# Patient Record
Sex: FEMALE | Race: WHITE | NOT HISPANIC OR LATINO | Employment: STUDENT
[De-identification: names, ages, dates, MRNs, and addresses within clinical notes are randomized per-mention and may not be internally consistent; named-entity substitution may affect disease eponyms.]

---

## 2017-01-13 ENCOUNTER — RECORDS - HEALTHEAST (OUTPATIENT)
Dept: ADMINISTRATIVE | Facility: OTHER | Age: 22
End: 2017-01-13

## 2017-03-19 ENCOUNTER — RECORDS - HEALTHEAST (OUTPATIENT)
Dept: ADMINISTRATIVE | Facility: OTHER | Age: 22
End: 2017-03-19

## 2017-04-27 ENCOUNTER — HOSPITAL ENCOUNTER (OUTPATIENT)
Dept: BEHAVIORAL HEALTH | Facility: CLINIC | Age: 22
Discharge: HOME | End: 2017-04-27
Attending: PSYCHOLOGIST | Admitting: PSYCHOLOGIST
Payer: COMMERCIAL

## 2017-04-27 ENCOUNTER — BEH TREATMENT PLAN (OUTPATIENT)
Dept: BEHAVIORAL HEALTH | Facility: CLINIC | Age: 22
End: 2017-04-27
Attending: PSYCHIATRY & NEUROLOGY

## 2017-04-27 PROCEDURE — 90791 PSYCH DIAGNOSTIC EVALUATION: CPT

## 2017-04-27 ASSESSMENT — PAIN SCALES - GENERAL: PAINLEVEL: MILD PAIN (2)

## 2017-04-27 NOTE — PROGRESS NOTES
"Initial Individual Treatment Plan     Patient: Nehemias Mascorro   MRN: [id number]  : 1995  Age: 21 year old  Sex: female    Diagnostic Assessment Date / Date of Initial Individual Treatment Plan: 17      Immediate Health Concerns:  No immediate health concerns, however, client reported the following: concussion related symptoms including headaches, noise and light sensitivity     Immediate Safety Concerns:  No. Per history, see safety plan.    Identify the issues to be addressed in treatment:  Symptom Management, Personal Safety, Community Resources/Discharge Planning, Develop / Improve Independent Living Skills, Develop Socialization / Interpersonal Relationship Skills and Physical Health     Client Initial Individualized Goals for Treatment: \"To find ways to manage my frustration and depression and anxiety symptoms\".    Initial Treatment suggestions for the client during the time between Diagnostic Assessment and completion of the Individualized Treatment Plan:  Follow Safety Plan   Ask for more information, support and/or assistance as needed.  Follow up with providers/community supports as needed: neurologist and therapist  Report increases or changes in symptoms to staff.  Report any personal safety concerns to staff.   Take medications as prescribed.  Report medication changes and/or side effects to staff.  Attend and participate in groups as scheduled or notify staff if unable to do so.  Report any use of substances to staff as this may impact your symptoms and/or  personal safety.  Notify staff if you have any other issues that need to be addressed. This may include  any current abuse / neglect / exploitation or other vulnerability.  Follow recommendations of your treatment team and discuss concerns if not in  agreement.     Treatment Team Responsible: Day Treatment (DT)      Therapeutic Interventions/Treatment Strategies may include:  Support, Redirection, Feedback, Limit/Boundaries, Safety " Assessments, Structured Activity, Problem Solving, Clarification, Education, Motivational Enhancement and Relapse Prevention as needed.    Xiomara Castro, [facility]

## 2017-04-27 NOTE — PROGRESS NOTES
Acknowledgement of Current Treatment Plan       I have reviewed my treatment plan with my therapist / counselor on 7/26/17. I agree with the plan as it is written in the electronic health record.    Name Signature   Nehemias Mascorro    Name of Therapist / Counselor    Terri Cherry, Therapist

## 2017-04-27 NOTE — PROGRESS NOTES
"MY COPING PLAN FOR SAFETY          Things that are most important to me & reasons for living: My family, I feel like It's not my decision when I die.\"              My relapse Warning Signs: Increase in thoughts, increase in tearfulness, increased anxiety and anger  I will make my environment safer by: nothing required  When in crisis, I will use the following coping skills: (relaxation/self-soothing/distraction/activity):  Ask dad to take me driving, listen to music  I will use My Support System:   Personal Supports: I can ask for reminders, support, or for them to stay with me  Trusted Friend/s:   Alma Rosa Lynn  Family Member/s : Mom and Dad                  Professional Supports: I can ask for med changes, emergency appointments, help  Psychiatrist:    Therapist: David CLIFTON     Other:     Crisis Lines I can call to discuss options and to access support:  [facility]:    [place] ([facility]) [phone]   [place] (Mental Health Crisis Program) [phone]   [place]  [phone]   [place] (COPE) [phone]   [place] [phone]   [place] ([facility] Crisis [facility]) [phone]   [place] ([place], [place], [place], [place], [facility]) [phone]   Other Crisis Lines:   [facility] (Counseling) [phone]   National Suicide Prevention [phone]   Suicide Prevention [phone]      X   I will attend my Treatment Program and talk to my one of my therapists:      X   I agree that I will report any current / recent thoughts, impulses or plans of suicide,           homicide, self injurious behavior or substance use .   X  I agree that I will not act on the above symptoms, but will follow the above plan         instead.  I can also go to the Emergency Department at [facility]: [facility] [phone] or call: 911                                "
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
## 2017-04-28 RX ORDER — NORETHINDRONE ACETATE AND ETHINYL ESTRADIOL 1MG-20(21)
1 KIT ORAL DAILY
COMMUNITY
End: 2022-01-21

## 2017-05-03 ENCOUNTER — HOSPITAL ENCOUNTER (OUTPATIENT)
Dept: BEHAVIORAL HEALTH | Facility: CLINIC | Age: 22
End: 2017-05-03
Attending: PSYCHIATRY & NEUROLOGY
Payer: COMMERCIAL

## 2017-05-03 PROBLEM — F32.9 MAJOR DEPRESSIVE DISORDER: Status: ACTIVE | Noted: 2017-05-03

## 2017-05-03 PROCEDURE — 97150 GROUP THERAPEUTIC PROCEDURES: CPT | Mod: GO

## 2017-05-03 PROCEDURE — H2012 BEHAV HLTH DAY TREAT, PER HR: HCPCS

## 2017-05-03 ASSESSMENT — ANXIETY QUESTIONNAIRES
6. BECOMING EASILY ANNOYED OR IRRITABLE: MORE THAN HALF THE DAYS
7. FEELING AFRAID AS IF SOMETHING AWFUL MIGHT HAPPEN: NOT AT ALL
2. NOT BEING ABLE TO STOP OR CONTROL WORRYING: NOT AT ALL
3. WORRYING TOO MUCH ABOUT DIFFERENT THINGS: SEVERAL DAYS
5. BEING SO RESTLESS THAT IT IS HARD TO SIT STILL: NOT AT ALL
IF YOU CHECKED OFF ANY PROBLEMS ON THIS QUESTIONNAIRE, HOW DIFFICULT HAVE THESE PROBLEMS MADE IT FOR YOU TO DO YOUR WORK, TAKE CARE OF THINGS AT HOME, OR GET ALONG WITH OTHER PEOPLE: SOMEWHAT DIFFICULT
1. FEELING NERVOUS, ANXIOUS, OR ON EDGE: SEVERAL DAYS
GAD7 TOTAL SCORE: 5

## 2017-05-03 ASSESSMENT — PATIENT HEALTH QUESTIONNAIRE - PHQ9: 5. POOR APPETITE OR OVEREATING: SEVERAL DAYS

## 2017-05-04 ASSESSMENT — PATIENT HEALTH QUESTIONNAIRE - PHQ9: SUM OF ALL RESPONSES TO PHQ QUESTIONS 1-9: 11

## 2017-05-04 ASSESSMENT — ANXIETY QUESTIONNAIRES: GAD7 TOTAL SCORE: 5

## 2017-05-04 NOTE — PROGRESS NOTES
"  Adult Mental Health Outpatient Group Therapy Progress Note     Client Initial Individualized Goals for Treatment: \"To find ways to manage my frustration and depression and anxiety symptoms\".    See Initial Treatment suggestions for the client during the time between Diagnostic Assessment and completion of the Master Individualized Treatment Plan.    Treatment Goals:     see above.     Area of Treatment Focus:  Symptom Management, Develop / Improve Independent Living Skills and Develop Socialization / Interpersonal Relationship Skills    Therapeutic Interventions/Treatment Strategies:  Support, Feedback, Safety Assessments, Structured Activity and Problem Solving    Response to Treatment Strategies:  Accepted Feedback, Gave Feedback, Listened, Focused on Goals, Attentive and Accepted Support    Name of Group:  OT Clinic     Description and Outcome:  Pt attended and participated in a structured occupational therapy group where intervention focused on coping through structured hands-on activities to improve function in valued roles, routines, and independent living skills. Client presented to group with an anxious affect. Pt was oriented to the group and verbalized understanding of behavioral expectations in group. Writer provided education re: OT and therapeutic benefits of OT clinic. She initiated a novel multi step task. Fair task focus. Minimal interaction with peers in session. Client would benefit from additional opportunities to practice and implement content from this session. Client addressed ITP goal number initial goal in this session.     Is this a Weekly Review of the Progress on the Treatment Plan?  No          "

## 2017-05-05 ENCOUNTER — HOSPITAL ENCOUNTER (OUTPATIENT)
Dept: BEHAVIORAL HEALTH | Facility: CLINIC | Age: 22
End: 2017-05-05
Attending: PSYCHIATRY & NEUROLOGY
Payer: COMMERCIAL

## 2017-05-05 PROCEDURE — H2012 BEHAV HLTH DAY TREAT, PER HR: HCPCS

## 2017-05-05 PROCEDURE — 97150 GROUP THERAPEUTIC PROCEDURES: CPT | Mod: GO

## 2017-05-05 NOTE — PROGRESS NOTES
"  Adult Mental Health Outpatient Group Therapy Progress Note     Client Initial Individualized Goals for Treatment: \"To find ways to manage my frustration and depression and anxiety symptoms\".     See Initial Treatment suggestions for the client during the time between Diagnostic Assessment and completion of the Master Individualized Treatment Plan.     Treatment Goals:  see above.     Area of Treatment Focus:  Symptom Management and Orientation to program and group psychotherapy.    Therapeutic Interventions/Treatment Strategies:  Support, Feedback and Cognitive Behavioral Therapy    Response to Treatment Strategies:  Accepted Feedback and Gave Feedback    Name of Group:  Group Psychotherapy    Description and Outcome:  Nehemias Bailey) was attending her first day in the program.  She was oriented to group therapy, and the guidelines for participation.  Lebron was offered the opportunity to share with the group.  She said she was feeling anxious about being \"new\" and her feelings were validated by the group.  Lebron talked about her concussion, and the long recovery process (memory and cognitive issues, headaches).  She said her father is a , and she travels with him sometimes.  Lebron shared the challenges with a recent trip to [place] that had undergone recent changes that had upset the dynamic in the group.  She expressed frustration at having to \"act nice\" and how what she has to portray on the outside often conflicts with her inner thoughts and feelings.  The group validated this, and a discussion ensued focused on the roles one has to play in different life situations, and the challenges of \"acting okay\" when one is depressed/anxious.  Lebron offered support to other group members.    Is this a Weekly Review of the Progress on the Treatment Plan?  No          "

## 2017-05-05 NOTE — PROGRESS NOTES
"Mental Health Outpatient Group Therapy Progress Note         Client Initial Individualized Goals for Treatment: \"To find ways to manage my frustration and depression and anxiety symptoms\".     See Initial Treatment suggestions for the client during the time between Diagnostic Assessment and completion of the Master Individualized Treatment Plan.     Treatment Goals:     see above.      Area of Treatment Focus:  Symptom Management, Develop / Improve Independent Living Skills and Develop Socialization / Interpersonal Relationship Skills     Therapeutic Interventions/Treatment Strategies:  Support, Feedback, Safety Assessments, Structured Activity and Problem Solving     Response to Treatment Strategies:  Accepted Feedback, Gave Feedback, Listened, Focused on Goals, Attentive and Accepted Support     Name of Group: Psychotherapy group     Description and Outcome:  Pt attended and participated in the psychotherapy group.  She focused on describing some of her personal story.  This therapist offered validation and support.  She shared about how her work injury has changed her life.  Her life being changed mostly in negative ways.  She was articulate and willing to participate.  She has both anxiety and depression and cannot picture what the rest of her life will look like.      Is this a Weekly Review of the Progress on the Treatment Plan?  No.      Are Treatment Plan Goals being addressed?  Yes, continue treatment goals        Are Treatment Plan Strategies to Address Goals Effective?  Yes, continue treatment strategies        Are there any current contracts in place?  No                                         "

## 2017-05-05 NOTE — PROGRESS NOTES
"  Adult Mental Health Outpatient Group Therapy Progress Note     Client Initial Individualized Goals for Treatment: \"To find ways to manage my frustration and depression and anxiety symptoms\".    See Initial Treatment suggestions for the client during the time between Diagnostic Assessment and completion of the Master Individualized Treatment Plan.    Treatment Goals:     see above.     Area of Treatment Focus:  Symptom Management, Develop / Improve Independent Living Skills and Develop Socialization / Interpersonal Relationship Skills    Therapeutic Interventions/Treatment Strategies:  Support, Feedback, Safety Assessments, Structured Activity and Problem Solving    Response to Treatment Strategies:  Accepted Feedback, Gave Feedback, Listened, Focused on Goals, Attentive and Accepted Support    Name of Group:  OT Clinic     Description and Outcome:  Pt attended and participated in a structured occupational therapy group where intervention focused on coping through structured hands-on activities to improve function in valued roles, routines, and independent living skills. Client presented to group with a calm even affect, engaged in group conversation. She continued work on a multi step project and made good progress. She also discussed with writer that she would like to try painting in the future.  Client would benefit from additional opportunities to practice and implement content from this session. Client addressed initial goal in this session.     Is this a Weekly Review of the Progress on the Treatment Plan?  Yes.      Are Treatment Plan Goals being addressed?  Yes, continue treatment goals      Are Treatment Plan Strategies to Address Goals Effective?  Yes, continue treatment strategies      Are there any current contracts in place?  No                "

## 2017-05-08 ENCOUNTER — HOSPITAL ENCOUNTER (OUTPATIENT)
Dept: BEHAVIORAL HEALTH | Facility: CLINIC | Age: 22
End: 2017-05-08
Attending: PSYCHIATRY & NEUROLOGY
Payer: COMMERCIAL

## 2017-05-08 PROCEDURE — 97150 GROUP THERAPEUTIC PROCEDURES: CPT | Mod: GO

## 2017-05-08 PROCEDURE — H2012 BEHAV HLTH DAY TREAT, PER HR: HCPCS

## 2017-05-08 NOTE — PROGRESS NOTES
"  Adult Mental Health Outpatient Group Therapy Progress Note     Client Initial Individualized Goals for Treatment: \"To find ways to manage my frustration and depression and anxiety symptoms\".     See Initial Treatment suggestions for the client during the time between Diagnostic Assessment and completion of the Master Individualized Treatment Plan.     Treatment Goals:     see above.     Area of Treatment Focus:  Symptom Management, Personal Safety and Develop / Improve Independent Living Skills    Therapeutic Interventions/Treatment Strategies:  Support, Feedback, Safety Assessments, Structured Activity, Clarification and Education    Response to Treatment Strategies:  Accepted Feedback, Gave Feedback, Listened, Focused on Goals, Accepted Support and Alert    Name of Group:   Mental Health Management     Description and Outcome:  Nehemias actively participated in a group discussion focused on possible upcoming topics for this group.  Nehemias identified she would like to learn more about: time management, problem solving, procrastination, feeling overwhelmed, managing anger and aggravation, and sensory based coping skills.  For the remainder of the group, Nehemias actively participated in a structured psycho-educational activity focused on building communication, trust, and group cohesion.  She was active in listening to peers and sharing her own interests.  She had adequate concentration and reported the activity was helpful.         Is this a Weekly Review of the Progress on the Treatment Plan?  No        "

## 2017-05-09 NOTE — PROGRESS NOTES
"  Adult Mental Health Outpatient Group Therapy Progress Note     Client Initial Individualized Goals for Treatment: \"To find ways to manage my frustration and depression and anxiety symptoms\".    See Initial Treatment suggestions for the client during the time between Diagnostic Assessment and completion of the Master Individualized Treatment Plan.    Treatment Goals:     see above.     Area of Treatment Focus:  Symptom Management, Develop / Improve Independent Living Skills and Develop Socialization / Interpersonal Relationship Skills    Therapeutic Interventions/Treatment Strategies:  Support, Feedback, Safety Assessments, Structured Activity and Problem Solving    Response to Treatment Strategies:  Accepted Feedback, Gave Feedback, Listened, Focused on Goals, Attentive and Accepted Support    Name of Group:  OT Clinic     Description and Outcome:  Pt attended and participated in a structured occupational therapy group where intervention focused on coping through structured hands-on activities to improve function in valued roles, routines, and independent living skills. Client had a bright affect during group. Self directed with ongoing task. Client discussed educational and work interests that she has. Reported feeling hopeful to hear stories of people that have made careers out of a particular skill that she has studied. Adequate focus to complete structured task in session. Client demonstrated understanding of session content by increase independence in clinic. Client addressed ITP goal number initial goal in this session.    Is this a Weekly Review of the Progress on the Treatment Plan?  No          "

## 2017-05-09 NOTE — PROGRESS NOTES
"Adult Mental Health Outpatient Group Therapy Progress Note         Client Initial Individualized Goals for Treatment: \"To find ways to manage my frustration and depression and anxiety symptoms\".      See Initial Treatment suggestions for the client during the time between Diagnostic Assessment and completion of the Master Individualized Treatment Plan.      Treatment Goals:      see above.      Area of Treatment Focus:  Symptom Management, Personal Safety and Develop / Improve Independent Living Skills     Therapeutic Interventions/Treatment Strategies:  Support, Feedback, Safety Assessments, Structured Activity, Clarification and Education     Response to Treatment Strategies:  Accepted Feedback, Gave Feedback, Listened, Focused on Goals, Accepted Support and Alert     Name of Group:   Group Psychotherapy       Description and Outcome:  Nehemias actively participated in the group discussion today.  At first she was reporting that she was not feeling all that well.  Then she shared that she was thinking about death over the weekend.  She also felt like she had more anxiety over the weekend and she reported having panic attacks.  When she was feeling suicidal she did tell her mother. Her mother is supportive and validating.  Her mother reminded her that she would not always feel as bad as she does now.  Not knowing what her future will be like, is  hard for her.  She feels out of control and everyday it seems like others are telling her that something else is wrong with her.  This writer asked her about trying to stay more in the present moment.  She can look back at the past, think about the future but to worry about the future is obviously not helpful for her because she always goes to the negative, She demonstrates catastrophic thinking.  Her safety plan was also brought to her attention.  Her safety was assessed during the group process.  She committed to stay safe and to tell someone if she had increasing " suicidal ideation with a plan.        Is this a Weekly Review of the Progress on the Treatment Plan?  No

## 2017-05-10 ENCOUNTER — HOSPITAL ENCOUNTER (OUTPATIENT)
Dept: BEHAVIORAL HEALTH | Facility: CLINIC | Age: 22
End: 2017-05-10
Attending: PSYCHIATRY & NEUROLOGY
Payer: COMMERCIAL

## 2017-05-10 PROCEDURE — H2012 BEHAV HLTH DAY TREAT, PER HR: HCPCS

## 2017-05-10 PROCEDURE — 97150 GROUP THERAPEUTIC PROCEDURES: CPT | Mod: GO

## 2017-05-12 NOTE — PROGRESS NOTES
"  Adult Mental Health Outpatient Group Therapy Progress Note     Client Initial Individualized Goals for Treatment: \"To find ways to manage my frustration and depression and anxiety symptoms\".    See Initial Treatment suggestions for the client during the time between Diagnostic Assessment and completion of the Master Individualized Treatment Plan.    Treatment Goals:     see above.     Area of Treatment Focus:  Symptom Management, Develop / Improve Independent Living Skills and Develop Socialization / Interpersonal Relationship Skills    Therapeutic Interventions/Treatment Strategies:  Support, Feedback, Safety Assessments, Structured Activity and Problem Solving    Response to Treatment Strategies:  Accepted Feedback, Gave Feedback, Listened, Focused on Goals, Attentive and Accepted Support    Name of Group:  OT Clinic     Description and Outcome:  Pt attended and participated in a structured occupational therapy group where intervention focused on coping through structured hands-on activities to improve function in valued roles, routines, and independent living skills. Client had a calm, even affect in session. She assertively sought assistance to locate needed materials in session. She demonstrated good problem solving to overcome a barrier that arose during task. Adequate task focus. Client demonstrated understanding of session content by improved independence in session. Client addressed ITP goal number initial goal in this session.     Is this a Weekly Review of the Progress on the Treatment Plan?  No          "

## 2017-05-15 ENCOUNTER — HOSPITAL ENCOUNTER (OUTPATIENT)
Dept: BEHAVIORAL HEALTH | Facility: CLINIC | Age: 22
End: 2017-05-15
Attending: PSYCHIATRY & NEUROLOGY
Payer: COMMERCIAL

## 2017-05-15 PROCEDURE — 97150 GROUP THERAPEUTIC PROCEDURES: CPT | Mod: GO

## 2017-05-15 PROCEDURE — H2012 BEHAV HLTH DAY TREAT, PER HR: HCPCS

## 2017-05-16 NOTE — PROGRESS NOTES
"Adult Mental Health Outpatient Group Therapy Progress Note         Client Initial Individualized Goals for Treatment: \"To find ways to manage my frustration and depression and anxiety symptoms\".      See Initial Treatment suggestions for the client during the time between Diagnostic Assessment and completion of the Master Individualized Treatment Plan.      Treatment Goals:      see above.      Area of Treatment Focus:  Symptom Management, Personal Safety and Develop / Improve Independent Living Skills     Therapeutic Interventions/Treatment Strategies:  Support, Feedback, Safety Assessments, Structured Activity, Clarification and Education     Response to Treatment Strategies:  Accepted Feedback, Gave Feedback, Listened, Focused on Goals, Accepted Support and Alert     Name of Group:   Group Psychotherapy       Description and Outcome:  Nehemias was feeling ill again today.  She does stay in the group but struggles as she participates.  She is reporting that her sense of smell has increased and some of the group rooms bother her.  She continues to report both headaches and stomach pain.  She generally looks uncomfortable.  When she is offered a weighted blanket she accepts it.  She states that the blanket is very helpful for her.  Her weekend was good with family celebration of Mother's day and always going to a [employer] service.  She shared with the group that she is having nightmares about school.  She knows others that are graduating and she is starting to realize that her schooling will be affected by her injury at work and the complications caused by it.  When she thinks about getting behind it does affect her self esteem and is causing her to feel depressed.  When she thinks about her future she also gets anxious.  Today she also mentioned some resentment towards her parents.  She is the middle child and has always done really well.  She is starting to realize that her parents have left her to own devices " because she has been so reliable in the past.  She is not always feeling validated when she is having increased negative feelings.  Being in a negative place is new for her and her family.  She having passive suicidal ideation but today could contract for safety.  The group validated her feelings and offered her support.      Is this a Weekly Review of the Progress on the Treatment Plan?  Yes

## 2017-05-16 NOTE — PROGRESS NOTES
"  Adult Mental Health Outpatient Group Therapy Progress Note     Client Initial Individualized Goals for Treatment: \"To find ways to manage my frustration and depression and anxiety symptoms\".     See Initial Treatment suggestions for the client during the time between Diagnostic Assessment and completion of the Master Individualized Treatment Plan.     Treatment Goals:     see above.     Area of Treatment Focus:  Symptom Management, Personal Safety and Develop / Improve Independent Living Skills    Therapeutic Interventions/Treatment Strategies:  Support, Feedback, Safety Assessments, Structured Activity, Clarification and Education    Response to Treatment Strategies:  Accepted Feedback, Gave Feedback, Listened, Focused on Goals, Accepted Support and Alert    Name of Group:   Mental Health Management     Description and Outcome:  Nehemias actively participated in a psycho-educational group discussion and written activity focused on increasing awareness and insight into her strengths and difficulties in communicating with others.  Nehemias discussed how sometimes family dynamics influence how she communicates with others.  She shared examples of cognitive distortions she has at times as well.  She was active in giving feedback to others and she seemed receptive to feedback from peers. Concentration was fair.     Is this a Weekly Review of the Progress on the Treatment Plan?  No        "

## 2017-05-17 ENCOUNTER — HOSPITAL ENCOUNTER (OUTPATIENT)
Dept: BEHAVIORAL HEALTH | Facility: CLINIC | Age: 22
End: 2017-05-17
Attending: PSYCHIATRY & NEUROLOGY
Payer: COMMERCIAL

## 2017-05-17 PROCEDURE — H2012 BEHAV HLTH DAY TREAT, PER HR: HCPCS

## 2017-05-17 PROCEDURE — 97150 GROUP THERAPEUTIC PROCEDURES: CPT | Mod: GO

## 2017-05-17 NOTE — PROGRESS NOTES
"Adult Mental Health Outpatient Group Therapy Progress Note         Client Initial Individualized Goals for Treatment: \"To find ways to manage my frustration and depression and anxiety symptoms\".      See Initial Treatment suggestions for the client during the time between Diagnostic Assessment and completion of the Master Individualized Treatment Plan.      Treatment Goals:      see above.      Area of Treatment Focus:  Symptom Management, Personal Safety and Develop / Improve Independent Living Skills     Therapeutic Interventions/Treatment Strategies:  Support, Feedback, Safety Assessments, Structured Activity, Clarification and Education     Response to Treatment Strategies:  Accepted Feedback, Gave Feedback, Listened, Focused on Goals, Accepted Support and Alert     Name of Group:   Group Psychotherapy       Description and Outcome:  Nehemias was feeling ill again today.  She does stay in the group but struggles as she participates.  She is reporting that today she feels physically agitated.  She continues to report both headaches and stomach pain.  She generally looks uncomfortable.  When she is offered a weighted blanket she accepts it.  She states that the blanket is very helpful for her. She is reporting that she is having very scary dreams at night.  She is still dreaming about people at her school wanting to kill her.  This therapist asked some questions about medications that she is taking and then did brief education about dreams in general.  She was asked to keep a journal of her dreams if possible.  She also reported feeling guilty for having all these issues related to her injury.  She is having passive suicidal ideation but today could contract for safety.  The group validated her feelings and offered her support.      Is this a Weekly Review of the Progress on the Treatment Plan?  No.                                 "

## 2017-05-17 NOTE — PROGRESS NOTES
"  Adult Mental Health Outpatient Group Therapy Progress Note     Client Initial Individualized Goals for Treatment: \"To find ways to manage my frustration and depression and anxiety symptoms\".    See Initial Treatment suggestions for the client during the time between Diagnostic Assessment and completion of the Master Individualized Treatment Plan.    Treatment Goals:     see above.     Area of Treatment Focus:  Symptom Management, Develop / Improve Independent Living Skills and Develop Socialization / Interpersonal Relationship Skills    Therapeutic Interventions/Treatment Strategies:  Support, Feedback, Safety Assessments, Structured Activity and Problem Solving    Response to Treatment Strategies:  Accepted Feedback, Gave Feedback, Listened, Focused on Goals, Attentive and Accepted Support    Name of Group:  OT Clinic     Description and Outcome:  Pt attended and participated in a structured occupational therapy group where intervention focused on coping through structured hands-on activities to improve function in valued roles, routines, and independent living skills. Client presented to group with a clam, even affect. Reported that she had \"a lot of family activities\" over the weekend. She reported that she is feeling low energy and low motivation today d/t weekend activities. She discussed strategies to address this such as energy conservation techniques and pacing activities when she knows she will high demand times. She attended to novel activity with variable task focus. Receptive to feedback from writer. Client would benefit from additional opportunities to practice and implement content from this session. Client addressed ITP goal number initial goal in this session.     Is this a Weekly Review of the Progress on the Treatment Plan?  No          "

## 2017-05-19 ENCOUNTER — HOSPITAL ENCOUNTER (OUTPATIENT)
Dept: BEHAVIORAL HEALTH | Facility: CLINIC | Age: 22
End: 2017-05-19
Attending: PSYCHIATRY & NEUROLOGY
Payer: COMMERCIAL

## 2017-05-19 VITALS — HEIGHT: 66 IN | BODY MASS INDEX: 36.8 KG/M2 | WEIGHT: 229 LBS

## 2017-05-19 PROCEDURE — H2012 BEHAV HLTH DAY TREAT, PER HR: HCPCS

## 2017-05-19 PROCEDURE — 97150 GROUP THERAPEUTIC PROCEDURES: CPT | Mod: GO

## 2017-05-19 NOTE — PROGRESS NOTES
"  Adult Mental Health Outpatient Group Therapy Progress Note     Client Initial Individualized Goals for Treatment: \"To find ways to manage my frustration and depression and anxiety symptoms\".    See Initial Treatment suggestions for the client during the time between Diagnostic Assessment and completion of the Master Individualized Treatment Plan.    Treatment Goals:     see above.     Area of Treatment Focus:  Symptom Management, Develop / Improve Independent Living Skills and Develop Socialization / Interpersonal Relationship Skills    Therapeutic Interventions/Treatment Strategies:  Support, Feedback, Safety Assessments, Structured Activity and Problem Solving    Response to Treatment Strategies:  Accepted Feedback, Gave Feedback, Listened, Focused on Goals, Attentive and Accepted Support    Name of Group:  OT Clinic     Description and Outcome:  Pt attended and participated in a structured occupational therapy group where intervention focused on coping through structured hands-on activities to improve function in valued roles, routines, and independent living skills. Client had a calm affect in group. Reported low energy at beginning of session. Improved with participation in structured task. Encouraged client to take breaks throughout task participation. Client reported this is challenging for her because she \"wants to see the finished outcome\". Working on appreciating and being present with the process in addition to the product. Client would benefit from additional opportunities to practice and implement content from this session. Client addressed ITP goal number initial goal in this session.     Is this a Weekly Review of the Progress on the Treatment Plan?  No          "

## 2017-05-19 NOTE — PROGRESS NOTES
Psychiatry staffing: case discussed  Diagnosis:  MDD, here about 2 weeks.  Working on managing sx of depression.  Concussion in September.    Current Outpatient Prescriptions   Medication     norethindrone-ethinyl estradiol (JUNEL FE 1/20) 1-20 MG-MCG per tablet     Fexofenadine HCl (ALLEGRA PO)     TIZANIDINE HCL PO     Venlafaxine HCl (EFFEXOR PO)     IBUPROFEN PO     ONDANSETRON PO     DIAZEPAM PO     GUANFACINE HCL PO     topiramate (TOPAMAX) 25 MG tablet     Lansoprazole (PREVACID PO)     AMITRIPTYLINE HCL PO     cetirizine (ZYRTEC) 10 MG tablet     Ipratropium-Albuterol (COMBIVENT RESPIMAT)  MCG/ACT inhaler     Probiotic Product (PROBIOTIC DAILY PO)     No current facility-administered medications for this encounter.      Past Medical History:   Diagnosis Date     Depressive disorder      Uncomplicated asthma

## 2017-05-19 NOTE — PROGRESS NOTES
"Adult Mental Health Outpatient Group Therapy Progress Note   Client Initial Individualized Goals for Treatment: \"To find ways to manage my frustration and depression and anxiety symptoms\".     See Initial Treatment suggestions for the client during the time between Diagnostic Assessment and completion of the Master Individualized Treatment Plan.     Treatment Goals:     see above.     Area of Treatment Focus:  Symptom Management, Personal Safety and Develop / Improve Independent Living Skills     Therapeutic Interventions/Treatment Strategies:  Support, Feedback, Safety Assessments, Structured Activity, Clarification and Education     Response to Treatment Strategies:  Accepted Feedback, Gave Feedback, Listened, Focused on Goals, Accepted Support and Alert     Name of Group:   Group Psychotherapy        Description and Outcome:  Nehemias reported being safe today.  She reported feeling suicidal and went to her room to be alone, but messaged her friend Marty, who has helped her problem-solve during these situations.  She reported that the stayed in bed, but felt physically tired and needed sleep.  She reported problems with panic attacks and talked with other group members about it.  She stated that she will meet with a Girl's Group on Saturday, and they are 12-15 years old, and they will go to lunch. She stated that one of the girls was insulted about her parents, after the death of her dad, and Nehemias felt that she needed support from the other young girls. She talked with the group about the situation.       Is this a Weekly Review of the Progress on the Treatment Plan?  Yes.      Are Treatment Plan Goals being addressed?  Yes, continue treatment goals      Are Treatment Plan Strategies to Address Goals Effective?  Yes, continue treatment strategies      Are there any current contracts in place?  No                    "

## 2017-05-19 NOTE — PROGRESS NOTES
RN Review of Medical History / Physical Health Screen  Outpatient Behavioral Programs      CLIENT'S NAME: Nehemias Mascorro  MRN:   [id number]  :   1995 AGE:21 year old SEX: female    DATE OF DIAGNOSTIC ASSESSMENT: 17  DATE OF ADMISSION: 5/3/17   PROGRAM: Day Treatment (DT)      Following admission, the RN reviewed the following:    - Medical History / Physical Health Screen completed during the DA noted above.  - Immediate Health Concerns as noted on the Initial Individual Treatment Plan.    Client height and weight recorded by RN in epic: yes    BMI Review:  Was the patient informed of BMI? yes      Findings  37.04 Above,  General nutrition education       RN Recommendations include: RN to provide education about BMI and refer to PCP for further assessment as needed.    Luis Serrano  2017

## 2017-05-22 ENCOUNTER — HOSPITAL ENCOUNTER (OUTPATIENT)
Dept: BEHAVIORAL HEALTH | Facility: CLINIC | Age: 22
End: 2017-05-22
Attending: PSYCHIATRY & NEUROLOGY
Payer: COMMERCIAL

## 2017-05-22 PROCEDURE — H2012 BEHAV HLTH DAY TREAT, PER HR: HCPCS

## 2017-05-22 PROCEDURE — 97150 GROUP THERAPEUTIC PROCEDURES: CPT | Mod: GO

## 2017-05-23 NOTE — PROGRESS NOTES
"  Adult Mental Health Outpatient Group Therapy Progress Note     Client Initial Individualized Goals for Treatment: \"To find ways to manage my frustration and depression and anxiety symptoms\".    See Initial Treatment suggestions for the client during the time between Diagnostic Assessment and completion of the Master Individualized Treatment Plan.    Treatment Goals:     see above.     Area of Treatment Focus:  Symptom Management, Develop / Improve Independent Living Skills and Develop Socialization / Interpersonal Relationship Skills    Therapeutic Interventions/Treatment Strategies:  Support, Feedback, Safety Assessments, Structured Activity and Problem Solving    Response to Treatment Strategies:  Accepted Feedback, Gave Feedback, Listened, Focused on Goals, Attentive and Accepted Support    Name of Group:  OT Clinic     Description and Outcome:  Pt attended and participated in a structured occupational therapy group where intervention focused on coping through structured hands-on activities to improve function in valued roles, routines, and independent living skills. Client presented to group with a calm, even affect. She reported that she had a positive weekend with family visiting from out of town. She reported that she had adequate energy level to socialize with them. She spent some time working on a beading hobby over the weekend, and did not take breaks independently and had a headache afterwards. Took breaks during session with verbal cues. Client would benefit from additional opportunities to practice and implement content from this session. Client addressed ITP goal number initial goal in this session.     Is this a Weekly Review of the Progress on the Treatment Plan?  No          "

## 2017-05-23 NOTE — PROGRESS NOTES
"  Adult Mental Health Outpatient Group Therapy Progress Note     Client Initial Individualized Goals for Treatment: \"To find ways to manage my frustration and depression and anxiety symptoms\".    See Initial Treatment suggestions for the client during the time between Diagnostic Assessment and completion of the Master Individualized Treatment Plan.    Treatment Goals:     see above.     Area of Treatment Focus:  Symptom Management, Develop / Improve Independent Living Skills and Develop Socialization / Interpersonal Relationship Skills    Therapeutic Interventions/Treatment Strategies:  Support, Feedback, Safety Assessments, Structured Activity and Problem Solving    Response to Treatment Strategies:  Accepted Feedback, Gave Feedback, Listened, Focused on Goals, Attentive and Accepted Support    Name of Group:  OT Clinic     Description and Outcome:  Pt attended and participated in a structured occupational therapy group where intervention focused on coping through structured hands-on activities to improve function in valued roles, routines, and independent living skills. Client had full range of affect in session. Self directed with an ongoing goal focused activity. She had difficulty accepting imperfections in task outcome. She problem solved an error in project with mod encouragement from writer. Needed verbal cues to take a break when needed. Client reports she has difficulty pacing activities in most aspects of her life. Client would benefit from additional opportunities to practice and implement content from this session. Client addressed ITP goal number initial goal in this session.    Is this a Weekly Review of the Progress on the Treatment Plan?  Yes.      Are Treatment Plan Goals being addressed?  Yes, continue treatment goals      Are Treatment Plan Strategies to Address Goals Effective?  Yes, continue treatment strategies      Are there any current contracts in place?  No              "

## 2017-05-23 NOTE — PROGRESS NOTES
Occupational Therapy Assessment     Patient: Nehemias Mascorro    MRN: [id number]     :1995    Age: 21 year old    Sex:female     Assessment     Mood: Euthymic    Affect: Congruent to mood    Thought Content:  Ruminative    Verbal Content: No observed deficiencies    Concentration: Inconsistent    Energy Level:  Low  Lethargic/somnolent    Memory:  Continue to assess    Following Directions: Independently followa 1-step directions    Decision Making:  Needs choices limited    Motivation/Procrastination:  Independently initiates activities    Planning & Problem Solving:  Needs assistance  Needs structure    Judgment:  Recognizes errors    Frustration/Stress Management:  Independently identifies stressors/symptoms  Needs assistance to manage frustrations/identify & apply coping skills    Self Awareness:  Demonstrates/expresses lack of confidence in abilities  Demonstrates/expresses perfection    Interpersonal Skills: Difficulty/ineffective in expressing feelings  Demonstrates/reports difficulty with trust /personal boundaries  Reports that energy level interferes with her interpersonal functioning    Social Supports:  Identifies adequate social supports    Time Management:  Needs assistance to organize use of time and/or structure daily activities effectively    Leisure:  Difficulty planning & following through with leisure activities  Has difficulty pacing activities and consequently struggles with fatigue    Self-Care:  Needs assistance to establish plan for adequate nutrition, sleep, exercise, ADL's    Home Management:  Dependent on others for managing meal preparation, cleaning, laundry, organization, personal, financial paperowrk    Community Resources:  Needs assisstnace to identify & follow-through with use of resources & transportation    Employment & Education:  Plans to return to work/school  Has been on leave after accident at work resulting in concussion/possible  "TBI    Additional Comments/Plan of Treatment Functional Goals:  Writer provided an orientation to OT Life Skills group and clinic and administered the [place] Occupational Performance Measure (COPM). The COPM is an individualized measure designed to detect self-perceived change in occupational performance over time. Using a likert scale of 1-10 with 1 being low and 10 being high, results below for client's self-perceptions in occupational areas that are important to them.     Radharobroyal identified the following growth areas regarding their occupational performance.    1. Pacing activities/scheduling breaks  2. Building tolerance for focused activities  3. Her \"social battery\"/energy level during social events  4. Sensory education/learning about sensitivities    In collaboration with occupational therapist, Nehemias assessed their perceived performance and satisfaction in the above listed growth areas using the likert scale referenced previously and collaborated with therapist to develop life skills goal focused on exploring strategies to help pace activities to reduce fatigue.    COPM initial results (mean performance and satisfaction scores):    Performance score: 4.0     Satisfaction score: 2.2      OTR/L Signature: RANDY Mondragon/L    Date/Time: 5/23/2017    "

## 2017-05-24 ENCOUNTER — HOSPITAL ENCOUNTER (OUTPATIENT)
Dept: BEHAVIORAL HEALTH | Facility: CLINIC | Age: 22
End: 2017-05-24
Attending: PSYCHIATRY & NEUROLOGY
Payer: COMMERCIAL

## 2017-05-24 PROCEDURE — H2012 BEHAV HLTH DAY TREAT, PER HR: HCPCS

## 2017-05-24 PROCEDURE — 97150 GROUP THERAPEUTIC PROCEDURES: CPT | Mod: GO

## 2017-05-24 NOTE — TREATMENT PLAN
Individualized Treatment Plan     Date of Plan: 2017    Name: Nehemias Mascorro MRN: [id number]    : 1995    Programs:  Day Treatment (DT)     Clinical Track (if applicable):  1A    DSM5 Diagnosis  296.22 Major Depressive Disorder, Single Episode, Moderate _ and With anxious distress  Adjustment Disorders  309.28 (F43.23) With mixed anxiety and depressed mood    Team Members Contributing to Plan:  Miesha Barroso, Julia Escamilla  and Luis Serrano    Client Strengths:  caring, committed to sobriety, creative, educated, empathetic, goal-focused, good listener, insightful, intelligent, open to learning, open to suggestions / feedback, support of family, friends and providers, supportive, wants to learn, willing to ask questions, willing to relate to others, and a history of working     Client Participation in Plan:  Contributed to goals and plan   Attended individual treatment plan meeting on 17, 17, 17  Agrees with plan   Received copy of treatment plan     Areas of Vulnerability:  Suicidal Ideation  Anxiety with/without panic attacks  Depressive symptoms  Physical/medical concerns     Long-Term Goals:  Knowledge about illness and management of symptoms   Maintenance of personal safety   Effective management of impulsivity     Abuse Prevention Plan:  Safe, therapeutic environment   Safety coping plan as needed   Education regarding illness and skill development   Coordination with care providers   Impluse control education and intervention     Discharge Criteria:  Satisfactory progress toward treatment goals   Improvement re: identified problems and symptoms   Ability to continue recovery at next level of service   Has a discharge plan in place   Has safety/coping plan in place   Regular attendance as scheduled     Tentative Discharge date: 17    Areas of Treatment Focus      Area of Treatment Focus:   Personal Safety  Start Date:    17    Goal:  Target Date: 17, 17 Status:  Active  Client will notify staff when needing assistance to develop or implement a coping plan to manage suicidal or self injurious urges.      Progress:  6/21/17: Nehemias identifies some self harm urges and is encouraged to talk to team further if needed. Identified using ice, rubber bands, or distracting activities using her hands.  7/26/17: Nehemias feels her SIB urges have been somewhat better along with improvement in negative thinking and ruminating. After discharge she will talk to a good friend or call a crisis line if she is struggles with urges to hurt herself.        Treatment Strategies:   Assess / reassess level of potential for harm to self or others  Engage in safety planning when indicated  Facilitate increased self awareness  Teach adaptive coping skills and communication skills  Use reality based supportive approach        Area of Treatment Focus:   Symptom Stabilization and Management  Start Date:    5/24/17    Goal:  Target Date: 6/21/17, 7/26/17 Status: Gunner Del Cid will utilize time in OT clinic to select structured, paced activities and taking breaks as needed.      Progress:  6/21/17: Nehemias has been setting alarms and leaving groups to take a break and walk a needed.  7/26/17: Nehemias has been doing better about taking less breaks and being able to focus on a project much longer than when she started OT. She is encouraged to work on projects at home or even find a Community Ed class to continue working on projects in a classroom setting to continue working on her focus and concentration.        Treatment Strategies:   Facilitate increased self awareness  Provide feedback about social skills  Teach adaptive coping skills and communication skills  Use reality based supportive approach      Area of Treatment Focus:   Symptom Stabilization and Management  Start Date:    5/24/17    Goal:  Target Date: 6/21/17, 7/26/17 Status: Gunner Del Cid will use time in OT to identify  and explore strategies to manage sensory sensitivities.      Progress:  6/21/17:  Nehemias will continue to work with OT to identify and manage sensory issues.  7/26/17:  Nehemias was given some homework to work on regarding sensory sensitivities and she still has yet to work on this.        Treatment Strategies:   Provide education regarding sensory education  Teach adaptive coping skills and communication skills  Use reality based supportive approach      Area of Treatment Focus:   Symptom Stabilization and Management  Start Date:    5/24/17    Goal:  Target Date: 6/21/17, 726/17 Status: Active  Nehemias will use time in Group Therapy to discuss strategies to help manage emotional dysregulation.      Progress:   6/21/17: Nehemias is doing a good job in group therapy to identify stressors and alternative ways to manage her dysregulated mood.  7/26/17:  Nehemias has been using group therapy well, she did a good job of tying her good mood to a weekend spent with friends, she still feels this is a goal to continue to work on, she was encouraged to call and schedule a DBT intake with [facility] so she can learn concrete skills for emotional dysregulation.    Treatment Strategies:   Facilitate increased self awareness  Teach adaptive coping skills and communication skills  Use reality based supportive approach      Area of Treatment Focus:   Community Resources / Support and Discharge Planning  Start Date:    5/24/17    Goal:  Target Date: 6/21/17, 7/26/17 Status: Active  Nehemias will increase her community support by scheduling with a Psychiatric prescriber and identifying what an aftercare plan might look like for ongoing support.      Progress:   6/21/17: Nehemias just established care with psychiatric prescriber Dr Thornton at [facility], RN will provide DBT resources to client and was encourged to check out DBT at [facility].  7/26/17:  Nehemias would like to switch Psychiatric providers  at [facility] and needs to call to do that, she sees her therapist 7/27/17 and will continue to see every other week, she will call to schedule a DBT intake at [facility], and Will go the the [facility] connection group Tues 8/1/17, she will also be taking online classes beginning Aug/Sept at [facility], and is encouraged to check into community education classes for added structure.    Treatment Strategies:   Facilitate increased self awareness  Provide education regarding Community Resources

## 2017-05-24 NOTE — PROGRESS NOTES
"  Adult Mental Health Outpatient Group Therapy Progress Note     Client Initial Individualized Goals for Treatment: \"To find ways to manage my frustration and depression and anxiety symptoms\".     See Initial Treatment suggestions for the client during the time between Diagnostic Assessment and completion of the Master Individualized Treatment Plan.     Treatment Goals:     see above.     Area of Treatment Focus:  Symptom Management, Personal Safety and Develop / Improve Independent Living Skills    Therapeutic Interventions/Treatment Strategies:  Support, Feedback, Safety Assessments, Structured Activity, Problem Solving, Clarification and Education    Response to Treatment Strategies:  Accepted Feedback, Gave Feedback, Listened, Accepted Support and Alert    Name of Group:   Mental Health Management     Description and Outcome:  Nehemias actively participated in a discussion and structured psycho-educational group on using focused activities to help with mindfulness, self regulation and managing symptoms/stressors in a healthy way.  Nehemias was able to identify a couple of activities she already engages in such as coloring but also reported interest in expanding her options.  Nehemias spent time in group exploring an unfamiliar activity but reported frustration with this due to her perfectionistic thinking.  We discussed some ways to challenge this thinking and she was receptive to this idea.  Reported she \"had the best weekend in a long time\" and felt proud about managing her emotions.  She had fair concentration.  Mood was even. Needs continued work on exploring mindful focused activities while effectively managing her perfectionistic thinking.     Is this a Weekly Review of the Progress on the Treatment Plan?  No        "

## 2017-05-26 ENCOUNTER — HOSPITAL ENCOUNTER (OUTPATIENT)
Dept: BEHAVIORAL HEALTH | Facility: CLINIC | Age: 22
End: 2017-05-26
Attending: PSYCHIATRY & NEUROLOGY
Payer: COMMERCIAL

## 2017-05-26 PROCEDURE — 97150 GROUP THERAPEUTIC PROCEDURES: CPT | Mod: GO

## 2017-05-26 PROCEDURE — H2012 BEHAV HLTH DAY TREAT, PER HR: HCPCS

## 2017-05-26 NOTE — PROGRESS NOTES
"  Adult Mental Health Outpatient Group Therapy Progress Note     Client Initial Individualized Goals for Treatment: \"To find ways to manage my frustration and depression and anxiety symptoms\".    See Initial Treatment suggestions for the client during the time between Diagnostic Assessment and completion of the Master Individualized Treatment Plan.    Treatment Goals:     see above.     Area of Treatment Focus:  Symptom Management, Develop / Improve Independent Living Skills and Develop Socialization / Interpersonal Relationship Skills    Therapeutic Interventions/Treatment Strategies:  Support, Feedback, Safety Assessments, Structured Activity and Problem Solving    Response to Treatment Strategies:  Accepted Feedback, Gave Feedback, Listened, Focused on Goals, Attentive and Accepted Support    Name of Group:  OT Clinic     Description and Outcome:  Pt attended and participated in a structured occupational therapy group where intervention focused on coping through structured hands-on activities to improve function in valued roles, routines, and independent living skills. Client had a calm, even affect during session. She was self directed with an ongoing goal focused activity. Demonstrated good task focus. Writer checked in on two separate occasions to cue client to take a break from activity. Both times client reported feeling okay and that she did not need a break. Client would benefit from additional opportunities to practice and implement content from this session. Client addressed ITP goal number initial goal in this session.    Is this a Weekly Review of the Progress on the Treatment Plan?  No          "

## 2017-05-31 ENCOUNTER — HOSPITAL ENCOUNTER (OUTPATIENT)
Dept: BEHAVIORAL HEALTH | Facility: CLINIC | Age: 22
End: 2017-05-31
Attending: PSYCHIATRY & NEUROLOGY
Payer: COMMERCIAL

## 2017-05-31 PROCEDURE — H2012 BEHAV HLTH DAY TREAT, PER HR: HCPCS

## 2017-05-31 PROCEDURE — 97150 GROUP THERAPEUTIC PROCEDURES: CPT | Mod: GO

## 2017-05-31 NOTE — PROGRESS NOTES
"  Adult Mental Health Outpatient Group Therapy Progress Note     Client Initial Individualized Goals for Treatment: \"To find ways to manage my frustration and depression and anxiety symptoms\".    See Initial Treatment suggestions for the client during the time between Diagnostic Assessment and completion of the Master Individualized Treatment Plan.    Treatment Goals:    1. Client will notify staff when needing assistance to develop or implement a coping plan to manage suicidal or self injurious urges.    2. Nehemais will utilize time in OT clinic to select structured, paced activities and taking breaks as needed.    3. Nehemias will use time in OT to identify and explore strategies to manage sensory sensitivities.    4. Nehemias will use time in Group Therapy to discuss strategies to help manage emotional dysregulation.    5. Nehemias will increase her community support by scheduling with a Psychiatric prescriber and identifying what an aftercare plan might look like for ongoing support.     Area of Treatment Focus:  Symptom Management, Develop / Improve Independent Living Skills and Develop Socialization / Interpersonal Relationship Skills    Therapeutic Interventions/Treatment Strategies:  Support, Feedback, Safety Assessments, Structured Activity and Problem Solving    Response to Treatment Strategies:  Accepted Feedback, Gave Feedback, Listened, Focused on Goals, Attentive and Accepted Support    Name of Group:  OT Clinic     Description and Outcome:  Pt attended and participated in a structured occupational therapy group where intervention focused on coping through structured hands-on activities to improve function in valued roles, routines, and independent living skills. Client presented to group with a calm, even affect. She was self directed with an ongoing goal focused activity. She needed occasional cues to pace herself during task participation. Client verbalized understanding of session content " by reporting improved mood and improved motivation through participation in meaningful activity. Client addressed ITP goal number 2 in this session.     Is this a Weekly Review of the Progress on the Treatment Plan?  Yes.      Are Treatment Plan Goals being addressed?  Yes, continue treatment goals      Are Treatment Plan Strategies to Address Goals Effective?  Yes, continue treatment strategies      Are there any current contracts in place?  No

## 2017-06-01 NOTE — PROGRESS NOTES
"Adult Mental Health Outpatient Group Therapy Progress Note   Client Initial Individualized Goals for Treatment: \"To find ways to manage my frustration and depression and anxiety symptoms\".     See Initial Treatment suggestions for the client during the time between Diagnostic Assessment and completion of the Master Individualized Treatment Plan.     Treatment Goals:     1. Client will notify staff when needing assistance to develop or implement a coping plan to manage suicidal or self injurious urges.     2. Nehemias will utilize time in OT clinic to select structured, paced activities and taking breaks as needed.     3. Nehemias will use time in OT to identify and explore strategies to manage sensory sensitivities.     4. Nehemias will use time in Group Therapy to discuss strategies to help manage emotional dysregulation.     5. Nehemias will increase her community support by scheduling with a Psychiatric prescriber and identifying what an aftercare plan might look like for ongoing support.        Area of Treatment Focus:  Symptom Management, Personal Safety and Community Resources/Discharge Planning    Therapeutic Interventions/Treatment Strategies:  Support, Feedback, Safety Assessments, Problem Solving, Clarification, Education and Cognitive Behavioral Therapy    Response to Treatment Strategies:  Accepted Feedback, Listened, Attentive, Accepted Support, Alert and Was able to stay in group    Name of Group:  Psychotherapy     Description and Outcome:  At first the client was very anxious and asked if she could go first.  When asked about her anxiety she said she was not doing well and may have to take a break.  Over the weekend she felt more irritable towards her mother again.  They do a lot of talking and it turns into arguments.  This weekend she became so angry that she left home and went to have a drink.  She was concerned because she does not normally drink alcohol.  She was so upset that she could " not figure out what to do.  Then she felt really bad and still is feeling guilty about her behaviors.  Again this writer reflected back to her about how hard it can be to experience one's emotions and specifically the negative ones.  She is concerned about hurting others so she is turning her feelings inward.  She did not want to drink and did not even like the experience.  She was reminded that negative emotions will occur but that she can handle even the hardest of these emotions when using coping skills.  She admitted that it is getting harder when she feels anger towards someone else.  She thinks about dying still but is working hard to be honest and to ask for help.  She does not want to die but her life has become more stressful over the last few months.  The client is demonstrating ongoing emotional dysregulation and would benefit from additional opportunities to practice and implement content from this session.      Is this a Weekly Review of the Progress on the Treatment Plan?  Yes.      Are Treatment Plan Goals being addressed?  Yes, continue treatment goals      Are Treatment Plan Strategies to Address Goals Effective?  Yes, continue treatment strategies      Are there any current contracts in place?  No

## 2017-06-01 NOTE — PROGRESS NOTES
"Adult Mental Health Outpatient Group Therapy Progress Note         Client Initial Individualized Goals for Treatment: \"To find ways to manage my frustration and depression and anxiety symptoms\".      See Initial Treatment suggestions for the client during the time between Diagnostic Assessment and completion of the Master Individualized Treatment Plan.      Treatment Goals:      see above.      Area of Treatment Focus:  Symptom Management, Personal Safety and Develop / Improve Independent Living Skills     Therapeutic Interventions/Treatment Strategies:  Support, Feedback, Safety Assessments, Structured Activity, Clarification and Education     Response to Treatment Strategies:  Accepted Feedback, Gave Feedback, Listened, Focused on Goals, Accepted Support and Alert     Name of Group:   Group Psychotherapy       Description and Outcome:  Nehemias brought up her increased anxiety today.  She is worried about everything now.  She starts thinking about her future and her mood quickly goes down.  She was exposed to the concept of using mindfulness as a stress management tool.  She was asked to practice staying in her current emotional space over the weekend.  She was encouraged to become more aware of when she jumps to the future and starts to make negative assumptions.  Client demonstrated understanding of session content by coming up with a personal example and was willing to practice over the weekend.      Is this a Weekly Review of the Progress on the Treatment Plan?  Yes.    Continue with Treatment Plan and Strategies?  Yes.                                 "
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
## 2017-06-02 ENCOUNTER — HOSPITAL ENCOUNTER (OUTPATIENT)
Dept: BEHAVIORAL HEALTH | Facility: CLINIC | Age: 22
End: 2017-06-02
Attending: PSYCHIATRY & NEUROLOGY
Payer: COMMERCIAL

## 2017-06-02 PROCEDURE — 97150 GROUP THERAPEUTIC PROCEDURES: CPT | Mod: GO

## 2017-06-02 PROCEDURE — H2012 BEHAV HLTH DAY TREAT, PER HR: HCPCS

## 2017-06-02 NOTE — PROGRESS NOTES
"  Adult Mental Health Outpatient Group Therapy Progress Note     Client Initial Individualized Goals for Treatment: \"To find ways to manage my frustration and depression and anxiety symptoms\".    See Initial Treatment suggestions for the client during the time between Diagnostic Assessment and completion of the Master Individualized Treatment Plan.    Treatment Goals:    1. Client will notify staff when needing assistance to develop or implement a coping plan to manage suicidal or self injurious urges.      2. Nehemias will utilize time in OT clinic to select structured, paced activities and taking breaks as needed.      3. Nehemias will use time in OT to identify and explore strategies to manage sensory sensitivities.      4. Nehemias will use time in Group Therapy to discuss strategies to help manage emotional dysregulation.      5. Nehemias will increase her community support by scheduling with a Psychiatric prescriber and identifying what an aftercare plan might look like for ongoing support.     Area of Treatment Focus:  Symptom Management, Develop / Improve Independent Living Skills and Develop Socialization / Interpersonal Relationship Skills    Therapeutic Interventions/Treatment Strategies:  Support, Feedback, Safety Assessments, Structured Activity and Problem Solving    Response to Treatment Strategies:  Accepted Feedback, Gave Feedback, Listened, Focused on Goals, Attentive and Accepted Support    Name of Group:  OT Clinic     Description and Outcome:  Pt attended and participated in a structured occupational therapy group where intervention focused on coping through structured hands-on activities to improve function in valued roles, routines, and independent living skills. Client had a calm, even affect in session. She needed a verbal cue to take a break/pace activity. She reported difficulty allowing herself a break from task that required high degree of concentration. She found it helpful to " get up and move while taking a break. Fair task focus throughout session. Required verbal cue to transition to next group. Client would benefit from additional opportunities to practice and implement content from this session. Client addressed ITP goal number 2 in this session.     Is this a Weekly Review of the Progress on the Treatment Plan?  No

## 2017-06-05 NOTE — PROGRESS NOTES
"  Adult Mental Health Outpatient Group Therapy Progress Note     Client Initial Individualized Goals for Treatment: \"To find ways to manage my frustration and depression and anxiety symptoms\".    See Initial Treatment suggestions for the client during the time between Diagnostic Assessment and completion of the Master Individualized Treatment Plan.    Treatment Goals:    1. Client will notify staff when needing assistance to develop or implement a coping plan to manage suicidal or self injurious urges.    2. Nehemias will utilize time in OT clinic to select structured, paced activities and taking breaks as needed.    3. Nehemias will use time in OT to identify and explore strategies to manage sensory sensitivities.    4. Nehemias will use time in Group Therapy to discuss strategies to help manage emotional dysregulation.    5. Nehemias will increase her community support by scheduling with a Psychiatric prescriber and identifying what an aftercare plan might look like for ongoing support.     Area of Treatment Focus:  Symptom Management, Develop / Improve Independent Living Skills and Develop Socialization / Interpersonal Relationship Skills    Therapeutic Interventions/Treatment Strategies:  Support, Feedback, Safety Assessments, Structured Activity and Problem Solving    Response to Treatment Strategies:  Accepted Feedback, Gave Feedback, Listened, Focused on Goals, Attentive and Accepted Support    Name of Group:  OT Clinic     Description and Outcome:  Pt attended and participated in a structured occupational therapy group where intervention focused on coping through structured hands-on activities to improve function in valued roles, routines, and independent living skills. Client had a bright affect in session. She was self directed with an ongoing goal focused task. Worked at an appropriate pace, attentive to details. She did an adequate job of accepting imperfections in task. Problem solved a mistake " with one verbal cue. Client demonstrated understanding of session content by improved task tolerance. Client addressed ITP goal number 2 in this session.     Is this a Weekly Review of the Progress on the Treatment Plan?  Yes.      Are Treatment Plan Goals being addressed?  Yes, continue treatment goals      Are Treatment Plan Strategies to Address Goals Effective?  Yes, continue treatment strategies      Are there any current contracts in place?  No

## 2017-06-12 ENCOUNTER — HOSPITAL ENCOUNTER (OUTPATIENT)
Dept: BEHAVIORAL HEALTH | Facility: CLINIC | Age: 22
End: 2017-06-12
Attending: PSYCHIATRY & NEUROLOGY
Payer: COMMERCIAL

## 2017-06-12 PROCEDURE — H2012 BEHAV HLTH DAY TREAT, PER HR: HCPCS

## 2017-06-12 PROCEDURE — 97150 GROUP THERAPEUTIC PROCEDURES: CPT | Mod: GO

## 2017-06-14 ENCOUNTER — HOSPITAL ENCOUNTER (OUTPATIENT)
Dept: BEHAVIORAL HEALTH | Facility: CLINIC | Age: 22
End: 2017-06-14
Attending: PSYCHIATRY & NEUROLOGY
Payer: COMMERCIAL

## 2017-06-14 PROCEDURE — H2012 BEHAV HLTH DAY TREAT, PER HR: HCPCS

## 2017-06-14 PROCEDURE — 97150 GROUP THERAPEUTIC PROCEDURES: CPT | Mod: GO

## 2017-06-14 NOTE — PROGRESS NOTES
"  Adult Mental Health Outpatient Group Therapy Progress Note     Client Initial Individualized Goals for Treatment: \"To find ways to manage my frustration and depression and anxiety symptoms\".    See Initial Treatment suggestions for the client during the time between Diagnostic Assessment and completion of the Master Individualized Treatment Plan.    Treatment Goals:    1. Client will notify staff when needing assistance to develop or implement a coping plan to manage suicidal or self injurious urges.      2. Nehemias will utilize time in OT clinic to select structured, paced activities and taking breaks as needed.      3. Nehemias will use time in OT to identify and explore strategies to manage sensory sensitivities.      4. Nehemias will use time in Group Therapy to discuss strategies to help manage emotional dysregulation.      5. Nehemias will increase her community support by scheduling with a Psychiatric prescriber and identifying what an aftercare plan might look like for ongoing support.     Area of Treatment Focus:  Symptom Management, Develop / Improve Independent Living Skills and Develop Socialization / Interpersonal Relationship Skills    Therapeutic Interventions/Treatment Strategies:  Support, Feedback, Safety Assessments, Structured Activity and Problem Solving    Response to Treatment Strategies:  Accepted Feedback, Gave Feedback, Listened, Focused on Goals, Attentive and Accepted Support    Name of Group:  OT Clinic     Description and Outcome:  Pt attended and participated in a structured occupational therapy group where intervention focused on coping through structured hands-on activities to improve function in valued roles, routines, and independent living skills. Client had a bright affect in session. She reported that her trip to visit a friend in a busy city was overwhelming. She said that she was able to go to crowded settings with friends and managed this by spending only a short " amount of time in crowds. She noted an accomplishment of using a ride share service independently. Client would benefit from additional opportunities to practice and implement content from this session. Client addressed ITP goal number 3 in this session.    Is this a Weekly Review of the Progress on the Treatment Plan?  No

## 2017-06-14 NOTE — PROGRESS NOTES
"Adult Mental Health Outpatient Group Therapy Progress Note     Client Initial Individualized Goals for Treatment: \"To find ways to manage my frustration and depression and anxiety symptoms\".      See Initial Treatment suggestions for the client during the time between Diagnostic Assessment and completion of the Master Individualized Treatment Plan.      Treatment Goals:      1. Client will notify staff when needing assistance to develop or implement a coping plan to manage suicidal or self injurious urges.      2. Nehemias will utilize time in OT clinic to select structured, paced activities and taking breaks as needed.      3. Nehemias will use time in OT to identify and explore strategies to manage sensory sensitivities.      4. Nehemias will use time in Group Therapy to discuss strategies to help manage emotional dysregulation.      5. Nehemias will increase her community support by scheduling with a Psychiatric prescriber and identifying what an aftercare plan might look like for ongoing support.      Area of Treatment Focus:  Symptom Management, Personal Safety and Community Resources/Discharge Planning     Therapeutic Interventions/Treatment Strategies:  Support, Feedback, Safety Assessments, Problem Solving, Clarification, Education and Cognitive Behavioral Therapy     Response to Treatment Strategies:  Accepted Feedback, Listened, Attentive, Accepted Support, Alert and Was able to stay in group     Name of Group:  Group Psychotherapy     Description and Outcome:  Client came into group wearing sunglasses. She reports that she returned this past weekend from a week long vacation in [place]. 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 She reported that she had a good time, though it does not sound like she did a lot of activities outside of attending the recital.  She talked about a situation where her friend planned to watch a recital with her this weekend but then " ended up helping behind the stage, client was disappointed and her feelings were hurt. She was receptive to feedback about letting her friend know that her feelings were hurt. The client is demonstrating ongoing emotional dysregulation and would benefit from additional opportunities to practice and implement content from therapy session.    Is this a Weekly Review of the Progress on the Treatment Plan?  No

## 2017-06-15 NOTE — PROGRESS NOTES
"Adult Mental Health Outpatient Group Therapy Progress Note     Client Initial Individualized Goals for Treatment: \"To find ways to manage my frustration and depression and anxiety symptoms\".     See Initial Treatment suggestions for the client during the time between Diagnostic Assessment and completion of the Master Individualized Treatment Plan.     Treatment Goals:     1. Client will notify staff when needing assistance to develop or implement a coping plan to manage suicidal or self injurious urges.      2. Nehemias will utilize time in OT clinic to select structured, paced activities and taking breaks as needed.      3. Nehemias will use time in OT to identify and explore strategies to manage sensory sensitivities.      4. Nehemias will use time in Group Therapy to discuss strategies to help manage emotional dysregulation.      5. Nehemias will increase her community support by scheduling with a Psychiatric prescriber and identifying what an aftercare plan might look like for ongoing support.     Area of Treatment Focus:  Symptom Management, Personal Safety and Develop / Improve Independent Living Skills    Therapeutic Interventions/Treatment Strategies:  Support, Feedback, Safety Assessments, Structured Activity, Problem Solving, Clarification and Education    Response to Treatment Strategies:  Accepted Feedback, Gave Feedback, Listened, Accepted Support and Alert    Name of Group:   Mental Health Management     Description and Outcome:  Nehemias actively participated in a psycho-educational discussion and written activity focused on building awareness of different states of alertness/arousal and how to make changes in order to better function in various life roles and relationships.  Nehemias  offered examples of different signs and symptoms of high and low arousal for her and a few activities she uses to make changes when needed.  Nehemias talked about how difficult it is currently to feel " "\"just right\" or \"even\" as she almost always feels \"too anxious or depressed\".  Nehemias was attentive throughout the group.  She verbalized understanding of session content by applying information presented to her current experience.  She was active in asking questions and giving feedback.     Is this a Weekly Review of the Progress on the Treatment Plan?  No  "

## 2017-06-16 ENCOUNTER — HOSPITAL ENCOUNTER (OUTPATIENT)
Dept: BEHAVIORAL HEALTH | Facility: CLINIC | Age: 22
End: 2017-06-16
Attending: PSYCHIATRY & NEUROLOGY
Payer: COMMERCIAL

## 2017-06-16 PROCEDURE — H2012 BEHAV HLTH DAY TREAT, PER HR: HCPCS

## 2017-06-16 PROCEDURE — 97150 GROUP THERAPEUTIC PROCEDURES: CPT | Mod: GO

## 2017-06-16 NOTE — PROGRESS NOTES
"  Adult Mental Health Outpatient Group Therapy Progress Note     Client Initial Individualized Goals for Treatment: \"To find ways to manage my frustration and depression and anxiety symptoms\".    See Initial Treatment suggestions for the client during the time between Diagnostic Assessment and completion of the Master Individualized Treatment Plan.    Treatment Goals:    1. Client will notify staff when needing assistance to develop or implement a coping plan to manage suicidal or self injurious urges.      2. Nehemias will utilize time in OT clinic to select structured, paced activities and taking breaks as needed.      3. Nehemias will use time in OT to identify and explore strategies to manage sensory sensitivities.      4. Nehemias will use time in Group Therapy to discuss strategies to help manage emotional dysregulation.      5. Nehemias will increase her community support by scheduling with a Psychiatric prescriber and identifying what an aftercare plan might look like for ongoing support.     Area of Treatment Focus:  Symptom Management, Develop / Improve Independent Living Skills and Develop Socialization / Interpersonal Relationship Skills    Therapeutic Interventions/Treatment Strategies:  Support, Feedback, Safety Assessments, Structured Activity and Problem Solving    Response to Treatment Strategies:  Accepted Feedback, Gave Feedback, Listened, Focused on Goals, Attentive and Accepted Support    Name of Group:  OT Clinic     Description and Outcome:  Pt attended and participated in a structured occupational therapy group where intervention focused on coping through structured hands-on activities to improve function in valued roles, routines, and independent living skills. Client presented to group with a calm, even affect. She was self directed with a goal focused activity in session. She was attentive to details of the task. She interacted easily with peers in session. Client demonstrated " understanding of session content by increased ability to tolerate activity (30+ minutes). Client addressed ITP goal number 2 in this session.     Is this a Weekly Review of the Progress on the Treatment Plan?  No

## 2017-06-16 NOTE — PROGRESS NOTES
"Adult Mental Health Outpatient Group Therapy Progress Note   Client Initial Individualized Goals for Treatment: \"To find ways to manage my frustration and depression and anxiety symptoms\".     See Initial Treatment suggestions for the client during the time between Diagnostic Assessment and completion of the Master Individualized Treatment Plan.     Treatment Goals:     1. Client will notify staff when needing assistance to develop or implement a coping plan to manage suicidal or self injurious urges.     2. Nehemias will utilize time in OT clinic to select structured, paced activities and taking breaks as needed.     3. Nehemias will use time in OT to identify and explore strategies to manage sensory sensitivities.     4. Nehemias will use time in Group Therapy to discuss strategies to help manage emotional dysregulation.     5. Nehemias will increase her community support by scheduling with a Psychiatric prescriber and identifying what an aftercare plan might look like for ongoing support.        Area of Treatment Focus:  Symptom Management, Personal Safety and Community Resources/Discharge Planning    Therapeutic Interventions/Treatment Strategies:  Support, Feedback, Safety Assessments, Problem Solving, Clarification, Education and Cognitive Behavioral Therapy    Response to Treatment Strategies:  Accepted Feedback, Listened, Attentive, Accepted Support, Alert and Was able to stay in group    Name of Group:  Psychotherapy     Description and Outcome:  The client asked if the group could meet today in the Sensory room.  This room helps her to feel more calm and relaxed.  She was reflecting today on how her [employer] year of High School was her favorite year so far.  She felt loved and she felt happy with what was going on in her life.  Now she is dealing with much more difficult situations.  She has \"grown up stuff\" to worry about she told the group.  She remains very anxious about her future and she is " disappointed that she has not graduated from college yet.  This week she turned [age] years which is increasing her anxiety.  She is feeling disappointed in herself.    Is this a Weekly Review of the Progress on the Treatment Plan?  Yes.      Are Treatment Plan Goals being addressed?  Yes, continue treatment goals      Are Treatment Plan Strategies to Address Goals Effective?  Yes, continue treatment strategies      Are there any current contracts in place?  No
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
## 2017-06-21 ENCOUNTER — HOSPITAL ENCOUNTER (OUTPATIENT)
Dept: BEHAVIORAL HEALTH | Facility: CLINIC | Age: 22
End: 2017-06-21
Attending: PSYCHIATRY & NEUROLOGY
Payer: COMMERCIAL

## 2017-06-21 PROCEDURE — H2012 BEHAV HLTH DAY TREAT, PER HR: HCPCS

## 2017-06-21 PROCEDURE — 97150 GROUP THERAPEUTIC PROCEDURES: CPT | Mod: GO

## 2017-06-22 NOTE — PROGRESS NOTES
"  Adult Mental Health Outpatient Group Therapy Progress Note     Client Initial Individualized Goals for Treatment: \"To find ways to manage my frustration and depression and anxiety symptoms\".    See Initial Treatment suggestions for the client during the time between Diagnostic Assessment and completion of the Master Individualized Treatment Plan.    Treatment Goals:    1. Client will notify staff when needing assistance to develop or implement a coping plan to manage suicidal or self injurious urges.      2. Nehemias will utilize time in OT clinic to select structured, paced activities and taking breaks as needed.      3. Nehemias will use time in OT to identify and explore strategies to manage sensory sensitivities.      4. Nehemias will use time in Group Therapy to discuss strategies to help manage emotional dysregulation.      5. Nehemias will increase her community support by scheduling with a Psychiatric prescriber and identifying what an aftercare plan might look like for ongoing support.     Area of Treatment Focus:  Symptom Management, Develop / Improve Independent Living Skills and Develop Socialization / Interpersonal Relationship Skills    Therapeutic Interventions/Treatment Strategies:  Support, Feedback, Safety Assessments, Structured Activity and Problem Solving    Response to Treatment Strategies:  Accepted Feedback, Gave Feedback, Listened, Focused on Goals, Attentive and Accepted Support    Name of Group:  OT Clinic     Description and Outcome:  Pt attended and participated in a structured occupational therapy group where intervention focused on coping through structured hands-on activities to improve function in valued roles, routines, and independent living skills. Client had a calm, even affect in session. She reported that she is continuing to progress in the amount of time that she can focus on reading without side effects. She reported that she is happy about this. She problem solved " a mistake that was made in an ongoing activity. She expressed anxiety about making a mistake in a novel activity that she is planning. Discussed strategies she can use to complete the task. Client would benefit from additional opportunities to practice and implement content from this session. Client addressed ITP goal number 2 in this session.     Is this a Weekly Review of the Progress on the Treatment Plan?  No

## 2017-06-23 ENCOUNTER — HOSPITAL ENCOUNTER (OUTPATIENT)
Dept: BEHAVIORAL HEALTH | Facility: CLINIC | Age: 22
End: 2017-06-23
Attending: PSYCHIATRY & NEUROLOGY
Payer: COMMERCIAL

## 2017-06-23 PROCEDURE — H2012 BEHAV HLTH DAY TREAT, PER HR: HCPCS

## 2017-06-23 PROCEDURE — 97150 GROUP THERAPEUTIC PROCEDURES: CPT | Mod: GO

## 2017-06-26 ENCOUNTER — HOSPITAL ENCOUNTER (OUTPATIENT)
Dept: BEHAVIORAL HEALTH | Facility: CLINIC | Age: 22
End: 2017-06-26
Attending: PSYCHIATRY & NEUROLOGY
Payer: COMMERCIAL

## 2017-06-26 PROCEDURE — 97150 GROUP THERAPEUTIC PROCEDURES: CPT | Mod: GO

## 2017-06-26 PROCEDURE — H2012 BEHAV HLTH DAY TREAT, PER HR: HCPCS

## 2017-06-26 NOTE — PROGRESS NOTES
"  Adult Mental Health Outpatient Group Therapy Progress Note     Client Initial Individualized Goals for Treatment: \"To find ways to manage my frustration and depression and anxiety symptoms\".    See Initial Treatment suggestions for the client during the time between Diagnostic Assessment and completion of the Master Individualized Treatment Plan.    Treatment Goals:    1. Client will notify staff when needing assistance to develop or implement a coping plan to manage suicidal or self injurious urges.    2. Nehemias will utilize time in OT clinic to select structured, paced activities and taking breaks as needed.    3. Nehemias will use time in OT to identify and explore strategies to manage sensory sensitivities.    4. Nehemias will use time in Group Therapy to discuss strategies to help manage emotional dysregulation.    5. Nehemias will increase her community support by scheduling with a Psychiatric prescriber and identifying what an aftercare plan might look like for ongoing support.     Area of Treatment Focus:  Symptom Management, Develop / Improve Independent Living Skills and Develop Socialization / Interpersonal Relationship Skills    Therapeutic Interventions/Treatment Strategies:  Support, Feedback, Safety Assessments, Structured Activity and Problem Solving    Response to Treatment Strategies:  Accepted Feedback, Gave Feedback, Listened, Focused on Goals, Attentive and Accepted Support    Name of Group:  OT Clinic     Description and Outcome:  Pt attended and participated in a structured occupational therapy group where intervention focused on coping through structured hands-on activities to improve function in valued roles, routines, and independent living skills. Client had a calm, even affect in session. She identified a novel multi step activity that she would like to learn. Adequate focus and frustration tolerance during activity. [place] novel complex activity with demonstration and verbal " direction. Client reported she learns best with demonstration of task. Reported no side effects from sustained focus on activity. Provided client with sensory screen to complete over the weekend. Client demonstrated understanding of session content by improved activity tolerance. Client addressed ITP goal number 2 and 3 in this session.     Is this a Weekly Review of the Progress on the Treatment Plan?  Yes.      Are Treatment Plan Goals being addressed?  Yes, continue treatment goals      Are Treatment Plan Strategies to Address Goals Effective?  Yes, continue treatment strategies      Are there any current contracts in place?  No

## 2017-06-27 NOTE — PROGRESS NOTES
"Adult Mental Health Outpatient Group Therapy Progress Note     Client Initial Individualized Goals for Treatment: \"To find ways to manage my frustration and depression and anxiety symptoms\".     See Initial Treatment suggestions for the client during the time between Diagnostic Assessment and completion of the Master Individualized Treatment Plan.     Treatment Goals:     1. Client will notify staff when needing assistance to develop or implement a coping plan to manage suicidal or self injurious urges.      2. Nehemias will utilize time in OT clinic to select structured, paced activities and taking breaks as needed.      3. Nehemias will use time in OT to identify and explore strategies to manage sensory sensitivities.      4. Nehemias will use time in Group Therapy to discuss strategies to help manage emotional dysregulation.      5. Nehemias will increase her community support by scheduling with a Psychiatric prescriber and identifying what an aftercare plan might look like for ongoing support.     Area of Treatment Focus:  Symptom Management, Personal Safety and Develop / Improve Independent Living Skills    Therapeutic Interventions/Treatment Strategies:  Support, Feedback, Safety Assessments, Structured Activity, Clarification and Education    Response to Treatment Strategies:  Accepted Feedback, Gave Feedback, Listened, Accepted Support, Alert and Distracted    Name of Group:   Mental Health Management     Description and Outcome:  Nehemias  participated in a psycho-educational discussion and written activity focused on identifying leisure activities and benefits of participating in leisure activities.  Nehemias reported she has difficulty understanding leisure and how she often feels guilty when not doing productive tasks.  She was open to feedback from peers on the importance of leisure for balance and she seemed receptive  to this information.  Mood was anxious.  Concentration was fair to poor. " Client would benefit from additional opportunities to practice and implement content from this session.    Is this a Weekly Review of the Progress on the Treatment Plan?  No

## 2017-06-27 NOTE — PROGRESS NOTES
"  Adult Mental Health Outpatient Group Therapy Progress Note     Client Initial Individualized Goals for Treatment: \"To find ways to manage my frustration and depression and anxiety symptoms\".    See Initial Treatment suggestions for the client during the time between Diagnostic Assessment and completion of the Master Individualized Treatment Plan.    Treatment Goals:    1. Client will notify staff when needing assistance to develop or implement a coping plan to manage suicidal or self injurious urges.      2. Nehemias will utilize time in OT clinic to select structured, paced activities and taking breaks as needed.      3. Nehemias will use time in OT to identify and explore strategies to manage sensory sensitivities.      4. Nehemias will use time in Group Therapy to discuss strategies to help manage emotional dysregulation.      5. Nehemias will increase her community support by scheduling with a Psychiatric prescriber and identifying what an aftercare plan might look like for ongoing support.     Area of Treatment Focus:  Symptom Management, Develop / Improve Independent Living Skills and Develop Socialization / Interpersonal Relationship Skills    Therapeutic Interventions/Treatment Strategies:  Support, Feedback, Safety Assessments, Structured Activity and Problem Solving    Response to Treatment Strategies:  Accepted Feedback, Gave Feedback, Listened, Focused on Goals, Attentive and Accepted Support    Name of Group:  OT Clinic     Description and Outcome:  Pt attended and participated in a structured occupational therapy group where intervention focused on coping through structured hands-on activities to improve function in valued roles, routines, and independent living skills. Client presented to group with a bright affect. Independently initiated a novel multi step task in session. Attentive to details of the task. Interacted easily with peers in session. Client demonstrated understanding of " session content by taking a healthy risk to try a new activity that she has been hesitant to explore d/t possibility of making a mistake. Client addressed ITP goal number 2 in this session.     Is this a Weekly Review of the Progress on the Treatment Plan?  No

## 2017-06-28 ENCOUNTER — HOSPITAL ENCOUNTER (OUTPATIENT)
Dept: BEHAVIORAL HEALTH | Facility: CLINIC | Age: 22
End: 2017-06-28
Attending: PSYCHIATRY & NEUROLOGY
Payer: COMMERCIAL

## 2017-06-28 PROCEDURE — H2012 BEHAV HLTH DAY TREAT, PER HR: HCPCS

## 2017-06-28 PROCEDURE — 97150 GROUP THERAPEUTIC PROCEDURES: CPT | Mod: GO

## 2017-06-28 NOTE — PROGRESS NOTES
"Adult Mental Health Outpatient Group Therapy Progress Note   Client Initial Individualized Goals for Treatment: \"To find ways to manage my frustration and depression and anxiety symptoms\".     See Initial Treatment suggestions for the client during the time between Diagnostic Assessment and completion of the Master Individualized Treatment Plan.     Treatment Goals:     1. Client will notify staff when needing assistance to develop or implement a coping plan to manage suicidal or self injurious urges.     2. Nehemias will utilize time in OT clinic to select structured, paced activities and taking breaks as needed.     3. Nehemias will use time in OT to identify and explore strategies to manage sensory sensitivities.     4. Nehemias will use time in Group Therapy to discuss strategies to help manage emotional dysregulation.     5. Nehemias will increase her community support by scheduling with a Psychiatric prescriber and identifying what an aftercare plan might look like for ongoing support.        Area of Treatment Focus:  Symptom Management, Personal Safety and Community Resources/Discharge Planning    Therapeutic Interventions/Treatment Strategies:  Support, Feedback, Safety Assessments, Problem Solving, Clarification, Education and Cognitive Behavioral Therapy    Response to Treatment Strategies:  Accepted Feedback, Listened, Attentive, Accepted Support, Alert and Was able to stay in group    Name of Group:  Psychotherapy     Description and Outcome:  Yesterday Nehemias did go to her uncle's .  She did have more emotions during the service than she expected she ended feeling sad about his death.  At first she was not sad but relieved that he had .  She also told the group that she is not feeling good about going to Physical Therapy.  Her therapist does not validate her experience.  She is afraid that she is not listening to her during her appointments or does not believe what she is " "reporting.  She is thinking about finding a new PT.  This therapist asked about this decision it seemed \"out of the blue\", to this therapist.  Nehemias was asked about talking to the therapist about her concerns first, before making such a drastic move.  She was willing to think about it.  Nehemias's thinking can be black and white and she is more sensitive about what others say or think.    Is this a Weekly Review of the Progress on the Treatment Plan?  No.      Are Treatment Plan Goals being addressed?  Yes, continue treatment goals      Are Treatment Plan Strategies to Address Goals Effective?  Yes, continue treatment strategies      Are there any current contracts in place?  No        "

## 2017-06-28 NOTE — PROGRESS NOTES
"Adult Mental Health Outpatient Group Therapy Progress Note   Client Initial Individualized Goals for Treatment: \"To find ways to manage my frustration and depression and anxiety symptoms\".     See Initial Treatment suggestions for the client during the time between Diagnostic Assessment and completion of the Master Individualized Treatment Plan.     Treatment Goals:     1. Client will notify staff when needing assistance to develop or implement a coping plan to manage suicidal or self injurious urges.     2. Nehemias will utilize time in OT clinic to select structured, paced activities and taking breaks as needed.     3. Nehemias will use time in OT to identify and explore strategies to manage sensory sensitivities.     4. Nehemias will use time in Group Therapy to discuss strategies to help manage emotional dysregulation.     5. Nehemias will increase her community support by scheduling with a Psychiatric prescriber and identifying what an aftercare plan might look like for ongoing support.        Area of Treatment Focus:  Symptom Management, Personal Safety and Community Resources/Discharge Planning    Therapeutic Interventions/Treatment Strategies:  Support, Feedback, Safety Assessments, Problem Solving, Clarification, Education and Cognitive Behavioral Therapy    Response to Treatment Strategies:  Accepted Feedback, Listened, Attentive, Accepted Support, Alert and Was able to stay in group    Name of Group:  Psychotherapy     Description and Outcome:  Nehemias's weekend went better.  She was trying to use a specific coping skill that she learned the week before while she was having strong emotions over the weekend.  She was practicing something called, the ABC's to please one's self when distressed.  She was working on a new hobby of working with different fruit and cooking fruit.  She asked her mother to assist and she was willing and they even got a long better.  There was more talking than arguing " which has increased in the last months.  Nehemias felt more positive about life in general after using this skill set.  Currently she is still reporting passive suicidal ideation but not active.  She is still experiencing emotional dysregulation but is trying to use more skills to cope in more productive ways.    Is this a Weekly Review of the Progress on the Treatment Plan?  Yes.      Are Treatment Plan Goals being addressed?  Yes, continue treatment goals      Are Treatment Plan Strategies to Address Goals Effective?  Yes, continue treatment strategies      Are there any current contracts in place?  No

## 2017-06-29 NOTE — PROGRESS NOTES
"  Adult Mental Health Outpatient Group Therapy Progress Note     Client Initial Individualized Goals for Treatment: \"To find ways to manage my frustration and depression and anxiety symptoms\".    See Initial Treatment suggestions for the client during the time between Diagnostic Assessment and completion of the Master Individualized Treatment Plan.    Treatment Goals:    1. Client will notify staff when needing assistance to develop or implement a coping plan to manage suicidal or self injurious urges.      2. Nehemias will utilize time in OT clinic to select structured, paced activities and taking breaks as needed.      3. Nehemias will use time in OT to identify and explore strategies to manage sensory sensitivities.      4. Nehemias will use time in Group Therapy to discuss strategies to help manage emotional dysregulation.      5. Nehemias will increase her community support by scheduling with a Psychiatric prescriber and identifying what an aftercare plan might look like for ongoing support.     Area of Treatment Focus:  Symptom Management, Develop / Improve Independent Living Skills and Develop Socialization / Interpersonal Relationship Skills    Therapeutic Interventions/Treatment Strategies:  Support, Feedback, Safety Assessments, Structured Activity and Problem Solving    Response to Treatment Strategies:  Accepted Feedback, Gave Feedback, Listened, Focused on Goals, Attentive and Accepted Support    Name of Group:  OT Clinic     Description and Outcome:  Pt attended and participated in a structured occupational therapy group where intervention focused on coping through structured hands-on activities to improve function in valued roles, routines, and independent living skills. Client had a calm, even affect in session. Focused on novel multi step activity. Fair concentration. She was attentive to details. Increased frustration noted when she identified a mistake. Problem solved options that she has " and she chose to accept the imperfection rather than trying to minimize the error at the risk of making the imperfection greater. Client demonstrated understanding of session content by improved acceptance of imperfection in task. Client addressed ITP goal number 2 in this session.     Is this a Weekly Review of the Progress on the Treatment Plan?  No

## 2017-06-30 ENCOUNTER — HOSPITAL ENCOUNTER (OUTPATIENT)
Dept: BEHAVIORAL HEALTH | Facility: CLINIC | Age: 22
End: 2017-06-30
Attending: PSYCHIATRY & NEUROLOGY
Payer: COMMERCIAL

## 2017-06-30 PROCEDURE — H2012 BEHAV HLTH DAY TREAT, PER HR: HCPCS

## 2017-06-30 NOTE — PROGRESS NOTES
"Adult Mental Health Outpatient Group Therapy Progress Note   Client Initial Individualized Goals for Treatment: \"To find ways to manage my frustration and depression and anxiety symptoms\".     See Initial Treatment suggestions for the client during the time between Diagnostic Assessment and completion of the Master Individualized Treatment Plan.     Treatment Goals:     1. Client will notify staff when needing assistance to develop or implement a coping plan to manage suicidal or self injurious urges.     2. Nehemias will utilize time in OT clinic to select structured, paced activities and taking breaks as needed.     3. Nehemias will use time in OT to identify and explore strategies to manage sensory sensitivities.     4. Nehemias will use time in Group Therapy to discuss strategies to help manage emotional dysregulation.     5. Nehemias will increase her community support by scheduling with a Psychiatric prescriber and identifying what an aftercare plan might look like for ongoing support.        Area of Treatment Focus:  Symptom Management, Personal Safety and Community Resources/Discharge Planning    Therapeutic Interventions/Treatment Strategies:  Support, Feedback, Safety Assessments, Problem Solving, Clarification, Education and Cognitive Behavioral Therapy    Response to Treatment Strategies:  Accepted Feedback, Listened, Attentive, Accepted Support, Alert and Was able to stay in group    Name of Group:  Psychotherapy     Description and Outcome:  Nehemias was in group today and kept her dark glasses on the entire session.  She reported that her head hurt.  She was asked about how she was feeling relative to the last session.  She was reporting that she was going to stop seeing her current Physical Therapist.  She is still not sure about what she is going to do.  She does not feel heard by the PT and is afraid that she is not being understood by her.  She is feeling hurt by the PT.  She has not " talked to the PT about these feelings and is not sure that she feels up to talking with her.  Nehemias does seem to be struggling with negative reactivity especially if she is having conflict with another person.  She does seem to be comprehending the themes in the session but does not always agree with them.      Is this a Weekly Review of the Progress on the Treatment Plan?  No.      Are Treatment Plan Goals being addressed?  Yes, continue treatment goals      Are Treatment Plan Strategies to Address Goals Effective?  Yes, continue treatment strategies      Are there any current contracts in place?  No

## 2017-06-30 NOTE — PROGRESS NOTES
"Group Therapy Progress Notes     Client Initial Individualized Goals for Treatment: \"To find ways to manage my frustration and depression and anxiety symptoms\".      See Initial Treatment suggestions for the client during the time between Diagnostic Assessment and completion of the Master Individualized Treatment Plan.      Treatment Goals:      1. Client will notify staff when needing assistance to develop or implement a coping plan to manage suicidal or self injurious urges.      2. Nehemias will utilize time in OT clinic to select structured, paced activities and taking breaks as needed.      3. Nehemias will use time in OT to identify and explore strategies to manage sensory sensitivities.      4. Nehemias will use time in Group Therapy to discuss strategies to help manage emotional dysregulation.      5. Nehemias will increase her community support by scheduling with a Psychiatric prescriber and identifying what an aftercare plan might look like for ongoing support.    Area of Treatment Focus:  Symptom Management and Develop Socialization / Interpersonal Relationship Skills    Therapeutic Interventions/Treatment Strategies:  Support, Feedback, Structured Activity, Problem Solving and Clarification    Response to Treatment Strategies:  Accepted Feedback, Listened, Focused on Goals, Attentive, Accepted Support, Alert and Demonstrates Behavior Change    Name of Group:  O.T. Clinic    Progress Note  Pt attended and participated in a structured occupational therapy group where intervention focused on coping through structured hands-on activities to improve function in valued roles, routines, and independent living skills.   Client initiated set-up and work on an ongoing project.  Client accepted a suggestion for improving her task results.  Client showed good concentration for the task throughout the session.  Client showed motivation for being productive by selecting a new activity once finished with the " first task.  Client was independent in problem solving/planning task work and showed a steady work pace.  Client presented with calm, even mood and was interactive with peers periodically.  Client's goal was addressed in this session.          Is this a Weekly Review of the Progress on the Treatment Plan?  Yes.      Are Treatment Plan Goals being addressed?  Yes, continue treatment goals      Are Treatment Plan Strategies to Address Goals Effective?  Yes, continue treatment strategies      Are there any current contracts in place?  No

## 2017-07-05 ENCOUNTER — HOSPITAL ENCOUNTER (OUTPATIENT)
Dept: BEHAVIORAL HEALTH | Facility: CLINIC | Age: 22
End: 2017-07-05
Attending: PSYCHIATRY & NEUROLOGY
Payer: COMMERCIAL

## 2017-07-05 PROCEDURE — H2012 BEHAV HLTH DAY TREAT, PER HR: HCPCS
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
## 2017-07-14 NOTE — PROGRESS NOTES
"Adult Mental Health Outpatient Group Therapy Progress Note   Client Initial Individualized Goals for Treatment: \"To find ways to manage my frustration and depression and anxiety symptoms\".     See Initial Treatment suggestions for the client during the time between Diagnostic Assessment and completion of the Master Individualized Treatment Plan.     Treatment Goals:     1. Client will notify staff when needing assistance to develop or implement a coping plan to manage suicidal or self injurious urges.     2. Nehemias will utilize time in OT clinic to select structured, paced activities and taking breaks as needed.     3. Nehemias will use time in OT to identify and explore strategies to manage sensory sensitivities.     4. Nehemias will use time in Group Therapy to discuss strategies to help manage emotional dysregulation.     5. Nehemias will increase her community support by scheduling with a Psychiatric prescriber and identifying what an aftercare plan might look like for ongoing support.        Area of Treatment Focus:  Symptom Management, Personal Safety and Community Resources/Discharge Planning    Therapeutic Interventions/Treatment Strategies:  Support, Feedback, Safety Assessments, Problem Solving, Clarification, Education and Cognitive Behavioral Therapy    Response to Treatment Strategies:  Accepted Feedback, Listened, Attentive, Accepted Support, Alert and Was able to stay in group    Name of Group:  Psychotherapy     Description and Outcome:  Nehemias's week has not been good.  She is reporting that she has had a lot of physical symptoms that are holding her up.  These symptoms include nausea, headaches and pain.  She is just feeling like crap and she is getting very tired of it.  Sometimes she is still thinking that she would be better off dead and not going through any of this.  She is worried about her parents and all the stress she is causing them.  She was redirected to take a pause and " then to breath a few breaths before sharing any more information.  She followed directions and then was slightly more calm.      Is this a Weekly Review of the Progress on the Treatment Plan?  Yes.      Are Treatment Plan Goals being addressed?  Yes, continue treatment goals      Are Treatment Plan Strategies to Address Goals Effective?  Yes, continue treatment strategies      Are there any current contracts in place?  No

## 2017-07-14 NOTE — ADDENDUM NOTE
Encounter addended by: Maryam Barroso [facility] on: 7/14/2017  4:24 PM<BR>     Actions taken: Sign clinical note

## 2017-07-14 NOTE — PROGRESS NOTES
"Adult Mental Health Outpatient Group Therapy Progress Note   Client Initial Individualized Goals for Treatment: \"To find ways to manage my frustration and depression and anxiety symptoms\".     See Initial Treatment suggestions for the client during the time between Diagnostic Assessment and completion of the Master Individualized Treatment Plan.     Treatment Goals:     1. Client will notify staff when needing assistance to develop or implement a coping plan to manage suicidal or self injurious urges.     2. Nehemias will utilize time in OT clinic to select structured, paced activities and taking breaks as needed.     3. Nehemias will use time in OT to identify and explore strategies to manage sensory sensitivities.     4. Nehemias will use time in Group Therapy to discuss strategies to help manage emotional dysregulation.     5. Nehemias will increase her community support by scheduling with a Psychiatric prescriber and identifying what an aftercare plan might look like for ongoing support.        Area of Treatment Focus:  Symptom Management, Personal Safety and Community Resources/Discharge Planning    Therapeutic Interventions/Treatment Strategies:  Support, Feedback, Safety Assessments, Problem Solving, Clarification, Education and Cognitive Behavioral Therapy    Response to Treatment Strategies:  Accepted Feedback, Listened, Attentive, Accepted Support, Alert and Was able to stay in group    Name of Group:  Psychotherapy     Description and Outcome:  Yesterday Nehemias's great uncle .  She told the group that she does not really care that he  because he was not very nice to her aunt.  She is a bit worried about how she feels but she does not like him very much.  She was reminded that everyone has a right to their feelings and or opinions.  \"It's just not right to feel like I do\", she said again to the group.  Others in group reminded her that not everyone is the type of person to be sad about " when they die.  Sometimes Immanuelle struggles with her values vs her feelings.  It is a big struggle in life right now.  She wants to be honest with people even when it is not want they might want to hear.  This is a new way of thinking and it is scary for her.         Is this a Weekly Review of the Progress on the Treatment Plan?  No.      Are Treatment Plan Goals being addressed?  Yes, continue treatment goals      Are Treatment Plan Strategies to Address Goals Effective?  Yes, continue treatment strategies      Are there any current contracts in place?  No

## 2017-07-17 ENCOUNTER — HOSPITAL ENCOUNTER (OUTPATIENT)
Dept: BEHAVIORAL HEALTH | Facility: CLINIC | Age: 22
End: 2017-07-17
Attending: PSYCHIATRY & NEUROLOGY
Payer: COMMERCIAL

## 2017-07-17 PROCEDURE — H2012 BEHAV HLTH DAY TREAT, PER HR: HCPCS

## 2017-07-17 PROCEDURE — 97150 GROUP THERAPEUTIC PROCEDURES: CPT | Mod: GO

## 2017-07-17 NOTE — PROGRESS NOTES
"Adult Mental Health Outpatient Group Therapy Progress Note     Client Initial Individualized Goals for Treatment: \"To find ways to manage my frustration and depression and anxiety symptoms\".    See Initial Treatment suggestions for the client during the time between Diagnostic Assessment and completion of the Master Individualized Treatment Plan.    Treatment Goals:  1. Client will notify staff when needing assistance to develop or implement a coping plan to manage suicidal or self injurious urges.     2. Nehemias will utilize time in OT clinic to select structured, paced activities and taking breaks as needed.     3. Nehemias will use time in OT to identify and explore strategies to manage sensory sensitivities.     4. Nehemias will use time in Group Therapy to discuss strategies to help manage emotional dysregulation.     5. Nehemias will increase her community support by scheduling with a Psychiatric prescriber and identifying what an aftercare plan might look like for ongoing support.     Area of Treatment Focus:  Symptom Management, Personal Safety, Develop / Improve Independent Living Skills and Develop Socialization / Interpersonal Relationship Skills    Therapeutic Interventions/Treatment Strategies:  Support, Redirection, Feedback, Safety Assessments, Problem Solving and Education    Response to Treatment Strategies:  Accepted Feedback, Gave Feedback, Listened, Focused on Goals, Attentive and Accepted Support    Name of Group:  Psychotherapy and Mental Health Management    Description and Outcome:  Hour one (Psychotherapy): Lebron reported struggling with change in facilitators as she was looking forward to returning to the stability/predictability of group after a difficult vacation. Validated and normalized difficulty in change; thanked Lebron for honest feelings.  Lebron reported reading her brother's texts while on vacation and found he had vented to a friend his frustrations related to Client.  She " "reported isolating due to stress after reading these texts.  When her aunt and uncle came to talk to her, she reported stating how she was feeling but did not expound as to why she was feeling that way.  Highlighted effective action of honest assessment of symptoms and problem solved how Lebron might cope with similar distress in the future.  She reported hearing voices telling her to \"die\" and seeing images of SIB action.  She reported low current suicidal ideation and SIB urges with no intent to act or plan.  She committed to safety and will follow her safety plan.  She was tearful in group.      Goals one and four were addressed.       Lebron verbalized understanding of session content by committing to safety and taking credit for use of skills.       Hour two (Mental Health Management):  Facilitator taught and led discussion on how to practice validation of self and others to demonstrate understanding of emotions which may help reduce symptoms and improve interpersonal relationships. Lebron participated in identifying her barriers to self-validation.  Throughout session, she put her head down on the table and would cease participation for a time.  When she would re-engage it was appropriate and effective.        Client would benefit from additional opportunities to practice and implement content from this session through use of self-validation regarding emotions rather than judging self.     Is this a Weekly Review of the Progress on the Treatment Plan?  Yes.      Are Treatment Plan Goals being addressed?  Yes, continue treatment goals      Are Treatment Plan Strategies to Address Goals Effective?  Yes, continue treatment strategies      Are there any current contracts in place?  No      "

## 2017-07-18 NOTE — PROGRESS NOTES
"Adult Mental Health Outpatient Group Therapy Progress Note   Client Initial Individualized Goals for Treatment: \"To find ways to manage my frustration and depression and anxiety symptoms\".     See Initial Treatment suggestions for the client during the time between Diagnostic Assessment and completion of the Master Individualized Treatment Plan.     Treatment Goals:     1. Client will notify staff when needing assistance to develop or implement a coping plan to manage suicidal or self injurious urges.     2. Nehemias will utilize time in OT clinic to select structured, paced activities and taking breaks as needed.     3. Nehemias will use time in OT to identify and explore strategies to manage sensory sensitivities.     4. Nehemias will use time in Group Therapy to discuss strategies to help manage emotional dysregulation.     5. Nehemias will increase her community support by scheduling with a Psychiatric prescriber and identifying what an aftercare plan might look like for ongoing support.        Area of Treatment Focus:  Symptom Management, Personal Safety and Community Resources/Discharge Planning    Therapeutic Interventions/Treatment Strategies:  Support, Feedback, Safety Assessments, Problem Solving, Clarification, Education and Cognitive Behavioral Therapy    Response to Treatment Strategies:  Accepted Feedback, Listened, Attentive, Accepted Support, Alert and Was able to stay in group    Name of Group:  Psychotherapy     Description and Outcome:  Nehemias shared with the group that she and her mother are having a hard time with communication.  She feels like her Mom goes to the extremes with her.  She has been encouraged to use \"I\" statements when talking with her Mom to help with any defensiveness that Mom might have.  She is willing to try.  She is reporting that she is taking things that the Workmen's comp staff are saying to her very personally.  She is feeling lots of pressure when talking " with them.  She is still endorsing physical pain, nightmares, depressed mood and suicidal ideation daily.      Is this a Weekly Review of the Progress on the Treatment Plan?  No.      Are Treatment Plan Goals being addressed?  Yes, continue treatment goals      Are Treatment Plan Strategies to Address Goals Effective?  Yes, continue treatment strategies      Are there any current contracts in place?  No

## 2017-07-18 NOTE — ADDENDUM NOTE
Encounter addended by: Maryam Barroso [facility] on: 7/18/2017  2:33 PM<BR>     Actions taken: Sign clinical note

## 2017-07-18 NOTE — PROGRESS NOTES
"  Adult Mental Health Outpatient Group Therapy Progress Note     Client Initial Individualized Goals for Treatment: \"To find ways to manage my frustration and depression and anxiety symptoms\".    See Initial Treatment suggestions for the client during the time between Diagnostic Assessment and completion of the Master Individualized Treatment Plan.    Treatment Goals:    1. Client will notify staff when needing assistance to develop or implement a coping plan to manage suicidal or self injurious urges.      2. Nehemias will utilize time in OT clinic to select structured, paced activities and taking breaks as needed.      3. Nehemias will use time in OT to identify and explore strategies to manage sensory sensitivities.      4. Nehemias will use time in Group Therapy to discuss strategies to help manage emotional dysregulation.      5. Nehemias will increase her community support by scheduling with a Psychiatric prescriber and identifying what an aftercare plan might look like for ongoing support.     Area of Treatment Focus:  Symptom Management, Develop / Improve Independent Living Skills and Develop Socialization / Interpersonal Relationship Skills    Therapeutic Interventions/Treatment Strategies:  Support, Feedback, Safety Assessments, Structured Activity and Problem Solving    Response to Treatment Strategies:  Accepted Feedback, Gave Feedback, Listened, Focused on Goals, Attentive and Accepted Support    Name of Group:  OT Clinic     Description and Outcome:  Pt attended and participated in a structured occupational therapy group where intervention focused on coping through structured hands-on activities to improve function in valued roles, routines, and independent living skills. Client had a calm, even affect in session. She reported that her family visit was both good and bad. She enjoyed time with family, had several days of high symptoms. Plans to process this in group therapy today. Reports no " safety concerns. Client verbalized understanding of session content by identifying coping skills that helped to decrease intensity of symptoms. Client addressed ITP goal number 2 in this session.     Is this a Weekly Review of the Progress on the Treatment Plan?  No

## 2017-07-19 ENCOUNTER — HOSPITAL ENCOUNTER (OUTPATIENT)
Dept: BEHAVIORAL HEALTH | Facility: CLINIC | Age: 22
End: 2017-07-19
Attending: PSYCHIATRY & NEUROLOGY
Payer: COMMERCIAL

## 2017-07-19 PROCEDURE — H2012 BEHAV HLTH DAY TREAT, PER HR: HCPCS

## 2017-07-19 PROCEDURE — 97150 GROUP THERAPEUTIC PROCEDURES: CPT | Mod: GO
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
## 2017-07-20 NOTE — PROGRESS NOTES
"  Adult Mental Health Outpatient Group Therapy Progress Note     Client Initial Individualized Goals for Treatment: \"To find ways to manage my frustration and depression and anxiety symptoms\".    See Initial Treatment suggestions for the client during the time between Diagnostic Assessment and completion of the Master Individualized Treatment Plan.    Treatment Goals:    1. Client will notify staff when needing assistance to develop or implement a coping plan to manage suicidal or self injurious urges.      2. Nehemias will utilize time in OT clinic to select structured, paced activities and taking breaks as needed.      3. Nehemias will use time in OT to identify and explore strategies to manage sensory sensitivities.      4. Nehemias will use time in Group Therapy to discuss strategies to help manage emotional dysregulation.      5. Nehemias will increase her community support by scheduling with a Psychiatric prescriber and identifying what an aftercare plan might look like for ongoing support.     Area of Treatment Focus:  Symptom Management, Develop / Improve Independent Living Skills and Develop Socialization / Interpersonal Relationship Skills    Therapeutic Interventions/Treatment Strategies:  Support, Feedback, Safety Assessments, Structured Activity and Problem Solving    Response to Treatment Strategies:  Accepted Feedback, Gave Feedback, Listened, Focused on Goals, Attentive and Accepted Support    Name of Group:  OT Clinic     Description and Outcome:  Pt attended and participated in a structured occupational therapy group where intervention focused on coping through structured hands-on activities to improve function in valued roles, routines, and independent living skills. Client presented to group with a constricted affect. She reported feeling overwhelmed with her worker's comp claim and adjusting to changes in her day treatment schedule. Writer validated and normalized how changes in staff " are hard to adjust to. Discussed ways to cope with the adjustment by asserting needs, using sensory tools, or asking for a short break from group. Client stated that having fidgets or a weighted blanket would be helpful in the future. Reviewed process for attaining sensory tools when needed. Client would benefit from additional opportunities to practice and implement content from this session. Client addressed ITP goal number 2 and 3 in this session.     Is this a Weekly Review of the Progress on the Treatment Plan?  No

## 2017-07-21 ENCOUNTER — HOSPITAL ENCOUNTER (OUTPATIENT)
Dept: BEHAVIORAL HEALTH | Facility: CLINIC | Age: 22
End: 2017-07-21
Attending: PSYCHIATRY & NEUROLOGY
Payer: COMMERCIAL

## 2017-07-21 PROCEDURE — 97150 GROUP THERAPEUTIC PROCEDURES: CPT | Mod: GO

## 2017-07-21 PROCEDURE — H2012 BEHAV HLTH DAY TREAT, PER HR: HCPCS

## 2017-07-21 NOTE — PROGRESS NOTES
"Adult Mental Health Outpatient Group Therapy Progress Note     Client Initial Individualized Goals for Treatment: \"To find ways to manage my frustration and depression and anxiety symptoms\".    See Initial Treatment suggestions for the client during the time between Diagnostic Assessment and completion of the Master Individualized Treatment Plan.    Treatment Goals:  1. Client will notify staff when needing assistance to develop or implement a coping plan to manage suicidal or self injurious urges.      2. Nehemias will utilize time in OT clinic to select structured, paced activities and taking breaks as needed.      3. Nehemias will use time in OT to identify and explore strategies to manage sensory sensitivities.      4. Nehemias will use time in Group Therapy to discuss strategies to help manage emotional dysregulation.      5. Nehemias will increase her community support by scheduling with a Psychiatric prescriber and identifying what an aftercare plan might look like for ongoing support.     Area of Treatment Focus:  Symptom Management, Personal Safety, Community Resources/Discharge Planning and Develop Socialization / Interpersonal Relationship Skills    Therapeutic Interventions/Treatment Strategies:  Support, Feedback, Safety Assessments and Problem Solving    Response to Treatment Strategies:  Accepted Feedback, Gave Feedback, Listened, Focused on Goals, Attentive and Accepted Support    Name of Group:  Psychotherapy     Description and Outcome:  Lebron reported she woke up late which lead to her coming to group 25 minutes late.  Reinforced skills used to still attend group even if late.  She reported feeling anxious/stomach fluttering as well as crying a lot and stated she was unsure why she was nervous.  However, she then reported feeling like all of her programming is kicking her out/coming to an end and she does not feel ready.  She noted fear of change.  Validated and normalized.  Discussed how " to transition out of programming by building support and other programs.  Lebron also discussed difficulty interacting with others due to them asking awkward questions to her about her present/future plans.  Validated difficult feelings and normalized experience.  Provided strategies to cope with this situation including humor, stock responses, and asking questions about the other person.  Lebron appeared receptive to feedback.  She reported suicidal ideation with no plan or intent to act.     Client verbalized understanding of session content by committing to safety related to SI and highlighting strategies she could use to cope with awkward questions.        Is this a Weekly Review of the Progress on the Treatment Plan?  No

## 2017-07-24 ENCOUNTER — HOSPITAL ENCOUNTER (OUTPATIENT)
Dept: BEHAVIORAL HEALTH | Facility: CLINIC | Age: 22
End: 2017-07-24
Attending: PSYCHIATRY & NEUROLOGY
Payer: COMMERCIAL

## 2017-07-24 PROCEDURE — H2012 BEHAV HLTH DAY TREAT, PER HR: HCPCS

## 2017-07-24 PROCEDURE — 97150 GROUP THERAPEUTIC PROCEDURES: CPT | Mod: GO

## 2017-07-24 NOTE — PROGRESS NOTES
"Adult Mental Health Outpatient Group Therapy Progress Note     Client Initial Individualized Goals for Treatment: \"To find ways to manage my frustration and depression and anxiety symptoms\".  See Initial Treatment suggestions for the client during the time between Diagnostic Assessment and completion of the Master Individualized Treatment Plan.    Treatment Goals:  1. Client will notify staff when needing assistance to develop or implement a coping plan to manage suicidal or self injurious urges.      2. Nehemias will utilize time in OT clinic to select structured, paced activities and taking breaks as needed.      3. Nehemias will use time in OT to identify and explore strategies to manage sensory sensitivities.      4. Nehemias will use time in Group Therapy to discuss strategies to help manage emotional dysregulation.      5. Nehemias will increase her community support by scheduling with a Psychiatric prescriber and identifying what an aftercare plan might look like for ongoing support.     Area of Treatment Focus:  Symptom Management, Develop Socialization / Interpersonal Relationship Skills and Physical Health     Therapeutic Interventions/Treatment Strategies:  Support, Feedback and Problem Solving    Response to Treatment Strategies:  Accepted Feedback, Gave Feedback, Listened, Focused on Goals, Attentive and Accepted Support    Name of Group:  Psychotherapy and Mental Health Management     Description and Outcome:  Hour One (Psychotherapy): Lebron used weighted shoulder pad throughout sessions.  She reported stopped taking her sleep medications due to nightmares (she connected to stress) but noted waking in the middle of the night.  She stated plans to restart her sleep meds.  Provided suggestions to practice calming self before sleep to reduce stress and hopefully nightmares.  Lebron stated plan to try candles, shower, and lavender oil to cope with stress related to many life changes and insurance " issues.  She reported an otherwise good weekend due to spending time with her uncles who make her life.  She reported successfully using skills to ground self when in distress.  Challenged her to think of these people or try to connect with them when stress is high.  She appeared receptive to feedback and did not report safety concerns.       Goal four addressed in session.       Client verbalized understanding of session content by stating plan to cope with stress effectively.       Hour Two (Mental Health Management):  Led discussion on purpose of psychotherapy and how to get the most out of each hour.  Taught and led discussion on what the purpose of judgments are and how they can become problematic if someone becomes stuck or overuses judgments.  Provided activity for Clients to aid in identifying judgments and re framing them in facts to help cope with judgments of self, others, and situations that cause distress.  Lebron discussed problems associated with getting stuck in judgments.  She participated actively in discussion and activity.      Client would benefit from additional opportunities to practice and implement content from this session by challenging judgments of self, others and situations.        Is this a Weekly Review of the Progress on the Treatment Plan?  Yes.      Are Treatment Plan Goals being addressed?  Yes, continue treatment goals      Are Treatment Plan Strategies to Address Goals Effective?  Yes, continue treatment strategies      Are there any current contracts in place?  No

## 2017-07-25 NOTE — PROGRESS NOTES
"  Adult Mental Health Outpatient Group Therapy Progress Note     Client Initial Individualized Goals for Treatment: \"To find ways to manage my frustration and depression and anxiety symptoms\".    See Initial Treatment suggestions for the client during the time between Diagnostic Assessment and completion of the Master Individualized Treatment Plan.    Treatment Goals:    1. Client will notify staff when needing assistance to develop or implement a coping plan to manage suicidal or self injurious urges.      2. Nehemias will utilize time in OT clinic to select structured, paced activities and taking breaks as needed.      3. Nehemias will use time in OT to identify and explore strategies to manage sensory sensitivities.      4. Nehemias will use time in Group Therapy to discuss strategies to help manage emotional dysregulation.      5. Nehemias will increase her community support by scheduling with a Psychiatric prescriber and identifying what an aftercare plan might look like for ongoing support.     Area of Treatment Focus:  Symptom Management, Develop / Improve Independent Living Skills and Develop Socialization / Interpersonal Relationship Skills    Therapeutic Interventions/Treatment Strategies:  Support, Feedback, Safety Assessments, Structured Activity and Problem Solving    Response to Treatment Strategies:  Accepted Feedback, Gave Feedback, Listened, Focused on Goals, Attentive and Accepted Support    Name of Group:  OT Clinic     Description and Outcome:  Pt attended and participated in a structured occupational therapy group where intervention focused on coping through structured hands-on activities to improve function in valued roles, routines, and independent living skills. Client had an anxious affect in session. Mood seemed depressed. She reported feeling concerned about discharge. Reviewed her aftercare supports. She is on DBT waitlist at [facility]. Writer encouraged client to attend [facility] " young adult group prior to discharge and she agreed. 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 She reported talking through this plan helped her to see that she does have adequate support in place. Client would benefit from additional opportunities to practice and implement content from this session. Client addressed ITP goal number 2 in this session.     Is this a Weekly Review of the Progress on the Treatment Plan?  Yes.      Are Treatment Plan Goals being addressed?  Yes, continue treatment goals      Are Treatment Plan Strategies to Address Goals Effective?  Yes, continue treatment strategies      Are there any current contracts in place?  No

## 2017-07-26 ENCOUNTER — HOSPITAL ENCOUNTER (OUTPATIENT)
Dept: BEHAVIORAL HEALTH | Facility: CLINIC | Age: 22
End: 2017-07-26
Attending: PSYCHIATRY & NEUROLOGY
Payer: COMMERCIAL

## 2017-07-26 PROCEDURE — H2012 BEHAV HLTH DAY TREAT, PER HR: HCPCS

## 2017-07-26 PROCEDURE — 97150 GROUP THERAPEUTIC PROCEDURES: CPT | Mod: GO

## 2017-07-26 NOTE — DISCHARGE SUMMARY
Adult Mental Health Intensive Outpatient Discharge Summary/Instructions      Patient: Nehemias Mascorro MRN: [id number]   : 1995 Age: 22 year old Sex: female     Admission Date: 5/3/17  Discharge Date: 17  Diagnosis: 296.22 Major Depressive Disorder, Single Episode, Moderate _ and With anxious distress  Adjustment Disorders  309.28 (F43.23) With mixed anxiety and depressed mood    Focus of Treatment / Progress    Personal Safety: No safety concerns at time of discharge     * Follow your safety plan     * Call crisis lines as needed:    [facility] [phone] [facility] [phone]  [facility] [phone] Crisis Connection [phone]  [facility] [phone] [facility] [phone]  [facility] [phone] National Suicide Prevention [phone]  [facility] [phone] Suicide Prevention [phone]  [facility] [phone]    Managing symptoms of:   In group therapy Nehemias focused on strategies to manage emotional dysregulation.    In occupational therapy groups, Nehemias focused on pacing activities, taking breaks as needed, and increasing activity tolerance. GOAL MET. She also focused on exploring sensory sensitivities and learning strategies to manage them. GOAL MET.    Nehemias completed the [place] Occupational Performance Measure (COPM) to assess perceived occupational performance. Pre/post scores for perceived performance and satisfaction in valued occupations improved by 2.8 and 4.3, respectively, on a ten point likert scale.    Community support/health:  Nehemias lives with supportive family, she is currently under the care of multiple Medical and Mental Health providers and has an RN Case Mgr with [facility] working with her.    Managing Symptoms and Preventing Relapse    * Go to all of your appointments    * Take all medications as directed.      * Carry a current list if medication with you    * Do not use illicit (street) drugs.   Avoid alcohol    * Report these symptoms to your care team. These are early signs of relapse:   Thoughts of suicide   Losing more sleep or a lot of daytime sleeping   Increased confusion   Mood getting worse   Feeling more aggressive   Other:  Isolating/Withdrawing yourself    *Use these skills daily:  Opposite to Emotion, Distraction, do something creative, schedule an activity with family or friend    Copy of summary sent to: Available in EPIC.    Follow up with psychiatrist / main caregiver: Ludwin Thornton @ [facility]  Next visit: Client will call and schedule with [facility]    Follow up with your therapist: David Li   Next visit:  Client had appt 7/27/17 and will see weekly.    Go to group therapy and / or support groups at:  [facility] Connections groups, see link for other groups: [email or web address]  [place] [facility] 18[facility]30 Every Tuesday 7:30-9pm @ [facility] [address] contact Jm @ [email or web address] or [phone]  [place] [employer] [facility] [place] Ages 18-30 July 31st, Aug 14, and Aug 28 from 7:30-9pm @ [place] [address]  Contact Sabi @ [email or web address] or Tiffany @ [email or web address]           [facility] (Depression and Bipolar Support [place]) See link for more groups [email or web address]-care-[email or web address]-community-[email or web address]-[email or web address]-groups    [facility] for DBT intake  [phone]    See your medical doctor about:  Any medical concerns that arise and for your ongoing medical issues.    Other: Consider a community class related to ceramics! You may enjoy a class at [facility] (near day treatment) [email or web address] or community education class through [place] at [email or web address].     Your team has appreciated working with you and wishes you ongoing wellness going forward, Please do not hesitate to reach out to us in  the future for any questions or resources. Just call [phone] and ask to speak to one of the team 1A staff.    Client Signature:_______________________  Date / Time:___________  Staff Signature:________________________   Date / Time:___________

## 2017-07-26 NOTE — PROGRESS NOTES
"Adult Mental Health Outpatient Group Therapy Progress Note     Client Initial Individualized Goals for Treatment: \"To find ways to manage my frustration and depression and anxiety symptoms\".  See Initial Treatment suggestions for the client during the time between Diagnostic Assessment and completion of the Master Individualized Treatment Plan.    Treatment Goals:  1. Client will notify staff when needing assistance to develop or implement a coping plan to manage suicidal or self injurious urges.      2. Nehemias will utilize time in OT clinic to select structured, paced activities and taking breaks as needed.      3. Nehemias will use time in OT to identify and explore strategies to manage sensory sensitivities.      4. Nehemias will use time in Group Therapy to discuss strategies to help manage emotional dysregulation.      5. Nehemias will increase her community support by scheduling with a Psychiatric prescriber and identifying what an aftercare plan might look like for ongoing support.     Area of Treatment Focus:  Symptom Management, Develop Socialization / Interpersonal Relationship Skills and Physical Health     Therapeutic Interventions/Treatment Strategies:  Support, Feedback and Problem Solving    Response to Treatment Strategies:  Accepted Feedback, Gave Feedback, Listened, Focused on Goals, Attentive and Accepted Support    Name of Group:  Psychotherapy     Description and Outcome:  Lebron reported feeling unsure of what was going on.  She noted she felt like her old self on Monday but now felt like she was \"drifting.\"  She reported sleeping a lot and spending time with a friend who is leaving soon for school.  Identified pattern that over the weekend she engaged in fun activities and felt better on Monday and over the last few days she has slept and now feels like her symptoms are higher.  She acknowledged patterns.  Challenged Client to find 5 minutes of positive feelings from now until next " group and be mindful in the moment.  Lebron stated this would be challenging but that she would try.  She stated desire to go back on old ADHD meds and acknowledged necessity to discuss this with her doctor.  She did not endorse safety concerns.     Goal four addressed in session.       Client verbalized understanding of session content by stating plan to cope with stress effectively.      Is this a Weekly Review of the Progress on the Treatment Plan?  No

## 2017-07-26 NOTE — PROGRESS NOTES
"  Adult Mental Health Outpatient Group Therapy Progress Note     Client Initial Individualized Goals for Treatment: \"To find ways to manage my frustration and depression and anxiety symptoms\".    See Initial Treatment suggestions for the client during the time between Diagnostic Assessment and completion of the Master Individualized Treatment Plan.    Treatment Goals:    1. Client will notify staff when needing assistance to develop or implement a coping plan to manage suicidal or self injurious urges.      2. Nehemias will utilize time in OT clinic to select structured, paced activities and taking breaks as needed.      3. Nehemias will use time in OT to identify and explore strategies to manage sensory sensitivities.      4. Nehemias will use time in Group Therapy to discuss strategies to help manage emotional dysregulation.      5. Nehemias will increase her community support by scheduling with a Psychiatric prescriber and identifying what an aftercare plan might look like for ongoing support.     Area of Treatment Focus:  Symptom Management, Develop / Improve Independent Living Skills and Develop Socialization / Interpersonal Relationship Skills    Therapeutic Interventions/Treatment Strategies:  Support, Feedback, Safety Assessments, Structured Activity and Problem Solving    Response to Treatment Strategies:  Accepted Feedback, Gave Feedback, Listened, Focused on Goals, Attentive and Accepted Support    Name of Group:  OT Clinic     Description and Outcome:  Pt attended and participated in a structured occupational therapy group where intervention focused on coping through structured hands-on activities to improve function in valued roles, routines, and independent living skills. Client presented to group with a calm, even affect. She focused on an ongoing goal focused activity. Reviewed plan to attend peer support group tomorrow to trial aftercare support. Client requested to discharge on Friday " versus Wednesday to bridge the gap until DBT intake. Reviewed sensory screen and discussed next steps to identify strategies to manage sensitivities. Client verbalized understanding of session content by problem solving aftercare plans. Client addressed ITP goal number 2 and 3 in this session.     Is this a Weekly Review of the Progress on the Treatment Plan?  No

## 2017-07-27 NOTE — PROGRESS NOTES
"  Adult Mental Health Outpatient Group Therapy Progress Note     Client Initial Individualized Goals for Treatment: \"To find ways to manage my frustration and depression and anxiety symptoms\".    See Initial Treatment suggestions for the client during the time between Diagnostic Assessment and completion of the Master Individualized Treatment Plan.    Treatment Goals:    1. Client will notify staff when needing assistance to develop or implement a coping plan to manage suicidal or self injurious urges.      2. Nehemias will utilize time in OT clinic to select structured, paced activities and taking breaks as needed.      3. Nehemias will use time in OT to identify and explore strategies to manage sensory sensitivities.      4. Nehemias will use time in Group Therapy to discuss strategies to help manage emotional dysregulation.      5. Nehemias will increase her community support by scheduling with a Psychiatric prescriber and identifying what an aftercare plan might look like for ongoing support.     Area of Treatment Focus:  Symptom Management, Develop / Improve Independent Living Skills and Develop Socialization / Interpersonal Relationship Skills    Therapeutic Interventions/Treatment Strategies:  Support, Feedback, Safety Assessments, Structured Activity and Problem Solving    Response to Treatment Strategies:  Accepted Feedback, Gave Feedback, Listened, Focused on Goals, Attentive and Accepted Support    Name of Group:  OT Clinic     Description and Outcome:  Pt attended and participated in a structured occupational therapy group where intervention focused on coping through structured hands-on activities to improve function in valued roles, routines, and independent living skills. Client had a calm, even affect in this session. She was focused on completing activity in the time remaining before discharge. She reported that she forgot to attend [facility] young adult group. Encouraged her to email the " facilitator before next session. Client agreed to do so. Client verbalized understanding of session content by discussing her aftercare needs. Client addressed ITP goal number 2 in this session.     Is this a Weekly Review of the Progress on the Treatment Plan?  No

## 2017-07-28 ENCOUNTER — HOSPITAL ENCOUNTER (OUTPATIENT)
Dept: BEHAVIORAL HEALTH | Facility: CLINIC | Age: 22
End: 2017-07-28
Attending: PSYCHIATRY & NEUROLOGY
Payer: COMMERCIAL

## 2017-07-28 PROCEDURE — H2012 BEHAV HLTH DAY TREAT, PER HR: HCPCS

## 2017-07-28 ASSESSMENT — ANXIETY QUESTIONNAIRES
1. FEELING NERVOUS, ANXIOUS, OR ON EDGE: SEVERAL DAYS
2. NOT BEING ABLE TO STOP OR CONTROL WORRYING: NOT AT ALL
3. WORRYING TOO MUCH ABOUT DIFFERENT THINGS: SEVERAL DAYS
7. FEELING AFRAID AS IF SOMETHING AWFUL MIGHT HAPPEN: SEVERAL DAYS
4. TROUBLE RELAXING: SEVERAL DAYS
5. BEING SO RESTLESS THAT IT IS HARD TO SIT STILL: MORE THAN HALF THE DAYS
6. BECOMING EASILY ANNOYED OR IRRITABLE: NEARLY EVERY DAY
GAD7 TOTAL SCORE: 9
IF YOU CHECKED OFF ANY PROBLEMS ON THIS QUESTIONNAIRE, HOW DIFFICULT HAVE THESE PROBLEMS MADE IT FOR YOU TO DO YOUR WORK, TAKE CARE OF THINGS AT HOME, OR GET ALONG WITH OTHER PEOPLE: SOMEWHAT DIFFICULT

## 2017-07-28 NOTE — PROGRESS NOTES
"Adult Mental Health Outpatient Group Therapy Progress Note     Client Initial Individualized Goals for Treatment: \"To find ways to manage my frustration and depression and anxiety symptoms\".  See Initial Treatment suggestions for the client during the time between Diagnostic Assessment and completion of the Master Individualized Treatment Plan.    Treatment Goals:  1. Client will notify staff when needing assistance to develop or implement a coping plan to manage suicidal or self injurious urges.      2. Nehemias will utilize time in OT clinic to select structured, paced activities and taking breaks as needed.      3. Nehemias will use time in OT to identify and explore strategies to manage sensory sensitivities.      4. Nehemias will use time in Group Therapy to discuss strategies to help manage emotional dysregulation.      5. Nehemias will increase her community support by scheduling with a Psychiatric prescriber and identifying what an aftercare plan might look like for ongoing support.     Area of Treatment Focus:  Symptom Management, Develop Socialization / Interpersonal Relationship Skills and Physical Health     Therapeutic Interventions/Treatment Strategies:  Support, Feedback and Problem Solving    Response to Treatment Strategies:  Accepted Feedback, Gave Feedback, Listened, Focused on Goals, Attentive and Accepted Support    Name of Group:  Psychotherapy     Description and Outcome:  Lebron reported mixed feelings about leaving group: excited to start her life and potentially getting a puppy but also nervous about losing consistent structure of group.  Validated concerned and highlighted that her awareness of importance for structure allows her the ability to plan in advance.  Provided suggestions for how to add daily/weekly structure.  Lebron participated in brainstorming ideas including doing yoga, swimming, and volunteering.  She also reported plans to do DBT once per week after leaving " programming.  She stated concern about if symptoms return.  Reminded Immy that she has the ability to cope effectively with increasing symptoms and if they become unmanageable she is able to increase her services as needed.  She did not endorse safety concerns.       Goal four addressed in session.       Client verbalized understanding of session content by creating a plan to maintain positive structure once she leaves group.      Is this a Weekly Review of the Progress on the Treatment Plan?  No

## 2017-07-29 ASSESSMENT — PATIENT HEALTH QUESTIONNAIRE - PHQ9: SUM OF ALL RESPONSES TO PHQ QUESTIONS 1-9: 13

## 2017-07-29 ASSESSMENT — ANXIETY QUESTIONNAIRES: GAD7 TOTAL SCORE: 9

## 2017-08-01 NOTE — PROGRESS NOTES
"  Adult Mental Health Outpatient Group Therapy Progress Note     Client Initial Individualized Goals for Treatment: \"To find ways to manage my frustration and depression and anxiety symptoms\".    See Initial Treatment suggestions for the client during the time between Diagnostic Assessment and completion of the Master Individualized Treatment Plan.    Treatment Goals:    1. Client will notify staff when needing assistance to develop or implement a coping plan to manage suicidal or self injurious urges.      2. Nehemias will utilize time in OT clinic to select structured, paced activities and taking breaks as needed.      3. Nehemias will use time in OT to identify and explore strategies to manage sensory sensitivities.      4. Nehemias will use time in Group Therapy to discuss strategies to help manage emotional dysregulation.      5. Nehemias will increase her community support by scheduling with a Psychiatric prescriber and identifying what an aftercare plan might look like for ongoing support.     Area of Treatment Focus:  Symptom Management, Develop / Improve Independent Living Skills and Develop Socialization / Interpersonal Relationship Skills    Therapeutic Interventions/Treatment Strategies:  Support, Feedback, Safety Assessments, Structured Activity and Problem Solving    Response to Treatment Strategies:  Accepted Feedback, Gave Feedback, Listened, Focused on Goals, Attentive and Accepted Support    Name of Group:  OT Clinic     Description and Outcome:  Pt attended and participated in a structured occupational therapy group where intervention focused on coping through structured hands-on activities to improve function in valued roles, routines, and independent living skills. Client presented to group with a calm, even affect. She reported feeling prepared for discharge today. She reviewed her aftercare support and spent time exploring community ed courses that she could take to increase daily " structure if needed. Client verbalized understanding of session content by discussing personal growth during participation in program. Client addressed ITP goal number 2 in this session.    Is this a Weekly Review of the Progress on the Treatment Plan?  Yes.      Are Treatment Plan Goals being addressed?  Client discharged      Are Treatment Plan Strategies to Address Goals Effective?  Client discharged      Are there any current contracts in place?  No

## 2017-08-22 ENCOUNTER — COMMUNICATION - HEALTHEAST (OUTPATIENT)
Dept: INTERNAL MEDICINE | Facility: CLINIC | Age: 22
End: 2017-08-22

## 2017-08-22 ENCOUNTER — AMBULATORY - HEALTHEAST (OUTPATIENT)
Dept: INTERNAL MEDICINE | Facility: CLINIC | Age: 22
End: 2017-08-22

## 2017-09-01 ENCOUNTER — OFFICE VISIT - HEALTHEAST (OUTPATIENT)
Dept: FAMILY MEDICINE | Facility: CLINIC | Age: 22
End: 2017-09-01

## 2017-09-01 DIAGNOSIS — J32.9 SINUSITIS: ICD-10-CM

## 2017-10-04 ENCOUNTER — RECORDS - HEALTHEAST (OUTPATIENT)
Dept: ADMINISTRATIVE | Facility: OTHER | Age: 22
End: 2017-10-04

## 2017-12-03 ENCOUNTER — HEALTH MAINTENANCE LETTER (OUTPATIENT)
Age: 22
End: 2017-12-03

## 2017-12-04 ENCOUNTER — RECORDS - HEALTHEAST (OUTPATIENT)
Dept: ADMINISTRATIVE | Facility: OTHER | Age: 22
End: 2017-12-04

## 2017-12-11 ENCOUNTER — OFFICE VISIT - HEALTHEAST (OUTPATIENT)
Dept: FAMILY MEDICINE | Facility: CLINIC | Age: 22
End: 2017-12-11

## 2017-12-11 DIAGNOSIS — R51.9 HEADACHE: ICD-10-CM

## 2017-12-18 ENCOUNTER — OFFICE VISIT - HEALTHEAST (OUTPATIENT)
Dept: INTERNAL MEDICINE | Facility: CLINIC | Age: 22
End: 2017-12-18

## 2017-12-18 DIAGNOSIS — R53.83 FATIGUE: ICD-10-CM

## 2017-12-18 DIAGNOSIS — Z00.00 HEALTHCARE MAINTENANCE: ICD-10-CM

## 2017-12-18 DIAGNOSIS — R51.9 HEADACHE: ICD-10-CM

## 2017-12-18 LAB
CHOLEST SERPL-MCNC: 118 MG/DL
FASTING STATUS PATIENT QL REPORTED: YES
HDLC SERPL-MCNC: 41 MG/DL
LDLC SERPL CALC-MCNC: 70 MG/DL
TRIGL SERPL-MCNC: 34 MG/DL

## 2017-12-18 ASSESSMENT — MIFFLIN-ST. JEOR: SCORE: 1827.7

## 2017-12-19 ENCOUNTER — RECORDS - HEALTHEAST (OUTPATIENT)
Dept: ADMINISTRATIVE | Facility: OTHER | Age: 22
End: 2017-12-19

## 2017-12-19 ENCOUNTER — HOSPITAL ENCOUNTER (OUTPATIENT)
Dept: CT IMAGING | Facility: CLINIC | Age: 22
Discharge: HOME | End: 2017-12-19
Attending: INTERNAL MEDICINE

## 2017-12-19 DIAGNOSIS — R51.9 HEADACHE: ICD-10-CM

## 2017-12-19 NOTE — PROGRESS NOTES
"Adult Mental Health Outpatient Group Therapy Progress Note   Client Initial Individualized Goals for Treatment: \"To find ways to manage my frustration and depression and anxiety symptoms\".     See Initial Treatment suggestions for the client during the time between Diagnostic Assessment and completion of the Master Individualized Treatment Plan.     Treatment Goals:     1. Client will notify staff when needing assistance to develop or implement a coping plan to manage suicidal or self injurious urges.     2. Nehemias will utilize time in OT clinic to select structured, paced activities and taking breaks as needed.     3. Nehemias will use time in OT to identify and explore strategies to manage sensory sensitivities.     4. Nehemias will use time in Group Therapy to discuss strategies to help manage emotional dysregulation.     5. Nehemias will increase her community support by scheduling with a Psychiatric prescriber and identifying what an aftercare plan might look like for ongoing support.        Area of Treatment Focus:  Symptom Management, Personal Safety and Community Resources/Discharge Planning    Therapeutic Interventions/Treatment Strategies:  Support, Feedback, Safety Assessments, Problem Solving, Clarification, Education and Cognitive Behavioral Therapy    Response to Treatment Strategies:  Accepted Feedback, Listened, Attentive, Accepted Support, Alert and Was able to stay in group    Name of Group:  Psychotherapy     Description and Outcome:  Nehemias talked about her trip with her family friend.  She said it was awesome but difficult at the same time.  On her trip she could not enjoy the larger city environment because it was over stimulating for her and gave her headaches.  She also became anxious and overwhelmed which previously would not have been the case.  She enjoyed spending time with her friend but they stayed at her house rather then going into the city like they used to do together. "  She is glad to be back from her trip and in group.      Is this a Weekly Review of the Progress on the Treatment Plan?  Yes.      Are Treatment Plan Goals being addressed?  Yes, continue treatment goals      Are Treatment Plan Strategies to Address Goals Effective?  Yes, continue treatment strategies      Are there any current contracts in place?  No         Wrist Watch/Cell Phone/PDA (specify)/Clothing

## 2017-12-20 ENCOUNTER — COMMUNICATION - HEALTHEAST (OUTPATIENT)
Dept: SCHEDULING | Facility: CLINIC | Age: 22
End: 2017-12-20

## 2017-12-21 ENCOUNTER — COMMUNICATION - HEALTHEAST (OUTPATIENT)
Dept: INTERNAL MEDICINE | Facility: CLINIC | Age: 22
End: 2017-12-21

## 2018-01-02 ENCOUNTER — RECORDS - HEALTHEAST (OUTPATIENT)
Dept: ADMINISTRATIVE | Facility: OTHER | Age: 23
End: 2018-01-02

## 2018-01-12 ENCOUNTER — RECORDS - HEALTHEAST (OUTPATIENT)
Dept: ADMINISTRATIVE | Facility: OTHER | Age: 23
End: 2018-01-12

## 2018-01-13 ENCOUNTER — COMMUNICATION - HEALTHEAST (OUTPATIENT)
Dept: INTERNAL MEDICINE | Facility: CLINIC | Age: 23
End: 2018-01-13

## 2018-01-22 ENCOUNTER — RECORDS - HEALTHEAST (OUTPATIENT)
Dept: ADMINISTRATIVE | Facility: OTHER | Age: 23
End: 2018-01-22

## 2018-01-31 ENCOUNTER — RECORDS - HEALTHEAST (OUTPATIENT)
Dept: ADMINISTRATIVE | Facility: OTHER | Age: 23
End: 2018-01-31

## 2018-01-31 ENCOUNTER — TRANSFERRED RECORDS (OUTPATIENT)
Dept: HEALTH INFORMATION MANAGEMENT | Facility: CLINIC | Age: 23
End: 2018-01-31

## 2018-02-20 ENCOUNTER — RECORDS - HEALTHEAST (OUTPATIENT)
Dept: ADMINISTRATIVE | Facility: OTHER | Age: 23
End: 2018-02-20

## 2018-02-20 ENCOUNTER — TRANSFERRED RECORDS (OUTPATIENT)
Dept: HEALTH INFORMATION MANAGEMENT | Facility: CLINIC | Age: 23
End: 2018-02-20

## 2018-03-06 ENCOUNTER — RECORDS - HEALTHEAST (OUTPATIENT)
Dept: ADMINISTRATIVE | Facility: OTHER | Age: 23
End: 2018-03-06

## 2018-03-17 ENCOUNTER — COMMUNICATION - HEALTHEAST (OUTPATIENT)
Dept: SCHEDULING | Facility: CLINIC | Age: 23
End: 2018-03-17

## 2018-03-19 ENCOUNTER — OFFICE VISIT - HEALTHEAST (OUTPATIENT)
Dept: INTERNAL MEDICINE | Facility: CLINIC | Age: 23
End: 2018-03-19

## 2018-03-19 DIAGNOSIS — Z01.818 PREOP EXAMINATION: ICD-10-CM

## 2018-03-19 LAB
HCG UR QL: NEGATIVE
HGB BLD-MCNC: 11.4 G/DL (ref 12–16)
SP GR UR STRIP: 1.01 (ref 1–1.03)

## 2018-03-19 ASSESSMENT — MIFFLIN-ST. JEOR: SCORE: 1841.31

## 2018-03-21 ENCOUNTER — RECORDS - HEALTHEAST (OUTPATIENT)
Dept: ADMINISTRATIVE | Facility: OTHER | Age: 23
End: 2018-03-21

## 2018-04-02 ENCOUNTER — RECORDS - HEALTHEAST (OUTPATIENT)
Dept: ADMINISTRATIVE | Facility: OTHER | Age: 23
End: 2018-04-02

## 2018-05-01 ENCOUNTER — RECORDS - HEALTHEAST (OUTPATIENT)
Dept: ADMINISTRATIVE | Facility: OTHER | Age: 23
End: 2018-05-01

## 2018-05-24 ENCOUNTER — RECORDS - HEALTHEAST (OUTPATIENT)
Dept: ADMINISTRATIVE | Facility: OTHER | Age: 23
End: 2018-05-24

## 2018-06-04 ENCOUNTER — COMMUNICATION - HEALTHEAST (OUTPATIENT)
Dept: INTERNAL MEDICINE | Facility: CLINIC | Age: 23
End: 2018-06-04

## 2018-06-13 ENCOUNTER — TELEPHONE (OUTPATIENT)
Dept: PEDIATRICS | Facility: CLINIC | Age: 23
End: 2018-06-13

## 2018-06-13 ENCOUNTER — RECORDS - HEALTHEAST (OUTPATIENT)
Dept: ADMINISTRATIVE | Facility: OTHER | Age: 23
End: 2018-06-13

## 2018-06-13 NOTE — TELEPHONE ENCOUNTER
Nehemias called and left message re: would like to come in with mom and sister for next Family Clinic on 6/22/18.  Was seen in the past and she is home for the summer and would like to see Dr. Slade again.    Called Nehemias back.  Let her know to go ahead and come on 6/22/18 at 11:15am.  Nehemias okay with plan. She had no other questions at this time.

## 2018-06-22 ENCOUNTER — OFFICE VISIT (OUTPATIENT)
Dept: PEDIATRICS | Facility: CLINIC | Age: 23
End: 2018-06-22
Attending: DIETITIAN, REGISTERED
Payer: COMMERCIAL

## 2018-06-22 ENCOUNTER — RECORDS - HEALTHEAST (OUTPATIENT)
Dept: ADMINISTRATIVE | Facility: OTHER | Age: 23
End: 2018-06-22

## 2018-06-22 ENCOUNTER — OFFICE VISIT (OUTPATIENT)
Dept: ENDOCRINOLOGY | Facility: CLINIC | Age: 23
End: 2018-06-22
Attending: INTERNAL MEDICINE
Payer: COMMERCIAL

## 2018-06-22 VITALS
HEIGHT: 65 IN | HEART RATE: 67 BPM | WEIGHT: 244.27 LBS | SYSTOLIC BLOOD PRESSURE: 115 MMHG | BODY MASS INDEX: 40.7 KG/M2 | DIASTOLIC BLOOD PRESSURE: 72 MMHG

## 2018-06-22 DIAGNOSIS — E66.01 MORBID (SEVERE) OBESITY DUE TO EXCESS CALORIES (H): Primary | ICD-10-CM

## 2018-06-22 DIAGNOSIS — Z78.9 VEGETARIAN DIET: ICD-10-CM

## 2018-06-22 DIAGNOSIS — R53.83 OTHER FATIGUE: ICD-10-CM

## 2018-06-22 DIAGNOSIS — E66.01 MORBID (SEVERE) OBESITY DUE TO EXCESS CALORIES (H): ICD-10-CM

## 2018-06-22 LAB
ALT SERPL W P-5'-P-CCNC: 18 U/L (ref 0–50)
ANION GAP SERPL CALCULATED.3IONS-SCNC: 9 MMOL/L (ref 3–14)
AST SERPL W P-5'-P-CCNC: 14 U/L (ref 0–45)
BUN SERPL-MCNC: 15 MG/DL (ref 7–30)
CALCIUM SERPL-MCNC: 8.5 MG/DL (ref 8.5–10.1)
CHLORIDE SERPL-SCNC: 110 MMOL/L (ref 94–109)
CO2 SERPL-SCNC: 24 MMOL/L (ref 20–32)
CREAT SERPL-MCNC: 0.77 MG/DL (ref 0.52–1.04)
FERRITIN SERPL-MCNC: 3 NG/ML (ref 12–150)
GFR SERPL CREATININE-BSD FRML MDRD: >90 ML/MIN/1.7M2
GLUCOSE SERPL-MCNC: 84 MG/DL (ref 70–99)
IRON SATN MFR SERPL: 8 % (ref 15–46)
IRON SERPL-MCNC: 37 UG/DL (ref 35–180)
POTASSIUM SERPL-SCNC: 4 MMOL/L (ref 3.4–5.3)
SODIUM SERPL-SCNC: 143 MMOL/L (ref 133–144)
T4 FREE SERPL-MCNC: 0.79 NG/DL (ref 0.76–1.46)
TIBC SERPL-MCNC: 451 UG/DL (ref 240–430)
TSH SERPL DL<=0.005 MIU/L-ACNC: 0.7 MU/L (ref 0.4–4)
VIT B12 SERPL-MCNC: 560 PG/ML (ref 193–986)

## 2018-06-22 PROCEDURE — 84439 ASSAY OF FREE THYROXINE: CPT | Performed by: INTERNAL MEDICINE

## 2018-06-22 PROCEDURE — 36415 COLL VENOUS BLD VENIPUNCTURE: CPT | Performed by: INTERNAL MEDICINE

## 2018-06-22 PROCEDURE — 83550 IRON BINDING TEST: CPT | Performed by: INTERNAL MEDICINE

## 2018-06-22 PROCEDURE — G0463 HOSPITAL OUTPT CLINIC VISIT: HCPCS | Mod: 25

## 2018-06-22 PROCEDURE — 84460 ALANINE AMINO (ALT) (SGPT): CPT | Performed by: INTERNAL MEDICINE

## 2018-06-22 PROCEDURE — 83540 ASSAY OF IRON: CPT | Performed by: INTERNAL MEDICINE

## 2018-06-22 PROCEDURE — 82728 ASSAY OF FERRITIN: CPT | Performed by: INTERNAL MEDICINE

## 2018-06-22 PROCEDURE — 84443 ASSAY THYROID STIM HORMONE: CPT | Performed by: INTERNAL MEDICINE

## 2018-06-22 PROCEDURE — 82607 VITAMIN B-12: CPT | Performed by: INTERNAL MEDICINE

## 2018-06-22 PROCEDURE — 97803 MED NUTRITION INDIV SUBSEQ: CPT | Mod: ZF | Performed by: DIETITIAN, REGISTERED

## 2018-06-22 PROCEDURE — 84450 TRANSFERASE (AST) (SGOT): CPT | Performed by: INTERNAL MEDICINE

## 2018-06-22 PROCEDURE — 80048 BASIC METABOLIC PNL TOTAL CA: CPT | Performed by: INTERNAL MEDICINE

## 2018-06-22 RX ORDER — PRAZOSIN HYDROCHLORIDE 1 MG/1
1 CAPSULE ORAL AT BEDTIME
COMMUNITY
End: 2020-08-28

## 2018-06-22 RX ORDER — NALTREXONE HYDROCHLORIDE 50 MG/1
TABLET, FILM COATED ORAL
Qty: 30 TABLET | Refills: 3 | Status: SHIPPED | OUTPATIENT
Start: 2018-06-22 | End: 2018-12-07

## 2018-06-22 ASSESSMENT — PAIN SCALES - GENERAL: PAINLEVEL: NO PAIN (0)

## 2018-06-22 NOTE — LETTER
"  6/22/2018      RE: Nehemias Mascorro  [address]  [place] [address]       Medical Nutrition Therapy  Nutrition Reassessment  Patient seen in Family/Pediatric Weight Mangement Clinic, accompanied by mother and sister.    Anthropometrics  Age:  23 year old female   Height:  166 cm (5' 5.35\")  Weight:  110.8 kg (244 lb 4.3 oz)  BMI:  40.29  Weight Gain 30 lbs since last clinic visit on 8/7/15.  Nutrition History  [facility] management follow up. Patient has not been seen in clinic since 8/7/15 (over 2 1/2 years ago). During this time patient has gained about 30 lbs. She went to college and had an accident during a job - suffered a concussion. She is now at home and struggles with debilitating headaches. She is very restricted on her activity - not allowed to walk but is in rehab. She is living at home again - only allowed to work 2 hours every 2 weeks. Patient is eating throughout the day - grazing. She will eat a very small breakfast - granola bar (not hungry) and snacks on popcorn throughout the morning. She is trying to follow a plant-based, vegan diet (is feeling very tired and even craving meat at times). She is snacking in the afternoon on chips, popcorn or pretzels. Struggles to make meals on her own (without supervision). She states she will often wake up in the night starving.     Nutritional Intakes  Sample intake includes:  Breakfast:   @ 9:30 am - granola bar - something light with medications (not very hungry)  Am Snack:   Popcorn   Lunch:   @1:30 pm - GF, DF burrito and popcorn or leftovers  PM Snack:   Popcorn, chips, pretzels  Dinner:   Make herself - chicken rice or spaghetti or frozen  Joes meals  HS Snack:in the middle of the night - popcorn or granola bar  Beverages:  water      Medications/Vitamins/Minerals    Current Outpatient Prescriptions:      AMITRIPTYLINE HCL PO, Reported on 4/27/2017, Disp: , Rfl:      ARIPiprazole (ABILIFY) 7.5 mg " half-tab, Take 7.5 mg by mouth daily, Disp: , Rfl:      BuPROPion HCl (WELLBUTRIN PO), Take by mouth daily, Disp: , Rfl:      cetirizine (ZYRTEC) 10 MG tablet, Take 10 mg by mouth daily Reported on 4/27/2017, Disp: , Rfl:      DIAZEPAM PO, Take 5 mg by mouth, Disp: , Rfl:      ESCITALOPRAM OXALATE PO, Take 20 mg by mouth daily, Disp: , Rfl:      Fexofenadine HCl (ALLEGRA PO), Take by mouth daily as needed for allergies, Disp: , Rfl:      GLYCOPYRROLATE PO, Take 2 mg by mouth daily, Disp: , Rfl:      GUANFACINE HCL PO, Take 2 mg by mouth daily, Disp: , Rfl:      IBUPROFEN PO, Take 600 mg by mouth, Disp: , Rfl:      Ipratropium-Albuterol (COMBIVENT RESPIMAT)  MCG/ACT inhaler, Inhale 1 puff into the lungs 4 times daily, Disp: , Rfl:      Lansoprazole (PREVACID PO), , Disp: , Rfl:      norethindrone-ethinyl estradiol (JUNEL FE 1/20) 1-20 MG-MCG per tablet, Take 1 tablet by mouth daily, Disp: , Rfl:      ONDANSETRON PO, Take 8 mg by mouth, Disp: , Rfl:      prazosin (MINIPRESS) 1 MG capsule, Take 1 mg by mouth At Bedtime, Disp: , Rfl:      Probiotic Product (PROBIOTIC DAILY PO), , Disp: , Rfl:      TIZANIDINE HCL PO, Take 4 mg by mouth, Disp: , Rfl:      topiramate (TOPAMAX) 25 MG tablet, Take 1 tablet first wk t bedtime, then 2 tablets at bedtime daily thereafter as tolerated (Patient not taking: Reported on 4/27/2017), Disp: 60 tablet, Rfl: 4     Venlafaxine HCl (EFFEXOR PO), Take 75 mg by mouth 3 times daily, Disp: , Rfl:     Nutrition Diagnosis  Obesity related to excessive energy intake as evidenced by BMI 40.    Interventions & Education  Provided written and verbal education on the following:    Meal Plan  Meal replacements    Reviewed dietary recall and patient's current eating habits/behaviors. Discussed the use of a 1400 kcal meal replacement plan. Talked about what foods would allowed on the meal plan and encouraged her to follow the plan strictly to see best results - keep meals to 300 kcal (breakfast,  lunch and dinner) and snacks to 150 kcal (3 daily). Answered nutrition-related questions that mom and pt had, and worked with them to set nutrition goals to work towards until next visit.    Goals  1) Reduce BMI  2) follow 1400 meal replacement plan   3) Keep meals to 300 kcal   4) Have 3 snacks a day of 150 kcal each     Monitoring/Evaluation  Will continue to monitor progress towards goals and provide education in Pediatric Weight Management.    Spent 45 minutes in consult with patient & mother and sister.      Sade Levy MS, RD, LD  Pager # [phone]

## 2018-06-22 NOTE — NURSING NOTE
"[facility] [[id number]]  Chief Complaint   Patient presents with     RECHECK     weight management     Initial /72 (BP Location: Right arm, Patient Position: Sitting, Cuff Size: Adult Large)  Pulse 67  Ht 5' 5.35\" (166 cm)  Wt 244 lb 4.3 oz (110.8 kg)  BMI 40.21 kg/m2 Estimated body mass index is 40.21 kg/(m^2) as calculated from the following:    Height as of this encounter: 5' 5.35\" (166 cm).    Weight as of this encounter: 244 lb 4.3 oz (110.8 kg).  Medication Reconciliation: complete       Wt Readings from Last 4 Encounters:   06/22/18 244 lb 4.3 oz (110.8 kg)   05/19/17 229 lb (103.9 kg)   08/07/15 214 lb 9.6 oz (97.3 kg)   06/26/15 211 lb 13.8 oz (96.1 kg)     Francy Caraballo CMA      "

## 2018-06-22 NOTE — LETTER
Patient:  Nehemias Mascorro  :   1995  MRN:     [id number]        Ms.Immanuelle Mascorro  [address] [place] [address]        2018    Dear ,    We are writing to inform you of your test results.  These additional results ALT (a liver test), TSH (related to thyroid function) and vitamin Y86--cdo all OK.  Other results from this visit are reported separately.  Please let us know if you have any questions or concerns between visits.      Resulted Orders   ALT   Result Value Ref Range    ALT 18 0 - 50 U/L   TSH   Result Value Ref Range    TSH 0.70 0.40 - 4.00 mU/L   Vitamin B12   Result Value Ref Range    Vitamin B12 560 193 - 986 pg/mL       Luis Slade MD

## 2018-06-22 NOTE — PATIENT INSTRUCTIONS
"Please get labs today    We are considering medications to help with wt loss.  One option is adding naltrexone to the bupropion you are already taking--if we do this you would start with 1/2 tablet once daily for one week, timed to be about 1-2 hours before worst cravings, and then the second week go up to 1/2 tablet twice daily.    MEDICATION STARTED AT THIS APPOINTMENT  We are starting Naltrexone. Start with 1/2 tab 1-2 hours prior to the time you have the most trouble with cravings or extra hunger. If you are doing well you may switch to 1/2 tablet twice daily    WARNING: This medication blocks the action of opioid type pain medications. If you routinely take any medication like Codeine, Oxycontin,Percocet,Morphine,Dilaudid or Methodone, do not take this until you have talked with weight management staff. If you are planning surgery you should stop Naltrexone 4 days prior to the surgery. If you have an injury that requires pain medication, make sure the health care staff knows you take Naltrexone.     Call the nurse at [phone] if you have any questions or concerns. (Do not stop taking it if you don't think it's working. For some people it works without them knowing it.)    Naltrexone is a medication that is used most often to help people who are troubled by dependence on prescription pain killers or alcohol. It has also been found to help with weight loss. Although it's not currently FDA approved for weight loss, it has been used safely for a number of years to help people who are carrying extra weight.     Just how Naltrexone helps with weight loss has not been exactly determined.  It seems to work by quieting down brain signals related to strong food cravings. Many of our patients use the word \"addiction\" to describe their feelings and constant thoughts about food. It makes sense then to treat the feeling of dependence on food, outside of real hunger, with a medication designed to help with other sorts of " dependence.     Our patients on Naltrexone find that they:    >feel less interest in food   >think less about food and eating and have more time to think of other things   >find it easier to push the plate away   >have an easier time eating less    For some of our patients, these feelings are very immediate. Other patients, don't feel much of a change but find they've lost weight. Like all weight loss medications, Naltrexone works best when you help it work. This means:  1. Having less tempting high calorie (fattening) food around the house or office. (For people with strong cravings this is very important.)   2. Staying away from situations or people that may trigger your cravings .   3. Eating out only one time or less each week.  4. Eating your meals at a table with the TV or computer off.    Side-effects. Naltrexone is generally well tolerated. The main side-effect we see is  nausea or a woozy feeling. A small number of people feel quite ill. Most people have a mild reaction and some people have no reaction at all.  The good news is that this feeling does go away.     In order to avoid nausea, please start the medication with half a pill for the first few days. Go on to a full pill if you are feeling well.      If you  are nauseated on 1/2 a pill it is okay to cut back to 1/4 pill ( a very small amount). Take this for a couple of days and work your way back up to a 1/2 pill and then a whole pill. Taking the medication at night or with food  to start also may help prevent the feeling of nausea.         Please refer to the pharmacy insert for more information on side-effects. Since many pharmacists are not familiar with the use of naltrexone in weight loss, calling the nurse at [phone] will get you the most accurate information.  In order to get refills of this or any medication we prescribe you must be seen in the medical weight mgmt clinic every 2-3 months. Please have your pharmacy fax a refill request to  [phone].

## 2018-06-22 NOTE — LETTER
"2018       RE: Nehemias Mascorro  [address]  [place] [address]     Dear Colleague,    Thank you for referring your patient, Nehemias Mascorro, to the [facility] ADULT WEIGHT MGT D at [facility] Please see a copy of my visit note below.            Return Medical Weight Management Note     Nehemias Mascorro  MRN:  [id number]  :  1995  JASMEET:  2018    Dear Ludwin Elam,    I had the pleasure of seeing your patient Nehemias Mascorro.  She is a 23 year old female who I am continuing to see for treatment of obesity.  Pt returns, last seen almost 3 yrs ago (last visit was at the family clinic)--reviewed and relevant history is as follows as extracted from the earlier note--     \"...Self-referred for obesity associated with GERD and anxiety and asthma.  Patient is interested in attaining a healthier weight to diminish current health problems related to co-morbid conditions and prevent future health issues.  She describes her weight history as a gradual gain, and began around age 9  Her previous attempts at weight loss include exercise. Patient has had some success lost about 20#--was on adderall and dancing)....   ...She is working on hypocaloric approach with volumetrics approach--thus far we have not started medication--options have been discussed and strategies, both lifestyle and medication support, were again discussed today.  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  We discussed incoporating meal replacement shakes for 1-2 meals per day to help--she wants to do this.  Discussed plans/ideas for activity, discussed also pharm approaches to help support wt loss in terms of potential risks and possible benefits and side effects. All of her " "questions were addressed and she would like a trial of topiramate--familiar with this med for wt loss as other family members are on it with some success.  She is aware that this med alone is not approved bu the FDA as a wt loss med.  Detailed discussion of this med and in particular the need for birth control--recommended 2 forms, at least one barrier, of effective BC if she is sexually active.  Also discussed further the possibility of cognitive issues with the topiramate and concern for school performance.  She will be starting the med for a couple of wks before going off to school and will elt us know if any issues on this med in the interim or later.\"       INTERVAL HISTORY:  Complicated interim history with setbacks.  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  Work is limited to 2  2-hr sessions of sit-down work.  By herself some of the day at home a fair amount and is eating a lot of prepared/quick fix good, trying to be plant-based so eating high carb not low fat.  Snacking frequenlty during the day on prop-popped popcorn with ghee and sea salt (keeps a big bag), notes cravigs for carbs including the popcorn and also sweets.  Wakes at night and eats (has been having night terrors, did have to stop the topiramate because of that.    Discussed options with pt and her mother--would benefit from modified meal replacement approach which is also what her mom is doing.  Pharm support was als discussed with pt and her mom during the visit.  Pt is already on bupropion 150 mg daily--discussed adding naltrexone to that and they will also talk with her psychiatrist about this.  Discussed potential risks and benefits and possible side effects.  Pt is not on tramadol or narcotics " "now.  She is noting feeling more fatigued--difficulty with sleep but is also on largely plant based diet--need to eval for anemia inlcuding iron loss and also B12 def.  Also, given the significant head trauma history will also screen for central hypothyroidism.  Pt also snacks on high carb and some high fat foods--will benefit from working with our nutritionist.    CURRENT WEIGHT:   244 lbs 4.31 oz    Wt Readings from Last 4 Encounters:   06/22/18 110.8 kg (244 lb 4.3 oz)   05/19/17 103.9 kg (229 lb)   08/07/15 97.3 kg (214 lb 9.6 oz)   06/26/15 96.1 kg (211 lb 13.8 oz)       Height:  5' 5.354\"  Body Mass Index:  Body mass index is 40.21 kg/(m^2).  Vitals:  B/P: 115/72, P: 67, R: Data Unavailable     Initial consult weight/vitals--Estimated body mass index is 34.28 kg/(m^2) as calculated from the following:    Height as of this encounter: 5' 5.75\" (167 cm).    Weight as of this encounter: 210 lb 12.2 oz (95.6 kg). on 4/24/15.  Weight change since last seen on 8/7/15 is up 30 pounds.   Total gain is 34 pounds.      MEDICATIONS:   Current Outpatient Prescriptions   Medication     ARIPiprazole (ABILIFY) 7.5 mg half-tab     BuPROPion HCl (WELLBUTRIN PO)     cetirizine (ZYRTEC) 10 MG tablet     DIAZEPAM PO     ESCITALOPRAM OXALATE PO     Fexofenadine HCl (ALLEGRA PO)     GLYCOPYRROLATE PO     IBUPROFEN PO     Ipratropium-Albuterol (COMBIVENT RESPIMAT)  MCG/ACT inhaler     Lansoprazole (PREVACID PO)     norethindrone-ethinyl estradiol (JUNEL FE 1/20) 1-20 MG-MCG per tablet     ONDANSETRON PO     prazosin (MINIPRESS) 1 MG capsule     AMITRIPTYLINE HCL PO     GUANFACINE HCL PO     Probiotic Product (PROBIOTIC DAILY PO)     TIZANIDINE HCL PO     topiramate (TOPAMAX) 25 MG tablet     Venlafaxine HCl (EFFEXOR PO)     No current facility-administered medications for this visit.      ASSESSMENT:   Morbid obesity  Fatigue  Follows vegetarian diet    PLAN:   --will get labs today to eval for fatigue in this pt on vegetarian " "diet, premenopausal  --discussed barriers and approaches to wt loss with pt (include hunger/cravings/lack of information needed and applied to follow plan for wt loss effectively/additional new interim health issues impacting her ability to focus on wt loss as above) and her mother; pt's mother is already following a modified meal replacement plan and with this pt, especially during the day when her mom is out it is a struggle to eat healthy and within a calorie restriction goal for wt loss so pt could also benefit from a  Modified meal replacement approach  --work with our dietitian today--this was done with the following goals co-developed/discussed with pt:  \"Goals  1) Reduce BMI  2) follow 1400 meal replacement plan   3) Keep meals to 300 kcal   4) Have 3 snacks a day of 150 kcal each\"  --planning possible addition of naltrexone to the bupropion pt is already taking; they are discussing with pt's psychiatrist also and will get back to us.    Time: about 35/40 min spent on evaluation, management, counseling, education, & motivational interviewing with greater than 50 % of the total time was spent on counseling and coordinating care    Sincerely,    Luis Slade MD    "

## 2018-06-22 NOTE — LETTER
Patient:  Nehemias Mascorro  :   1995  MRN:     [id number]        Ms.Immanuelle Mascorro  [address] [place] [address]        2018    Dear ,    We are writing to inform you of your test results.  Some of these results are coming in a separate letter-but of note in this set of results is that there does appear to be some iron deficiency.  The ferritin (which can reflect iron stores) is low and the iron saturation is low, and the binding capacity is high.  Kidney function (serum creatinine) and glucose are OK.  Please review these results with your primary care provider to follow up on the low iron stores, which could be contributing to fatigue.      Resulted Orders   Basic metabolic panel   Result Value Ref Range    Sodium 143 133 - 144 mmol/L    Potassium 4.0 3.4 - 5.3 mmol/L    Chloride 110 (H) 94 - 109 mmol/L    Carbon Dioxide 24 20 - 32 mmol/L    Anion Gap 9 3 - 14 mmol/L    Glucose 84 70 - 99 mg/dL    Urea Nitrogen 15 7 - 30 mg/dL    Creatinine 0.77 0.52 - 1.04 mg/dL    GFR Estimate >90 >60 mL/min/1.7m2      Comment:      Non  GFR Calc    GFR Estimate If Black >90 >60 mL/min/1.7m2      Comment:       GFR Calc    Calcium 8.5 8.5 - 10.1 mg/dL   AST   Result Value Ref Range    AST 14 0 - 45 U/L   T4 free   Result Value Ref Range    T4 Free 0.79 0.76 - 1.46 ng/dL   Iron and iron binding capacity   Result Value Ref Range    Iron 37 35 - 180 ug/dL    Iron Binding Cap 451 (H) 240 - 430 ug/dL    Iron Saturation Index 8 (L) 15 - 46 %   Ferritin   Result Value Ref Range    Ferritin 3 (L) 12 - 150 ng/mL       Luis Slade

## 2018-06-22 NOTE — PROGRESS NOTES
"        Return Medical Weight Management Note     Nehemias Mascorro  MRN:  [id number]  :  1995  JASMEET:  2018    Dear Ludwin Elam,    I had the pleasure of seeing your patient Nehemias Mascorro.  She is a 23 year old female who I am continuing to see for treatment of obesity.  Pt returns, last seen almost 3 yrs ago (last visit was at the [facility])--reviewed and relevant history is as follows as extracted from the earlier note--     \"...Self-referred for obesity associated with GERD and anxiety and asthma.  Patient is interested in attaining a healthier weight to diminish current health problems related to co-morbid conditions and prevent future health issues.  She describes her weight history as a gradual gain, and began around age 9  Her previous attempts at weight loss include exercise. Patient has had some success lost about 20#--was on adderall and dancing)....   ...She is working on hypocaloric approach with volumetrics approach--thus far we have not started medication--options have been discussed and strategies, both lifestyle and medication support, were again discussed today.  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  We discussed incoporating meal replacement shakes for 1-2 meals per day to help--she wants to do this.  Discussed plans/ideas for activity, discussed also pharm approaches to help support wt loss in terms of potential risks and possible benefits and side effects. All of her questions were addressed and she would like a trial of topiramate--familiar with this med for wt loss as other family members are on it with some success.  She is aware that this med alone is not approved bu the FDA as a wt loss med.  Detailed discussion of this med and in particular " "the need for birth control--recommended 2 forms, at least one barrier, of effective BC if she is sexually active.  Also discussed further the possibility of cognitive issues with the topiramate and concern for school performance.  She will be starting the med for a couple of wks before going off to school and will elt us know if any issues on this med in the interim or later.\"       INTERVAL HISTORY:  Complicated interim history with setbacks.  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  Work is limited to 2  2-hr sessions of sit-down work.  By herself some of the day at home a fair amount and is eating a lot of prepared/quick fix good, trying to be plant-based so eating high carb not low fat.  Snacking frequenlty during the day on prop-popped popcorn with ghee and sea salt (keeps a big bag), notes cravigs for carbs including the popcorn and also sweets.  Wakes at night and eats (has been having night terrors, did have to stop the topiramate because of that.    Discussed options with pt and her mother--would benefit from modified meal replacement approach which is also what her mom is doing.  Pharm support was als discussed with pt and her mom during the visit.  Pt is already on bupropion 150 mg daily--discussed adding naltrexone to that and they will also talk with her psychiatrist about this.  Discussed potential risks and benefits and possible side effects.  Pt is not on tramadol or narcotics now.  She is noting feeling more fatigued--difficulty with sleep but is also on largely plant based diet--need to eval for anemia inlcuding iron loss and also B12 def.  Also, given the significant head trauma history will also screen for central hypothyroidism.  Pt also snacks on high " "carb and some high fat foods--will benefit from working with our nutritionist.    CURRENT WEIGHT:   244 lbs 4.31 oz    Wt Readings from Last 4 Encounters:   06/22/18 110.8 kg (244 lb 4.3 oz)   05/19/17 103.9 kg (229 lb)   08/07/15 97.3 kg (214 lb 9.6 oz)   06/26/15 96.1 kg (211 lb 13.8 oz)       Height:  5' 5.354\"  Body Mass Index:  Body mass index is 40.21 kg/(m^2).  Vitals:  B/P: 115/72, P: 67, R: Data Unavailable     Initial consult weight/vitals--Estimated body mass index is 34.28 kg/(m^2) as calculated from the following:    Height as of this encounter: 5' 5.75\" (167 cm).    Weight as of this encounter: 210 lb 12.2 oz (95.6 kg). on 4/24/15.  Weight change since last seen on 8/7/15 is up 30 pounds.   Total gain is 34 pounds.      MEDICATIONS:   Current Outpatient Prescriptions   Medication     ARIPiprazole (ABILIFY) 7.5 mg half-tab     BuPROPion HCl (WELLBUTRIN PO)     cetirizine (ZYRTEC) 10 MG tablet     DIAZEPAM PO     ESCITALOPRAM OXALATE PO     Fexofenadine HCl (ALLEGRA PO)     GLYCOPYRROLATE PO     IBUPROFEN PO     Ipratropium-Albuterol (COMBIVENT RESPIMAT)  MCG/ACT inhaler     Lansoprazole (PREVACID PO)     norethindrone-ethinyl estradiol (JUNEL FE 1/20) 1-20 MG-MCG per tablet     ONDANSETRON PO     prazosin (MINIPRESS) 1 MG capsule     AMITRIPTYLINE HCL PO     GUANFACINE HCL PO     Probiotic Product (PROBIOTIC DAILY PO)     TIZANIDINE HCL PO     topiramate (TOPAMAX) 25 MG tablet     Venlafaxine HCl (EFFEXOR PO)     No current facility-administered medications for this visit.      ASSESSMENT:   Morbid obesity  Fatigue  Follows vegetarian diet    PLAN:   --will get labs today to eval for fatigue in this pt on vegetarian diet, premenopausal  --discussed barriers and approaches to wt loss with pt (include hunger/cravings/lack of information needed and applied to follow plan for wt loss effectively/additional new interim health issues impacting her ability to focus on wt loss as above) and her mother; " "pt's mother is already following a modified meal replacement plan and with this pt, especially during the day when her mom is out it is a struggle to eat healthy and within a calorie restriction goal for wt loss so pt could also benefit from a  Modified meal replacement approach  --work with our dietitian today--this was done with the following goals co-developed/discussed with pt:  \"Goals  1) Reduce BMI  2) follow 1400 meal replacement plan   3) Keep meals to 300 kcal   4) Have 3 snacks a day of 150 kcal each\"  --planning possible addition of naltrexone to the bupropion pt is already taking; they are discussing with pt's psychiatrist also and will get back to us.    Time: about 35/40 min spent on evaluation, management, counseling, education, & motivational interviewing with greater than 50 % of the total time was spent on counseling and coordinating care    Sincerely,    Luis Slade MD  "

## 2018-06-22 NOTE — PROGRESS NOTES
"Medical Nutrition Therapy  Nutrition Reassessment  Patient seen in Family[facility] Clinic, accompanied by mother and sister.    Anthropometrics  Age:  23 year old female   Height:  166 cm (5' 5.35\")  Weight:  110.8 kg (244 lb 4.3 oz)  BMI:  40.29  Weight Gain 30 lbs since last clinic visit on 8/7/15.  Nutrition History  Patient seen in [facility] for family weight management follow up. Patient has not been seen in clinic since 8/7/15 (over 2 1/2 years ago). During this time patient has gained about 30 lbs. She went to college and had an accident during a job - suffered a concussion. She is now at home and struggles with debilitating headaches. She is very restricted on her activity - not allowed to walk but is in rehab. She is living at home again - only allowed to work 2 hours every 2 weeks. Patient is eating throughout the day - grazing. She will eat a very small breakfast - granola bar (not hungry) and snacks on popcorn throughout the morning. She is trying to follow a plant-based, vegan diet (is feeling very tired and even craving meat at times). She is snacking in the afternoon on chips, popcorn or pretzels. Struggles to make meals on her own (without supervision). She states she will often wake up in the night starving.     Nutritional Intakes  Sample intake includes:  Breakfast:   @ 9:30 am - granola bar - something light with medications (not very hungry)  Am Snack:   Popcorn   Lunch:   @1:30 pm - GF, DF burrito and popcorn or leftovers  PM Snack:   Popcorn, chips, pretzels  Dinner:   Make herself - chicken rice or spaghetti or frozen  Joes meals  HS Snack:in the middle of the night - popcorn or granola bar  Beverages:  water      Medications/Vitamins/Minerals    Current Outpatient Prescriptions:      AMITRIPTYLINE HCL PO, Reported on 4/27/2017, Disp: , Rfl:      ARIPiprazole (ABILIFY) 7.5 mg half-tab, Take 7.5 mg by mouth daily, Disp: , Rfl:      BuPROPion HCl (WELLBUTRIN PO), " Take by mouth daily, Disp: , Rfl:      cetirizine (ZYRTEC) 10 MG tablet, Take 10 mg by mouth daily Reported on 4/27/2017, Disp: , Rfl:      DIAZEPAM PO, Take 5 mg by mouth, Disp: , Rfl:      ESCITALOPRAM OXALATE PO, Take 20 mg by mouth daily, Disp: , Rfl:      Fexofenadine HCl (ALLEGRA PO), Take by mouth daily as needed for allergies, Disp: , Rfl:      GLYCOPYRROLATE PO, Take 2 mg by mouth daily, Disp: , Rfl:      GUANFACINE HCL PO, Take 2 mg by mouth daily, Disp: , Rfl:      IBUPROFEN PO, Take 600 mg by mouth, Disp: , Rfl:      Ipratropium-Albuterol (COMBIVENT RESPIMAT)  MCG/ACT inhaler, Inhale 1 puff into the lungs 4 times daily, Disp: , Rfl:      Lansoprazole (PREVACID PO), , Disp: , Rfl:      norethindrone-ethinyl estradiol (JUNEL FE 1/20) 1-20 MG-MCG per tablet, Take 1 tablet by mouth daily, Disp: , Rfl:      ONDANSETRON PO, Take 8 mg by mouth, Disp: , Rfl:      prazosin (MINIPRESS) 1 MG capsule, Take 1 mg by mouth At Bedtime, Disp: , Rfl:      Probiotic Product (PROBIOTIC DAILY PO), , Disp: , Rfl:      TIZANIDINE HCL PO, Take 4 mg by mouth, Disp: , Rfl:      topiramate (TOPAMAX) 25 MG tablet, Take 1 tablet first wk t bedtime, then 2 tablets at bedtime daily thereafter as tolerated (Patient not taking: Reported on 4/27/2017), Disp: 60 tablet, Rfl: 4     Venlafaxine HCl (EFFEXOR PO), Take 75 mg by mouth 3 times daily, Disp: , Rfl:     Nutrition Diagnosis  Obesity related to excessive energy intake as evidenced by BMI 40.    Interventions & Education  Provided written and verbal education on the following:    Meal Plan  Meal replacements    Reviewed dietary recall and patient's current eating habits/behaviors. Discussed the use of a 1400 kcal meal replacement plan. Talked about what foods would allowed on the meal plan and encouraged her to follow the plan strictly to see best results - keep meals to 300 kcal (breakfast, lunch and dinner) and snacks to 150 kcal (3 daily). Answered nutrition-related  questions that mom and pt had, and worked with them to set nutrition goals to work towards until next visit.    Goals  1) Reduce BMI  2) follow 1400 meal replacement plan   3) Keep meals to 300 kcal   4) Have 3 snacks a day of 150 kcal each     Monitoring/Evaluation  Will continue to monitor progress towards goals and provide education in Pediatric Weight Management.    Spent 45 minutes in consult with patient & mother and sister.      Sade Levy MS, RD, LD  Pager # [phone]

## 2018-06-22 NOTE — MR AVS SNAPSHOT
After Visit Summary   6/22/2018    Nehemias Mascorro    MRN: [id number]           Patient Information     Date Of Birth          1995        Visit Information        Provider Department      6/22/2018 11:15 AM Luis Slade MD P Adult Weight Mgt D        Today's Diagnoses     Morbid (severe) obesity due to excess calories (H)    -  1    Other fatigue        Vegetarian diet          Care Instructions    Please get labs today    We are considering medications to help with wt loss.  One option is adding naltrexone to the bupropion you are already taking--if we do this you would start with 1/2 tablet once daily for one week, timed to be about 1-2 hours before worst cravings, and then the second week go up to 1/2 tablet twice daily.    MEDICATION STARTED AT THIS APPOINTMENT  We are starting Naltrexone. Start with 1/2 tab 1-2 hours prior to the time you have the most trouble with cravings or extra hunger. If you are doing well you may switch to 1/2 tablet twice daily    WARNING: This medication blocks the action of opioid type pain medications. If you routinely take any medication like Codeine, Oxycontin,Percocet,Morphine,Dilaudid or Methodone, do not take this until you have talked with weight management staff. If you are planning surgery you should stop Naltrexone 4 days prior to the surgery. If you have an injury that requires pain medication, make sure the health care staff knows you take Naltrexone.     Call the nurse at [phone] if you have any questions or concerns. (Do not stop taking it if you don't think it's working. For some people it works without them knowing it.)    Naltrexone is a medication that is used most often to help people who are troubled by dependence on prescription pain killers or alcohol. It has also been found to help with weight loss. Although it's not currently FDA approved for weight loss, it has been used safely for a number of years to help people who  "are carrying extra weight.     Just how Naltrexone helps with weight loss has not been exactly determined.  It seems to work by quieting down brain signals related to strong food cravings. Many of our patients use the word \"addiction\" to describe their feelings and constant thoughts about food. It makes sense then to treat the feeling of dependence on food, outside of real hunger, with a medication designed to help with other sorts of dependence.     Our patients on Naltrexone find that they:    >feel less interest in food   >think less about food and eating and have more time to think of other things   >find it easier to push the plate away   >have an easier time eating less    For some of our patients, these feelings are very immediate. Other patients, don't feel much of a change but find they've lost weight. Like all weight loss medications, Naltrexone works best when you help it work. This means:  1. Having less tempting high calorie (fattening) food around the house or office. (For people with strong cravings this is very important.)   2. Staying away from situations or people that may trigger your cravings .   3. Eating out only one time or less each week.  4. Eating your meals at a table with the TV or computer off.    Side-effects. Naltrexone is generally well tolerated. The main side-effect we see is  nausea or a woozy feeling. A small number of people feel quite ill. Most people have a mild reaction and some people have no reaction at all.  The good news is that this feeling does go away.     In order to avoid nausea, please start the medication with half a pill for the first few days. Go on to a full pill if you are feeling well.      If you  are nauseated on 1/2 a pill it is okay to cut back to 1/4 pill ( a very small amount). Take this for a couple of days and work your way back up to a 1/2 pill and then a whole pill. Taking the medication at night or with food  to start also may help prevent the " feeling of nausea.         Please refer to the pharmacy insert for more information on side-effects. Since many pharmacists are not familiar with the use of naltrexone in weight loss, calling the nurse at [phone] will get you the most accurate information.  In order to get refills of this or any medication we prescribe you must be seen in the medical weight mgmt clinic every 2-3 months. Please have your pharmacy fax a refill request to [phone].              Follow-ups after your visit        Future tests that were ordered for you today     Open Future Orders        Priority Expected Expires Ordered    T4 free Routine  6/22/2019 6/22/2018    Iron and iron binding capacity Routine  6/22/2019 6/22/2018    Ferritin Routine  6/22/2019 6/22/2018    Basic metabolic panel Routine  6/22/2019 6/22/2018    AST Routine  6/22/2019 6/22/2018            Who to contact     Please call your clinic at [phone] to:    Ask questions about your health    Make or cancel appointments    Discuss your medicines    Learn about your test results    Speak to your doctor            Additional Information About Your Visit        [employer] Information     [employer] gives you secure access to your electronic health record. If you see a primary care provider, you can also send messages to your care team and make appointments. If you have questions, please call your primary care clinic.  If you do not have a primary care provider, please call [phone] and they will assist you.      [employer] is an electronic gateway that provides easy, online access to your medical records. With [employer], you can request a clinic appointment, read your test results, renew a prescription or communicate with your care team.     To access your existing account, please contact your [facility] or call [phone] for assistance.        Care EveryWhere ID     This is your Care EveryWhere ID. This could be used by other  "organizations to access your [place] medical records  [id number]        Your Vitals Were     Pulse Height BMI (Body Mass Index)             67 1.66 m (5' 5.35\") 40.21 kg/m2          Blood Pressure from Last 3 Encounters:   06/22/18 115/72   08/07/15 112/61   06/26/15 109/65    Weight from Last 3 Encounters:   06/22/18 110.8 kg (244 lb 4.3 oz)   05/19/17 103.9 kg (229 lb)   08/07/15 97.3 kg (214 lb 9.6 oz)              We Performed the Following     ALT     TSH     Vitamin B12        Primary Care Provider Office Phone # Fax #    Ludwin Elam [phone]       [facility] [address] [address]        Equal Access to Services     SUZAN HICKS : Hadii aad ku hadasho Somike, waaxda luqadaha, qaybta kaalmada adeelmer, juan manuel agosto . So [facility] [phone].    ATENCIÓN: Si habla español, tiene a blackwell disposición servicios gratuitos de asistencia lingüística. Tyrone al [phone].    We comply with applicable federal civil rights laws and [place] laws. We do not discriminate on the basis of race, color, national origin, age, disability, sex, sexual orientation, or gender identity.            Thank you!     Thank you for choosing [facility] ADULT WEIGHT MGT D  for your care. Our goal is always to provide you with excellent care. Hearing back from our patients is one way we can continue to improve our services. Please take a few minutes to complete the written survey that you may receive in the mail after your visit with us. Thank you!             Your Updated Medication List - Protect others around you: Learn how to safely use, store and throw away your medicines at www.disposemymeds.org.          This list is accurate as of 6/22/18 12:37 PM.  Always use your most recent med list.                   Brand Name Dispense Instructions for use Diagnosis    ALLEGRA PO      Take by mouth daily as needed for allergies        AMITRIPTYLINE HCL PO "      Reported on 4/27/2017        ARIPiprazole 7.5 mg half-tab    ABILIFY     Take 7.5 mg by mouth daily        cetirizine 10 MG tablet    zyrTEC     Take 10 mg by mouth daily Reported on 4/27/2017        DIAZEPAM PO      Take 5 mg by mouth        EFFEXOR PO      Take 75 mg by mouth 3 times daily        ESCITALOPRAM OXALATE PO      Take 20 mg by mouth daily        GLYCOPYRROLATE PO      Take 2 mg by mouth daily        GUANFACINE HCL PO      Take 2 mg by mouth daily        IBUPROFEN PO      Take 600 mg by mouth        Ipratropium-Albuterol  MCG/ACT inhaler    COMBIVENT RESPIMAT     Inhale 1 puff into the lungs 4 times daily        norethindrone-ethinyl estradiol 1-20 MG-MCG per tablet    JUNEL FE 1/20     Take 1 tablet by mouth daily        ONDANSETRON PO      Take 8 mg by mouth        prazosin 1 MG capsule    MINIPRESS     Take 1 mg by mouth At Bedtime        PREVACID PO           PROBIOTIC DAILY PO           TIZANIDINE HCL PO      Take 4 mg by mouth        topiramate 25 MG tablet    TOPAMAX    60 tablet    Take 1 tablet first wk t bedtime, then 2 tablets at bedtime daily thereafter as tolerated    Obesity       WELLBUTRIN PO      Take by mouth daily

## 2018-06-22 NOTE — MR AVS SNAPSHOT
MRN:[id number]                      After Visit Summary   6/22/2018    Nehemias Mascorro    MRN: [id number]           Visit Information        Provider Department      6/22/2018 12:00 PM Sade Levy RD Peds Weight Management        [employer] Information     [employer] gives you secure access to your electronic health record. If you see a primary care provider, you can also send messages to your care team and make appointments. If you have questions, please call your primary care clinic.  If you do not have a primary care provider, please call [phone] and they will assist you.      [employer] is an electronic gateway that provides easy, online access to your medical records. With [employer], you can request a clinic appointment, read your test results, renew a prescription or communicate with your care team.     To access your existing account, please contact your [facility] or call [phone] for assistance.        Care EveryWhere ID     This is your Care EveryWhere ID. This could be used by other organizations to access your [place] medical records  [id number]        Equal Access to Services     SUZAN HICKS : Hadii sveta kaufman hadasho Soandrewali, waaxda luqadaha, qaybta kaalmada adeelmer, juan manuel cruz. So [facility] [phone].    ATENCIÓN: Si habla español, tiene a blackwell disposición servicios gratuitos de asistencia lingüística. Llame al [phone].    We comply with applicable federal civil rights laws and [place] laws. We do not discriminate on the basis of race, color, national origin, age, disability, sex, sexual orientation, or gender identity.

## 2018-07-02 ENCOUNTER — TELEPHONE (OUTPATIENT)
Dept: PEDIATRICS | Facility: CLINIC | Age: 23
End: 2018-07-02

## 2018-07-02 NOTE — TELEPHONE ENCOUNTER
Called and left message re: Lab results letters have been sent out.  Dr. Slade want to let you know that your have low ferritin, which can reflect iron store, and the iron saturation is low.  Please follow up with primary care provider to treat low iron stores.  This could be contributing to fatigue.  Left direct call back number for questions or concerns.

## 2018-07-02 NOTE — TELEPHONE ENCOUNTER
----- Message from Luis Slade MD sent at 7/1/2018 10:20 AM CDT -----  Regarding: follow up results letters  Please review both letters with pt (and her mom if she has been given permission)--does appear to be iron deficient so can follow up with PCP to get that treated.  She has been fatigued.    Thanks,  Luis

## 2018-07-10 ENCOUNTER — RECORDS - HEALTHEAST (OUTPATIENT)
Dept: ADMINISTRATIVE | Facility: OTHER | Age: 23
End: 2018-07-10

## 2018-07-10 ENCOUNTER — AMBULATORY - HEALTHEAST (OUTPATIENT)
Dept: MULTI SPECIALTY CLINIC | Facility: CLINIC | Age: 23
End: 2018-07-10

## 2018-07-10 LAB
HPV_EXT - HISTORICAL: NORMAL
PAP SMEAR - HIM PATIENT REPORTED: ABNORMAL

## 2018-07-11 ENCOUNTER — RECORDS - HEALTHEAST (OUTPATIENT)
Dept: ADMINISTRATIVE | Facility: OTHER | Age: 23
End: 2018-07-11

## 2018-07-25 ENCOUNTER — TELEPHONE (OUTPATIENT)
Dept: PEDIATRICS | Facility: CLINIC | Age: 23
End: 2018-07-25

## 2018-07-25 NOTE — TELEPHONE ENCOUNTER
Called and spoke to mom about [facility] appointments on 7/27/18 starting at 11am.  Mom had no other questions at this time.

## 2018-10-25 ASSESSMENT — ENCOUNTER SYMPTOMS
BLOOD IN STOOL: 1
MYALGIAS: 1
POLYPHAGIA: 1
ARTHRALGIAS: 0
NAUSEA: 1
DECREASED CONCENTRATION: 1
SPEECH CHANGE: 0
WEIGHT LOSS: 0
NAIL CHANGES: 1
HEADACHES: 1
FEVER: 0
SMELL DISTURBANCE: 0
HOARSE VOICE: 0
MUSCLE CRAMPS: 0
LOSS OF CONSCIOUSNESS: 0
NECK PAIN: 1
BLOATING: 0
TROUBLE SWALLOWING: 0
ALTERED TEMPERATURE REGULATION: 1
JAUNDICE: 0
HEARTBURN: 0
DISTURBANCES IN COORDINATION: 0
DEPRESSION: 1
RECTAL PAIN: 0
SEIZURES: 0
INSOMNIA: 1
SORE THROAT: 0
BOWEL INCONTINENCE: 0
CHILLS: 0
POOR WOUND HEALING: 0
SKIN CHANGES: 0
WEAKNESS: 0
DECREASED APPETITE: 1
PARALYSIS: 0
DIZZINESS: 1
INCREASED ENERGY: 1
TINGLING: 1
TREMORS: 1
PANIC: 0
TASTE DISTURBANCE: 0
FATIGUE: 1
VOMITING: 0
HALLUCINATIONS: 0
POLYDIPSIA: 0
MUSCLE WEAKNESS: 0
NIGHT SWEATS: 1
WEIGHT GAIN: 1
NUMBNESS: 1
JOINT SWELLING: 0
NERVOUS/ANXIOUS: 1
NECK MASS: 0
DIARRHEA: 0
STIFFNESS: 1
SINUS CONGESTION: 1
ABDOMINAL PAIN: 1
CONSTIPATION: 1
MEMORY LOSS: 1
SINUS PAIN: 0
BACK PAIN: 1

## 2018-10-26 ENCOUNTER — OFFICE VISIT (OUTPATIENT)
Dept: ENDOCRINOLOGY | Facility: CLINIC | Age: 23
End: 2018-10-26
Attending: INTERNAL MEDICINE
Payer: COMMERCIAL

## 2018-10-26 ENCOUNTER — RECORDS - HEALTHEAST (OUTPATIENT)
Dept: ADMINISTRATIVE | Facility: OTHER | Age: 23
End: 2018-10-26

## 2018-10-26 VITALS
HEART RATE: 85 BPM | SYSTOLIC BLOOD PRESSURE: 130 MMHG | HEIGHT: 65 IN | DIASTOLIC BLOOD PRESSURE: 86 MMHG | BODY MASS INDEX: 39.34 KG/M2 | WEIGHT: 236.1 LBS

## 2018-10-26 DIAGNOSIS — E66.9 OBESITY (BMI 30-39.9): Primary | ICD-10-CM

## 2018-10-26 PROCEDURE — G0463 HOSPITAL OUTPT CLINIC VISIT: HCPCS | Mod: ZF

## 2018-10-26 ASSESSMENT — PAIN SCALES - GENERAL: PAINLEVEL: NO PAIN (0)

## 2018-10-26 NOTE — MR AVS SNAPSHOT
"              After Visit Summary   10/26/2018    Nehemias Mascorro    MRN: [id number]           Patient Information     Date Of Birth          1995        Visit Information        Provider Department      10/26/2018 11:30 AM Luis Slade MD [facility] Adult Weight [facility]        Care Instructions    MEDICATION STARTED AT THIS APPOINTMENT  We are starting Naltrexone. Start with 1/2 tab 1-2 hours prior to the time you have the most trouble with cravings or extra hunger. If you are doing well you may switch to a whole tablet taken at the same time period.     WARNING: This medication blocks the action of opioid type pain medications. If you routinely take any medication like Codeine, Oxycontin,Percocet,Morphine,Dilaudid or Methodone, do not take this until you have talked with weight management staff. If you are planning surgery you should stop Naltrexone 4 days prior to the surgery. If you have an injury that requires pain medication, make sure the health care staff knows you take Naltrexone.     Call the nurse at [phone] if you have any questions or concerns. (Do not stop taking it if you don't think it's working. For some people it works without them knowing it.)    Naltrexone is a medication that is used most often to help people who are troubled by dependence on prescription pain killers or alcohol. It has also been found to help with weight loss. Although it's not currently FDA approved for weight loss, it has been used safely for a number of years to help people who are carrying extra weight.     Just how Naltrexone helps with weight loss has not been exactly determined.  It seems to work by quieting down brain signals related to strong food cravings. Many of our patients use the word \"addiction\" to describe their feelings and constant thoughts about food. It makes sense then to treat the feeling of dependence on food, outside of real hunger, with a medication designed to help with other sorts of " dependence.     Our patients on Naltrexone find that they:    >feel less interest in food   >think less about food and eating and have more time to think of other things   >find it easier to push the plate away   >have an easier time eating less    For some of our patients, these feelings are very immediate. Other patients, don't feel much of a change but find they've lost weight. Like all weight loss medications, Naltrexone works best when you help it work. This means:  1. Having less tempting high calorie (fattening) food around the house or office. (For people with strong cravings this is very important.)   2. Staying away from situations or people that may trigger your cravings .   3. Eating out only one time or less each week.  4. Eating your meals at a table with the TV or computer off.    Side-effects. Naltrexone is generally well tolerated. The main side-effect we see is  nausea or a woozy feeling. A small number of people feel quite ill. Most people have a mild reaction and some people have no reaction at all.  The good news is that this feeling does go away.     In order to avoid nausea, please start the medication with half a pill for the first few days. Go on to a full pill if you are feeling well.      If you  are nauseated on 1/2 a pill it is okay to cut back to 1/4 pill ( a very small amount). Take this for a couple of days and work your way back up to a 1/2 pill and then a whole pill. Taking the medication at night or with food  to start also may help prevent the feeling of nausea.         Please refer to the pharmacy insert for more information on side-effects. Since many pharmacists are not familiar with the use of naltrexone in weight loss, calling the nurse at [phone] will get you the most accurate information.  In order to get refills of this or any medication we prescribe you must be seen in the medical weight mgmt clinic every 2-3 months. Please have your pharmacy fax a refill request to  "[phone].              Follow-ups after your visit        Who to contact     Please call your clinic at [phone] to:    Ask questions about your health    Make or cancel appointments    Discuss your medicines    Learn about your test results    Speak to your doctor            Additional Information About Your Visit        MyChart Information     [employer] gives you secure access to your electronic health record. If you see a primary care provider, you can also send messages to your care team and make appointments. If you have questions, please call your primary care clinic.  If you do not have a primary care provider, please call [phone] and they will assist you.      [employer] is an electronic gateway that provides easy, online access to your medical records. With [employer], you can request a clinic appointment, read your test results, renew a prescription or communicate with your care team.     To access your existing account, please contact your [facility] or call [phone] for assistance.        Care EveryWhere ID     This is your Care EveryWhere ID. This could be used by other organizations to access your [place] medical records  [id number]        Your Vitals Were     Pulse Height BMI (Body Mass Index)             85 1.659 m (5' 5.32\") 38.91 kg/m2          Blood Pressure from Last 3 Encounters:   10/26/18 130/86   06/22/18 115/72   08/07/15 112/61    Weight from Last 3 Encounters:   10/26/18 107.1 kg (236 lb 1.6 oz)   06/22/18 110.8 kg (244 lb 4.3 oz)   05/19/17 103.9 kg (229 lb)              Today, you had the following     No orders found for display       Primary Care Provider Office Phone # Fax #    Ludwin Elam [phone]       [facility] [address] [address]        Equal Access to Services     SUZAN HICKS AH: Tito Leach, raquel martines, qajuan manuel yoder " chayito terryfranklyn mahnazgeorge lastephanie ah. So [facility] [phone].    ATENCIÓN: Si habla español, tiene a blackwell disposición servicios gratuitos de asistencia lingüística. Tyrone al [phone].    We comply with applicable federal civil rights laws and [place] laws. We do not discriminate on the basis of race, color, national origin, age, disability, sex, sexual orientation, or gender identity.            Thank you!     Thank you for choosing [facility] ADULT WEIGHT MGT D  for your care. Our goal is always to provide you with excellent care. Hearing back from our patients is one way we can continue to improve our services. Please take a few minutes to complete the written survey that you may receive in the mail after your visit with us. Thank you!             Your Updated Medication List - Protect others around you: Learn how to safely use, store and throw away your medicines at www.disposemymeds.org.          This list is accurate as of 10/26/18 12:53 PM.  Always use your most recent med list.                   Brand Name Dispense Instructions for use Diagnosis    ALLEGRA PO      Take by mouth daily as needed for allergies        AMITRIPTYLINE HCL PO      Reported on 4/27/2017        ARIPiprazole 7.5 mg half-tab    ABILIFY     Take 7.5 mg by mouth daily        cetirizine 10 MG tablet    zyrTEC     Take 10 mg by mouth daily Reported on 4/27/2017        DIAZEPAM PO      Take 5 mg by mouth        EFFEXOR PO      Take 75 mg by mouth 3 times daily        ESCITALOPRAM OXALATE PO      Take 20 mg by mouth daily        GLYCOPYRROLATE PO      Take 2 mg by mouth daily        GUANFACINE HCL PO      Take 2 mg by mouth daily        IBUPROFEN PO      Take 600 mg by mouth        Ipratropium-Albuterol  MCG/ACT inhaler    COMBIVENT RESPIMAT     Inhale 1 puff into the lungs 4 times daily        naltrexone 50 MG tablet    DEPADE;REVIA    30 tablet    Take 1/2 tablet.  Time it one to two hours prior to worst cravings or take with AM bupropion;  increase to 1/2 tablet twice daily in 1 wk as tolerated    Morbid (severe) obesity due to excess calories (H)       norethindrone-ethinyl estradiol 1-20 MG-MCG per tablet    JUNEL FE 1/20     Take 1 tablet by mouth daily        ONDANSETRON PO      Take 8 mg by mouth        prazosin 1 MG capsule    MINIPRESS     Take 1 mg by mouth At Bedtime        PREVACID PO           PROBIOTIC DAILY PO           TIZANIDINE HCL PO      Take 4 mg by mouth        topiramate 25 MG tablet    TOPAMAX    60 tablet    Take 1 tablet first wk t bedtime, then 2 tablets at bedtime daily thereafter as tolerated    Obesity       WELLBUTRIN PO      Take 300 mg by mouth daily

## 2018-10-26 NOTE — LETTER
"10/26/2018       RE: Nehemias Mascorro  [address]  [place] [address]     Dear Colleague,    Thank you for referring your patient, Nehemias Mascorro, to the [facility] ADULT WEIGHT MGT D at [facility]. Please see a copy of my visit note below.            Return Medical Weight Management Note     Nehemias Mascorro  MRN:  [id number]  :  1995  JASMEET:  10/26/2018    Dear Ludwin Elam,    I had the pleasure of seeing your patient Nehemias Mascorro.  She is a 23 year old female who I am continuing to see for treatment of obesity; PMH includes the following--  Past Medical History:   Diagnosis Date     Depressive disorder      Uncomplicated asthma        INTERVAL HISTORY:    I had the pleasure of seeing your patient Nehemias Mascorro.  She is a 23 year old female who I am continuing to see for treatment of obesity.   She was last seen about 4 mo ago, reviewed and relevant history, as extracted, includes the following--      \"Pt returns, last seen almost 3 yrs ago (last visit was at the family clinic)--reviewed and relevant history is as follows as extracted from the earlier note--      '...Self-referred for obesity associated with GERD and anxiety and asthma.  Patient is interested in attaining a healthier weight to diminish current health problems related to co-morbid conditions and prevent future health issues.  She describes her weight history as a gradual gain, and began around age 9  Her previous attempts at weight loss include exercise. Patient has had some success lost about 20#--was on adderall and dancing)....   ...She is working on hypocaloric approach with volumetrics approach--thus far we have not started medication--options have been discussed and strategies, both lifestyle and medication support, were again discussed today.  She starts school soon in [place], is concerned about her ability to manage fod changes in that environment without a kitchen for her own cooking, " "without a fridge, reliant on the cafeteria and without the time to think about what she is eating and track food/activity.  We discussed incoporating meal replacement shakes for 1-2 meals per day to help--she wants to do this.  Discussed plans/ideas for activity, discussed also pharm approaches to help support wt loss in terms of potential risks and possible benefits and side effects. All of her questions were addressed and she would like a trial of topiramate--familiar with this med for wt loss as other family members are on it with some success.  She is aware that this med alone is not approved bu the FDA as a wt loss med.  Detailed discussion of this med and in particular the need for birth control--recommended 2 forms, at least one barrier, of effective BC if she is sexually active.  Also discussed further the possibility of cognitive issues with the topiramate and concern for school performance.  She will be starting the med for a couple of wks before going off to school and will elt us know if any issues on this med in the interim or later.\"        Complicated interim history with setbacks.  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  Work is limited to 2  2-hr sessions of sit-down work.  By herself some of the day at home a fair amount and is eating a lot of prepared/quick fix good, trying to be plant-based so eating high carb not low fat.  Snacking frequenlty during the day on prop-popped popcorn with ghee and sea salt (keeps a big bag), notes cravigs for carbs including the popcorn and also sweets.  Wakes at night and eats (has been having night terrors, did have to stop the topiramate because of that.'     Discussed options with pt and her mother--would benefit from " "modified meal replacement approach which is also what her mom is doing.  Pharm support was als discussed with pt and her mom during the visit.  Pt is already on bupropion 150 mg daily--discussed adding naltrexone to that and they will also talk with her psychiatrist about this.  Discussed potential risks and benefits and possible side effects.  Pt is not on tramadol or narcotics now.  She is noting feeling more fatigued--difficulty with sleep but is also on largely plant based diet--need to eval for anemia inlcuding iron loss and also B12 def.  Also, given the significant head trauma history will also screen for central hypothyroidism.  Pt also snacks on high carb and some high fat foods--will benefit from working with our nutritionist.\"       INTERVAL HISTORY:    Labs at last visit showed low iron stores--she is following up with her PCP on this for repletion.  Discussed challenges with following a hypocaloric intake consistently--she worked with our dietitian and modified meal replacement plan was discussed, along with stopping the snacking on higher carb items (included popcorn with coconut oil)  Goals co-developed/discussed with pt:  \"Goals  1) Reduce BMI  2) follow 1400 meal replacement plan   3) Keep meals to 300 kcal   4) Have 3 snacks a day of 150 kcal each\"    She is here again today with her mother.   We were planning possible addition of naltrexone to the bupropion pt is already taking; pt notes that he psychiatrist may have concerns about combining naltrexone with bupropion.  I discussed that those 2 are combined in the [facility] wt loss formation, and plan was made for them to get contact information for the psychatrist for me so that we can discuss directly and plan for pharm additions to help with wt loss that are best in thie pt, taking into account her other issues include psych issues.      CURRENT WEIGHT:   236 lbs 1.6 oz    Wt Readings from Last 4 Encounters:   10/26/18 107.1 kg (236 lb 1.6 oz) " "  06/22/18 110.8 kg (244 lb 4.3 oz)   05/19/17 103.9 kg (229 lb)   08/07/15 97.3 kg (214 lb 9.6 oz)       Height:  5' 5.315\"  Body Mass Index:  Body mass index is 38.91 kg/(m^2).  Vitals:  B/P: 130/86, P: 85, R: Data Unavailable     Initial consult weight/vitals--Estimated body mass index is 34.28 kg/(m^2) as calculated from the following:    Height as of this encounter: 5' 5.75\" (167 cm).    Weight as of this encounter: 210 lb 12.2 oz (95.6 kg). on 4/24/15.  Weight change since last seen on 6/22/2018 is down 8 pounds.   Total gain is 26 pounds.    Diet and Activity Changes Since Last Visit Reviewed With Patient 10/25/2018   I have made the following changes to my diet since my last visit: Eating less food in a day   With regards to my diet, I am still struggling with: Some cravings   I have made the following changes to my activity/exercise since my last visit: N/A   With regards to my activity/exercise, I am still struggling with: N/A       MEDICATIONS:   Current Outpatient Prescriptions   Medication     ARIPiprazole (ABILIFY) 7.5 mg half-tab     BuPROPion HCl (WELLBUTRIN PO)     DIAZEPAM PO     ESCITALOPRAM OXALATE PO     Fexofenadine HCl (ALLEGRA PO)     IBUPROFEN PO     Ipratropium-Albuterol (COMBIVENT RESPIMAT)  MCG/ACT inhaler     Lansoprazole (PREVACID PO)     naltrexone (DEPADE;REVIA) 50 MG tablet     norethindrone-ethinyl estradiol (JUNEL FE 1/20) 1-20 MG-MCG per tablet     ONDANSETRON PO     prazosin (MINIPRESS) 1 MG capsule     Venlafaxine HCl (EFFEXOR PO)     AMITRIPTYLINE HCL PO     cetirizine (ZYRTEC) 10 MG tablet     GLYCOPYRROLATE PO     GUANFACINE HCL PO     Probiotic Product (PROBIOTIC DAILY PO)     TIZANIDINE HCL PO     topiramate (TOPAMAX) 25 MG tablet     No current facility-administered medications for this visit.        Weight Loss Medication History Reviewed With Patient 10/25/2018   Which weight loss medications are you currently taking on a regular basis?  Naltrexone   Are you having " any side effects from the weight loss medication that we have prescribed you? No       ASSESSMENT:   obesity    PLAN:   Goals set with Sade  1) Reduce BMI  2) follow 1400 meal replacement plan   3) Keep meals to 300 kcal   4) Have 3 snacks a day of 150 kcal each      Considering adding naltrexone--I want to connect with pt's psychiatrist regarding this, and I also gave pt our naltrexone fact sheet on this med to review    FOLLOW-UP:    Return in about 1 mo    Time: about 20/25 min spent on evaluation, management, counseling, education, & motivational interviewing with greater than 50 % of the total time was spent on counseling and coordinating care    Sincerely,    Luis Slade MD

## 2018-10-26 NOTE — NURSING NOTE
"[facility] [[id number]]  Pt is being seen for follow up of weight management  Initial /86 (BP Location: Right arm, Patient Position: Sitting, Cuff Size: Adult Large)  Pulse 85  Ht 5' 5.32\" (165.9 cm)  Wt 236 lb 1.6 oz (107.1 kg)  BMI 38.91 kg/m2 Estimated body mass index is 38.91 kg/(m^2) as calculated from the following:    Height as of this encounter: 5' 5.32\" (165.9 cm).    Weight as of this encounter: 236 lb 1.6 oz (107.1 kg).  Medication Reconciliation: complete   Wt Readings from Last 4 Encounters:   10/26/18 236 lb 1.6 oz (107.1 kg)   06/22/18 244 lb 4.3 oz (110.8 kg)   05/19/17 229 lb (103.9 kg)   08/07/15 214 lb 9.6 oz (97.3 kg)       "

## 2018-10-26 NOTE — PATIENT INSTRUCTIONS
"MEDICATION STARTED AT THIS APPOINTMENT  We are starting Naltrexone. Start with 1/2 tab 1-2 hours prior to the time you have the most trouble with cravings or extra hunger. If you are doing well you may switch to a whole tablet taken at the same time period.     WARNING: This medication blocks the action of opioid type pain medications. If you routinely take any medication like Codeine, Oxycontin,Percocet,Morphine,Dilaudid or Methodone, do not take this until you have talked with weight management staff. If you are planning surgery you should stop Naltrexone 4 days prior to the surgery. If you have an injury that requires pain medication, make sure the health care staff knows you take Naltrexone.     Call the nurse at [phone] if you have any questions or concerns. (Do not stop taking it if you don't think it's working. For some people it works without them knowing it.)    Naltrexone is a medication that is used most often to help people who are troubled by dependence on prescription pain killers or alcohol. It has also been found to help with weight loss. Although it's not currently FDA approved for weight loss, it has been used safely for a number of years to help people who are carrying extra weight.     Just how Naltrexone helps with weight loss has not been exactly determined.  It seems to work by quieting down brain signals related to strong food cravings. Many of our patients use the word \"addiction\" to describe their feelings and constant thoughts about food. It makes sense then to treat the feeling of dependence on food, outside of real hunger, with a medication designed to help with other sorts of dependence.     Our patients on Naltrexone find that they:    >feel less interest in food   >think less about food and eating and have more time to think of other things   >find it easier to push the plate away   >have an easier time eating less    For some of our patients, these feelings are very immediate. " Other patients, don't feel much of a change but find they've lost weight. Like all weight loss medications, Naltrexone works best when you help it work. This means:  1. Having less tempting high calorie (fattening) food around the house or office. (For people with strong cravings this is very important.)   2. Staying away from situations or people that may trigger your cravings .   3. Eating out only one time or less each week.  4. Eating your meals at a table with the TV or computer off.    Side-effects. Naltrexone is generally well tolerated. The main side-effect we see is  nausea or a woozy feeling. A small number of people feel quite ill. Most people have a mild reaction and some people have no reaction at all.  The good news is that this feeling does go away.     In order to avoid nausea, please start the medication with half a pill for the first few days. Go on to a full pill if you are feeling well.      If you  are nauseated on 1/2 a pill it is okay to cut back to 1/4 pill ( a very small amount). Take this for a couple of days and work your way back up to a 1/2 pill and then a whole pill. Taking the medication at night or with food  to start also may help prevent the feeling of nausea.         Please refer to the pharmacy insert for more information on side-effects. Since many pharmacists are not familiar with the use of naltrexone in weight loss, calling the nurse at [phone] will get you the most accurate information.  In order to get refills of this or any medication we prescribe you must be seen in the medical weight mgmt clinic every 2-3 months. Please have your pharmacy fax a refill request to [phone].

## 2018-10-29 ENCOUNTER — OFFICE VISIT - HEALTHEAST (OUTPATIENT)
Dept: INTERNAL MEDICINE | Facility: CLINIC | Age: 23
End: 2018-10-29

## 2018-10-29 DIAGNOSIS — R53.83 FATIGUE: ICD-10-CM

## 2018-10-29 ASSESSMENT — MIFFLIN-ST. JEOR: SCORE: 1827.7

## 2018-10-30 LAB
FERRITIN SERPL-MCNC: 5 NG/ML (ref 10–130)
FOLATE SERPL-MCNC: 6.8 NG/ML
VIT B12 SERPL-MCNC: 602 PG/ML (ref 213–816)

## 2018-10-31 ENCOUNTER — COMMUNICATION - HEALTHEAST (OUTPATIENT)
Dept: INTERNAL MEDICINE | Facility: CLINIC | Age: 23
End: 2018-10-31

## 2018-11-04 NOTE — PROGRESS NOTES
"        Return Medical Weight Management Note     Nehemias Mascorro  MRN:  [id number]  :  1995  JASMEET:  10/26/2018    Dear Ludwin Elam,    I had the pleasure of seeing your patient Nehemias Mascorro.  She is a 23 year old female who I am continuing to see for treatment of obesity; PMH includes the following--  Past Medical History:   Diagnosis Date     Depressive disorder      Uncomplicated asthma        INTERVAL HISTORY:    I had the pleasure of seeing your patient Nehemias Mascorro.  She is a 23 year old female who I am continuing to see for treatment of obesity.  She was last seen about 4 mo ago, reviewed and relevant history, as extracted, includes the following--      \"Pt returns, last seen almost 3 yrs ago (last visit was at the family clinic)--reviewed and relevant history is as follows as extracted from the earlier note--      '...Self-referred for obesity associated with GERD and anxiety and asthma.  Patient is interested in attaining a healthier weight to diminish current health problems related to co-morbid conditions and prevent future health issues.  She describes her weight history as a gradual gain, and began around age 9  Her previous attempts at weight loss include exercise. Patient has had some success lost about 20#--was on adderall and dancing)....   ...She is working on hypocaloric approach with volumetrics approach--thus far we have not started medication--options have been discussed and strategies, both lifestyle and medication support, were again discussed today.  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  We discussed incoporating meal replacement shakes for 1-2 meals per day to help--she wants to do this.  Discussed plans/ideas for activity, discussed also " "pharm approaches to help support wt loss in terms of potential risks and possible benefits and side effects. All of her questions were addressed and she would like a trial of topiramate--familiar with this med for wt loss as other family members are on it with some success.  She is aware that this med alone is not approved bu the FDA as a wt loss med.  Detailed discussion of this med and in particular the need for birth control--recommended 2 forms, at least one barrier, of effective BC if she is sexually active.  Also discussed further the possibility of cognitive issues with the topiramate and concern for school performance.  She will be starting the med for a couple of wks before going off to school and will elt us know if any issues on this med in the interim or later.\"        Complicated interim history with setbacks.  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  Work is limited to 2  2-hr sessions of sit-down work.  By herself some of the day at home a fair amount and is eating a lot of prepared/quick fix good, trying to be plant-based so eating high carb not low fat.  Snacking frequenlty during the day on prop-popped popcorn with ghee and sea salt (keeps a big bag), notes cravigs for carbs including the popcorn and also sweets.  Wakes at night and eats (has been having night terrors, did have to stop the topiramate because of that.'     Discussed options with pt and her mother--would benefit from modified meal replacement approach which is also what her mom is doing.  Pharm support was als discussed with pt and her mom during the visit.  Pt is already on bupropion 150 mg daily--discussed adding naltrexone to that and they will also talk with her psychiatrist about this.  " "Discussed potential risks and benefits and possible side effects.  Pt is not on tramadol or narcotics now.  She is noting feeling more fatigued--difficulty with sleep but is also on largely plant based diet--need to eval for anemia inlcuding iron loss and also B12 def.  Also, given the significant head trauma history will also screen for central hypothyroidism.  Pt also snacks on high carb and some high fat foods--will benefit from working with our nutritionist.\"       INTERVAL HISTORY:    Labs at last visit showed low iron stores--she is following up with her PCP on this for repletion.  Discussed challenges with following a hypocaloric intake consistently--she worked with our dietitian and modified meal replacement plan was discussed, along with stopping the snacking on higher carb items (included popcorn with coconut oil)  Goals co-developed/discussed with pt:  \"Goals  1) Reduce BMI  2) follow 1400 meal replacement plan   3) Keep meals to 300 kcal   4) Have 3 snacks a day of 150 kcal each\"    She is here again today with her mother.   We were planning possible addition of naltrexone to the bupropion pt is already taking; pt notes that he psychiatrist may have concerns about combining naltrexone with bupropion.  I discussed that those 2 are combined in the [facility] wt loss formation, and plan was made for them to get contact information for the psychatrist for me so that we can discuss directly and plan for pharm additions to help with wt loss that are best in elvira pt, taking into account her other issues include psych issues.      CURRENT WEIGHT:   236 lbs 1.6 oz    Wt Readings from Last 4 Encounters:   10/26/18 107.1 kg (236 lb 1.6 oz)   06/22/18 110.8 kg (244 lb 4.3 oz)   05/19/17 103.9 kg (229 lb)   08/07/15 97.3 kg (214 lb 9.6 oz)       Height:  5' 5.315\"  Body Mass Index:  Body mass index is 38.91 kg/(m^2).  Vitals:  B/P: 130/86, P: 85, R: Data Unavailable     Initial consult weight/vitals--Estimated body " "mass index is 34.28 kg/(m^2) as calculated from the following:    Height as of this encounter: 5' 5.75\" (167 cm).    Weight as of this encounter: 210 lb 12.2 oz (95.6 kg). on 4/24/15.  Weight change since last seen on 6/22/2018 is down 8 pounds.   Total gain is 26 pounds.    Diet and Activity Changes Since Last Visit Reviewed With Patient 10/25/2018   I have made the following changes to my diet since my last visit: Eating less food in a day   With regards to my diet, I am still struggling with: Some cravings   I have made the following changes to my activity/exercise since my last visit: N/A   With regards to my activity/exercise, I am still struggling with: N/A       MEDICATIONS:   Current Outpatient Prescriptions   Medication     ARIPiprazole (ABILIFY) 7.5 mg half-tab     BuPROPion HCl (WELLBUTRIN PO)     DIAZEPAM PO     ESCITALOPRAM OXALATE PO     Fexofenadine HCl (ALLEGRA PO)     IBUPROFEN PO     Ipratropium-Albuterol (COMBIVENT RESPIMAT)  MCG/ACT inhaler     Lansoprazole (PREVACID PO)     naltrexone (DEPADE;REVIA) 50 MG tablet     norethindrone-ethinyl estradiol (JUNEL FE 1/20) 1-20 MG-MCG per tablet     ONDANSETRON PO     prazosin (MINIPRESS) 1 MG capsule     Venlafaxine HCl (EFFEXOR PO)     AMITRIPTYLINE HCL PO     cetirizine (ZYRTEC) 10 MG tablet     GLYCOPYRROLATE PO     GUANFACINE HCL PO     Probiotic Product (PROBIOTIC DAILY PO)     TIZANIDINE HCL PO     topiramate (TOPAMAX) 25 MG tablet     No current facility-administered medications for this visit.        Weight Loss Medication History Reviewed With Patient 10/25/2018   Which weight loss medications are you currently taking on a regular basis?  Naltrexone   Are you having any side effects from the weight loss medication that we have prescribed you? No       ASSESSMENT:   obesity    PLAN:   Goals set with Sade  1) Reduce BMI  2) follow 1400 meal replacement plan   3) Keep meals to 300 kcal   4) Have 3 snacks a day of 150 kcal each "      Considering adding naltrexone--I want to connect with pt's psychiatrist regarding this, and I also gave pt our naltrexone fact sheet on this med to review    FOLLOW-UP:    Return in about 1 mo    Time: about 20/25 min spent on evaluation, management, counseling, education, & motivational interviewing with greater than 50 % of the total time was spent on counseling and coordinating care    Sincerely,    Luis Slade MD

## 2018-12-05 ENCOUNTER — COMMUNICATION - HEALTHEAST (OUTPATIENT)
Dept: SCHEDULING | Facility: CLINIC | Age: 23
End: 2018-12-05

## 2018-12-05 ENCOUNTER — OFFICE VISIT - HEALTHEAST (OUTPATIENT)
Dept: INTERNAL MEDICINE | Facility: CLINIC | Age: 23
End: 2018-12-05

## 2018-12-05 ENCOUNTER — TELEPHONE (OUTPATIENT)
Dept: PEDIATRICS | Facility: CLINIC | Age: 23
End: 2018-12-05

## 2018-12-05 DIAGNOSIS — R53.83 FATIGUE, UNSPECIFIED TYPE: ICD-10-CM

## 2018-12-05 DIAGNOSIS — R06.09 DYSPNEA ON EXERTION: ICD-10-CM

## 2018-12-05 DIAGNOSIS — R00.2 PALPITATIONS: ICD-10-CM

## 2018-12-05 LAB
ATRIAL RATE - MUSE: 74 BPM
DIASTOLIC BLOOD PRESSURE - MUSE: NORMAL MMHG
FERRITIN SERPL-MCNC: 8 NG/ML (ref 10–130)
INTERPRETATION ECG - MUSE: NORMAL
P AXIS - MUSE: 47 DEGREES
PR INTERVAL - MUSE: 144 MS
QRS DURATION - MUSE: 86 MS
QT - MUSE: 390 MS
QTC - MUSE: 432 MS
R AXIS - MUSE: 40 DEGREES
SYSTOLIC BLOOD PRESSURE - MUSE: NORMAL MMHG
T AXIS - MUSE: 27 DEGREES
TSH SERPL DL<=0.005 MIU/L-ACNC: 1.28 UIU/ML (ref 0.3–5)
VENTRICULAR RATE- MUSE: 74 BPM

## 2018-12-05 ASSESSMENT — MIFFLIN-ST. JEOR: SCORE: 1877.6

## 2018-12-05 NOTE — TELEPHONE ENCOUNTER
Called and left message re: Reminder of [facility] appointments on 12/7/18.  Left direct call back number for questions or concerns.

## 2018-12-06 ENCOUNTER — COMMUNICATION - HEALTHEAST (OUTPATIENT)
Dept: INTERNAL MEDICINE | Facility: CLINIC | Age: 23
End: 2018-12-06

## 2018-12-07 ENCOUNTER — RECORDS - HEALTHEAST (OUTPATIENT)
Dept: ADMINISTRATIVE | Facility: OTHER | Age: 23
End: 2018-12-07

## 2018-12-07 ENCOUNTER — OFFICE VISIT (OUTPATIENT)
Dept: ENDOCRINOLOGY | Facility: CLINIC | Age: 23
End: 2018-12-07
Attending: INTERNAL MEDICINE
Payer: COMMERCIAL

## 2018-12-07 VITALS
BODY MASS INDEX: 39.22 KG/M2 | WEIGHT: 244.05 LBS | DIASTOLIC BLOOD PRESSURE: 72 MMHG | SYSTOLIC BLOOD PRESSURE: 127 MMHG | HEIGHT: 66 IN | HEART RATE: 81 BPM

## 2018-12-07 DIAGNOSIS — E66.01 MORBID (SEVERE) OBESITY DUE TO EXCESS CALORIES (H): ICD-10-CM

## 2018-12-07 PROCEDURE — G0463 HOSPITAL OUTPT CLINIC VISIT: HCPCS | Mod: ZF

## 2018-12-07 RX ORDER — NALTREXONE HYDROCHLORIDE 50 MG/1
TABLET, FILM COATED ORAL
Qty: 45 TABLET | Refills: 2 | Status: SHIPPED | OUTPATIENT
Start: 2018-12-07 | End: 2019-03-03

## 2018-12-07 NOTE — LETTER
2018       RE: Nehemias Mascorro  [address]  [place] [address]     Dear Colleague,    Thank you for referring your patient, Nehemias Mascorro, to the [facility] ADULT WEIGHT MGT D at [facility]. Please see a copy of my visit note below.          Return Medical Weight Management Note     Nehemias Mascorro  MRN:  [id number]  :  1995  JASMEET:  2018    Dear Ludwin Elam,    I had the pleasure of seeing your patient Nehemias Mascorro.  She is a 23 year old female who I am continuing to see for treatment of obesity related to:  Past Medical History:   Diagnosis Date     Depressive disorder      Uncomplicated asthma    GERD also      INTERVAL HISTORY:    Pt returns, last seen about 6 wks ago (beofore that had a gap of more than 3 yrs since last seen).  Reviewed and relevant history as extracted from earlier note is as follows--  --reviewed and relevant history is as follows as extracted from the earlier note--      '...Self-referred for obesity associated with GERD and anxiety and asthma.  Patient is interested in attaining a healthier weight to diminish current health problems related to co-morbid conditions and prevent future health issues.  She describes her weight history as a gradual gain, and began around age 9  Her previous attempts at weight loss include exercise. Patient has had some success lost about 20#--was on adderall and dancing)....   ...She is working on hypocaloric approach with volumetrics approach--thus far we have not started medication--options have been discussed and strategies, both lifestyle and medication support, were again discussed today.  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  We discussed incoporating meal  replacement shakes for 1-2 meals per day to help--she wants to do this.  Discussed plans/ideas for activity, discussed also pharm approaches to help support wt loss in terms of potential risks and possible benefits and side effects. All of her questions were addressed and she would like a trial of topiramate--familiar with this med for wt loss as other family members are on it with some success.  She is aware that this med alone is not approved bu the FDA as a wt loss med.  Detailed discussion of this med and in particular the need for birth control--recommended 2 forms, at least one barrier, of effective BC if she is sexually active.  Also discussed further the possibility of cognitive issues with the topiramate and concern for school performance.  She will be starting the med for a couple of wks before going off to school and will elt us know if any issues on this med in the interim or later...        Complicated  history with setbacks.  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  Work is limited to 2  2-hr sessions of sit-down work.  By herself some of the day at home a fair amount and is eating a lot of prepared/quick fix good, trying to be plant-based so eating high carb not low fat.  Snacking frequenlty during the day on prop-popped popcorn with ghee and sea salt (keeps a big bag), notes cravigs for carbs including the popcorn and also sweets.  Wakes at night and eats (has been having night terrors, did have to stop the topiramate because of that...     Discussed options with pt and her mother--would benefit from modified meal replacement approach which is also what her mom is doing.  Pharm support was als discussed with pt and her mom during the visit.  Pt is already on  "bupropion 150 mg daily--discussed adding naltrexone to that and they will also talk with her psychiatrist about this.  Discussed potential risks and benefits and possible side effects.  Pt is not on tramadol or narcotics now.  She is noting feeling more fatigued--difficulty with sleep but is also on largely plant based diet--need to eval for anemia inlcuding iron loss and also B12 def.  Also, given the significant head trauma history will also screen for central hypothyroidism.  Pt also snacks on high carb and some high fat foods--will benefit from working with our nutritionist.\"       We had worked out a strategy allowing pt to use some meal replacements and avoid higher carb higher fat foods but it has been difficult for pt to stay focussed.  We had also discussed adding naltrexone to her regimen (pt was already on bupropion) but pt noted potential concerns by other providers of combining these 2 meds--we discussed that these meds are combinted in Contrave, an FDA-approved wt loss med.  Subsequently this med was started--tolerating it without ay issues but she wonders if the effectiveness is waning.    Barriers to wt loss--  Distracted eating, cravings, difficulty stopping    Constipation is ano ongoing issue at times--  Metamucil crackers/benefiber now to help with constiption    CURRENT WEIGHT:   244 lbs .79 oz    Wt Readings from Last 4 Encounters:   12/07/18 110.7 kg (244 lb 0.8 oz)   10/26/18 107.1 kg (236 lb 1.6 oz)   06/22/18 110.8 kg (244 lb 4.3 oz)   05/19/17 103.9 kg (229 lb)       Height:  5' 5.551\"  Body Mass Index:  Body mass index is 39.93 kg/(m^2).  Vitals:  B/P: 127/72, P: 81, R: Data Unavailable     Initial consult weight/vitals--Estimated body mass index is 34.28 kg/(m^2) as calculated from the following:    Height as of this encounter: 5' 5.75\" (167 cm).    Weight as of this encounter: 210 lb 12.2 oz (95.6 kg). on 4/24/15.    Weight change since last seen on 10/26/2018 is up 8 pounds.   Total " gain is up 33 pounds from 2015, stable since resuming clinic after a >3 yr break in 6/18.    Diet and Activity Changes Since Last Visit Reviewed With Patient 10/25/2018   I have made the following changes to my diet since my last visit: Eating less food in a day   With regards to my diet, I am still struggling with: Some cravings   I have made the following changes to my activity/exercise since my last visit: N/A   With regards to my activity/exercise, I am still struggling with: N/A       MEDICATIONS:   Current Outpatient Prescriptions   Medication     ARIPiprazole (ABILIFY) 7.5 mg half-tab     BuPROPion HCl (WELLBUTRIN PO)     DIAZEPAM PO     ESCITALOPRAM OXALATE PO     Fexofenadine HCl (ALLEGRA PO)     GLYCOPYRROLATE PO     IBUPROFEN PO     Ipratropium-Albuterol (COMBIVENT RESPIMAT)  MCG/ACT inhaler     Lansoprazole (PREVACID PO)     naltrexone (DEPADE;REVIA) 50 MG tablet     norethindrone-ethinyl estradiol (JUNEL FE 1/20) 1-20 MG-MCG per tablet     ONDANSETRON PO     prazosin (MINIPRESS) 1 MG capsule     AMITRIPTYLINE HCL PO     cetirizine (ZYRTEC) 10 MG tablet     GUANFACINE HCL PO     Probiotic Product (PROBIOTIC DAILY PO)     TIZANIDINE HCL PO     topiramate (TOPAMAX) 25 MG tablet     Venlafaxine HCl (EFFEXOR PO)     No current facility-administered medications for this visit.        Weight Loss Medication History Reviewed With Patient 10/25/2018   Which weight loss medications are you currently taking on a regular basis?  Naltrexone   Are you having any side effects from the weight loss medication that we have prescribed you? No       ASSESSMENT:   Obesity, morbid     PLAN:   Goals set with Sade were reviewed today and notes that she can follow this (reviewec concretely as pt tends to eat many of the same things from day to day)--  1) Reduce BMI  2) follow 1400 meal replacement plan   3) Keep meals to 300 kcal   4) Have 3 snacks a day of 150 kcal each      Specifically--  1--Food  Goals--  --[place], apple around noon  --2p--snack baked chips  --6:30 dinner--Frozen low chalo meal replacement  --later apple if hungry  --middle of the night apple    2--we will increase to a full tablet of naltrexone in the morning, continue 1/2 tablet in the evening (pt is tolerating this without any side effects, and was effective but effectiveness may be waning)     FOLLOW-UP:    Return in about 6 wks     Time: about 20/25 min spent on evaluation, management, counseling, education, & motivational interviewing with greater than 50 % of the total time was spent on counseling and coordinating care       Sincerely,    Luis Slade MD

## 2018-12-07 NOTE — NURSING NOTE
"Chief Complaint   Patient presents with     RECHECK     weight management     /72  Pulse 81  Ht 5' 5.55\" (166.5 cm)  Wt 244 lb 0.8 oz (110.7 kg)  BMI 39.93 kg/m2  Ela Brown CMA    "

## 2018-12-07 NOTE — PROGRESS NOTES
Return Medical Weight Management Note     Nehemias Mascorro  MRN:  [id number]  :  1995  JASMEET:  2018    Dear Ludwin Elam,    I had the pleasure of seeing your patient Nehemias Mascorro.  She is a 23 year old female who I am continuing to see for treatment of obesity related to:  Past Medical History:   Diagnosis Date     Depressive disorder      Uncomplicated asthma    GERD also      INTERVAL HISTORY:    Pt returns, last seen about 6 wks ago (beofore that had a gap of more than 3 yrs since last seen).  Reviewed and relevant history as extracted from earlier note is as follows--  --reviewed and relevant history is as follows as extracted from the earlier note--      '...Self-referred for obesity associated with GERD and anxiety and asthma.  Patient is interested in attaining a healthier weight to diminish current health problems related to co-morbid conditions and prevent future health issues.  She describes her weight history as a gradual gain, and began around age 9  Her previous attempts at weight loss include exercise. Patient has had some success lost about 20#--was on adderall and dancing)....   ...She is working on hypocaloric approach with volumetrics approach--thus far we have not started medication--options have been discussed and strategies, both lifestyle and medication support, were again discussed today.  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  We discussed incoporating meal replacement shakes for 1-2 meals per day to help--she wants to do this.  Discussed plans/ideas for activity, discussed also pharm approaches to help support wt loss in terms of potential risks and possible benefits and side effects. All of her questions were addressed and she would like a  trial of topiramate--familiar with this med for wt loss as other family members are on it with some success.  She is aware that this med alone is not approved bu the FDA as a wt loss med.  Detailed discussion of this med and in particular the need for birth control--recommended 2 forms, at least one barrier, of effective BC if she is sexually active.  Also discussed further the possibility of cognitive issues with the topiramate and concern for school performance.  She will be starting the med for a couple of wks before going off to school and will elt us know if any issues on this med in the interim or later...        Complicated  history with setbacks.  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  Work is limited to 2  2-hr sessions of sit-down work.  By herself some of the day at home a fair amount and is eating a lot of prepared/quick fix good, trying to be plant-based so eating high carb not low fat.  Snacking frequenlty during the day on prop-popped popcorn with ghee and sea salt (keeps a big bag), notes cravigs for carbs including the popcorn and also sweets.  Wakes at night and eats (has been having night terrors, did have to stop the topiramate because of that...     Discussed options with pt and her mother--would benefit from modified meal replacement approach which is also what her mom is doing.  Pharm support was als discussed with pt and her mom during the visit.  Pt is already on bupropion 150 mg daily--discussed adding naltrexone to that and they will also talk with her psychiatrist about this.  Discussed potential risks and benefits and possible side effects.  Pt is not on tramadol or narcotics now.  She is noting feeling more fatigued--difficulty with sleep but  "is also on largely plant based diet--need to eval for anemia inlcuding iron loss and also B12 def.  Also, given the significant head trauma history will also screen for central hypothyroidism.  Pt also snacks on high carb and some high fat foods--will benefit from working with our nutritionist.\"       We had worked out a strategy allowing pt to use some meal replacements and avoid higher carb higher fat foods but it has been difficult for pt to stay focussed.  We had also discussed adding naltrexone to her regimen (pt was already on bupropion) but pt noted potential concerns by other providers of combining these 2 meds--we discussed that these meds are combinted in Contrave, an FDA-approved wt loss med.  Subsequently this med was started--tolerating it without ay issues but she wonders if the effectiveness is waning.    Barriers to wt loss--  Distracted eating, cravings, difficulty stopping    Constipation is ano ongoing issue at times--  Metamucil crackers/benefiber now to help with constiption    CURRENT WEIGHT:   244 lbs .79 oz    Wt Readings from Last 4 Encounters:   12/07/18 110.7 kg (244 lb 0.8 oz)   10/26/18 107.1 kg (236 lb 1.6 oz)   06/22/18 110.8 kg (244 lb 4.3 oz)   05/19/17 103.9 kg (229 lb)       Height:  5' 5.551\"  Body Mass Index:  Body mass index is 39.93 kg/(m^2).  Vitals:  B/P: 127/72, P: 81, R: Data Unavailable     Initial consult weight/vitals--Estimated body mass index is 34.28 kg/(m^2) as calculated from the following:    Height as of this encounter: 5' 5.75\" (167 cm).    Weight as of this encounter: 210 lb 12.2 oz (95.6 kg). on 4/24/15.    Weight change since last seen on 10/26/2018 is up 8 pounds.   Total gain is up 33 pounds from 2015, stable since resuming clinic after a >3 yr break in 6/18.    Diet and Activity Changes Since Last Visit Reviewed With Patient 10/25/2018   I have made the following changes to my diet since my last visit: Eating less food in a day   With regards to my diet, I " am still struggling with: Some cravings   I have made the following changes to my activity/exercise since my last visit: N/A   With regards to my activity/exercise, I am still struggling with: N/A       MEDICATIONS:   Current Outpatient Prescriptions   Medication     ARIPiprazole (ABILIFY) 7.5 mg half-tab     BuPROPion HCl (WELLBUTRIN PO)     DIAZEPAM PO     ESCITALOPRAM OXALATE PO     Fexofenadine HCl (ALLEGRA PO)     GLYCOPYRROLATE PO     IBUPROFEN PO     Ipratropium-Albuterol (COMBIVENT RESPIMAT)  MCG/ACT inhaler     Lansoprazole (PREVACID PO)     naltrexone (DEPADE;REVIA) 50 MG tablet     norethindrone-ethinyl estradiol (JUNEL FE 1/20) 1-20 MG-MCG per tablet     ONDANSETRON PO     prazosin (MINIPRESS) 1 MG capsule     AMITRIPTYLINE HCL PO     cetirizine (ZYRTEC) 10 MG tablet     GUANFACINE HCL PO     Probiotic Product (PROBIOTIC DAILY PO)     TIZANIDINE HCL PO     topiramate (TOPAMAX) 25 MG tablet     Venlafaxine HCl (EFFEXOR PO)     No current facility-administered medications for this visit.        Weight Loss Medication History Reviewed With Patient 10/25/2018   Which weight loss medications are you currently taking on a regular basis?  Naltrexone   Are you having any side effects from the weight loss medication that we have prescribed you? No       ASSESSMENT:   Obesity, morbid     PLAN:   Goals set with Sade were reviewed today and notes that she can follow this (reviewec concretely as pt tends to eat many of the same things from day to day)--  1) Reduce BMI  2) follow 1400 meal replacement plan   3) Keep meals to 300 kcal   4) Have 3 snacks a day of 150 kcal each      Specifically--  1--Food Goals--  --[place], apple around noon  --2p--snack baked chips  --6:30 dinner--Frozen low chalo meal replacement  --later apple if hungry  --middle of the night apple    2--we will increase to a full tablet of naltrexone in the morning, continue 1/2 tablet in the evening (pt is tolerating this without any side  effects, and was effective but effectiveness may be waning)     FOLLOW-UP:    Return in about 6 wks     Time: about 20/25 min spent on evaluation, management, counseling, education, & motivational interviewing with greater than 50 % of the total time was spent on counseling and coordinating care       Sincerely,    Luis Slade MD

## 2018-12-07 NOTE — PATIENT INSTRUCTIONS
1--Food Goals--  --[place], apple around noon  --2p--snack baked chips  --6:30 dinner--Frozen low chalo meal replacement  --later apple if hungry  --middle of the night apple    2--we will increase to a full tablet of naltrexone in the morning, continue 1/2 tablet in the evening

## 2018-12-17 ENCOUNTER — RECORDS - HEALTHEAST (OUTPATIENT)
Dept: ADMINISTRATIVE | Facility: OTHER | Age: 23
End: 2018-12-17

## 2018-12-17 ENCOUNTER — TRANSFERRED RECORDS (OUTPATIENT)
Dept: HEALTH INFORMATION MANAGEMENT | Facility: CLINIC | Age: 23
End: 2018-12-17

## 2018-12-27 ENCOUNTER — COMMUNICATION - HEALTHEAST (OUTPATIENT)
Dept: SCHEDULING | Facility: CLINIC | Age: 23
End: 2018-12-27

## 2018-12-27 ENCOUNTER — RECORDS - HEALTHEAST (OUTPATIENT)
Dept: ADMINISTRATIVE | Facility: OTHER | Age: 23
End: 2018-12-27

## 2018-12-29 ENCOUNTER — COMMUNICATION - HEALTHEAST (OUTPATIENT)
Dept: SCHEDULING | Facility: CLINIC | Age: 23
End: 2018-12-29

## 2019-01-07 ENCOUNTER — COMMUNICATION - HEALTHEAST (OUTPATIENT)
Dept: INTERNAL MEDICINE | Facility: CLINIC | Age: 24
End: 2019-01-07

## 2019-01-14 ENCOUNTER — OFFICE VISIT - HEALTHEAST (OUTPATIENT)
Dept: INTERNAL MEDICINE | Facility: CLINIC | Age: 24
End: 2019-01-14

## 2019-01-14 DIAGNOSIS — J06.9 UPPER RESPIRATORY TRACT INFECTION, UNSPECIFIED TYPE: ICD-10-CM

## 2019-01-14 ASSESSMENT — MIFFLIN-ST. JEOR: SCORE: 1882.13

## 2019-01-23 ENCOUNTER — TELEPHONE (OUTPATIENT)
Dept: PEDIATRICS | Facility: CLINIC | Age: 24
End: 2019-01-23

## 2019-01-23 NOTE — TELEPHONE ENCOUNTER
Called and spoke to mom about Family Clinic appointments on 1/25/19.  Mom had no other questions at this time.

## 2019-01-25 ENCOUNTER — OFFICE VISIT (OUTPATIENT)
Dept: ENDOCRINOLOGY | Facility: CLINIC | Age: 24
End: 2019-01-25
Attending: INTERNAL MEDICINE
Payer: COMMERCIAL

## 2019-01-25 ENCOUNTER — RECORDS - HEALTHEAST (OUTPATIENT)
Dept: ADMINISTRATIVE | Facility: OTHER | Age: 24
End: 2019-01-25

## 2019-01-25 VITALS
SYSTOLIC BLOOD PRESSURE: 190 MMHG | WEIGHT: 243.83 LBS | DIASTOLIC BLOOD PRESSURE: 72 MMHG | HEIGHT: 65 IN | HEART RATE: 74 BPM | BODY MASS INDEX: 40.62 KG/M2

## 2019-01-25 DIAGNOSIS — E66.01 MORBID OBESITY DUE TO EXCESS CALORIES (H): Primary | ICD-10-CM

## 2019-01-25 PROCEDURE — G0463 HOSPITAL OUTPT CLINIC VISIT: HCPCS | Mod: ZF

## 2019-01-25 ASSESSMENT — ENCOUNTER SYMPTOMS
HEADACHES: 1
DECREASED CONCENTRATION: 1
NERVOUS/ANXIOUS: 1
INSOMNIA: 1
PARALYSIS: 0
MUSCLE CRAMPS: 1
EYE WATERING: 0
MEMORY LOSS: 1
SPUTUM PRODUCTION: 1
TREMORS: 1
JOINT SWELLING: 0
EYE IRRITATION: 1
SNORES LOUDLY: 0
DISTURBANCES IN COORDINATION: 0
NECK PAIN: 1
SINUS CONGESTION: 1
HEMOPTYSIS: 0
SHORTNESS OF BREATH: 1
EYE REDNESS: 0
TASTE DISTURBANCE: 1
POSTURAL DYSPNEA: 1
SORE THROAT: 0
SEIZURES: 0
DEPRESSION: 1
WHEEZING: 0
PANIC: 0
DIZZINESS: 0
DYSPNEA ON EXERTION: 1
WEAKNESS: 1
COUGH DISTURBING SLEEP: 1
SINUS PAIN: 0
SPEECH CHANGE: 0
MYALGIAS: 1
DOUBLE VISION: 1
COUGH: 1
MUSCLE WEAKNESS: 0
HOARSE VOICE: 0
NECK MASS: 0
TINGLING: 1
ARTHRALGIAS: 0
NUMBNESS: 1
LOSS OF CONSCIOUSNESS: 0
TROUBLE SWALLOWING: 0
STIFFNESS: 0
SMELL DISTURBANCE: 0
BACK PAIN: 1
EYE PAIN: 1

## 2019-01-25 ASSESSMENT — PAIN SCALES - GENERAL: PAINLEVEL: NO PAIN (0)

## 2019-01-25 ASSESSMENT — MIFFLIN-ST. JEOR: SCORE: 1865.62

## 2019-01-25 NOTE — LETTER
2019       RE: Nehemias Mascorro  [address]  [place] [address]     Dear Colleague,    Thank you for referring your patient, Nehemias Mascorro, to the [facility] ADULT WEIGHT MGT D at [facility]. Please see a copy of my visit note below.        Return Medical Weight Management Note     Nehemias Mascorro  MRN:  [id number]  :  1995  JASMEET:  2019    Dear Ludwin Elam,    I had the pleasure of seeing your patient Nehemias Mascorro.  She is a 23 year old female who I am continuing to see for treatment of obesity; PMH includes the following:  Past Medical History:   Diagnosis Date     Depressive disorder      Uncomplicated asthma    GERD also      INTERVAL HISTORY:    Last visit here was about 6 wks ago--Reviewed and relevant history as extracted from earlier note is as follows--  --reviewed and relevant history is as follows as extracted from the earlier note--      '...Self-referred for obesity associated with GERD and anxiety and asthma.  Patient is interested in attaining a healthier weight to diminish current health problems related to co-morbid conditions and prevent future health issues.  She describes her weight history as a gradual gain, and began around age 9  Her previous attempts at weight loss include exercise. Patient has had some success lost about 20#--was on adderall and dancing)....   ...She is working on hypocaloric approach with volumetrics approach--thus far we have not started medication--options have been discussed and strategies, both lifestyle and medication support, were again discussed today.  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  We discussed incoporating meal replacement shakes for 1-2 meals per day to help--she  wants to do this.  Discussed plans/ideas for activity, discussed also pharm approaches to help support wt loss in terms of potential risks and possible benefits and side effects. All of her questions were addressed and she would like a trial of topiramate--familiar with this med for wt loss as other family members are on it with some success.  She is aware that this med alone is not approved bu the FDA as a wt loss med.  Detailed discussion of this med and in particular the need for birth control--recommended 2 forms, at least one barrier, of effective BC if she is sexually active.  Also discussed further the possibility of cognitive issues with the topiramate and concern for school performance.  She will be starting the med for a couple of wks before going off to school and will elt us know if any issues on this med in the interim or later...     Complicated  history with setbacks.  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  Work is limited to 2  2-hr sessions of sit-down work.  By herself some of the day at home a fair amount and is eating a lot of prepared/quick fix good, trying to be plant-based so eating high carb not low fat.  Snacking frequenlty during the day on prop-popped popcorn with ghee and sea salt (keeps a big bag), notes cravigs for carbs including te popcorn and also sweets.  Wakes at night and eats (has been having night terrors, did have to stop the topiramate because of that...     Discussed options with pt and her mother--would benefit from modified meal replacement approach which is also what her mom is doing.  Pharm support was als discussed with pt and her mom during the visit.  Pt is already on bupropion 150 mg daily--discussed adding naltrexone to that  "and they will also talk with her psychiatrist about this.  Discussed potential risks and benefits and possible side effects.  Pt is not on tramadol or narcotics now.  She is noting feeling more fatigued--difficulty with sleep but is also on largely plant based diet--need to eval for anemia inlcuding iron loss and also B12 def.  Also, given the significant head trauma history will also screen for central hypothyroidism.  Pt also snacks on high carb and some high fat foods--will benefit from working with our nutritionist.\"        When pt reconnected for care in 2018 after about a 3 yr gap we worked out a strategy allowing pt to use some meal replacements and avoid higher carb higher fat foods but it has been difficult for pt to stay focussed.  We had also discussed adding naltrexone to her regimen (pt was already on bupropion) but pt noted potential concerns by other providers of combining these 2 meds--we discussed that these meds are combinted in Contrave, an FDA-approved wt loss med.  Subsequently this med was started--tolerating it without ay issues but she wonders if the effectiveness is waning.     Barriers to wt loss identified--  Distracted eating, cravings, difficulty stopping    Today again we assessed strategies for wt loss, and barriers and what is working well, and co-developed next steps/changes to help get her on track.  Since last seen--  Getting over a recent pneumonia--with recovery lately off of her food regimen to help support wt loss, for example drinking sugared ginger ale.    Noting that she tends to want to snack in the afternoon.  Taking the naltrexone at 8:30A and with supper.      Meals--breakfast/brunch around 11-12 (not hungry until then)--sandwich and fruit  Afternoon snacking around 4p    6:30 supper--often eating what her mom makes or sometimes a frozen meal replacement such as lean cuisine.    She is working on being more active also    CURRENT WEIGHT:   Vitals--  /72 (BP " "Location: Right arm, Patient Position: Chair, Cuff Size: Adult Large)   Pulse 74   Ht 1.657 m (5' 5.24\")   Wt 110.6 kg (243 lb 13.3 oz)   BMI 40.28 kg/m       Wt Readings from Last 4 Encounters:   12/07/18 110.7 kg (244 lb 0.8 oz)   10/26/18 107.1 kg (236 lb 1.6 oz)   06/22/18 110.8 kg (244 lb 4.3 oz)   05/19/17 103.9 kg (229 lb)       Height:  Data Unavailable  Body Mass Index:  There is no height or weight on file to calculate BMI.  Vitals:  B/P: Data Unavailable, P: Data Unavailable, R: Data Unavailable     Initial consult weight/vitals--Estimated body mass index is 34.28 kg/(m^2) as calculated from the following:    Height as of this encounter: 5' 5.75\" (167 cm).    Weight as of this encounter: 210 lb 12.2 oz (95.6 kg). on 4/24/15.  Weight change since last seen on 12/7/2018 is down 1 pounds.   Total gain is 34 pounds.    Diet and Activity Changes Since Last Visit Reviewed With Patient 1/25/2019   I have made the following changes to my diet since my last visit: Eating less and more vegetables   With regards to my diet, I am still struggling with: -   I have made the following changes to my activity/exercise since my last visit: Joined a gym   With regards to my activity/exercise, I am still struggling with: -       MEDICATIONS:   Current Outpatient Medications   Medication     AMITRIPTYLINE HCL PO     ARIPiprazole (ABILIFY) 7.5 mg half-tab     BuPROPion HCl (WELLBUTRIN PO)     cetirizine (ZYRTEC) 10 MG tablet     DIAZEPAM PO     ESCITALOPRAM OXALATE PO     Fexofenadine HCl (ALLEGRA PO)     GLYCOPYRROLATE PO     GUANFACINE HCL PO     IBUPROFEN PO     Ipratropium-Albuterol (COMBIVENT RESPIMAT)  MCG/ACT inhaler     Lansoprazole (PREVACID PO)     naltrexone (DEPADE/REVIA) 50 MG tablet     norethindrone-ethinyl estradiol (JUNEL FE 1/20) 1-20 MG-MCG per tablet     ONDANSETRON PO     prazosin (MINIPRESS) 1 MG capsule     Probiotic Product (PROBIOTIC DAILY PO)     TIZANIDINE HCL PO     topiramate (TOPAMAX) 25 " MG tablet     Venlafaxine HCl (EFFEXOR PO)     No current facility-administered medications for this visit.        Weight Loss Medication History Reviewed With Patient 1/25/2019   Which weight loss medications are you currently taking on a regular basis?  Naltrexone   Are you having any side effects from the weight loss medication that we have prescribed you? No       ASSESSMENT:   Obesity, morbid     PLAN:   Goals set with Sade were reviewed today and notes that she can follow this (reviewec concretely as pt tends to eat many of the same things from day to day)--  1) Reduce BMI  2) follow 1400 meal replacement plan   3) Keep meals to 300 kcal   4) Have 3 snacks a day of 150 kcal each      Specifically--  1--Food Goals--  --[place], apple around noon  --2p--snack baked chips  --6:30 dinner--Frozen low chalo meal replacement  --later apple if hungry  --middle of the night apple     2--full tablet of naltrexone in the morning but shift timing to around 11A (around when she has her first volodymyr), continue 1/2 tablet in the evening (pt is tolerating this without any side effects).  She will see if this allows her to stop the afternoon snacks.  She will also stop the sugared soda and avoid other caloried liquids.  Also increasing activity--gym 2-3 times per wk if/as tolerated-walking the track is a good activity to start with.     FOLLOW-UP:    Return in about  4 wks if needed--our nurse will check in with pt on the med shift for appetitive control and decreasing snacking, and the planned activity and food changes.  If needed I can see pt in a month but if she is losing wt has no questions we can schedule further out for the next visit     Time: about 20/25 min spent on evaluation, management, counseling, education, & motivational interviewing with greater than 50 % of the total time was spent on counseling and coordinating care  Sincerely,    Luis Slade MD

## 2019-01-25 NOTE — PROGRESS NOTES
Return Medical Weight Management Note     Nehemias Mascorro  MRN:  [id number]  :  1995  JASMEET:  2019    Dear Ludwin Elam,    I had the pleasure of seeing your patient Nehemias Mascorro.  She is a 23 year old female who I am continuing to see for treatment of obesity; PMH includes the following:  Past Medical History:   Diagnosis Date     Depressive disorder      Uncomplicated asthma    GERD also      INTERVAL HISTORY:    Last visit here was about 6 wks ago--Reviewed and relevant history as extracted from earlier note is as follows--  --reviewed and relevant history is as follows as extracted from the earlier note--      '...Self-referred for obesity associated with GERD and anxiety and asthma.  Patient is interested in attaining a healthier weight to diminish current health problems related to co-morbid conditions and prevent future health issues.  She describes her weight history as a gradual gain, and began around age 9  Her previous attempts at weight loss include exercise. Patient has had some success lost about 20#--was on adderall and dancing)....   ...She is working on hypocaloric approach with volumetrics approach--thus far we have not started medication--options have been discussed and strategies, both lifestyle and medication support, were again discussed today.  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  We discussed incoporating meal replacement shakes for 1-2 meals per day to help--she wants to do this.  Discussed plans/ideas for activity, discussed also pharm approaches to help support wt loss in terms of potential risks and possible benefits and side effects. All of her questions were addressed and she would like a trial of topiramate--familiar with this med for  wt loss as other family members are on it with some success.  She is aware that this med alone is not approved bu the FDA as a wt loss med.  Detailed discussion of this med and in particular the need for birth control--recommended 2 forms, at least one barrier, of effective BC if she is sexually active.  Also discussed further the possibility of cognitive issues with the topiramate and concern for school performance.  She will be starting the med for a couple of wks before going off to school and will elt us know if any issues on this med in the interim or later...     Complicated  history with setbacks.  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  Work is limited to 2  2-hr sessions of sit-down work.  By herself some of the day at home a fair amount and is eating a lot of prepared/quick fix good, trying to be plant-based so eating high carb not low fat.  Snacking frequenlty during the day on prop-popped popcorn with ghee and sea salt (keeps a big bag), notes cravigs for carbs including te popcorn and also sweets.  Wakes at night and eats (has been having night terrors, did have to stop the topiramate because of that...     Discussed options with pt and her mother--would benefit from modified meal replacement approach which is also what her mom is doing.  Pharm support was als discussed with pt and her mom during the visit.  Pt is already on bupropion 150 mg daily--discussed adding naltrexone to that and they will also talk with her psychiatrist about this.  Discussed potential risks and benefits and possible side effects.  Pt is not on tramadol or narcotics now.  She is noting feeling more fatigued--difficulty with sleep but is also on largely plant based diet--need to eval for  "anemia inlcuding iron loss and also B12 def.  Also, given the significant head trauma history will also screen for central hypothyroidism.  Pt also snacks on high carb and some high fat foods--will benefit from working with our nutritionist.\"        When pt reconnected for care in 2018 after about a 3 yr gap we worked out a strategy allowing pt to use some meal replacements and avoid higher carb higher fat foods but it has been difficult for pt to stay focussed.  We had also discussed adding naltrexone to her regimen (pt was already on bupropion) but pt noted potential concerns by other providers of combining these 2 meds--we discussed that these meds are combinted in Contrave, an FDA-approved wt loss med.  Subsequently this med was started--tolerating it without ay issues but she wonders if the effectiveness is waning.     Barriers to wt loss identified--  Distracted eating, cravings, difficulty stopping    Today again we assessed strategies for wt loss, and barriers and what is working well, and co-developed next steps/changes to help get her on track.  Since last seen--  Getting over a recent pneumonia--with recovery lately off of her food regimen to help support wt loss, for example drinking sugared ginger ale.    Noting that she tends to want to snack in the afternoon.  Taking the naltrexone at 8:30A and with supper.      Meals--breakfast/brunch around 11-12 (not hungry until then)--sandwich and fruit  Afternoon snacking around 4p    6:30 supper--often eating what her mom makes or sometimes a frozen meal replacement such as lean cuisine.    She is working on being more active also    CURRENT WEIGHT:   Vitals--  /72 (BP Location: Right arm, Patient Position: Chair, Cuff Size: Adult Large)   Pulse 74   Ht 1.657 m (5' 5.24\")   Wt 110.6 kg (243 lb 13.3 oz)   BMI 40.28 kg/m      Wt Readings from Last 4 Encounters:   12/07/18 110.7 kg (244 lb 0.8 oz)   10/26/18 107.1 kg (236 lb 1.6 oz)   06/22/18 110.8 kg " "(244 lb 4.3 oz)   05/19/17 103.9 kg (229 lb)       Height:  Data Unavailable  Body Mass Index:  There is no height or weight on file to calculate BMI.  Vitals:  B/P: Data Unavailable, P: Data Unavailable, R: Data Unavailable     Initial consult weight/vitals--Estimated body mass index is 34.28 kg/(m^2) as calculated from the following:    Height as of this encounter: 5' 5.75\" (167 cm).    Weight as of this encounter: 210 lb 12.2 oz (95.6 kg). on 4/24/15.  Weight change since last seen on 12/7/2018 is down 1 pounds.   Total gain is 34 pounds.    Diet and Activity Changes Since Last Visit Reviewed With Patient 1/25/2019   I have made the following changes to my diet since my last visit: Eating less and more vegetables   With regards to my diet, I am still struggling with: -   I have made the following changes to my activity/exercise since my last visit: Joined a gym   With regards to my activity/exercise, I am still struggling with: -       MEDICATIONS:   Current Outpatient Medications   Medication     AMITRIPTYLINE HCL PO     ARIPiprazole (ABILIFY) 7.5 mg half-tab     BuPROPion HCl (WELLBUTRIN PO)     cetirizine (ZYRTEC) 10 MG tablet     DIAZEPAM PO     ESCITALOPRAM OXALATE PO     Fexofenadine HCl (ALLEGRA PO)     GLYCOPYRROLATE PO     GUANFACINE HCL PO     IBUPROFEN PO     Ipratropium-Albuterol (COMBIVENT RESPIMAT)  MCG/ACT inhaler     Lansoprazole (PREVACID PO)     naltrexone (DEPADE/REVIA) 50 MG tablet     norethindrone-ethinyl estradiol (JUNEL FE 1/20) 1-20 MG-MCG per tablet     ONDANSETRON PO     prazosin (MINIPRESS) 1 MG capsule     Probiotic Product (PROBIOTIC DAILY PO)     TIZANIDINE HCL PO     topiramate (TOPAMAX) 25 MG tablet     Venlafaxine HCl (EFFEXOR PO)     No current facility-administered medications for this visit.        Weight Loss Medication History Reviewed With Patient 1/25/2019   Which weight loss medications are you currently taking on a regular basis?  Naltrexone   Are you having any " side effects from the weight loss medication that we have prescribed you? No       ASSESSMENT:   Obesity, morbid     PLAN:   Goals set with Sade were reviewed today and notes that she can follow this (reviewec concretely as pt tends to eat many of the same things from day to day)--  1) Reduce BMI  2) follow 1400 meal replacement plan   3) Keep meals to 300 kcal   4) Have 3 snacks a day of 150 kcal each      Specifically--  1--Food Goals--  --[place], apple around noon  --2p--snack baked chips  --6:30 dinner--Frozen low chalo meal replacement  --later apple if hungry  --middle of the night apple     2--full tablet of naltrexone in the morning but shift timing to around 11A (around when she has her first volodymyr), continue 1/2 tablet in the evening (pt is tolerating this without any side effects).  She will see if this allows her to stop the afternoon snacks.  She will also stop the sugared soda and avoid other caloried liquids.  Also increasing activity--gym 2-3 times per wk if/as tolerated-walking the track is a good activity to start with.     FOLLOW-UP:    Return in about 4 wks if needed--our nurse will check in with pt on the med shift for appetitive control and decreasing snacking, and the planned activity and food changes.  If needed I can see pt in a month but if she is losing wt has no questions we can schedule further out for the next visit     Time: about 20/25 min spent on evaluation, management, counseling, education, & motivational interviewing with greater than 50 % of the total time was spent on counseling and coordinating care  Sincerely,    Luis Slade MD

## 2019-01-25 NOTE — NURSING NOTE
"[facility] [[id number]]  Chief Complaint   Patient presents with     RECHECK     weight management follow up      Initial /72 (BP Location: Right arm, Patient Position: Chair, Cuff Size: Adult Large)   Pulse 74   Ht 5' 5.24\" (165.7 cm)   Wt 243 lb 13.3 oz (110.6 kg)   BMI 40.28 kg/m   Estimated body mass index is 40.28 kg/m  as calculated from the following:    Height as of this encounter: 5' 5.24\" (165.7 cm).    Weight as of this encounter: 243 lb 13.3 oz (110.6 kg).  Medication Reconciliation: complete  Wt Readings from Last 4 Encounters:   01/25/19 243 lb 13.3 oz (110.6 kg)   12/07/18 244 lb 0.8 oz (110.7 kg)   10/26/18 236 lb 1.6 oz (107.1 kg)   06/22/18 244 lb 4.3 oz (110.8 kg)     "

## 2019-01-25 NOTE — PATIENT INSTRUCTIONS
We reviewed food plans--  Specifically--  1--Food Goals--  --[place], apple around noon  --2p--snack baked chips  --6:30 dinner--Frozen low chalo meal replacement  --later apple if hungry  --middle of the night apple    2.  We discussed that you can change your naltrexone first dose (full tablet) to with brunch, second dose (1/2 tablet) to be with supper.    3.  You plan to go to the gym twice per wk also

## 2019-02-03 ENCOUNTER — COMMUNICATION - HEALTHEAST (OUTPATIENT)
Dept: INTERNAL MEDICINE | Facility: CLINIC | Age: 24
End: 2019-02-03

## 2019-02-04 ENCOUNTER — COMMUNICATION - HEALTHEAST (OUTPATIENT)
Dept: INTERNAL MEDICINE | Facility: CLINIC | Age: 24
End: 2019-02-04

## 2019-02-13 ENCOUNTER — TELEPHONE (OUTPATIENT)
Dept: PEDIATRICS | Facility: CLINIC | Age: 24
End: 2019-02-13

## 2019-02-13 NOTE — TELEPHONE ENCOUNTER
Called and left message re: Calling to check in to see how Naltrexone was working.  Left direct call back number to discuss medication.

## 2019-02-22 ENCOUNTER — RECORDS - HEALTHEAST (OUTPATIENT)
Dept: ADMINISTRATIVE | Facility: OTHER | Age: 24
End: 2019-02-22

## 2019-02-22 ENCOUNTER — OFFICE VISIT (OUTPATIENT)
Dept: ENDOCRINOLOGY | Facility: CLINIC | Age: 24
End: 2019-02-22
Attending: INTERNAL MEDICINE
Payer: COMMERCIAL

## 2019-02-22 ENCOUNTER — OFFICE VISIT (OUTPATIENT)
Dept: PSYCHOLOGY | Facility: CLINIC | Age: 24
End: 2019-02-22
Attending: PSYCHOLOGIST
Payer: COMMERCIAL

## 2019-02-22 VITALS
BODY MASS INDEX: 40.11 KG/M2 | DIASTOLIC BLOOD PRESSURE: 81 MMHG | WEIGHT: 249.56 LBS | HEIGHT: 66 IN | HEART RATE: 87 BPM | SYSTOLIC BLOOD PRESSURE: 126 MMHG

## 2019-02-22 DIAGNOSIS — E66.01 MORBID OBESITY DUE TO EXCESS CALORIES (H): Primary | ICD-10-CM

## 2019-02-22 DIAGNOSIS — F41.9 ANXIETY: ICD-10-CM

## 2019-02-22 DIAGNOSIS — E66.01 MORBID (SEVERE) OBESITY DUE TO EXCESS CALORIES (H): ICD-10-CM

## 2019-02-22 DIAGNOSIS — E66.9 OBESITY: Primary | ICD-10-CM

## 2019-02-22 DIAGNOSIS — F32.9 MAJOR DEPRESSIVE DISORDER: ICD-10-CM

## 2019-02-22 ASSESSMENT — MIFFLIN-ST. JEOR: SCORE: 1902.88

## 2019-02-22 ASSESSMENT — PAIN SCALES - GENERAL: PAINLEVEL: NO PAIN (0)

## 2019-02-22 NOTE — PATIENT INSTRUCTIONS
We can increase the naltrexone to a full tablet twice daily--first one in the morning when you get up and second dose at 3p    We are considering phentermine as a future add in for better appetite control but need to consider possible medication interactions and please also talk with your other providers about whether they would have any concerns about this medication at this point    Please return in a month

## 2019-02-22 NOTE — PROGRESS NOTES
Return Medical Weight Management Note     Nehemias Mascorro  MRN:  [id number]  :  1995  JASMEET:  2019    Dear Ludwin Elam,    I had the pleasure of seeing your patient Nehemias Mascorro.  She is a 23 year old female who I am continuing to see for treatment of obesity related to:  Past Medical History:   Diagnosis Date     Depressive disorder      Uncomplicated asthma    GERD also    INTERVAL HISTORY:    Last visit here was about 4 wks ago--Reviewed and relevant history as extracted from earlier note is as follows--  --reviewed and relevant history is as follows as extracted from the earlier note--      '...Self-referred for obesity associated with GERD and anxiety and asthma.  Patient is interested in attaining a healthier weight to diminish current health problems related to co-morbid conditions and prevent future health issues.  She describes her weight history as a gradual gain, and began around age 9  Her previous attempts at weight loss include exercise. Patient has had some success lost about 20#--was on adderall and dancing)....   ...She is working on hypocaloric approach with volumetrics approach--thus far we have not started medication--options have been discussed and strategies, both lifestyle and medication support, were again discussed today.  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  We discussed incoporating meal replacement shakes for 1-2 meals per day to help--she wants to do this.  Discussed plans/ideas for activity, discussed also pharm approaches to help support wt loss in terms of potential risks and possible benefits and side effects. All of her questions were addressed and she would like a trial of topiramate--familiar with this med for wt loss as other  family members are on it with some success.  She is aware that this med alone is not approved bu the FDA as a wt loss med.  Detailed discussion of this med and in particular the need for birth control--recommended 2 forms, at least one barrier, of effective BC if she is sexually active.  Also discussed further the possibility of cognitive issues with the topiramate and concern for school performance.  She will be starting the med for a couple of wks before going off to school and will elt us know if any issues on this med in the interim or later...     Complicated  history with setbacks.  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  Work is limited to 2  2-hr sessions of sit-down work.  By herself some of the day at home a fair amount and is eating a lot of prepared/quick fix good, trying to be plant-based so eating high carb not low fat.  Snacking frequenlty during the day on prop-popped popcorn with ghee and sea salt (keeps a big bag), notes cravigs for carbs including te popcorn and also sweets.  Wakes at night and eats (has been having night terrors, did have to stop the topiramate because of that...     Discussed options with pt and her mother--would benefit from modified meal replacement approach which is also what her mom is doing.  Pharm support was als discussed with pt and her mom during the visit.  Pt is already on bupropion 150 mg daily--discussed adding naltrexone to that and they will also talk with her psychiatrist about this.  Discussed potential risks and benefits and possible side effects.  Pt is not on tramadol or narcotics now.  She is noting feeling more fatigued--difficulty with sleep but is also on largely plant based diet--need to eval for anemia inlcuding  "iron loss and also B12 def.  Also, given the significant head trauma history will also screen for central hypothyroidism.  Pt also snacks on high carb and some high fat foods--will benefit from working with our nutritionist...        When pt reconnected for care in 2018 after about a 3 yr gap we worked out a strategy allowing pt to use some meal replacements and avoid higher carb higher fat foods but it has been difficult for pt to stay focussed.  We had also discussed adding naltrexone to her regimen (pt was already on bupropion) but pt noted potential concerns by other providers of combining these 2 meds--we discussed that these meds are combinted in Contrave, an FDA-approved wt loss med.  Subsequently this med was started--tolerating it without ay issues but she wonders if the effectiveness is waning.     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   [phone]       CURRENT WEIGHT:   249 lbs 8.97 oz    Wt Readings from Last 4 Encounters:   02/22/19 113.2 kg (249 lb 9 oz)   01/25/19 110.6 kg (243 lb 13.3 oz)   12/07/18 110.7 kg (244 lb 0.8 oz)   10/26/18 107.1 kg (236 lb 1.6 oz)       Height:  5' 5.945\"  Body Mass Index:  Body mass index is 40.35 kg/m .  Vitals:  B/P: 126/81, P: 87, R: Data Unavailable     Initial consult weight/vitals--Estimated body mass index is 34.28 kg/(m^2) as " "calculated from the following:    Height as of this encounter: 5' 5.75\" (167 cm).    Weight as of this encounter: 210 lb 12.2 oz (95.6 kg). on 4/24/15.  Weight change since last seen on 1/25/2019 is up 6 pounds.   Total gain is 39 pounds.    Diet and Activity Changes Since Last Visit Reviewed With Patient 1/25/2019   I have made the following changes to my diet since my last visit: Eating less and more vegetables   With regards to my diet, I am still struggling with: -   I have made the following changes to my activity/exercise since my last visit: Joined a gym   With regards to my activity/exercise, I am still struggling with: -       MEDICATIONS:   Current Outpatient Medications   Medication     AMITRIPTYLINE HCL PO     ARIPiprazole (ABILIFY) 7.5 mg half-tab     BuPROPion HCl (WELLBUTRIN PO)     cetirizine (ZYRTEC) 10 MG tablet     DIAZEPAM PO     ESCITALOPRAM OXALATE PO     Fexofenadine HCl (ALLEGRA PO)     GLYCOPYRROLATE PO     GUANFACINE HCL PO     IBUPROFEN PO     Ipratropium-Albuterol (COMBIVENT RESPIMAT)  MCG/ACT inhaler     Lansoprazole (PREVACID PO)     naltrexone (DEPADE/REVIA) 50 MG tablet     norethindrone-ethinyl estradiol (JUNEL FE 1/20) 1-20 MG-MCG per tablet     ONDANSETRON PO     prazosin (MINIPRESS) 1 MG capsule     Probiotic Product (PROBIOTIC DAILY PO)     TIZANIDINE HCL PO     topiramate (TOPAMAX) 25 MG tablet     Venlafaxine HCl (EFFEXOR PO)     No current facility-administered medications for this visit.        Weight Loss Medication History Reviewed With Patient 1/25/2019   Which weight loss medications are you currently taking on a regular basis?  Naltrexone   Are you having any side effects from the weight loss medication that we have prescribed you? No       ASSESSMENT:   Morbid obesity in pt with wt gain over past few years, more since her TBI; working now with neuro and psych.  Spends good portion of most days of the week at home, relatively sedentary, and has been gaining despite " working with me and nutrition and with escalation of meds available; not able to be on topiramate at present.  On escalating naltrexone, potential to add low dose phentermine and I have asked pt/her mother to discuss that with other providers also.  I am concerned about potential for decreased self monitoring/control, given the gain despite working towards setting food goals repeatedly.  Need to better assess what is going on with food intake at present, pt is alone fair amount of the time.  Some issues with various types of snacking issues which we address and then additional issues arise.  Have also discussed meal replacement approach to help with this issue.  Pt has been using own foods lately but not staying with low carb low fat options and some snacking/liquid calories have been an issue    PLAN:   Eat breakfast daily  Decrease portion sizes  No meals in front of TV screen  Purge house of food triggers  No meal skipping  Decrease caloric beverages by no soda  Volumetrics eating plan--structured plan and avoding snacking  Low Calorie/low fat diet  Meal planning/journaling    They will meet with Alba Huitron today also regarding gaining further insight into why pt is gaining wt     We can increase the naltrexone to a full tablet twice daily--first one in the morning when you get up and second dose at 3p    We are considering phentermine as a future add in for better appetite control but need to consider possible medication interactions and please also talk with your other providers about whether they would have any concerns about this medication at this point    return in a month      Time: 20/25 min spent on evaluation, management, counseling, education, & motivational interviewing with greater than 50 % of the total time was spent on counseling and coordinating care    Sincerely,    Luis Slade MD

## 2019-02-22 NOTE — PROGRESS NOTES
"Pediatric Psychology Progress Note    Start time: 10:15am  Stop time: 10:45am  Service: [id number]  Diagnosis: obesity, depression, anxiety    Subjective: Nehemias is a 23 year old female here with her mother for family weight management clinic. I had met the family a few years ago but have not followed them in the interim. They have been followed by Dr. Slade.     Objective: Nehemias has a history of head injury as a young adult. She is currently living at home and working 2 days per week x5 hours per day. Otherwise she is home - doing \"nothing.\" She has worked with OT int he past - created a schedule with meal times, wake/sleep times, etc. She is currently working with a psychologist and psychiatrist. The broad goal with psychology is to increase pleasurable activities. Discussed barriers to weight management; Nehemias reported lack of physical activity. Mom thought family stressors and not having meals as a family may be contributors. Discussed how lack of family meals contributed; it was unclear. Mom reported that she does not see Nehemias overeat or make poor food choices. Discussed how to help Nehemias self-monitor and we agreed Nehemias would take photos of food as a food log.     Assessment: Nehemias and her mother were both someone reserved in session but answered all questions and seemed open to suggestions.    Plan: Follow-up next month. Problem-solve based on photo food diary.    Alba Huitron, PhD, LP, BCBA-D   of Pediatrics  Board Certified Behavior Analyst-Doctoral  Department of Pediatrics    *no letter    "

## 2019-02-22 NOTE — LETTER
2019     RE: Nehemias Mascorro  [address]  [place] [address]     Dear Colleague,    Thank you for referring your patient, Nehemias Mascorro, to the [facility] ADULT WEIGHT MGT D at [facility]. Please see a copy of my visit note below.    Return Medical Weight Management Note     Nehemias Mascorro  MRN:  [id number]  :  1995  JASMEET:  2019    Dear Ludwin Elam,    I had the pleasure of seeing your patient Nehemias Mascorro.  She is a 23 year old female who I am continuing to see for treatment of obesity related to:  Past Medical History:   Diagnosis Date     Depressive disorder      Uncomplicated asthma    GERD also    INTERVAL HISTORY:    Last visit here was about 4 wks ago--Reviewed and relevant history as extracted from earlier note is as follows--  --reviewed and relevant history is as follows as extracted from the earlier note--      '...Self-referred for obesity associated with GERD and anxiety and asthma.  Patient is interested in attaining a healthier weight to diminish current health problems related to co-morbid conditions and prevent future health issues.  She describes her weight history as a gradual gain, and began around age 9  Her previous attempts at weight loss include exercise. Patient has had some success lost about 20#--was on adderall and dancing)....   ...She is working on hypocaloric approach with volumetrics approach--thus far we have not started medication--options have been discussed and strategies, both lifestyle and medication support, were again discussed today.  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  We discussed incoporating meal replacement shakes for 1-2 meals per day to help--she wants to do this.   Discussed plans/ideas for activity, discussed also pharm approaches to help support wt loss in terms of potential risks and possible benefits and side effects. All of her questions were addressed and she would like a trial of topiramate--familiar with this med for wt loss as other family members are on it with some success.  She is aware that this med alone is not approved bu the FDA as a wt loss med.  Detailed discussion of this med and in particular the need for birth control--recommended 2 forms, at least one barrier, of effective BC if she is sexually active.  Also discussed further the possibility of cognitive issues with the topiramate and concern for school performance.  She will be starting the med for a couple of wks before going off to school and will elt us know if any issues on this med in the interim or later...     Complicated  history with setbacks.  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  Work is limited to 2  2-hr sessions of sit-down work.  By herself some of the day at home a fair amount and is eating a lot of prepared/quick fix good, trying to be plant-based so eating high carb not low fat.  Snacking frequenlty during the day on prop-popped popcorn with ghee and sea salt (keeps a big bag), notes cravigs for carbs including te popcorn and also sweets.  Wakes at night and eats (has been having night terrors, did have to stop the topiramate because of that...     Discussed options with pt and her mother--would benefit from modified meal replacement approach which is also what her mom is doing.  Pharm support was als discussed with pt and her mom during the visit.  Pt is already on bupropion 150 mg daily--discussed adding naltrexone to that and they will  "also talk with her psychiatrist about this.  Discussed potential risks and benefits and possible side effects.  Pt is not on tramadol or narcotics now.  She is noting feeling more fatigued--difficulty with sleep but is also on largely plant based diet--need to eval for anemia inlcuding iron loss and also B12 def.  Also, given the significant head trauma history will also screen for central hypothyroidism.  Pt also snacks on high carb and some high fat foods--will benefit from working with our nutritionist...        When pt reconnected for care in 2018 after about a 3 yr gap we worked out a strategy allowing pt to use some meal replacements and avoid higher carb higher fat foods but it has been difficult for pt to stay focussed.  We had also discussed adding naltrexone to her regimen (pt was already on bupropion) but pt noted potential concerns by other providers of combining these 2 meds--we discussed that these meds are combinted in Contrave, an FDA-approved wt loss med.  Subsequently this med was started--tolerating it without ay issues but she wonders if the effectiveness is waning.     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   [phone]       CURRENT WEIGHT:   249 lbs 8.97 oz    Wt Readings from Last 4 Encounters:   02/22/19 113.2 kg (249 lb 9 oz)   01/25/19 110.6 kg (243 lb 13.3 " "oz)   12/07/18 110.7 kg (244 lb 0.8 oz)   10/26/18 107.1 kg (236 lb 1.6 oz)       Height:  5' 5.945\"  Body Mass Index:  Body mass index is 40.35 kg/m .  Vitals:  B/P: 126/81, P: 87, R: Data Unavailable     Initial consult weight/vitals--Estimated body mass index is 34.28 kg/(m^2) as calculated from the following:    Height as of this encounter: 5' 5.75\" (167 cm).    Weight as of this encounter: 210 lb 12.2 oz (95.6 kg). on 4/24/15.  Weight change since last seen on 1/25/2019 is up 6 pounds.   Total gain is 39 pounds.    Diet and Activity Changes Since Last Visit Reviewed With Patient 1/25/2019   I have made the following changes to my diet since my last visit: Eating less and more vegetables   With regards to my diet, I am still struggling with: -   I have made the following changes to my activity/exercise since my last visit: Joined a gym   With regards to my activity/exercise, I am still struggling with: -       MEDICATIONS:   Current Outpatient Medications   Medication     AMITRIPTYLINE HCL PO     ARIPiprazole (ABILIFY) 7.5 mg half-tab     BuPROPion HCl (WELLBUTRIN PO)     cetirizine (ZYRTEC) 10 MG tablet     DIAZEPAM PO     ESCITALOPRAM OXALATE PO     Fexofenadine HCl (ALLEGRA PO)     GLYCOPYRROLATE PO     GUANFACINE HCL PO     IBUPROFEN PO     Ipratropium-Albuterol (COMBIVENT RESPIMAT)  MCG/ACT inhaler     Lansoprazole (PREVACID PO)     naltrexone (DEPADE/REVIA) 50 MG tablet     norethindrone-ethinyl estradiol (JUNEL FE 1/20) 1-20 MG-MCG per tablet     ONDANSETRON PO     prazosin (MINIPRESS) 1 MG capsule     Probiotic Product (PROBIOTIC DAILY PO)     TIZANIDINE HCL PO     topiramate (TOPAMAX) 25 MG tablet     Venlafaxine HCl (EFFEXOR PO)     No current facility-administered medications for this visit.        Weight Loss Medication History Reviewed With Patient 1/25/2019   Which weight loss medications are you currently taking on a regular basis?  Naltrexone   Are you having any side effects from the " weight loss medication that we have prescribed you? No     ASSESSMENT:   Morbid obesity in pt with wt gain over past few years, more since her TBI; working now with neuro and psych.  Spends good portion of most days of the week at home, relatively sedentary, and has been gaining despite working with me and nutrition and with escalation of meds available; not able to be on topiramate at present.  On escalating naltrexone, potential to add low dose phentermine and I have asked pt/her mother to discuss that with other providers also.  I am concerned about potential for decreased self monitoring/control, given the gain despite working towards setting food goals repeatedly.  Need to better assess what is going on with food intake at present, pt is alone fair amount of the time.  Some issues with various types of snacking issues which we address and then additional issues arise.  Have also discussed meal replacement approach to help with this issue.  Pt has been using own foods lately but not staying with low carb low fat options and some snacking/liquid calories have been an issue    PLAN:   Eat breakfast daily  Decrease portion sizes  No meals in front of TV screen  Purge house of food triggers  No meal skipping  Decrease caloric beverages by no soda  Volumetrics eating plan--structured plan and avoding snacking  Low Calorie/low fat diet  Meal planning/journaling    They will meet with Alba Huitron today also regarding gaining further insight into why pt is gaining wt     We can increase the naltrexone to a full tablet twice daily--first one in the morning when you get up and second dose at 3p    We are considering phentermine as a future add in for better appetite control but need to consider possible medication interactions and please also talk with your other providers about whether they would have any concerns about this medication at this point    return in a month    Time: 20/25 min spent on evaluation, management,  counseling, education, & motivational interviewing with greater than 50 % of the total time was spent on counseling and coordinating care    Sincerely,    Luis Slade MD

## 2019-02-22 NOTE — LETTER
"  2/22/2019      RE: Nehemias Mascorro  [address]  [place] [address]       Pediatric Psychology Progress Note    Start time: 10:15am  Stop time: 10:45am  Service: [id number]  Diagnosis: obesity, depression, anxiety    Subjective: Nehemias is a 23 year old female here with her mother for family weight management clinic. I had met the family a few years ago but have not followed them in the interim. They have been followed by Dr. Slade.     Objective: Nehemias has a history of head injury as a young adult. She is currently living at home and working 2 days per week x5 hours per day. Otherwise she is home - doing \"nothing.\" She has worked with OT int he past - created a schedule with meal times, wake/sleep times, etc. She is currently working with a psychologist and psychiatrist. The broad goal with psychology is to increase pleasurable activities. Discussed barriers to weight management; Nehemias reported lack of physical activity. Mom thought family stressors and not having meals as a family may be contributors. Discussed how lack of family meals contributed; it was unclear. Mom reported that she does not see Nehemias overeat or make poor food choices. Discussed how to help Nehemias self-monitor and we agreed Nehemias would take photos of food as a food log.     Assessment: Nehemias and her mother were both someone reserved in session but answered all questions and seemed open to suggestions.    Plan: Follow-up next month. Problem-solve based on photo food diary.    Alba Huitron, PhD, LP, BCBA-D   of Pediatrics  Board Certified Behavior Analyst-Doctoral  Department of Pediatrics    *no letter      Alba Huitron LP, PhD LP  "

## 2019-02-22 NOTE — LETTER
Date:February 25, 2019      Provider requested that no letter be sent. Do not send.       [facility] Health Information

## 2019-03-03 RX ORDER — NALTREXONE HYDROCHLORIDE 50 MG/1
TABLET, FILM COATED ORAL
Qty: 60 TABLET | Refills: 2 | Status: SHIPPED | OUTPATIENT
Start: 2019-03-03 | End: 2019-06-28

## 2019-05-07 ENCOUNTER — RECORDS - HEALTHEAST (OUTPATIENT)
Dept: ADMINISTRATIVE | Facility: OTHER | Age: 24
End: 2019-05-07

## 2019-05-09 ENCOUNTER — COMMUNICATION - HEALTHEAST (OUTPATIENT)
Dept: INTERNAL MEDICINE | Facility: CLINIC | Age: 24
End: 2019-05-09

## 2019-05-16 ENCOUNTER — RECORDS - HEALTHEAST (OUTPATIENT)
Dept: ADMINISTRATIVE | Facility: OTHER | Age: 24
End: 2019-05-16

## 2019-05-17 ENCOUNTER — OFFICE VISIT - HEALTHEAST (OUTPATIENT)
Dept: INTERNAL MEDICINE | Facility: CLINIC | Age: 24
End: 2019-05-17

## 2019-05-17 DIAGNOSIS — E66.01 MORBID OBESITY (H): ICD-10-CM

## 2019-05-17 DIAGNOSIS — D50.0 IRON DEFICIENCY ANEMIA DUE TO CHRONIC BLOOD LOSS: ICD-10-CM

## 2019-05-17 DIAGNOSIS — H92.09 OTALGIA, UNSPECIFIED LATERALITY: ICD-10-CM

## 2019-05-17 DIAGNOSIS — Z76.89 ENCOUNTER FOR WEIGHT MANAGEMENT: ICD-10-CM

## 2019-05-17 LAB
BASOPHILS # BLD AUTO: 0 THOU/UL (ref 0–0.2)
BASOPHILS NFR BLD AUTO: 0 % (ref 0–2)
EOSINOPHIL # BLD AUTO: 0 THOU/UL (ref 0–0.4)
EOSINOPHIL NFR BLD AUTO: 1 % (ref 0–6)
ERYTHROCYTE [DISTWIDTH] IN BLOOD BY AUTOMATED COUNT: 12.8 % (ref 11–14.5)
HCT VFR BLD AUTO: 40.9 % (ref 35–47)
HGB BLD-MCNC: 13.6 G/DL (ref 12–16)
IRON SATN MFR SERPL: 19 % (ref 20–50)
IRON SERPL-MCNC: 69 UG/DL (ref 42–175)
LYMPHOCYTES # BLD AUTO: 0.6 THOU/UL (ref 0.8–4.4)
LYMPHOCYTES NFR BLD AUTO: 13 % (ref 20–40)
MCH RBC QN AUTO: 30.2 PG (ref 27–34)
MCHC RBC AUTO-ENTMCNC: 33.3 G/DL (ref 32–36)
MCV RBC AUTO: 91 FL (ref 80–100)
MONOCYTES # BLD AUTO: 0.1 THOU/UL (ref 0–0.9)
MONOCYTES NFR BLD AUTO: 2 % (ref 2–10)
NEUTROPHILS # BLD AUTO: 4.1 THOU/UL (ref 2–7.7)
NEUTROPHILS NFR BLD AUTO: 84 % (ref 50–70)
PLATELET # BLD AUTO: 249 THOU/UL (ref 140–440)
PMV BLD AUTO: 8.5 FL (ref 7–10)
RBC # BLD AUTO: 4.5 MILL/UL (ref 3.8–5.4)
TIBC SERPL-MCNC: 364 UG/DL (ref 313–563)
TRANSFERRIN SERPL-MCNC: 291 MG/DL (ref 212–360)
WBC: 4.9 THOU/UL (ref 4–11)

## 2019-05-17 ASSESSMENT — MIFFLIN-ST. JEOR: SCORE: 1936.57

## 2019-05-21 ENCOUNTER — COMMUNICATION - HEALTHEAST (OUTPATIENT)
Dept: INTERNAL MEDICINE | Facility: CLINIC | Age: 24
End: 2019-05-21

## 2019-05-22 ENCOUNTER — COMMUNICATION - HEALTHEAST (OUTPATIENT)
Dept: INTERNAL MEDICINE | Facility: CLINIC | Age: 24
End: 2019-05-22

## 2019-06-28 ENCOUNTER — OFFICE VISIT (OUTPATIENT)
Dept: ENDOCRINOLOGY | Facility: CLINIC | Age: 24
End: 2019-06-28
Attending: INTERNAL MEDICINE
Payer: COMMERCIAL

## 2019-06-28 ENCOUNTER — RECORDS - HEALTHEAST (OUTPATIENT)
Dept: ADMINISTRATIVE | Facility: OTHER | Age: 24
End: 2019-06-28

## 2019-06-28 VITALS
DIASTOLIC BLOOD PRESSURE: 76 MMHG | WEIGHT: 257.5 LBS | BODY MASS INDEX: 41.38 KG/M2 | SYSTOLIC BLOOD PRESSURE: 119 MMHG | HEIGHT: 66 IN | HEART RATE: 83 BPM

## 2019-06-28 DIAGNOSIS — E66.01 MORBID (SEVERE) OBESITY DUE TO EXCESS CALORIES (H): ICD-10-CM

## 2019-06-28 PROCEDURE — G0463 HOSPITAL OUTPT CLINIC VISIT: HCPCS | Mod: ZF

## 2019-06-28 RX ORDER — NALTREXONE HYDROCHLORIDE 50 MG/1
TABLET, FILM COATED ORAL
Qty: 60 TABLET | Refills: 2 | Status: SHIPPED | OUTPATIENT
Start: 2019-06-28 | End: 2020-03-22

## 2019-06-28 ASSESSMENT — MIFFLIN-ST. JEOR: SCORE: 1928.88

## 2019-06-28 ASSESSMENT — PAIN SCALES - GENERAL: PAINLEVEL: NO PAIN (0)

## 2019-06-28 NOTE — PROGRESS NOTES
Return Medical Weight Management Note     Nehemias Mascorro  MRN:  [id number]  :  1995  JASMEET:  2019    Dear Ludwin Elam,    I had the pleasure of seeing your patient Nehemias Mascorro.  She is a 24 year old female who I am continuing to see for treatment of obesity related to:      INTERVAL HISTORY:    Last visit here was about 4 mo ago--Reviewed and relevant history as extracted from earlier note is as follows--  --reviewed and relevant history is as follows as extracted from the earlier note--      '...Self-referred for obesity associated with GERD and anxiety and asthma.  Patient is interested in attaining a healthier weight to diminish current health problems related to co-morbid conditions and prevent future health issues.  She describes her weight history as a gradual gain, and began around age 9  Her previous attempts at weight loss include exercise. Patient has had some success lost about 20#--was on adderall and dancing)....   ...She is working on hypocaloric approach with volumetrics approach--thus far we have not started medication--options have been discussed and strategies, both lifestyle and medication support, were again discussed today.  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  We discussed incoporating meal replacement shakes for 1-2 meals per day to help--she wants to do this.  Discussed plans/ideas for activity, discussed also pharm approaches to help support wt loss in terms of potential risks and possible benefits and side effects. All of her questions were addressed and she would like a trial of topiramate--familiar with this med for wt loss as other family members are on it with some success.  She is aware that this med alone is not approved bu the FDA  as a wt loss med.  Detailed discussion of this med and in particular the need for birth control--recommended 2 forms, at least one barrier, of effective BC if she is sexually active.  Also discussed further the possibility of cognitive issues with the topiramate and concern for school performance.  She will be starting the med for a couple of wks before going off to school and will elt us know if any issues on this med in the interim or later...     Complicated  history with setbacks.  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  Work is limited to 2  2-hr sessions of sit-down work.  By herself some of the day at home a fair amount and is eating a lot of prepared/quick fix good, trying to be plant-based so eating high carb not low fat.  Snacking frequenlty during the day on prop-popped popcorn with ghee and sea salt (keeps a big bag), notes cravigs for carbs including te popcorn and also sweets.  Wakes at night and eats (has been having night terrors, did have to stop the topiramate because of that...     Discussed options with pt and her mother--would benefit from modified meal replacement approach which is also what her mom is doing.  Pharm support was als discussed with pt and her mom during the visit.  Pt is already on bupropion 150 mg daily--discussed adding naltrexone to that and they will also talk with her psychiatrist about this.  Discussed potential risks and benefits and possible side effects.  Pt is not on tramadol or narcotics now.  She is noting feeling more fatigued--difficulty with sleep but is also on largely plant based diet--need to eval for anemia inlcuding iron loss and also B12 def.  Also, given the significant head trauma history will also screen for  "central hypothyroidism.  Pt also snacks on high carb and some high fat foods--will benefit from working with our nutritionist...        When pt reconnected for care in 2018 after about a 3 yr gap we worked out a strategy allowing pt to use some meal replacements and avoid higher carb higher fat foods but it has been difficult for pt to stay focussed.  We had also discussed adding naltrexone to her regimen (pt was already on bupropion) but pt noted potential concerns by other providers of combining these 2 meds--we discussed that these meds are combinted in Contrave, an FDA-approved wt loss med.  Subsequently this med was started--tolerating it without ay issues but she wonders if the effectiveness is waning.     Barriers to wt loss identified--  Distracted eating, cravings, difficulty stopping...\"       Since last seen, pt notes that she is having ongoing struggles with staying on track with and sustaining healthy food changes to get and keep wt loss going effectively.  Cravings and snacking on higher carb snacks are current issues, kervin with current pharm support which she is tolerating but is only getting partal benefit from.  Snacking and cravings (snack before bed 9p  [pretzels or applesauce], and usually in afternoon 3p  [pretzels or applesauce]  Hunger is an issue also.    May benefit from shift in the timing of her current pharm support for t loss-  Naltrexone--9:30 change to 1p, 3P dose change to 7p    Food recall--  Breakfast--bagel, eggs, cheese  [place]/pretzels or chips  Supper--chicken or fish and veggies    Walking--random rather than structured; 2000 steps daily which could be built up.    Pt would benefit from further work with nutrition to make further carb cuts from her daily routine--we will schedule her with Padmini in near future. It will be important for pt to have more structure in her wt loss plans--need to find ways to intensify the support for these changes.  Does have unstructured time " "during the day--difficult to have a good sustained pattern with food relationship to support wt loss.    Other providers include--  Psych  Dr. Gabriela Hall   [facility]   [phone]     CURRENT WEIGHT:   257 lbs 7.96 oz    Wt Readings from Last 4 Encounters:   06/28/19 116.8 kg (257 lb 8 oz)   02/22/19 113.2 kg (249 lb 9 oz)   01/25/19 110.6 kg (243 lb 13.3 oz)   12/07/18 110.7 kg (244 lb 0.8 oz)       Height:  5' 5.63\"  Body Mass Index:  Body mass index is 42.03 kg/m .  /76   Pulse 83   Ht 1.667 m (5' 5.63\")   Wt 116.8 kg (257 lb 8 oz)   BMI 42.03 kg/m      Initial consult weight/vitals--Estimated body mass index is 34.28 kg/(m^2) as calculated from the following:    Height as of encounter: 5' 5.75\" (167 cm).    Weight as of encounter: 210 lb 12.2 oz (95.6 kg). on 4/24/15.  Weight change since last seen on 2/22/2019 is up 8 pounds.   Total gain is 47 pounds.    Diet and Activity Changes Since Last Visit Reviewed With Patient 1/25/2019   I have made the following changes to my diet since my last visit: Eating less and more vegetables   With regards to my diet, I am still struggling with: -   I have made the following changes to my activity/exercise since my last visit: Joined a gym   With regards to my activity/exercise, I am still struggling with: -       MEDICATIONS:   Current Outpatient Medications   Medication     AMITRIPTYLINE HCL PO     ARIPiprazole (ABILIFY) 7.5 mg half-tab     BuPROPion HCl (WELLBUTRIN PO)     cetirizine (ZYRTEC) 10 MG tablet     DIAZEPAM PO     ESCITALOPRAM OXALATE PO     Fexofenadine HCl (ALLEGRA PO)     GLYCOPYRROLATE PO     GUANFACINE HCL PO     IBUPROFEN PO     Ipratropium-Albuterol (COMBIVENT RESPIMAT)  MCG/ACT inhaler     Lansoprazole (PREVACID PO)     naltrexone (DEPADE/REVIA) 50 MG tablet     norethindrone-ethinyl estradiol (JUNEL FE 1/20) 1-20 MG-MCG per tablet     ONDANSETRON PO     prazosin (MINIPRESS) 1 MG capsule     Probiotic Product (PROBIOTIC " DAILY PO)     TIZANIDINE HCL PO     topiramate (TOPAMAX) 25 MG tablet     Venlafaxine HCl (EFFEXOR PO)     No current facility-administered medications for this visit.        Weight Loss Medication History Reviewed With Patient 1/25/2019   Which weight loss medications are you currently taking on a regular basis?  Naltrexone   Are you having any side effects from the weight loss medication that we have prescribed you? No     ASSESSMENT:   Morbid obesity in pt with wt gain over past few years, more since her TBI; working now with neuro and psych.  Spends good portion of most days of the week at home, relatively sedentary, and has been gaining despite working with me and nutrition and with escalation of meds available; not able to be on topiramate at present.  On escalating naltrexone, potential to add low dose phentermine and I have asked pt/her mother to discuss that with other providers also.  I remain concerned about potential for decreased self monitoring/control, given the gain despite working towards setting food goals repeatedly.       PLAN:   Eat breakfast daily  Decrease portion sizes  No meals in front of TV screen  Purge house of food triggers  No meal skipping  Decrease caloric beverages by no soda  Volumetrics eating plan--structured plan and avoding snacking  Low Calorie/low fat diet  Meal planning/journaling         FOLLOW-UP:    Return in 1 mo    Time: 20/25 min spent on evaluation, management, counseling, education, & motivational interviewing with greater than 50 % of the total time was spent on counseling and coordinating care    Sincerely,    Luis Slade MD

## 2019-06-28 NOTE — LETTER
2019       RE: Nehemias Mascorro  [address]  [place] [address]     Dear Colleague,    Thank you for referring your patient, Nehemias Mascorro, to the [facility] ADULT WEIGHT MGT D at [facility]. Please see a copy of my visit note below.        Return Medical Weight Management Note     Nehemias Mascorro  MRN:  [id number]  :  1995  JASMEET:  2019    Dear Ludwin Elam,    I had the pleasure of seeing your patient Nehemias Mascorro.  She is a 24 year old female who I am continuing to see for treatment of obesity related to:      INTERVAL HISTORY:    Last visit here was about 4  mo ago--Reviewed and relevant history as extracted from earlier note is as follows--  --reviewed and relevant history is as follows as extracted from the earlier note--      '...Self-referred for obesity associated with GERD and anxiety and asthma.  Patient is interested in attaining a healthier weight to diminish current health problems related to co-morbid conditions and prevent future health issues.  She describes her weight history as a gradual gain, and began around age 9  Her previous attempts at weight loss include exercise. Patient has had some success lost about 20#--was on adderall and dancing)....   ...She is working on hypocaloric approach with volumetrics approach--thus far we have not started medication--options have been discussed and strategies, both lifestyle and medication support, were again discussed today.  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  We discussed incoporating meal replacement shakes for 1-2 meals per day to help--she wants to do this.  Discussed plans/ideas for activity, discussed also pharm approaches to help support wt loss in terms of  potential risks and possible benefits and side effects. All of her questions were addressed and she would like a trial of topiramate--familiar with this med for wt loss as other family members are on it with some success.  She is aware that this med alone is not approved bu the FDA as a wt loss med.  Detailed discussion of this med and in particular the need for birth control--recommended 2 forms, at least one barrier, of effective BC if she is sexually active.  Also discussed further the possibility of cognitive issues with the topiramate and concern for school performance.  She will be starting the med for a couple of wks before going off to school and will elt us know if any issues on this med in the interim or later...     Complicated  history with setbacks.  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  Work is limited to 2  2-hr sessions of sit-down work.  By herself some of the day at home a fair amount and is eating a lot of prepared/quick fix good, trying to be plant-based so eating high carb not low fat.  Snacking frequenlty during the day on prop-popped popcorn with ghee and sea salt (keeps a big bag), notes cravigs for carbs including te popcorn and also sweets.  Wakes at night and eats (has been having night terrors, did have to stop the topiramate because of that...     Discussed options with pt and her mother--would benefit from modified meal replacement approach which is also what her mom is doing.  Pharm support was als discussed with pt and her mom during the visit.  Pt is already on bupropion 150 mg daily--discussed adding naltrexone to that and they will also talk with her psychiatrist about this.  Discussed potential risks and benefits and possible side  "effects.  Pt is not on tramadol or narcotics now.  She is noting feeling more fatigued--difficulty with sleep but is also on largely plant based diet--need to eval for anemia inlcuding iron loss and also B12 def.  Also, given the significant head trauma history will also screen for central hypothyroidism.  Pt also snacks on high carb and some high fat foods--will benefit from working with our nutritionist...        When pt reconnected for care in 2018 after about a 3 yr gap we worked out a strategy allowing pt to use some meal replacements and avoid higher carb higher fat foods but it has been difficult for pt to stay focussed.  We had also discussed adding naltrexone to her regimen (pt was already on bupropion) but pt noted potential concerns by other providers of combining these 2 meds--we discussed that these meds are combinted in Contrave, an FDA-approved wt loss med.  Subsequently this med was started--tolerating it without ay issues but she wonders if the effectiveness is waning.     Barriers to wt loss identified--  Distracted eating, cravings, difficulty stopping...\"       Since last seen, pt notes that she is having ongoing struggles with staying on track with and sustaining healthy food changes to get and keep wt loss going effectively.  Cravings and snacking on higher carb snacks are current issues, kervin with current pharm support which she is tolerating but is only getting partal benefit from.  Snacking and cravings (snack before bed 9p  [pretzels or applesauce], and usually in afternoon 3p  [pretzels or applesauce]  Hunger is an issue also.    May benefit from shift in the timing of her current pharm support for t loss-  Naltrexone--9:30 change to 1p, 3P dose change to 7p    Food recall--  Breakfast--bagel, eggs, cheese  [place]/pretzels or chips  Supper--chicken or fish and veggies    Walking--random rather than structured; 2000 steps daily which could be built up.    Pt would benefit from further " "work with nutrition to make further carb cuts from her daily routine--we will schedule her with Padmini in near future. It will be important for pt to have more structure in her wt loss plans--need to find ways to intensify the support for these changes.  Does have unstructured time during the day--difficult to have a good sustained pattern with food relationship to support wt loss.    Other providers include--  Psych  Dr. Gabriela Hall   [facility]   [phone]     CURRENT WEIGHT:   257 lbs 7.96 oz    Wt Readings from Last 4 Encounters:   06/28/19 116.8 kg (257 lb 8 oz)   02/22/19 113.2 kg (249 lb 9 oz)   01/25/19 110.6 kg (243 lb 13.3 oz)   12/07/18 110.7 kg (244 lb 0.8 oz)       Height:  5' 5.63\"  Body Mass Index:  Body mass index is 42.03 kg/m .  /76   Pulse 83   Ht 1.667 m (5' 5.63\")   Wt 116.8 kg (257 lb 8 oz)   BMI 42.03 kg/m       Initial consult weight/vitals--Estimated body mass index is 34.28 kg/(m^2) as calculated from the following:    Height as of encounter: 5' 5.75\" (167 cm).    Weight as of encounter: 210 lb 12.2 oz (95.6 kg). on 4/24/15.  Weight change since last seen on 2/22/2019 is up 8 pounds.   Total gain is 47 pounds.    Diet and Activity Changes Since Last Visit Reviewed With Patient 1/25/2019   I have made the following changes to my diet since my last visit: Eating less and more vegetables   With regards to my diet, I am still struggling with: -   I have made the following changes to my activity/exercise since my last visit: Joined a gym   With regards to my activity/exercise, I am still struggling with: -       MEDICATIONS:   Current Outpatient Medications   Medication     AMITRIPTYLINE HCL PO     ARIPiprazole (ABILIFY) 7.5 mg half-tab     BuPROPion HCl (WELLBUTRIN PO)     cetirizine (ZYRTEC) 10 MG tablet     DIAZEPAM PO     ESCITALOPRAM OXALATE PO     Fexofenadine HCl (ALLEGRA PO)     GLYCOPYRROLATE PO     GUANFACINE HCL PO     IBUPROFEN PO     Ipratropium-Albuterol " (COMBIVENT RESPIMAT)  MCG/ACT inhaler     Lansoprazole (PREVACID PO)     naltrexone (DEPADE/REVIA) 50 MG tablet     norethindrone-ethinyl estradiol (JUNEL FE 1/20) 1-20 MG-MCG per tablet     ONDANSETRON PO     prazosin (MINIPRESS) 1 MG capsule     Probiotic Product (PROBIOTIC DAILY PO)     TIZANIDINE HCL PO     topiramate (TOPAMAX) 25 MG tablet     Venlafaxine HCl (EFFEXOR PO)     No current facility-administered medications for this visit.        Weight Loss Medication History Reviewed With Patient 1/25/2019   Which weight loss medications are you currently taking on a regular basis?  Naltrexone   Are you having any side effects from the weight loss medication that we have prescribed you? No     ASSESSMENT:   Morbid obesity in pt with wt gain over past few years, more since her TBI; working now with neuro and psych.  Spends good portion of most days of the week at home, relatively sedentary, and has been gaining despite working with me and nutrition and with escalation of meds available; not able to be on topiramate at present.  On escalating naltrexone, potential to add low dose phentermine and I have asked pt/her mother to discuss that with other providers also.  I  remain concerned about potential for decreased self monitoring/control, given the gain despite working towards setting food goals repeatedly.       PLAN:   Eat breakfast daily  Decrease portion sizes  No meals in front of TV screen  Purge house of food triggers  No meal skipping  Decrease caloric beverages by no soda  Volumetrics eating plan--structured plan and avoding snacking  Low Calorie/low fat diet  Meal planning/journaling         FOLLOW-UP:    Return in 1 mo    Time: 20/25 min spent on evaluation, management, counseling, education, & motivational interviewing with greater than 50 % of the total time was spent on counseling and coordinating care    Sincerely,    Luis Slade MD

## 2019-06-28 NOTE — NURSING NOTE
"[facility] [[id number]]  Chief Complaint   Patient presents with     RECHECK     weight management     Initial /76   Pulse 83   Ht 5' 5.63\" (166.7 cm)   Wt 257 lb 8 oz (116.8 kg)   BMI 42.03 kg/m   Estimated body mass index is 42.03 kg/m  as calculated from the following:    Height as of this encounter: 5' 5.63\" (166.7 cm).    Weight as of this encounter: 257 lb 8 oz (116.8 kg).  Medication Reconciliation: complete   Belén Bautista LPN      "

## 2019-07-10 ENCOUNTER — TELEPHONE (OUTPATIENT)
Dept: PEDIATRICS | Facility: CLINIC | Age: 24
End: 2019-07-10

## 2019-07-10 NOTE — TELEPHONE ENCOUNTER
Called and left messages with both Nehemias and mom re: checking in to see if Nehemias was able to change timing of naltrexone dose.  If she has changed timing, wondering how that was working for her food cravings.  Left direct call back number.

## 2019-07-17 NOTE — TELEPHONE ENCOUNTER
Nehemias called back and left message re: She did switch her naltrexone times and has not seen a big difference, but it has only been a week.  She also started on Adderall and feels that this has really helped suppress her appetite.  She had no other questions or concerns at this time.

## 2019-07-26 ENCOUNTER — OFFICE VISIT (OUTPATIENT)
Dept: ENDOCRINOLOGY | Facility: CLINIC | Age: 24
End: 2019-07-26
Attending: INTERNAL MEDICINE
Payer: COMMERCIAL

## 2019-07-26 ENCOUNTER — RECORDS - HEALTHEAST (OUTPATIENT)
Dept: ADMINISTRATIVE | Facility: OTHER | Age: 24
End: 2019-07-26

## 2019-07-26 VITALS
BODY MASS INDEX: 43.05 KG/M2 | HEIGHT: 65 IN | WEIGHT: 258.38 LBS | HEART RATE: 95 BPM | SYSTOLIC BLOOD PRESSURE: 131 MMHG | DIASTOLIC BLOOD PRESSURE: 82 MMHG

## 2019-07-26 DIAGNOSIS — E66.01 MORBID (SEVERE) OBESITY DUE TO EXCESS CALORIES (H): Primary | ICD-10-CM

## 2019-07-26 PROCEDURE — G0463 HOSPITAL OUTPT CLINIC VISIT: HCPCS | Mod: ZF

## 2019-07-26 ASSESSMENT — MIFFLIN-ST. JEOR: SCORE: 1928.49

## 2019-07-26 ASSESSMENT — PAIN SCALES - GENERAL: PAINLEVEL: NO PAIN (0)

## 2019-07-26 NOTE — LETTER
2019       RE: Nehemias Mascorro  [address]  [place] [address]     Dear Colleague,    Thank you for referring your patient, Nehemias Mascorro, to the [facility] ADULT WEIGHT MGT D at [facility]. Please see a copy of my visit note below.        Return Medical Weight Management Note     Nehemias Mascorro  MRN:  [id number]  :  1995  JASMEET:  2019    Dear Ludwin Elam,    I had the pleasure of seeing your patient Nehemias Mascorro.  She is a 24 year old female who I am continuing to see for treatment of obesity related to:      INTERVAL HISTORY:    She returns, last seen about 1 mo ago.  Reviewed and relevant history as extracted from earlier notes is as follows--  Last visit here was about 4 mo ago--Reviewed and relevant history as extracted from earlier note is as follows--  --reviewed and relevant history is as follows as extracted from the earlier note--      '...Self-referred for obesity associated with GERD and anxiety and asthma.  Patient is interested in attaining a healthier weight to diminish current health problems related to co-morbid conditions and prevent future health issues.  She describes her weight history as a gradual gain, and began around age 9  Her previous attempts at weight loss include exercise. Patient has had some success lost about 20#--was on adderall and dancing)....   ...She is working on hypocaloric approach with volumetrics approach--thus far we have not started medication--options have been discussed and strategies, both lifestyle and medication support, were again discussed today.  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  We discussed incoporating meal replacement shakes for 1-2 meals per day to help--she  wants to do this.  Discussed plans/ideas for activity, discussed also pharm approaches to help support wt loss in terms of potential risks and possible benefits and side effects. All of her questions were addressed and she would like a trial of topiramate--familiar with this med for wt loss as other family members are on it with some success.  She is aware that this med alone is not approved bu the FDA as a wt loss med.  Detailed discussion of this med and in particular the need for birth control--recommended 2 forms, at least one barrier, of effective BC if she is sexually active.  Also discussed further the possibility of cognitive issues with the topiramate and concern for school performance.  She will be starting the med for a couple of wks before going off to school and will elt us know if any issues on this med in the interim or later...     Complicated  history with setbacks.  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  Work is limited to 2  2-hr sessions of sit-down work.  By herself some of the day at home a fair amount and is eating a lot of prepared/quick fix good, trying to be plant-based so eating high carb not low fat.  Snacking frequenlty during the day on prop-popped popcorn with ghee and sea salt (keeps a big bag), notes cravigs for carbs including te popcorn and also sweets.  Wakes at night and eats (has been having night terrors, did have to stop the topiramate because of that...     Discussed options with pt and her mother--would benefit from modified meal replacement approach which is also what her mom is doing.  Pharm support was als discussed with pt and her mom during the visit.  Pt is already on bupropion 150 mg daily--discussed adding naltrexone to that  "and they will also talk with her psychiatrist about this.  Discussed potential risks and benefits and possible side effects.  Pt is not on tramadol or narcotics now.  She is noting feeling more fatigued--difficulty with sleep but is also on largely plant based diet--need to eval for anemia inlcuding iron loss and also B12 def.  Also, given the significant head trauma history will also screen for central hypothyroidism.  Pt also snacks on high carb and some high fat foods--will benefit from working with our nutritionist...        When pt reconnected for care in 2018 after about a 3 yr gap we worked out a strategy allowing pt to use some meal replacements and avoid higher carb higher fat foods but it has been difficult for pt to stay focussed.  We had also discussed adding naltrexone to her regimen (pt was already on bupropion) but pt noted potential concerns by other providers of combining these 2 meds--we discussed that these meds are combinted in Contrave, an FDA-approved wt loss med.  Subsequently this med was started--tolerating it without ay issues but she wonders if the effectiveness is waning.     Barriers to wt loss identified--  Distracted eating, cravings, difficulty stopping...\"       Food recall at last visit (and on review this is similar to her current eating/activity pattern)--Food recall--  Breakfast--bagel, eggs, cheese  [place]/pretzels or chips  Supper--chicken or fish and veggies  --made a plan for her to follow up with Padmini for counseling/troubleshooting for carb and fat reduction at meals.     Walking--random rather than structured; 2000 steps daily which could be built up.    Last visit she was having a snack/wanting to eat between supper and bed--we shifted her naltrexone to current pattern of fist dose 1p (no problems in the morning), and second dose at 7p.    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  Vitals:  B/P: 131/82, P: 95, R: Data Unavailable     Initial consult weight/vitals--Estimated body mass index is 34.28 kg/(m^2) as calculated from the following:    Height as of encounter: 5' 5.75\" (167 cm).    Weight as of encounter: 210 lb 12.2 oz (95.6 kg). on 4/24/15.  Weight change since last seen on 6/28/2019 is up 1 pounds--rate of wt gain is plateauing  Total gain is 48 pounds (gained post TBI a few years ago)    Diet and Activity Changes Since Last Visit Reviewed With Patient 1/25/2019   I have made the following changes to my diet since my last visit: Eating less and more vegetables   With regards to my diet, I am still struggling with: -   I have made the following changes to my activity/exercise since my last visit: Joined a gym   With regards to my activity/exercise, I am still struggling with: -       MEDICATIONS:   Current Outpatient Medications   Medication     ARIPiprazole (ABILIFY) 7.5 mg half-tab     BuPROPion HCl (WELLBUTRIN PO)     cetirizine (ZYRTEC) 10 MG tablet     ESCITALOPRAM OXALATE PO     Fexofenadine HCl (ALLEGRA PO)     GLYCOPYRROLATE PO     IBUPROFEN PO     Ipratropium-Albuterol (COMBIVENT RESPIMAT)  MCG/ACT inhaler     Lansoprazole (PREVACID PO)     naltrexone (DEPADE/REVIA) 50 MG tablet     norethindrone-ethinyl estradiol (JUNEL FE 1/20) 1-20 MG-MCG per tablet     ONDANSETRON PO     prazosin (MINIPRESS) 1 MG capsule     AMITRIPTYLINE HCL PO     DIAZEPAM PO     GUANFACINE HCL PO     Probiotic Product " (PROBIOTIC DAILY PO)     TIZANIDINE HCL PO     topiramate (TOPAMAX) 25 MG tablet     Venlafaxine HCl (EFFEXOR PO)     No current facility-administered medications for this visit.        Weight Loss Medication History Reviewed With Patient 1/25/2019   Which weight loss medications are you currently taking on a regular basis?  Naltrexone   Are you having any side effects from the weight loss medication that we have prescribed you? No       ASSESSMENT:   Morbid obesity    ASSESSMENT:   Morbid obesity in pt with wt gain over past few years, more since her TBI; working now with neuro and psych.  Spends good portion of most days of the week at home, relatively sedentary, and has been gaining despite working with me and nutrition and with escalation of meds available; not able to be on topiramate at present.  On naltrexone (coupled with bupropion he has been prescribed by other physician.  Considering possible additional meds, with input from neuro and psych       PLAN:   Eat breakfast daily  Decrease portion sizes  No meals in front of TV screen  Purge house of food triggers  No meal skipping  Decrease caloric beverages by no soda  Volumetrics eating plan--structured plan and avoding snacking  Low Calorie/low fat diet  Meal planning/journaling       I have asked them to discuss with other providers (neuro and psych) the possibility of adding Belviq--  Avoiding topiramate currently (potential for cognitive impact).  She remains on bupropion and I have added naltrexone--will continue with 1 tab 1p and 1 tab 7p.  --no seizure history    [place] goals framed with Padmini today--  Goals  1) Reduce BMI  2) Decrease carbohydrates in diet -switch out to bagel thin or english muffin, cut out pretzels/chips from lunch  3) use 20 minute rule - seconds on fruits and vegetables  4) HS snack - only fruit or vegetables if needed     FOLLOW-UP:    Return in 1 mo    Time: 20/25 min spent on evaluation, management, counseling, education, &  motivational interviewing with greater than 50 % of the total time was spent on counseling and coordinating care    Sincerely,    Luis Slade MD

## 2019-07-26 NOTE — PROGRESS NOTES
Return Medical Weight Management Note     Nehemias Mascorro  MRN:  [id number]  :  1995  JASMEET:  2019    Dear Ludwin Elam,    I had the pleasure of seeing your patient Nehemias Mascorro.  She is a 24 year old female who I am continuing to see for treatment of obesity related to:      INTERVAL HISTORY:    She returns, last seen about 1 mo ago.  Reviewed and relevant history as extracted from earlier notes is as follows--  Last visit here was about 4 mo ago--Reviewed and relevant history as extracted from earlier note is as follows--  --reviewed and relevant history is as follows as extracted from the earlier note--      '...Self-referred for obesity associated with GERD and anxiety and asthma.  Patient is interested in attaining a healthier weight to diminish current health problems related to co-morbid conditions and prevent future health issues.  She describes her weight history as a gradual gain, and began around age 9  Her previous attempts at weight loss include exercise. Patient has had some success lost about 20#--was on adderall and dancing)....   ...She is working on hypocaloric approach with volumetrics approach--thus far we have not started medication--options have been discussed and strategies, both lifestyle and medication support, were again discussed today.  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  We discussed incoporating meal replacement shakes for 1-2 meals per day to help--she wants to do this.  Discussed plans/ideas for activity, discussed also pharm approaches to help support wt loss in terms of potential risks and possible benefits and side effects. All of her questions were addressed and she would like a trial of topiramate--familiar with this med for wt  loss as other family members are on it with some success.  She is aware that this med alone is not approved bu the FDA as a wt loss med.  Detailed discussion of this med and in particular the need for birth control--recommended 2 forms, at least one barrier, of effective BC if she is sexually active.  Also discussed further the possibility of cognitive issues with the topiramate and concern for school performance.  She will be starting the med for a couple of wks before going off to school and will elt us know if any issues on this med in the interim or later...     Complicated  history with setbacks.  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  Work is limited to 2  2-hr sessions of sit-down work.  By herself some of the day at home a fair amount and is eating a lot of prepared/quick fix good, trying to be plant-based so eating high carb not low fat.  Snacking frequenlty during the day on prop-popped popcorn with ghee and sea salt (keeps a big bag), notes cravigs for carbs including te popcorn and also sweets.  Wakes at night and eats (has been having night terrors, did have to stop the topiramate because of that...     Discussed options with pt and her mother--would benefit from modified meal replacement approach which is also what her mom is doing.  Pharm support was als discussed with pt and her mom during the visit.  Pt is already on bupropion 150 mg daily--discussed adding naltrexone to that and they will also talk with her psychiatrist about this.  Discussed potential risks and benefits and possible side effects.  Pt is not on tramadol or narcotics now.  She is noting feeling more fatigued--difficulty with sleep but is also on largely plant based diet--need to eval for  "anemia inlcuding iron loss and also B12 def.  Also, given the significant head trauma history will also screen for central hypothyroidism.  Pt also snacks on high carb and some high fat foods--will benefit from working with our nutritionist...        When pt reconnected for care in 2018 after about a 3 yr gap we worked out a strategy allowing pt to use some meal replacements and avoid higher carb higher fat foods but it has been difficult for pt to stay focussed.  We had also discussed adding naltrexone to her regimen (pt was already on bupropion) but pt noted potential concerns by other providers of combining these 2 meds--we discussed that these meds are combinted in Contrave, an FDA-approved wt loss med.  Subsequently this med was started--tolerating it without ay issues but she wonders if the effectiveness is waning.     Barriers to wt loss identified--  Distracted eating, cravings, difficulty stopping...\"       Food recall at last visit (and on review this is similar to her current eating/activity pattern)--Food recall--  Breakfast--bagel, eggs, cheese  [place]/pretzels or chips  Supper--chicken or fish and veggies  --made a plan for her to follow up with Padmini for counseling/troubleshooting for carb and fat reduction at meals.     Walking--random rather than structured; 2000 steps daily which could be built up.    Last visit she was having a snack/wanting to eat between supper and bed--we shifted her naltrexone to current pattern of fist dose 1p (no problems in the morning), and second dose at 7p.    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  Vitals:  B/P: 131/82, P: 95, R: Data Unavailable     Initial consult weight/vitals--Estimated body mass index is 34.28 kg/(m^2) as calculated from the following:    Height as of encounter: 5' 5.75\" (167 cm).    Weight as of encounter: 210 lb 12.2 oz (95.6 kg). on 4/24/15.  Weight change since last seen on 6/28/2019 is up 1 pounds--rate of wt gain is plateauing  Total gain is 48 pounds (gained post TBI a few years ago)    Diet and Activity Changes Since Last Visit Reviewed With Patient 1/25/2019   I have made the following changes to my diet since my last visit: Eating less and more vegetables   With regards to my diet, I am still struggling with: -   I have made the following changes to my activity/exercise since my last visit: Joined a gym   With regards to my activity/exercise, I am still struggling with: -       MEDICATIONS:   Current Outpatient Medications   Medication     ARIPiprazole (ABILIFY) 7.5 mg half-tab     BuPROPion HCl (WELLBUTRIN PO)     cetirizine (ZYRTEC) 10 MG tablet     ESCITALOPRAM OXALATE PO     Fexofenadine HCl (ALLEGRA PO)     GLYCOPYRROLATE PO     IBUPROFEN PO     Ipratropium-Albuterol (COMBIVENT RESPIMAT)  MCG/ACT inhaler     Lansoprazole (PREVACID PO)     naltrexone (DEPADE/REVIA) 50 MG tablet     norethindrone-ethinyl estradiol (JUNEL FE 1/20) 1-20 MG-MCG per tablet     ONDANSETRON PO     prazosin (MINIPRESS) 1 MG capsule     AMITRIPTYLINE HCL PO     DIAZEPAM PO     GUANFACINE HCL PO     Probiotic Product (PROBIOTIC DAILY PO)     TIZANIDINE HCL PO     topiramate (TOPAMAX) 25 MG tablet     Venlafaxine HCl (EFFEXOR PO)     No current facility-administered medications for this visit.        Weight Loss Medication History Reviewed With Patient 1/25/2019   Which weight loss medications are " you currently taking on a regular basis?  Naltrexone   Are you having any side effects from the weight loss medication that we have prescribed you? No       ASSESSMENT:   Morbid obesity    ASSESSMENT:   Morbid obesity in pt with wt gain over past few years, more since her TBI; working now with neuro and psych.  Spends good portion of most days of the week at home, relatively sedentary, and has been gaining despite working with me and nutrition and with escalation of meds available; not able to be on topiramate at present.  On naltrexone (coupled with bupropion he has been prescribed by other physician.  Considering possible additional meds, with input from neuro and psych       PLAN:   Eat breakfast daily  Decrease portion sizes  No meals in front of TV screen  Purge house of food triggers  No meal skipping  Decrease caloric beverages by no soda  Volumetrics eating plan--structured plan and avoding snacking  Low Calorie/low fat diet  Meal planning/journaling       I have asked them to discuss with other providers (neuro and psych) the possibility of adding Belviq--  Avoiding topiramate currently (potential for cognitive impact).  She remains on bupropion and I have added naltrexone--will continue with 1 tab 1p and 1 tab 7p.  --no seizure history    [place] goals framed with Padmini today--  Goals  1) Reduce BMI  2) Decrease carbohydrates in diet -switch out to bagel thin or english muffin, cut out pretzels/chips from lunch  3) use 20 minute rule - seconds on fruits and vegetables  4) HS snack - only fruit or vegetables if needed     FOLLOW-UP:    Return in 1 mo    Time: 20/25 min spent on evaluation, management, counseling, education, & motivational interviewing with greater than 50 % of the total time was spent on counseling and coordinating care    Sincerely,    Luis Slade MD

## 2019-07-26 NOTE — NURSING NOTE
"[facility] [088135]  Chief Complaint   Patient presents with     RECHECK     WM follow up     Initial /82   Pulse 95   Ht 5' 5.35\" (166 cm)   Wt 258 lb 6.1 oz (117.2 kg)   BMI 42.53 kg/m   Estimated body mass index is 42.53 kg/m  as calculated from the following:    Height as of this encounter: 5' 5.35\" (166 cm).    Weight as of this encounter: 258 lb 6.1 oz (117.2 kg).  Medication Reconciliation: complete Sabi Wan LPN  Wt Readings from Last 4 Encounters:   07/26/19 258 lb 6.1 oz (117.2 kg)   06/28/19 257 lb 8 oz (116.8 kg)   02/22/19 249 lb 9 oz (113.2 kg)   01/25/19 243 lb 13.3 oz (110.6 kg)     "

## 2019-07-26 NOTE — PATIENT INSTRUCTIONS
Please discuss this possible medication with your pcsyhiatrist and neurologist--we can consider adding it at the next visit if needed    We are considering starting Belviq. Dosing for the medication is 10 mg BID.  Belviq is a medication specifically prescribed for obesity. The drug works by controlling appetite. It activates brain receptors for serotonin, a neurotransmitter that triggers feelings of satiety and satisfaction. Patients should take the medication for at least 3 months to see if it is effective. A positive response to the medication is a 5-10% weight loss.       For some of our patients, the pills work right away. They feel and think quite differently about food. Other patients don't feel much of a change but find in fact they have lost weight! Like all weight loss medications, Belviq works best when you help it work.  This means:  1) Have less tempting high calorie (fattening) food around the house or office   2) Have lower calorie food (fruits, vegetables,low fat meats and dairy) for snacks   3) Eat out only one time or less each week.  4) Eat your meals at a table with the TV or computer off.    Side-effects: The most common side effects include: dizziness; headache; fatigue; nausea; dry mouth; and constipation.     This medication typically isn t covered by insurance and usually requires a prior authorization, which can take 1-2 weeks for review. It typically costs around $200 a month. Patients can pay out of pocket for the medication or can visit [email or web address] and receive 14 days free and also sign up for the Pharmacy savings program to receive $75 off their monthly prescription for 12 months if they are eligible    For any questions/concerns contact Susan Balderas LPN at [phone] or Lo Gamez RN at [phone]     In order to get refills of this or any medication we prescribe you must be seen in the medical weight mgmt clinic every 2-4 months. Please have your pharmacy fax a refill  request to [phone].                                     .

## 2019-07-31 ENCOUNTER — RECORDS - HEALTHEAST (OUTPATIENT)
Dept: ADMINISTRATIVE | Facility: OTHER | Age: 24
End: 2019-07-31

## 2019-07-31 ENCOUNTER — OFFICE VISIT (OUTPATIENT)
Dept: PEDIATRICS | Facility: CLINIC | Age: 24
End: 2019-07-31
Attending: DIETITIAN, REGISTERED
Payer: COMMERCIAL

## 2019-07-31 VITALS — WEIGHT: 255.73 LBS | HEIGHT: 65 IN | BODY MASS INDEX: 42.61 KG/M2

## 2019-07-31 PROCEDURE — 97803 MED NUTRITION INDIV SUBSEQ: CPT | Performed by: DIETITIAN, REGISTERED

## 2019-07-31 ASSESSMENT — MIFFLIN-ST. JEOR: SCORE: 1916.49

## 2019-07-31 NOTE — PROGRESS NOTES
Medical Nutrition Therapy  Nutrition Reassessment  Patient seen in [facility], accompanied by mother.    Anthropometrics  Age:  24 year old female   Height:  166 cm  Normalized stature-for-age data not available for patients older than 20 years.    Weight:  116 kg (actual weight), 255 lbs 11.74 oz, Normalized weight-for-age data not available for patients older than 20 years.  BMI:  Body mass index is 42.1 kg/m .   Weight Loss 3 lbs since last clinic visit on 7/26/19.  Nutrition History  Patient seen in [facility] for family weight management follow up. Patient has not seen a dietitian for over a year but following up with Dr Slade frequently. She has lost about 3 lbs in the past week. She reports that she is doing okay and has noticed that she is eating less. Was started on Adderall and notices a difference when she takes the medication and starts to wear off around 6-7 pm. She is eating less during the middle of the day but snacking more in the evening time. Diet is high in carbohydrates. Sample dietary intake noted below.     Nutritional Intakes  Sample intake includes:  Breakfast:  Cereal (HN Cheerios or Frost Flakes)with milk; bagel, egg and cheese sandwich    Am Snack:   None reported  Lunch:   2 pm - sandwich, pretzels/chips, applesauce  PM Snack:   Applesauce, babatunde, pretzels  Dinner:   Tacos (3- larger tortillas), oatmeal bar  HS Snack:  Same as above   Beverages: water, Crystal Light        Medications/Vitamins/Minerals    Current Outpatient Medications:      AMITRIPTYLINE HCL PO, Reported on 4/27/2017, Disp: , Rfl:      ARIPiprazole (ABILIFY) 7.5 mg half-tab, Take 7.5 mg by mouth daily, Disp: , Rfl:      BuPROPion HCl (WELLBUTRIN PO), Take 300 mg by mouth daily , Disp: , Rfl:      cetirizine (ZYRTEC) 10 MG tablet, Take 10 mg by mouth daily Reported on 4/27/2017, Disp: , Rfl:      DIAZEPAM PO, Take 5 mg by mouth, Disp: , Rfl:      ESCITALOPRAM OXALATE PO, Take 20 mg by mouth  daily, Disp: , Rfl:      Fexofenadine HCl (ALLEGRA PO), Take by mouth daily as needed for allergies, Disp: , Rfl:      GLYCOPYRROLATE PO, Take 2 mg by mouth daily, Disp: , Rfl:      GUANFACINE HCL PO, Take 2 mg by mouth daily, Disp: , Rfl:      IBUPROFEN PO, Take 600 mg by mouth, Disp: , Rfl:      Ipratropium-Albuterol (COMBIVENT RESPIMAT)  MCG/ACT inhaler, Inhale 1 puff into the lungs 4 times daily, Disp: , Rfl:      Lansoprazole (PREVACID PO), , Disp: , Rfl:      naltrexone (DEPADE/REVIA) 50 MG tablet, Take 1 tablet 1p and 1 tablet 7p, Disp: 60 tablet, Rfl: 2     norethindrone-ethinyl estradiol (JUNEL FE 1/20) 1-20 MG-MCG per tablet, Take 1 tablet by mouth daily, Disp: , Rfl:      ONDANSETRON PO, Take 8 mg by mouth, Disp: , Rfl:      prazosin (MINIPRESS) 1 MG capsule, Take 1 mg by mouth At Bedtime, Disp: , Rfl:      Probiotic Product (PROBIOTIC DAILY PO), , Disp: , Rfl:      TIZANIDINE HCL PO, Take 4 mg by mouth, Disp: , Rfl:      topiramate (TOPAMAX) 25 MG tablet, Take 1 tablet first wk t bedtime, then 2 tablets at bedtime daily thereafter as tolerated (Patient not taking: Reported on 7/26/2019), Disp: 60 tablet, Rfl: 4     Venlafaxine HCl (EFFEXOR PO), Take 75 mg by mouth 3 times daily, Disp: , Rfl:     Previous Goals & Progress  Goals not applicable     Nutrition Diagnosis  Obesity related to excessive energy intake as evidenced by BMI 42.1    Interventions & Education  Provided written and verbal education on the following:    Plate Method  Healthy lunchs  Healthy meals/cooking  Healthy snacks  Healthy beverages  Portion sizes  Increase fruit and vegetable intake    Reviewed previous nutrition goals and patient's progress since last appointment. Discussed the importance of cutting back on carbohydrate foods in diet and provided some examples - switch to bagel thin or english muffin for breakfast, cut out chips/pretzels from lunch, no pretzels or chips for snacks. Discussed some healthy snack options for  patient. Also discussed working on decreasing portion sizes at meals - encouraged her to use the 20 minute rule before going back for seconds. Answered nutrition-related questions that mom and pt had, and worked with them to set nutrition goals to work towards until next visit.    Goals  1) Reduce BMI  2) Decrease carbohydrates in diet -switch out to bagel thin or english muffin, cut out pretzels/chips from lunch  3) use 20 minute rule - seconds on fruits and vegetables  4) HS snack - only fruit or vegetables if needed    Monitoring/Evaluation  Will continue to monitor progress towards goals and provide education in Family Weight Management.    Spent 30 minutes in consult with patient & mother.      Sade Levy MS, RD, LD  Pager # [phone]

## 2019-07-31 NOTE — LETTER
7/31/2019      RE: Nehemias Mascorro  [address]  [place] [address]       Medical Nutrition Therapy  Nutrition Reassessment  Patient seen in [facility], accompanied by mother.    Anthropometrics  Age:  24 year old female   Height:  166 cm  Normalized stature-for-age data not available for patients older than 20 years.    Weight:  116 kg (actual weight), 255 lbs 11.74 oz, Normalized weight-for-age data not available for patients older than 20 years.  BMI:  Body mass index is 42.1 kg/m .   Weight Loss 3 lbs since last clinic visit on 7/26/19.  Nutrition History  Patient seen in [facility] for family weight management follow up. Patient has not seen a dietitian for over a year but following up with Dr Slade frequently. She has lost about 3 lbs in the past week. She reports that she is doing okay and has noticed that she is eating less. Was started on Adderall and notices a difference when she takes the medication and starts to wear off around 6-7 pm. She is eating less during the middle of the day but snacking more in the evening time. Diet is high in carbohydrates. Sample dietary intake noted below.     Nutritional Intakes  Sample intake includes:  Breakfast:  Cereal (HN Cheerios or Frost Flakes)with milk; bagel, egg and cheese sandwich    Am Snack:   None reported  Lunch:   2 pm - sandwich, pretzels/chips, applesauce  PM Snack:   Applesauce, babatunde, pretzels  Dinner:   Tacos (3- larger tortillas), oatmeal bar  HS Snack:  Same as above   Beverages: water, Crystal Light        Medications/Vitamins/Minerals    Current Outpatient Medications:      AMITRIPTYLINE HCL PO, Reported on 4/27/2017, Disp: , Rfl:      ARIPiprazole (ABILIFY) 7.5 mg half-tab, Take 7.5 mg by mouth daily, Disp: , Rfl:      BuPROPion HCl (WELLBUTRIN PO), Take 300 mg by mouth daily , Disp: , Rfl:      cetirizine (ZYRTEC) 10 MG tablet, Take 10 mg by mouth daily Reported on 4/27/2017, Disp: , Rfl:      DIAZEPAM PO,  Take 5 mg by mouth, Disp: , Rfl:      ESCITALOPRAM OXALATE PO, Take 20 mg by mouth daily, Disp: , Rfl:      Fexofenadine HCl (ALLEGRA PO), Take by mouth daily as needed for allergies, Disp: , Rfl:      GLYCOPYRROLATE PO, Take 2 mg by mouth daily, Disp: , Rfl:      GUANFACINE HCL PO, Take 2 mg by mouth daily, Disp: , Rfl:      IBUPROFEN PO, Take 600 mg by mouth, Disp: , Rfl:      Ipratropium-Albuterol (COMBIVENT RESPIMAT)  MCG/ACT inhaler, Inhale 1 puff into the lungs 4 times daily, Disp: , Rfl:      Lansoprazole (PREVACID PO), , Disp: , Rfl:      naltrexone (DEPADE/REVIA) 50 MG tablet, Take 1 tablet 1p and 1 tablet 7p, Disp: 60 tablet, Rfl: 2     norethindrone-ethinyl estradiol (JUNEL FE 1/20) 1-20 MG-MCG per tablet, Take 1 tablet by mouth daily, Disp: , Rfl:      ONDANSETRON PO, Take 8 mg by mouth, Disp: , Rfl:      prazosin (MINIPRESS) 1 MG capsule, Take 1 mg by mouth At Bedtime, Disp: , Rfl:      Probiotic Product (PROBIOTIC DAILY PO), , Disp: , Rfl:      TIZANIDINE HCL PO, Take 4 mg by mouth, Disp: , Rfl:      topiramate (TOPAMAX) 25 MG tablet, Take 1 tablet first wk t bedtime, then 2 tablets at bedtime daily thereafter as tolerated (Patient not taking: Reported on 7/26/2019), Disp: 60 tablet, Rfl: 4     Venlafaxine HCl (EFFEXOR PO), Take 75 mg by mouth 3 times daily, Disp: , Rfl:     Previous Goals & Progress  Goals not applicable     Nutrition Diagnosis  Obesity related to excessive energy intake as evidenced by BMI 42.1    Interventions & Education  Provided written and verbal education on the following:    Plate Method  Healthy lunchs  Healthy meals/cooking  Healthy snacks  Healthy beverages  Portion sizes  Increase fruit and vegetable intake    Reviewed previous nutrition goals and patient's progress since last appointment. Discussed the importance of cutting back on carbohydrate foods in diet and provided some examples - switch to bagel thin or english muffin for breakfast, cut out chips/pretzels  from lunch, no pretzels or chips for snacks. Discussed some healthy snack options for patient. Also discussed working on decreasing portion sizes at meals - encouraged her to use the 20 minute rule before going back for seconds. Answered nutrition-related questions that mom and pt had, and worked with them to set nutrition goals to work towards until next visit.    Goals  1) Reduce BMI  2) Decrease carbohydrates in diet -switch out to bagel thin or english muffin, cut out pretzels/chips from lunch  3) use 20 minute rule - seconds on fruits and vegetables  4) HS snack - only fruit or vegetables if needed    Monitoring/Evaluation  Will continue to monitor progress towards goals and provide education in Family Weight Management.    Spent 30 minutes in consult with patient & mother.      Sade Levy MS, RD, LD  Pager # [phone]

## 2019-08-12 ENCOUNTER — COMMUNICATION - HEALTHEAST (OUTPATIENT)
Dept: INTERNAL MEDICINE | Facility: CLINIC | Age: 24
End: 2019-08-12

## 2019-08-12 DIAGNOSIS — B37.31 YEAST INFECTION OF THE VAGINA: ICD-10-CM

## 2019-09-05 ENCOUNTER — RECORDS - HEALTHEAST (OUTPATIENT)
Dept: ADMINISTRATIVE | Facility: OTHER | Age: 24
End: 2019-09-05

## 2019-09-26 ENCOUNTER — TELEPHONE (OUTPATIENT)
Dept: PEDIATRICS | Facility: CLINIC | Age: 24
End: 2019-09-26

## 2019-09-26 NOTE — TELEPHONE ENCOUNTER
Called and spoke to mom.  Reminded her of Family Clinic appointments on 9/27/19.  Mom had no other questions at this time.

## 2019-09-27 ENCOUNTER — RECORDS - HEALTHEAST (OUTPATIENT)
Dept: ADMINISTRATIVE | Facility: OTHER | Age: 24
End: 2019-09-27

## 2019-09-27 ENCOUNTER — OFFICE VISIT (OUTPATIENT)
Dept: ENDOCRINOLOGY | Facility: CLINIC | Age: 24
End: 2019-09-27
Attending: INTERNAL MEDICINE
Payer: COMMERCIAL

## 2019-09-27 VITALS
SYSTOLIC BLOOD PRESSURE: 136 MMHG | HEART RATE: 89 BPM | DIASTOLIC BLOOD PRESSURE: 85 MMHG | BODY MASS INDEX: 41.38 KG/M2 | WEIGHT: 257.5 LBS | HEIGHT: 66 IN

## 2019-09-27 DIAGNOSIS — E66.01 MORBID OBESITY DUE TO EXCESS CALORIES (H): Primary | ICD-10-CM

## 2019-09-27 PROCEDURE — G0463 HOSPITAL OUTPT CLINIC VISIT: HCPCS | Mod: ZF

## 2019-09-27 RX ORDER — DEXTROAMPHETAMINE SULFATE, DEXTROAMPHETAMINE SACCHARATE, AMPHETAMINE SULFATE AND AMPHETAMINE ASPARTATE 6.25; 6.25; 6.25; 6.25 MG/1; MG/1; MG/1; MG/1
CAPSULE, EXTENDED RELEASE ORAL
Refills: 0 | COMMUNITY
Start: 2019-08-27 | End: 2020-11-08

## 2019-09-27 RX ORDER — ALBUTEROL SULFATE 90 UG/1
2 AEROSOL, METERED RESPIRATORY (INHALATION)
COMMUNITY
Start: 2016-08-10 | End: 2021-11-10

## 2019-09-27 RX ORDER — ESTRADIOL VALERATE AND ESTRADIOL VALERATE/DIENOGEST 3-2-1(28)
KIT ORAL
Refills: 0 | COMMUNITY
Start: 2019-09-23 | End: 2021-11-10

## 2019-09-27 ASSESSMENT — PAIN SCALES - GENERAL: PAINLEVEL: MILD PAIN (3)

## 2019-09-27 ASSESSMENT — MIFFLIN-ST. JEOR: SCORE: 1928.88

## 2019-09-27 NOTE — PATIENT INSTRUCTIONS
Welcome to the Family Clinic!   We are excited to join you on your weight loss journey    Thank you for allowing us the privilege of caring for you. We hope we provided you with the excellent service you deserve.    Please let us know if there is anything else we can do so that we can be sure you are leaving completely satisfied with your care experience.    Instructions per today's visit:   Medications started today: see below    Follow up appointments:  One month    For any questions/concerns contact Tamara Green RN at [phone]    To schedule appointments with our team, please call [phone]     Please call during clinic hours Monday through Friday 8:00a - 4:00p if you have questions or you can contact us via [employer] at anytime. ?    Lab results will be communicated through My Chart or letter (if My Chart not used). Please call the clinic if you have not received communication after 1 week or if you have any questions.?      We will  See if the Belviq is covered for you to try to help with weight loss; please discuss with your neurologist    Try replacing lunch with a meal replacement--like a SlimFast shake or Lean Cuisine    Try weighing with the new scales    MEDICATION STARTED AT THIS APPOINTMENT    We are starting Belviq. Dosing for the medication is 10 mg BID.  Belviq is a medication specifically prescribed for obesity. The drug works by controlling appetite. It activates brain receptors for serotonin, a neurotransmitter that triggers feelings of satiety and satisfaction. Patients should take the medication for at least 3 months to see if it is effective. A positive response to the medication is a 5-10% weight loss.       For some of our patients, the pills work right away. They feel and think quite differently about food. Other patients don't feel much of a change but find in fact they have lost weight! Like all weight loss medications, Belviq works best when you help it work.  This means:  1)  Have less tempting high calorie (fattening) food around the house or office   2) Have lower calorie food (fruits, vegetables,low fat meats and dairy) for snacks   3) Eat out only one time or less each week.  4) Eat your meals at a table with the TV or computer off.    Side-effects: The most common side effects include: dizziness; headache; fatigue; nausea; dry mouth; and constipation.     This medication typically isn t covered by insurance and usually requires a prior authorization, which can take 1-2 weeks for review. It typically costs around $200 a month. Patients can pay out of pocket for the medication or can visit www.[employer] and receive 14 days free and also sign up for the Pharmacy savings program to receive $75 off their monthly prescription for 12 months if they are eligible    For any questions/concerns contact our nurse.     In order to get refills of this or any medication we prescribe you must be seen in the medical weight mgmt clinic every 2-4 months. Please have your pharmacy fax a refill request to [phone].                                     .

## 2019-09-27 NOTE — LETTER
2019       RE: Nehemias Mascorro  [address]  [place] [address]     Dear Colleague,    Thank you for referring your patient, Nehemias Mascorro, to the [facility]ULT [facility] at [facility]. Please see a copy of my visit note below.      Return Medical Weight Management Note     Nehemias Mascorro  MRN:  [id number]  :  1995  JASMEET:  2019    Dear Ludwin Elam,    I had the pleasure of seeing your patient Nehemias Mascorro.  She is a 24 year old female who I am continuing to see for treatment of obesity related to:  GERD and anxiety and asthma      INTERVAL HISTORY:    Last seen about 2 mo ago, reviewed and relevant history is as follows--      '...Self-referred for obesity associated with GERD and anxiety and asthma.  Patient is interested in attaining a healthier weight to diminish current health problems related to co-morbid conditions and prevent future health issues.  She describes her weight history as a gradual gain, and began around age 9  Her previous attempts at weight loss include exercise. Patient has had some success lost about 20#--was on adderall and dancing)....   ...She is working on hypocaloric approach with volumetrics approach--thus far we have not started medication--options have been discussed and strategies, both lifestyle and medication support, were again discussed today.  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  We discussed incoporating meal replacement shakes for 1-2 meals per day to help--she wants to do this.  Discussed plans/ideas for activity, discussed also pharm approaches to help support wt loss in terms of potential risks and possible benefits and side effects. All of her questions were addressed and  she would like a trial of topiramate--familiar with this med for wt loss as other family members are on it with some success.  She is aware that this med alone is not approved bu the FDA as a wt loss med.  Detailed discussion of this med and in particular the need for birth control--recommended 2 forms, at least one barrier, of effective BC if she is sexually active.  Also discussed further the possibility of cognitive issues with the topiramate and concern for school performance.  She will be starting the med for a couple of wks before going off to school and will elt us know if any issues on this med in the interim or later...     Complicated  history with setbacks.  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  Work is limited to 2  2-hr sessions of sit-down work.  By herself some of the day at home a fair amount and is eating a lot of prepared/quick fix good, trying to be plant-based so eating high carb not low fat.  Snacking frequenlty during the day on prop-popped popcorn with ghee and sea salt (keeps a big bag), notes cravigs for carbs including te popcorn and also sweets.  Wakes at night and eats (has been having night terrors, did have to stop the topiramate because of that...     Discussed options with pt and her mother--would benefit from modified meal replacement approach which is also what her mom is doing.  Pharm support was als discussed with pt and her mom during the visit.  Pt is already on bupropion 150 mg daily--discussed adding naltrexone to that and they will also talk with her psychiatrist about this.  Discussed potential risks and benefits and possible side effects.  Pt is not on tramadol or narcotics now.  She is noting feeling more fatigued--difficulty  "with sleep but is also on largely plant based diet--need to eval for anemia inlcuding iron loss and also B12 def.  Also, given the significant head trauma history will also screen for central hypothyroidism.  Pt also snacks on high carb and some high fat foods--will benefit from working with our nutritionist...     When pt reconnected for care in 2018 after about a 3 yr gap we worked out a strategy allowing pt to use some meal replacements and avoid higher carb higher fat foods but it has been difficult for pt to stay focussed.  We had also discussed adding naltrexone to her regimen (pt was already on bupropion) but pt noted potential concerns by other providers of combining these 2 meds--we discussed that these meds are combinted in Contrave, an FDA-approved wt loss med.  Subsequently this med was started--tolerating it without ay issues but she wonders if the effectiveness is waning.     Barriers to wt loss identified--  Distracted eating, cravings, difficulty stopping...\"        In the interim--    Walking--random rather than structured; 2000 steps daily which could be built up.  Discussed options for structured walks vs. working into daily activities.  Could track steps with a tracker also to help with determining baseline and goal setting     At earlier visits she had noted a snack/wanting to eat between supper and bed--we shifted her naltrexone to current pattern of fist dose 1p (no problems in the morning), and second dose at 7p.    Up to 15 mg adderrall and not napping and less hungry     She reports not snacking during the day much, snack in the evening before bed still.     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     Pt follows with neuro and psychiatry soon--will be following up soon      MWF class-- 1.5 hours  [facility]--5 hours    Up around " "7:45  Br-oatmeal/eggs cereal/banana  Petra--sandwich (meal/black/cheese)/pretzels/applesause--  Verdin-yellow rice/gr beans/chicken  Sometimes dessert (brownies)  Crystal light    CURRENT WEIGHT:   257 lbs 7.96 oz    Wt Readings from Last 4 Encounters:   09/27/19 116.8 kg (257 lb 8 oz)   07/31/19 116 kg (255 lb 11.7 oz)   07/26/19 117.2 kg (258 lb 6.1 oz)   06/28/19 116.8 kg (257 lb 8 oz)       Height:  5' 5.63\"  Body Mass Index:  Body mass index is 42.03 kg/m .  Vitals:  B/P: 136/85, P: 89, R: Data Unavailable     Initial consult weight/vitals--Estimated body mass index is 34.28 kg/(m^2) as calculated from the following:    Height as of encounter: 5' 5.75\" (167 cm).    Weight as of encounter: 210 lb 12.2 oz (95.6 kg). on 4/24/15.    Weight change since last seen on 7/26/2019 is up 1.5 pounds.   Total gain is 47 pounds.    Diet and Activity Changes Since Last Visit Reviewed With Patient 1/25/2019   I have made the following changes to my diet since my last visit: Eating less and more vegetables   With regards to my diet, I am still struggling with: -   I have made the following changes to my activity/exercise since my last visit: Joined a gym   With regards to my activity/exercise, I am still struggling with: -       MEDICATIONS:   Current Outpatient Medications   Medication     ADDERALL XR 25 MG 24 hr capsule     albuterol (PROAIR HFA/PROVENTIL HFA/VENTOLIN HFA) 108 (90 Base) MCG/ACT inhaler     ARIPiprazole (ABILIFY) 7.5 mg half-tab     BuPROPion HCl (WELLBUTRIN PO)     ESCITALOPRAM OXALATE PO     Fexofenadine HCl (ALLEGRA PO)     GLYCOPYRROLATE PO     IBUPROFEN PO     Lansoprazole (PREVACID PO)     NATAZIA 3/2-2/2-3/1 MG TABS     AMITRIPTYLINE HCL PO     cetirizine (ZYRTEC) 10 MG tablet     DIAZEPAM PO     GUANFACINE HCL PO     Ipratropium-Albuterol (COMBIVENT RESPIMAT)  MCG/ACT inhaler     naltrexone (DEPADE/REVIA) 50 MG tablet     norethindrone-ethinyl estradiol (JUNEL FE 1/20) 1-20 MG-MCG per tablet     " ONDANSETRON PO     prazosin (MINIPRESS) 1 MG capsule     Probiotic Product (PROBIOTIC DAILY PO)     TIZANIDINE HCL PO     topiramate (TOPAMAX) 25 MG tablet     Venlafaxine HCl (EFFEXOR PO)     No current facility-administered medications for this visit.        Weight Loss Medication History Reviewed With Patient 1/25/2019   Which weight loss medications are you currently taking on a regular basis?  Naltrexone   Are you having any side effects from the weight loss medication that we have prescribed you? No       ASSESSMENT:   Morbid obesity in pt with wt gain over past few years, more since her TBI; working now with neuro and psych.  Spends good portion of most days of the week at home, relatively sedentary, and has been gaining despite working with me and nutrition and with escalation of meds available; not able to be on topiramate at present.  On escalating naltrexone, potential to add low dose phentermine and I have asked pt/her mother to discuss that with other providers also.  I remain concerned about potential for decreased self monitoring/control, given the gain despite working towards setting food goals repeatedly.       PLAN:   Eat breakfast daily  Decrease portion sizes  No meals in front of TV screen  Purge house of food triggers  No meal skipping  Decrease caloric beverages by no soda  Volumetrics eating plan--structured plan and avoding snacking  Low Calorie/low fat diet  Meal planning/journaling        We will  See if the Belviq is covered to try to help with weight loss; asked also to discuss with her neurologist    She can also continue the naltrexone although she has not felt like it was consistently helping (and pt is also on bupropion)    Try replacing lunch with a meal replacement--like a SlimFast shake or Lean Cuisine    Try weighing with the new scales       FOLLOW-UP:    Return in 1 mo     Time: 20/25 min spent on evaluation, management, counseling, education, & motivational interviewing with  greater than 50 % of the total time was spent on counseling and coordinating care    Sincerely,    Luis Slade MD

## 2019-09-27 NOTE — PROGRESS NOTES
Return Medical Weight Management Note     Nehemias Mascorro  MRN:  [id number]  :  1995  JASMEET:  2019    Dear Ludwin Elam,    I had the pleasure of seeing your patient Nehemias Mascorro.  She is a 24 year old female who I am continuing to see for treatment of obesity related to:  GERD and anxiety and asthma      INTERVAL HISTORY:    Last seen about 2 mo ago, reviewed and relevant history is as follows--      '...Self-referred for obesity associated with GERD and anxiety and asthma.  Patient is interested in attaining a healthier weight to diminish current health problems related to co-morbid conditions and prevent future health issues.  She describes her weight history as a gradual gain, and began around age 9  Her previous attempts at weight loss include exercise. Patient has had some success lost about 20#--was on adderall and dancing)....   ...She is working on hypocaloric approach with volumetrics approach--thus far we have not started medication--options have been discussed and strategies, both lifestyle and medication support, were again discussed today.  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  We discussed incoporating meal replacement shakes for 1-2 meals per day to help--she wants to do this.  Discussed plans/ideas for activity, discussed also pharm approaches to help support wt loss in terms of potential risks and possible benefits and side effects. All of her questions were addressed and she would like a trial of topiramate--familiar with this med for wt loss as other family members are on it with some success.  She is aware that this med alone is not approved bu the FDA as a wt loss med.  Detailed discussion of this med and in particular the need for birth  control--recommended 2 forms, at least one barrier, of effective BC if she is sexually active.  Also discussed further the possibility of cognitive issues with the topiramate and concern for school performance.  She will be starting the med for a couple of wks before going off to school and will elt us know if any issues on this med in the interim or later...     Complicated  history with setbacks.  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  Work is limited to 2  2-hr sessions of sit-down work.  By herself some of the day at home a fair amount and is eating a lot of prepared/quick fix good, trying to be plant-based so eating high carb not low fat.  Snacking frequenlty during the day on prop-popped popcorn with ghee and sea salt (keeps a big bag), notes cravigs for carbs including te popcorn and also sweets.  Wakes at night and eats (has been having night terrors, did have to stop the topiramate because of that...     Discussed options with pt and her mother--would benefit from modified meal replacement approach which is also what her mom is doing.  Pharm support was als discussed with pt and her mom during the visit.  Pt is already on bupropion 150 mg daily--discussed adding naltrexone to that and they will also talk with her psychiatrist about this.  Discussed potential risks and benefits and possible side effects.  Pt is not on tramadol or narcotics now.  She is noting feeling more fatigued--difficulty with sleep but is also on largely plant based diet--need to eval for anemia inlcuding iron loss and also B12 def.  Also, given the significant head trauma history will also screen for central hypothyroidism.  Pt also snacks on high carb and some high fat foods--will benefit  "from working with our nutritionist...     When pt reconnected for care in 2018 after about a 3 yr gap we worked out a strategy allowing pt to use some meal replacements and avoid higher carb higher fat foods but it has been difficult for pt to stay focussed.  We had also discussed adding naltrexone to her regimen (pt was already on bupropion) but pt noted potential concerns by other providers of combining these 2 meds--we discussed that these meds are combinted in Contrave, an FDA-approved wt loss med.  Subsequently this med was started--tolerating it without ay issues but she wonders if the effectiveness is waning.     Barriers to wt loss identified--  Distracted eating, cravings, difficulty stopping...\"        In the interim--    Walking--random rather than structured; 2000 steps daily which could be built up.  Discussed options for structured walks vs. working into daily activities.  Could track steps with a tracker also to help with determining baseline and goal setting     At earlier visits she had noted a snack/wanting to eat between supper and bed--we shifted her naltrexone to current pattern of fist dose 1p (no problems in the morning), and second dose at 7p.    Up to 15 mg adderrall and not napping and less hungry     She reports not snacking during the day much, snack in the evening before bed still.     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  Vitals:  B/P: 136/85, P: 89, R: Data Unavailable     Initial consult weight/vitals--Estimated body mass index is 34.28 kg/(m^2) as calculated from the following:    Height as of encounter: 5' 5.75\" (167 cm).    Weight as of encounter: 210 lb 12.2 oz (95.6 kg). on 4/24/15.    Weight change since last seen on 7/26/2019 is up 1.5 pounds.   Total gain is 47 pounds.    Diet and Activity Changes Since Last Visit Reviewed With Patient 1/25/2019   I have made the following changes to my diet since my last visit: Eating less and more vegetables   With regards to my diet, I am still struggling with: -   I have made the following changes to my activity/exercise since my last visit: Joined a gym   With regards to my activity/exercise, I am still struggling with: -       MEDICATIONS:   Current Outpatient Medications   Medication     ADDERALL XR 25 MG 24 hr capsule     albuterol (PROAIR HFA/PROVENTIL HFA/VENTOLIN HFA) 108 (90 Base) MCG/ACT inhaler     ARIPiprazole (ABILIFY) 7.5 mg half-tab     BuPROPion HCl (WELLBUTRIN PO)     ESCITALOPRAM OXALATE PO     Fexofenadine HCl (ALLEGRA PO)     GLYCOPYRROLATE PO     IBUPROFEN PO     Lansoprazole (PREVACID PO)     NATAZIA 3/2-2/2-3/1 MG TABS     AMITRIPTYLINE HCL PO     cetirizine (ZYRTEC) 10 MG tablet     DIAZEPAM PO     GUANFACINE HCL PO     Ipratropium-Albuterol (COMBIVENT RESPIMAT)  MCG/ACT inhaler     naltrexone (DEPADE/REVIA) 50 MG tablet     norethindrone-ethinyl estradiol (JUNEL FE 1/20) 1-20 MG-MCG per tablet     ONDANSETRON PO     prazosin (MINIPRESS) 1 MG capsule     Probiotic Product (PROBIOTIC DAILY PO)     TIZANIDINE HCL PO     topiramate (TOPAMAX) 25 MG tablet     Venlafaxine HCl (EFFEXOR PO)     No current facility-administered medications for this visit.        Weight Loss Medication History Reviewed " With Patient 1/25/2019   Which weight loss medications are you currently taking on a regular basis?  Naltrexone   Are you having any side effects from the weight loss medication that we have prescribed you? No       ASSESSMENT:   Morbid obesity in pt with wt gain over past few years, more since her TBI; working now with neuro and psych.  Spends good portion of most days of the week at home, relatively sedentary, and has been gaining despite working with me and nutrition and with escalation of meds available; not able to be on topiramate at present.  On escalating naltrexone, potential to add low dose phentermine and I have asked pt/her mother to discuss that with other providers also.  I remain concerned about potential for decreased self monitoring/control, given the gain despite working towards setting food goals repeatedly.       PLAN:   Eat breakfast daily  Decrease portion sizes  No meals in front of TV screen  Purge house of food triggers  No meal skipping  Decrease caloric beverages by no soda  Volumetrics eating plan--structured plan and avoding snacking  Low Calorie/low fat diet  Meal planning/journaling        We will  See if the Belviq is covered to try to help with weight loss; asked also to discuss with her neurologist    She can also continue the naltrexone although she has not felt like it was consistently helping (and pt is also on bupropion)    Try replacing lunch with a meal replacement--like a SlimFast shake or Lean Cuisine    Try weighing with the new scales       FOLLOW-UP:    Return in 1 mo     Time: 20/25 min spent on evaluation, management, counseling, education, & motivational interviewing with greater than 50 % of the total time was spent on counseling and coordinating care    Sincerely,    Luis Slade MD

## 2019-09-27 NOTE — NURSING NOTE
"Chief Complaint   Patient presents with     RECHECK     Follow up weight mgmt      /85 (BP Location: Right arm, Patient Position: Sitting, Cuff Size: Adult Large)   Pulse 89   Ht 5' 5.63\" (166.7 cm)   Wt 257 lb 8 oz (116.8 kg)   BMI 42.03 kg/m    Wt Readings from Last 4 Encounters:   09/27/19 257 lb 8 oz (116.8 kg)   07/31/19 255 lb 11.7 oz (116 kg)   07/26/19 258 lb 6.1 oz (117.2 kg)   06/28/19 257 lb 8 oz (116.8 kg)   Yessi Coyle LPN    "

## 2019-09-30 ENCOUNTER — TELEPHONE (OUTPATIENT)
Dept: PEDIATRICS | Facility: CLINIC | Age: 24
End: 2019-09-30

## 2019-09-30 NOTE — TELEPHONE ENCOUNTER
Nehemias called and left message re: She spoke to neurologist.  They are okay with Nehemias starting Belviq.  Nehemias requested to let Dr. Slade know and ask that she send a prescription to her pharmacy.    Note to Dr. Slade sent.

## 2019-10-09 ENCOUNTER — TELEPHONE (OUTPATIENT)
Dept: ENDOCRINOLOGY | Facility: CLINIC | Age: 24
End: 2019-10-09

## 2019-10-09 NOTE — TELEPHONE ENCOUNTER
Prior Authorization Retail Medication Request    Medication/Dose: Belviq  ICD code (if different than what is on RX):  Morbid obesity due to excess calories (H) [E66.01]  - Primary   Previously Tried and Failed: Naltrexone, Bupropion, History of diet and exercise  Rationale:  She is a 24 year old female who I am continuing to see for treatment of obesity related to: GERD and anxiety and asthma. Patient is interested in attaining a healthier weight to diminish current health problems related to co-morbid conditions and prevent future health issues. Not able to be on topiramate at present.  On escalating naltrexone, potential to add low dose phentermine and I have asked pt/her mother to discuss that with other providers also.  I remain concerned about potential for decreased self monitoring/control, given the gain despite working towards setting food goals repeatedly.      Insurance Name:    Insurance ID:        Pharmacy Information (if different than what is on RX)  Name:  [id number] - [place], [place] - [address] AT [facility]  Phone:  [phone]

## 2019-10-10 NOTE — TELEPHONE ENCOUNTER
Central Prior Authorization Team   Phone: [phone]      PA Initiation    Medication: Belviq 10 mg-PA Initiated  Insurance Company: [employer] [place] - Phone [phone] Fax [phone]  Pharmacy Filling the Rx: [id number] - [place], [place] - [address] [place] [address] & [facility]  Filling Pharmacy Phone: [phone]  Filling Pharmacy Fax:    Start Date: 10/10/2019

## 2019-10-11 ENCOUNTER — AMBULATORY - HEALTHEAST (OUTPATIENT)
Dept: LAB | Facility: CLINIC | Age: 24
End: 2019-10-11

## 2019-10-11 DIAGNOSIS — Z79.899 ENCOUNTER FOR LONG-TERM (CURRENT) USE OF MEDICATIONS: ICD-10-CM

## 2019-10-11 LAB
AMPHETAMINES UR QL SCN: ABNORMAL
BARBITURATES UR QL: ABNORMAL
BASOPHILS # BLD AUTO: 0 THOU/UL (ref 0–0.2)
BASOPHILS NFR BLD AUTO: 1 % (ref 0–2)
BENZODIAZ UR QL: ABNORMAL
CANNABINOIDS UR QL SCN: ABNORMAL
CHOLEST SERPL-MCNC: 142 MG/DL
COCAINE UR QL: ABNORMAL
CREAT UR-MCNC: 257.4 MG/DL
EOSINOPHIL # BLD AUTO: 0 THOU/UL (ref 0–0.4)
EOSINOPHIL NFR BLD AUTO: 1 % (ref 0–6)
ERYTHROCYTE [DISTWIDTH] IN BLOOD BY AUTOMATED COUNT: 12.6 % (ref 11–14.5)
FASTING STATUS PATIENT QL REPORTED: YES
FASTING STATUS PATIENT QL REPORTED: YES
GLUCOSE BLD-MCNC: 95 MG/DL (ref 70–125)
HBA1C MFR BLD: 5.4 % (ref 3.5–6)
HCT VFR BLD AUTO: 41.3 % (ref 35–47)
HDLC SERPL-MCNC: 58 MG/DL
HGB BLD-MCNC: 13.4 G/DL (ref 12–16)
IRON SERPL-MCNC: 88 UG/DL (ref 42–175)
LDLC SERPL CALC-MCNC: 70 MG/DL
LYMPHOCYTES # BLD AUTO: 1.5 THOU/UL (ref 0.8–4.4)
LYMPHOCYTES NFR BLD AUTO: 35 % (ref 20–40)
MCH RBC QN AUTO: 29.7 PG (ref 27–34)
MCHC RBC AUTO-ENTMCNC: 32.4 G/DL (ref 32–36)
MCV RBC AUTO: 92 FL (ref 80–100)
METHADONE UR QL SCN: ABNORMAL
MONOCYTES # BLD AUTO: 0.4 THOU/UL (ref 0–0.9)
MONOCYTES NFR BLD AUTO: 9 % (ref 2–10)
NEUTROPHILS # BLD AUTO: 2.3 THOU/UL (ref 2–7.7)
NEUTROPHILS NFR BLD AUTO: 54 % (ref 50–70)
OPIATES UR QL SCN: ABNORMAL
OXYCODONE UR QL: ABNORMAL
PCP UR QL SCN: ABNORMAL
PLATELET # BLD AUTO: 262 THOU/UL (ref 140–440)
PMV BLD AUTO: 11.1 FL (ref 8.5–12.5)
RBC # BLD AUTO: 4.51 MILL/UL (ref 3.8–5.4)
TRIGL SERPL-MCNC: 71 MG/DL
TSH SERPL DL<=0.005 MIU/L-ACNC: 0.74 UIU/ML (ref 0.3–5)
WBC: 4.3 THOU/UL (ref 4–11)

## 2019-10-14 NOTE — TELEPHONE ENCOUNTER
PRIOR AUTHORIZATION DENIED    Medication: Belviq 10 mg-PA denied    Denial Date: 10/13/2019    Denial Rational: Plan exclusion        Appeal Information:

## 2019-10-14 NOTE — TELEPHONE ENCOUNTER
Medication Appeal Request    Please initiate an appeal for the requested medication: Belviq 10 mg-PA denied    Has a letter of medical necessity been completed in EPIC?   yes    Any additional lab values/information to include?     Would you like to include any research articles?               If yes please include the hyperlink(s) below or fax to    [phone] for Specialty/Retail               [phone] for Infusion/Clinic Administered.                Include the patients name and MRN on the fax cover sheet.

## 2019-10-15 NOTE — TELEPHONE ENCOUNTER
Medication Appeal Initiation    We have initiated an appeal for the requested medication:  Medication: Belviq 10 mg-PA Appeal Initiated  Appeal Start Date:  10/15/2019  Insurance Company: [employer] [place] - Phone [phone] Fax [phone]  Comments:  Appeal and letter of medical necessity faxed to [employer] of [place]

## 2019-11-19 NOTE — TELEPHONE ENCOUNTER
I spent 1 hr 10 minutes on the phone with [employer] trying to get an update on the status after having faxed this 4 times. They claim,m again, they don't have the appeal, but did state it is faxed to the correct number. I have faxed a 5th time and asked for an immediate call back to verify receipt.

## 2019-11-20 ENCOUNTER — TELEPHONE (OUTPATIENT)
Dept: PEDIATRICS | Facility: CLINIC | Age: 24
End: 2019-11-20

## 2019-11-20 NOTE — TELEPHONE ENCOUNTER
Called and spoke to mom.  Reminded her of Family Clinic appointments on 11/22/19.  Mom had no other questions at this time.

## 2019-11-22 ENCOUNTER — OFFICE VISIT (OUTPATIENT)
Dept: PEDIATRICS | Facility: CLINIC | Age: 24
End: 2019-11-22
Attending: DIETITIAN, REGISTERED
Payer: COMMERCIAL

## 2019-11-22 ENCOUNTER — OFFICE VISIT (OUTPATIENT)
Dept: ENDOCRINOLOGY | Facility: CLINIC | Age: 24
End: 2019-11-22
Attending: INTERNAL MEDICINE
Payer: COMMERCIAL

## 2019-11-22 ENCOUNTER — RECORDS - HEALTHEAST (OUTPATIENT)
Dept: ADMINISTRATIVE | Facility: OTHER | Age: 24
End: 2019-11-22

## 2019-11-22 VITALS
SYSTOLIC BLOOD PRESSURE: 135 MMHG | HEART RATE: 102 BPM | DIASTOLIC BLOOD PRESSURE: 80 MMHG | WEIGHT: 261.91 LBS | BODY MASS INDEX: 42.09 KG/M2 | HEIGHT: 66 IN

## 2019-11-22 DIAGNOSIS — E66.01 MORBID OBESITY DUE TO EXCESS CALORIES (H): Primary | ICD-10-CM

## 2019-11-22 PROCEDURE — G0463 HOSPITAL OUTPT CLINIC VISIT: HCPCS | Mod: ZF

## 2019-11-22 PROCEDURE — 97803 MED NUTRITION INDIV SUBSEQ: CPT | Mod: 25 | Performed by: DIETITIAN, REGISTERED

## 2019-11-22 ASSESSMENT — MIFFLIN-ST. JEOR: SCORE: 1949.5

## 2019-11-22 ASSESSMENT — PAIN SCALES - GENERAL: PAINLEVEL: NO PAIN (0)

## 2019-11-22 NOTE — PROGRESS NOTES
"Medical Nutrition Therapy  Nutrition Reassessment  Patient seen in Family/Pediatric Weight Mangement Clinic, accompanied by mother.    Anthropometrics  Age:  24 year old female   Height:  166.8 cm (5' 5.67\")  Weight: 118.8 kg (261 lb 14.5 oz)  BMI:  42.70  Weight Gain 4 lbs since last clinic visit on 9/27/19.  Nutrition History  Patient seen in [facility] for family weight management follow up. Patient has gained about 4 lbs in the past 2 months. She reports that her eating hasn't been great lately due to stress of family and strong cravings (will often go get a food she is craving like cookies, etc). 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 Patient has been logging her food intake on her phone. Per food logs, she is eating out frequently and giving into her cravings often (daily). She even identified that she is eating too much carbohydrates. Sample dietary intake noted below.     Nutritional Intakes  Sample intake includes:  Breakfast:   PB banana sandwich  Am Snack:   gummies or treat  Lunch:   Pork, sweet potatoes, mac and cheese and greens  PM Snack:   Ice cream bar  Dinner:   katya  HS Snack: mini pretzel bags or bernardo crackers      Medications/Vitamins/Minerals    Current Outpatient Medications:      ADDERALL XR 25 MG 24 hr capsule, TK 1 C PO D, Disp: , Rfl: 0     albuterol (PROAIR HFA/PROVENTIL HFA/VENTOLIN HFA) 108 (90 Base) MCG/ACT inhaler, Inhale 2 puffs into the lungs, Disp: , Rfl:      AMITRIPTYLINE HCL PO, Reported on 4/27/2017, Disp: , Rfl:      ARIPiprazole (ABILIFY) 7.5 mg half-tab, Take 7.5 mg by mouth daily, Disp: , Rfl:      BuPROPion HCl (WELLBUTRIN PO), Take 300 mg by mouth daily , Disp: , Rfl:      cetirizine (ZYRTEC) 10 MG tablet, Take 10 mg by mouth daily Reported on " 4/27/2017, Disp: , Rfl:      DIAZEPAM PO, Take 5 mg by mouth, Disp: , Rfl:      ESCITALOPRAM OXALATE PO, Take 20 mg by mouth daily, Disp: , Rfl:      Fexofenadine HCl (ALLEGRA PO), Take by mouth daily as needed for allergies, Disp: , Rfl:      GLYCOPYRROLATE PO, Take 2 mg by mouth daily, Disp: , Rfl:      GUANFACINE HCL PO, Take 2 mg by mouth daily, Disp: , Rfl:      IBUPROFEN PO, Take 600 mg by mouth, Disp: , Rfl:      Ipratropium-Albuterol (COMBIVENT RESPIMAT)  MCG/ACT inhaler, Inhale 1 puff into the lungs 4 times daily, Disp: , Rfl:      Lansoprazole (PREVACID PO), , Disp: , Rfl:      lorcaserin (BELVIQ) 10 MG tablet, Take 1 tablet (10 mg) by mouth 2 times daily (Patient not taking: Reported on 11/22/2019), Disp: 60 tablet, Rfl: 2     naltrexone (DEPADE/REVIA) 50 MG tablet, Take 1 tablet 1p and 1 tablet 7p (Patient not taking: Reported on 9/27/2019), Disp: 60 tablet, Rfl: 2     NATAZIA 3/2-2/2-3/1 MG TABS, TK 1 T PO QD, Disp: , Rfl: 0     norethindrone-ethinyl estradiol (JUNEL FE 1/20) 1-20 MG-MCG per tablet, Take 1 tablet by mouth daily, Disp: , Rfl:      ONDANSETRON PO, Take 8 mg by mouth, Disp: , Rfl:      prazosin (MINIPRESS) 1 MG capsule, Take 1 mg by mouth At Bedtime, Disp: , Rfl:      Probiotic Product (PROBIOTIC DAILY PO), , Disp: , Rfl:      TIZANIDINE HCL PO, Take 4 mg by mouth, Disp: , Rfl:      topiramate (TOPAMAX) 25 MG tablet, Take 1 tablet first wk t bedtime, then 2 tablets at bedtime daily thereafter as tolerated (Patient not taking: Reported on 7/26/2019), Disp: 60 tablet, Rfl: 4     Venlafaxine HCl (EFFEXOR PO), Take 75 mg by mouth 3 times daily, Disp: , Rfl:     Previous Goals & Progress  1) Reduce BMI - ongoing goal ; gained 4 lbs  2) Decrease carbohydrates in diet -switch out to bagel thin or english muffin, cut out pretzels/chips from lunch - goal not met  3) use 20 minute rule - seconds on fruits and vegetables - goal not met  4) HS snack - only fruit or vegetables if needed - goal  not met    Nutrition Diagnosis  Obesity related to excessive energy intake as evidenced by BMI is 42.    Interventions & Education  Provided written and verbal education on the following:    Plate Method  Healthy lunchs  Healthy meals/cooking  Healthy snacks  Healthy beverages  Portion sizes  Increase fruit and vegetable intake    Reviewed previous nutrition goals and patient's progress since last appointment. Looked at patient's food log and provided feedback and suggestions for changes. She identified having too much carbs in her diet - agreed to have a lighter snack before bed (fruit, vegetable, string cheese). Also discussed cutting back on eating out and to not order a dessert. Answered nutrition-related questions that mom and pt had, and worked with them to set nutrition goals to work towards until next visit.    Goals  1. Switch Evening snack (no grain)              - fruit              - vegetable              - string cheese  2. Limit eating out to 1x/week               - no dessert  3. Always have vegetable at meals    Monitoring/Evaluation  Will continue to monitor progress towards goals and provide education in Pediatric Weight Management.    Spent 30 minutes in consult with patient & mother.      Sade Levy MS, RD, LD  Pager # [phone]

## 2019-11-22 NOTE — LETTER
"  11/22/2019    RE: Nehemias Guadalupe  [place] [address]     Medical Nutrition Therapy  Nutrition Reassessment  Patient seen in [facility], accompanied by mother.    Anthropometrics  Age:  24 year old female   Height:  166.8 cm (5' 5.67\")  Weight: 118.8 kg (261 lb 14.5 oz)  BMI:  42.70  Weight Gain 4 lbs since last clinic visit on 9/27/19.  Nutrition History  Patient seen in [facility] for family weight management follow up. Patient has gained about 4 lbs in the past 2 months. She reports that her eating hasn't been great lately due to stress of family and strong cravings (will often go get a food she is craving like cookies, etc). 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 Patient has been logging her food intake on her phone. Per food logs, she is eating out frequently and giving into her cravings often (daily). She even identified that she is eating too much carbohydrates. Sample dietary intake noted below.     Nutritional Intakes  Sample intake includes:  Breakfast:   PB banana sandwich  Am Snack:   gummies or treat  Lunch:   Pork, sweet potatoes, mac and cheese and greens  PM Snack:   Ice cream bar  Dinner:   katya  HS Snack: mini pretzel bags or bernardo crackers      Medications/Vitamins/Minerals    Current Outpatient Medications:      ADDERALL XR 25 MG 24 hr capsule, TK 1 C PO D, Disp: , Rfl: 0     albuterol (PROAIR HFA/PROVENTIL HFA/VENTOLIN HFA) 108 (90 Base) MCG/ACT inhaler, Inhale 2 puffs into the lungs, Disp: , Rfl:      AMITRIPTYLINE HCL PO, Reported on 4/27/2017, Disp: , Rfl:      ARIPiprazole (ABILIFY) 7.5 mg half-tab, Take 7.5 mg by mouth daily, Disp: , Rfl:      BuPROPion HCl (WELLBUTRIN PO), Take 300 mg by mouth daily , Disp: , Rfl: "      cetirizine (ZYRTEC) 10 MG tablet, Take 10 mg by mouth daily Reported on 4/27/2017, Disp: , Rfl:      DIAZEPAM PO, Take 5 mg by mouth, Disp: , Rfl:      ESCITALOPRAM OXALATE PO, Take 20 mg by mouth daily, Disp: , Rfl:      Fexofenadine HCl (ALLEGRA PO), Take by mouth daily as needed for allergies, Disp: , Rfl:      GLYCOPYRROLATE PO, Take 2 mg by mouth daily, Disp: , Rfl:      GUANFACINE HCL PO, Take 2 mg by mouth daily, Disp: , Rfl:      IBUPROFEN PO, Take 600 mg by mouth, Disp: , Rfl:      Ipratropium-Albuterol (COMBIVENT RESPIMAT)  MCG/ACT inhaler, Inhale 1 puff into the lungs 4 times daily, Disp: , Rfl:      Lansoprazole (PREVACID PO), , Disp: , Rfl:      lorcaserin (BELVIQ) 10 MG tablet, Take 1 tablet (10 mg) by mouth 2 times daily (Patient not taking: Reported on 11/22/2019), Disp: 60 tablet, Rfl: 2     naltrexone (DEPADE/REVIA) 50 MG tablet, Take 1 tablet 1p and 1 tablet 7p (Patient not taking: Reported on 9/27/2019), Disp: 60 tablet, Rfl: 2     NATAZIA 3/2-2/2-3/1 MG TABS, TK 1 T PO QD, Disp: , Rfl: 0     norethindrone-ethinyl estradiol (JUNEL FE 1/20) 1-20 MG-MCG per tablet, Take 1 tablet by mouth daily, Disp: , Rfl:      ONDANSETRON PO, Take 8 mg by mouth, Disp: , Rfl:      prazosin (MINIPRESS) 1 MG capsule, Take 1 mg by mouth At Bedtime, Disp: , Rfl:      Probiotic Product (PROBIOTIC DAILY PO), , Disp: , Rfl:      TIZANIDINE HCL PO, Take 4 mg by mouth, Disp: , Rfl:      topiramate (TOPAMAX) 25 MG tablet, Take 1 tablet first wk t bedtime, then 2 tablets at bedtime daily thereafter as tolerated (Patient not taking: Reported on 7/26/2019), Disp: 60 tablet, Rfl: 4     Venlafaxine HCl (EFFEXOR PO), Take 75 mg by mouth 3 times daily, Disp: , Rfl:     Previous Goals & Progress  1) Reduce BMI - ongoing goal ; gained 4 lbs  2) Decrease carbohydrates in diet -switch out to bagel thin or english muffin, cut out pretzels/chips from lunch - goal not met  3) use 20 minute rule - seconds on fruits and  vegetables - goal not met  4) HS snack - only fruit or vegetables if needed - goal not met    Nutrition Diagnosis  Obesity related to excessive energy intake as evidenced by BMI is 42.    Interventions & Education  Provided written and verbal education on the following:    Plate Method  Healthy lunchs  Healthy meals/cooking  Healthy snacks  Healthy beverages  Portion sizes  Increase fruit and vegetable intake    Reviewed previous nutrition goals and patient's progress since last appointment. Looked at patient's food log and provided feedback and suggestions for changes. She identified having too much carbs in her diet - agreed to have a lighter snack before bed (fruit, vegetable, string cheese). Also discussed cutting back on eating out and to not order a dessert. Answered nutrition-related questions that mom and pt had, and worked with them to set nutrition goals to work towards until next visit.    Goals  1. Switch Evening snack (no grain)              - fruit              - vegetable              - string cheese  2. Limit eating out to 1x/week               - no dessert  3. Always have vegetable at meals    Monitoring/Evaluation  Will continue to monitor progress towards goals and provide education in Pediatric Weight Management.    Spent 30 minutes in consult with patient & mother.      Sade Levy MS, RD, LD  Pager # [phone]

## 2019-11-22 NOTE — PATIENT INSTRUCTIONS
Nutrition Goals:  1. Switch Evening snack (no grain)   - fruit   - vegetable   - string cheese  2. Limit eating out to 1x/week    - no dessert  3. Always have vegetable at meals

## 2019-11-22 NOTE — PROGRESS NOTES
Return Medical Weight Management Note     Nehemias Mascorro  MRN:  [id number]  :  1995  JASMEET:  2019    Dear Ludwin Elam,    I had the pleasure of seeing your patient Nehemias Mascorro.  She is a 24 year old female who I am continuing to see for treatment of obesity related to:  GERD.  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    INTERVAL HISTORY:    Last seen about 2 mo ago, reviewed and relevant history is as follows--      -------------------  '...Self-referred for obesity associated with GERD and anxiety and asthma.  Patient is interested in attaining a healthier weight to diminish current health problems related to co-morbid conditions and prevent future health issues.  She describes her weight history as a gradual gain, and began around age 9  Her previous attempts at weight loss include exercise. Patient has had some success lost about 20#--was on adderall and dancing)....   ...She is working on hypocaloric approach with volumetrics approach--thus far we have not started medication--options have been discussed and strategies, both lifestyle and medication support, were again discussed today.  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  We discussed incoporating meal replacement shakes for 1-2 meals per day to help--she wants to do this.  Discussed plans/ideas for activity, discussed also pharm approaches to help support wt loss in terms of potential risks and possible benefits and side effects. All of her questions were addressed and she would like a trial of topiramate--familiar with this med for wt loss as other family members are on it with some success.  She is  aware that this med alone is not approved bu the FDA as a wt loss med.  Detailed discussion of this med and in particular the need for birth control--recommended 2 forms, at least one barrier, of effective BC if she is sexually active.  Also discussed further the possibility of cognitive issues with the topiramate and concern for school performance.  She will be starting the med for a couple of wks before going off to school and will elt us know if any issues on this med in the interim or later...     Complicated  history with setbacks.  History of head injury (working in kitchen at [employer]) and subsequent neurologic and psychological issues including chronic HAs, night terrors at times, difficulty with functioning independently (completed rehab prior) and is now living at home with parents, restrictions on activity.  Work is limited to 2  2-hr sessions of sit-down work.  By herself some of the day at home a fair amount and is eating a lot of prepared/quick fix good, trying to be plant-based so eating high carb not low fat.  Snacking frequenlty during the day on prop-popped popcorn with ghee and sea salt (keeps a big bag), notes cravigs for carbs including te popcorn and also sweets.  Wakes at night and eats (has been having night terrors, did have to stop the topiramate because of that...     Discussed options with pt and her mother--would benefit from modified meal replacement approach which is also what her mom is doing.  Pharm support was als discussed with pt and her mom during the visit.  Pt is already on bupropion 150 mg daily--discussed adding naltrexone to that and they will also talk with her psychiatrist about this.  Discussed potential risks and benefits and possible side effects.  Pt is not on tramadol or narcotics now.  She is noting feeling more fatigued--difficulty with sleep but is also on largely plant based diet--need to eval for anemia inlcuding iron loss and also B12 def.  Also, given the  "significant head trauma history will also screen for central hypothyroidism.  Pt also snacks on high carb and some high fat foods--will benefit from working with our nutritionist...     When pt reconnected for care in 2018 after about a 3 yr gap we worked out a strategy allowing pt to use some meal replacements and avoid higher carb higher fat foods but it has been difficult for pt to stay focussed.  We had also discussed adding naltrexone to her regimen (pt was already on bupropion) but pt noted potential concerns by other providers of combining these 2 meds--we discussed that these meds are combinted in Contrave, an FDA-approved wt loss med.  Subsequently this med was started--tolerating it without ay issues but she wonders if the effectiveness is waning.     Barriers to wt loss identified--  Distracted eating, cravings, difficulty stopping...\"    At earlier visits she had noted a snack/wanting to eat between supper and bed--we shifted her naltrexone to current pattern of fist dose 1p (no problems in the morning), and second dose at 7p.     Up to 15 mg adderrall and not napping and less hungry...   -------------------     In the interim--    She ntress and emotional eating--reaching more lately for \"feel good\" stuff like cake and cookies (out of the house now), pt will buy at grocery store or bakery--she is stressed with her parents considering a divorce.    Issues/triggers for eating--Boredom and stress eating  Hunger and cravings both (sweets and comfort food cravings)    Has been having a more active schedule lately--school MWF AM and work Mon/Thu/Sat; working on art (painting with acrylics)    Food schedule--  School day 8A, work day 8:30--breakfast (was doing SlimFast shakes, but now eating PB banana sandwiches)  -Eating around 12:30 next--dinner leftovers (pasta and/or chicken, green beans, sweet potatoes, turkey sandwich with cheese and black), crystal light; sometimes with lunch cookie or cake  -Dinner " "around 6p--steak or chicken, pasta, green beans or greens, rice, crystal light  -Snacks/dessert--hungry before bed or waking up hungry middle of night (crackers or mini-bag of pretzels    At last visit made plan to start lorcaserin-still working on appeal for insurance coverage; they have met their deductible this year so she was advised to  the med and start that to see if there is benefit.      CURRENT WEIGHT:   /80 (BP Location: Right arm, Patient Position: Sitting, Cuff Size: Adult Large)   Pulse 102   Ht 1.668 m (5' 5.67\")   Wt 118.8 kg (261 lb 14.5 oz)   BMI 42.70 kg/m        Wt Readings from Last 4 Encounters:   09/27/19 116.8 kg (257 lb 8 oz)   07/31/19 116 kg (255 lb 11.7 oz)   07/26/19 117.2 kg (258 lb 6.1 oz)   06/28/19 116.8 kg (257 lb 8 oz)       Height:  Data Unavailable  Body Mass Index:  There is no height or weight on file to calculate BMI.  Vitals:  B/P: Data Unavailable, P: Data Unavailable, R: Data Unavailable       Initial consult weight/vitals--Estimated body mass index is 34.28 kg/(m^2) as calculated from the following:    Height as of encounter: 5' 5.75\" (167 cm).    Weight as of encounter: 210 lb 12.2 oz (95.6 kg). on 4/24/15.    Weight change since last seen on 9/27/2019 is up 4 pounds.   Total gain is 47.5 pounds.    Diet and Activity Changes Since Last Visit Reviewed With Patient 1/25/2019   I have made the following changes to my diet since my last visit: Eating less and more vegetables   With regards to my diet, I am still struggling with: -   I have made the following changes to my activity/exercise since my last visit: Joined a gym   With regards to my activity/exercise, I am still struggling with: -       MEDICATIONS:   Current Outpatient Medications   Medication     ADDERALL XR 25 MG 24 hr capsule     albuterol (PROAIR HFA/PROVENTIL HFA/VENTOLIN HFA) 108 (90 Base) MCG/ACT inhaler     AMITRIPTYLINE HCL PO     ARIPiprazole (ABILIFY) 7.5 mg half-tab     BuPROPion HCl " (WELLBUTRIN PO)     cetirizine (ZYRTEC) 10 MG tablet     DIAZEPAM PO     ESCITALOPRAM OXALATE PO     Fexofenadine HCl (ALLEGRA PO)     GLYCOPYRROLATE PO     GUANFACINE HCL PO     IBUPROFEN PO     Ipratropium-Albuterol (COMBIVENT RESPIMAT)  MCG/ACT inhaler     Lansoprazole (PREVACID PO)     lorcaserin (BELVIQ) 10 MG tablet     naltrexone (DEPADE/REVIA) 50 MG tablet     NATAZIA 3/2-2/2-3/1 MG TABS     norethindrone-ethinyl estradiol (JUNEL FE 1/20) 1-20 MG-MCG per tablet     ONDANSETRON PO     prazosin (MINIPRESS) 1 MG capsule     Probiotic Product (PROBIOTIC DAILY PO)     TIZANIDINE HCL PO     topiramate (TOPAMAX) 25 MG tablet     Venlafaxine HCl (EFFEXOR PO)     No current facility-administered medications for this visit.        Weight Loss Medication History Reviewed With Patient 1/25/2019   Which weight loss medications are you currently taking on a regular basis?  Naltrexone   Are you having any side effects from the weight loss medication that we have prescribed you? No         ASSESSMENT:   Morbid obesity in pt with wt gain over past few years, more since her TBI; working now with neuro and psych.  Spends good portion of most days of the week at home, relatively sedentary, and has been gaining despite working with me and nutrition and with escalation of meds available; not able to be on topiramate at present.  On escalating naltrexone, potential to add low dose phentermine and I have asked pt/her mother to discuss that with other providers also.  I remain concerned about potential for decreased self monitoring/control, given the gain despite working towards setting food goals repeatedly.       PLAN:   Eat breakfast daily  Decrease portion sizes  No meals in front of TV screen  Purge house of food triggers  No meal skipping  Decrease caloric beverages by no soda  Volumetrics eating plan--structured plan and avoding snacking  Low Calorie/low fat diet  Meal planning/journaling        Start Belviq--she will  "be able to try it for a couple of mo to see if there is benefit and we are working on the appeal.     She can also continue the naltrexone although she has not felt like it was consistently helping (and pt is also on bupropion)     Try replacing lunch with a meal replacement--like a SlimFast shake or Lean Cuisine     Try weighing with the new scales    Met with Sade today--  \"Goals  1. Switch Evening snack (no grain)              - fruit              - vegetable              - string cheese  2. Limit eating out to 1x/week               - no dessert  3. Always have vegetable at meals\"        FOLLOW-UP:    next family clinic in  3 mo; advised to get in touch in the interim with any questions or concerns that arise            Time: about 20/25 min spent on evaluation, management, counseling, education, & motivational interviewing with greater than 50 % of the total time was spent on counseling and coordinating care    Sincerely,    Luis Slade MD     Addendum--I also discussed for future possibility, depending on coverage, use of GLP1 agonist therapy to help with wt loss.  Pt does not have evidence of DM or prediabetes at this time (labs reviewed from outside provider done 10/19--HbA1c 5.4 with glucose 95 [TSH was also checked and wnl] but sibling has prediabetes and is on Ozempic and benefiting from this therapy.  "

## 2019-11-22 NOTE — LETTER
2019       RE: Nehemias Mascorro  [address]  [place] [address]     Dear Colleague,    Thank you for referring your patient, Nehemias Mascorro, to the [facility] ADULT WEIGHT MGT D at [facility]. Please see a copy of my visit note below.        Return Medical Weight Management Note     Nehemias Mascorro  MRN:  [id number]  :  1995  JASMEET:  2019    Dear Ludwin Elam,    I had the pleasure of seeing your patient Nehemias Mascorro.  She is a 24 year old female who I am continuing to see for treatment of obesity related to:  GERD.  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    INTERVAL HISTORY:    Last seen about 2 mo ago, reviewed and relevant history is as follows--      -------------------  '...Self-referred for obesity associated with GERD and anxiety and asthma.  Patient is interested in attaining a healthier weight to diminish current health problems related to co-morbid conditions and prevent future health issues.  She describes her weight history as a gradual gain, and began around age 9  Her previous attempts at weight loss include exercise. Patient has had some success lost about 20#--was on adderall and dancing)....   ...She is working on hypocaloric approach with volumetrics approach--thus far we have not started medication--options have been discussed and strategies, both lifestyle and medication support, were again discussed today.  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  We discussed incoporating meal replacement shakes for 1-2 meals per day to help--she wants to do this.  Discussed plans/ideas for activity, discussed  also pharm approaches to help support wt loss in terms of potential risks and possible benefits and side effects. All of her questions were addressed and she would like a trial of topiramate--familiar with this med for wt loss as other family members are on it with some success.  She is aware that this med alone is not approved bu the FDA as a wt loss med.  Detailed discussion of this med and in particular the need for birth control--recommended 2 forms, at least one barrier, of effective BC if she is sexually active.  Also discussed further the possibility of cognitive issues with the topiramate and concern for school performance.  She will be starting the med for a couple of wks before going off to school and will elt us know if any issues on this med in the interim or later...     Complicated  history with setbacks.  History of head injury (working in kitchen at [employer]) and subsequent neurologic and psychological issues including chronic HAs, night terrors at times, difficulty with functioning independently (completed rehab prior) and is now living at home with parents, restrictions on activity.  Work is limited to 2  2-hr sessions of sit-down work.  By herself some of the day at home a fair amount and is eating a lot of prepared/quick fix good, trying to be plant-based so eating high carb not low fat.  Snacking frequenlty during the day on prop-popped popcorn with ghee and sea salt (keeps a big bag), notes cravigs for carbs including te popcorn and also sweets.  Wakes at night and eats (has been having night terrors, did have to stop the topiramate because of that...     Discussed options with pt and her mother--would benefit from modified meal replacement approach which is also what her mom is doing.  Pharm support was als discussed with pt and her mom during the visit.  Pt is already on bupropion 150 mg daily--discussed adding naltrexone to that and they will also talk with her psychiatrist about this.  " Discussed potential risks and benefits and possible side effects.  Pt is not on tramadol or narcotics now.  She is noting feeling more fatigued--difficulty with sleep but is also on largely plant based diet--need to eval for anemia inlcuding iron loss and also B12 def.  Also, given the significant head trauma history will also screen for central hypothyroidism.  Pt also snacks on high carb and some high fat foods--will benefit from working with our nutritionist...     When pt reconnected for care in 2018 after about a 3 yr gap we worked out a strategy allowing pt to use some meal replacements and avoid higher carb higher fat foods but it has been difficult for pt to stay focussed.  We had also discussed adding naltrexone to her regimen (pt was already on bupropion) but pt noted potential concerns by other providers of combining these 2 meds--we discussed that these meds are combinted in Contrave, an FDA-approved wt loss med.  Subsequently this med was started--tolerating it without ay issues but she wonders if the effectiveness is waning.     Barriers to wt loss identified--  Distracted eating, cravings, difficulty stopping...\"    At earlier visits she had noted a snack/wanting to eat between supper and bed--we shifted her naltrexone to current pattern of fist dose 1p (no problems in the morning), and second dose at 7p.     Up to 15 mg adderrall and not napping and less hungry...   -------------------     In the interim--    She ntress and emotional eating--reaching more lately for \"feel good\" stuff like cake and cookies (out of the house now), pt will buy at grocery store or bakery--she is stressed with her parents considering a divorce.    Issues/triggers for eating--Boredom and stress eating  Hunger and cravings both (sweets and comfort food cravings)    Has been having a more active schedule lately--school MWF AM and work Mon/Thu/Sat; working on art (painting with acrylics)    Food schedule--  School day 8A, " "work day 8:30--breakfast (was doing SlimFast shakes, but now eating PB banana sandwiches)  -Eating around 12:30 next--dinner leftovers (pasta and/or chicken, green beans, sweet potatoes, turkey sandwich with cheese and black), crystal light; sometimes with lunch cookie or cake  -Dinner around 6p--steak or chicken, pasta, green beans or greens, rice, crystal light  -Snacks/dessert--hungry before bed or waking up hungry middle of night (crackers or mini-bag of pretzels    At last visit made plan to start lorcaserin-still working on appeal for insurance coverage; they have met their deductible this year so she was advised to  the med and start that to see if there is benefit.      CURRENT WEIGHT:   /80 (BP Location: Right arm, Patient Position: Sitting, Cuff Size: Adult Large)   Pulse 102   Ht 1.668 m (5' 5.67\")   Wt 118.8 kg (261 lb 14.5 oz)   BMI 42.70 kg/m         Wt Readings from Last 4 Encounters:   09/27/19 116.8 kg (257 lb 8 oz)   07/31/19 116 kg (255 lb 11.7 oz)   07/26/19 117.2 kg (258 lb 6.1 oz)   06/28/19 116.8 kg (257 lb 8 oz)       Height:  Data Unavailable  Body Mass Index:  There is no height or weight on file to calculate BMI.  Vitals:  B/P: Data Unavailable, P: Data Unavailable, R: Data Unavailable       Initial consult weight/vitals--Estimated body mass index is 34.28 kg/(m^2) as calculated from the following:    Height as of encounter: 5' 5.75\" (167 cm).    Weight as of encounter: 210 lb 12.2 oz (95.6 kg). on 4/24/15.    Weight change since last seen on 9/27/2019 is up 4 pounds.   Total gain is 47.5 pounds.    Diet and Activity Changes Since Last Visit Reviewed With Patient 1/25/2019   I have made the following changes to my diet since my last visit: Eating less and more vegetables   With regards to my diet, I am still struggling with: -   I have made the following changes to my activity/exercise since my last visit: Joined a gym   With regards to my activity/exercise, I am still " struggling with: -       MEDICATIONS:   Current Outpatient Medications   Medication     ADDERALL XR 25 MG 24 hr capsule     albuterol (PROAIR HFA/PROVENTIL HFA/VENTOLIN HFA) 108 (90 Base) MCG/ACT inhaler     AMITRIPTYLINE HCL PO     ARIPiprazole (ABILIFY) 7.5 mg half-tab     BuPROPion HCl (WELLBUTRIN PO)     cetirizine (ZYRTEC) 10 MG tablet     DIAZEPAM PO     ESCITALOPRAM OXALATE PO     Fexofenadine HCl (ALLEGRA PO)     GLYCOPYRROLATE PO     GUANFACINE HCL PO     IBUPROFEN PO     Ipratropium-Albuterol (COMBIVENT RESPIMAT)  MCG/ACT inhaler     Lansoprazole (PREVACID PO)     lorcaserin (BELVIQ) 10 MG tablet     naltrexone (DEPADE/REVIA) 50 MG tablet     NATAZIA 3/2-2/2-3/1 MG TABS     norethindrone-ethinyl estradiol (JUNEL FE 1/20) 1-20 MG-MCG per tablet     ONDANSETRON PO     prazosin (MINIPRESS) 1 MG capsule     Probiotic Product (PROBIOTIC DAILY PO)     TIZANIDINE HCL PO     topiramate (TOPAMAX) 25 MG tablet     Venlafaxine HCl (EFFEXOR PO)     No current facility-administered medications for this visit.        Weight Loss Medication History Reviewed With Patient 1/25/2019   Which weight loss medications are you currently taking on a regular basis?  Naltrexone   Are you having any side effects from the weight loss medication that we have prescribed you? No         ASSESSMENT:   Morbid obesity in pt with wt gain over past few years, more since her TBI; working now with neuro and psych.  Spends good portion of most days of the week at home, relatively sedentary, and has been gaining despite working with me and nutrition and with escalation of meds available; not able to be on topiramate at present.  On escalating naltrexone, potential to add low dose phentermine and I have asked pt/her mother to discuss that with other providers also.  I remain concerned about potential for decreased self monitoring/control, given the gain despite working towards setting food goals repeatedly.       PLAN:   Eat breakfast  "daily  Decrease portion sizes  No meals in front of TV screen  Purge house of food triggers  No meal skipping  Decrease caloric beverages by no soda  Volumetrics eating plan--structured plan and avoding snacking  Low Calorie/low fat diet  Meal planning/journaling        Start Belviq--she will be able to try it for a couple of mo to see if there is benefit and we are working on the appeal.     She can also continue the naltrexone although she has not felt like it was consistently helping (and pt is also on bupropion)     Try replacing lunch with a meal replacement--like a SlimFast shake or Lean Cuisine     Try weighing with the new scales    Met with Sade today--  \"Goals  1. Switch Evening snack (no grain)              - fruit              - vegetable              - string cheese  2. Limit eating out to 1x/week               - no dessert  3. Always have vegetable at meals\"        FOLLOW-UP:    next family clinic in  3 mo; advised to get in touch in the interim with any questions or concerns that arise            Time: about 20/25 min spent on evaluation, management, counseling, education, & motivational interviewing with greater than 50 % of the total time was spent on counseling and coordinating care    Sincerely,    Luis Slade MD     Addendum--I also discussed for future possibility, depending on coverage, use of GLP1 agonist therapy to help with wt loss.  Pt does not have evidence of DM or prediabetes at this time (labs reviewed from outside provider done 10/19--HbA1c 5.4 with glucose 95 [TSH was also checked and wnl] but sibling has prediabetes and is on Ozempic and benefiting from this therapy.      "

## 2019-11-22 NOTE — NURSING NOTE
"[facility] [[id number]]  Chief Complaint   Patient presents with     RECHECK     [facility]u     Initial /80 (BP Location: Right arm, Patient Position: Sitting, Cuff Size: Adult Large)   Pulse 102   Ht 5' 5.67\" (166.8 cm)   Wt 261 lb 14.5 oz (118.8 kg)   BMI 42.70 kg/m   Estimated body mass index is 42.7 kg/m  as calculated from the following:    Height as of this encounter: 5' 5.67\" (166.8 cm).    Weight as of this encounter: 261 lb 14.5 oz (118.8 kg).  Medication Reconciliation: complete   Cheryl Rowland LPN      "

## 2019-12-03 NOTE — TELEPHONE ENCOUNTER
I spent 20 minutes on the phone again with [employer] appeals and the agent was not able to find the appeal. I refaxed for the 5th time.

## 2019-12-06 NOTE — TELEPHONE ENCOUNTER
After sending 7 requests for appeal and status update to [facility], I received a fax stating the patient must initiate any appeal.

## 2019-12-10 NOTE — TELEPHONE ENCOUNTER
Appeal letter was sent to patient by insurance. This must be completed and returned to initiate a 2nd level appeal. Per [employer], this plan will not allow a level 1 appeal. This cannot be initiated by the clinic.

## 2020-01-10 ENCOUNTER — OFFICE VISIT - HEALTHEAST (OUTPATIENT)
Dept: INTERNAL MEDICINE | Facility: CLINIC | Age: 25
End: 2020-01-10

## 2020-01-10 DIAGNOSIS — M62.838 MUSCLE SPASM: ICD-10-CM

## 2020-01-10 DIAGNOSIS — E66.01 MORBID OBESITY (H): ICD-10-CM

## 2020-01-10 DIAGNOSIS — D50.9 IRON DEFICIENCY ANEMIA, UNSPECIFIED IRON DEFICIENCY ANEMIA TYPE: ICD-10-CM

## 2020-01-10 LAB
BASOPHILS # BLD AUTO: 0 THOU/UL (ref 0–0.2)
BASOPHILS NFR BLD AUTO: 1 % (ref 0–2)
EOSINOPHIL # BLD AUTO: 0.1 THOU/UL (ref 0–0.4)
EOSINOPHIL NFR BLD AUTO: 1 % (ref 0–6)
ERYTHROCYTE [DISTWIDTH] IN BLOOD BY AUTOMATED COUNT: 12.2 % (ref 11–14.5)
FERRITIN SERPL-MCNC: 24 NG/ML (ref 10–130)
HCT VFR BLD AUTO: 39.1 % (ref 35–47)
HGB BLD-MCNC: 13.2 G/DL (ref 12–16)
IRON SERPL-MCNC: 93 UG/DL (ref 42–175)
LYMPHOCYTES # BLD AUTO: 1.9 THOU/UL (ref 0.8–4.4)
LYMPHOCYTES NFR BLD AUTO: 36 % (ref 20–40)
MAGNESIUM SERPL-MCNC: 1.8 MG/DL (ref 1.8–2.6)
MCH RBC QN AUTO: 30.2 PG (ref 27–34)
MCHC RBC AUTO-ENTMCNC: 33.9 G/DL (ref 32–36)
MCV RBC AUTO: 89 FL (ref 80–100)
MONOCYTES # BLD AUTO: 0.3 THOU/UL (ref 0–0.9)
MONOCYTES NFR BLD AUTO: 6 % (ref 2–10)
NEUTROPHILS # BLD AUTO: 3.1 THOU/UL (ref 2–7.7)
NEUTROPHILS NFR BLD AUTO: 56 % (ref 50–70)
PLATELET # BLD AUTO: 262 THOU/UL (ref 140–440)
PMV BLD AUTO: 7.9 FL (ref 7–10)
RBC # BLD AUTO: 4.38 MILL/UL (ref 3.8–5.4)
WBC: 5.5 THOU/UL (ref 4–11)

## 2020-01-10 ASSESSMENT — PATIENT HEALTH QUESTIONNAIRE - PHQ9: SUM OF ALL RESPONSES TO PHQ QUESTIONS 1-9: 9

## 2020-01-10 ASSESSMENT — MIFFLIN-ST. JEOR: SCORE: 1963.78

## 2020-01-13 ENCOUNTER — COMMUNICATION - HEALTHEAST (OUTPATIENT)
Dept: INTERNAL MEDICINE | Facility: CLINIC | Age: 25
End: 2020-01-13

## 2020-01-21 ENCOUNTER — RECORDS - HEALTHEAST (OUTPATIENT)
Dept: ADMINISTRATIVE | Facility: OTHER | Age: 25
End: 2020-01-21

## 2020-02-18 ENCOUNTER — AMBULATORY - HEALTHEAST (OUTPATIENT)
Dept: INTERNAL MEDICINE | Facility: CLINIC | Age: 25
End: 2020-02-18

## 2020-02-18 ENCOUNTER — OFFICE VISIT - HEALTHEAST (OUTPATIENT)
Dept: INTERNAL MEDICINE | Facility: CLINIC | Age: 25
End: 2020-02-18

## 2020-02-18 DIAGNOSIS — M25.562 ACUTE PAIN OF LEFT KNEE: ICD-10-CM

## 2020-02-18 DIAGNOSIS — J01.01 ACUTE RECURRENT MAXILLARY SINUSITIS: ICD-10-CM

## 2020-02-18 ASSESSMENT — MIFFLIN-ST. JEOR: SCORE: 1945.64

## 2020-02-24 ENCOUNTER — TELEPHONE (OUTPATIENT)
Dept: PEDIATRICS | Facility: CLINIC | Age: 25
End: 2020-02-24

## 2020-02-24 NOTE — TELEPHONE ENCOUNTER
Called and left message re: Reminder of Family Clinic appointment on 2/28/20.  Left direct call back number for questions or concerns.

## 2020-02-28 ENCOUNTER — OFFICE VISIT (OUTPATIENT)
Dept: PEDIATRICS | Facility: CLINIC | Age: 25
End: 2020-02-28
Attending: DIETITIAN, REGISTERED
Payer: COMMERCIAL

## 2020-02-28 ENCOUNTER — RECORDS - HEALTHEAST (OUTPATIENT)
Dept: ADMINISTRATIVE | Facility: OTHER | Age: 25
End: 2020-02-28

## 2020-02-28 ENCOUNTER — OFFICE VISIT (OUTPATIENT)
Dept: ENDOCRINOLOGY | Facility: CLINIC | Age: 25
End: 2020-02-28
Attending: INTERNAL MEDICINE
Payer: COMMERCIAL

## 2020-02-28 VITALS
SYSTOLIC BLOOD PRESSURE: 128 MMHG | WEIGHT: 264.33 LBS | BODY MASS INDEX: 44.04 KG/M2 | DIASTOLIC BLOOD PRESSURE: 80 MMHG | HEIGHT: 65 IN

## 2020-02-28 DIAGNOSIS — E66.01 MORBID (SEVERE) OBESITY DUE TO EXCESS CALORIES (H): Primary | ICD-10-CM

## 2020-02-28 PROCEDURE — 97803 MED NUTRITION INDIV SUBSEQ: CPT | Performed by: DIETITIAN, REGISTERED

## 2020-02-28 RX ORDER — ARIPIPRAZOLE 15 MG/1
1 TABLET ORAL
COMMUNITY
Start: 2020-01-29 | End: 2022-01-03

## 2020-02-28 RX ORDER — DEXTROAMPHETAMINE SACCHARATE, AMPHETAMINE ASPARTATE MONOHYDRATE, DEXTROAMPHETAMINE SULFATE AND AMPHETAMINE SULFATE 7.5; 7.5; 7.5; 7.5 MG/1; MG/1; MG/1; MG/1
CAPSULE, EXTENDED RELEASE ORAL
COMMUNITY
Start: 2019-12-17 | End: 2022-11-16

## 2020-02-28 ASSESSMENT — PAIN SCALES - GENERAL: PAINLEVEL: NO PAIN (0)

## 2020-02-28 ASSESSMENT — MIFFLIN-ST. JEOR: SCORE: 1951.75

## 2020-02-28 NOTE — LETTER
2/28/2020      RE: Immanuelle Thomas [address] [place] [address]       Medical Nutrition Therapy  Nutrition Reassessment  Patient seen in [facility], accompanied by mother and younger sister.    Anthropometrics  Age:  24 year old female   Height: 165.4 cm (165.4 cm )  Weight: 119.9 kg (264 lb 5.3 oz)  BMI:  43.83  Weight Gain 3 lbs since last clinic visit on 11/22/19.  Nutrition History  Patient seen in [facility] for family weight management follow up. Patient has gained about 3 lbs in the past 3 months. Patient reports that she has made the goal for lent to cut out all snacking between meals and stop overeating. She believes those are two areas she is struggling with currently.     Nutritional Intakes  Sample intake includes:  Breakfast:   2 Activa yogurts  Am Snack:   None reported  Lunch:   Leftovers (chicken, veggies); went out yesterday  PM Snack: cheese stick, fruit cups, stephany chips       Dinner:   Burrito (chicken, refried beans, rice, sour cream, cheese, corn, tomatoes)  HS Snack:   Middle of night - fruit cup  Beverages:  water      Medications/Vitamins/Minerals    Current Outpatient Medications:      ADDERALL XR 25 MG 24 hr capsule, TK 1 C PO D, Disp: , Rfl: 0     albuterol (PROAIR HFA/PROVENTIL HFA/VENTOLIN HFA) 108 (90 Base) MCG/ACT inhaler, Inhale 2 puffs into the lungs, Disp: , Rfl:      AMITRIPTYLINE HCL PO, Reported on 4/27/2017, Disp: , Rfl:      amphetamine-dextroamphetamine (ADDERALL XR) 30 MG 24 hr capsule, TK 1 C PO D FOR ADHD, Disp: , Rfl:      ARIPiprazole (ABILIFY) 15 MG tablet, Take 1 tablet by mouth, Disp: , Rfl:      ARIPiprazole (ABILIFY) 7.5 mg half-tab, Take 7.5 mg by mouth daily, Disp: , Rfl:      BuPROPion HCl (WELLBUTRIN PO), Take 300 mg by mouth daily , Disp: , Rfl:      cetirizine (ZYRTEC) 10 MG tablet, Take 10 mg by mouth daily Reported on 4/27/2017, Disp: , Rfl:      DIAZEPAM PO, Take 5 mg by mouth, Disp: , Rfl:      ESCITALOPRAM  OXALATE PO, Take 20 mg by mouth daily, Disp: , Rfl:      Fexofenadine HCl (ALLEGRA PO), Take by mouth daily as needed for allergies, Disp: , Rfl:      GLYCOPYRROLATE PO, Take 2 mg by mouth daily, Disp: , Rfl:      GUANFACINE HCL PO, Take 2 mg by mouth daily, Disp: , Rfl:      IBUPROFEN PO, Take 600 mg by mouth, Disp: , Rfl:      Ipratropium-Albuterol (COMBIVENT RESPIMAT)  MCG/ACT inhaler, Inhale 1 puff into the lungs 4 times daily, Disp: , Rfl:      Lansoprazole (PREVACID PO), , Disp: , Rfl:      lorcaserin (BELVIQ) 10 MG tablet, Take 1 tablet (10 mg) by mouth 2 times daily (Patient not taking: Reported on 11/22/2019), Disp: 60 tablet, Rfl: 2     metFORMIN (GLUCOPHAGE) 500 MG tablet, For 2 wks take 1 tablet AM with breakfast then take 1 tablet twice daily with meal, as tolerated, Disp: 180 tablet, Rfl: 1     naltrexone (DEPADE/REVIA) 50 MG tablet, Take 1 tablet 1p and 1 tablet 7p (Patient not taking: Reported on 9/27/2019), Disp: 60 tablet, Rfl: 2     NATAZIA 3/2-2/2-3/1 MG TABS, TK 1 T PO QD, Disp: , Rfl: 0     norethindrone-ethinyl estradiol (JUNEL FE 1/20) 1-20 MG-MCG per tablet, Take 1 tablet by mouth daily, Disp: , Rfl:      ONDANSETRON PO, Take 8 mg by mouth, Disp: , Rfl:      prazosin (MINIPRESS) 1 MG capsule, Take 1 mg by mouth At Bedtime, Disp: , Rfl:      Probiotic Product (PROBIOTIC DAILY PO), , Disp: , Rfl:      TIZANIDINE HCL PO, Take 4 mg by mouth, Disp: , Rfl:      topiramate (TOPAMAX) 25 MG tablet, Take 1 tablet first wk t bedtime, then 2 tablets at bedtime daily thereafter as tolerated (Patient not taking: Reported on 7/26/2019), Disp: 60 tablet, Rfl: 4     Venlafaxine HCl (EFFEXOR PO), Take 75 mg by mouth 3 times daily, Disp: , Rfl:     Previous Goals & Progress  1. Switch Evening snack (no grain) - ongoing goal               - fruit              - vegetable              - string cheese  2. Limit eating out to 1x/week - ongoing goal               - no dessert  3. Always have vegetable at  meals - ongoing goal     Nutrition Diagnosis  Obesity related to excessive energy intake as evidenced by BMI/age >95th %ile    Interventions & Education  Provided written and verbal education on the following:    Plate Method  Healthy lunchs  Healthy meals/cooking  Healthy snacks  Portion sizes  Increase fruit and vegetable intake    Reviewed previous nutrition goals and patient's progress since last appointment. Discussed with the family working on using the sectioned plates as a guide for meals and keeping the food portions in each section. Discussed the importance of decreasing the frequency of eating out with the goal being 1 time a week or less. Also discussed alternative options when eating out, looking specifically at [employer] and [employer]  (bowl or salad/ unwich or little [employer]arena sub). Answered nutrition-related questions that mom and pt had, and worked with them to set nutrition goals to work towards until next visit.    Goals  1) Reduce BMI  2) Use sectioned plate for meals at home   - no seconds  3) Limit eating out 1x/week or less  4) When eating out, modify order   - [employer] - salad or bowl   - [employer] - unwich or little macarena sub  5) Patient is giving up snacking between meals and overeating for lent    Monitoring/Evaluation  Will continue to monitor progress towards goals and provide education in Pediatric Weight Management.    Spent 30 minutes in consult with patient & mother and younger sister.      Sade Levy MS, RD, LD  Pager # [phone]        Sade Levy RD

## 2020-02-28 NOTE — NURSING NOTE
"[facility] [[id number]]  Chief Complaint   Patient presents with     RECHECK     pt being seen in  Clinic for f/u     Initial /80 (BP Location: Right arm, Patient Position: Sitting, Cuff Size: Thigh)   Ht 5' 5.12\" (165.4 cm)   Wt 264 lb 5.3 oz (119.9 kg)   BMI 43.83 kg/m   Estimated body mass index is 43.83 kg/m  as calculated from the following:    Height as of this encounter: 5' 5.12\" (165.4 cm).    Weight as of this encounter: 264 lb 5.3 oz (119.9 kg).  Medication Reconciliation: complete   Cheryl Rowland LPN  Arm circ: 46cm  Cheryl Rowland LPN  Wt Readings from Last 4 Encounters:   02/28/20 264 lb 5.3 oz (119.9 kg)   11/22/19 261 lb 14.5 oz (118.8 kg)   09/27/19 257 lb 8 oz (116.8 kg)   07/31/19 255 lb 11.7 oz (116 kg)         "

## 2020-02-28 NOTE — PROGRESS NOTES
Medical Nutrition Therapy  Nutrition Reassessment  Patient seen in [facility], accompanied by mother and younger sister.    Anthropometrics  Age:  24 year old female   Height: 165.4 cm (165.4 cm )  Weight: 119.9 kg (264 lb 5.3 oz)  BMI:  43.83  Weight Gain 3 lbs since last clinic visit on 11/22/19.  Nutrition History  Patient seen in [facility] for family weight management follow up. Patient has gained about 3 lbs in the past 3 months. Patient reports that she has made the goal for lent to cut out all snacking between meals and stop overeating. She believes those are two areas she is struggling with currently.     Nutritional Intakes  Sample intake includes:  Breakfast:   2 Activa yogurts  Am Snack:   None reported  Lunch:   Leftovers (chicken, veggies); went out yesterday  PM Snack: cheese stick, fruit cups, stephany chips       Dinner:   Burrito (chicken, refried beans, rice, sour cream, cheese, corn, tomatoes)  HS Snack:   Middle of night - fruit cup  Beverages:  water      Medications/Vitamins/Minerals    Current Outpatient Medications:      ADDERALL XR 25 MG 24 hr capsule, TK 1 C PO D, Disp: , Rfl: 0     albuterol (PROAIR HFA/PROVENTIL HFA/VENTOLIN HFA) 108 (90 Base) MCG/ACT inhaler, Inhale 2 puffs into the lungs, Disp: , Rfl:      AMITRIPTYLINE HCL PO, Reported on 4/27/2017, Disp: , Rfl:      amphetamine-dextroamphetamine (ADDERALL XR) 30 MG 24 hr capsule, TK 1 C PO D FOR ADHD, Disp: , Rfl:      ARIPiprazole (ABILIFY) 15 MG tablet, Take 1 tablet by mouth, Disp: , Rfl:      ARIPiprazole (ABILIFY) 7.5 mg half-tab, Take 7.5 mg by mouth daily, Disp: , Rfl:      BuPROPion HCl (WELLBUTRIN PO), Take 300 mg by mouth daily , Disp: , Rfl:      cetirizine (ZYRTEC) 10 MG tablet, Take 10 mg by mouth daily Reported on 4/27/2017, Disp: , Rfl:      DIAZEPAM PO, Take 5 mg by mouth, Disp: , Rfl:      ESCITALOPRAM OXALATE PO, Take 20 mg by mouth daily, Disp: , Rfl:      Fexofenadine HCl (ALLEGRA PO),  Take by mouth daily as needed for allergies, Disp: , Rfl:      GLYCOPYRROLATE PO, Take 2 mg by mouth daily, Disp: , Rfl:      GUANFACINE HCL PO, Take 2 mg by mouth daily, Disp: , Rfl:      IBUPROFEN PO, Take 600 mg by mouth, Disp: , Rfl:      Ipratropium-Albuterol (COMBIVENT RESPIMAT)  MCG/ACT inhaler, Inhale 1 puff into the lungs 4 times daily, Disp: , Rfl:      Lansoprazole (PREVACID PO), , Disp: , Rfl:      lorcaserin (BELVIQ) 10 MG tablet, Take 1 tablet (10 mg) by mouth 2 times daily (Patient not taking: Reported on 11/22/2019), Disp: 60 tablet, Rfl: 2     metFORMIN (GLUCOPHAGE) 500 MG tablet, For 2 wks take 1 tablet AM with breakfast then take 1 tablet twice daily with meal, as tolerated, Disp: 180 tablet, Rfl: 1     naltrexone (DEPADE/REVIA) 50 MG tablet, Take 1 tablet 1p and 1 tablet 7p (Patient not taking: Reported on 9/27/2019), Disp: 60 tablet, Rfl: 2     NATAZIA 3/2-2/2-3/1 MG TABS, TK 1 T PO QD, Disp: , Rfl: 0     norethindrone-ethinyl estradiol (JUNEL FE 1/20) 1-20 MG-MCG per tablet, Take 1 tablet by mouth daily, Disp: , Rfl:      ONDANSETRON PO, Take 8 mg by mouth, Disp: , Rfl:      prazosin (MINIPRESS) 1 MG capsule, Take 1 mg by mouth At Bedtime, Disp: , Rfl:      Probiotic Product (PROBIOTIC DAILY PO), , Disp: , Rfl:      TIZANIDINE HCL PO, Take 4 mg by mouth, Disp: , Rfl:      topiramate (TOPAMAX) 25 MG tablet, Take 1 tablet first wk t bedtime, then 2 tablets at bedtime daily thereafter as tolerated (Patient not taking: Reported on 7/26/2019), Disp: 60 tablet, Rfl: 4     Venlafaxine HCl (EFFEXOR PO), Take 75 mg by mouth 3 times daily, Disp: , Rfl:     Previous Goals & Progress  1. Switch Evening snack (no grain) - ongoing goal               - fruit              - vegetable              - string cheese  2. Limit eating out to 1x/week - ongoing goal               - no dessert  3. Always have vegetable at meals - ongoing goal     Nutrition Diagnosis  Obesity related to excessive energy intake as  evidenced by BMI/age >95th %ile    Interventions & Education  Provided written and verbal education on the following:    Plate Method  Healthy lunchs  Healthy meals/cooking  Healthy snacks  Portion sizes  Increase fruit and vegetable intake    Reviewed previous nutrition goals and patient's progress since last appointment. Discussed with the family working on using the sectioned plates as a guide for meals and keeping the food portions in each section. Discussed the importance of decreasing the frequency of eating out with the goal being 1 time a week or less. Also discussed alternative options when eating out, looking specifically at [employer] and [employer]  (bowl or salad/ unwich or little Macarena sub). Answered nutrition-related questions that mom and pt had, and worked with them to set nutrition goals to work towards until next visit.    Goals  1) Reduce BMI  2) Use sectioned plate for meals at home   - no seconds  3) Limit eating out 1x/week or less  4) When eating out, modify order   - [employer] - salad or bowl   - [employer] - unwich or little macarena sub  5) Patient is giving up snacking between meals and overeating for lent    Monitoring/Evaluation  Will continue to monitor progress towards goals and provide education in Pediatric Weight Management.    Spent 30 minutes in consult with patient & mother and younger sister.      Sade Levy MS, RD, LD  Pager # [phone]

## 2020-02-28 NOTE — LETTER
2020       RE: Nehemias Mascorro  [address]  [place] [address]     Dear Colleague,    Thank you for referring your patient, Nehemias Mascorro, to the [facility] ADULT WEIGHT [facility] at [facility]. Please see a copy of my visit note below.        Return Medical Weight Management Note     Nehemias Mascorro  MRN:  [id number]  :  1995  JASMEET:  2020        I had the pleasure of seeing your patient Nehemias Mascorro. She is a 24 year old female who I am continuing to see for treatment of obesity related to:    No flowsheet data found.    ASSESSMENT:   Return in 2 mo      PLAN:   Morbid obesity in pt with wt gain over past few years, more since her TBI; working now with neuro and psych.  Spends good portion of most days of the week at home, relatively sedentary, and has been gaining despite working with me and nutrition and with escalation of meds available; not able to be on topiramate at present.  On escalating naltrexone, potential to add low dose phentermine and I have asked pt/her mother to discuss that with other providers also.  I remain concerned about potential for decreased self monitoring/control, given the gain despite working towards setting food goals repeatedly.       PLAN:   Eat breakfast daily  Decrease portion sizes  No meals in front of TV screen  Purge house of food triggers  No meal skipping  Decrease caloric beverages by no soda  Volumetrics eating plan--structured plan and avoding snacking  Low Calorie/low fat diet  Meal planning/journaling       Pharm support--start metformin, and consider change from Adderall to Vyvanse.    INTERVAL HISTORY:     she returns, last seen about 3 mo ago.  Reviewed and relevant history is as follows as extracted from earlier note--   -------------------  '...Self-referred for obesity associated with GERD and anxiety and asthma.  Patient is interested in attaining a healthier weight to diminish current health  problems related to co-morbid conditions and prevent future health issues.  She describes her weight history as a gradual gain, and began around age 9  Her previous attempts at weight loss include exercise. Patient has had some success lost about 20#--was on adderall and dancing)....   ...She is working on hypocaloric approach with volumetrics approach--thus far we have not started medication--options have been discussed and strategies, both lifestyle and medication support, were again discussed today.  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  We discussed incoporating meal replacement shakes for 1-2 meals per day to help--she wants to do this.  Discussed plans/ideas for activity, discussed also pharm approaches to help support wt loss in terms of potential risks and possible benefits and side effects. All of her questions were addressed and she would like a trial of topiramate--familiar with this med for wt loss as other family members are on it with some success.  She is aware that this med alone is not approved bu the FDA as a wt loss med.  Detailed discussion of this med and in particular the need for birth control--recommended 2 forms, at least one barrier, of effective BC if she is sexually active.  Also discussed further the possibility of cognitive issues with the topiramate and concern for school performance.  She will be starting the med for a couple of wks before going off to school and will elt us know if any issues on this med in the interim or later...     Complicated  history with setbacks.  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  Work is limited to 2  2-hr sessions of sit-down work.  By herself some of the day at home a fair amount and is eating a lot of prepared/quick fix good, trying to be plant-based so eating high carb not low fat.  Snacking frequenlty during the day on prop-popped popcorn with ghee and sea salt (keeps a big bag), notes cravigs for carbs including te popcorn and also sweets.  Wakes at night and eats (has been having night terrors, did have to stop the topiramate because of that...     Discussed options with pt and her mother--would benefit from modified meal replacement approach which is also what her mom is doing.  Pharm support was als discussed with pt and her mom during the visit.  Pt is already on bupropion 150 mg daily--discussed adding naltrexone to that and they will also talk with her psychiatrist about this.  Discussed potential risks and benefits and possible side effects.  Pt is not on tramadol or narcotics now.  She is noting feeling more fatigued--difficulty with sleep but is also on largely plant based diet--need to eval for anemia inlcuding iron loss and also B12 def.  Also, given the significant head trauma history will also screen for central hypothyroidism.  Pt also snacks on high carb and some high fat foods--will benefit from working with our nutritionist...     When pt reconnected for care in 2018 after about a 3 yr gap we worked out a strategy allowing pt to use some meal replacements and avoid higher carb higher fat foods but it has been difficult for pt to stay focussed.  We had also discussed adding naltrexone to her regimen (pt was already on bupropion) but pt noted potential concerns by other providers of combining these 2 meds--we discussed that these meds are combinted in Contrave, an FDA-approved wt loss med.  Subsequently this med was started--tolerating it without ay issues but she  "wonders if the effectiveness is waning.     Barriers to wt loss identified--  Distracted eating, cravings, difficulty stopping...     At earlier visits she had noted a snack/wanting to eat between supper and bed--we shifted her naltrexone to current pattern of fist dose 1p (no problems in the morning), and second dose at 7p.     Up to 15 mg adderrall and not napping and less hungry...\"   -------------------     In the interim--     Issues/triggers with eating have included stress and emotional eating--at times reaching for \"feel good\" stuff like cake and cookies (out of the house now), pt would buy at grocery store or bakery.  For Natalie she has decided to give up snacking and overeating--would benefit from meeting with nutrition today to reframe/shape this plan into operationalized concrete approach     Issues/triggers for eating (as noted)--Boredom and stress eating  Hunger and cravings both (sweets and comfort food cravings)     Variable schedule in terms of daily steps and she discussed goal for increasing daily steps now that spring is here--was given step chart with goals     At last visit made plan to start lorcaserin-but she never started it and with recall we will not start it.  We reviewed meds and considered other options.  I will start metformin as this may be of some benefit.  We also reviewed that she is on Adderall and discussed possibility of changing this to Vyvanse--she will discuss this with the provider who is treating her ADHD--Vyvanse might treat that and better support wt loss.    Tamara Green will follow up with her in a few weeks to see how she is doing with the med changes.      CURRENT WEIGHT:   264 lbs 5.3 oz        last visit--11/22/19--  /80 (BP Location: Right arm, Patient Position: Sitting, Cuff Size: Adult Large)   Pulse 102   Ht 1.668 m (5' 5.67\")   Wt 118.8 kg (261 lb 14.5 oz)   BMI 42.70 kg/m              Wt Readings from Last 4 Encounters:   09/27/19 116.8 kg (257 lb 8 " oz)   07/31/19 116 kg (255 lb 11.7 oz)   07/26/19 117.2 kg (258 lb 6.1 oz)   06/28/19 116.8 kg (257 lb 8 oz)               MEDICATIONS:   Current Outpatient Medications   Medication Sig Dispense Refill     albuterol (PROAIR HFA/PROVENTIL HFA/VENTOLIN HFA) 108 (90 Base) MCG/ACT inhaler Inhale 2 puffs into the lungs       amphetamine-dextroamphetamine (ADDERALL XR) 30 MG 24 hr capsule TK 1 C PO D FOR ADHD       ARIPiprazole (ABILIFY) 15 MG tablet Take 1 tablet by mouth       BuPROPion HCl (WELLBUTRIN PO) Take 300 mg by mouth daily        cetirizine (ZYRTEC) 10 MG tablet Take 10 mg by mouth daily Reported on 4/27/2017       ESCITALOPRAM OXALATE PO Take 20 mg by mouth daily       Fexofenadine HCl (ALLEGRA PO) Take by mouth daily as needed for allergies       IBUPROFEN PO Take 600 mg by mouth       Lansoprazole (PREVACID PO)        NATAZIA 3/2-2/2-3/1 MG TABS TK 1 T PO QD  0     ADDERALL XR 25 MG 24 hr capsule TK 1 C PO D  0     AMITRIPTYLINE HCL PO Reported on 4/27/2017       ARIPiprazole (ABILIFY) 7.5 mg half-tab Take 7.5 mg by mouth daily       DIAZEPAM PO Take 5 mg by mouth       GLYCOPYRROLATE PO Take 2 mg by mouth daily       GUANFACINE HCL PO Take 2 mg by mouth daily       Ipratropium-Albuterol (COMBIVENT RESPIMAT)  MCG/ACT inhaler Inhale 1 puff into the lungs 4 times daily       lorcaserin (BELVIQ) 10 MG tablet Take 1 tablet (10 mg) by mouth 2 times daily (Patient not taking: Reported on 11/22/2019) 60 tablet 2     naltrexone (DEPADE/REVIA) 50 MG tablet Take 1 tablet 1p and 1 tablet 7p (Patient not taking: Reported on 9/27/2019) 60 tablet 2     norethindrone-ethinyl estradiol (JUNEL FE 1/20) 1-20 MG-MCG per tablet Take 1 tablet by mouth daily       ONDANSETRON PO Take 8 mg by mouth       prazosin (MINIPRESS) 1 MG capsule Take 1 mg by mouth At Bedtime       Probiotic Product (PROBIOTIC DAILY PO)        TIZANIDINE HCL PO Take 4 mg by mouth       topiramate (TOPAMAX) 25 MG tablet Take 1 tablet first wk t  "bedtime, then 2 tablets at bedtime daily thereafter as tolerated (Patient not taking: Reported on 7/26/2019) 60 tablet 4     Venlafaxine HCl (EFFEXOR PO) Take 75 mg by mouth 3 times daily         Weight Loss Medication History Reviewed With Patient 1/25/2019   Which weight loss medications are you currently taking on a regular basis?  Naltrexone   Are you having any side effects from the weight loss medication that we have prescribed you? No        VITALS:  /80 (BP Location: Right arm, Patient Position: Sitting, Cuff Size: Thigh)   Ht 1.654 m (5' 5.12\")   Wt 119.9 kg (264 lb 5.3 oz)   BMI 43.83 kg/m      FOLLOW-UP:    RTC 2 mo    Time: about 20/25 min spent on evaluation, management, counseling, education, & motivational interviewing with greater than 50 % of the total time was spent on counseling and coordinating care    Sincerely,    Luis Slade MD      "

## 2020-02-28 NOTE — PROGRESS NOTES
Return Medical Weight Management Note     Nehemias Mascorro  MRN:  [id number]  :  1995  JASMEET:  2020        I had the pleasure of seeing your patient Nehemias Mascorro. She is a 24 year old female who I am continuing to see for treatment of obesity related to:    No flowsheet data found.    ASSESSMENT:   Return in 2 mo      PLAN:   Morbid obesity in pt with wt gain over past few years, more since her TBI; working now with neuro and psych.  Spends good portion of most days of the week at home, relatively sedentary, and has been gaining despite working with me and nutrition and with escalation of meds available; not able to be on topiramate at present.  On escalating naltrexone, potential to add low dose phentermine and I have asked pt/her mother to discuss that with other providers also.  I remain concerned about potential for decreased self monitoring/control, given the gain despite working towards setting food goals repeatedly.       PLAN:   Eat breakfast daily  Decrease portion sizes  No meals in front of TV screen  Purge house of food triggers  No meal skipping  Decrease caloric beverages by no soda  Volumetrics eating plan--structured plan and avoding snacking  Low Calorie/low fat diet  Meal planning/journaling       Pharm support--start metformin, and consider change from Adderall to Vyvanse.    INTERVAL HISTORY:     she returns, last seen about 3 mo ago.  Reviewed and relevant history is as follows as extracted from earlier note--   -------------------  '...Self-referred for obesity associated with GERD and anxiety and asthma.  Patient is interested in attaining a healthier weight to diminish current health problems related to co-morbid conditions and prevent future health issues.  She describes her weight history as a gradual gain, and began around age 9  Her previous attempts at weight loss include exercise. Patient has had some success lost about 20#--was on adderall and dancing)....   ...She  is working on hypocaloric approach with volumetrics approach--thus far we have not started medication--options have been discussed and strategies, both lifestyle and medication support, were again discussed today.  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  We discussed incoporating meal replacement shakes for 1-2 meals per day to help--she wants to do this.  Discussed plans/ideas for activity, discussed also pharm approaches to help support wt loss in terms of potential risks and possible benefits and side effects. All of her questions were addressed and she would like a trial of topiramate--familiar with this med for wt loss as other family members are on it with some success.  She is aware that this med alone is not approved bu the FDA as a wt loss med.  Detailed discussion of this med and in particular the need for birth control--recommended 2 forms, at least one barrier, of effective BC if she is sexually active.  Also discussed further the possibility of cognitive issues with the topiramate and concern for school performance.  She will be starting the med for a couple of wks before going off to school and will elt us know if any issues on this med in the interim or later...     Complicated  history with setbacks.  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  Work is limited to 2  2-hr sessions of sit-down work.  By herself some of the day at home a fair amount and is eating a lot of prepared/quick fix good, trying to be plant-based so eating high carb not  low fat.  Snacking frequenlty during the day on prop-popped popcorn with ghee and sea salt (keeps a big bag), notes cravigs for carbs including te popcorn and also sweets.  Wakes at night and eats (has been having night terrors, did have to stop the topiramate because of that...     Discussed options with pt and her mother--would benefit from modified meal replacement approach which is also what her mom is doing.  Pharm support was als discussed with pt and her mom during the visit.  Pt is already on bupropion 150 mg daily--discussed adding naltrexone to that and they will also talk with her psychiatrist about this.  Discussed potential risks and benefits and possible side effects.  Pt is not on tramadol or narcotics now.  She is noting feeling more fatigued--difficulty with sleep but is also on largely plant based diet--need to eval for anemia inlcuding iron loss and also B12 def.  Also, given the significant head trauma history will also screen for central hypothyroidism.  Pt also snacks on high carb and some high fat foods--will benefit from working with our nutritionist...     When pt reconnected for care in 2018 after about a 3 yr gap we worked out a strategy allowing pt to use some meal replacements and avoid higher carb higher fat foods but it has been difficult for pt to stay focussed.  We had also discussed adding naltrexone to her regimen (pt was already on bupropion) but pt noted potential concerns by other providers of combining these 2 meds--we discussed that these meds are combinted in Contrave, an FDA-approved wt loss med.  Subsequently this med was started--tolerating it without ay issues but she wonders if the effectiveness is waning.     Barriers to wt loss identified--  Distracted eating, cravings, difficulty stopping...     At earlier visits she had noted a snack/wanting to eat between supper and bed--we shifted her naltrexone to current pattern of fist dose 1p (no problems in the morning), and  "second dose at 7p.     Up to 15 mg adderrall and not napping and less hungry...\"   -------------------     In the interim--     Issues/triggers with eating have included stress and emotional eating--at times reaching for \"feel good\" stuff like cake and cookies (out of the house now), pt would buy at grocery store or bakery.  For Natalie she has decided to give up snacking and overeating--would benefit from meeting with nutrition today to reframe/shape this plan into operationalized concrete approach     Issues/triggers for eating (as noted)--Boredom and stress eating  Hunger and cravings both (sweets and comfort food cravings)     Variable schedule in terms of daily steps and she discussed goal for increasing daily steps now that spring is here--was given step chart with goals     At last visit made plan to start lorcaserin-but she never started it and with recall we will not start it.  We reviewed meds and considered other options.  I will start metformin as this may be of some benefit.  We also reviewed that she is on Adderall and discussed possibility of changing this to Vyvanse--she will discuss this with the provider who is treating her ADHD--Vyvanse might treat that and better support wt loss.    Tamara Green will follow up with her in a few weeks to see how she is doing with the med changes.      CURRENT WEIGHT:   264 lbs 5.3 oz        last visit--11/22/19--  /80 (BP Location: Right arm, Patient Position: Sitting, Cuff Size: Adult Large)   Pulse 102   Ht 1.668 m (5' 5.67\")   Wt 118.8 kg (261 lb 14.5 oz)   BMI 42.70 kg/m              Wt Readings from Last 4 Encounters:   09/27/19 116.8 kg (257 lb 8 oz)   07/31/19 116 kg (255 lb 11.7 oz)   07/26/19 117.2 kg (258 lb 6.1 oz)   06/28/19 116.8 kg (257 lb 8 oz)               MEDICATIONS:   Current Outpatient Medications   Medication Sig Dispense Refill     albuterol (PROAIR HFA/PROVENTIL HFA/VENTOLIN HFA) 108 (90 Base) MCG/ACT inhaler Inhale 2 puffs into " the lungs       amphetamine-dextroamphetamine (ADDERALL XR) 30 MG 24 hr capsule TK 1 C PO D FOR ADHD       ARIPiprazole (ABILIFY) 15 MG tablet Take 1 tablet by mouth       BuPROPion HCl (WELLBUTRIN PO) Take 300 mg by mouth daily        cetirizine (ZYRTEC) 10 MG tablet Take 10 mg by mouth daily Reported on 4/27/2017       ESCITALOPRAM OXALATE PO Take 20 mg by mouth daily       Fexofenadine HCl (ALLEGRA PO) Take by mouth daily as needed for allergies       IBUPROFEN PO Take 600 mg by mouth       Lansoprazole (PREVACID PO)        NATAZIA 3/2-2/2-3/1 MG TABS TK 1 T PO QD  0     ADDERALL XR 25 MG 24 hr capsule TK 1 C PO D  0     AMITRIPTYLINE HCL PO Reported on 4/27/2017       ARIPiprazole (ABILIFY) 7.5 mg half-tab Take 7.5 mg by mouth daily       DIAZEPAM PO Take 5 mg by mouth       GLYCOPYRROLATE PO Take 2 mg by mouth daily       GUANFACINE HCL PO Take 2 mg by mouth daily       Ipratropium-Albuterol (COMBIVENT RESPIMAT)  MCG/ACT inhaler Inhale 1 puff into the lungs 4 times daily       lorcaserin (BELVIQ) 10 MG tablet Take 1 tablet (10 mg) by mouth 2 times daily (Patient not taking: Reported on 11/22/2019) 60 tablet 2     naltrexone (DEPADE/REVIA) 50 MG tablet Take 1 tablet 1p and 1 tablet 7p (Patient not taking: Reported on 9/27/2019) 60 tablet 2     norethindrone-ethinyl estradiol (JUNEL FE 1/20) 1-20 MG-MCG per tablet Take 1 tablet by mouth daily       ONDANSETRON PO Take 8 mg by mouth       prazosin (MINIPRESS) 1 MG capsule Take 1 mg by mouth At Bedtime       Probiotic Product (PROBIOTIC DAILY PO)        TIZANIDINE HCL PO Take 4 mg by mouth       topiramate (TOPAMAX) 25 MG tablet Take 1 tablet first wk t bedtime, then 2 tablets at bedtime daily thereafter as tolerated (Patient not taking: Reported on 7/26/2019) 60 tablet 4     Venlafaxine HCl (EFFEXOR PO) Take 75 mg by mouth 3 times daily         Weight Loss Medication History Reviewed With Patient 1/25/2019   Which weight loss medications are you currently  "taking on a regular basis?  Naltrexone   Are you having any side effects from the weight loss medication that we have prescribed you? No        VITALS:  /80 (BP Location: Right arm, Patient Position: Sitting, Cuff Size: Thigh)   Ht 1.654 m (5' 5.12\")   Wt 119.9 kg (264 lb 5.3 oz)   BMI 43.83 kg/m      FOLLOW-UP:    RTC 2 mo    Time: about 20/25 min spent on evaluation, management, counseling, education, & motivational interviewing with greater than 50 % of the total time was spent on counseling and coordinating care    Sincerely,    Luis Slade MD  "

## 2020-03-02 ENCOUNTER — HEALTH MAINTENANCE LETTER (OUTPATIENT)
Age: 25
End: 2020-03-02

## 2020-03-06 ENCOUNTER — TELEPHONE (OUTPATIENT)
Dept: PEDIATRICS | Facility: CLINIC | Age: 25
End: 2020-03-06

## 2020-03-06 NOTE — TELEPHONE ENCOUNTER
Called and left message re: Calling [facility].  Left direct call back number.

## 2020-03-27 ENCOUNTER — TELEPHONE (OUTPATIENT)
Dept: PEDIATRICS | Facility: CLINIC | Age: 25
End: 2020-03-27

## 2020-03-27 NOTE — TELEPHONE ENCOUNTER
Called and spoke to Nehemias to check in on Metformin.  Nehemias reports that things are going okay.  She has had some upset stomach with Metformin.  Nehemias is currently taking one tablet with breakfast and one with supper.  She takes the medication during her meal.      Discussed that upset stomach should decrease after taking medication for a while.  Nehemias reports that it is already getting better.    Scheduled appointments for [facility].    Nehemias had no other questions at this time.

## 2020-04-24 ENCOUNTER — VIRTUAL VISIT (OUTPATIENT)
Dept: ENDOCRINOLOGY | Facility: CLINIC | Age: 25
End: 2020-04-24
Attending: INTERNAL MEDICINE
Payer: COMMERCIAL

## 2020-04-24 DIAGNOSIS — E66.01 MORBID (SEVERE) OBESITY DUE TO EXCESS CALORIES (H): Primary | ICD-10-CM

## 2020-04-24 NOTE — PROGRESS NOTES
"The patient has been notified of following:      \"This video visit will be conducted via a call between you and your physician/provider. We have found that certain health care needs can be provided without the need for an in-person physical exam.  This service lets us provide the care you need with a video conversation.  If a prescription is necessary we can send it directly to your pharmacy.  If lab work is needed we can place an order for that and you can then stop by our lab to have the test done at a later time.     Video visits are billed at different rates depending on your insurance coverage.  Please reach out to your insurance provider with any questions.     If during the course of the call the physician/provider feels a video visit is not appropriate, you will not be charged for this service.\"     Patient has given verbal consent for Video visit? Yes     How would you like to obtain your AVS? MyChart        Will anyone else be joining your video visit?  Mother James     Video-Visit Details     Type of service:  Video Visit     Video Start Time: 09:52  Video End Time: 10:39     Total time spent counseling James (group and individual counseling)--approx 40 min    Originating Location (pt. Location): Home     Distant Location (provider location):  [facility] ADULT WEIGHT MGT D      Mode of Communication:  Video Conference via [facility]        Luis Slade MD         Return Medical Weight Management Note     Nehemias Mascorro  MRN:  [id number]  :  1995  JASMEET:  2020    Dear Ludwin Elam MD,    I had the pleasure of seeing your patient Nehemias Mascorro. She is a 24 year old female who I am continuing to see for treatment of morbid obesity; additional PMH is as follows:  Past Medical History:   Diagnosis Date     Depressive disorder      Uncomplicated asthma    h/o TBI also     MEDICATIONS:   Current Outpatient Medications   Medication Sig Dispense Refill     metFORMIN (GLUCOPHAGE) 500 MG " tablet For 2 wks take 2 tablets AM with breakfast then take 1 tablet twice daily with meal, and then increase to 2 tablets twice daily, as tolerated 360 tablet 1     ADDERALL XR 25 MG 24 hr capsule TK 1 C PO D  0     albuterol (PROAIR HFA/PROVENTIL HFA/VENTOLIN HFA) 108 (90 Base) MCG/ACT inhaler Inhale 2 puffs into the lungs       AMITRIPTYLINE HCL PO Reported on 4/27/2017       amphetamine-dextroamphetamine (ADDERALL XR) 30 MG 24 hr capsule TK 1 C PO D FOR ADHD       ARIPiprazole (ABILIFY) 15 MG tablet Take 1 tablet by mouth       ARIPiprazole (ABILIFY) 7.5 mg half-tab Take 7.5 mg by mouth daily       BuPROPion HCl (WELLBUTRIN PO) Take 300 mg by mouth daily        cetirizine (ZYRTEC) 10 MG tablet Take 10 mg by mouth daily Reported on 4/27/2017       DIAZEPAM PO Take 5 mg by mouth       ESCITALOPRAM OXALATE PO Take 20 mg by mouth daily       Fexofenadine HCl (ALLEGRA PO) Take by mouth daily as needed for allergies       GLYCOPYRROLATE PO Take 2 mg by mouth daily       GUANFACINE HCL PO Take 2 mg by mouth daily       IBUPROFEN PO Take 600 mg by mouth       Ipratropium-Albuterol (COMBIVENT RESPIMAT)  MCG/ACT inhaler Inhale 1 puff into the lungs 4 times daily       Lansoprazole (PREVACID PO)        NATAZIA 3/2-2/2-3/1 MG TABS TK 1 T PO QD  0     norethindrone-ethinyl estradiol (JUNEL FE 1/20) 1-20 MG-MCG per tablet Take 1 tablet by mouth daily       ONDANSETRON PO Take 8 mg by mouth       prazosin (MINIPRESS) 1 MG capsule Take 1 mg by mouth At Bedtime       Probiotic Product (PROBIOTIC DAILY PO)        TIZANIDINE HCL PO Take 4 mg by mouth       Venlafaxine HCl (EFFEXOR PO) Take 75 mg by mouth 3 times daily         Weight Loss Medication History Reviewed With Patient 1/25/2019   Which weight loss medications are you currently taking on a regular basis?  Naltrexone   Are you having any side effects from the weight loss medication that we have prescribed you? No     ASSESSMENT:   Return in 1 mo--virtual        PLAN:    Morbid obesity in pt with wt gain over past few years, more since her TBI; working now with neuro and psych.     PLAN:   Decrease portion sizes  No meals in front of TV screen  Purge house of food triggers  No meal skipping and avoid snacking  Decrease caloric beverages by no soda  Volumetrics eating plan--structured plan and avoding snacking  Low Calorie/low fat diet  Meal planning/journaling  --I discussed that we also have a 24 week structured wt loss program that pt could consider and we can send further information out to her with this additional support option.      Goal setting/reframing during the visit--  1.  She is getting almost 4500 steps lately.  She set goal for every 2 wks increasing that daily step count by 500, with eventual target of around 10,000  2.  She set goal of trying to cut out the snacks, but if having a snack making it only 1 fruit cup or a piece of fresh fruit such as an apple       Pharm support--increasing metformin (not having any side effects, will increase insulin sensitivity and may have potential to support wt loss also, and at this point do not have many other options to add considering other meds/PMH/insurance coverage and affordability). Considered change from Adderall to Vyvanse and pt checked with other provider--provider would rather not change to Vyvanse.  --will escalate metformin to total daily dose of 1500 and then 2000mg as tolerated--increase to 1000AM/500mg PM for 2 wks and then up to 1000mg BID thereafter as tolerated.  --RTC virtual visit in one month     Time: approx 40 min of shared total virtual visit (including group and individual elements) spent on evaluation, management, counseling, education, & motivational interviewing.    Sincerely,    Luis Slade MD        INTERVAL HISTORY:    She returns, last seen about 2 mo ago, reviewed and relevant history, as extracted from earlier notes, is as follows--   -------------------  '...Self-referred for obesity  associated with GERD and anxiety and asthma.  Patient is interested in attaining a healthier weight to diminish current health problems related to co-morbid conditions and prevent future health issues.  She describes her weight history as a gradual gain, and began around age 9  Her previous attempts at weight loss include exercise. Patient has had some success lost about 20#--was on adderall and dancing)....   ...She is working on hypocaloric approach with volumetrics approach--thus far we have not started medication--options have been discussed and strategies, both lifestyle and medication support, were again discussed today.  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  We discussed incoporating meal replacement shakes for 1-2 meals per day to help--she wants to do this.  Discussed plans/ideas for activity, discussed also pharm approaches to help support wt loss in terms of potential risks and possible benefits and side effects. All of her questions were addressed and she would like a trial of topiramate--familiar with this med for wt loss as other family members are on it with some success.  She is aware that this med alone is not approved bu the FDA as a wt loss med.  Detailed discussion of this med and in particular the need for birth control--recommended 2 forms, at least one barrier, of effective BC if she is sexually active.  Also discussed further the possibility of cognitive issues with the topiramate and concern for school performance.  She will be starting the med for a couple of wks before going off to school and will elt us know if any issues on this med in the interim or later...     Complicated  history with setbacks.  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  Work is limited to 2  2-hr sessions of sit-down work.  By herself some of the day at home a fair amount and is eating a lot of prepared/quick fix good, trying to be plant-based so eating high carb not low fat.  Snacking frequenlty during the day on prop-popped popcorn with ghee and sea salt (keeps a big bag), notes cravigs for carbs including te popcorn and also sweets.  Wakes at night and eats (has been having night terrors, did have to stop the topiramate because of that...     Discussed options with pt and her mother--would benefit from modified meal replacement approach which is also what her mom is doing.  Pharm support was als discussed with pt and her mom during the visit.  Pt is already on bupropion 150 mg daily--discussed adding naltrexone to that and they will also talk with her psychiatrist about this.  Discussed potential risks and benefits and possible side effects.  Pt is not on tramadol or narcotics now.  She is noting feeling more fatigued--difficulty with sleep but is also on largely plant based diet--need to eval for anemia inlcuding iron loss and also B12 def.  Also, given the significant head trauma history will also screen for central hypothyroidism.  Pt also snacks on high carb and some high fat foods--will benefit from working with our nutritionist...     When pt reconnected for care in 2018 after about a 3 yr gap we worked out a strategy allowing pt to use some meal replacements and avoid higher carb higher fat foods but it has been difficult for pt to stay focussed.  We had also discussed adding naltrexone to her regimen (pt was already on bupropion) but pt noted potential concerns by other providers of combining these 2 meds--we discussed that these meds are combinted  "in Contrave, an FDA-approved wt loss med.  Subsequently this med was started--tolerating it without ay issues but she wonders if the effectiveness is waning.     Barriers to wt loss identified--  Distracted eating, cravings, difficulty stopping...     At earlier visits she had noted a snack/wanting to eat between supper and bed--we shifted her naltrexone to current pattern of fist dose 1p (no problems in the morning), and second dose at 7p.     Up to 15 mg adderrall and not napping and less hungry...\"   -------------------       Today again detailed discussion of barriers/issues in the way of successful wt loss, and also reviewed pharm options including everything tried prior, potential med interactions and within context of PMH.  --she is on bupropion so naltrexone was added for potential for wt loss but pt notes this med did not help.  --other providers have had concern about restarting topiramate given the TBI (she had been on that prior to the TBI and was tolerating it)  --GLP1 agonists not covered by insurance  --on Adderall so not able to use phentermine (and she checked with other provider about possibly switching to Vyvanse but this is not a change they want to make)  --lorcaserin is off the market  --she did start metformin and has increased to 500mg BID and is tolerating this without any side effects    She notes lately she has felt hungrier and some night eating, up at night sometimes (will eat peach cup in water).  Has had issues with cravings prior and self-monitoring/distracted eating are also factors. Lately not noting/aware of significant issue with stress/mood eating.    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  May have 1-2 of the cups for a snack.  May snack mid morning or afternoon.     Post our last visit she discussed with other provider " possibility of Vyvannse but not a good option for pt to switch to from adderall so pt will stay on that.    During the visit we reviewed daily schedule and activity and did goal setting/reframing during the visit and other plans--  1.  She is getting almost 4500 steps lately.  She set goal for every 2 wks increasing that daily step count by 500, with eventual target of around 10,000  2.  She set goal of trying to cut out the snacks, but if having a snack making it only 1 fruit cup or a piece of fresh fruit such as an apple  3.  meds--on the metformin now at 500mg BID and tolerating (initially GI issues) so will increase to 1000AM/500mg PM for 2 wks and then up to 1000mg BID thereafter as tolerated.    Visit conducted as video visit--video start time (visit with mother James and daughter Nehemias--total time start 9:52, end 10:39

## 2020-04-24 NOTE — PATIENT INSTRUCTIONS
"Welcome to our weight management program!   We are excited to join you on your weight loss journey    Thank you for allowing us the privilege of caring for you. We hope we provided you with the excellent service you deserve.   Please let us know if there is anything else we can do for you so that we can be sure you are leaving completely satisfied with your care experience.    To ensure the quality of our services you may be receiving a patient satisfaction survey from an independent patient satisfaction monitoring company.    The greatest compliment you can give is a \"Likely to Recommend\"    Visit with  today.    Instructions per today's visit:   Goal setting/reframing during the visit and other plans--  1.  She is getting almost 4500 steps lately.  She set goal for every 2 wks increasing that daily step count by 500, with eventual target of around 10,000  2.  She set goal of trying to cut out the snacks, but if having a snack making it only 1 fruit cup or a piece of fresh fruit such as an apple  3.  meds--on the metformin now at 500mg BID and tolerating (initially GI issues) so will increase to 1000AM/500mg PM for 2 wks and then up to 1000mg BID thereafter as tolerated.    --let us know if you are interested getting more information about the 24 week weight loss program    Return--  RTC virtual visit again in 1 month    Interested in working with a health ?  Health coaches work with you to improve your overall health and wellbeing.  They look at the whole person, and may involve discussion of different areas of life, including, but not limited to the four pillars of health (sleep, exercise, nutrition, and stress management). Discuss with your care team if you would like to start working a health .  Health Coaching-3 Pack:    $99 for three health coaching visits    Visits may be done in person or via phone    Coaching is a partnership between the  and the client; Coaches do not prescribe or " diagnose    Coaching helps inspire the client to reach his/her personal goals     For any questions/concerns contact Susan Balderas LPN at [phone]     To schedule appointments with our team, please call [phone]     Please call during clinic hours Monday through Friday 8:00a - 4:00p if you have questions or you can contact us via [employer] at anytime. ?    Lab results will be communicated through My Chart or letter (if My Chart not used). Please call the clinic if you have not received communication after 1 week or if you have any questions.?      Fax: [phone]    Thank you,  Medical Weight Management Team

## 2020-05-19 ENCOUNTER — RECORDS - HEALTHEAST (OUTPATIENT)
Dept: ADMINISTRATIVE | Facility: OTHER | Age: 25
End: 2020-05-19

## 2020-05-19 ENCOUNTER — TRANSFERRED RECORDS (OUTPATIENT)
Dept: HEALTH INFORMATION MANAGEMENT | Facility: CLINIC | Age: 25
End: 2020-05-19

## 2020-05-21 ENCOUNTER — RECORDS - HEALTHEAST (OUTPATIENT)
Dept: ADMINISTRATIVE | Facility: OTHER | Age: 25
End: 2020-05-21

## 2020-05-22 ENCOUNTER — RECORDS - HEALTHEAST (OUTPATIENT)
Dept: ADMINISTRATIVE | Facility: OTHER | Age: 25
End: 2020-05-22

## 2020-05-22 ENCOUNTER — VIRTUAL VISIT (OUTPATIENT)
Dept: ENDOCRINOLOGY | Facility: CLINIC | Age: 25
End: 2020-05-22
Attending: INTERNAL MEDICINE
Payer: COMMERCIAL

## 2020-05-22 DIAGNOSIS — E66.01 MORBID (SEVERE) OBESITY DUE TO EXCESS CALORIES (H): Primary | ICD-10-CM

## 2020-05-22 NOTE — LETTER
"2020       RE: Nehemias Mascorro  [address]  [place] [address]     Dear Colleague,    Thank you for referring your patient, Nehemias Mascorro, to the [facility]ULT WEIGHT [facility] at [facility]. Please see a copy of my visit note below.    Nehemias Mascorro is a 24 year old female who is being evaluated via a billable telephone visit.      The patient has been notified of following:     \"This telephone visit will be conducted via a call between you and your physician/provider. We have found that certain health care needs can be provided without the need for a physical exam.  This service lets us provide the care you need with a short phone conversation.  If a prescription is necessary we can send it directly to your pharmacy.  If lab work is needed we can place an order for that and you can then stop by our lab to have the test done at a later time.    Telephone visits are billed at different rates depending on your insurance coverage. During this emergency period, for some insurers they may be billed the same as an in-person visit.  Please reach out to your insurance provider with any questions.    If during the course of the call the physician/provider feels a telephone visit is not appropriate, you will not be charged for this service.\"    Patient has given verbal consent for Telephone visit?  Yes      How would you like to obtain your AVS? MyChart    Phone call duration: 40 minutes    Luis Slade MD        Return Medical Weight Management Note     Nehemias Mascorro  MRN:  [id number]  :  1995  JASMEET:  2020    Dear Ludwin Elam MD,    I had the pleasure of seeing your patient Nehemias Mascorro. She is a 24 year old female who I am continuing to see for treatment of obesity related to:        INTERVAL HISTORY:    Last visit (virtual) was about 1 mo ago; reviewed and relevant history is as follows, as extracted from earlier " note--     -------------------  '...Self-referred for obesity associated with GERD and anxiety and asthma.  Patient is interested in attaining a healthier weight to diminish current health problems related to co-morbid conditions and prevent future health issues.  She describes her weight history as a gradual gain, and began around age 9  Her previous attempts at weight loss include exercise. Patient has had some success lost about 20#--was on adderall and dancing)....   ...She is working on hypocaloric approach with volumetrics approach--thus far we have not started medication--options have been discussed and strategies, both lifestyle and medication support, were again discussed today.  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  We discussed incoporating meal replacement shakes for 1-2 meals per day to help--she wants to do this.  Discussed plans/ideas for activity, discussed also pharm approaches to help support wt loss in terms of potential risks and possible benefits and side effects. All of her questions were addressed and she would like a trial of topiramate--familiar with this med for wt loss as other family members are on it with some success.  She is aware that this med alone is not approved bu the FDA as a wt loss med.  Detailed discussion of this med and in particular the need for birth control--recommended 2 forms, at least one barrier, of effective BC if she is sexually active.  Also discussed further the possibility of cognitive issues with the topiramate and concern for school performance.  She will be starting the med for a couple of wks before going off to school and will elt us know if any issues on this med in the interim or later...     Complicated  history with setbacks.   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  Work is limited to 2  2-hr sessions of sit-down work.  By herself some of the day at home a fair amount and is eating a lot of prepared/quick fix good, trying to be plant-based so eating high carb not low fat.  Snacking frequenlty during the day on prop-popped popcorn with ghee and sea salt (keeps a big bag), notes cravigs for carbs including te popcorn and also sweets.  Wakes at night and eats (has been having night terrors, did have to stop the topiramate because of that...     Discussed options with pt and her mother--would benefit from modified meal replacement approach which is also what her mom is doing.  Pharm support was als discussed with pt and her mom during the visit.  Pt is already on bupropion 150 mg daily--discussed adding naltrexone to that and they will also talk with her psychiatrist about this.  Discussed potential risks and benefits and possible side effects.  Pt is not on tramadol or narcotics now.  She is noting feeling more fatigued--difficulty with sleep but is also on largely plant based diet--need to eval for anemia inlcuding iron loss and also B12 def.  Also, given the significant head trauma history will also screen for central hypothyroidism.  Pt also snacks on high carb and some high fat foods--will benefit from working with our nutritionist...     When pt reconnected for care in 2018 after about a 3 yr gap we worked out a strategy allowing pt to use some meal replacements and avoid higher carb higher fat foods but it has been difficult for pt to stay focussed.  We had also discussed adding naltrexone to her regimen (pt was already on bupropion) but pt noted potential concerns by other providers of  "combining these 2 meds--we discussed that these meds are combinted in Contrave, an FDA-approved wt loss med.  Subsequently this med was started--tolerating it without ay issues but she wonders if the effectiveness is waning.     Barriers to wt loss identified--  Distracted eating, cravings, difficulty stopping...     At earlier visits she had noted a snack/wanting to eat between supper and bed--we shifted her naltrexone to current pattern of fist dose 1p (no problems in the morning), and second dose at 7p.     Up to 15 mg adderrall and not napping and less hungry...\"   -------------------     Issues/triggers with eating have included stress and emotional eating--at times reaching for \"feel good\" stuff like cake and cookies (out of the house now), pt would buy at grocery store or bakery.  Boredom eating can also be an issue.  Hunger and cravings both (sweets and comfort food cravings)    Today again detailed discussion/assessment/motivational interviewing done with pt to assess barriers/issues with wt loss and to re-formulate plan going forward, including reframing/setting goals.    Also had a detailed discussion of prior meds/issues with those, and discussed potential future options (really the main option at this point if unable to get GLP1 agonist for her [and at present--see comments below--given the GI issues being worked up I will continue to hold off on that option at present] would be to add back the naltrexone tried before given that she is on bupropion but this did not seem to provide much benefit prior she notes)    Of note is the following with other med changes, with an obesity focus, is the following--  1. lexapro tapering and adding Prozac  2. Adderall continuing  3. Amitriptyline--not taking now (removed from med list); diazepam has only gotten a few pills and may be discontinuing that.  4. Abilify--taking  5. buproprion at 300 taking  6. Metformin 1000mg BID tolerating without any side " "effects)--unsure if this has helped any with appetite (escalated after last visit to full dose--I asked whether the abd symptoms she has been having might be related but she links them more strongly to the baclofen in terms of timing/dosing)    Of note from prior meds tried to help with wt loss--  --she is on bupropion so naltrexone was added (as noted above) for potential for wt loss but pt notes this med did not help.  --other providers have had concern about restarting topiramate given the TBI hx (she had been on that prior to the TBI and was tolerating it and getting some benefit)  --GLP1 agonists not covered by insurance  --on Adderall so not able to use phentermine (and she checked with other provider about possibly switching to Vyvanse but this is not a change they want to make)  --lorcaserin is off the market  --she did start metformin and has increased to 500mg BID    Also as noted above, she is having stomach pains on baclofen which is being tapered and pain is getting better she notes      Wt recently--gained 1# since last visit one month ago (today)  Noting she was was eating more with the baclofen (abd pains) initially but then eating less ore recently  --Saw gastroenterologist and will be having camera endoscopy done for assessment of GI symptoms      Current food changes struggles--has been \"cuddling up to carbs\" lately and has restricted them to only with 2 meals daily and with depression meds adjusted she is hopeful it will be easier to manage making food changes to support wt loss    Goals around food--  For carb reduction she formulated concrete goals during the visit--  --drop to 0-2 slices of bread daily (have low calorie or whole grain when having bread ideally)  --carbs @ 2 meals or less daily--when having them limit to 1/2 cup  --avoid snacking (those have tended to be high carb prior) and if needing a snack have a piece of fresh fruit    Activity-related goals--currently aspirational, " thinking about going with mother and sister on walks and/or to the park      CURRENT WEIGHT:      notes she has gained 1# last month (weighed earlier today)         Wt Readings from Last 3 Encounters:   02/28/20 119.9 kg (264 lb 5.3 oz)   11/22/19 118.8 kg (261 lb 14.5 oz)   09/27/19 116.8 kg (257 lb 8 oz)        MEDICATIONS:   Current Outpatient Medications   Medication Sig Dispense Refill     ADDERALL XR 25 MG 24 hr capsule TK 1 C PO D  0     albuterol (PROAIR HFA/PROVENTIL HFA/VENTOLIN HFA) 108 (90 Base) MCG/ACT inhaler Inhale 2 puffs into the lungs       AMITRIPTYLINE HCL PO Reported on 4/27/2017       amphetamine-dextroamphetamine (ADDERALL XR) 30 MG 24 hr capsule TK 1 C PO D FOR ADHD       ARIPiprazole (ABILIFY) 15 MG tablet Take 1 tablet by mouth       ARIPiprazole (ABILIFY) 7.5 mg half-tab Take 7.5 mg by mouth daily       BuPROPion HCl (WELLBUTRIN PO) Take 300 mg by mouth daily        cetirizine (ZYRTEC) 10 MG tablet Take 10 mg by mouth daily Reported on 4/27/2017       DIAZEPAM PO Take 5 mg by mouth       ESCITALOPRAM OXALATE PO Take 20 mg by mouth daily       Fexofenadine HCl (ALLEGRA PO) Take by mouth daily as needed for allergies       GLYCOPYRROLATE PO Take 2 mg by mouth daily       GUANFACINE HCL PO Take 2 mg by mouth daily       IBUPROFEN PO Take 600 mg by mouth       Ipratropium-Albuterol (COMBIVENT RESPIMAT)  MCG/ACT inhaler Inhale 1 puff into the lungs 4 times daily       Lansoprazole (PREVACID PO)        metFORMIN (GLUCOPHAGE) 500 MG tablet For 2 wks take 2 tablets AM with breakfast then take 1 tablet twice daily with meal, and then increase to 2 tablets twice daily, as tolerated 360 tablet 1     NATAZIA 3/2-2/2-3/1 MG TABS TK 1 T PO QD  0     norethindrone-ethinyl estradiol (JUNEL FE 1/20) 1-20 MG-MCG per tablet Take 1 tablet by mouth daily       ONDANSETRON PO Take 8 mg by mouth       prazosin (MINIPRESS) 1 MG capsule Take 1 mg by mouth At Bedtime       Probiotic Product (PROBIOTIC DAILY  PO)        TIZANIDINE HCL PO Take 4 mg by mouth       Venlafaxine HCl (EFFEXOR PO) Take 75 mg by mouth 3 times daily         Weight Loss Medication History Reviewed With Patient 1/25/2019   Which weight loss medications are you currently taking on a regular basis?  Naltrexone   Are you having any side effects from the weight loss medication that we have prescribed you? No       ASSESSMENT;  Morbid obesity in pt with wt gain over past few years, more since her TBI; working now with neuro and psych.     PLAN:   (continues)  Decrease portion sizes  No meals in front of TV screen  Purge house of food triggers  No meal skipping and avoid snacking  Decrease caloric beverages--avoid soda  Volumetrics low carb eating plan--structured plan and avoding snacking  Low Calorie/low fat diet  Meal planning/journaling  --I discussed the components of our 24 week structured wt loss program that pt could consider adding in for further struction, and we can send information out to her with this additional support option and get her signed up if she wants.       additionally--  --would like to sign up for the 24 week program; will let Susan Balderas know this so we can get pt signed up  --continue on metformin at current full dose; tolerating this without any issues  --request [facility] records from the last visit and the upcoming procedure results also    FOLLOW-UP:    4-8 weeks in family clinic again        Sincerely,    Luis Slade MD    Again, thank you for allowing me to participate in the care of your patient.      Sincerely,    Luis Slade MD

## 2020-05-22 NOTE — PROGRESS NOTES
"Nehemias Mascorro is a 24 year old female who is being evaluated via a billable telephone visit.      The patient has been notified of following:     \"This telephone visit will be conducted via a call between you and your physician/provider. We have found that certain health care needs can be provided without the need for a physical exam.  This service lets us provide the care you need with a short phone conversation.  If a prescription is necessary we can send it directly to your pharmacy.  If lab work is needed we can place an order for that and you can then stop by our lab to have the test done at a later time.    Telephone visits are billed at different rates depending on your insurance coverage. During this emergency period, for some insurers they may be billed the same as an in-person visit.  Please reach out to your insurance provider with any questions.    If during the course of the call the physician/provider feels a telephone visit is not appropriate, you will not be charged for this service.\"    Patient has given verbal consent for Telephone visit?  Yes      How would you like to obtain your AVS? MyChart    Phone call duration: 40 minutes    Luis Slade MD        Return Medical Weight Management Note     Nehemias Mascorro  MRN:  [id number]  :  1995  JASMEET:  2020    Dear Ludwin Elam MD,    I had the pleasure of seeing your patient Nehemias Mascorro. She is a 24 year old female who I am continuing to see for treatment of obesity related to:        INTERVAL HISTORY:    Last visit (virtual) was about 1 mo ago; reviewed and relevant history is as follows, as extracted from earlier note--     -------------------  '...Self-referred for obesity associated with GERD and anxiety and asthma.  Patient is interested in attaining a healthier weight to diminish current health problems related to co-morbid conditions and prevent future health issues.  She describes her weight history as a " gradual gain, and began around age 9  Her previous attempts at weight loss include exercise. Patient has had some success lost about 20#--was on adderall and dancing)....   ...She is working on hypocaloric approach with volumetrics approach--thus far we have not started medication--options have been discussed and strategies, both lifestyle and medication support, were again discussed today.  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  We discussed incoporating meal replacement shakes for 1-2 meals per day to help--she wants to do this.  Discussed plans/ideas for activity, discussed also pharm approaches to help support wt loss in terms of potential risks and possible benefits and side effects. All of her questions were addressed and she would like a trial of topiramate--familiar with this med for wt loss as other family members are on it with some success.  She is aware that this med alone is not approved bu the FDA as a wt loss med.  Detailed discussion of this med and in particular the need for birth control--recommended 2 forms, at least one barrier, of effective BC if she is sexually active.  Also discussed further the possibility of cognitive issues with the topiramate and concern for school performance.  She will be starting the med for a couple of wks before going off to school and will elt us know if any issues on this med in the interim or later...     Complicated  history with setbacks.  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  Work is limited to 2   2-hr sessions of sit-down work.  By herself some of the day at home a fair amount and is eating a lot of prepared/quick fix good, trying to be plant-based so eating high carb not low fat.  Snacking frequenlty during the day on prop-popped popcorn with ghee and sea salt (keeps a big bag), notes cravigs for carbs including te popcorn and also sweets.  Wakes at night and eats (has been having night terrors, did have to stop the topiramate because of that...     Discussed options with pt and her mother--would benefit from modified meal replacement approach which is also what her mom is doing.  Pharm support was als discussed with pt and her mom during the visit.  Pt is already on bupropion 150 mg daily--discussed adding naltrexone to that and they will also talk with her psychiatrist about this.  Discussed potential risks and benefits and possible side effects.  Pt is not on tramadol or narcotics now.  She is noting feeling more fatigued--difficulty with sleep but is also on largely plant based diet--need to eval for anemia inlcuding iron loss and also B12 def.  Also, given the significant head trauma history will also screen for central hypothyroidism.  Pt also snacks on high carb and some high fat foods--will benefit from working with our nutritionist...     When pt reconnected for care in 2018 after about a 3 yr gap we worked out a strategy allowing pt to use some meal replacements and avoid higher carb higher fat foods but it has been difficult for pt to stay focussed.  We had also discussed adding naltrexone to her regimen (pt was already on bupropion) but pt noted potential concerns by other providers of combining these 2 meds--we discussed that these meds are combinted in Contrave, an FDA-approved wt loss med.  Subsequently this med was started--tolerating it without ay issues but she wonders if the effectiveness is waning.     Barriers to wt loss identified--  Distracted eating, cravings, difficulty  "stopping...     At earlier visits she had noted a snack/wanting to eat between supper and bed--we shifted her naltrexone to current pattern of fist dose 1p (no problems in the morning), and second dose at 7p.     Up to 15 mg adderrall and not napping and less hungry...\"   -------------------     Issues/triggers with eating have included stress and emotional eating--at times reaching for \"feel good\" stuff like cake and cookies (out of the house now), pt would buy at grocery store or bakery.  Boredom eating can also be an issue.  Hunger and cravings both (sweets and comfort food cravings)    Today again detailed discussion/assessment/motivational interviewing done with pt to assess barriers/issues with wt loss and to re-formulate plan going forward, including reframing/setting goals.    Also had a detailed discussion of prior meds/issues with those, and discussed potential future options (really the main option at this point if unable to get GLP1 agonist for her [and at present--see comments below--given the GI issues being worked up I will continue to hold off on that option at present] would be to add back the naltrexone tried before given that she is on bupropion but this did not seem to provide much benefit prior she notes)    Of note is the following with other med changes, with an obesity focus, is the following--  1. lexapro tapering and adding Prozac  2. Adderall continuing  3. Amitriptyline--not taking now (removed from med list); diazepam has only gotten a few pills and may be discontinuing that.  4. Abilify--taking  5. buproprion at 300 taking  6. Metformin 1000mg BID tolerating without any side effects)--unsure if this has helped any with appetite (escalated after last visit to full dose--I asked whether the abd symptoms she has been having might be related but she links them more strongly to the baclofen in terms of timing/dosing)    Of note from prior meds tried to help with wt loss--  --she is on " "bupropion so naltrexone was added (as noted above) for potential for wt loss but pt notes this med did not help.  --other providers have had concern about restarting topiramate given the TBI hx (she had been on that prior to the TBI and was tolerating it and getting some benefit)  --GLP1 agonists not covered by insurance  --on Adderall so not able to use phentermine (and she checked with other provider about possibly switching to Vyvanse but this is not a change they want to make)  --lorcaserin is off the market  --she did start metformin and has increased to 500mg BID    Also as noted above, she is having stomach pains on baclofen which is being tapered and pain is getting better she notes      Wt recently--gained 1# since last visit one month ago (today)  Noting she was was eating more with the baclofen (abd pains) initially but then eating less ore recently  --Saw gastroenterologist and will be having camera endoscopy done for assessment of GI symptoms      Current food changes struggles--has been \"cuddling up to carbs\" lately and has restricted them to only with 2 meals daily and with depression meds adjusted she is hopeful it will be easier to manage making food changes to support wt loss    Goals around food--  For carb reduction she formulated concrete goals during the visit--  --drop to 0-2 slices of bread daily (have low calorie or whole grain when having bread ideally)  --carbs @ 2 meals or less daily--when having them limit to 1/2 cup  --avoid snacking (those have tended to be high carb prior) and if needing a snack have a piece of fresh fruit    Activity-related goals--currently aspirational, thinking about going with mother and sister on walks and/or to the park      CURRENT WEIGHT:      notes she has gained 1# last month (weighed earlier today)         Wt Readings from Last 3 Encounters:   02/28/20 119.9 kg (264 lb 5.3 oz)   11/22/19 118.8 kg (261 lb 14.5 oz)   09/27/19 116.8 kg (257 lb 8 oz)      "   MEDICATIONS:   Current Outpatient Medications   Medication Sig Dispense Refill     ADDERALL XR 25 MG 24 hr capsule TK 1 C PO D  0     albuterol (PROAIR HFA/PROVENTIL HFA/VENTOLIN HFA) 108 (90 Base) MCG/ACT inhaler Inhale 2 puffs into the lungs       AMITRIPTYLINE HCL PO Reported on 4/27/2017       amphetamine-dextroamphetamine (ADDERALL XR) 30 MG 24 hr capsule TK 1 C PO D FOR ADHD       ARIPiprazole (ABILIFY) 15 MG tablet Take 1 tablet by mouth       ARIPiprazole (ABILIFY) 7.5 mg half-tab Take 7.5 mg by mouth daily       BuPROPion HCl (WELLBUTRIN PO) Take 300 mg by mouth daily        cetirizine (ZYRTEC) 10 MG tablet Take 10 mg by mouth daily Reported on 4/27/2017       DIAZEPAM PO Take 5 mg by mouth       ESCITALOPRAM OXALATE PO Take 20 mg by mouth daily       Fexofenadine HCl (ALLEGRA PO) Take by mouth daily as needed for allergies       GLYCOPYRROLATE PO Take 2 mg by mouth daily       GUANFACINE HCL PO Take 2 mg by mouth daily       IBUPROFEN PO Take 600 mg by mouth       Ipratropium-Albuterol (COMBIVENT RESPIMAT)  MCG/ACT inhaler Inhale 1 puff into the lungs 4 times daily       Lansoprazole (PREVACID PO)        metFORMIN (GLUCOPHAGE) 500 MG tablet For 2 wks take 2 tablets AM with breakfast then take 1 tablet twice daily with meal, and then increase to 2 tablets twice daily, as tolerated 360 tablet 1     NATAZIA 3/2-2/2-3/1 MG TABS TK 1 T PO QD  0     norethindrone-ethinyl estradiol (JUNEL FE 1/20) 1-20 MG-MCG per tablet Take 1 tablet by mouth daily       ONDANSETRON PO Take 8 mg by mouth       prazosin (MINIPRESS) 1 MG capsule Take 1 mg by mouth At Bedtime       Probiotic Product (PROBIOTIC DAILY PO)        TIZANIDINE HCL PO Take 4 mg by mouth       Venlafaxine HCl (EFFEXOR PO) Take 75 mg by mouth 3 times daily         Weight Loss Medication History Reviewed With Patient 1/25/2019   Which weight loss medications are you currently taking on a regular basis?  Naltrexone   Are you having any side effects  from the weight loss medication that we have prescribed you? No       ASSESSMENT;  Morbid obesity in pt with wt gain over past few years, more since her TBI; working now with neuro and psych.     PLAN:   (continues)  Decrease portion sizes  No meals in front of TV screen  Purge house of food triggers  No meal skipping and avoid snacking  Decrease caloric beverages--avoid soda  Volumetrics low carb eating plan--structured plan and avoding snacking  Low Calorie/low fat diet  Meal planning/journaling  --I discussed the components of our 24 week structured wt loss program that pt could consider adding in for further struction, and we can send information out to her with this additional support option and get her signed up if she wants.       additionally--  --would like to sign up for the 24 week program; will let Susan Balderas know this so we can get pt signed up  --continue on metformin at current full dose; tolerating this without any issues  --request [facility] records from the last visit and the upcoming procedure results also    FOLLOW-UP:    4-8 weeks in family clinic again        Sincerely,    Luis Slade MD

## 2020-05-22 NOTE — NURSING NOTE
"Nehemias Mascorro is a 24 year old female who is being evaluated via a billable video visit.      The patient has been notified of following:     \"This video visit will be conducted via a call between you and your physician/provider. We have found that certain health care needs can be provided without the need for an in-person physical exam.  This service lets us provide the care you need with a video conversation.  If a prescription is necessary we can send it directly to your pharmacy.  If lab work is needed we can place an order for that and you can then stop by our lab to have the test done at a later time.    Video visits are billed at different rates depending on your insurance coverage.  Please reach out to your insurance provider with any questions.    If during the course of the call the physician/provider feels a video visit is not appropriate, you will not be charged for this service.\"     How would you like to obtain your AVS? Reji    Nehemias Mascorro complains of  No chief complaint on file.      Patient has given verbal consent for Video visit? Yes    Patient would like the video invitation sent by: Send to e-mail at:[email or web address]    I have reviewed and updated the patient's medication list, allergies and preferred pharmacy.      Mika Cochran LPN  "

## 2020-05-25 NOTE — PATIENT INSTRUCTIONS
Thank you for allowing us the privilege of caring for you. We hope we provided you with the excellent service you deserve.    Please let us know if there is anything else we can do for you so that we can be sure you are completely satisfied with your care experience.    Your recent visit was with Dr. Hernandez.    Instructions per today's visit:  Goals around food--  Working on carb reduction  --drop to 0-2 slices of bread daily (have low calorie or whole grain when having bread ideally)  --carbs @ 2 meals or less daily--when having them limit to 1/2 cup  --avoid snacking (those have tended to be high carb prior) and if needing a snack have a piece of fresh fruit    We also discussed the 24 week weight loss support program--we will be following up with you on this.    We can plan a return appointment with you for one month from now.        Other information--  Meal Replacement Products:    Here is the link to our new e-store where you can purchase our meal replacement products    [email or web address]    The one week starter kit is a great way to sample a variety of products and see what works for you.    If you want more information about the product go to: Worklight    Free Shipping for orders over $75    Benefits of meal replacements products:    Portion and calorie control  Improved nutrition  Structured eating  Simplified food choices  Avoid contact with trigger foods    Interested in working with a health ?  Health coaches work with you to improve your overall health and wellbeing.  They look at the whole person, and may involve discussion of different areas of life, including, but not limited to the four pillars of health (sleep, exercise, nutrition, and stress management). Discuss with your care team if you would like to start working a health .    Health Coaching-3 Pack: Schedule by calling [phone]    $99 for three health coaching visits     Visits may be done in person or via phone    Coaching is a partnership between the  and the client; Coaches do not prescribe or diagnose    Coaching helps inspire the client to reach his/her personal goals        Bluetooth Scale:    We hope to provide you with high quality telephone and virtual healthcare visits while social distancing for COVID-19 is necessary, as well as in the future when virtual visits may be more convenient for you.    Our technology team made it possible for Bluetooth scales to send weight measurements to our electronic medical record. This allows weights from you weighing at home to securely flow into the medical record, which will improve telephone and virtual visits.  Additionally, studies have shown that adults actually lose more weight when their weights are automatically sent to someone else, and also that this process is not stressful for those adults.    Below is a link for purchasing the scale, with a discount code for our patients. You may call your insurance company to see if they will reimburse you for the cost of the scale, as a piece of durable medical equipment. The scales only go up to a weight of 400 pounds. This is an issue and we are working with the developer on increasing this. We found no scales that go over 400lb that have blue-tooth for connecting to [employer].    Scale to purchase: the [employer]  Body  Scale: [email or web address]=aw.ds    Discount Code: We have a discount code for our patients to bring the cost down to $50, the code is:  withmed     Steps to link the scale to [employer] via an Android Phone (you can always disconnect at any point in the future):  1. The order must be placed first before the patient can access Track My Health within [employer].  2. Download Google Fit robby from the Google Play Store  a. Log in or register using your Google account  3. Download the  Wuiperhart junaid from Google Play Store  a. Select add organization  b. Search for [employer] and select it  c. Log into [facility]  d. Select Track My Health  e. Select the green connect my account button  f. When prompted log into your Google account  g. Select okay to confirm the account  4. Download the [employer] Health Mate junaid from Google Play Store  a. Laura for Withings  b. Go to profile  c. Tap google fit under the Apps section  d. Select the option to activate Google Fit integration  e. Select the same Google account  f. Select okay to confirm the account  g.  Steps to link the scale to [employer] via an iPhone (you can always disconnect at any point in the future):  **Note fsboWOW is not available for download on an iPad**  1. The order must be placed first before the patient can access [employer] Health within [employer].  2. Locate the Health junaid on your iPhone.  a. Set up your Apple Health account as prompted  b. The Sources page will show Apps that communicate with your Health junaid. Once all steps are completed, you should see Health myTips and MyChart listed under the Apps section and your iPhone under the devices section.  i. Select Health Mate  1. Under 'ALLOW  HEALTH MATE  TO WRITE DATA ensure the toggle is on for Weight.  2. This will allow the scale to add your weight to the Apple Health  ii. Select MyChart  1. Under 'ALLOW  MYCHART  TO READ DATA ensure the toggle is on for Weight.  2. This allows [facility] to grab the weight from fsboWOW so your provider can see your weights.  3. Download the Wuiperhart junaid from the Junaid Store  a. Select add organization                                                  b. Search for [place] and select it  c. Log into [employer]  d. Select Track My Health  e. Select the green connect my account button  f. Follow prompts to link your device to [employer].  4. Download the [employer] health mate junaid in the Junaid Store  a. Laura for Withings  b. Go to profile  c. Tap Health under the  Apps section  d. If prompted to allow access with the Health Junaid, toggle weight on for read and write access.      For any questions/concerns contact Tamara Green ([facility]) or Susan Balderas LPN at the [facility]    To schedule appointments with our team, please call [phone]    Please call during clinic hours Monday through Friday 8:00a - 4:00p if you have questions or you can contact us via [employer] at anytime.      Lab results will be communicated through My Chart or letter (if My Chart not used). Please call the clinic if you have not received communication after 1 week or if you have any questions.    Clinic Fax: [phone]    Thank you,  Medical Weight Management Team

## 2020-06-12 ENCOUNTER — TRANSFERRED RECORDS (OUTPATIENT)
Dept: HEALTH INFORMATION MANAGEMENT | Facility: CLINIC | Age: 25
End: 2020-06-12

## 2020-06-25 ENCOUNTER — RECORDS - HEALTHEAST (OUTPATIENT)
Dept: ADMINISTRATIVE | Facility: OTHER | Age: 25
End: 2020-06-25

## 2020-06-26 ENCOUNTER — RECORDS - HEALTHEAST (OUTPATIENT)
Dept: ADMINISTRATIVE | Facility: OTHER | Age: 25
End: 2020-06-26

## 2020-06-26 ENCOUNTER — VIRTUAL VISIT (OUTPATIENT)
Dept: ENDOCRINOLOGY | Facility: CLINIC | Age: 25
End: 2020-06-26
Attending: INTERNAL MEDICINE
Payer: COMMERCIAL

## 2020-06-26 DIAGNOSIS — E66.01 MORBID (SEVERE) OBESITY DUE TO EXCESS CALORIES (H): Primary | ICD-10-CM

## 2020-06-26 NOTE — LETTER
2020       RE: Nehemias Mascorro  [address]  [place] [address]     Dear Colleague,    Thank you for referring your patient, Nehemias Mascorro, to the [facility] ADULT WEIGHT MGT D at [facility]. Please see a copy of my visit note below.    Nehemias Mascorro is a 25 year old female who is being evaluated via a billable video visit.            Video-Visit Details    Type of service:  Video Visit    Video Start Time: 9:59  Appointment End Time for this pt (same video stream as mother, James): 10:32    Originating Location (pt. Location): Home    Distant Location (provider location):  [facility] ADULT WEIGHT MGT D     Platform used for Video Visit: Peace Slade MD        Return Medical Weight Management Note     Nehemias Mascorro  MRN:  [id number]  :  1995  JASMEET:  2020    Dear Ludwin Elam MD,    I had the pleasure of seeing your patient Nehemias Mascorro. She is a 25 year old female who I am continuing to see for treatment of obesity    Past Medical History:   Diagnosis Date     Depressive disorder      Uncomplicated asthma        INTERVAL HISTORY:      Last visit (virtual) was about 1 mo ago; reviewed and relevant history is as follows, as extracted from earlier note--      -------------------  '...Self-referred for obesity associated with GERD and anxiety and asthma.  Patient is interested in attaining a healthier weight to diminish current health problems related to co-morbid conditions and prevent future health issues.  She describes her weight history as a gradual gain, and began around age 9  Her previous attempts at weight loss include exercise. Patient has had some success lost about 20#--was on adderall and dancing)....   ...She is working on hypocaloric approach with volumetrics approach--thus far we have not started medication--options have been discussed and strategies, both lifestyle and medication support, were again discussed  today.  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  We discussed incoporating meal replacement shakes for 1-2 meals per day to help--she wants to do this.  Discussed plans/ideas for activity, discussed also pharm approaches to help support wt loss in terms of potential risks and possible benefits and side effects. All of her questions were addressed and she would like a trial of topiramate--familiar with this med for wt loss as other family members are on it with some success.  She is aware that this med alone is not approved bu the FDA as a wt loss med.  Detailed discussion of this med and in particular the need for birth control--recommended 2 forms, at least one barrier, of effective BC if she is sexually active.  Also discussed further the possibility of cognitive issues with the topiramate and concern for school performance.  She will be starting the med for a couple of wks before going off to school and will elt us know if any issues on this med in the interim or later...     Complicated  history with setbacks.  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  Work is limited to 2  2-hr sessions of sit-down work.  By herself some of the day at home a fair amount and is eating a lot of prepared/quick fix good, trying to be plant-based so eating high carb not low fat.  Snacking frequenlty during the day on prop-popped popcorn with ghee and sea salt (keeps a big bag), notes cravigs for carbs including te popcorn and also sweets.  Wakes at night and eats (has been  "having night terrors, did have to stop the topiramate because of that...     Discussed options with pt and her mother--would benefit from modified meal replacement approach which is also what her mom is doing.  Pharm support was als discussed with pt and her mom during the visit.  Pt is already on bupropion 150 mg daily--discussed adding naltrexone to that and they will also talk with her psychiatrist about this.  Discussed potential risks and benefits and possible side effects.  Pt is not on tramadol or narcotics now.  She is noting feeling more fatigued--difficulty with sleep but is also on largely plant based diet--need to eval for anemia inlcuding iron loss and also B12 def.  Also, given the significant head trauma history will also screen for central hypothyroidism.  Pt also snacks on high carb and some high fat foods--will benefit from working with our nutritionist...     When pt reconnected for care in 2018 after about a 3 yr gap we worked out a strategy allowing pt to use some meal replacements and avoid higher carb higher fat foods but it has been difficult for pt to stay focussed.  We had also discussed adding naltrexone to her regimen (pt was already on bupropion) but pt noted potential concerns by other providers of combining these 2 meds--we discussed that these meds are combinted in Contrave, an FDA-approved wt loss med.  Subsequently this med was started--tolerating it without ay issues but she wonders if the effectiveness is waning.     Barriers to wt loss identified--  Distracted eating, cravings, difficulty stopping...     At earlier visits she had noted a snack/wanting to eat between supper and bed--we shifted her naltrexone to current pattern of fist dose 1p (no problems in the morning), and second dose at 7p.     Up to 15 mg adderrall and not napping and less hungry...      Issues/triggers with eating have included stress and emotional eating--at times reaching for \"feel good\" stuff like cake " and cookies (out of the house now), pt would buy at grocery store or bakery.  Boredom eating can also be an issue.  Hunger and cravings both (sweets and comfort food cravings)...      Of note from prior meds tried to help with wt loss--  --she is on bupropion so naltrexone was added (as noted above) for potential for wt loss but pt notes this med did not help.  --other providers have had concern about restarting topiramate given the TBI hx (she had been on that prior to the TBI and was tolerating it and getting some benefit)  --GLP1 agonists not covered by insurance  --on Adderall so not able to use phentermine (and she checked with other provider about possibly switching to Vyvanse but this is not a change they want to make)  --lorcaserin is off the market  --she did start metformin and has increased to 500mg BID...      -------------------       Today again detailed discussion/assessment/motivational interviewing done with pt to assess barriers/issues with wt loss and to re-formulate plan going forward, including reframing/setting goals.     Also again had a detailed discussion of prior meds/issues with those, and discussed potential future options (really the main option at this point if unable to get GLP1 agonist for her [and at present--see comments below--given the GI issues being worked up I will continue to hold off on that option at present] would be to add back the naltrexone tried before given that she is on bupropion but this did not seem to provide much benefit prior she notes).       Of note is the following with other med changes, with an obesity focus, is the following--  1. lexapro changed to Prozac--now has stabilized with plan to titrate as needed  2. Adderall continuing  4. Abilify--taking  5. buproprion at 300 taking  6. Metformin 1000mg BID tolerating without any side effects)--unsure if this has helped any with appetite (escalated after last visit to full dose--I asked whether the abd symptoms  she has been having might be related but she links them more strongly to the baclofen in terms of timing/dosing)    Sometimes getting cravings but has been able to resist more.  At times eating less because of desire to lose wt       Activity-related goals--currently aspirational, thinking about going with mother and sister on walks and/or to the park    She is interested in the 24 week program but no one has contacted her; I contacted Angela Jiménez during the visit to help her get connected.       Also as noted above, she is having stomach pains on baclofen which is being tapered and pain is getting better she notes--had camera endoscopy done recently      Current food habits--  Up around 9:30  Breakfast--oatmeal or grits or eggs or cereal (Cheerios, Cinnamon Toast Crunch)  Lounge for awhile, and go to [employer] to work  Lunch at work or at home--tuna salad/chicken and rice  Snack sometimes (if skipped lunch)--peaches or veggie straws  Supper--(yesterday ate out--chicken/fried rice/veggie); salmon or chicken/veggies      CURRENT WEIGHT:     249.3# (scale wt)        Wt Readings from Last 3 Encounters:   02/28/20 119.9 kg (264 lb 5.3 oz)   11/22/19 118.8 kg (261 lb 14.5 oz)   09/27/19 116.8 kg (257 lb 8 oz)                Changes and Difficulties 1/25/2019   I have made the following changes to my diet since my last visit: Eating less and more vegetables   With regards to my diet, I am still struggling with: -   I have made the following changes to my activity/exercise since my last visit: Joined a gym   With regards to my activity/exercise, I am still struggling with: -       MEDICATIONS:   Current Outpatient Medications   Medication Sig Dispense Refill     ADDERALL XR 25 MG 24 hr capsule TK 1 C PO D  0     albuterol (PROAIR HFA/PROVENTIL HFA/VENTOLIN HFA) 108 (90 Base) MCG/ACT inhaler Inhale 2 puffs into the lungs       amphetamine-dextroamphetamine (ADDERALL XR) 30 MG 24 hr capsule TK 1 C PO D FOR ADHD        ARIPiprazole (ABILIFY) 15 MG tablet Take 1 tablet by mouth       ARIPiprazole (ABILIFY) 7.5 mg half-tab Take 7.5 mg by mouth daily       BuPROPion HCl (WELLBUTRIN PO) Take 300 mg by mouth daily        cetirizine (ZYRTEC) 10 MG tablet Take 10 mg by mouth daily Reported on 4/27/2017       DIAZEPAM PO Take 5 mg by mouth       ESCITALOPRAM OXALATE PO Take 20 mg by mouth daily       Fexofenadine HCl (ALLEGRA PO) Take by mouth daily as needed for allergies       GLYCOPYRROLATE PO Take 2 mg by mouth daily       GUANFACINE HCL PO Take 2 mg by mouth daily       IBUPROFEN PO Take 600 mg by mouth       Ipratropium-Albuterol (COMBIVENT RESPIMAT)  MCG/ACT inhaler Inhale 1 puff into the lungs 4 times daily       Lansoprazole (PREVACID PO)        metFORMIN (GLUCOPHAGE) 500 MG tablet For 2 wks take 2 tablets AM with breakfast then take 1 tablet twice daily with meal, and then increase to 2 tablets twice daily, as tolerated 360 tablet 1     NATAZIA 3/2-2/2-3/1 MG TABS TK 1 T PO QD  0     norethindrone-ethinyl estradiol (JUNEL FE 1/20) 1-20 MG-MCG per tablet Take 1 tablet by mouth daily       ONDANSETRON PO Take 8 mg by mouth       prazosin (MINIPRESS) 1 MG capsule Take 1 mg by mouth At Bedtime       Probiotic Product (PROBIOTIC DAILY PO)        TIZANIDINE HCL PO Take 4 mg by mouth       Venlafaxine HCl (EFFEXOR PO) Take 75 mg by mouth 3 times daily         Weight Loss Medication History Reviewed With Patient 1/25/2019   Which weight loss medications are you currently taking on a regular basis?  Naltrexone   Are you having any side effects from the weight loss medication that we have prescribed you? No         ASSESSMENT;  Morbid obesity in pt with wt gain over past few years, more since her TBI; working now with neuro and psych.     PLAN:   (continues)  Decrease portion sizes  No meals in front of TV screen  Purge house of food triggers  No meal skipping and avoid snacking  Decrease caloric beverages--avoid soda  Volumetrics  low carb eating plan--structured plan and avoding snacking  Low Calorie/low fat diet  Meal planning/journaling  --I discussed the components of our 24 week structured wt loss program that pt could consider adding in for further structure, and we can send information out to her with this additional support option and get her signed up if she wants. I contacted Angela Tino as noted above        additionally--  --continue on metformin at current full dose (2 g); she wants to continue this  --request [facility] records from the last visit and the upcoming procedure results also  --Screening labs (BMP) ideally to be used with other labs ordered to be used in the next few mo for fasting BS and kidney function     FOLLOW-UP:    4 weeks in family clinic again         Sincerely,    Luis Slade MD

## 2020-06-26 NOTE — NURSING NOTE
"Nehemias Mascorro is a 25 year old female who is being evaluated via a billable video visit.      The patient has been notified of following:     \"This video visit will be conducted via a call between you and your physician/provider. We have found that certain health care needs can be provided without the need for an in-person physical exam.  This service lets us provide the care you need with a video conversation.  If a prescription is necessary we can send it directly to your pharmacy.  If lab work is needed we can place an order for that and you can then stop by our lab to have the test done at a later time.    Video visits are billed at different rates depending on your insurance coverage.  Please reach out to your insurance provider with any questions.    If during the course of the call the physician/provider feels a video visit is not appropriate, you will not be charged for this service.\"    Patient has given verbal consent for Video visit? Yes    Will anyone else be joining your video visit? No      Sabi Wan LPN          "

## 2020-06-26 NOTE — PATIENT INSTRUCTIONS
Thank you for allowing us the privilege of caring for you. We hope we provided you with the excellent service you deserve.    Please let us know if there is anything else we can do for you so that we can be sure you are completely satisfied with your care experience.    Your visit was with Dr. Slade today.    Instructions per today's visit:  --continue on metformin at current full dose (2 g per day total dose)  --We want to request [facility] records from the last visit and the procedure results also  --Screening labs (BMP) ideally to be used with other labs ordered to be used in the next few mo for fasting BS and kidney function  --Angela Jiménez (health ) should be contacting you about the 24 week program     FOLLOW-UP:    Please return to family clinic (virtual visit) in 4 wks; please let us know if you have any questions or concerns between visits                  Other information for our patients--    Meal Replacement Products:    Here is the link to our new e-store where you can purchase our meal replacement products    [email or web address]    The one week starter kit is a great way to sample a variety of products and see what works for you.    If you want more information about the product go to: [employer]    Free Shipping for orders over $75    Benefits of meal replacements products:    Portion and calorie control  Improved nutrition  Structured eating  Simplified food choices  Avoid contact with trigger foods            Interested in working with a health ?  Health coaches work with you to improve your overall health and wellbeing.  They look at the whole person, and may involve discussion of different areas of life, including, but not limited to the four pillars of health (sleep, exercise, nutrition, and stress management). Discuss with your care team if you would like to start working a health .    Health Coaching-3 Pack:  Schedule by calling [phone]    $99 for three health coaching visits    Visits may be done in person or via phone    Coaching is a partnership between the  and the client; Coaches do not prescribe or diagnose    Coaching helps inspire the client to reach his/her personal goals            Bluetooth Scale:    We hope to provide you with high quality telephone and virtual healthcare visits while social distancing for COVID-19 is necessary, as well as in the future when virtual visits may be more convenient for you.    Our technology team made it possible for Bluetooth scales to send weight measurements to our electronic medical record. This allows weights from you weighing at home to securely flow into the medical record, which will improve telephone and virtual visits.  Additionally, studies have shown that adults actually lose more weight when their weights are automatically sent to someone else, and also that this process is not stressful for those adults.    Below is a link for purchasing the scale, with a discount code for our patients. You may call your insurance company to see if they will reimburse you for the cost of the scale, as a piece of durable medical equipment. The scales only go up to a weight of 400 pounds. This is an issue and we are working with the developer on increasing this. We found no scales that go over 400lb that have blue-tooth for connecting to [employer].    Scale to purchase: the [employer]  Body  Scale: [email or web address]=aw.ds    Discount Code: We have a discount code for our patients to bring the cost down to $50, the code is:  withmed     Steps to link the scale to [employer] via an Android Phone (you can always disconnect at any point in the future):  1. The order must be placed first before the patient can access Track My Health within [employer].  2. Download Google Fit robby from the [employer]  Play Store  a. Log in or register using your Google account  3. Download the MyChart junaid from Google Play Store  a. Select add organization  b. Search for [place] and select it  c. Log into "[employer]hart  d. Select Track My Health  e. Select the green connect my account button  f. When prompted log into your Google account  g. Select okay to confirm the account  4. Download the [employer] Health Mate junaid from Google Play Store  a. [place] for [employer]  b. Go to profile  c. Tap google fit under the Apps section  d. Select the option to activate Google Fit integration  e. Select the same Google account  f. Select okay to confirm the account  g.  Steps to link the scale to [employer] via an iPhone (you can always disconnect at any point in the future):  **Note [place] is not available for download on an iPad**  1. The order must be placed first before the patient can access Track [employer] Health within [employer].  2. Locate the Health junaid on your iPhone.  a. Set up your Apple Health account as prompted  b. The Sources page will show Apps that communicate with your Health junaid. Once all steps are completed, you should see Health [employer] and MyChart listed under the Apps section and your iPhone under the devices section.  i. Select Health Mate  1. Under 'ALLOW  HEALTH MATE  TO WRITE DATA ensure the toggle is on for Weight.  2. This will allow the scale to add your weight to the Apple Health  ii. Select MyChart  1. Under 'ALLOW  MYCHART  TO READ DATA ensure the toggle is on for Weight.  2. This allows MyChart to grab the weight from [place] so your provider can see your weights.  3. Download the MyChart junaid from the Junaid Store  a. Select add organization                                                  b. Search for [place] and select it  c. Log into [employer]  d. Select Track My Health  e. Select the green connect my account button  f. Follow prompts to link your device to [employer].  4. Download the [employer] health mate junaid in the Junaid  Store  a. [place] for [employer]  b. Go to profile  c. [employer] under the Apps section  d. If prompted to allow access with the Health Junaid, toggle weight on for read and write access.      For any questions/concerns contact Susan Balderas LPN at [phone]    To schedule appointments with our team, please call [phone]    Please call during clinic hours Monday through Friday 8:00a - 4:00p if you have questions or you can contact us via drchrono at anytime.      Lab results will be communicated through My Chart or letter (if My Chart not used). Please call the clinic if you have not received communication after 1 week or if you have any questions.    Clinic Fax: [phone]    Thank you,  Medical Weight Management Team

## 2020-06-26 NOTE — PROGRESS NOTES
"Nehemias Mascorro is a 25 year old female who is being evaluated via a billable video visit.      The patient has been notified of following:     \"This video visit will be conducted via a call between you and your physician/provider. We have found that certain health care needs can be provided without the need for an in-person physical exam.  This service lets us provide the care you need with a video conversation.  If a prescription is necessary we can send it directly to your pharmacy.  If lab work is needed we can place an order for that and you can then stop by our lab to have the test done at a later time.    Video visits are billed at different rates depending on your insurance coverage.  Please reach out to your insurance provider with any questions.    If during the course of the call the physician/provider feels a video visit is not appropriate, you will not be charged for this service.\"    Patient has given verbal consent for Video visit? Yes        Video-Visit Details    Type of service:  Video Visit    Video Start Time: 9:59  Appointment End Time for this pt (same video stream as motherJames): 10:32    Originating Location (pt. Location): Home    Distant Location (provider location):  [facility] ADULT WEIGHT MGT D     Platform used for Video Visit: Peace Slade MD        Return Medical Weight Management Note     Nehemias Mascorro  MRN:  [id number]  :  1995  JASMEET:  2020    Dear Ludwin Elam MD,    I had the pleasure of seeing your patient Nehemias Mascorro. She is a 25 year old female who I am continuing to see for treatment of obesity    Past Medical History:   Diagnosis Date     Depressive disorder      Uncomplicated asthma        INTERVAL HISTORY:      Last visit (virtual) was about 1 mo ago; reviewed and relevant history is as follows, as extracted from earlier note--      -------------------  '...Self-referred for obesity associated with GERD and anxiety and " asthma.  Patient is interested in attaining a healthier weight to diminish current health problems related to co-morbid conditions and prevent future health issues.  She describes her weight history as a gradual gain, and began around age 9  Her previous attempts at weight loss include exercise. Patient has had some success lost about 20#--was on adderall and dancing)....   ...She is working on hypocaloric approach with volumetrics approach--thus far we have not started medication--options have been discussed and strategies, both lifestyle and medication support, were again discussed today.  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  We discussed incoporating meal replacement shakes for 1-2 meals per day to help--she wants to do this.  Discussed plans/ideas for activity, discussed also pharm approaches to help support wt loss in terms of potential risks and possible benefits and side effects. All of her questions were addressed and she would like a trial of topiramate--familiar with this med for wt loss as other family members are on it with some success.  She is aware that this med alone is not approved bu the FDA as a wt loss med.  Detailed discussion of this med and in particular the need for birth control--recommended 2 forms, at least one barrier, of effective BC if she is sexually active.  Also discussed further the possibility of cognitive issues with the topiramate and concern for school performance.  She will be starting the med for a couple of wks before going off to school and will elt us know if any issues on this med in the interim or later...     Complicated  history with setbacks.  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  Work is limited to 2  2-hr sessions of sit-down work.  By herself some of the day at home a fair amount and is eating a lot of prepared/quick fix good, trying to be plant-based so eating high carb not low fat.  Snacking frequenlty during the day on prop-popped popcorn with ghee and sea salt (keeps a big bag), notes cravigs for carbs including te popcorn and also sweets.  Wakes at night and eats (has been having night terrors, did have to stop the topiramate because of that...     Discussed options with pt and her mother--would benefit from modified meal replacement approach which is also what her mom is doing.  Pharm support was als discussed with pt and her mom during the visit.  Pt is already on bupropion 150 mg daily--discussed adding naltrexone to that and they will also talk with her psychiatrist about this.  Discussed potential risks and benefits and possible side effects.  Pt is not on tramadol or narcotics now.  She is noting feeling more fatigued--difficulty with sleep but is also on largely plant based diet--need to eval for anemia inlcuding iron loss and also B12 def.  Also, given the significant head trauma history will also screen for central hypothyroidism.  Pt also snacks on high carb and some high fat foods--will benefit from working with our nutritionist...     When pt reconnected for care in 2018 after about a 3 yr gap we worked out a strategy allowing pt to use some meal replacements and avoid higher carb higher fat foods but it has been difficult for pt to stay focussed.  We had also discussed adding naltrexone to her regimen (pt was already on bupropion) but pt noted potential concerns by other providers of combining these 2 meds--we discussed that these meds are combinted in Contrave, an FDA-approved wt loss  "med.  Subsequently this med was started--tolerating it without ay issues but she wonders if the effectiveness is waning.     Barriers to wt loss identified--  Distracted eating, cravings, difficulty stopping...     At earlier visits she had noted a snack/wanting to eat between supper and bed--we shifted her naltrexone to current pattern of fist dose 1p (no problems in the morning), and second dose at 7p.     Up to 15 mg adderrall and not napping and less hungry...      Issues/triggers with eating have included stress and emotional eating--at times reaching for \"feel good\" stuff like cake and cookies (out of the house now), pt would buy at grocery store or bakery.  Boredom eating can also be an issue.  Hunger and cravings both (sweets and comfort food cravings)...      Of note from prior meds tried to help with wt loss--  --she is on bupropion so naltrexone was added (as noted above) for potential for wt loss but pt notes this med did not help.  --other providers have had concern about restarting topiramate given the TBI hx (she had been on that prior to the TBI and was tolerating it and getting some benefit)  --GLP1 agonists not covered by insurance  --on Adderall so not able to use phentermine (and she checked with other provider about possibly switching to Vyvanse but this is not a change they want to make)  --lorcaserin is off the market  --she did start metformin and has increased to 500mg BID...      -------------------       Today again detailed discussion/assessment/motivational interviewing done with pt to assess barriers/issues with wt loss and to re-formulate plan going forward, including reframing/setting goals.     Also again had a detailed discussion of prior meds/issues with those, and discussed potential future options (really the main option at this point if unable to get GLP1 agonist for her [and at present--see comments below--given the GI issues being worked up I will continue to hold off on that " option at present] would be to add back the naltrexone tried before given that she is on bupropion but this did not seem to provide much benefit prior she notes).       Of note is the following with other med changes, with an obesity focus, is the following--  1. lexapro changed to Prozac--now has stabilized with plan to titrate as needed  2. Adderall continuing  4. Abilify--taking  5. buproprion at 300 taking  6. Metformin 1000mg BID tolerating without any side effects)--unsure if this has helped any with appetite (escalated after last visit to full dose--I asked whether the abd symptoms she has been having might be related but she links them more strongly to the baclofen in terms of timing/dosing)    Sometimes getting cravings but has been able to resist more.  At times eating less because of desire to lose wt       Activity-related goals--currently aspirational, thinking about going with mother and sister on walks and/or to the park    She is interested in the 24 week program but no one has contacted her; I contacted Angela Jiménez during the visit to help her get connected.       Also as noted above, she is having stomach pains on baclofen which is being tapered and pain is getting better she notes--had camera endoscopy done recently      Current food habits--  Up around 9:30  Breakfast--oatmeal or grits or eggs or cereal (Cheerios, Cinnamon Toast Crunch)  Lounge for awhile, and go to [employer] to work  Lunch at work or at home--tuna salad/chicken and rice  Snack sometimes (if skipped lunch)--peaches or veggie straws  Supper--(yesterday ate out--chicken/fried rice/veggie); salmon or chicken/veggies      CURRENT WEIGHT:     249.3# (scale wt)        Wt Readings from Last 3 Encounters:   02/28/20 119.9 kg (264 lb 5.3 oz)   11/22/19 118.8 kg (261 lb 14.5 oz)   09/27/19 116.8 kg (257 lb 8 oz)                Changes and Difficulties 1/25/2019   I have made the following changes to my diet since my last visit: Eating less  and more vegetables   With regards to my diet, I am still struggling with: -   I have made the following changes to my activity/exercise since my last visit: Joined a gym   With regards to my activity/exercise, I am still struggling with: -       MEDICATIONS:   Current Outpatient Medications   Medication Sig Dispense Refill     ADDERALL XR 25 MG 24 hr capsule TK 1 C PO D  0     albuterol (PROAIR HFA/PROVENTIL HFA/VENTOLIN HFA) 108 (90 Base) MCG/ACT inhaler Inhale 2 puffs into the lungs       amphetamine-dextroamphetamine (ADDERALL XR) 30 MG 24 hr capsule TK 1 C PO D FOR ADHD       ARIPiprazole (ABILIFY) 15 MG tablet Take 1 tablet by mouth       ARIPiprazole (ABILIFY) 7.5 mg half-tab Take 7.5 mg by mouth daily       BuPROPion HCl (WELLBUTRIN PO) Take 300 mg by mouth daily        cetirizine (ZYRTEC) 10 MG tablet Take 10 mg by mouth daily Reported on 4/27/2017       DIAZEPAM PO Take 5 mg by mouth       ESCITALOPRAM OXALATE PO Take 20 mg by mouth daily       Fexofenadine HCl (ALLEGRA PO) Take by mouth daily as needed for allergies       GLYCOPYRROLATE PO Take 2 mg by mouth daily       GUANFACINE HCL PO Take 2 mg by mouth daily       IBUPROFEN PO Take 600 mg by mouth       Ipratropium-Albuterol (COMBIVENT RESPIMAT)  MCG/ACT inhaler Inhale 1 puff into the lungs 4 times daily       Lansoprazole (PREVACID PO)        metFORMIN (GLUCOPHAGE) 500 MG tablet For 2 wks take 2 tablets AM with breakfast then take 1 tablet twice daily with meal, and then increase to 2 tablets twice daily, as tolerated 360 tablet 1     NATAZIA 3/2-2/2-3/1 MG TABS TK 1 T PO QD  0     norethindrone-ethinyl estradiol (JUNEL FE 1/20) 1-20 MG-MCG per tablet Take 1 tablet by mouth daily       ONDANSETRON PO Take 8 mg by mouth       prazosin (MINIPRESS) 1 MG capsule Take 1 mg by mouth At Bedtime       Probiotic Product (PROBIOTIC DAILY PO)        TIZANIDINE HCL PO Take 4 mg by mouth       Venlafaxine HCl (EFFEXOR PO) Take 75 mg by mouth 3 times daily          Weight Loss Medication History Reviewed With Patient 1/25/2019   Which weight loss medications are you currently taking on a regular basis?  Naltrexone   Are you having any side effects from the weight loss medication that we have prescribed you? No         ASSESSMENT;  Morbid obesity in pt with wt gain over past few years, more since her TBI; working now with neuro and psych.     PLAN:   (continues)  Decrease portion sizes  No meals in front of TV screen  Purge house of food triggers  No meal skipping and avoid snacking  Decrease caloric beverages--avoid soda  Volumetrics low carb eating plan--structured plan and avoding snacking  Low Calorie/low fat diet  Meal planning/journaling  --I discussed the components of our 24 week structured wt loss program that pt could consider adding in for further structure, and we can send information out to her with this additional support option and get her signed up if she wants. I contacted Angela Tino as noted above        additionally--  --continue on metformin at current full dose (2 g); she wants to continue this  --request [facility] records from the last visit and the upcoming procedure results also  --Screening labs (BMP) ideally to be used with other labs ordered to be used in the next few mo for fasting BS and kidney function     FOLLOW-UP:    4 weeks in family clinic again         Sincerely,    Luis Slade MD

## 2020-07-06 ENCOUNTER — TRANSFERRED RECORDS (OUTPATIENT)
Dept: HEALTH INFORMATION MANAGEMENT | Facility: CLINIC | Age: 25
End: 2020-07-06

## 2020-07-06 ENCOUNTER — RECORDS - HEALTHEAST (OUTPATIENT)
Dept: ADMINISTRATIVE | Facility: OTHER | Age: 25
End: 2020-07-06

## 2020-07-08 ENCOUNTER — VIRTUAL VISIT (OUTPATIENT)
Dept: ENDOCRINOLOGY | Facility: CLINIC | Age: 25
End: 2020-07-08
Payer: COMMERCIAL

## 2020-07-08 ENCOUNTER — RECORDS - HEALTHEAST (OUTPATIENT)
Dept: ADMINISTRATIVE | Facility: OTHER | Age: 25
End: 2020-07-08

## 2020-07-08 ENCOUNTER — VIRTUAL VISIT (OUTPATIENT)
Dept: SURGERY | Facility: CLINIC | Age: 25
End: 2020-07-08
Payer: COMMERCIAL

## 2020-07-08 DIAGNOSIS — E66.9 OBESITY: Primary | ICD-10-CM

## 2020-07-08 NOTE — PROGRESS NOTES
MICHELLE GARCIA    Progress Notes    New Weight Management Health Coaching Note    Nehemias Mascorro          MRN: [id number]  : 1995  JASMEET: 2020    Health  Visit #: 1  Phone Visit, 24 Week  Referred by Dr Slade    ASSESSMENT:     Initial Start Date: 2020  Initial Weight: 249. 3 lbs  Graduation Date: 2020  Current Weight (lbs): na  Average Daily Water (ounces): 45 oz  Weight Loss Medication: metformin    Nutrition Plan: 1 week supply; tracking food on noes section of phone    Just had visit with RD today.    Support Group Email List:  yes    Scheduled next 2 HC visits: and  at 9am    PATIENT INFORMATION PROVIDED:  1.) 24 Week Healthy Lifestyle Plan Overview Handout:    Explained the structure of the 24 week plan    Reviewed scheduling information    Discussed option to do coaching sessions via phone or in person  2.) Cooking and Exercise Class Handout  3.) Support Group Handout  4.) Explain the role of a HEALTH  and the FOUR Pillars of Health      INITIAL INTAKE:  The four pillars of well-being may impact your ability to manage weight.   Level of Satisfaction:   Rate your current level of satisfaction on a scale of 1-10 in each pillar, and state what number you would like to be at for each area (you do not have to want to be at a 10).        Sleep ( -10): 5. Not good quality since had concussion. Sleep study rec by psychitrist.    Movement/Activity ( -10): 4/10. Mom and I started walking.    Nutrition ( -10): 7/10. Only eating dessert on sundays    Stress Management ( -10): 7/10.  Positive self talk    GOALS  Using the Meal Replacement products/ following the RD's dietary recs    Tracking my food intake on the notes section of my phone    Taking Walks with  mom  Continuing to use positive self talk         FOLLOW-UP: Next HC visits  , and  at 9am. .         To schedule your next visit, please call [phone].      TIME: 30 min          SIGNATURE:  Angela Jiménez, Certified Health  on 7/8/2020 at 8:30 AM

## 2020-07-08 NOTE — PATIENT INSTRUCTIONS
1. Try the fresh meal starter kit: 4-5 small meals daily (you may have one meal with family, while trying the starter kit)   - You may add as many vegetables as you would like to meal kit.   - If you do not like the flavor packets for the smoothies, you can add 1/2 cup of frozen   - Season well   2. Continues walking 3x a week   3. Start food journaling: can use an robby on your phone or a small notebook and pen   4. Increase vegetable intake: have a handful of vegetables with 2 meals daily. Ideas below:   - added vegetables to your eggs in the morning   - Have vegetable with dip for a snack or a side dish   - Prep a salad for dinner or lunch  - Eat a small side salad before having the rest of dinner   - Put tuna/chicken salad on a bed of lettuce or eat with carrots, celery and cucumbers.     Follow up with RD in one month    Deborah Beckman, MS, RD, LD  If you need to schedule or reschedule with a dietitian please call [phone].

## 2020-07-08 NOTE — LETTER
2020       RE: Nehemias Mascorro  [address]  [place] [address]     Dear Colleague,    Thank you for referring your patient, Nehemias Mascorro, to the [facility] MEDICAL WEIGHT MANAGEMENT at [facility]. Please see a copy of my visit note below.    MICHELLE GARCIA Notes    New Weight Management Health Coaching Note    Nehemias Mascorro          MRN: [id number]  : 1995  JASMEET: 2020    Health  Visit #: 1  Phone Visit, 24 Week  Referred by Dr Slade    ASSESSMENT:     Initial Start Date: 2020  Initial Weight: 249. 3 lbs  Graduation Date: 2020  Current Weight (lbs): na  Average Daily Water (ounces): 45 oz  Weight Loss Medication: metformin    Nutrition Plan: 1 week supply; tracking food on noes section of phone    Just had visit with RD today.    Support Group Email List:  yes    Scheduled next 2 HC visits: and  at 9am    PATIENT INFORMATION PROVIDED:  1.) 24 Week Healthy Lifestyle Plan Overview Handout:    Explained the structure of the 24 week plan    Reviewed scheduling information    Discussed option to do coaching sessions via phone or in person  2.) Cooking and Exercise Class Handout  3.) Support Group Handout  4.) Explain the role of a HEALTH  and the FOUR Pillars of Health      INITIAL INTAKE:  The four pillars of well-being may impact your ability to manage weight.   Level of Satisfaction:   Rate your current level of satisfaction on a scale of 1-10 in each pillar, and state what number you would like to be at for each area (you do not have to want to be at a 10).        Sleep (1 -10): 5. Not good quality since had concussion. Sleep study rec by psychitrist.    Movement/Activity (1 -10): 4/10. Mom and I started walking.    Nutrition (1 -10): 7/10. Only eating dessert on sundays    Stress Management (1 -10): 7/10.  Positive self talk    GOALS  Using the Meal Replacement products/ following the RD's dietary  recs    Tracking my food intake on the notes section of my phone    Taking Walks with  mom  Continuing to use positive self talk         FOLLOW-UP: Next HC visits  July 16, and July 23 at 9am. .         To schedule your next visit, please call [phone].      TIME: 30 min         SIGNATURE:  Angela Jiménez, Certified Health  on 7/8/2020 at 8:30 AM     Again, thank you for allowing me to participate in the care of your patient.      Sincerely,    Angela Jiménez

## 2020-07-08 NOTE — LETTER
"7/8/2020       RE: Nehemias Mascorro  [address]  [place] [address]     Dear Colleague,    Thank you for referring your patient, Nehemias Mascorro, to the [facility] SURGICAL WEIGHT MANAGEMENT at [facility]. Please see a copy of my visit note below.    Nehemias Mascorro is a 25 year old female who is being evaluated via a billable video visit.        Video-Visit Details    Type of service:  Video Visit    Video Start Time: 9:32 AM  Video End Time: 9:58 AM    Originating Location (pt. Location): Home    Distant Location (provider location):  [facility] SURGICAL WEIGHT MANAGEMENT     Platform used for Video Visit: flikdate 24 Week Healthy Lifestyle Nutrition Note    Reason for visit: Nutrition Assessment    Nehemias Mascorro is a 25 year old female presenting today for new nutrition assessment consult.  Patient is accompanied by self. Pt is referred by Dr. Slade.    Use of meal replacements: yes, trying starter kit    Current vitamin/mineral supplements: did not discuss today    ANTHROPOMETRICS:   Estimated body mass index is 43.83 kg/m  as calculated from the following:    Height as of 2/28/20: 1.654 m (5' 5.12\").    Weight as of 2/28/20: 119.9 kg (264 lb 5.3 oz).   Recent pt reported weight on 6/29/2020: 249 lb     NUTRITION HISTORY:  Today, pt expresses she is doing well, she started walking with her mom this past Monday. She is trying to only have two CHO daily, for lunch and dinner, but sometimes will have them for breakfast or a snack.     Diet Recall   Breakfast--oatmeal or grits or eggs or cereal (Cheerios, Cinnamon Toast Crunch)  Lunch at work or at home--tuna salad/chicken and rice  Snack sometimes: peaches or veggie straws  Supper--(yesterday ate out--chicken/fried rice/veggie); salmon or chicken/veggies      ADDITIONAL INFORMATION:   Pt is interested in trying the meal kit box, she has it ordering an will be trying this.     PHYSICAL ACTIVITY:  Walking " with mom      NUTRITION DIAGNOSIS:   Food and nutrition related knowledge deficit r/t lack of prior exposure to nutrition education of meal replacement program aeb pt unable to verbalize understanding of how to use Bariatrix meal replacements for weight loss.  -OR-  Obesity r/t long history of self-monitoring deficit and excessive energy intake aeb BMI >30.    INTERVENTION:  Nutrition Prescription:  Recommended energy/nutrient modification.    Implementation:  Assessed learning needs and learning preferences  Nutrition Education:   1. Introduced the bariatrix products and discussed how to use the meal kit.   2. Dicussed consistent exercise   3. Recommended starting a food journal again  4. Discussed increasing vegetable intake.   5. AVS via My Chart     GOALS:  1. Try the fresh meal starter kit: 4-5 small meals daily (you may have one meal with family, while trying the starter kit)   - You may add as many vegetables as you would like to meal kit.   - If you do not like the flavor packets for the smoothies, you can add 1/2 cup of frozen   - Season well   2. Continues walking 3x a week   3. Start food journaling: can use an robby on your phone or a small notebook and pen   4. Increase vegetable intake: have a handful of vegetables with 2 meals daily. Ideas below:   - added vegetables to your eggs in the morning   - Have vegetable with dip for a snack or a side dish   - Prep a salad for dinner or lunch  - Eat a small side salad before having the rest of dinner   - Put tuna/chicken salad on a bed of lettuce or eat with carrots, celery and cucumbers.     Patient Understanding: good  Expected Compliance: fair-good  Follow-Up Plans: Discuss meal and snack planning and portion sizes      FOLLOW UP  1 month    TIME SPENT WITH PATIENT: 26 Minutes     Deborah Beckman, MS, RD, LD

## 2020-07-08 NOTE — PROGRESS NOTES
"Nehemias Mascorro is a 25 year old female who is being evaluated via a billable video visit.      The patient has been notified of following:     \"This video visit will be conducted via a call between you and your physician/provider. We have found that certain health care needs can be provided without the need for an in-person physical exam.  This service lets us provide the care you need with a video conversation.  If a prescription is necessary we can send it directly to your pharmacy.  If lab work is needed we can place an order for that and you can then stop by our lab to have the test done at a later time.    Video visits are billed at different rates depending on your insurance coverage.  Please reach out to your insurance provider with any questions.    If during the course of the call the physician/provider feels a video visit is not appropriate, you will not be charged for this service.\"    Patient has given verbal consent for Video visit? Yes  How would you like to obtain your AVS? MyChart    Will anyone else be joining your video visit? No        Video-Visit Details    Type of service:  Video Visit    Video Start Time: 9:32 AM  Video End Time: 9:58 AM    Originating Location (pt. Location): Home    Distant Location (provider location):  [facility]     Platform used for Video Visit: XYverify 24 Week Healthy Lifestyle Nutrition Note    Reason for visit: Nutrition Assessment    Nehemias Mascorro is a 25 year old female presenting today for new nutrition assessment consult.  Patient is accompanied by self. Pt is referred by Dr. Slade.    Use of meal replacements: yes, trying starter kit    Current vitamin/mineral supplements: did not discuss today    ANTHROPOMETRICS:   Estimated body mass index is 43.83 kg/m  as calculated from the following:    Height as of 2/28/20: 1.654 m (5' 5.12\").    Weight as of 2/28/20: 119.9 kg (264 lb 5.3 oz).   Recent pt reported weight on " 6/29/2020: 249 lb     NUTRITION HISTORY:  Today, pt expresses she is doing well, she started walking with her mom this past Monday. She is trying to only have two CHO daily, for lunch and dinner, but sometimes will have them for breakfast or a snack.     Diet Recall   Breakfast--oatmeal or grits or eggs or cereal (Cheerios, Cinnamon Toast Crunch)  Lunch at work or at home--tuna salad/chicken and rice  Snack sometimes: peaches or veggie straws  Supper--(yesterday ate out--chicken/fried rice/veggie); salmon or chicken/veggies      ADDITIONAL INFORMATION:   Pt is interested in trying the meal kit box, she has it ordering an will be trying this.     PHYSICAL ACTIVITY:  Walking with mom      NUTRITION DIAGNOSIS:   Food and nutrition related knowledge deficit r/t lack of prior exposure to nutrition education of meal replacement program aeb pt unable to verbalize understanding of how to use Bariatrix meal replacements for weight loss.  -OR-  Obesity r/t long history of self-monitoring deficit and excessive energy intake aeb BMI >30.    INTERVENTION:  Nutrition Prescription:  Recommended energy/nutrient modification.    Implementation:  Assessed learning needs and learning preferences  Nutrition Education:   1. Introduced the bariatrix products and discussed how to use the meal kit.   2. Dicussed consistent exercise   3. Recommended starting a food journal again  4. Discussed increasing vegetable intake.   5. AVS via My Chart     GOALS:  1. Try the fresh meal starter kit: 4-5 small meals daily (you may have one meal with family, while trying the starter kit)   - You may add as many vegetables as you would like to meal kit.   - If you do not like the flavor packets for the smoothies, you can add 1/2 cup of frozen   - Season well   2. Continues walking 3x a week   3. Start food journaling: can use an robby on your phone or a small notebook and pen   4. Increase vegetable intake: have a handful of vegetables with 2 meals daily.  Ideas below:   - added vegetables to your eggs in the morning   - Have vegetable with dip for a snack or a side dish   - Prep a salad for dinner or lunch  - Eat a small side salad before having the rest of dinner   - Put tuna/chicken salad on a bed of lettuce or eat with carrots, celery and cucumbers.     Patient Understanding: good  Expected Compliance: fair-good  Follow-Up Plans: Discuss meal and snack planning and portion sizes      FOLLOW UP  1 month    TIME SPENT WITH PATIENT: 26 Minutes     Deborah Beckman, MS, RD, LD

## 2020-07-14 ENCOUNTER — COMMUNICATION - HEALTHEAST (OUTPATIENT)
Dept: INTERNAL MEDICINE | Facility: CLINIC | Age: 25
End: 2020-07-14

## 2020-07-14 ENCOUNTER — OFFICE VISIT - HEALTHEAST (OUTPATIENT)
Dept: INTERNAL MEDICINE | Facility: CLINIC | Age: 25
End: 2020-07-14

## 2020-07-14 DIAGNOSIS — B37.31 CANDIDIASIS OF VAGINA: ICD-10-CM

## 2020-07-14 ASSESSMENT — PATIENT HEALTH QUESTIONNAIRE - PHQ9: SUM OF ALL RESPONSES TO PHQ QUESTIONS 1-9: 8

## 2020-07-15 ENCOUNTER — TRANSFERRED RECORDS (OUTPATIENT)
Dept: HEALTH INFORMATION MANAGEMENT | Facility: CLINIC | Age: 25
End: 2020-07-15

## 2020-07-16 ENCOUNTER — RECORDS - HEALTHEAST (OUTPATIENT)
Dept: ADMINISTRATIVE | Facility: OTHER | Age: 25
End: 2020-07-16

## 2020-07-16 ENCOUNTER — VIRTUAL VISIT (OUTPATIENT)
Dept: ENDOCRINOLOGY | Facility: CLINIC | Age: 25
End: 2020-07-16
Payer: COMMERCIAL

## 2020-07-16 DIAGNOSIS — E66.9 OBESITY: Primary | ICD-10-CM

## 2020-07-16 NOTE — PROGRESS NOTES
ANGELA GARCIA     Progress Notes    Return Weight Management Health Coaching Note    Nehemias Mascorro          MRN: [id number]  : 1995  JASMEET: 2020    Health  Visit #: 2  Phone, 24 week    ASSESSMENT:  Initial Weight:  249.3 lbs, on       Weight Loss Medication: metformin    PILLAR(S) DISCUSSED IN THIS VISIT:    Pt received the starter box. Did not like smoothies but I gave her some ideas to try to see if it helps. She likes the egg, and pasta and bars are ok. She is wondering about using frozen meals like healthy choice. I messaged KENA Cabrera to reach out to her with some ideas for breakfast. Pt is currently eating cheerios for breakfast and wants to have the goal of healthy breakfasts daily.    Pt did not walk this week with her mom because they were busy volunteering to make meals for her [employer]; pt enjoys this.    Pt says making small goals works best for her. So she made the healthy breakfast goal, and will keep to the dessert only on Sundays goal.    We did a brief 5 min wellness vision;    Stamina, Less Sugar, Fitting into my clothes, Taking Baby Steps (discussed options for expanding on this vision with pt)        GOALS:      1.) Healthy Breakfast daily (no cereal)    2.) Dessert only on Sundays      PATIENT EDUCATION PROVIDED:        NOTES:      FOLLOW-UP:  To schedule your next visit, please call [phone].      TIME:       SIGNATURE:  Angela Garcia, Certified Health  on 2020 at 8:28 AM   Per Dr. Seaman, patient should heal naturally, should wait at least 1 month for acupuncture. Message left to return call.

## 2020-07-16 NOTE — LETTER
2020       RE: Nehemias Mascorro  [address]  [place] [address]     Dear Colleague,    Thank you for referring your patient, Nehemias Mascorro, to the [facility] at [facility]. Please see a copy of my visit note below.    ANGELA JIMÉNEZ     Progress Notes    Return Weight Management Health Coaching Note    Nheemias Mascorro          MRN: [id number]  : 1995  JASMEET: 2020    Health  Visit #: 2  Phone, 24 week    ASSESSMENT:  Initial Weight:  249.3 lbs, on       Weight Loss Medication: metformin    PILLAR(S) DISCUSSED IN THIS VISIT:    Pt received the starter box. Did not like smoothies but I gave her some ideas to try to see if it helps. She likes the egg, and pasta and bars are ok. She is wondering about using frozen meals like healthy choice. I messaged KENA Cabrera to reach out to her with some ideas for breakfast. Pt is currently eating cheerios for breakfast and wants to have the goal of healthy breakfasts daily.    Pt did not walk this week with her mom because they were busy volunteering to make meals for her [employer]; pt enjoys this.    Pt says making small goals works best for her. So she made the healthy breakfast goal, and will keep to the dessert only on Sundays goal.    We did a brief 5 min wellness vision;    Stamina, Less Sugar, Fitting into my clothes, Taking Baby Steps (discussed options for expanding on this vision with pt)        GOALS:      1.) Healthy Breakfast daily (no cereal)    2.) Dessert only on Sundays      PATIENT EDUCATION PROVIDED:        NOTES:      FOLLOW-UP:  To schedule your next visit, please call [phone].      TIME:       SIGNATURE:  Angela Jiménez, Certified Health  on 2020 at 8:28 AM    Again, thank you for allowing me to participate in the care of your patient.      Sincerely,    Angela Jiménez

## 2020-07-21 ENCOUNTER — RECORDS - HEALTHEAST (OUTPATIENT)
Dept: ADMINISTRATIVE | Facility: OTHER | Age: 25
End: 2020-07-21

## 2020-07-22 ENCOUNTER — RECORDS - HEALTHEAST (OUTPATIENT)
Dept: ADMINISTRATIVE | Facility: OTHER | Age: 25
End: 2020-07-22

## 2020-07-23 ENCOUNTER — VIRTUAL VISIT (OUTPATIENT)
Dept: ENDOCRINOLOGY | Facility: CLINIC | Age: 25
End: 2020-07-23
Payer: COMMERCIAL

## 2020-07-23 DIAGNOSIS — E66.9 OBESITY: Primary | ICD-10-CM

## 2020-07-23 NOTE — LETTER
2020       RE: Nehemias Mascorro  [address]  [place] [address]     Dear Colleague,    Thank you for referring your patient, Nehemias Mascorro, to the [facility]NAGEMENT at [facility]. Please see a copy of my visit note below.    ANGELA GARCIA Notes    Return Weight Management Health Coaching Note    Nehemias Mascorro          MRN: [id number]  : 1995  JASMEET: 2020    Health  Visit #: 3    ASSESSMENT:  Initial Weight:  249.3lbs    Current Weight (lbs):   Average Daily Water (ounces):   Weight Loss Medication: metformin      PREVIOUS GOAL(S) REVIEW:    1.) Healthy Breakfast daily (no cereal)     2.) Dessert only on Sundays      PILLAR(S) DISCUSSED IN THIS VISIT:         GOALS:        PATIENT EDUCATION PROVIDED:        NOTES:      FOLLOW-UP:  To schedule your next visit, please call [phone].      TIME:       SIGNATURE:  Angela Garcia, Certified Health  on 2020 at 8:48 PM    ANGELA GARCIA Notes    Return Weight Management Health Coaching Note    Nehemias Mascorro          MRN: [id number]  : 1995  JASMEET: 2020    Health  Visit #: 3    ASSESSMENT:  Initial Weight:  249.3lbs    Current Weight (lbs): 257 lb    Weight Loss Medication: metformin    Nutrition plan: eggs or oatmeal;  Lunch is healthy choice frozen meal; dinner with family    Ate out more this week; talked to dad about not bringing food home    Better planning and not eating out as much are pts' goals for the upcoming week.    Going to therapist twice per month    PREVIOUS GOAL(S) REVIEW:    1.) Healthy Breakfast daily (no cereal) MEETING     2.) Dessert only on Sundays  MEETING    GOALS:  2020    1.) I will eat out once per week     2;)  I will sit down with mom and write up a menu for the week; shop; prepare food ahead of time.    3.) Practice Mindful Eating (see Mindful eating handouts)    3.)  [place]  for cooking classes at [facility]; call [phone]      PATIENT EDUCATION PROVIDED:       Mindful Eating  [email or web address]    Mindful Eating Assessment  [email or web address]    Tips for Mindful Eating    [email or web address]    NOTES:    Next HC visits with Angela, August 12 and August 26 at 9am      FOLLOW-UP:  To schedule your next visit, please call [phone].      TIME:       SIGNATURE:  Angela Jiménez, Certified Health  on 7/22/2020 at 8:48 PM

## 2020-07-23 NOTE — PROGRESS NOTES
MICHELLE GARCIA     Progress Notes    Return Weight Management Health Coaching Note    Nehemias Mascorro          MRN: [id number]  : 1995  JASMEET: 2020    Health  Visit #: 3    ASSESSMENT:  Initial Weight:  249.3lbs    Current Weight (lbs): 257 lb    Weight Loss Medication: metformin    Nutrition plan: eggs or oatmeal;  Lunch is healthy choice frozen meal; dinner with family    Ate out more this week; talked to dad about not bringing food home    Better planning and not eating out as much are pts' goals for the upcoming week.    Going to therapist twice per month    PREVIOUS GOAL(S) REVIEW:    1.) Healthy Breakfast daily (no cereal) MEETING     2.) Dessert only on Sundays  MEETING    GOALS:  2020    1.) I will eat out once per week     2;)  I will sit down with mom and write up a menu for the week; shop; prepare food ahead of time.    3.) Practice Mindful Eating (see Mindful eating handouts)    3.)  [place] for cooking classes at [employer]; call [phone]      PATIENT EDUCATION PROVIDED:       Mindful Eating  [email or web address]    Mindful Eating Assessment  [email or web address]    Tips for Mindful Eating    [email or web address]    NOTES:    Next HC visits with Michelle,  and  at 9am      FOLLOW-UP:  To schedule your next visit, please call [phone].      TIME:       SIGNATURE:  Michelle Garcia, Certified Health  on 2020 at 8:48 PM

## 2020-07-23 NOTE — PROGRESS NOTES
MICHELLE GARCIA     Progress Notes    Return Weight Management Health Coaching Note    Nehemias Mascorro          MRN: [id number]  : 1995  JASMEET: 2020    Health  Visit #: 3    ASSESSMENT:  Initial Weight:  249.3lbs    Current Weight (lbs):   Average Daily Water (ounces):   Weight Loss Medication: metformin      PREVIOUS GOAL(S) REVIEW:    1.) Healthy Breakfast daily (no cereal)     2.) Dessert only on Sundays      PILLAR(S) DISCUSSED IN THIS VISIT:         GOALS:        PATIENT EDUCATION PROVIDED:        NOTES:      FOLLOW-UP:  To schedule your next visit, please call [phone].      TIME:       SIGNATURE:  Michelle Garcia, Certified Health  on 2020 at 8:48 PM

## 2020-07-24 ENCOUNTER — RECORDS - HEALTHEAST (OUTPATIENT)
Dept: ADMINISTRATIVE | Facility: OTHER | Age: 25
End: 2020-07-24

## 2020-07-24 ENCOUNTER — VIRTUAL VISIT (OUTPATIENT)
Dept: ENDOCRINOLOGY | Facility: CLINIC | Age: 25
End: 2020-07-24
Attending: INTERNAL MEDICINE
Payer: COMMERCIAL

## 2020-07-24 DIAGNOSIS — E66.01 MORBID (SEVERE) OBESITY DUE TO EXCESS CALORIES (H): Primary | ICD-10-CM

## 2020-07-24 NOTE — PROGRESS NOTES
"Nehemias Mascorro is a 25 year old female who is being evaluated via a billable video visit.      The patient has been notified of following:     \"This video visit will be conducted via a call between you and your physician/provider. We have found that certain health care needs can be provided without the need for an in-person physical exam.  This service lets us provide the care you need with a video conversation.  If a prescription is necessary we can send it directly to your pharmacy.  If lab work is needed we can place an order for that and you can then stop by our lab to have the test done at a later time.    Video visits are billed at different rates depending on your insurance coverage.  Please reach out to your insurance provider with any questions.    If during the course of the call the physician/provider feels a video visit is not appropriate, you will not be charged for this service.\"    Patient has given verbal consent for Video visit? Yes  How would you like to obtain your AVS? MyChart      Video-Visit Details    Type of service:  Video Visit    Video Start Time: 8:14  Video End Time: 9:14   (time spent with this pt 25 min; done as part of a family visit with Dr. Polanco (working with another family member) present for part of family visit)    Originating Location (pt. Location): Home    Distant Location (provider location):  [facility] ADULT WEIGHT MGT D     Platform used for Video Visit: Peace Slade MD      Return Medical Weight Management Note     Nehemias Mascorro  MRN:  [id number]  :  1995  JASMEET:  2020    Dear Ludwin Elam MD,    I had the pleasure of seeing your patient Nehemias Mascorro. She is a 25 year old female who I am continuing to see for treatment of obesity; past medical history includes the following:  Past Medical History:   Diagnosis Date     Depressive disorder      Uncomplicated asthma      INTERVAL HISTORY:    Today again we reviewed interim history " and discussed goals/barriers/issues with working on wt loss.    Last visit (virtual) was about 1 mo ago; reviewed and relevant history is as follows, as extracted from earlier note--      -------------------  '...Self-referred for obesity associated with GERD and anxiety and asthma.  Patient is interested in attaining a healthier weight to diminish current health problems related to co-morbid conditions and prevent future health issues.  She describes her weight history as a gradual gain, and began around age 9  Her previous attempts at weight loss include exercise. Patient has had some success lost about 20#--was on adderall and dancing)....   ...She is working on hypocaloric approach with volumetrics approach--thus far we have not started medication--options have been discussed and strategies, both lifestyle and medication support, were again discussed today.  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  We discussed incoporating meal replacement shakes for 1-2 meals per day to help--she wants to do this.  Discussed plans/ideas for activity, discussed also pharm approaches to help support wt loss in terms of potential risks and possible benefits and side effects. All of her questions were addressed and she would like a trial of topiramate--familiar with this med for wt loss as other family members are on it with some success.  She is aware that this med alone is not approved bu the FDA as a wt loss med.  Detailed discussion of this med and in particular the need for birth control--recommended 2 forms, at least one barrier, of effective BC if she is sexually active.  Also discussed further the possibility of cognitive issues with the topiramate and concern for school performance.  She will be  starting the med for a couple of wks before going off to school and will elt us know if any issues on this med in the interim or later...     Complicated  history with setbacks.  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  Work is limited to 2  2-hr sessions of sit-down work.  By herself some of the day at home a fair amount and is eating a lot of prepared/quick fix good, trying to be plant-based so eating high carb not low fat.  Snacking frequenlty during the day on prop-popped popcorn with ghee and sea salt (keeps a big bag), notes cravigs for carbs including te popcorn and also sweets.  Wakes at night and eats (has been having night terrors, did have to stop the topiramate because of that...     Discussed options with pt and her mother--would benefit from modified meal replacement approach which is also what her mom is doing.  Pharm support was als discussed with pt and her mom during the visit.  Pt is already on bupropion 150 mg daily--discussed adding naltrexone to that and they will also talk with her psychiatrist about this.  Discussed potential risks and benefits and possible side effects.  Pt is not on tramadol or narcotics now.  She is noting feeling more fatigued--difficulty with sleep but is also on largely plant based diet--need to eval for anemia inlcuding iron loss and also B12 def.  Also, given the significant head trauma history will also screen for central hypothyroidism.  Pt also snacks on high carb and some high fat foods--will benefit from working with our nutritionist...     When pt reconnected for care in 2018 after about a 3 yr gap we worked out a strategy allowing pt to use some meal replacements and avoid higher carb higher fat foods but it has been  "difficult for pt to stay focussed.  We had also discussed adding naltrexone to her regimen (pt was already on bupropion) but pt noted potential concerns by other providers of combining these 2 meds--we discussed that these meds are combinted in Contrave, an FDA-approved wt loss med.  Subsequently this med was started--tolerating it without ay issues but she wonders if the effectiveness is waning.     Barriers to wt loss identified--  Distracted eating, cravings, difficulty stopping...     At earlier visits she had noted a snack/wanting to eat between supper and bed--we shifted her naltrexone to current pattern of fist dose 1p (no problems in the morning), and second dose at 7p.     Up to 15 mg adderrall and not napping and less hungry...        Issues/triggers with eating have included stress and emotional eating--at times reaching for \"feel good\" stuff like cake and cookies (out of the house now), pt would buy at grocery store or bakery.  Boredom eating can also be an issue.  Hunger and cravings both (sweets and comfort food cravings)...        Of note from prior meds tried to help with wt loss--  --she is on bupropion so naltrexone was added (as noted above) for potential for wt loss but pt notes this med did not help.  --other providers have had concern about restarting topiramate given the TBI hx (she had been on that prior to the TBI and was tolerating it and getting some benefit)  --GLP1 agonists not covered by insurance  --on Adderall so not able to use phentermine (and she checked with other provider about possibly switching to Vyvanse but this is not a change they want to make)  --lorcaserin is off the market  --she did start metformin and has increased to 500mg BID...      -------------------    Regarding medications related/relevant to wt loss and co-morbidities I note the following--    1. lexapro changed to Prozac--now has stabilized with plan to titrate as needed---> in the interim Prozac dose was " increased but is working much better than the other  2. Adderall continuing  4. Abilify--taking  5. buproprion at 300 taking  6. Metformin 1000mg BID tolerating without any side effects)--unsure if this has helped any with appetite (escalated after last visit to full dose)         Since last seen, connected with the 24 wk program and has met with  and nutritionist    We discussed goals she is working on with that program and she notes the following--  Goals set--stop cereal and have eggs or oatmeal  Go walking during the week--3 days per wk)  Plan out menus during the week with Mom and do shopping with her      She has also followed up with GI in the interim and had anorectal studies--she notes they cited difficulty with passing stools; further follow up is planned.  Given the potential GI motility issues I continue to have some concern about adding GLP-1 agonist.  May be under consideration in the  Future depending on additional work up/treatment.    CURRENT WEIGHT:     Wt today--257#      wt last  258.7#        Wt Readings from Earlier Encounters:   02/28/20 119.9 kg (264 lb 5.3 oz)   11/22/19 118.8 kg (261 lb 14.5 oz)   09/27/19 116.8 kg (257 lb 8 oz)               Changes and Difficulties 1/25/2019   I have made the following changes to my diet since my last visit: Eating less and more vegetables   With regards to my diet, I am still struggling with: -   I have made the following changes to my activity/exercise since my last visit: Joined a gym   With regards to my activity/exercise, I am still struggling with: -       VITALS:  There were no vitals taken for this visit.    MEDICATIONS:   Current Outpatient Medications   Medication Sig Dispense Refill     ADDERALL XR 25 MG 24 hr capsule TK 1 C PO D  0     albuterol (PROAIR HFA/PROVENTIL HFA/VENTOLIN HFA) 108 (90 Base) MCG/ACT inhaler Inhale 2 puffs into the lungs       ARIPiprazole (ABILIFY) 15 MG tablet Take 1 tablet by mouth       BuPROPion HCl (WELLBUTRIN  PO) Take 300 mg by mouth daily        cetirizine (ZYRTEC) 10 MG tablet Take 10 mg by mouth daily Reported on 4/27/2017       DIAZEPAM PO Take 5 mg by mouth       ESCITALOPRAM OXALATE PO Take 20 mg by mouth daily       Fexofenadine HCl (ALLEGRA PO) Take by mouth daily as needed for allergies       GLYCOPYRROLATE PO Take 2 mg by mouth daily       GUANFACINE HCL PO Take 2 mg by mouth daily       IBUPROFEN PO Take 600 mg by mouth       Ipratropium-Albuterol (COMBIVENT RESPIMAT)  MCG/ACT inhaler Inhale 1 puff into the lungs 4 times daily       Lansoprazole (PREVACID PO)        metFORMIN (GLUCOPHAGE) 500 MG tablet For 2 wks take 2 tablets AM with breakfast then take 1 tablet twice daily with meal, and then increase to 2 tablets twice daily, as tolerated 360 tablet 1     NATAZIA 3/2-2/2-3/1 MG TABS TK 1 T PO QD  0     norethindrone-ethinyl estradiol (JUNEL FE 1/20) 1-20 MG-MCG per tablet Take 1 tablet by mouth daily       ONDANSETRON PO Take 8 mg by mouth       prazosin (MINIPRESS) 1 MG capsule Take 1 mg by mouth At Bedtime       Probiotic Product (PROBIOTIC DAILY PO)        TIZANIDINE HCL PO Take 4 mg by mouth       Venlafaxine HCl (EFFEXOR PO) Take 75 mg by mouth 3 times daily       amphetamine-dextroamphetamine (ADDERALL XR) 30 MG 24 hr capsule TK 1 C PO D FOR ADHD       ARIPiprazole (ABILIFY) 7.5 mg half-tab Take 7.5 mg by mouth daily         Weight Loss Medication History Reviewed With Patient 1/25/2019   Which weight loss medications are you currently taking on a regular basis?  Naltrexone   Are you having any side effects from the weight loss medication that we have prescribed you? No   (not still taking naltrexone)    ASSESSMENT;  Morbid obesity in pt with wt gain over past few years, more since her TBI; working now with neuro and psych.      PLAN:   (continues)  Decrease portion sizes  No meals in front of TV screen  Purge house of food triggers  No meal skipping and avoid snacking  Decrease caloric  beverages--avoid soda  Volumetrics low carb eating plan--structured plan and avoding snacking  Low Calorie/low fat diet  Meal planning/journaling          additionally--  --continue on metformin at current full dose (2 g); she wants to continue this  --continue with [facility] work up/treatment  --Screening labs (BMP) ideally to be used with other labs ordered to be used in the next few mo for fasting BS and kidney function  --continue with our 24 week health coaching/nutrition program        Sincerely,    Luis Slade MD

## 2020-07-24 NOTE — NURSING NOTE
"[facility] [[id number]]  No chief complaint on file.    Initial There were no vitals taken for this visit. Estimated body mass index is 43.83 kg/m  as calculated from the following:    Height as of 2/28/20: 5' 5.12\" (165.4 cm).    Weight as of 2/28/20: 264 lb 5.3 oz (119.9 kg).  Medication Reconciliation: complete  "

## 2020-07-24 NOTE — LETTER
2020       RE: Nehemias Mascorro  [address]  [place] [address]     Dear Colleague,    Thank you for referring your patient, Nehemias Mascorro, to the [facility] ADULT WEIGHT MGT D at [facility] acute [facility]. Please see a copy of my visit note below.    Nehemias Mascorro is a 25 year old female who is being evaluated via a billable video visit.        Video-Visit Details    Type of service:  Video Visit    Video Start Time: 8:14  Video End Time: 9:14   (time spent with this pt 25 min; done as part of a family visit with Dr. Polanco (working with another family member) present for part of family visit)    Originating Location (pt. Location): Home    Distant Location (provider location):  [facility] ADULT WEIGHT MGT D     Platform used for Video Visit: Peace Slade MD      Return Medical Weight Management Note     Nehemias Mascorro  MRN:  [id number]  :  1995  JASMEET:  2020    Dear Ludwin Elam MD,    I had the pleasure of seeing your patient Nehemias Mascorro. She is a 25 year old female who I am continuing to see for treatment of obesity; past medical history includes the following:  Past Medical History:   Diagnosis Date     Depressive disorder      Uncomplicated asthma      INTERVAL HISTORY:    Today again we reviewed interim history and discussed goals/barriers/issues with working on wt loss.    Last visit (virtual) was about 1 mo ago; reviewed and relevant history is as follows, as extracted from earlier note--      -------------------  '...Self-referred for obesity associated with GERD and anxiety and asthma.  Patient is interested in attaining a healthier weight to diminish current health problems related to co-morbid conditions and prevent future health issues.  She describes her weight history as a gradual gain, and began around age 9  Her previous attempts at weight loss include exercise. Patient has had some success lost about 20#--was on adderall  and dancing)....   ...She is working on hypocaloric approach with volumetrics approach--thus far we have not started medication--options have been discussed and strategies, both lifestyle and medication support, were again discussed today.  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  We discussed incoporating meal replacement shakes for 1-2 meals per day to help--she wants to do this.  Discussed plans/ideas for activity, discussed also pharm approaches to help support wt loss in terms of potential risks and possible benefits and side effects. All of her questions were addressed and she would like a trial of topiramate--familiar with this med for wt loss as other family members are on it with some success.  She is aware that this med alone is not approved bu the FDA as a wt loss med.  Detailed discussion of this med and in particular the need for birth control--recommended 2 forms, at least one barrier, of effective BC if she is sexually active.  Also discussed further the possibility of cognitive issues with the topiramate and concern for school performance.  She will be starting the med for a couple of wks before going off to school and will elt us know if any issues on this med in the interim or later...     Complicated  history with setbacks.  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  Work is limited to 2  2-hr sessions of sit-down work.  By herself some of the day at home a fair amount and is eating a lot of prepared/quick fix good, trying to be  plant-based so eating high carb not low fat.  Snacking frequenlty during the day on prop-popped popcorn with ghee and sea salt (keeps a big bag), notes cravigs for carbs including te popcorn and also sweets.  Wakes at night and eats (has been having night terrors, did have to stop the topiramate because of that...     Discussed options with pt and her mother--would benefit from modified meal replacement approach which is also what her mom is doing.  Pharm support was als discussed with pt and her mom during the visit.  Pt is already on bupropion 150 mg daily--discussed adding naltrexone to that and they will also talk with her psychiatrist about this.  Discussed potential risks and benefits and possible side effects.  Pt is not on tramadol or narcotics now.  She is noting feeling more fatigued--difficulty with sleep but is also on largely plant based diet--need to eval for anemia inlcuding iron loss and also B12 def.  Also, given the significant head trauma history will also screen for central hypothyroidism.  Pt also snacks on high carb and some high fat foods--will benefit from working with our nutritionist...     When pt reconnected for care in 2018 after about a 3 yr gap we worked out a strategy allowing pt to use some meal replacements and avoid higher carb higher fat foods but it has been difficult for pt to stay focussed.  We had also discussed adding naltrexone to her regimen (pt was already on bupropion) but pt noted potential concerns by other providers of combining these 2 meds--we discussed that these meds are combinted in Contrave, an FDA-approved wt loss med.  Subsequently this med was started--tolerating it without ay issues but she wonders if the effectiveness is waning.     Barriers to wt loss identified--  Distracted eating, cravings, difficulty stopping...     At earlier visits she had noted a snack/wanting to eat between supper and bed--we shifted her naltrexone to current pattern of fist dose 1p  "(no problems in the morning), and second dose at 7p.     Up to 15 mg adderrall and not napping and less hungry...        Issues/triggers with eating have included stress and emotional eating--at times reaching for \"feel good\" stuff like cake and cookies (out of the house now), pt would buy at grocery store or bakery.  Boredom eating can also be an issue.  Hunger and cravings both (sweets and comfort food cravings)...        Of note from prior meds tried to help with wt loss--  --she is on bupropion so naltrexone was added (as noted above) for potential for wt loss but pt notes this med did not help.  --other providers have had concern about restarting topiramate given the TBI hx (she had been on that prior to the TBI and was tolerating it and getting some benefit)  --GLP1 agonists not covered by insurance  --on Adderall so not able to use phentermine (and she checked with other provider about possibly switching to Vyvanse but this is not a change they want to make)  --lorcaserin is off the market  --she did start metformin and has increased to 500mg BID...      -------------------    Regarding medications related/relevant to wt loss and co-morbidities I note the following--    1. lexapro changed to Prozac--now has stabilized with plan to titrate as needed---> in the interim Prozac dose was increased but is working much better than the other  2. Adderall continuing  4. Abilify--taking  5. buproprion at 300 taking  6. Metformin 1000mg BID tolerating without any side effects)--unsure if this has helped any with appetite (escalated after last visit to full dose)         Since last seen, connected with the 24 wk program and has met with  and nutritionist    We discussed goals she is working on with that program and she notes the following--  Goals set--stop cereal and have eggs or oatmeal  Go walking during the week--3 days per wk)  Plan out menus during the week with Mom and do shopping with her      She has also " followed up with GI in the interim and had anorectal studies--she notes they cited difficulty with passing stools; further follow up is planned.  Given the potential GI motility issues I continue to have some concern about adding GLP-1 agonist.  May be under consideration in the  Future depending on additional work up/treatment.    CURRENT WEIGHT:     Wt today--257#      wt last  258.7#        Wt Readings from Earlier Encounters:   02/28/20 119.9 kg (264 lb 5.3 oz)   11/22/19 118.8 kg (261 lb 14.5 oz)   09/27/19 116.8 kg (257 lb 8 oz)               Changes and Difficulties 1/25/2019   I have made the following changes to my diet since my last visit: Eating less and more vegetables   With regards to my diet, I am still struggling with: -   I have made the following changes to my activity/exercise since my last visit: Joined a gym   With regards to my activity/exercise, I am still struggling with: -       VITALS:  There were no vitals taken for this visit.    MEDICATIONS:   Current Outpatient Medications   Medication Sig Dispense Refill     ADDERALL XR 25 MG 24 hr capsule TK 1 C PO D  0     albuterol (PROAIR HFA/PROVENTIL HFA/VENTOLIN HFA) 108 (90 Base) MCG/ACT inhaler Inhale 2 puffs into the lungs       ARIPiprazole (ABILIFY) 15 MG tablet Take 1 tablet by mouth       BuPROPion HCl (WELLBUTRIN PO) Take 300 mg by mouth daily        cetirizine (ZYRTEC) 10 MG tablet Take 10 mg by mouth daily Reported on 4/27/2017       DIAZEPAM PO Take 5 mg by mouth       ESCITALOPRAM OXALATE PO Take 20 mg by mouth daily       Fexofenadine HCl (ALLEGRA PO) Take by mouth daily as needed for allergies       GLYCOPYRROLATE PO Take 2 mg by mouth daily       GUANFACINE HCL PO Take 2 mg by mouth daily       IBUPROFEN PO Take 600 mg by mouth       Ipratropium-Albuterol (COMBIVENT RESPIMAT)  MCG/ACT inhaler Inhale 1 puff into the lungs 4 times daily       Lansoprazole (PREVACID PO)        metFORMIN (GLUCOPHAGE) 500 MG tablet For 2 wks take  2 tablets AM with breakfast then take 1 tablet twice daily with meal, and then increase to 2 tablets twice daily, as tolerated 360 tablet 1     NATAZIA 3/2-2/2-3/1 MG TABS TK 1 T PO QD  0     norethindrone-ethinyl estradiol (JUNEL FE 1/20) 1-20 MG-MCG per tablet Take 1 tablet by mouth daily       ONDANSETRON PO Take 8 mg by mouth       prazosin (MINIPRESS) 1 MG capsule Take 1 mg by mouth At Bedtime       Probiotic Product (PROBIOTIC DAILY PO)        TIZANIDINE HCL PO Take 4 mg by mouth       Venlafaxine HCl (EFFEXOR PO) Take 75 mg by mouth 3 times daily       amphetamine-dextroamphetamine (ADDERALL XR) 30 MG 24 hr capsule TK 1 C PO D FOR ADHD       ARIPiprazole (ABILIFY) 7.5 mg half-tab Take 7.5 mg by mouth daily         Weight Loss Medication History Reviewed With Patient 1/25/2019   Which weight loss medications are you currently taking on a regular basis?  Naltrexone   Are you having any side effects from the weight loss medication that we have prescribed you? No   (not still taking naltrexone)    ASSESSMENT;  Morbid obesity in pt with wt gain over past few years, more since her TBI; working now with neuro and psych.      PLAN:   (continues)  Decrease portion sizes  No meals in front of TV screen  Purge house of food triggers  No meal skipping and avoid snacking  Decrease caloric beverages--avoid soda  Volumetrics low carb eating plan--structured plan and avoding snacking  Low Calorie/low fat diet  Meal planning/journaling          additionally--  --continue on metformin at current full dose (2 g); she wants to continue this  --continue with [facility] work up/treatment  --Screening labs (BMP) ideally to be used with other labs ordered to be used in the next few mo for fasting BS and kidney function  --continue with our 24 week health coaching/nutrition program        Sincerely,    Luis Slade MD

## 2020-07-26 NOTE — PATIENT INSTRUCTIONS
Thank you for allowing us the privilege of caring for you. We hope we provided you with the excellent service you deserve.    Please let us know if there is anything else we can do for you so that we can be sure you are completely satisfied with your care experience.    Your visit was with Dr. Slade today.    Instructions per today's visit:  --we can continue current medications to help with wt loss support  --please continue to work on the goals you are setting with the 24- week health coaching/nutrition program  --we can plan a follow up virtual visit in one month          Additional information for our patients--  Meal Replacement Products:    Here is the link to our new e-store where you can purchase our meal replacement products    [email or web address]    The one week starter kit is a great way to sample a variety of products and see what works for you.    If you want more information about the product go to: [employer]    Free Shipping for orders over $75    Benefits of meal replacements products:    Portion and calorie control  Improved nutrition  Structured eating  Simplified food choices  Avoid contact with trigger foods          Interested in working with a health ?  Health coaches work with you to improve your overall health and wellbeing.  They look at the whole person, and may involve discussion of different areas of life, including, but not limited to the four pillars of health (sleep, exercise, nutrition, and stress management). Discuss with your care team if you would like to start working a health .    Health Coaching-3 Pack: Schedule by calling [phone]    $99 for three health coaching visits    Visits may be done in person or via phone    Coaching is a partnership between the  and the client; Coaches do not prescribe or diagnose    Coaching helps inspire the client to reach his/her personal goals          [employer]  Scale:    We hope to provide you with high quality telephone and virtual healthcare visits while social distancing for COVID-19 is necessary, as well as in the future when virtual visits may be more convenient for you.    Our technology team made it possible for Bluetooth scales to send weight measurements to our electronic medical record. This allows weights from you weighing at home to securely flow into the medical record, which will improve telephone and virtual visits.  Additionally, studies have shown that adults actually lose more weight when their weights are automatically sent to someone else, and also that this process is not stressful for those adults.    Below is a link for purchasing the scale, with a discount code for our patients. You may call your insurance company to see if they will reimburse you for the cost of the scale, as a piece of durable medical equipment. The scales only go up to a weight of 400 pounds. This is an issue and we are working with the developer on increasing this. We found no scales that go over 400lb that have blue-tooth for connecting to [employer].    Scale to purchase: the [employer]  Body  Scale: [email or web address]=aw.ds    Discount Code: We have a discount code for our patients to bring the cost down to $50, the code is:  withmed     Steps to link the scale to [employer] via an Android Phone (you can always disconnect at any point in the future):  1. The order must be placed first before the patient can access Track My Health within [employer].  2. Download Google Fit robby from the Google Play Store  a. Log in or register using your Google account  3. Download the [employer] robby from Google Play Store  a. Select add organization  b. Search for [employer] and select it  c. Log into [employer]  d. Select Track My Health  e. Select the green connect my account button  f. When prompted log into your [employer]  account  g. Select okay to confirm the account  4. Download the [employer] Health Mate junaid from Google Play Store  a. [place] for [employer]  b. Go to profile  c. Tap google fit under the Apps section  d. Select the option to activate Google Fit integration  e. Select the same Google account  f. Select okay to confirm the account  g.  Steps to link the scale to [employer] via an iPhone (you can always disconnect at any point in the future):  **Note En Noir is not available for download on an iPad**  1. The order must be placed first before the patient can access Track My Health within [employer]  2. Locate the Health junaid on your iPhone.  a. Set up your Apple Health account as prompted  b. The Sources page will show Apps that communicate with your Health junaid. Once all steps are completed, you should see [employer] and [employer] listed under the Apps section and your iPhone under the devices section.  i. Select Health Mate  1. Under 'ALLOW  HEALTH Lexim  TO WRITE DATA ensure the toggle is on for Weight.  2. This will allow the scale to add your weight to the Apple Health  ii. Select [employer]  1. Under 'ALLOW  [employer]ure  TO READ DATA ensure the toggle is on for Weight.  2. This allows [employer] to grab the weight from En Noir so your provider can see your weights.  3. Download the [employer] junaid from the Junaid Store  a. Select add organization                                                  b. Search for [employer] and select it  c. Log into [employer]  d. Select Track My Health  e. Select the green connect my account button  f. Follow prompts to link your device to [employer]  4. Download the [employer] health mate junaid in the Junaid Store  a. [place] for [employer]  b. Go to profile  c. Tap Health under the Apps section  d. If prompted to allow access with the Health Junaid, toggle weight on for read and write access.            Lab results will be communicated through My Chart or letter (if My Chart not used). Please call the clinic if you  have not received communication after 1 week or if you have any questions.      Thank you,  Medical Weight Management Team

## 2020-07-28 ENCOUNTER — THERAPY VISIT (OUTPATIENT)
Dept: PHYSICAL THERAPY | Facility: CLINIC | Age: 25
End: 2020-07-28
Payer: COMMERCIAL

## 2020-07-28 DIAGNOSIS — M62.89 PELVIC FLOOR DYSFUNCTION: ICD-10-CM

## 2020-07-28 PROCEDURE — 97110 THERAPEUTIC EXERCISES: CPT | Mod: GP | Performed by: PHYSICAL THERAPIST

## 2020-07-28 PROCEDURE — 97161 PT EVAL LOW COMPLEX 20 MIN: CPT | Mod: GP | Performed by: PHYSICAL THERAPIST

## 2020-07-28 PROCEDURE — 97112 NEUROMUSCULAR REEDUCATION: CPT | Mod: GP | Performed by: PHYSICAL THERAPIST

## 2020-07-28 PROCEDURE — 97530 THERAPEUTIC ACTIVITIES: CPT | Mod: GP | Performed by: PHYSICAL THERAPIST

## 2020-07-28 NOTE — LETTER
[facility] ATHLETIC [facility] PHYSICAL THERAPY  [address] [place] [address]  [phone]    2020    Re: Nehemias Mascorro   :   1995  MRN:  [id number]   REFERRING PHYSICIAN:   Ludwin Elam    [facility]  Date of Initial Evaluation:  20  Visits:  Rxs Used: 1  Reason for Referral:  Pelvic floor dysfunction    EVALUATION SUMMARY    [facility] Initial Evaluation  Subjective:  The history is provided by the patient. No  was used.   Patient Health History  Nehemias Mascorro being seen for obstructive defecation.     Date of Onset: 20, date of order.   Problem occurred: years ago   Pain is reported as 3/10 on pain scale.  General health as reported by patient is fair.  Health conditions: IBS, asthma, anxiety, depression, anemia, overweight, concussions/dizziness, migranes/headaches, numbness/tingling.   Red flags:  None as reported by patient.     Surgeries include:  Other (ankle hip wrist).    Current medications: adderall, metformin, lansoprazole, natazia, allergy.    Current occupation is student.   Primary job tasks include:  Computer work, lifting/carrying, prolonged sitting and pushing/pulling.                              Therapist Assessment:   Clinical Impression: [facility] with primary complaint of obstructive defecation.  Per clinical examination, pt with fair coordination of pelvic floor muscles and decreases strength and flexibility of lumbo pelvic region.  Pt will benefit from skilled physical therapy for pelvic floor coordination training as well as strength and flexibility trainging.    Chief Complaint:  The pt presents today with chronic constipation that has been going on ever since she can remember. The pt will also periodically have abdominal discomfort.    Onset date/MD orders: 20, date of  order, obstructive defecation    Current activity: walking 3x a week for ~1 mile    Goals: complete bowel movements and reduce abdominal discomfort    Urination   Do you leak on the way to the bathroom or with a strong urge to void? no  Do you leak with cough, sneeze, jumping, running? no  Re: Nehemias Mascorro   :   1995    Any other activities that cause leaking? n/a  How many times do you get up to urinate at night? no  How many times do you urinate during the day? 2-3 hours  Can you stop the flow of urine when on the toilet? yes  What is you fluid intake per day? Water (8oz) 32-64oz  Caffeine decaffeinated tea 1 cup  Alcohol: on occasion    Bowel Habits  Frequency of bowel movements? 2x a week  Consistency of stool? [place] stool scale? Type 3-5   Do you ignore the urge to defecate? sometimes  Do you strain to pass stool? Sometimes  The pt notes that she tries to eat a good amount of fruits and veggies during the day.  Pt is taking one capful of Miralax a day.    Pelvic Pain  When do you have pelvic pain? abdominal discomfort and chronic low back pain from accident in 2016.  Are you sexually active? no  Have you ever been worried for your physical safety? yes  Are you having regular exercise? Yes, walk 1-2 miles 3x a week  Have you practiced the PF (kegel) exercise for 4 or more weeks? no    Objective:  FLEXIBILITY:tightness of adductors and piriformis bilaterally    ABDOMINAL WALL: tenderness to palpation R lower quadrant    EXTERNAL ASSESSMENT:  Skin condition:normal  Bearing down/coughing:no movement  Muscle contraction/perineal mobility: elevation and urogenital triangle descent, slight lift, no urogenital triangle descent, no movement, substitution      INTERNAL ASSESSMENT:  Baseline PF tone:slightly hyper, increased tenderness to palpation levator ani and bulbocavernosus on R side  PF Tone with cough: NT  PF Response quality: periodically sluggish  PF Power: Center: 3/5  Accessory Muscle  use:occasionally glutes  Endurance: Maximum contraction in seconds:NT   Quick contraction repetitions prior to fatigue: NT     SEMG BIOFEEDBACK  Surface Electrode Placement:   Perianal: Levator ani     Abdominals: transverse abdominis  Baseline EMG PM: supine and sittinmV  Pt demonstrated slight paradoxical PFM contraction in sitting. Improved after cueing.  Observation: L ASIS superior    Assessment/Plan:    Patient is a 25 year old female with pelvic complaints.    Patient has the following significant findings with corresponding treatment plan.                  Re: Nehemias Mascorro   :   1995    Diagnosis 1:  Pelvic floor dysfunction  Pain -  hot/cold therapy, US, electric stimulation, mechanical traction, manual therapy, STS, splint/taping/bracing/orthotics, self management, education, directional preference exercise and home program  Decreased ROM/flexibility - manual therapy, therapeutic exercise, therapeutic activity and home program  Decreased strength - therapeutic exercise, therapeutic activities and home program  Impaired muscle performance - biofeedback, electric stimulation, neuro re-education and home program    Therapy Evaluation Codes:   Cumulative Therapy Evaluation is: Low complexity.    Previous and current functional limitations:  (See Goal Flow Sheet for this information)    Short term and Long term goals: (See Goal Flow Sheet for this information)     Communication ability:  Patient appears to be able to clearly communicate and understand verbal and written communication and follow directions correctly.  Treatment Explanation - The following has been discussed with the patient:   RX ordered/plan of care  Anticipated outcomes  Possible risks and side effects  This patient would benefit from PT intervention to resume normal activities.   Rehab potential is good.    Frequency:  1 X week, once daily  Duration:  for 4 weeks tapering to 2 X a month over 8 weeks  Discharge Plan:  Achieve  all LTG.  Independent in home treatment program.  Reach maximal therapeutic benefit.    Please refer to the daily flowsheet for treatment today, total treatment time and time spent performing 1:1 timed codes.     Thank you for your referral.    INQUIRIES  Therapist: Aggie Hawk DPT  [facility] PH[facility]ICAL THERAP[facility]  [address] [place] [address]  Phone: [phone]  Fax: [phone]

## 2020-07-28 NOTE — PROGRESS NOTES
[place] [facility] Initial Evaluation  Subjective:  The history is provided by the patient. No  was used.   Patient Health History  Nehemias Mascorro being seen for obstructive defecation.     Date of Onset: 7/7/20, date of order.   Problem occurred: years ago   Pain is reported as 3/10 on pain scale.  General health as reported by patient is fair.  Health conditions: IBS, asthma, anxiety, depression, anemia, overweight, concussions/dizziness, migranes/headaches, numbness/tingling.   Red flags:  None as reported by patient.     Surgeries include:  Other (ankle hip wrist).    Current medications: adderall, metformin, lansoprazole, natazia, allergy.    Current occupation is student.   Primary job tasks include:  Computer work, lifting/carrying, prolonged sitting and pushing/pulling.                                  Therapist Assessment:   Clinical Impression: Pt presents to [facility] with primary complaint of obstructive defecation.  Per clinical examination, pt with fair coordination of pelvic floor muscles and decreases strength and flexibility of lumbo pelvic region.  Pt will benefit from skilled physical therapy for pelvic floor coordination training as well as strength and flexibility trainging.    Chief Complaint:  The pt presents today with chronic constipation that has been going on ever since she can remember. The pt will also periodically have abdominal discomfort.    Onset date/MD orders: 7/7/20, date of order, obstructive defecation    Current activity: walking 3x a week for ~1 mile    Goals: complete bowel movements and reduce abdominal discomfort    Urination   Do you leak on the way to the bathroom or with a strong urge to void? no  Do you leak with cough, sneeze, jumping, running? no  Any other activities that cause leaking? n/a  How many times do you get up to urinate at night? no  How many times do you urinate during the day? 2-3  hours  Can you stop the flow of urine when on the toilet? yes  What is you fluid intake per day? Water (8oz) 32-64oz  Caffeine decaffeinated tea 1 cup  Alcohol: on occasion    Bowel Habits  Frequency of bowel movements? 2x a week  Consistency of stool? [place] stool scale? Type 3-5   Do you ignore the urge to defecate? sometimes  Do you strain to pass stool? Sometimes  The pt notes that she tries to eat a good amount of fruits and veggies during the day.  Pt is taking one capful of Miralax a day.    Pelvic Pain  When do you have pelvic pain? abdominal discomfort and chronic low back pain from accident in 2016.  Are you sexually active? no  Have you ever been worried for your physical safety? yes  Are you having regular exercise? Yes, walk 1-2 miles 3x a week  Have you practiced the PF (kegel) exercise for 4 or more weeks? no    Objective:    FLEXIBILITY:tightness of adductors and piriformis bilaterally    ABDOMINAL WALL: tenderness to palpation R lower quadrant    EXTERNAL ASSESSMENT:  Skin condition:normal  Bearing down/coughing:no movement  Muscle contraction/perineal mobility: elevation and urogenital triangle descent, slight lift, no urogenital triangle descent, no movement, substitution      INTERNAL ASSESSMENT:  Baseline PF tone:slightly hyper, increased tenderness to palpation levator ani and bulbocavernosus on R side  PF Tone with cough: NT  PF Response quality: periodically sluggish  PF Power: Center: 3/5  Accessory Muscle use:occasionally glutes  Endurance: Maximum contraction in seconds:NT   Quick contraction repetitions prior to fatigue: NT       SEMG BIOFEEDBACK  Surface Electrode Placement:   Perianal: Levator ani     Abdominals: transverse abdominis  Baseline EMG PM: supine and sittinmV  Pt demonstrated slight paradoxical PFM contraction in sitting. Improved after cueing.    Observation: L ASIS superior    Assessment/Plan:    Patient is a 25 year old female with pelvic complaints.    Patient has  the following significant findings with corresponding treatment plan.                Diagnosis 1:  Pelvic floor dysfunction  Pain -  hot/cold therapy, US, electric stimulation, mechanical traction, manual therapy, STS, splint/taping/bracing/orthotics, self management, education, directional preference exercise and home program  Decreased ROM/flexibility - manual therapy, therapeutic exercise, therapeutic activity and home program  Decreased strength - therapeutic exercise, therapeutic activities and home program  Impaired muscle performance - biofeedback, electric stimulation, neuro re-education and home program    Therapy Evaluation Codes:   Cumulative Therapy Evaluation is: Low complexity.    Previous and current functional limitations:  (See Goal Flow Sheet for this information)    Short term and Long term goals: (See Goal Flow Sheet for this information)     Communication ability:  Patient appears to be able to clearly communicate and understand verbal and written communication and follow directions correctly.  Treatment Explanation - The following has been discussed with the patient:   RX ordered/plan of care  Anticipated outcomes  Possible risks and side effects  This patient would benefit from PT intervention to resume normal activities.   Rehab potential is good.    Frequency:  1 X week, once daily  Duration:  for 4 weeks tapering to 2 X a month over 8 weeks  Discharge Plan:  Achieve all LTG.  Independent in home treatment program.  Reach maximal therapeutic benefit.    Please refer to the daily flowsheet for treatment today, total treatment time and time spent performing 1:1 timed codes.

## 2020-08-06 ENCOUNTER — AMBULATORY - HEALTHEAST (OUTPATIENT)
Dept: INTERNAL MEDICINE | Facility: CLINIC | Age: 25
End: 2020-08-06

## 2020-08-06 ENCOUNTER — COMMUNICATION - HEALTHEAST (OUTPATIENT)
Dept: LAB | Facility: CLINIC | Age: 25
End: 2020-08-06

## 2020-08-06 DIAGNOSIS — Z79.899 MEDICATION MANAGEMENT: ICD-10-CM

## 2020-08-10 ENCOUNTER — AMBULATORY - HEALTHEAST (OUTPATIENT)
Dept: LAB | Facility: CLINIC | Age: 25
End: 2020-08-10

## 2020-08-10 DIAGNOSIS — Z79.899 MEDICATION MANAGEMENT: ICD-10-CM

## 2020-08-10 LAB
ALBUMIN SERPL-MCNC: 3.7 G/DL (ref 3.5–5)
ALP SERPL-CCNC: 69 U/L (ref 45–120)
ALT SERPL W P-5'-P-CCNC: 33 U/L (ref 0–45)
ANION GAP SERPL CALCULATED.3IONS-SCNC: 8 MMOL/L (ref 5–18)
AST SERPL W P-5'-P-CCNC: 19 U/L (ref 0–40)
BILIRUB SERPL-MCNC: 0.5 MG/DL (ref 0–1)
BUN SERPL-MCNC: 11 MG/DL (ref 8–22)
CALCIUM SERPL-MCNC: 9 MG/DL (ref 8.5–10.5)
CHLORIDE BLD-SCNC: 106 MMOL/L (ref 98–107)
CO2 SERPL-SCNC: 25 MMOL/L (ref 22–31)
CREAT SERPL-MCNC: 0.83 MG/DL (ref 0.6–1.1)
GFR SERPL CREATININE-BSD FRML MDRD: >60 ML/MIN/1.73M2
GLUCOSE BLD-MCNC: 105 MG/DL (ref 70–125)
POTASSIUM BLD-SCNC: 4.4 MMOL/L (ref 3.5–5)
PROT SERPL-MCNC: 6.7 G/DL (ref 6–8)
SODIUM SERPL-SCNC: 139 MMOL/L (ref 136–145)

## 2020-08-11 ENCOUNTER — THERAPY VISIT (OUTPATIENT)
Dept: PHYSICAL THERAPY | Facility: CLINIC | Age: 25
End: 2020-08-11
Payer: COMMERCIAL

## 2020-08-11 ENCOUNTER — VIRTUAL VISIT (OUTPATIENT)
Dept: SURGERY | Facility: CLINIC | Age: 25
End: 2020-08-11
Payer: COMMERCIAL

## 2020-08-11 ENCOUNTER — TELEPHONE (OUTPATIENT)
Dept: ENDOCRINOLOGY | Facility: CLINIC | Age: 25
End: 2020-08-11

## 2020-08-11 ENCOUNTER — RECORDS - HEALTHEAST (OUTPATIENT)
Dept: ADMINISTRATIVE | Facility: OTHER | Age: 25
End: 2020-08-11

## 2020-08-11 DIAGNOSIS — E66.9 OBESITY: Primary | ICD-10-CM

## 2020-08-11 DIAGNOSIS — M62.89 PELVIC FLOOR DYSFUNCTION: ICD-10-CM

## 2020-08-11 PROCEDURE — 97112 NEUROMUSCULAR REEDUCATION: CPT | Mod: GP | Performed by: PHYSICAL THERAPIST

## 2020-08-11 PROCEDURE — 97140 MANUAL THERAPY 1/> REGIONS: CPT | Mod: GP | Performed by: PHYSICAL THERAPIST

## 2020-08-11 PROCEDURE — 97530 THERAPEUTIC ACTIVITIES: CPT | Mod: GP | Performed by: PHYSICAL THERAPIST

## 2020-08-11 NOTE — PATIENT INSTRUCTIONS
GOALS:  1. Try a protein shake for breakfast instead of cereal, egg and yogurt are other good options   2. Add a protein to your afternoon or nighttime snack this may help with the middle of the night hunger you are feeling.   3. Use whole fruit instead of fruit cups   -try making a list of fruit you like and a list of vegetables to help with meal planning and grocery store shopping.   4. Continues walking 3x a week   5. Increase vegetable intake: have a handful of vegetables with 2 meals daily. Ideas below:   - added vegetables to your eggs in the morning   - Have vegetable with dip for a snack or a side dish   - Prep a salad for dinner or lunch  - Eat a small side salad before having the rest of dinner   - Put tuna/chicken salad on a bed of lettuce or eat with carrots, celery and cucumbers.     Follow up with RD in one month    Deborah Beckman MS, RD, LD  If you need to schedule or reschedule with a dietitian please call [phone].

## 2020-08-11 NOTE — TELEPHONE ENCOUNTER
Called and spoke with patient as provider was running ~30 minutes behind. Patient has another appointment this morning and rescheduled 9am RD appointment to 12:30 PM today.     Dietitian will see patient at 12:30.     Deborah Beckman MS, RD, LD

## 2020-08-11 NOTE — PROGRESS NOTES
"Nehemias Mascorro is a 25 year old female who is being evaluated via a billable video visit.      The patient has been notified of following:     \"This video visit will be conducted via a call between you and your physician/provider. We have found that certain health care needs can be provided without the need for an in-person physical exam.  This service lets us provide the care you need with a video conversation.  If a prescription is necessary we can send it directly to your pharmacy.  If lab work is needed we can place an order for that and you can then stop by our lab to have the test done at a later time.    Video visits are billed at different rates depending on your insurance coverage.  Please reach out to your insurance provider with any questions.    If during the course of the call the physician/provider feels a video visit is not appropriate, you will not be charged for this service.\"    Patient has given verbal consent for Video visit? Yes  How would you like to obtain your AVS? MyChart    Will anyone else be joining your video visit? No        Video-Visit Details    Type of service:  Video Visit    Video Start Time: 12:32 PM  Video End Time: 1:03 PM    Originating Location (pt. Location): Home    Distant Location (provider location):  [facility]NAG[facility]     Platform used for Video Visit: Hupu 24 Week Healthy Lifestyle Nutrition Note    Reason for visit: Nutrition Assessment    Nehemias Mascorro is a 25 year old female presenting today for follow-up nutrition assessment consult.  Patient is accompanied by self. Pt is referred by Dr. Slade.    Use of meal replacements: yes, trying starter kit    Current vitamin/mineral supplements: did not discuss today    ANTHROPOMETRICS:   Estimated body mass index is 43.83 kg/m  as calculated from the following:    Height as of 2/28/20: 1.654 m (5' 5.12\").    Weight as of 2/28/20: 119.9 kg (264 lb 5.3 oz).   Recent pt reported weight on " 8/10/2020: 254.9 lb     NUTRITION HISTORY:  Today, pt expresses she is doing well and is interested in using one meal replacement daily. Discussed options for meal replacement, pt typically shops at [employer] or [employer].   Discussed goals from other providers such as meal prepping, having whole fruit instead of fruit cups and having dessert only once per week. Pt states she is working on these goals, meal prepping is not going well but she is working on the fruit and dessert goals.      Sleep: 4-5 times per week patient will wake up in the middle of the night and eat, she has been having fruit. Continues to work on this with her psychologist.     Diet Recall   Breakfast eggs or yogurt or cheerios or oatmeal plain (borwn sugar or honey, nuta dn flax seed and some times craisin)   Lunch healthy choices steamer   Snack: apple sauce or peaches and mandrian oranges. ( trying fresh)   D: Baked chicken or fish with a vegetable and corn or potatoes      Diet Recall:  1. Try the fresh meal starter kit: 4-5 small meals daily (you may have one meal with family, while trying the starter kit) -Trying other meal replacements  - You may add as many vegetables as you would like to meal kit.   - If you do not like the flavor packets for the smoothies, you can add 1/2 cup of frozen   - Season well   2. Continues walking 3x a week -Continues  3. Start food journaling: can use an robby on your phone or a small notebook and pen -did not dicuss today  4. Increase vegetable intake: have a handful of vegetables with 2 meals daily. Ideas below: -Continues   - added vegetables to your eggs in the morning  - Have vegetable with dip for a snack or a side dish   - Prep a salad for dinner or lunch  - Eat a small side salad before having the rest of dinner   - Put tuna/chicken salad on a bed of lettuce or eat with carrots, celery and cucumbers.       ADDITIONAL INFORMATION:   Pt is interested in trying the meal kit box, she has it ordering an will  be trying this.     PHYSICAL ACTIVITY:  Walking with mom      NUTRITION DIAGNOSIS:   Previous :Food and nutrition related knowledge deficit r/t lack of prior exposure to nutrition education of meal replacement program aeb pt unable to verbalize understanding of how to use [employer] meal replacements for weight loss.-Not using these meal replacement options   -OR-  Obesity r/t long history of self-monitoring deficit and excessive energy intake aeb BMI >30.    Current:Obesity r/t long history of self-monitoring deficit and excessive energy intake aeb BMI >30.    INTERVENTION:  Nutrition Prescription:  Recommended energy/nutrient modification.    Implementation:  Assessed learning needs and learning preferences  Nutrition Education:   1. Reviewed and modified previous goals   2. Dicussed and reminded patient of goals with other providers    3. Discussed protein shake options instead of cereal in the morning   4. Recommended add in a protein to afternoon or night time snack to help with satiety  5. AVS via My Chart     GOALS:  1. Try a protein shake for breakfast instead of cereal, egg and yogurt are other good options   2. Add a protein to your afternoon or nighttime snack this may help with the middle of the night hunger you are feeling.   3. Use whole fruit instead of fruit cups   -try making a list of fruit you like and a list of vegetables to help with meal planning and grocery store shopping.   4. Continues walking 3x a week   5. Increase vegetable intake: have a handful of vegetables with 2 meals daily. Ideas below:   - added vegetables to your eggs in the morning   - Have vegetable with dip for a snack or a side dish   - Prep a salad for dinner or lunch  - Eat a small side salad before having the rest of dinner   - Put tuna/chicken salad on a bed of lettuce or eat with carrots, celery and cucumbers.     Patient Understanding: good  Expected Compliance: fair-good  Follow-Up Plans: Discuss meal and snack planning  and portion sizes      FOLLOW UP  1 month    TIME SPENT WITH PATIENT: 31 Minutes     Deborah Beckman, MS, RD, LD

## 2020-08-11 NOTE — LETTER
"8/11/2020       RE: Nehemias Mascorro  [address]  [place] [address]     Dear Colleague,    Thank you for referring your patient, Nehemias Mascorro, to the [facility] SURGICAL WEIGHT MANAGEMENT at [facility]. Please see a copy of my visit note below.    Nehemias Mascorro is a 25 year old female who is being evaluated via a billable video visit.        Video-Visit Details    Type of service:  Video Visit    Video Start Time: 12:32 PM  Video End Time: 1:03 PM    Originating Location (pt. Location): Home    Distant Location (provider location):  [facility] SURGICAL WEIGHT MANAGEMENT     Platform used for Video Visit: AppSense 24 Week Healthy Lifestyle Nutrition Note    Reason for visit: Nutrition Assessment    Nehemias Mascorro is a 25 year old female presenting today for follow-up nutrition assessment consult.  Patient is accompanied by self. Pt is referred by Dr. Slade.    Use of meal replacements: yes, trying starter kit    Current vitamin/mineral supplements: did not discuss today    ANTHROPOMETRICS:   Estimated body mass index is 43.83 kg/m  as calculated from the following:    Height as of 2/28/20: 1.654 m (5' 5.12\").    Weight as of 2/28/20: 119.9 kg (264 lb 5.3 oz).   Recent pt reported weight on 8/10/2020: 254.9 lb     NUTRITION HISTORY:  Today, pt expresses she is doing well and is interested in using one meal replacement daily. Discussed options for meal replacement, pt typically shops at [employer] or [employer].   Discussed goals from other providers such as meal prepping, having whole fruit instead of fruit cups and having dessert only once per week. Pt states she is working on these goals, meal prepping is not going well but she is working on the fruit and dessert goals.      Sleep: 4-5 times per week patient will wake up in the middle of the night and eat, she has been having fruit. Continues to work on this with her psychologist.     Diet Recall "   Breakfast eggs or yogurt or cheerios or oatmeal plain (borwn sugar or honey, nuta dn flax seed and some times craisin)   Lunch healthy choices steamer   Snack: apple sauce or peaches and mandrian oranges. ( trying fresh)   D: Baked chicken or fish with a vegetable and corn or potatoes      Diet Recall:  1. Try the fresh meal starter kit: 4-5 small meals daily (you may have one meal with family, while trying the starter kit) -Trying other meal replacements  - You may add as many vegetables as you would like to meal kit.   - If you do not like the flavor packets for the smoothies, you can add 1/2 cup of frozen   - Season well   2. Continues walking 3x a week -Continues  3. Start food journaling: can use an robby on your phone or a small notebook and pen -did not dicuss today  4. Increase vegetable intake: have a handful of vegetables with 2 meals daily. Ideas below: -Continues   - added vegetables to your eggs in the morning  - Have vegetable with dip for a snack or a side dish   - Prep a salad for dinner or lunch  - Eat a small side salad before having the rest of dinner   - Put tuna/chicken salad on a bed of lettuce or eat with carrots, celery and cucumbers.       ADDITIONAL INFORMATION:   Pt is interested in trying the meal kit box, she has it ordering an will be trying this.     PHYSICAL ACTIVITY:  Walking with mom      NUTRITION DIAGNOSIS:   Previous :Food and nutrition related knowledge deficit r/t lack of prior exposure to nutrition education of meal replacement program aeb pt unable to verbalize understanding of how to use Bariatrix meal replacements for weight loss.-Not using these meal replacement options   -OR-  Obesity r/t long history of self-monitoring deficit and excessive energy intake aeb BMI >30.    Current:Obesity r/t long history of self-monitoring deficit and excessive energy intake aeb BMI >30.    INTERVENTION:  Nutrition Prescription:  Recommended energy/nutrient  modification.    Implementation:  Assessed learning needs and learning preferences  Nutrition Education:   1. Reviewed and modified previous goals   2. Dicussed and reminded patient of goals with other providers    3. Discussed protein shake options instead of cereal in the morning   4. Recommended add in a protein to afternoon or night time snack to help with satiety  5. AVS via My Chart     GOALS:  1. Try a protein shake for breakfast instead of cereal, egg and yogurt are other good options   2. Add a protein to your afternoon or nighttime snack this may help with the middle of the night hunger you are feeling.   3. Use whole fruit instead of fruit cups   -try making a list of fruit you like and a list of vegetables to help with meal planning and grocery store shopping.   4. Continues walking 3x a week   5. Increase vegetable intake: have a handful of vegetables with 2 meals daily. Ideas below:   - added vegetables to your eggs in the morning   - Have vegetable with dip for a snack or a side dish   - Prep a salad for dinner or lunch  - Eat a small side salad before having the rest of dinner   - Put tuna/chicken salad on a bed of lettuce or eat with carrots, celery and cucumbers.     Patient Understanding: good  Expected Compliance: fair-good  Follow-Up Plans: Discuss meal and snack planning and portion sizes      FOLLOW UP  1 month    TIME SPENT WITH PATIENT: 31 Minutes     Deborah Beckman, MS, RD, LD

## 2020-08-12 ENCOUNTER — VIRTUAL VISIT (OUTPATIENT)
Dept: ENDOCRINOLOGY | Facility: CLINIC | Age: 25
End: 2020-08-12
Payer: COMMERCIAL

## 2020-08-12 DIAGNOSIS — E66.9 OBESITY: Primary | ICD-10-CM

## 2020-08-12 NOTE — PROGRESS NOTES
"MICHELLE GARCIA     Progress Notes    Return Weight Management Health Coaching Note    Nehemias Mascorro          MRN: [id number]  : 1995  JASMEET: 2020    Health  Visit #: 4, 24 week    ASSESSMENT:  Initial Weight:  249.3 lb    Current Weight (lbs): 254.9 (8/10)    Weight Loss Medication: metformin    Trying the starter kit.    Feeling more determined.now, with her plan, sticking with it more now. I feel stronger. I see myself having more will power. Clothes are fitting differently.    Confidence level for following plan is a 7 out of 10. I will ask my family to help hold myself accountable.     I believe that I want to do this, pt says. This is not something I have to do.       PREVIOUS GOAL(S) REVIEW:  1.) I will eat out once per week      2;)  I will sit down with mom and write up a menu for the week; shop; prepare food ahead of time. IN PROGRESS     3.) Practice Mindful Eating (see Mindful eating handouts) EATING SLOWER     3.)  [place] for cooking classes at [employer]; call [phone] NOT YET        PATIENT EDUCATION PROVIDED:         Mindful Eating  [email or web address]     Mindful Eating Assessment  [email or web address]     Tips for Mindful Eating    [email or web address]     NOTES:     Next HC visits with Michelle,  at 9am            GOALS:  Aug 12    1.) I will be mindful at breakfast, enjoy my smoothie slowly.  (look at mindful eating handouts; practice eating more slowly and really enjoying your food).    2.) Remind myself of my mindset:\" I want this! I want to be healthier. It will take some work, but it is possible.\"      RD GOALS;  1. Try a protein shake for breakfast instead of cereal, egg and yogurt are other good options   2. Add a protein to your afternoon or nighttime snack this may help with the middle of the night hunger you are feeling.   3. Use whole fruit instead of fruit cups   -try making a list of fruit you like and a " list of vegetables to help with meal planning and grocery store shopping.   4. Continues walking 3x a week   5. Increase vegetable intake: have a handful of vegetables with 2 meals daily. Ideas below:   - added vegetables to your eggs in the morning   - Have vegetable with dip for a snack or a side dish   - Prep a salad for dinner or lunch  - Eat a small side salad before having the rest of dinner   - Put tuna/chicken salad on a bed of lettuce or eat with carrots, celery and cucumbers.          PATIENT EDUCATION PROVIDED:        NOTES:      FOLLOW-UP:  To schedule your next visit, please call [phone].      TIME:       SIGNATURE:  Angela Jiménez, Certified Health  on 8/11/2020 at 8:40 PM

## 2020-08-12 NOTE — LETTER
"2020       RE: Nehemias Mascorro  [address]  [place] [address]     Dear Colleague,    Thank you for referring your patient, Nehemias Mascorro, to the [facility] MEDICAL WEIGHT MANAGEMENT at [facility]. Please see a copy of my visit note below.    ANGELA GARCIA     Progress Notes    Return Weight Management Health Coaching Note    Nehemias Mascorro          MRN: [id number]  : 1995  JASMEET: 2020    Health  Visit #: 4, 24 week    ASSESSMENT:  Initial Weight:  249.3 lb    Current Weight (lbs): 254.9 (8/10)    Weight Loss Medication: metformin    Trying the starter kit.    Feeling more determined.now, with her plan, sticking with it more now. I feel stronger. I see myself having more will power. Clothes are fitting differently.    Confidence level for following plan is a 7 out of 10. I will ask my family to help hold myself accountable.     I believe that I want to do this, pt says. This is not something I have to do.       PREVIOUS GOAL(S) REVIEW:  1.) I will eat out once per week      2;)  I will sit down with mom and write up a menu for the week; shop; prepare food ahead of time. IN PROGRESS     3.) Practice Mindful Eating (see Mindful eating handouts) EATING SLOWER     3.)  [place] for cooking classes at [employer] [facility]; call [phone] NOT YET        PATIENT EDUCATION PROVIDED:         Mindful Eating  [email or web address]     Mindful Eating Assessment  [email or web address]     Tips for Mindful Eating    [email or web address]     NOTES:     Next HC visits with Angela,  at 9am            GOALS:  Aug 12    1.) I will be mindful at breakfast, enjoy my smoothie slowly.  (look at mindful eating handouts; practice eating more slowly and really enjoying your food).    2.) Remind myself of my mindset:\" I want this! I want to be healthier. It will take some work, but it is possible.\"      RD GOALS;  1. Try " a protein shake for breakfast instead of cereal, egg and yogurt are other good options   2. Add a protein to your afternoon or nighttime snack this may help with the middle of the night hunger you are feeling.   3. Use whole fruit instead of fruit cups   -try making a list of fruit you like and a list of vegetables to help with meal planning and grocery store shopping.   4. Continues walking 3x a week   5. Increase vegetable intake: have a handful of vegetables with 2 meals daily. Ideas below:   - added vegetables to your eggs in the morning   - Have vegetable with dip for a snack or a side dish   - Prep a salad for dinner or lunch  - Eat a small side salad before having the rest of dinner   - Put tuna/chicken salad on a bed of lettuce or eat with carrots, celery and cucumbers.          PATIENT EDUCATION PROVIDED:        NOTES:      FOLLOW-UP:  To schedule your next visit, please call [phone].      TIME:       SIGNATURE:  Angela Jiménez, Certified Health  on 8/11/2020 at 8:40 PM

## 2020-08-20 ENCOUNTER — THERAPY VISIT (OUTPATIENT)
Dept: PHYSICAL THERAPY | Facility: CLINIC | Age: 25
End: 2020-08-20
Payer: COMMERCIAL

## 2020-08-20 DIAGNOSIS — M62.89 PELVIC FLOOR DYSFUNCTION: ICD-10-CM

## 2020-08-20 PROCEDURE — 97530 THERAPEUTIC ACTIVITIES: CPT | Mod: GP | Performed by: PHYSICAL THERAPIST

## 2020-08-20 PROCEDURE — 97140 MANUAL THERAPY 1/> REGIONS: CPT | Mod: GP | Performed by: PHYSICAL THERAPIST

## 2020-08-20 PROCEDURE — 97110 THERAPEUTIC EXERCISES: CPT | Mod: GP | Performed by: PHYSICAL THERAPIST

## 2020-08-25 ENCOUNTER — THERAPY VISIT (OUTPATIENT)
Dept: PHYSICAL THERAPY | Facility: CLINIC | Age: 25
End: 2020-08-25
Payer: COMMERCIAL

## 2020-08-25 DIAGNOSIS — M62.89 PELVIC FLOOR DYSFUNCTION: ICD-10-CM

## 2020-08-25 PROCEDURE — 97530 THERAPEUTIC ACTIVITIES: CPT | Mod: GP | Performed by: PHYSICAL THERAPIST

## 2020-08-25 PROCEDURE — 97110 THERAPEUTIC EXERCISES: CPT | Mod: GP | Performed by: PHYSICAL THERAPIST

## 2020-08-26 ENCOUNTER — VIRTUAL VISIT (OUTPATIENT)
Dept: ENDOCRINOLOGY | Facility: CLINIC | Age: 25
End: 2020-08-26
Payer: COMMERCIAL

## 2020-08-26 ENCOUNTER — RECORDS - HEALTHEAST (OUTPATIENT)
Dept: ADMINISTRATIVE | Facility: OTHER | Age: 25
End: 2020-08-26

## 2020-08-26 DIAGNOSIS — E66.9 OBESITY: Primary | ICD-10-CM

## 2020-08-26 NOTE — PATIENT INSTRUCTIONS
GOALS:  8/26/20     1.) I will ask my family to support me with my weight loss and limit buying certain snack foods.    2.) Reminder: I am doing much better than I think I am. (7/10 with my effort). Review list of positive changes I am making.    My positive Changes:    I have a protein shake daily  I have healthy choice frozen meal for lunch  I am eating fresh fruit  I am eating more slowly and I am mindful of my eating.  My portion sizes are healthy  I am doing a better job snacking at night and no longer taking food upstairs.     3.) I will increase my water intake. I will have my water bottle near me.      NEXT HC VISIT is 9/15 at 9am       FOLLOW-UP:  To schedule your next visit, please call [phone].      TIME: 30 minutes       SIGNATURE:  Angela Jiménez, Certified Health  on 8/26/2020 at 7:55 AM

## 2020-08-26 NOTE — LETTER
"2020       RE: Nehemias Mascorro  [address]  [place] [address]     Dear Colleague,    Thank you for referring your patient, Nehemias Mascorro, to the [facility] MEDICAL WEIGHT MANAGEMENT at [facility]. Please see a copy of my visit note below.    MICHELLE GARCIA     Progress Notes    Return Weight Management Health Coaching Note    Nehemias Mascorro          MRN: [id number]  : 1995  JASMEET: 2020    Health  Visit #: 5, 24 week    ASSESSMENT:  Initial Weight:  249.3 lb    Current Weight (lbs) NA (last wght was 254)    Weight Loss Medication: metformin  Nutrition Plan:tried the starter kit      PREVIOUS GOAL(S) REVIEW:       1.) I will be mindful at breakfast, enjoy my smoothie slowly.  (look at mindful eating handouts; practice eating more slowly and really enjoying your food).     2.) Remind myself of my mindset:\" I want this! I want to be healthier. It will take some work, but it is possible.\"       PILLAR(S) DISCUSSED IN THIS VISIT:     Pt said she is not walking for exercise but feels she is doing better with her nutrition. We reviewed the positive changes she is making and this helped her feel less judgement. Pt says she wishes it was going faster; her weight loss. Wonders if she should put in more effort.    Pt says she has trouble saying No to food when it is around her house. We discussed how it is not selfish to ask for what she needs from her family for support. She worries about inconveniencing her family but thinks they will understand if she directly asks them to support her.        GOALS:  20     1.) I will ask my family to support me with my weight loss and limit buying certain snack foods.    2.) Reminder: I am doing much better than I think I am. (7/10 with my effort). Review list of positive changes I am making.    My positive Changes:    I have a protein shake daily  I have healthy choice frozen meal for lunch  I am eating " fresh fruit  I am eating more slowly and I am mindful of my eating.  My portion sizes are healthy  I am doing a better job snacking at night and no longer taking food upstairs.     3.) I will increase my water intake. I will have my water bottle near me.      NEXT HC VISIT is 9/15 at 9am       FOLLOW-UP:  To schedule your next visit, please call [phone].      TIME: 30 minutes       SIGNATURE:  Angela Jiménez, Certified Health  on 8/26/2020 at 7:55 AM

## 2020-08-26 NOTE — PROGRESS NOTES
"MICHELLE GARCIA     Progress Notes    Return Weight Management Health Coaching Note    Nehemias Mascorro          MRN: [id number]  : 1995  JASMEET: 2020    Health  Visit #: 5, 24 week    ASSESSMENT:  Initial Weight:  249.3 lb    Current Weight (lbs) NA (last wght was 254)    Weight Loss Medication: metformin  Nutrition Plan:tried the starter kit      PREVIOUS GOAL(S) REVIEW:       1.) I will be mindful at breakfast, enjoy my smoothie slowly.  (look at mindful eating handouts; practice eating more slowly and really enjoying your food).     2.) Remind myself of my mindset:\" I want this! I want to be healthier. It will take some work, but it is possible.\"       PILLAR(S) DISCUSSED IN THIS VISIT:     Pt said she is not walking for exercise but feels she is doing better with her nutrition. We reviewed the positive changes she is making and this helped her feel less judgement. Pt says she wishes it was going faster; her weight loss. Wonders if she should put in more effort.    Pt says she has trouble saying No to food when it is around her house. We discussed how it is not selfish to ask for what she needs from her family for support. She worries about inconveniencing her family but thinks they will understand if she directly asks them to support her.        GOALS:  20     1.) I will ask my family to support me with my weight loss and limit buying certain snack foods.    2.) Reminder: I am doing much better than I think I am. (7/10 with my effort). Review list of positive changes I am making.    My positive Changes:    I have a protein shake daily  I have healthy choice frozen meal for lunch  I am eating fresh fruit  I am eating more slowly and I am mindful of my eating.  My portion sizes are healthy  I am doing a better job snacking at night and no longer taking food upstairs.     3.) I will increase my water intake. I will have my water bottle near me.      NEXT HC VISIT is 9/15 at 9am "       FOLLOW-UP:  To schedule your next visit, please call [phone].      TIME: 30 minutes       SIGNATURE:  Angela Jiménez, Certified Health  on 8/26/2020 at 7:55 AM

## 2020-08-27 ENCOUNTER — COMMUNICATION - HEALTHEAST (OUTPATIENT)
Dept: INTERNAL MEDICINE | Facility: CLINIC | Age: 25
End: 2020-08-27

## 2020-08-27 ENCOUNTER — COMMUNICATION - HEALTHEAST (OUTPATIENT)
Dept: SCHEDULING | Facility: CLINIC | Age: 25
End: 2020-08-27

## 2020-08-27 DIAGNOSIS — B37.31 CANDIDIASIS OF VAGINA: ICD-10-CM

## 2020-08-28 ENCOUNTER — VIRTUAL VISIT (OUTPATIENT)
Dept: ENDOCRINOLOGY | Facility: CLINIC | Age: 25
End: 2020-08-28
Attending: INTERNAL MEDICINE
Payer: COMMERCIAL

## 2020-08-28 ENCOUNTER — RECORDS - HEALTHEAST (OUTPATIENT)
Dept: ADMINISTRATIVE | Facility: OTHER | Age: 25
End: 2020-08-28

## 2020-08-28 DIAGNOSIS — E66.01 MORBID (SEVERE) OBESITY DUE TO EXCESS CALORIES (H): Primary | ICD-10-CM

## 2020-08-28 NOTE — LETTER
"2020       RE: Nehemias Mascorro  [address]  [place] [address]     Dear Colleague,    Thank you for referring your patient, Nehemias Mascorro, to the [facility] ADULT WEIGHT MGT D at [facility]. Please see a copy of my visit note below.        Return Medical Weight Management Note     Nehemias Mascorro  MRN:  [id number]  :  1995  JASMEET:  2020    Dear Ludwin Elam MD,    I had the pleasure of seeing your patient Nehemias Mascorro. She is a 25 year old female who I am continuing to see for treatment of obesity; PMH includes the following:  Past Medical History:   Diagnosis Date     Depressive disorder      Uncomplicated asthma          INTERVAL HISTORY:  She last had virtual visit a mo ago.  She is getting started with the 24 wk program and notes that she likes it.  Today we reviewed goals she has made, progress, issues, and medication possibilities to help support wt loss, all in the context of her complicated additional history, during the visit.      CURRENT WEIGHT:   253# (today's wt)    Wt Jul--257#   Wt Peter  258.7#      Wt Readings from Last 3 Encounters:   20 119.9 kg (264 lb 5.3 oz)   19 118.8 kg (261 lb 14.5 oz)   19 116.8 kg (257 lb 8 oz)        goals set with  Nutrition recently (as part of the 24 wk wt loss program)--  \" GOALS:  1. Try a protein shake for breakfast instead of cereal, egg and yogurt are other good options   2. Add a protein to your afternoon or nighttime snack this may help with the middle of the night hunger you are feeling.   3. Use whole fruit instead of fruit cups   -try making a list of fruit you like and a list of vegetables to help with meal planning and grocery store shopping.   4. Continues walking 3x a week   5. Increase vegetable intake: have a handful of vegetables with 2 meals daily. Ideas below:   - added vegetables to your eggs in the morning   - Have vegetable with dip for a snack or a side dish " "  - Prep a salad for dinner or lunch  - Eat a small side salad before having the rest of dinner   - Put tuna/chicken salad on a bed of lettuce or eat with carrots, celery and cucumbers.\"    Goals/affirmations made with health --  \"1.) I will ask my family to support me with my weight loss and limit buying certain snack foods.  2.) Reminder: I am doing much better than I think I am. (7/10 with my effort). Review list of positive changes I am making.  My positive Changes:   I have a protein shake daily  I have healthy choice frozen meal for lunch  I am eating fresh fruit  I am eating more slowly and I am mindful of my eating.  My portion sizes are healthy  I am doing a better job snacking at night and no longer taking food upstairs.   3.) I will increase my water intake. I will have my water bottle near me.\"    Additionally,  Notes the program is helping with making changes with food and be accountable and these are the things she notes she is most focussed on now--  1. Working on protein shake AM  2. Eat slower/mindful eating--working on this  3.  Walking several times per week with her mother--not happening yet with [employer] work going    Regarding the GI issue she has been working on with [facility]--doing PT to work on coordinating with BMs; pain and nausea and constipation.  That is improving some   --getting nausea still a couple of times per week; stomach cramping about 3 times per month, stomach pain a couple of times per week  --BMs 3 times per week;  In the past there were times that would be less than once per week       Regarding medications related/relevant to wt loss and co-morbidities I note the following--     1. lexapro changed to Prozac--now has stabilized with plan to titrate as needed---> in the interim Prozac dose was increased but is working much better than the other  2. Adderall continuing 25mg daily  4. Abilify--taking 15mg  5. buproprion at 300mg taking  6. Metformin 1000mg " BID tolerating without any side effects)--unsure if this has helped any with appetite (escalated after last visit to full dose)  7. Prozac--40 mg daily       Changes and Difficulties 1/25/2019   I have made the following changes to my diet since my last visit: Eating less and more vegetables   With regards to my diet, I am still struggling with: -   I have made the following changes to my activity/exercise since my last visit: Joined a gym   With regards to my activity/exercise, I am still struggling with: -       VITALS:  There were no vitals taken for this visit.    MEDICATIONS:   Current Outpatient Medications   Medication Sig Dispense Refill     ADDERALL XR 25 MG 24 hr capsule TK 1 C PO D  0     albuterol (PROAIR HFA/PROVENTIL HFA/VENTOLIN HFA) 108 (90 Base) MCG/ACT inhaler Inhale 2 puffs into the lungs       ARIPiprazole (ABILIFY) 15 MG tablet Take 1 tablet by mouth       BuPROPion HCl (WELLBUTRIN PO) Take 300 mg by mouth daily        cetirizine (ZYRTEC) 10 MG tablet Take 10 mg by mouth daily Reported on 4/27/2017       DIAZEPAM PO Take 5 mg by mouth       ESCITALOPRAM OXALATE PO Take 20 mg by mouth daily       Fexofenadine HCl (ALLEGRA PO) Take by mouth daily as needed for allergies       GLYCOPYRROLATE PO Take 2 mg by mouth daily       GUANFACINE HCL PO Take 2 mg by mouth daily       IBUPROFEN PO Take 600 mg by mouth       Ipratropium-Albuterol (COMBIVENT RESPIMAT)  MCG/ACT inhaler Inhale 1 puff into the lungs 4 times daily       Lansoprazole (PREVACID PO)        metFORMIN (GLUCOPHAGE) 500 MG tablet For 2 wks take 2 tablets AM with breakfast then take 1 tablet twice daily with meal, and then increase to 2 tablets twice daily, as tolerated 360 tablet 1     NATAZIA 3/2-2/2-3/1 MG TABS TK 1 T PO QD  0     norethindrone-ethinyl estradiol (JUNEL FE 1/20) 1-20 MG-MCG per tablet Take 1 tablet by mouth daily       Probiotic Product (PROBIOTIC DAILY PO)        TIZANIDINE HCL PO Take 4 mg by mouth        amphetamine-dextroamphetamine (ADDERALL XR) 30 MG 24 hr capsule TK 1 C PO D FOR ADHD       ARIPiprazole (ABILIFY) 7.5 mg half-tab Take 7.5 mg by mouth daily       ONDANSETRON PO Take 8 mg by mouth       prazosin (MINIPRESS) 1 MG capsule Take 1 mg by mouth At Bedtime       Venlafaxine HCl (EFFEXOR PO) Take 75 mg by mouth 3 times daily         Weight Loss Medication History Reviewed With Patient 1/25/2019   Which weight loss medications are you currently taking on a regular basis?  Naltrexone   Are you having any side effects from the weight loss medication that we have prescribed you? No         ASSESSMENT;  Morbid obesity in pt with wt gain over past few years, more since her TBI; working now with neuro and psych. Is having some wt loss over last couple of month        PLAN:   (continues)  Decrease portion sizes  No meals in front of TV screen  Purge house of food triggers  No meal skipping and avoid snacking  Decrease caloric beverages--avoid soda  Volumetrics low carb eating plan--structured plan and avoding snacking  Low Calorie/low fat diet  Meal planning/journaling           additionally--  --continue on metformin at current full dose (2 g); again considered possible GLP1 agonist addition with her and, given potential for GI side effects at present we will continue to hold off as she works with [facility]  --continue with [facility] work up/treatment  --She had comprehensive (nonfasting) metabolic panel in the interim and we reviewed those results today--->nl glucose, SCr, liver tests.  I do also have HbA1c, ferritin, and B12 pending lab orders for her to use over the next few mo (has had anemia prior, is on metformin, further DM screening given fam history)  --continue with our 24 week health coaching/nutrition program   --will reassess next month       Sincerely,    Luis Slade MD       Nehemias Mascorro is a 25 year old female who is being evaluated via a billable video visit.      The  "patient has been notified of following:     \"This video visit will be conducted via a call between you and your physician/provider. We have found that certain health care needs can be provided without the need for an in-person physical exam.  This service lets us provide the care you need with a video conversation.  If a prescription is necessary we can send it directly to your pharmacy.  If lab work is needed we can place an order for that and you can then stop by our lab to have the test done at a later time.    Video visits are billed at different rates depending on your insurance coverage.  Please reach out to your insurance provider with any questions.    If during the course of the call the physician/provider feels a video visit is not appropriate, you will not be charged for this service.\"    Patient has given verbal consent for Video visit? Yes  How would you like to obtain your AVS? MyChart          Video-Visit Details    Type of service:  Video Visit    Duration of entire video visit for this pt (done as part of family visit with her mother who also had visit today)-- 25 min    Originating Location (pt. Location): Home    Distant Location (provider location):  [facility] ADULT WEIGHT MGT D     Platform used for Video Visit: Peace Slade MD        Again, thank you for allowing me to participate in the care of your patient.      Sincerely,    Luis Slade MD      "

## 2020-08-28 NOTE — PROGRESS NOTES
"    Return Medical Weight Management Note     Nehemias Mascorro  MRN:  [id number]  :  1995  JASMEET:  2020    Dear Ludwin Elam MD,    I had the pleasure of seeing your patient Nehemias Mascorro. She is a 25 year old female who I am continuing to see for treatment of obesity; PMH includes the following:  Past Medical History:   Diagnosis Date     Depressive disorder      Uncomplicated asthma          INTERVAL HISTORY:  She last had virtual visit a mo ago.  She is getting started with the 24 wk program and notes that she likes it.  Today we reviewed goals she has made, progress, issues, and medication possibilities to help support wt loss, all in the context of her complicated additional history, during the visit.      CURRENT WEIGHT:   253# (today's wt)    Wt Jul--257#   Wt Peter  258.7#      Wt Readings from Last 3 Encounters:   20 119.9 kg (264 lb 5.3 oz)   19 118.8 kg (261 lb 14.5 oz)   19 116.8 kg (257 lb 8 oz)        goals set with  Nutrition recently (as part of the 24 wk wt loss program)--  \" GOALS:  1. Try a protein shake for breakfast instead of cereal, egg and yogurt are other good options   2. Add a protein to your afternoon or nighttime snack this may help with the middle of the night hunger you are feeling.   3. Use whole fruit instead of fruit cups   -try making a list of fruit you like and a list of vegetables to help with meal planning and grocery store shopping.   4. Continues walking 3x a week   5. Increase vegetable intake: have a handful of vegetables with 2 meals daily. Ideas below:   - added vegetables to your eggs in the morning   - Have vegetable with dip for a snack or a side dish   - Prep a salad for dinner or lunch  - Eat a small side salad before having the rest of dinner   - Put tuna/chicken salad on a bed of lettuce or eat with carrots, celery and cucumbers.\"    Goals/affirmations made with health --  \"1.) I will ask my family to support me with my " "weight loss and limit buying certain snack foods.  2.) Reminder: I am doing much better than I think I am. (7/10 with my effort). Review list of positive changes I am making.  My positive Changes:   I have a protein shake daily  I have healthy choice frozen meal for lunch  I am eating fresh fruit  I am eating more slowly and I am mindful of my eating.  My portion sizes are healthy  I am doing a better job snacking at night and no longer taking food upstairs.   3.) I will increase my water intake. I will have my water bottle near me.\"    Additionally,  Notes the program is helping with making changes with food and be accountable and these are the things she notes she is most focussed on now--  1. Working on protein shake AM  2. Eat slower/mindful eating--working on this  3.  Walking several times per week with her mother--not happening yet with [employer] work going    Regarding the GI issue she has been working on with [facility]--doing PT to work on coordinating with BMs; pain and nausea and constipation.  That is improving some   --getting nausea still a couple of times per week; stomach cramping about 3 times per month, stomach pain a couple of times per week  --BMs 3 times per week;  In the past there were times that would be less than once per week       Regarding medications related/relevant to wt loss and co-morbidities I note the following--     1. lexapro changed to Prozac--now has stabilized with plan to titrate as needed---> in the interim Prozac dose was increased but is working much better than the other  2. Adderall continuing 25mg daily  4. Abilify--taking 15mg  5. buproprion at 300mg taking  6. Metformin 1000mg BID tolerating without any side effects)--unsure if this has helped any with appetite (escalated after last visit to full dose)  7. Prozac--40 mg daily       Changes and Difficulties 1/25/2019   I have made the following changes to my diet since my last visit: Eating less and more " vegetables   With regards to my diet, I am still struggling with: -   I have made the following changes to my activity/exercise since my last visit: Joined a gym   With regards to my activity/exercise, I am still struggling with: -       VITALS:  There were no vitals taken for this visit.    MEDICATIONS:   Current Outpatient Medications   Medication Sig Dispense Refill     ADDERALL XR 25 MG 24 hr capsule TK 1 C PO D  0     albuterol (PROAIR HFA/PROVENTIL HFA/VENTOLIN HFA) 108 (90 Base) MCG/ACT inhaler Inhale 2 puffs into the lungs       ARIPiprazole (ABILIFY) 15 MG tablet Take 1 tablet by mouth       BuPROPion HCl (WELLBUTRIN PO) Take 300 mg by mouth daily        cetirizine (ZYRTEC) 10 MG tablet Take 10 mg by mouth daily Reported on 4/27/2017       DIAZEPAM PO Take 5 mg by mouth       ESCITALOPRAM OXALATE PO Take 20 mg by mouth daily       Fexofenadine HCl (ALLEGRA PO) Take by mouth daily as needed for allergies       GLYCOPYRROLATE PO Take 2 mg by mouth daily       GUANFACINE HCL PO Take 2 mg by mouth daily       IBUPROFEN PO Take 600 mg by mouth       Ipratropium-Albuterol (COMBIVENT RESPIMAT)  MCG/ACT inhaler Inhale 1 puff into the lungs 4 times daily       Lansoprazole (PREVACID PO)        metFORMIN (GLUCOPHAGE) 500 MG tablet For 2 wks take 2 tablets AM with breakfast then take 1 tablet twice daily with meal, and then increase to 2 tablets twice daily, as tolerated 360 tablet 1     NATAZIA 3/2-2/2-3/1 MG TABS TK 1 T PO QD  0     norethindrone-ethinyl estradiol (JUNEL FE 1/20) 1-20 MG-MCG per tablet Take 1 tablet by mouth daily       Probiotic Product (PROBIOTIC DAILY PO)        TIZANIDINE HCL PO Take 4 mg by mouth       amphetamine-dextroamphetamine (ADDERALL XR) 30 MG 24 hr capsule TK 1 C PO D FOR ADHD       ARIPiprazole (ABILIFY) 7.5 mg half-tab Take 7.5 mg by mouth daily       ONDANSETRON PO Take 8 mg by mouth       prazosin (MINIPRESS) 1 MG capsule Take 1 mg by mouth At Bedtime       Venlafaxine HCl  "(EFFEXOR PO) Take 75 mg by mouth 3 times daily         Weight Loss Medication History Reviewed With Patient 1/25/2019   Which weight loss medications are you currently taking on a regular basis?  Naltrexone   Are you having any side effects from the weight loss medication that we have prescribed you? No         ASSESSMENT;  Morbid obesity in pt with wt gain over past few years, more since her TBI; working now with neuro and psych. Is having some wt loss over last couple of month        PLAN:   (continues)  Decrease portion sizes  No meals in front of TV screen  Purge house of food triggers  No meal skipping and avoid snacking  Decrease caloric beverages--avoid soda  Volumetrics low carb eating plan--structured plan and avoding snacking  Low Calorie/low fat diet  Meal planning/journaling           additionally--  --continue on metformin at current full dose (2 g); again considered possible GLP1 agonist addition with her and, given potential for GI side effects at present we will continue to hold off as she works with [facility]  --continue with [facility] work up/treatment  --She had comprehensive (nonfasting) metabolic panel in the interim and we reviewed those results today--->nl glucose, SCr, liver tests.  I do also have HbA1c, ferritin, and B12 pending lab orders for her to use over the next few mo (has had anemia prior, is on metformin, further DM screening given fam history)  --continue with our 24 week health coaching/nutrition program   --will reassess next month       Sincerely,    Luis Slade MD       Nehemias Mascorro is a 25 year old female who is being evaluated via a billable video visit.      The patient has been notified of following:     \"This video visit will be conducted via a call between you and your physician/provider. We have found that certain health care needs can be provided without the need for an in-person physical exam.  This service lets us provide the care you need with " "a video conversation.  If a prescription is necessary we can send it directly to your pharmacy.  If lab work is needed we can place an order for that and you can then stop by our lab to have the test done at a later time.    Video visits are billed at different rates depending on your insurance coverage.  Please reach out to your insurance provider with any questions.    If during the course of the call the physician/provider feels a video visit is not appropriate, you will not be charged for this service.\"    Patient has given verbal consent for Video visit? Yes  How would you like to obtain your AVS? MyChart          Video-Visit Details    Type of service:  Video Visit    Duration of entire video visit for this pt (done as part of family visit with her mother who also had visit today)-- 25 min    Originating Location (pt. Location): Home    Distant Location (provider location):  [facility] ADULT WEIGHT MGT D     Platform used for Video Visit: Peace Slade MD      "

## 2020-08-28 NOTE — LETTER
"2020      RE: Nehemias Mascorro  [address]  [place] [address]           Return Medical Weight Management Note     Nehemias Mascorro  MRN:  [id number]  :  1995  JASMEET:  2020    Dear Ludwin Elam MD,    I had the pleasure of seeing your patient Nehemias Mascorro. She is a 25 year old female who I am continuing to see for treatment of obesity; PMH includes the following:  Past Medical History:   Diagnosis Date     Depressive disorder      Uncomplicated asthma          INTERVAL HISTORY:  She last had virtual visit a mo ago.  She is getting started with the 24 wk program and notes that she likes it.  Today we reviewed goals she has made, progress, issues, and medication possibilities to help support wt loss, all in the context of her complicated additional history, during the visit.      CURRENT WEIGHT:   253# (today's wt)    Wt Jul--257#   Wt Peter  258.7#      Wt Readings from Last 3 Encounters:   20 119.9 kg (264 lb 5.3 oz)   19 118.8 kg (261 lb 14.5 oz)   19 116.8 kg (257 lb 8 oz)        goals set with  Nutrition recently (as part of the 24 wk wt loss program)--  \" GOALS:  1. Try a protein shake for breakfast instead of cereal, egg and yogurt are other good options   2. Add a protein to your afternoon or nighttime snack this may help with the middle of the night hunger you are feeling.   3. Use whole fruit instead of fruit cups   -try making a list of fruit you like and a list of vegetables to help with meal planning and grocery store shopping.   4. Continues walking 3x a week   5. Increase vegetable intake: have a handful of vegetables with 2 meals daily. Ideas below:   - added vegetables to your eggs in the morning   - Have vegetable with dip for a snack or a side dish   - Prep a salad for dinner or lunch  - Eat a small side salad before having the rest of dinner   - Put tuna/chicken salad on a bed of lettuce or eat with carrots, celery and " "cucumbers.\"    Goals/affirmations made with health --  \"1.) I will ask my family to support me with my weight loss and limit buying certain snack foods.  2.) Reminder: I am doing much better than I think I am. (7/10 with my effort). Review list of positive changes I am making.  My positive Changes:   I have a protein shake daily  I have healthy choice frozen meal for lunch  I am eating fresh fruit  I am eating more slowly and I am mindful of my eating.  My portion sizes are healthy  I am doing a better job snacking at night and no longer taking food upstairs.   3.) I will increase my water intake. I will have my water bottle near me.\"    Additionally,  Notes the program is helping with making changes with food and be accountable and these are the things she notes she is most focussed on now--  1. Working on protein shake AM  2. Eat slower/mindful eating--working on this  3.  Walking several times per week with her mother--not happening yet with [employer] work going    Regarding the GI issue she has been working on with [facility]--doing PT to work on coordinating with BMs; pain and nausea and constipation.  That is improving some   --getting nausea still a couple of times per week; stomach cramping about 3 times per month, stomach pain a couple of times per week  --BMs 3 times per week;  In the past there were times that would be less than once per week       Regarding medications related/relevant to wt loss and co-morbidities I note the following--     1. lexapro changed to Prozac--now has stabilized with plan to titrate as needed---> in the interim Prozac dose was increased but is working much better than the other  2. Adderall continuing 25mg daily  4. Abilify--taking 15mg  5. buproprion at 300mg taking  6. Metformin 1000mg BID tolerating without any side effects)--unsure if this has helped any with appetite (escalated after last visit to full dose)  7. Prozac--40 mg daily       Changes and " Difficulties 1/25/2019   I have made the following changes to my diet since my last visit: Eating less and more vegetables   With regards to my diet, I am still struggling with: -   I have made the following changes to my activity/exercise since my last visit: Joined a gym   With regards to my activity/exercise, I am still struggling with: -       VITALS:  There were no vitals taken for this visit.    MEDICATIONS:   Current Outpatient Medications   Medication Sig Dispense Refill     ADDERALL XR 25 MG 24 hr capsule TK 1 C PO D  0     albuterol (PROAIR HFA/PROVENTIL HFA/VENTOLIN HFA) 108 (90 Base) MCG/ACT inhaler Inhale 2 puffs into the lungs       ARIPiprazole (ABILIFY) 15 MG tablet Take 1 tablet by mouth       BuPROPion HCl (WELLBUTRIN PO) Take 300 mg by mouth daily        cetirizine (ZYRTEC) 10 MG tablet Take 10 mg by mouth daily Reported on 4/27/2017       DIAZEPAM PO Take 5 mg by mouth       ESCITALOPRAM OXALATE PO Take 20 mg by mouth daily       Fexofenadine HCl (ALLEGRA PO) Take by mouth daily as needed for allergies       GLYCOPYRROLATE PO Take 2 mg by mouth daily       GUANFACINE HCL PO Take 2 mg by mouth daily       IBUPROFEN PO Take 600 mg by mouth       Ipratropium-Albuterol (COMBIVENT RESPIMAT)  MCG/ACT inhaler Inhale 1 puff into the lungs 4 times daily       Lansoprazole (PREVACID PO)        metFORMIN (GLUCOPHAGE) 500 MG tablet For 2 wks take 2 tablets AM with breakfast then take 1 tablet twice daily with meal, and then increase to 2 tablets twice daily, as tolerated 360 tablet 1     NATAZIA 3/2-2/2-3/1 MG TABS TK 1 T PO QD  0     norethindrone-ethinyl estradiol (JUNEL FE 1/20) 1-20 MG-MCG per tablet Take 1 tablet by mouth daily       Probiotic Product (PROBIOTIC DAILY PO)        TIZANIDINE HCL PO Take 4 mg by mouth       amphetamine-dextroamphetamine (ADDERALL XR) 30 MG 24 hr capsule TK 1 C PO D FOR ADHD       ARIPiprazole (ABILIFY) 7.5 mg half-tab Take 7.5 mg by mouth daily       ONDANSETRON PO  "Take 8 mg by mouth       prazosin (MINIPRESS) 1 MG capsule Take 1 mg by mouth At Bedtime       Venlafaxine HCl (EFFEXOR PO) Take 75 mg by mouth 3 times daily         Weight Loss Medication History Reviewed With Patient 1/25/2019   Which weight loss medications are you currently taking on a regular basis?  Naltrexone   Are you having any side effects from the weight loss medication that we have prescribed you? No         ASSESSMENT;  Morbid obesity in pt with wt gain over past few years, more since her TBI; working now with neuro and psych. Is having some wt loss over last couple of month        PLAN:   (continues)  Decrease portion sizes  No meals in front of TV screen  Purge house of food triggers  No meal skipping and avoid snacking  Decrease caloric beverages--avoid soda  Volumetrics low carb eating plan--structured plan and avoding snacking  Low Calorie/low fat diet  Meal planning/journaling           additionally--  --continue on metformin at current full dose (2 g); again considered possible GLP1 agonist addition with her and, given potential for GI side effects at present we will continue to hold off as she works with [facility]  --continue with [facility] work up/treatment  --She had comprehensive (nonfasting) metabolic panel in the interim and we reviewed those results today--->nl glucose, SCr, liver tests.  I do also have HbA1c, ferritin, and B12 pending lab orders for her to use over the next few mo (has had anemia prior, is on metformin, further DM screening given fam history)  --continue with our 24 week health coaching/nutrition program   --will reassess next month       Sincerely,    Luis Slade MD       Nehemias Mascorro is a 25 year old female who is being evaluated via a billable video visit.      The patient has been notified of following:     \"This video visit will be conducted via a call between you and your physician/provider. We have found that certain health care needs can be " "provided without the need for an in-person physical exam.  This service lets us provide the care you need with a video conversation.  If a prescription is necessary we can send it directly to your pharmacy.  If lab work is needed we can place an order for that and you can then stop by our lab to have the test done at a later time.    Video visits are billed at different rates depending on your insurance coverage.  Please reach out to your insurance provider with any questions.    If during the course of the call the physician/provider feels a video visit is not appropriate, you will not be charged for this service.\"    Patient has given verbal consent for Video visit? Yes  How would you like to obtain your AVS? MyChart      Video-Visit Details    Type of service:  Video Visit    Duration of entire video visit for this pt (done as part of family visit with her mother who also had visit today)-- 25 min    Originating Location (pt. Location): Home    Distant Location (provider location):  [facility] ADULT WEIGHT MGT D     Platform used for Video Visit: Peace Slade MD      "

## 2020-08-28 NOTE — NURSING NOTE
"Nehemias Mascorro is a 25 year old female who is being evaluated via a billable video visit.      The patient has been notified of following:     \"This video visit will be conducted via a call between you and your physician/provider. We have found that certain health care needs can be provided without the need for an in-person physical exam.  This service lets us provide the care you need with a video conversation.  If a prescription is necessary we can send it directly to your pharmacy.  If lab work is needed we can place an order for that and you can then stop by our lab to have the test done at a later time.    Video visits are billed at different rates depending on your insurance coverage.  Please reach out to your insurance provider with any questions.    If during the course of the call the physician/provider feels a video visit is not appropriate, you will not be charged for this service.\"     How would you like to obtain your AVS? Reji Mascorro complains of  No chief complaint on file.      Patient has given verbal consent for Video visit? Yes    Patient would like the video invitation sent by: Send to e-mail at: [email or web address]     I have reviewed and updated the patient's medication list, allergies and preferred pharmacy.      Mika Cochran LPN  "

## 2020-09-01 NOTE — PATIENT INSTRUCTIONS
Thank you for allowing us the privilege of caring for you. We hope we provided you with the excellent service you deserve.    Please let us know if there is anything else we can do for you so that we can be sure you are completely satisfied with your care experience.    Your visit was with  today.    Instructions per today's visit:  --You can continue on the current metformin dosing.  --You discussed the following goals-  1. Working on protein shake AM  2. Eat slower/mindful eating--working on this  3.  Walking several times per week with her mother--not happening yet with [employer] work going.  --You are continuing to work on wt loss with the support of the 24-wk program.  --We can reconsider whether to add additional medication support for wt loss at your next visit.  --we discussed several additional labs we would want to get over the next few months if possible (hemoglobin A1c, B12, and ferritin)      Please call during clinic hours Monday through Friday 8:00a - 4:00p if you have questions or you can contact us via [employer] at anytime.      Lab results will be communicated through My Chart or letter (if My Chart not used). Please call the clinic if you have not received communication after 1 week or if you have any questions.    Clinic Fax: [phone]    Thank you,  Medical Weight Management Team

## 2020-09-10 ENCOUNTER — THERAPY VISIT (OUTPATIENT)
Dept: PHYSICAL THERAPY | Facility: CLINIC | Age: 25
End: 2020-09-10
Payer: COMMERCIAL

## 2020-09-10 DIAGNOSIS — M62.89 PELVIC FLOOR DYSFUNCTION: ICD-10-CM

## 2020-09-10 PROCEDURE — 97535 SELF CARE MNGMENT TRAINING: CPT | Mod: GP | Performed by: PHYSICAL THERAPIST

## 2020-09-10 PROCEDURE — 97112 NEUROMUSCULAR REEDUCATION: CPT | Mod: GP | Performed by: PHYSICAL THERAPIST

## 2020-09-10 PROCEDURE — 97110 THERAPEUTIC EXERCISES: CPT | Mod: GP | Performed by: PHYSICAL THERAPIST

## 2020-09-15 ENCOUNTER — VIRTUAL VISIT (OUTPATIENT)
Dept: ENDOCRINOLOGY | Facility: CLINIC | Age: 25
End: 2020-09-15
Payer: COMMERCIAL

## 2020-09-15 ENCOUNTER — VIRTUAL VISIT (OUTPATIENT)
Dept: SURGERY | Facility: CLINIC | Age: 25
End: 2020-09-15
Payer: COMMERCIAL

## 2020-09-15 ENCOUNTER — RECORDS - HEALTHEAST (OUTPATIENT)
Dept: ADMINISTRATIVE | Facility: OTHER | Age: 25
End: 2020-09-15

## 2020-09-15 DIAGNOSIS — E66.9 OBESITY: Primary | ICD-10-CM

## 2020-09-15 DIAGNOSIS — M62.89 PELVIC FLOOR DYSFUNCTION: ICD-10-CM

## 2020-09-15 NOTE — LETTER
"9/15/2020       RE: Nehemias Mascorro  [address]  [place] [address]     Dear Colleague,    Thank you for referring your patient, Nehemias Mascorro, to the [facility] SURGICAL WEIGHT MANAGEMENT at [facility]. Please see a copy of my visit note below.    Return 24 Week Healthy Lifestyle Nutrition Note    Reason for visit: Nutrition Assessment    Nehemias Mascorro is a 25 year old female presenting today for follow-up nutrition assessment consult.  Patient is accompanied by self. Pt is referred by Dr. Slade.    Use of meal replacements:no    Current vitamin/mineral supplements: did not discuss today    ANTHROPOMETRICS:   Estimated body mass index is 43.83 kg/m  as calculated from the following:    Height as of 2/28/20: 1.654 m (5' 5.12\").    Weight as of 2/28/20: 119.9 kg (264 lb 5.3 oz).   Recent pt reported weight on 8/10/2020: 254.9 lb ( no recent weight per patient's report)    NUTRITION HISTORY:  Today: pt states she is doing well she continues to work on food choices. Pt notices that when she has yogurt and fruit she is hungry a few hours after, dicussed other options for breakfast. Praised patient for listening the her bodies cues.     She inquired about the Low FODMAP diet today, she is currently going to Physical Therapy for pelvic floor dysfunction. She is having a lot of gas and some constipation.     Diet Recall   B: Yogurt and fruit   L: caggabe and corn and meat.   D: Left overs: chicken and macaroni and cheese  Snack: Dried apricots.   Beverages: Water and crystal light.     Diet Recall:  1. Try a protein shake for breakfast instead of cereal, egg and yogurt are other good options -Met, having yogurt   2. Add a protein to your afternoon or nighttime snack this may help with the middle of the night hunger you are feeling. -Not Met  3. Use whole fruit instead of fruit cups -Improving, using dried fruit   -try making a list of fruit you like and a list of " vegetables to help with meal planning and grocery store shopping.   4. Continues walking 3x a week -Continues, walking when she can but not as consistent as she would like   5. Increase vegetable intake: have a handful of vegetables with 2 meals daily. Ideas below: -Continues   - added vegetables to your eggs in the morning   - Have vegetable with dip for a snack or a side dish   - Prep a salad for dinner or lunch  - Eat a small side salad before having the rest of dinner   - Put tuna/chicken salad on a bed of lettuce or eat with carrots, celery and cucumbers.    ADDITIONAL INFORMATION:   Pt is interested in trying the meal kit box, she has it ordering an will be trying this.     PHYSICAL ACTIVITY:  Walking with mom    NUTRITION DIAGNOSIS:   Previous: Obesity r/t long history of self-monitoring deficit and excessive energy intake aeb BMI >30.- Continues     Current:Obesity r/t long history of self-monitoring deficit and excessive energy intake aeb BMI >30.    INTERVENTION:  Nutrition Prescription:  Recommended energy/nutrient modification.    Implementation:  Assessed learning needs and learning preferences  Nutrition Education:   1. Reviewed and modified previous goals   2. Dicussed and reminded patient of goals with other providers    3. Discussed and educated on the low FODMAP diet, dicussed how this may help, but studies have shown that the low FODMAP diet tends to improve GI symptoms with gas and diarrhea more than constipation. Dicussed the restrictive nature of the low FODMAP diet and at this time it was decided to try some recipes and look over the information prior to starting the diet.   4.Continue to food journal and start writing down your GI symptoms in you food journal.   5. AVS via My Chart       GOALS:  1. Breakfast options:   -  eggs and 1 piece of whole wheat toast   - Egg and vegetable scramble with a little cheese   - Yogurt with fruit and 1/4 c granola or some nuts (almonds)   - Hard boiled eggs  and a banana   - protein bar or shake, option to pair with fruit if needed.   2. Add a protein to your afternoon or nighttime snack this may help with the middle of the night hunger you are feeling.   3. Use whole fruit instead of fruit cups   -try making a list of fruit you like and a list of vegetables to help with meal planning and grocery store shopping.   4. Increase walking to  3x a week   5. Increase vegetable intake: have a handful of vegetables with 2 meals daily. Ideas below:   - added vegetables to your eggs in the morning   - Have vegetable with dip for a snack or a side dish   - Prep a salad for dinner or lunch  - Eat a small side salad before having the rest of dinner   - Put tuna/chicken salad on a bed of lettuce or eat with carrots, celery and cucumbers.   6. Continue to food journal, start writing down GI symptoms (gas, cramping, diarrhea, constipation, reflux etc.)   7. Look over low-FODMAP diet handouts (will be sent vis Mail)   8. Try a some Low FODMAP recipes and see if they cause GI symptoms or not before starting the full low- FODMAP diet.     What is the Low FODMAP diet:https://static1.8minutenergy Renewables.NodeFly/static/[id number].pdf    Foods Allow of the elimination phase of the Low-FODMAP diet:https://www.Redtree People/low-fodmapgrocerylist  - Note after the elimination phase we do start adding other foods back into the diet.     Recipes:  Baked oatmeal cups: https://blog.Redtree People/2015/07/06/baked-oatmeal-cups-for-on-the-go/  Baked parmesan Zucchini fries: https://blog.Redtree People/2018/05/14/baked-parmesan-zucchini-fries/   Ashe chicken Meatballs:https://blog.Redtree People/2013/10/25/buffalo-chicken-meatballs/  Pineapple chili lime kabobs: https://www.Space Pencil/pineapple-chile-lime-chicken-skewers-4147870  Cheesy shrimp and grits: https://blog.Redtree People/2014/04/14/cheesy-grits-shrimp/  Shrimp  and green beans: https://blog.Wishdates/2018/03/09/one-pan-roasted-shrimp-adobo-tomatoes-green-beans/  BBQ chicken skillet: https://blog.Wishdates/2018/06/22/one-skillet-bbq-chicken/    Patient Understanding: good  Expected Compliance: fair-good  Follow-Up Plans: Discuss meal and snack planning and portion sizes      FOLLOW UP  1 month    TIME SPENT WITH PATIENT: 25 Minutes     Again, thank you for allowing me to participate in the care of your patient.  Sincerely,    Deborah Beckman, MS, RD, LD

## 2020-09-15 NOTE — PATIENT INSTRUCTIONS
GOALS:  9/15/20    1.)  Write out positive self-talk statements. Use the double sided journal if I like.     Double-sided Journal          [email or web address]    2.) Create some routines in the afternoon and evenings. How do I want to spend my time? When will I have snack time?    3.) I will continue to take walks daily    4.) See nutrition goals from [facility]      NEXT HC VISITS with Angela, Oct 7 and Oct 28 at 10am    FOLLOW-UP:  To schedule your next visit, please call [phone].             SIGNATURE:  Angela Jiménez, Certified Health  on 9/15/2020 at 8:38 AM

## 2020-09-15 NOTE — PATIENT INSTRUCTIONS
GOALS:  1. Breakfast options:   -  eggs and 1 piece of whole wheat toast   - Egg and vegetable scramble with a little cheese   - Yogurt with fruit and 1/4 c granola or some nuts (almonds)   - Hard boiled eggs and a banana   - protein bar or shake, option to pair with fruit if needed.   2. Add a protein to your afternoon or nighttime snack this may help with the middle of the night hunger you are feeling.   3. Use whole fruit instead of fruit cups   -try making a list of fruit you like and a list of vegetables to help with meal planning and grocery store shopping.   4. Increase walking to  3x a week   5. Increase vegetable intake: have a handful of vegetables with 2 meals daily. Ideas below:   - added vegetables to your eggs in the morning   - Have vegetable with dip for a snack or a side dish   - Prep a salad for dinner or lunch  - Eat a small side salad before having the rest of dinner   - Put tuna/chicken salad on a bed of lettuce or eat with carrots, celery and cucumbers.   6. Continue to food journal, start writing down GI symptoms (gas, cramping, diarrhea, constipation, reflux etc.)   7. Look over low-FODMAP diet handouts (will be sent vis Mail)   8. Try a some Low FODMAP recipes and see if they cause GI symptoms or not before starting the full low- FODMAP diet.     What is the Low FODMAP diet:https://static1.realSociable.Circular/static/[email or web address]/[id number].pdf    Foods Allow of the elimination phase of the Low-FODMAP diet:https://www.Decide.com/low-fodmapgrocerylist  - Note after the elimination phase we do start adding other foods back into the diet.     Recipes:  Baked oatmeal cups: https://blog.Decide.com/2015/07/06/baked-oatmeal-cups-for-on-the-go/  Baked parmesan Zucchini fries: https://blog.Decide.com/2018/05/14/coral-shannan-zucchini-chayito/   [place]ortez chicken  Meatballs:https://bloUnfold.arGEN-X/2013/10/25/buffalo-chicken-meatballs/  Pineapple chili lime kabobs: https://www.Visiarc/pineapple-chile-lime-chicken-skewers-4147870  Cheesy shrimp and grits: https://bloKnewton/2014/04/14/cheesy-grits-shrimp/  Shrimp and green beans: https://bloKnewton/2018/03/09/one-pan-roasted-shrimp-adobo-tomatoes-green-beans/  BBQ chicken skillet: https://Minuteman Global/2018/06/22/one-skillet-bbq-chicken/    Follow up with RD in one month    Deborah Beckman, MS, RD, LD  If you need to schedule or reschedule with a dietitian please call [phone].

## 2020-09-15 NOTE — LETTER
9/15/2020       RE: Nehemias Mascorro  [address]  [place] [address]     Dear Colleague,    Thank you for referring your patient, Nehemias Mascorro, to the [facility] MEDICAL WEIGHT MANAGEMENT at [facility]. Please see a copy of my visit note below.    MICHELLE GARCIA     Progress Notes    Return Weight Management Health Coaching Note    Nehemias Mascorro          MRN: [id number]  : 1995  JASMETE: September 15, 2020    Health  Visit #: 6    ASSESSMENT:  Initial Weight:  249.3 lb    Current Weight (lbs): 254.3 lb    Metformin      PREVIOUS GOAL(S) REVIEW:    1.) I will ask my family to support me with my weight loss and limit buying certain snack foods.     2.) Reminder: I am doing much better than I think I am. (7/10 with my effort). Review list of positive changes I am making.     My positive Changes:     I have a protein shake daily  I have healthy choice frozen meal for lunch  I am eating fresh fruit  I am eating more slowly and I am mindful of my eating.  My portion sizes are healthy  I am doing a better job snacking at night and no longer taking food upstairs.      3.) I will increase my water intake. I will have my water bottle near me.      PILLAR(S) DISCUSSED IN THIS VISIT:    Pt says it was helpful to talk with RD this morning but still feels she struggles with self control. We talked about re-framing this to work more on creating an environment/ structures that will help her be successful. She says that emotional eating is better but does some boredom eating but is choosing to eat a lot of apples rather than unhealthy choices. We talked about establishing some afternoon and evening routines to avoid over-eating at this time.    We discussed her judgement towards herself. I instructed her on practicing using positive self talk that is more helpful. We discussed some examples of this. Pt is in school again part-time and enjoys this.     GOALS:   9/15/20    1.)  Write out positive self-talk statements. Use the double sided journal if I like.     Double-sided Journal          [email or web address]    2.) Create some routines in the afternoon and evenings. How do I want to spend my time? When will I have snack time?    3.) I will continue to take walks daily    4.) See KENA and Dr Slade nutrition goals.      NEXT HC VISITS with Angela, Oct 7 and Oct 28 at 10am      FOLLOW-UP:  To schedule your next visit, please call [phone].      TIME: 30 min       SIGNATURE:  Angela Jiménez, Certified Health  on 9/15/2020 at 8:38 AM

## 2020-09-15 NOTE — PROGRESS NOTES
MICHELLE GARCIA     Progress Notes    Return Weight Management Health Coaching Note    Nehemias Mascorro          MRN: [id number]  : 1995  JASMEET: September 15, 2020    Health  Visit #: 6    ASSESSMENT:  Initial Weight:  249.3 lb    Current Weight (lbs): 254.3 lb    Metformin      PREVIOUS GOAL(S) REVIEW:    1.) I will ask my family to support me with my weight loss and limit buying certain snack foods.     2.) Reminder: I am doing much better than I think I am. (7/10 with my effort). Review list of positive changes I am making.     My positive Changes:     I have a protein shake daily  I have healthy choice frozen meal for lunch  I am eating fresh fruit  I am eating more slowly and I am mindful of my eating.  My portion sizes are healthy  I am doing a better job snacking at night and no longer taking food upstairs.      3.) I will increase my water intake. I will have my water bottle near me.      PILLAR(S) DISCUSSED IN THIS VISIT:    Pt says it was helpful to talk with RD this morning but still feels she struggles with self control. We talked about re-framing this to work more on creating an environment/ structures that will help her be successful. She says that emotional eating is better but does some boredom eating but is choosing to eat a lot of apples rather than unhealthy choices. We talked about establishing some afternoon and evening routines to avoid over-eating at this time.    We discussed her judgement towards herself. I instructed her on practicing using positive self talk that is more helpful. We discussed some examples of this. Pt is in school again part-time and enjoys this.     GOALS:  9/15/20    1.)  Write out positive self-talk statements. Use the double sided journal if I like.     Double-sided Journal          [email or web address]    2.) Create some routines in the afternoon and evenings. How do I want to spend my time? When will I have snack time?    3.) I will continue to  take walks daily    4.) See RD and Dr Slade nutrition goals.      NEXT HC VISITS with Angela, Oct 7 and Oct 28 at 10am      FOLLOW-UP:  To schedule your next visit, please call [phone].      TIME: 30 min       SIGNATURE:  Angela Jiménez, Certified Health  on 9/15/2020 at 8:38 AM

## 2020-09-15 NOTE — PROGRESS NOTES
"Nehemias Mascorro is a 25 year old female who is being evaluated via a billable video visit.      The patient has been notified of following:     \"This video visit will be conducted via a call between you and your physician/provider. We have found that certain health care needs can be provided without the need for an in-person physical exam.  This service lets us provide the care you need with a video conversation.  If a prescription is necessary we can send it directly to your pharmacy.  If lab work is needed we can place an order for that and you can then stop by our lab to have the test done at a later time.    Video visits are billed at different rates depending on your insurance coverage.  Please reach out to your insurance provider with any questions.    If during the course of the call the physician/provider feels a video visit is not appropriate, you will not be charged for this service.\"    Patient has given verbal consent for Video visit? Yes  How would you like to obtain your AVS? MyChart    Will anyone else be joining your video visit? No        Video-Visit Details    Type of service:  Video Visit    Video Start Time: 8:05 AM  Video End Time: 8:30 AM    Originating Location (pt. Location): Home    Distant Location (provider location):  [facility]     Platform used for Video Visit: NextIO      Return 24 Week Healthy Lifestyle Nutrition Note    Reason for visit: Nutrition Assessment    Nehemias Mascorro is a 25 year old female presenting today for follow-up nutrition assessment consult.  Patient is accompanied by self. Pt is referred by Dr. Slade.    Use of meal replacements:no    Current vitamin/mineral supplements: did not discuss today    ANTHROPOMETRICS:   Estimated body mass index is 43.83 kg/m  as calculated from the following:    Height as of 2/28/20: 1.654 m (5' 5.12\").    Weight as of 2/28/20: 119.9 kg (264 lb 5.3 oz).   Recent pt reported weight on 8/10/2020: 254.9 lb ( " no recent weight per patient's report)    NUTRITION HISTORY:  Today: pt states she is doing well she continues to work on food choices. Pt notices that when she has yogurt and fruit she is hungry a few hours after, dicussed other options for breakfast. Praised patient for listening the her bodies cues.     She inquired about the Low FODMAP diet today, she is currently going to Physical Therapy for pelvic floor dysfunction. She is having a lot of gas and some constipation.     Diet Recall   B: Yogurt and fruit   L: caggabe and corn and meat.   D: Left overs: chicken and macaroni and cheese  Snack: Dried apricots.   Beverages: Water and crystal light.     Diet Recall:  1. Try a protein shake for breakfast instead of cereal, egg and yogurt are other good options -Met, having yogurt   2. Add a protein to your afternoon or nighttime snack this may help with the middle of the night hunger you are feeling. -Not Met  3. Use whole fruit instead of fruit cups -Improving, using dried fruit   -try making a list of fruit you like and a list of vegetables to help with meal planning and grocery store shopping.   4. Continues walking 3x a week -Continues, walking when she can but not as consistent as she would like   5. Increase vegetable intake: have a handful of vegetables with 2 meals daily. Ideas below: -Continues   - added vegetables to your eggs in the morning   - Have vegetable with dip for a snack or a side dish   - Prep a salad for dinner or lunch  - Eat a small side salad before having the rest of dinner   - Put tuna/chicken salad on a bed of lettuce or eat with carrots, celery and cucumbers.      ADDITIONAL INFORMATION:   Pt is interested in trying the meal kit box, she has it ordering an will be trying this.     PHYSICAL ACTIVITY:  Walking with mom    NUTRITION DIAGNOSIS:   Previous: Obesity r/t long history of self-monitoring deficit and excessive energy intake aeb BMI >30.- Continues     Current:Obesity r/t long  history of self-monitoring deficit and excessive energy intake aeb BMI >30.    INTERVENTION:  Nutrition Prescription:  Recommended energy/nutrient modification.    Implementation:  Assessed learning needs and learning preferences  Nutrition Education:   1. Reviewed and modified previous goals   2. Dicussed and reminded patient of goals with other providers    3. Discussed and educated on the low FODMAP diet, dicussed how this may help, but studies have shown that the low FODMAP diet tends to improve GI symptoms with gas and diarrhea more than constipation. Dicussed the restrictive nature of the low FODMAP diet and at this time it was decided to try some recipes and look over the information prior to starting the diet.   4.Continue to food journal and start writing down your GI symptoms in you food journal.   5. AVS via My Chart     GOALS:  1. Breakfast options:   -  eggs and 1 piece of whole wheat toast   - Egg and vegetable scramble with a little cheese   - Yogurt with fruit and 1/4 c granola or some nuts (almonds)   - Hard boiled eggs and a banana   - protein bar or shake, option to pair with fruit if needed.   2. Add a protein to your afternoon or nighttime snack this may help with the middle of the night hunger you are feeling.   3. Use whole fruit instead of fruit cups   -try making a list of fruit you like and a list of vegetables to help with meal planning and grocery store shopping.   4. Increase walking to  3x a week   5. Increase vegetable intake: have a handful of vegetables with 2 meals daily. Ideas below:   - added vegetables to your eggs in the morning   - Have vegetable with dip for a snack or a side dish   - Prep a salad for dinner or lunch  - Eat a small side salad before having the rest of dinner   - Put tuna/chicken salad on a bed of lettuce or eat with carrots, celery and cucumbers.   6. Continue to food journal, start writing down GI symptoms (gas, cramping, diarrhea, constipation, reflux etc.)    7. Look over low-FODMAP diet handouts (will be sent vis Mail)   8. Try a some Low FODMAP recipes and see if they cause GI symptoms or not before starting the full low- FODMAP diet.     What is the Low FODMAP diet:[id number]    Foods Allow of the elimination phase of the Low-FODMAP diet:https://www.Enforta/low-fodmapgrocerylist  - Note after the elimination phase we do start adding other foods back into the diet.     Recipes:  Baked oatmeal cups: https://bloHello World Mobile/2015/07/06/baked-oatmeal-cups-for-on-the-go/  Baked parmesan Zucchini fries: https://bloHello World Mobile/2018/05/14/baked-parmesan-zucchini-fries/   [place] chicken Meatballs:https://bloHello World Mobile/2013/10/25/buffalo-chicken-meatballs/  Pineapple chili lime kabobs: https://wwwFolloze/pineapple-chile-lime-chicken-skewers-4147870  Cheesy shrimp and grits: https://bloHello World Mobile/2014/04/14/cheesy-grits-shrimp/  Shrimp and green beans: https://bloHello World Mobile/2018/03/09/one-pan-roasted-shrimp-adobo-tomatoes-green-beans/  BBQ chicken skillet: https://[employer]/2018/06/22/one-skillet-bbq-chicken/    Patient Understanding: good  Expected Compliance: fair-good  Follow-Up Plans: Discuss meal and snack planning and portion sizes      FOLLOW UP  1 month    TIME SPENT WITH PATIENT: 25 Minutes     Deborah Beckman MS, RD, LD

## 2020-09-15 NOTE — LETTER
"9/15/2020       RE: Nehemias Mascorro  [address]  [place] [address]     Dear Colleague,    Thank you for referring your patient, Nehemias Mascorro, to the [facility]. Please see a copy of my visit note below.    Nehemias Mascorro is a 25 year old female who is being evaluated via a billable video visit.      The patient has been notified of following:     \"This video visit will be conducted via a call between you and your physician/provider. We have found that certain health care needs can be provided without the need for an in-person physical exam.  This service lets us provide the care you need with a video conversation.  If a prescription is necessary we can send it directly to your pharmacy.  If lab work is needed we can place an order for that and you can then stop by our lab to have the test done at a later time.    Video visits are billed at different rates depending on your insurance coverage.  Please reach out to your insurance provider with any questions.    If during the course of the call the physician/provider feels a video visit is not appropriate, you will not be charged for this service.\"    Patient has given verbal consent for Video visit? Yes  How would you like to obtain your AVS? MyChart    Will anyone else be joining your video visit? No        Video-Visit Details    Type of service:  Video Visit    Video Start Time: 8:05 AM  Video End Time: 8:30 AM    Originating Location (pt. Location): Home    Distant Location (provider location):  [facility]     Platform used for Video Visit: [facility]      Return 24 Week Healthy Lifestyle Nutrition Note    Reason for visit: Nutrition Assessment    Nehemias Mascorro is a 25 year old female presenting today for follow-up nutrition assessment consult.  Patient is accompanied by self. Pt is referred by Dr. Slade.    Use of meal " "replacements:no    Current vitamin/mineral supplements: did not discuss today    ANTHROPOMETRICS:   Estimated body mass index is 43.83 kg/m  as calculated from the following:    Height as of 2/28/20: 1.654 m (5' 5.12\").    Weight as of 2/28/20: 119.9 kg (264 lb 5.3 oz).   Recent pt reported weight on 8/10/2020: 254.9 lb ( no recent weight per patient's report)    NUTRITION HISTORY:  Today: pt states she is doing well she continues to work on food choices. Pt notices that when she has yogurt and fruit she is hungry a few hours after, dicussed other options for breakfast. Praised patient for listening the her bodies cues.     She inquired about the Low FODMAP diet today, she is currently going to Physical Therapy for pelvic floor dysfunction. She is having a lot of gas and some constipation.     Diet Recall   B: Yogurt and fruit   L: caggabe and corn and meat.   D: Left overs: chicken and macaroni and cheese  Snack: Dried apricots.   Beverages: Water and crystal light.     Diet Recall:  1. Try a protein shake for breakfast instead of cereal, egg and yogurt are other good options -Met, having yogurt   2. Add a protein to your afternoon or nighttime snack this may help with the middle of the night hunger you are feeling. -Not Met  3. Use whole fruit instead of fruit cups -Improving, using dried fruit   -try making a list of fruit you like and a list of vegetables to help with meal planning and grocery store shopping.   4. Continues walking 3x a week -Continues, walking when she can but not as consistent as she would like   5. Increase vegetable intake: have a handful of vegetables with 2 meals daily. Ideas below: -Continues   - added vegetables to your eggs in the morning   - Have vegetable with dip for a snack or a side dish   - Prep a salad for dinner or lunch  - Eat a small side salad before having the rest of dinner   - Put tuna/chicken salad on a bed of lettuce or eat with carrots, celery and " cucumbers.      ADDITIONAL INFORMATION:   Pt is interested in trying the meal kit box, she has it ordering an will be trying this.     PHYSICAL ACTIVITY:  Walking with mom    NUTRITION DIAGNOSIS:   Previous: Obesity r/t long history of self-monitoring deficit and excessive energy intake aeb BMI >30.- Continues     Current:Obesity r/t long history of self-monitoring deficit and excessive energy intake aeb BMI >30.    INTERVENTION:  Nutrition Prescription:  Recommended energy/nutrient modification.    Implementation:  Assessed learning needs and learning preferences  Nutrition Education:   1. Reviewed and modified previous goals   2. Dicussed and reminded patient of goals with other providers    3. Discussed and educated on the low FODMAP diet, dicussed how this may help, but studies have shown that the low FODMAP diet tends to improve GI symptoms with gas and diarrhea more than constipation. Dicussed the restrictive nature of the low FODMAP diet and at this time it was decided to try some recipes and look over the information prior to starting the diet.   4.Continue to food journal and start writing down your GI symptoms in you food journal.   5. AVS via My Chart     GOALS:  1. Breakfast options:   -  eggs and 1 piece of whole wheat toast   - Egg and vegetable scramble with a little cheese   - Yogurt with fruit and 1/4 c granola or some nuts (almonds)   - Hard boiled eggs and a banana   - protein bar or shake, option to pair with fruit if needed.   2. Add a protein to your afternoon or nighttime snack this may help with the middle of the night hunger you are feeling.   3. Use whole fruit instead of fruit cups   -try making a list of fruit you like and a list of vegetables to help with meal planning and grocery store shopping.   4. Increase walking to  3x a week   5. Increase vegetable intake: have a handful of vegetables with 2 meals daily. Ideas below:   - added vegetables to your eggs in the morning   - Have  vegetable with dip for a snack or a side dish   - Prep a salad for dinner or lunch  - Eat a small side salad before having the rest of dinner   - Put tuna/chicken salad on a bed of lettuce or eat with carrots, celery and cucumbers.   6. Continue to food journal, start writing down GI symptoms (gas, cramping, diarrhea, constipation, reflux etc.)   7. Look over low-FODMAP diet handouts (will be sent vis Mail)   8. Try a some Low FODMAP recipes and see if they cause GI symptoms or not before starting the full low- FODMAP diet.     What is the Low FODMAP diet:https://Future [email or web address].[employer]/[id number]    Foods Allow of the elimination phase of the Low-FODMAP diet:https://www.Diamond Communications/low-fodmapgrocerylist  - Note after the elimination phase we do start adding other foods back into the diet.     Recipes:  Baked oatmeal cups: https://bloDigital Harbor.Diamond Communications/2015/07/06/baked-oatmeal-cups-for-on-the-go/  Baked parmesan Zucchini fries: https://bloPeak8 Partners/2018/05/14/baked-parmesan-zucchini-fries/   [place] chicken Meatballs:https://blogAmootoon/2013/10/25/buffalo-chicken-meatballs/  Pineapple chili lime kabobs: https://www.Scivantage/pineapple-chile-lime-chicken-skewers-4147870  Cheesy shrimp and grits: https://blogAmootoon/2014/04/14/cheesy-grits-shrimp/  Shrimp and green beans: https://bloPeak8 Partners/2018/03/09/one-pan-roasted-shrimp-adobo-tomatoes-green-beans/  BBQ chicken skillet: https://blog.Motion Math.Everyclick/2018/06/22/one-skillet-bbq-chicken/    Patient Understanding: good  Expected Compliance: fair-good  Follow-Up Plans: Discuss meal and snack planning and portion sizes      FOLLOW UP  1 month    TIME SPENT WITH PATIENT: 25 Minutes     Deborah Beckman MS, KENA, LD    Again, thank you for allowing me to participate in the care of your patient.      Sincerely,    Deborah Beckman RD

## 2020-09-16 ENCOUNTER — TELEPHONE (OUTPATIENT)
Dept: ENDOCRINOLOGY | Facility: CLINIC | Age: 25
End: 2020-09-16

## 2020-09-25 ENCOUNTER — VIRTUAL VISIT (OUTPATIENT)
Dept: ENDOCRINOLOGY | Facility: CLINIC | Age: 25
End: 2020-09-25
Attending: INTERNAL MEDICINE
Payer: COMMERCIAL

## 2020-09-25 ENCOUNTER — RECORDS - HEALTHEAST (OUTPATIENT)
Dept: ADMINISTRATIVE | Facility: OTHER | Age: 25
End: 2020-09-25

## 2020-09-25 VITALS — WEIGHT: 256.8 LBS | BODY MASS INDEX: 42.58 KG/M2

## 2020-09-25 DIAGNOSIS — E66.01 MORBID (SEVERE) OBESITY DUE TO EXCESS CALORIES (H): Primary | ICD-10-CM

## 2020-09-25 NOTE — PROGRESS NOTES
"    Return Medical Weight Management Note     Nehemias Mascorro  MRN:  [id number]  :  1995  JASMEET:  2020    Dear Ludwin Elam MD,    I had the pleasure of seeing your patient Nehemias Mascorro. She is a 25 year old female who I am continuing to see for treatment of morbid obesity; PMH includes the following:  Past Medical History:   Diagnosis Date     Depressive disorder      Uncomplicated asthma            INTERVAL HISTORY:    She was last seen about a month ago.  She has also added in the support of the 24 wk program.  Reviewed and relevant history as extracted from last visit is as follows--  -------------------------------------------  \"Notes the program is helping with making changes with food and be accountable and these are the things she notes she is most focussed on now--  1. Working on protein shake AM  2. Eat slower/mindful eating--working on this  3.  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  That is improving some   --getting nausea still a couple of times per week; stomach cramping about 3 times per month, stomach pain a couple of times per week  --BMs 3 times per week;  In the past there were times that would be less than once per week        Regarding medications related/relevant to wt loss and co-morbidities I note the following--     1. lexapro changed to Prozac--now has stabilized with plan to titrate as needed---> in the interim Prozac dose was increased but is working much better than the other  2. Adderall continuing 25mg daily  4. Abilify--taking 15mg  5. buproprion at 300mg taking  6. Metformin 1000mg BID tolerating without any side effects)--unsure if this has helped any with appetite (escalated after last visit to full dose)  7. 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  Now sometimes having seconds at supper.    Pt is working through the 24 wk program--nutritionist is trying to connect with her for wk 4 visit, last visit with health  was 9/15/20    Goals before around structured eating--  1.  Have protein drink (Terra's Way)--110 calories; breakfast routine lately has been higher calorie (eggs/yogurt/oatmeal)  2.  Add protein to snack if having a snack  3.  Change from fruit cups to whole fruit (meeting)      With school one class is in person (Tue/Thu)  Up at 9a and eats breakfast----> will change to protein drink  AM has doctor's appointments and reading (apple/has been putting  PB on that); may have protein bar while at school (and will now change to the specs suggested by nutritionist)  Lunch when gets home (around 3:30)--spaghetti or tuna sandwich or leftovers, water or crystal light  Supper around 7p--chicken or salmon, spaghetti  Brasa last night--roasted port, red beans and rice, yucca (eating out about once per wk)    Snack between supper and bed--will have carrots or lean protein    Goals she is working on--  Working on having more self control--working on positive self talk, remembering the goal for losing wt and why  --pt wants to feel healthier and be in good shape  --sometimes negative self talk is a barrier to wt loss    Still in PT for the abd issues/anal sphinctor symptoms,  Some improvement in symptoms but still still with abd pains.  Learning how to lose the bathroom more regularly and less constipated now.  Noting BMs about 4 times per wk      CURRENT WEIGHT:   256 lbs 12.8 oz  (today's " wt)  Body mass index is 42.58 kg/m .    253#  On 8-28-20     Wt Jul--257#  Wt Peter  258.7#      Wt Readings from Last 3 Encounters:   09/25/20 116.5 kg (256 lb 12.8 oz)   02/28/20 119.9 kg (264 lb 5.3 oz)   11/22/19 118.8 kg (261 lb 14.5 oz)                   Changes and Difficulties 1/25/2019   I have made the following changes to my diet since my last visit: Eating less and more vegetables   With regards to my diet, I am still struggling with: -   I have made the following changes to my activity/exercise since my last visit: Joined a gym   With regards to my activity/exercise, I am still struggling with: -       VITALS:  Wt 116.5 kg (256 lb 12.8 oz)   BMI 42.58 kg/m      MEDICATIONS:   Current Outpatient Medications   Medication Sig Dispense Refill     ADDERALL XR 25 MG 24 hr capsule TK 1 C PO D  0     albuterol (PROAIR HFA/PROVENTIL HFA/VENTOLIN HFA) 108 (90 Base) MCG/ACT inhaler Inhale 2 puffs into the lungs       ARIPiprazole (ABILIFY) 15 MG tablet Take 1 tablet by mouth       BuPROPion HCl (WELLBUTRIN PO) Take 300 mg by mouth daily        cetirizine (ZYRTEC) 10 MG tablet Take 10 mg by mouth daily Reported on 4/27/2017       DIAZEPAM PO Take 5 mg by mouth       ESCITALOPRAM OXALATE PO Take 20 mg by mouth daily       Fexofenadine HCl (ALLEGRA PO) Take by mouth daily as needed for allergies       GLYCOPYRROLATE PO Take 2 mg by mouth daily       GUANFACINE HCL PO Take 2 mg by mouth daily       IBUPROFEN PO Take 600 mg by mouth       Ipratropium-Albuterol (COMBIVENT RESPIMAT)  MCG/ACT inhaler Inhale 1 puff into the lungs 4 times daily       Lansoprazole (PREVACID PO)        metFORMIN (GLUCOPHAGE) 500 MG tablet For 2 wks take 2 tablets AM with breakfast then take 1 tablet twice daily with meal, and then increase to 2 tablets twice daily, as tolerated 360 tablet 1     NATAZIA 3/2-2/2-3/1 MG TABS TK 1 T PO QD  0     norethindrone-ethinyl estradiol (JUNEL FE 1/20) 1-20 MG-MCG per tablet Take 1 tablet by mouth  daily       Probiotic Product (PROBIOTIC DAILY PO)        TIZANIDINE HCL PO Take 4 mg by mouth       amphetamine-dextroamphetamine (ADDERALL XR) 30 MG 24 hr capsule TK 1 C PO D FOR ADHD       ARIPiprazole (ABILIFY) 7.5 mg half-tab Take 7.5 mg by mouth daily         Weight Loss Medication History Reviewed With Patient 1/25/2019   Which weight loss medications are you currently taking on a regular basis?  Naltrexone   Are you having any side effects from the weight loss medication that we have prescribed you? No         ASSESSMENT;  Morbid obesity in pt with wt gain over past few years, more since her TBI; working now with neuro and psych. Is having some wt loss over last couple of month        PLAN:   (continues)  Decrease portion sizes  No meals in front of TV screen  Purge house of food triggers  No meal skipping and avoid snacking  Decrease caloric beverages--avoid soda  Volumetrics low carb eating plan--structured plan and avoding snacking  Low Calorie/low fat diet  Meal planning/journaling           additionally--  --continue on metformin at current full dose (2 g); again considered possible GLP1 agonist addition with her and, given potential for GI side effects at present we will continue to hold off as she works with [facility]; asked her to follow up with her gastroenterologist on the possibility of adding in a GLP1 agonist, given the issues they are working on with her  --continue with our 24 week health coaching/nutrition program   --will reassess next month      Goals she discussed--  1. Walk with mom daily afternoons (post mom's workday)  2.  Having healthy snack (whole fruit or veggies, possibly lean protein) only if hungry for snack; make it a low fat snask also  3.  Switch back to the low calorie protein shakes for breakfast  4.  Avoid seconds at dinner; if having seconds, make it a nonstarchy veggie    She will discuss possible semaglutide low dose start with her GI doctor, as noted above    FOLLOW-UP:  "     1 mo        Sincerely,    Luis Slade MD     Nehemias Mascorro is a 25 year old female who is being evaluated via a billable video visit.      The patient has been notified of following:     \"This video visit will be conducted via a call between you and your physician/provider. We have found that certain health care needs can be provided without the need for an in-person physical exam.  This service lets us provide the care you need with a video conversation.  If a prescription is necessary we can send it directly to your pharmacy.  If lab work is needed we can place an order for that and you can then stop by our lab to have the test done at a later time.    Video visits are billed at different rates depending on your insurance coverage.  Please reach out to your insurance provider with any questions.    If during the course of the call the physician/provider feels a video visit is not appropriate, you will not be charged for this service.\"    Patient has given verbal consent for Video visit? Yes  How would you like to obtain your AVS? MyChart      Video-Visit Details    Type of service:  Video Visit    Video Start Time: 10:36  Video End Time: 11:35 (time includes family visit with pt's mother; specific visit time for Nehemias--approx 30 min)    Originating Location (pt. Location): Home    Distant Location (provider location):  [facility]     Platform used for Video Visit: Peace Slade MD      "

## 2020-09-25 NOTE — NURSING NOTE
"Nehemias Mascorro is a 25 year old female who is being evaluated via a billable video visit.      The patient has been notified of following:     \"This video visit will be conducted via a call between you and your physician/provider. We have found that certain health care needs can be provided without the need for an in-person physical exam.  This service lets us provide the care you need with a video conversation.  If a prescription is necessary we can send it directly to your pharmacy.  If lab work is needed we can place an order for that and you can then stop by our lab to have the test done at a later time.    Video visits are billed at different rates depending on your insurance coverage.  Please reach out to your insurance provider with any questions.    If during the course of the call the physician/provider feels a video visit is not appropriate, you will not be charged for this service.\"    Patient has given verbal consent for Video visit? Yes  How would you like to obtain your AVS? MyChart  If you are dropped from the video visit, the video invite should be resent to: Send to e-mail at: [email or web address]  Will anyone else be joining your video visit? No     Wt 256 lb 12.8 oz (116.5 kg)   BMI 42.58 kg/m        Bernice Fischer CMA      "

## 2020-09-25 NOTE — LETTER
"2020       RE: Nehemias Mascorro  [address]  [place] [address]     Dear Colleague,    Thank you for referring your patient, Nehemias Mascorro, to the [facility] at [facility]. Please see a copy of my visit note below.        Return Medical Weight Management Note     Nehemias Mascorro  MRN:  [id number]  :  1995  JASMEET:  2020    Dear Ludwin Elam MD,    I had the pleasure of seeing your patient Nehemias Mascorro. She is a 25 year old female who I am continuing to see for treatment of morbid obesity; PMH includes the following:  Past Medical History:   Diagnosis Date     Depressive disorder      Uncomplicated asthma            INTERVAL HISTORY:    She was last seen about a month ago.  She has also added in the support of the 24 wk program.  Reviewed and relevant history as extracted from last visit is as follows--  -------------------------------------------  \"Notes the program is helping with making changes with food and be accountable and these are the things she notes she is most focussed on now--  1. Working on protein shake AM  2. Eat slower/mindful eating--working on this  3.  Walking several times per week with her mother--not happening yet with [employer] work going     Regarding the GI issue she has been working on with [facility]--doing PT to work on coordinating with BMs; pain and nausea and constipation.  That is improving some   --getting nausea still a couple of times per week; stomach cramping about 3 times per month, stomach pain a couple of times per week  --BMs 3 times per week;  In the past there were times that would be less than once per week        Regarding medications related/relevant to wt loss and co-morbidities I note the following--     1. lexapro changed to Prozac--now has stabilized with plan to titrate as needed---> in the interim Prozac dose was increased but is working much better " "than the other  2. Adderall continuing 25mg daily  4. Abilify--taking 15mg  5. buproprion at 300mg taking  6. Metformin 1000mg BID tolerating without any side effects)--unsure if this has helped any with appetite (escalated after last visit to full dose)  7. 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  Now sometimes having seconds at supper.    Pt is working through the 24 wk program--nutritionist is trying to connect with her for wk 4 visit, last visit with health  was 9/15/20    Goals before around structured eating--  1.  Have protein drink (Terra's Way)--110 calories; breakfast routine lately has been higher calorie (eggs/yogurt/oatmeal)  2.  Add protein to snack if having a snack  3.  Change from fruit cups to whole fruit (meeting)      With school one class is in person (Tue/Thu)  Up at 9a and eats breakfast----> will change to protein drink  AM has doctor's appointments and reading (apple/has been putting  PB on that); may have protein bar while at school (and will now change to the specs suggested by nutritionist)  Lunch when gets home (around 3:30)--spaghetti or tuna sandwich or leftovers, water or crystal light  Supper around 7p--chicken or salmon, spaghetti  Brasa last night--roasted port, red beans and rice, yucca (eating out about once per wk)    Snack between supper and bed--will have carrots or lean protein    Goals she is working on--  Working on having more self control--working on positive self talk, remembering the goal for losing wt and why  --pt wants to feel healthier and be in good shape  --sometimes negative self talk is a barrier to wt loss    Still in " PT for the abd issues/anal sphinctor symptoms,  Some improvement in symptoms but still still with abd pains.  Learning how to lose the bathroom more regularly and less constipated now.  Noting BMs about 4 times per wk      CURRENT WEIGHT:   256 lbs 12.8 oz  (today's wt)  Body mass index is 42.58 kg/m .    253#  On 8-28-20     Wt Jul--257#  Wt Peter  258.7#      Wt Readings from Last 3 Encounters:   09/25/20 116.5 kg (256 lb 12.8 oz)   02/28/20 119.9 kg (264 lb 5.3 oz)   11/22/19 118.8 kg (261 lb 14.5 oz)                   Changes and Difficulties 1/25/2019   I have made the following changes to my diet since my last visit: Eating less and more vegetables   With regards to my diet, I am still struggling with: -   I have made the following changes to my activity/exercise since my last visit: Joined a gym   With regards to my activity/exercise, I am still struggling with: -       VITALS:  Wt 116.5 kg (256 lb 12.8 oz)   BMI 42.58 kg/m      MEDICATIONS:   Current Outpatient Medications   Medication Sig Dispense Refill     ADDERALL XR 25 MG 24 hr capsule TK 1 C PO D  0     albuterol (PROAIR HFA/PROVENTIL HFA/VENTOLIN HFA) 108 (90 Base) MCG/ACT inhaler Inhale 2 puffs into the lungs       ARIPiprazole (ABILIFY) 15 MG tablet Take 1 tablet by mouth       BuPROPion HCl (WELLBUTRIN PO) Take 300 mg by mouth daily        cetirizine (ZYRTEC) 10 MG tablet Take 10 mg by mouth daily Reported on 4/27/2017       DIAZEPAM PO Take 5 mg by mouth       ESCITALOPRAM OXALATE PO Take 20 mg by mouth daily       Fexofenadine HCl (ALLEGRA PO) Take by mouth daily as needed for allergies       GLYCOPYRROLATE PO Take 2 mg by mouth daily       GUANFACINE HCL PO Take 2 mg by mouth daily       IBUPROFEN PO Take 600 mg by mouth       Ipratropium-Albuterol (COMBIVENT RESPIMAT)  MCG/ACT inhaler Inhale 1 puff into the lungs 4 times daily       Lansoprazole (PREVACID PO)        metFORMIN (GLUCOPHAGE) 500 MG tablet For 2 wks take 2 tablets AM with  breakfast then take 1 tablet twice daily with meal, and then increase to 2 tablets twice daily, as tolerated 360 tablet 1     NATAZIA 3/2-2/2-3/1 MG TABS TK 1 T PO QD  0     norethindrone-ethinyl estradiol (JUNEL FE 1/20) 1-20 MG-MCG per tablet Take 1 tablet by mouth daily       Probiotic Product (PROBIOTIC DAILY PO)        TIZANIDINE HCL PO Take 4 mg by mouth       amphetamine-dextroamphetamine (ADDERALL XR) 30 MG 24 hr capsule TK 1 C PO D FOR ADHD       ARIPiprazole (ABILIFY) 7.5 mg half-tab Take 7.5 mg by mouth daily         Weight Loss Medication History Reviewed With Patient 1/25/2019   Which weight loss medications are you currently taking on a regular basis?  Naltrexone   Are you having any side effects from the weight loss medication that we have prescribed you? No         ASSESSMENT;  Morbid obesity in pt with wt gain over past few years, more since her TBI; working now with neuro and psych. Is having some wt loss over last couple of month        PLAN:   (continues)  Decrease portion sizes  No meals in front of TV screen  Purge house of food triggers  No meal skipping and avoid snacking  Decrease caloric beverages--avoid soda  Volumetrics low carb eating plan--structured plan and avoding snacking  Low Calorie/low fat diet  Meal planning/journaling           additionally--  --continue on metformin at current full dose (2 g); again considered possible GLP1 agonist addition with her and, given potential for GI side effects at present we will continue to hold off as she works with [facility]; asked her to follow up with her gastroenterologist on the possibility of adding in a GLP1 agonist, given the issues they are working on with her  --continue with our 24 week health coaching/nutrition program   --will reassess next month      Goals she discussed--  1. Walk with mom daily afternoons (post mom's workday)  2.  Having healthy snack (whole fruit or veggies, possibly lean protein) only if hungry for snack; make it  "a low fat snask also  3.  Switch back to the low calorie protein shakes for breakfast  4.  Avoid seconds at dinner; if having seconds, make it a nonstarchy veggie    She will discuss possible semaglutide low dose start with her GI doctor, as noted above    FOLLOW-UP:      1 mo        Sincerely,    Luis Slade MD     Nehemias Mascorro is a 25 year old female who is being evaluated via a billable video visit.      The patient has been notified of following:     \"This video visit will be conducted via a call between you and your physician/provider. We have found that certain health care needs can be provided without the need for an in-person physical exam.  This service lets us provide the care you need with a video conversation.  If a prescription is necessary we can send it directly to your pharmacy.  If lab work is needed we can place an order for that and you can then stop by our lab to have the test done at a later time.    Video visits are billed at different rates depending on your insurance coverage.  Please reach out to your insurance provider with any questions.    If during the course of the call the physician/provider feels a video visit is not appropriate, you will not be charged for this service.\"    Patient has given verbal consent for Video visit? Yes  How would you like to obtain your AVS? MyChart      Video-Visit Details    Type of service:  Video Visit    Video Start Time: 10:36  Video End Time: 11:35 (time includes family visit with pt's mother; specific visit time for Nehemias--approx 30 min)    Originating Location (pt. Location): Home    Distant Location (provider location):  [facility]     Platform used for Video Visit: Peace Slade MD          "

## 2020-09-29 ENCOUNTER — RECORDS - HEALTHEAST (OUTPATIENT)
Dept: ADMINISTRATIVE | Facility: OTHER | Age: 25
End: 2020-09-29

## 2020-10-05 NOTE — PATIENT INSTRUCTIONS
Thank you for allowing us the privilege of caring for you. We hope we provided you with the excellent service you deserve.    Please let us know if there is anything else we can do for you so that we can be sure you are completely satisfied with your care experience.    Your visit was with  today.    Instructions per today's visit:   Goals you discussed--  1. Walk with mom daily afternoons (post mom's workday)  2.  Having healthy snack (whole fruit or veggies, possibly lean protein) only if hungry for snack; make it a low fat snask also  3.  Switch back to the low calorie protein shakes for breakfast  4.  Avoid seconds at dinner; if having seconds, make it a nonstarchy veggie     Please discuss possible semaglutide (Ozempic) low dose start with your GI doctor--we have reviewed Ozempic and we can start that at a visit, or between visits if you decide you want to.     FOLLOW-UP:       1 mo      Please call our contact center at [phone] to schedule your next appointments.    Meal Replacement Products:    Here is the link to our new e-store where you can purchase our meal replacement products    in[employer]te Innovationsview inSelly/store    The one week starter kit is a great way to sample a variety of products and see what works for you.    If you want more information about the product go to: MobiVita    Free Shipping for orders over $75    Benefits of meal replacements products:    Portion and calorie control  Improved nutrition  Structured eating  Simplified food choices  Avoid contact with trigger foods    Interested in working with a health ?  Health coaches work with you to improve your overall health and wellbeing.  They look at the whole person, and may involve discussion of different areas of life, including, but not limited to the four pillars of health (sleep, exercise, nutrition, and stress management). Discuss with your care team if you would like to  start working a health .    Health Coaching-3 Pack: Schedule by calling [phone]    $99 for three health coaching visits    Visits may be done in person or via phone    Coaching is a partnership between the  and the client; Coaches do not prescribe or diagnose    Coaching helps inspire the client to reach his/her personal goals    Bluetooth Scale:    We hope to provide you with high quality telephone and virtual healthcare visits while social distancing for COVID-19 is necessary, as well as in the future when virtual visits may be more convenient for you.    Our technology team made it possible for Bluetooth scales to send weight measurements to our electronic medical record. This allows weights from you weighing at home to securely flow into the medical record, which will improve telephone and virtual visits.  Additionally, studies have shown that adults actually lose more weight when their weights are automatically sent to someone else, and also that this process is not stressful for those adults.    Below is a link for purchasing the scale, with a discount code for our patients. You may call your insurance company to see if they will reimburse you for the cost of the scale, as a piece of durable medical equipment. The scales only go up to a weight of 400 pounds. This is an issue and we are working with the developer on increasing this. We found no scales that go over 400lb that have blue-tooth for connecting to ExploraMed.    Scale to purchase: the [employer]  Body  Scale: [email or web address]=aw.ds    Discount Code: We have a discount code for our patients to bring the cost down to $50, the code is:  withmed     Steps to link the scale to ExploraMed via an Android Phone (you can always disconnect at any point in the future):  1. The order must be placed first before the patient can access Track My Health within  MyChart.  2. Download Google Fit junaid from the Google Play Store  a. Log in or register using your Google account  3. Download the MyChart junaid from Google Play Store  a. Select add organization  b. Search for JAYS and select it  c. Log into [employer]  d. Select Track My Health  e. Select the green connect my account button  f. When prompted log into your Google account  g. Select okay to confirm the account  4. Download the Withings Health Mate junaid from Google Play Store  a. [place] for [employer]  b. Go to profile  c. Tap google fit under the Apps section  d. Select the option to activate Google Fit integration  e. Select the same Google account  f. Select okay to confirm the account  g.  Steps to link the scale to Usability[employer].com via an iPhone (you can always disconnect at any point in the future):  **Note Jintronix is not available for download on an iPad**  1. The order must be placed first before the patient can access Track Wow! Stuff Health within Usability[employer].com.  2. Locate the Health junaid on your iPhone.  a. Set up your Apple Health account as prompted  b. The Sources page will show Apps that communicate with your Health junaid. Once all steps are completed, you should see Health Mate and MyChart listed under the Apps section and your iPhone under the devices section.  i. Select Health Mate  1. Under 'ALLOW  HEALTH MATE  TO WRITE DATA ensure the toggle is on for Weight.  2. This will allow the scale to add your weight to the Apple Health  ii. Select MyChart  1. Under 'ALLOW  MYCHART  TO READ DATA ensure the toggle is on for Weight.  2. This allows UsabilityTools.com to grab the weight from Jintronix so your provider can see your weights.  3. Download the MyChart junaid from the Junaid Store  a. Select add organization                                                  b. Search for [place] and select it  c. Log into [employer] Cardhart  d. Select Track My Health  e. Select the green connect my account button  f. Follow prompts to link your device to  [employer].  4. Download the Withings health mate junaid in the Junaid Store  a. [place] for [employer]  b. Go to [employer]M  c. AMENDIA under the Apps section  d. If prompted to allow access with the Health Junaid, toggle weight on for read and write access.      For any questions/concerns contact Susan Balderas LPN at [phone]    To schedule appointments with our team, please call [phone]    Please call during clinic hours Monday through Friday 8:00a - 4:00p if you have questions or you can contact us via [employer] at anytime.      Lab results will be communicated through My Chart or letter (if My Chart not used). Please call the clinic if you have not received communication after 1 week or if you have any questions.    Clinic Fax: [phone]    Thank you,  Medical Weight Management Team

## 2020-10-07 ENCOUNTER — VIRTUAL VISIT (OUTPATIENT)
Dept: ENDOCRINOLOGY | Facility: CLINIC | Age: 25
End: 2020-10-07
Payer: COMMERCIAL

## 2020-10-07 ENCOUNTER — RECORDS - HEALTHEAST (OUTPATIENT)
Dept: ADMINISTRATIVE | Facility: OTHER | Age: 25
End: 2020-10-07

## 2020-10-07 DIAGNOSIS — E66.9 OBESITY: Primary | ICD-10-CM

## 2020-10-07 PROCEDURE — 99207 PR NO CHARGE LOS: CPT | Mod: TEL

## 2020-10-07 NOTE — PROGRESS NOTES
MICHELLE GARCIA     Progress Notes    Return Weight Management Health Coaching Note    Nehemias Mascorro          MRN: [id number]  : 1995  JASMEET: 2020    Health  Visit #:7    ASSESSMENT:  Initial Weight: 249.3 lb    Current Weight (lbs) 255 lb (last wght was 254.3)      PREVIOUS GOAL(S) REVIEW:    1.)  Write out positive self-talk statements. Use the double sided journal if I like.      Double-sided Journal          [email or web address]    Meeting: writing them in her phone.     2.) Create some routines in the afternoon and evenings. How do I want to spend my time? When will I have snack time? Got a planner; mapping out days; and meal times. Lots of school work.     3.) I will continue to take walks daily; not meeting; such a big workload with school. Had a concussion; so school work takes twice as long.wants to give myself a break for a walk. Has aspirations pysch PHD. Discussed how taking breaks for self care may help her think better and be more productive with her school work.     4.) See RD and Dr Slade nutrition goals.     Goals she discussed--  1. Walk with mom daily afternoons (post mom's workday)  2.  Having healthy snack (whole fruit or veggies, possibly lean protein) only if hungry for snack; make it a low fat snask also  3.  Switch back to the low calorie protein shakes for breakfast  4.  Avoid seconds at dinner; if having seconds, make it a nonstarchy veggies         GOALS:      I will take breaks during school work.        NOTES:        FOLLOW-UP:  To schedule your next visit, please call [phone].      TIME:       SIGNATURE:  Michelle Garcia, Certified Health  on 10/7/2020 at 9:57 AM

## 2020-10-07 NOTE — LETTER
10/7/2020       RE: Nehemias Mascorro  [address]  [place] [address]     Dear Colleague,    Thank you for referring your patient, Nehemias Mascorro, to the [facility] [place] at [facility]. Please see a copy of my visit note below.    ANGELA JIMÉNEZ     Progress Notes    Return Weight Management Health Coaching Note    Nehemias Mascorro          MRN: [id number]  : 1995  JASMEET: 2020    Health  Visit #:7    ASSESSMENT:  Initial Weight: 249.3 lb    Current Weight (lbs) 255 lb (last wght was 254.3)      PREVIOUS GOAL(S) REVIEW:    1.)  Write out positive self-talk statements. Use the double sided journal if I like.      Double-sided Journal          [email or web address]    Meeting: writing them in her phone.     2.) Create some routines in the afternoon and evenings. How do I want to spend my time? When will I have snack time? Got a planner; mapping out days; and meal times. Lots of school work.     3.) I will continue to take walks daily; not meeting; such a big workload with school. Had a concussion; so school work takes twice as long.wants to give myself a break for a walk. Has aspirations pysch PHD. Discussed how taking breaks for self care may help her think better and be more productive with her school work.     4.) See RD and Dr Slade nutrition goals.     Goals she discussed--  1. Walk with mom daily afternoons (post mom's workday)  2.  Having healthy snack (whole fruit or veggies, possibly lean protein) only if hungry for snack; make it a low fat snask also  3.  Switch back to the low calorie protein shakes for breakfast  4.  Avoid seconds at dinner; if having seconds, make it a nonstarchy veggies         GOALS:      I will take breaks during school work.        NOTES:        FOLLOW-UP:  To schedule your next visit, please call [phone].      TIME:       SIGNATURE:  Angela Jiménez,  Certified Health  on 10/7/2020 at 9:57 AM

## 2020-10-23 ENCOUNTER — VIRTUAL VISIT (OUTPATIENT)
Dept: ENDOCRINOLOGY | Facility: CLINIC | Age: 25
End: 2020-10-23
Attending: INTERNAL MEDICINE
Payer: COMMERCIAL

## 2020-10-23 ENCOUNTER — RECORDS - HEALTHEAST (OUTPATIENT)
Dept: ADMINISTRATIVE | Facility: OTHER | Age: 25
End: 2020-10-23

## 2020-10-23 DIAGNOSIS — E66.01 MORBID OBESITY DUE TO EXCESS CALORIES (H): Primary | ICD-10-CM

## 2020-10-23 PROCEDURE — 99214 OFFICE O/P EST MOD 30 MIN: CPT | Mod: 95 | Performed by: INTERNAL MEDICINE

## 2020-10-23 NOTE — LETTER
"10/23/2020       RE: Nehemias Mascorro  [address]  [place] [address]     Dear Colleague,    Thank you for referring your patient, Nehemias Mascorro, to the [facility] at [facility]. Please see a copy of my visit note below.        Return Medical Weight Management Note     Nehemias Mascorro  MRN:  [id number]  :  1995  JASMEET:  10/23/2020    Dear Ludwin Elam MD,    I had the pleasure of seeing your patient Nehemias Mascorro. She is a 25 year old female who I am continuing to see for treatment of obesity related to:    No flowsheet data found.    INTERVAL HISTORY:      She was last seen about a month ago.  She has also added in the support of the 24 wk program.  Reviewed and relevant history as extracted from last visit is as follows--  -------------------------------------------  \"Notes the program is helping with making changes with food and be accountable and these are the things she notes she is most focussed on now--  1. Working on protein shake AM  2. Eat slower/mindful eating--working on this  3.  Walking several times per week with her mother--not happening yet with [employer] work going     Regarding the GI issue she has been working on with [facility]--doing PT to work on coordinating with BMs; pain and nausea and constipation.  That is improving some   --getting nausea still a couple of times per week; stomach cramping about 3 times per month, stomach pain a couple of times per week  --BMs 3 times per week;  In the past there were times that would be less than once per week        Regarding medications related/relevant to wt loss and co-morbidities I note the following--     1. lexapro changed to Prozac--now has stabilized with plan to titrate as needed---> in the interim Prozac dose was increased but is working much better than the other  2. Adderall continuing 25mg daily  4. " "Abilify--taking 15mg  5. buproprion at 300mg taking  6. Metformin 1000mg BID tolerating without any side effects)--unsure if this has helped any with appetite (escalated after last visit to full dose)  7. Prozac--40 mg daily...     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  Now sometimes having seconds at supper.     Pt is working through the 24 wk program--nutritionist is trying to connect with her for wk 4 visit, last visit with health  was 9/15/20     Goals before around structured eating--  1.  Have protein drink (Terra's Way)--110 calories; breakfast routine lately has been higher calorie (eggs/yogurt/oatmeal)  2.  Add protein to snack if having a snack  3.  Change from fruit cups to whole fruit (meeting)        ...in PT for the abd issues/anal sphinctor symptoms,  Some improvement in symptoms but still still with abd pains.  Learning how to lose the bathroom more regularly and less constipated now.  Noting BMs about 4 times per wk\"  -------------------------------------------      Again we discussed goals and barriers with regards to wt loss plans, and other health issues as related to working on wt loss.      Goals she is working on--  Working on having more self control--working on positive self talk, remembering the goal for losing wt and why  --pt wants to feel healthier and be in good shape  --sometimes negative self talk is a barrier to wt loss      Goals--walking (has been doing twice per wk), eating whole fruit (instead of canneed)  Affirmations--positive self talk about succeeding with wt loss    --Up around 8:30 with protein shake 9a  Reading or homework  Class Tue/Thu (in person) other days at home  --Lunch--1p (frozen healthy " "choice meal or sandwich [turkey, light black one slice cheese]  Homework  Walk PM  --Dinner around 7p--baked chicken or pasta with chicken/shrimp, or salmon, salad or broccoli, or squash or carrots  --Bedtime snack of apple or baby carrots  --Water or Crystal Light or coffee in the morning (2 creams and 2 teaspoon)    Sometimes goes for seconds--->trying to make it nonstarchy veggies when she does    Was having trouble saying \"no\" to food--able to tell herself that she wants to lose wt; why lose wt?  Wants to feel healthier and able to walk without getting tired as quickly, wants clothes to fit better    Not taking metformin regularly---> in kitchen cabinet and will move to her room with other meds; dose should be     Much better GI issues--she will will ask GI about semaglutide start up.  I would plan 0.25mg weekly and gradually advance as tolerated    Other meds--  abilify continues at same dose (15mg)  adderall XL the same-- 30 mg/d  Bupropion 300mg daily  Prevacid  prozac dose 40 mg    Off of estalopram      Goals she discussed--  1. Walk with mom daily afternoons (post mom's workday)--->up to 3 times per wk  2.  Having healthy snack (whole fruit or veggies, possibly lean protein) only if hungry for snack; make it a low fat snask also  3.  Switch back to the low calorie protein shakes for breakfast  4.  Avoid seconds at dinner; if having seconds, make it a nonstarchy veggies--->like broccoli, green beans, carrots, cauliflower      CURRENT WEIGHT:     257#    Wt Readings from Last 3 Encounters:   09/25/20 116.5 kg (256 lb 12.8 oz)   02/28/20 119.9 kg (264 lb 5.3 oz)   11/22/19 118.8 kg (261 lb 14.5 oz)                Changes and Difficulties 1/25/2019   I have made the following changes to my diet since my last visit: Eating less and more vegetables   With regards to my diet, I am still struggling with: -   I have made the following changes to my activity/exercise since my last visit: Joined a gym   With regards " to my activity/exercise, I am still struggling with: -       VITALS:  There were no vitals taken for this visit.    MEDICATIONS:   Current Outpatient Medications   Medication Sig Dispense Refill     ADDERALL XR 25 MG 24 hr capsule TK 1 C PO D  0     albuterol (PROAIR HFA/PROVENTIL HFA/VENTOLIN HFA) 108 (90 Base) MCG/ACT inhaler Inhale 2 puffs into the lungs       amphetamine-dextroamphetamine (ADDERALL XR) 30 MG 24 hr capsule TK 1 C PO D FOR ADHD       ARIPiprazole (ABILIFY) 15 MG tablet Take 1 tablet by mouth       BuPROPion HCl (WELLBUTRIN PO) Take 300 mg by mouth daily        cetirizine (ZYRTEC) 10 MG tablet Take 10 mg by mouth daily Reported on 4/27/2017       DIAZEPAM PO Take 5 mg by mouth       ESCITALOPRAM OXALATE PO Take 20 mg by mouth daily       Fexofenadine HCl (ALLEGRA PO) Take by mouth daily as needed for allergies       GLYCOPYRROLATE PO Take 2 mg by mouth daily       GUANFACINE HCL PO Take 2 mg by mouth daily       IBUPROFEN PO Take 600 mg by mouth       Ipratropium-Albuterol (COMBIVENT RESPIMAT)  MCG/ACT inhaler Inhale 1 puff into the lungs 4 times daily       Lansoprazole (PREVACID PO)        metFORMIN (GLUCOPHAGE) 500 MG tablet For 2 wks take 2 tablets AM with breakfast then take 1 tablet twice daily with meal, and then increase to 2 tablets twice daily, as tolerated 360 tablet 1     NATAZIA 3/2-2/2-3/1 MG TABS TK 1 T PO QD  0     norethindrone-ethinyl estradiol (JUNEL FE 1/20) 1-20 MG-MCG per tablet Take 1 tablet by mouth daily       Probiotic Product (PROBIOTIC DAILY PO)        TIZANIDINE HCL PO Take 4 mg by mouth         Weight Loss Medication History Reviewed With Patient 1/25/2019   Which weight loss medications are you currently taking on a regular basis?  Naltrexone   Are you having any side effects from the weight loss medication that we have prescribed you? No       ASSESSMENT;  Morbid obesity in pt with wt gain over past few years, more since her TBI; working now with neuro and  "psych. Is having some wt loss over last couple of month        PLAN:   (continues)  Decrease portion sizes  No meals in front of TV screen  Purge house of food triggers  No meal skipping and avoid snacking  Decrease caloric beverages--avoid soda  Volumetrics low carb eating plan--structured plan and avoding snacking  Low Calorie/low fat diet  Meal planning/journaling           additionally--  --continue on metformin at current full dose (2 g); again considered possible GLP1 agonist addition with her and, given potential for GI side effects at present I have continued to hold off as she works with [facility]; asked her to follow up with her gastroenterologist on the possibility of adding in a GLP1 agonist, given the issues they are working on with her (she has not yet done that but plans to; we again discussed reasons to use to this medication if she does not have issues with constipation on it)  --continue with our 24 week health coaching/nutrition program      Goals she discussed--  1. Walk with mom daily afternoons (post mom's workday)  2.  Having healthy snack (whole fruit or veggies, possibly lean protein) only if hungry for snack; make it a low fat snask also  3.  Switch back to the low calorie protein shakes for breakfast  4.  Avoid seconds at dinner; if having seconds, make it a nonstarchy veggie     She will discuss possible semaglutide low dose start with her GI doctor, as noted above     FOLLOW-UP:       Dec-    Sincerely,    uLis Slade MD     Nehemias Mascorro is a 25 year old female who is being evaluated via a billable telephone visit.      The patient has been notified of following:     \"This telephone visit will be conducted via a call between you and your physician/provider. We have found that certain health care needs can be provided without the need for a physical exam.  This service lets us provide the care you need with a short phone conversation.  If a prescription is necessary we " "can send it directly to your pharmacy.  If lab work is needed we can place an order for that and you can then stop by our lab to have the test done at a later time.    Telephone visits are billed at different rates depending on your insurance coverage. During this emergency period, for some insurers they may be billed the same as an in-person visit.  Please reach out to your insurance provider with any questions.    If during the course of the call the physician/provider feels a telephone visit is not appropriate, you will not be charged for this service.\"    Patient has given verbal consent for Telephone visit?  Yes        How would you like to obtain your AVS? MyChart    Phone call duration: 25 minutes    Luis Slade MD    "

## 2020-10-23 NOTE — PROGRESS NOTES
"    Return Medical Weight Management Note     Nehemias Mascorro  MRN:  [id number]  :  1995  JASMEET:  10/23/2020    Dear Ludwin Elam MD,    I had the pleasure of seeing your patient Nehemias Mascorro. She is a 25 year old female who I am continuing to see for treatment of obesity related to:    No flowsheet data found.    INTERVAL HISTORY:      She was last seen about a month ago.  She has also added in the support of the 24 wk program.  Reviewed and relevant history as extracted from last visit is as follows--  -------------------------------------------  \"Notes the program is helping with making changes with food and be accountable and these are the things she notes she is most focussed on now--  1. Working on protein shake AM  2. Eat slower/mindful eating--working on this  3.  Walking several times per week with her mother--not happening yet with [employer] work going     Regarding the GI issue she has been working on with [facility]--doing PT to work on coordinating with BMs; pain and nausea and constipation.  That is improving some   --getting nausea still a couple of times per week; stomach cramping about 3 times per month, stomach pain a couple of times per week  --BMs 3 times per week;  In the past there were times that would be less than once per week        Regarding medications related/relevant to wt loss and co-morbidities I note the following--     1. lexapro changed to Prozac--now has stabilized with plan to titrate as needed---> in the interim Prozac dose was increased but is working much better than the other  2. Adderall continuing 25mg daily  4. Abilify--taking 15mg  5. buproprion at 300mg taking  6. Metformin 1000mg BID tolerating without any side effects)--unsure if this has helped any with appetite (escalated after last visit to full dose)  7. Prozac--40 mg daily...     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  Now sometimes having seconds at supper.     Pt is working through the 24 wk program--nutritionist is trying to connect with her for wk 4 visit, last visit with health  was 9/15/20     Goals before around structured eating--  1.  Have protein drink (Terra's Way)--110 calories; breakfast routine lately has been higher calorie (eggs/yogurt/oatmeal)  2.  Add protein to snack if having a snack  3.  Change from fruit cups to whole fruit (meeting)        ...in PT for the abd issues/anal sphinctor symptoms,  Some improvement in symptoms but still still with abd pains.  Learning how to lose the bathroom more regularly and less constipated now.  Noting BMs about 4 times per wk\"  -------------------------------------------      Again we discussed goals and barriers with regards to wt loss plans, and other health issues as related to working on wt loss.      Goals she is working on--  Working on having more self control--working on positive self talk, remembering the goal for losing wt and why  --pt wants to feel healthier and be in good shape  --sometimes negative self talk is a barrier to wt loss      Goals--walking (has been doing twice per wk), eating whole fruit (instead of canneed)  Affirmations--positive self talk about succeeding with wt loss    --Up around 8:30 with protein shake 9a  Reading or homework  Class Tue/Thu (in person) other days at home  --Lunch--1p (frozen healthy choice meal or sandwich [turkey, light black one slice cheese]  Homework  Walk PM  --Dinner around 7p--baked chicken or pasta with chicken/shrimp, or salmon, salad or broccoli, or squash or carrots  --Bedtime snack of apple or baby carrots  --Water or Crystal Light or coffee in the morning (2 creams and 2 teaspoon)    Sometimes goes for " "seconds--->trying to make it nonstarchy veggies when she does    Was having trouble saying \"no\" to food--able to tell herself that she wants to lose wt; why lose wt?  Wants to feel healthier and able to walk without getting tired as quickly, wants clothes to fit better    Not taking metformin regularly---> in kitchen cabinet and will move to her room with other meds; dose should be     Much better GI issues--she will will ask GI about semaglutide start up.  I would plan 0.25mg weekly and gradually advance as tolerated    Other meds--  abilify continues at same dose (15mg)  adderall XL the same-- 30 mg/d  Bupropion 300mg daily  Prevacid  prozac dose 40 mg    Off of estalopram      Goals she discussed--  1. Walk with mom daily afternoons (post mom's workday)--->up to 3 times per wk  2.  Having healthy snack (whole fruit or veggies, possibly lean protein) only if hungry for snack; make it a low fat snask also  3.  Switch back to the low calorie protein shakes for breakfast  4.  Avoid seconds at dinner; if having seconds, make it a nonstarchy veggies--->like broccoli, green beans, carrots, cauliflower      CURRENT WEIGHT:     257#    Wt Readings from Last 3 Encounters:   09/25/20 116.5 kg (256 lb 12.8 oz)   02/28/20 119.9 kg (264 lb 5.3 oz)   11/22/19 118.8 kg (261 lb 14.5 oz)                Changes and Difficulties 1/25/2019   I have made the following changes to my diet since my last visit: Eating less and more vegetables   With regards to my diet, I am still struggling with: -   I have made the following changes to my activity/exercise since my last visit: Joined a gym   With regards to my activity/exercise, I am still struggling with: -       VITALS:  There were no vitals taken for this visit.    MEDICATIONS:   Current Outpatient Medications   Medication Sig Dispense Refill     ADDERALL XR 25 MG 24 hr capsule TK 1 C PO D  0     albuterol (PROAIR HFA/PROVENTIL HFA/VENTOLIN HFA) 108 (90 Base) MCG/ACT inhaler Inhale 2 " puffs into the lungs       amphetamine-dextroamphetamine (ADDERALL XR) 30 MG 24 hr capsule TK 1 C PO D FOR ADHD       ARIPiprazole (ABILIFY) 15 MG tablet Take 1 tablet by mouth       BuPROPion HCl (WELLBUTRIN PO) Take 300 mg by mouth daily        cetirizine (ZYRTEC) 10 MG tablet Take 10 mg by mouth daily Reported on 4/27/2017       DIAZEPAM PO Take 5 mg by mouth       ESCITALOPRAM OXALATE PO Take 20 mg by mouth daily       Fexofenadine HCl (ALLEGRA PO) Take by mouth daily as needed for allergies       GLYCOPYRROLATE PO Take 2 mg by mouth daily       GUANFACINE HCL PO Take 2 mg by mouth daily       IBUPROFEN PO Take 600 mg by mouth       Ipratropium-Albuterol (COMBIVENT RESPIMAT)  MCG/ACT inhaler Inhale 1 puff into the lungs 4 times daily       Lansoprazole (PREVACID PO)        metFORMIN (GLUCOPHAGE) 500 MG tablet For 2 wks take 2 tablets AM with breakfast then take 1 tablet twice daily with meal, and then increase to 2 tablets twice daily, as tolerated 360 tablet 1     NATAZIA 3/2-2/2-3/1 MG TABS TK 1 T PO QD  0     norethindrone-ethinyl estradiol (JUNEL FE 1/20) 1-20 MG-MCG per tablet Take 1 tablet by mouth daily       Probiotic Product (PROBIOTIC DAILY PO)        TIZANIDINE HCL PO Take 4 mg by mouth         Weight Loss Medication History Reviewed With Patient 1/25/2019   Which weight loss medications are you currently taking on a regular basis?  Naltrexone   Are you having any side effects from the weight loss medication that we have prescribed you? No       ASSESSMENT;  Morbid obesity in pt with wt gain over past few years, more since her TBI; working now with neuro and psych. Is having some wt loss over last couple of month        PLAN:   (continues)  Decrease portion sizes  No meals in front of TV screen  Purge house of food triggers  No meal skipping and avoid snacking  Decrease caloric beverages--avoid soda  Volumetrics low carb eating plan--structured plan and avoding snacking  Low Calorie/low fat  "diet  Meal planning/journaling           additionally--  --continue on metformin at current full dose (2 g); again considered possible GLP1 agonist addition with her and, given potential for GI side effects at present I have continued to hold off as she works with [facility]; asked her to follow up with her gastroenterologist on the possibility of adding in a GLP1 agonist, given the issues they are working on with her (she has not yet done that but plans to; we again discussed reasons to use to this medication if she does not have issues with constipation on it)  --continue with our 24 week health coaching/nutrition program      Goals she discussed--  1. Walk with mom daily afternoons (post mom's workday)  2.  Having healthy snack (whole fruit or veggies, possibly lean protein) only if hungry for snack; make it a low fat snask also  3.  Switch back to the low calorie protein shakes for breakfast  4.  Avoid seconds at dinner; if having seconds, make it a nonstarchy veggie     She will discuss possible semaglutide low dose start with her GI doctor, as noted above     FOLLOW-UP:       Dec-    Sincerely,    Luis Slade MD     Nehemias Mascorro is a 25 year old female who is being evaluated via a billable telephone visit.      The patient has been notified of following:     \"This telephone visit will be conducted via a call between you and your physician/provider. We have found that certain health care needs can be provided without the need for a physical exam.  This service lets us provide the care you need with a short phone conversation.  If a prescription is necessary we can send it directly to your pharmacy.  If lab work is needed we can place an order for that and you can then stop by our lab to have the test done at a later time.    Telephone visits are billed at different rates depending on your insurance coverage. During this emergency period, for some insurers they may be billed the same as an " "in-person visit.  Please reach out to your insurance provider with any questions.    If during the course of the call the physician/provider feels a telephone visit is not appropriate, you will not be charged for this service.\"    Patient has given verbal consent for Telephone visit?  Yes        How would you like to obtain your AVS? MyChart    Phone call duration: 25 minutes    Luis Slade MD    "

## 2020-10-28 ENCOUNTER — RECORDS - HEALTHEAST (OUTPATIENT)
Dept: ADMINISTRATIVE | Facility: OTHER | Age: 25
End: 2020-10-28

## 2020-10-28 ENCOUNTER — TELEPHONE (OUTPATIENT)
Dept: ENDOCRINOLOGY | Facility: CLINIC | Age: 25
End: 2020-10-28

## 2020-10-28 ENCOUNTER — VIRTUAL VISIT (OUTPATIENT)
Dept: ENDOCRINOLOGY | Facility: CLINIC | Age: 25
End: 2020-10-28
Payer: COMMERCIAL

## 2020-10-28 DIAGNOSIS — E66.9 OBESITY: Primary | ICD-10-CM

## 2020-10-28 DIAGNOSIS — Z71.3 NUTRITIONAL COUNSELING: ICD-10-CM

## 2020-10-28 DIAGNOSIS — E66.01 MORBID OBESITY DUE TO EXCESS CALORIES (H): Primary | ICD-10-CM

## 2020-10-28 PROCEDURE — 97803 MED NUTRITION INDIV SUBSEQ: CPT | Mod: 95 | Performed by: DIETITIAN, REGISTERED

## 2020-10-28 NOTE — PROGRESS NOTES
Return Weight Management Health Coaching Note     Nehemias Mascorro          MRN: [id number]  : 1995  JASMEET: 2020     Health  Visit #:8     ASSESSMENT:  Initial Weight: 249.3 lb     Current Weight (lbs)  (last wght was 257 lb [place] on 10/28)        PREVIOUS GOAL(S) REVIEW  I will take breaks during school work. meeting     Previous Goals she discussed--with [place]  1. Walk with mom daily afternoons (post mom's workday)--->up to 3 times per wk  2.  Having healthy snack (whole fruit or veggies, possibly lean protein) only if hungry for snack; make it a low fat snask also  3.  Switch back to the low calorie protein shakes for breakfast  4.  Avoid seconds at dinner; if having seconds, make it a nonstarchy veggies--->like broccoli, green beans, carrots, cauliflower    Scheduled last 4 hcvisits    Sleep study done. Will get results this week.    Walking regularly    Do pt exercises    Decrease snack foods like pretzels    Has some carrot snack packs-- convenience is important for her.     Feels like she is trying more now; has been putting in more effort and happy that it is finally clicking, she says.

## 2020-10-28 NOTE — LETTER
10/28/2020       RE: Nehemias Mascorro  [address]  [place] [address]     Dear Colleague,    Thank you for referring your patient, Nehemias Mascorro, to the [facility] [place] [facility]. Please see a copy of my visit note below.    Return Weight Management Health Coaching Note     Nehemias Mascorro          MRN: [id number]  : 1995  JASMEET: 2020     Health  Visit #:8     ASSESSMENT:  Initial Weight: 249.3 lb     Current Weight (lbs)  (last wght was 257 lb [place] on 10/28)        PREVIOUS GOAL(S) REVIEW  I will take breaks during school work. meeting     Previous Goals she discussed--with [place]  1. Walk with mom daily afternoons (post mom's workday)--->up to 3 times per wk  2.  Having healthy snack (whole fruit or veggies, possibly lean protein) only if hungry for snack; make it a low fat snask also  3.  Switch back to the low calorie protein shakes for breakfast  4.  Avoid seconds at dinner; if having seconds, make it a nonstarchy veggies--->like broccoli, green beans, carrots, cauliflower    Scheduled last 4 hcvisits    Sleep study done. Will get results this week.    Walking regularly    Do pt exercises    Decrease snack foods like pretzels    Has some carrot snack packs-- convenience is important for her.     Feels like she is trying more now; has been putting in more effort and happy that it is finally clicking, she says.          Again, thank you for allowing me to participate in the care of your patient.      Sincerely,    Angela Jiménez

## 2020-10-28 NOTE — PROGRESS NOTES
"Nehemias Mascorro is a 25 year old female who is being evaluated via a billable video visit.      The patient has been notified of following:     \"This video visit will be conducted via a call between you and your physician/provider. We have found that certain health care needs can be provided without the need for an in-person physical exam.  This service lets us provide the care you need with a video conversation.  If a prescription is necessary we can send it directly to your pharmacy.  If lab work is needed we can place an order for that and you can then stop by our lab to have the test done at a later time.    Video visits are billed at different rates depending on your insurance coverage.  Please reach out to your insurance provider with any questions.    If during the course of the call the physician/provider feels a video visit is not appropriate, you will not be charged for this service.\"    Patient has given verbal consent for Video visit? Yes  How would you like to obtain your AVS? MyChart    Will anyone else be joining your video visit? No        Video-Visit Details    Type of service:  Video Visit    Video Start Time: 9:10 AM  Video End Time: 9:34 AM    Originating Location (pt. Location): Home    Distant Location (provider location):  [facility]     Platform used for Video Visit: [employer]      [facility] 24 Week Healthy Lifestyle Nutrition Note    Reason for visit: Nutrition Assessment    Nehemias Mascorro is a 25 year old female presenting today for follow-up nutrition assessment consult.  Patient is accompanied by self. Pt is referred by Dr. Slade.    Use of meal replacements:no    Current vitamin/mineral supplements: did not discuss today    ANTHROPOMETRICS:   Estimated body mass index is 42.58 kg/m  as calculated from the following:    Height as of 2/28/20: 1.654 m (5' 5.12\").    Weight as of 9/25/20: 116.5 kg (256 lb 12.8 oz).   Recent pt reported weight on 10/23/2020: 257 lb ( " no recent weight per patient's report)    NUTRITION HISTORY:  Today:Her GI symptoms have decreased, she has cut out broccoli and cauliflower as recommended by her PT for pelvic floor dysfunction. She would like to incorporate more of her PT exercise into her day.     Diet Recall   B: Protein shake OR yogurt granola and frozen fruit OR banana oatmeal pancakes (no flour or syrup)    L: sandwiches OR healthy choice frozen meals Or spaghetti OR humus carrots and chicken strips  D: rotisserie chicken, apple cauliflower, salmon green beans and apple, Brasa (dinning out), pot roast green beans sweet potatoes and cabbage OR yogurt.   Snack: peach, apple and almonds, crackers, pretzels, carrots     Beverages: Water and crystal light, coffee    Progress Towards Previous Goals:  GOALS:  1. Breakfast options: -Met  -  eggs and 1 piece of whole wheat toast   - Egg and vegetable scramble with a little cheese   - Yogurt with fruit and 1/4 c granola or some nuts (almonds)   - Hard boiled eggs and a banana   - protein bar or shake, option to pair with fruit if needed.   2. Add a protein to your afternoon or nighttime snack this may help with the middle of the night hunger you are feeling. -Sometimes   3. Use whole fruit instead of fruit cups -Met  -try making a list of fruit you like and a list of vegetables to help with meal planning and grocery store shopping.   4. Increase walking to  3x a week -Met  5. Increase vegetable intake: have a handful of vegetables with 2 meals daily. Ideas below:-Met  - added vegetables to your eggs in the morning   - Have vegetable with dip for a snack or a side dish   - Prep a salad for dinner or lunch  - Eat a small side salad before having the rest of dinner   - Put tuna/chicken salad on a bed of lettuce or eat with carrots, celery and cucumbers.   6. Continue to food journal, start writing down GI symptoms (gas, cramping, diarrhea, constipation, reflux etc.) -Food journaled but not GI symptoms,  which have now improved.   7. Look over low-FODMAP diet handouts (will be sent vis Mail) -Met   8. Try a some Low FODMAP recipes and see if they cause GI symptoms or not before starting the full low- FODMAP diet. -Did not try      PHYSICAL ACTIVITY:  Walking with mom    NUTRITION DIAGNOSIS:   Previous: Obesity r/t long history of self-monitoring deficit and excessive energy intake aeb BMI >30.- Continues     Current:Obesity r/t long history of self-monitoring deficit and excessive energy intake aeb BMI >30.    INTERVENTION:  Nutrition Prescription:  Recommended energy/nutrient modification.    Implementation:  Assessed learning needs and learning preferences  Nutrition Education:   1. Reviewed and modified previous goals   2. Praised patient for changes, provided encouragement and support.     3. Dicussed exercise and increasing PT exercises   4. Dicussed having less pretzels and crackers for snacks and using a protein, fruit or vegetable instead.  5. AVS via My Chart     GOALS:  1.Continue to have 3 meals daily   2. Add a protein to your afternoon or nighttime snack this may help with the middle of the night hunger you are feeling.   3. Continue to use the small the plate   4. Increase walking to  3x a week  5. PT exercise exercise 10-15 minutes, put this in you calendar.    6. Decrease pretzels and crackers for snacks by 50%, if still hungry try a boiled egg, carrots, piece of string cheese or fruit.     Recipes:  Baked oatmeal cups: https://blog.Fluidnet/2015/07/06/baked-oatmeal-cups-for-on-the-go/  Baked parmesan Zucchini fries: https://blog.[employer]net/2018/05/14/baked-parmesan-zucchini-fries/   [place] chicken Meatballs:https://blog.Fluidnet/2013/10/25/buffalo-chicken-meatballs/  Pineapple chili lime kabobs: https://www.[employer]/pineapple-chile-lime-chicken-skewers-4147870  Cheesy shrimp and grits: https://[employer].Fluidnet/2014/04/14/cheesy-grits-shrimp/  Shrimp and green  beans: https://blog.SparCode/2018/03/09/one-pan-roasted-shrimp-adobo-tomatoes-green-beans/  BBQ chicken skillet: https://blog.[employer]/2018/06/22/one-skillet-bbq-chicken/    Patient Understanding: good  Expected Compliance: fair-good  Follow-Up Plans: Discuss meal and snack planning and portion sizes      FOLLOW UP  1 month    TIME SPENT WITH PATIENT: 24 Minutes     Deborah Beckman, MS, RD, LD

## 2020-10-28 NOTE — PATIENT INSTRUCTIONS
GOALS:  1.Continue to have 3 meals daily   2. Add a protein to your afternoon or nighttime snack this may help with the middle of the night hunger you are feeling.   3. Continue to use the small the plate   4. Increase walking to  3x a week  5. PT exercise exercise 10-15 minutes, put this in you calendar.    6. Decrease pretzels and crackers for snacks by 50%, if still hungry try a boiled egg, carrots, piece of string cheese or fruit.     Recipes:  Baked oatmeal cups: https://bloBalls.ie.[employer]/2015/07/06/baked-oatmeal-cups-for-on-the-go/  Baked parmesan Zucchini fries: https://Vertra/2018/05/14/baked-parmesan-zucchini-fries/   [place] chicken Meatballs:https://bloDublin [employer]illers/2013/10/25/buffalo-chicken-meatballs/  Pineapple chili lime kabobs: https://wwwCFO.com/pineapple-chile-lime-chicken-skewers-4147870  Cheesy shrimp and grits: https://Vertra/2014/04/14/cheesy-grits-shrimp/  Shrimp and green beans: https://Vertra/2018/03/09/one-pan-roasted-shrimp-adobo-tomatoes-green-beans/  BBQ chicken skillet: https://[employer]ra/2018/06/22/one-skillet-bbq-chicken/    Follow up with RD in one month    Deborah Beckman, MS, RD, LD  If you need to schedule or reschedule with a dietitian please call [phone].

## 2020-10-28 NOTE — LETTER
"10/28/2020       RE: Nehemias Mascorro  [address]  [place] [address]     Dear Colleague,    Thank you for referring your patient, Nehemias Mascorro, to the [facility]IGHT MANAGEMENT [facility]IN[facility] [place] at [facility]. Please see a copy of my visit note below.    Nehemias Mascorro is a 25 year old female who is being evaluated via a billable video visit.      The patient has been notified of following:     \"This video visit will be conducted via a call between you and your physician/provider. We have found that certain health care needs can be provided without the need for an in-person physical exam.  This service lets us provide the care you need with a video conversation.  If a prescription is necessary we can send it directly to your pharmacy.  If lab work is needed we can place an order for that and you can then stop by our lab to have the test done at a later time.    Video visits are billed at different rates depending on your insurance coverage.  Please reach out to your insurance provider with any questions.    If during the course of the call the physician/provider feels a video visit is not appropriate, you will not be charged for this service.\"    Patient has given verbal consent for Video visit? Yes  How would you like to obtain your AVS? MyChart    Will anyone else be joining your video visit? No        Video-Visit Details    Type of service:  Video Visit    Video Start Time: 9:10 AM  Video End Time: 9:34 AM    Originating Location (pt. Location): Home    Distant Location (provider location):  [facility] SURGICAL WEIGHT MANAGEMENT     Platform used for Video Visit: [facility]      Return 24 Week Healthy Lifestyle Nutrition Note    Reason for visit: Nutrition Assessment    Nehemias Mascorro is a 25 year old female presenting today for follow-up nutrition assessment consult.  Patient is accompanied by self. Pt is referred by Dr. Slade.    Use of meal " "replacements:no    Current vitamin/mineral supplements: did not discuss today    ANTHROPOMETRICS:   Estimated body mass index is 42.58 kg/m  as calculated from the following:    Height as of 2/28/20: 1.654 m (5' 5.12\").    Weight as of 9/25/20: 116.5 kg (256 lb 12.8 oz).   Recent pt reported weight on 10/23/2020: 257 lb ( no recent weight per patient's report)    NUTRITION HISTORY:  Today:Her GI symptoms have decreased, she has cut out broccoli and cauliflower as recommended by her PT for pelvic floor dysfunction. She would like to incorporate more of her PT exercise into her day.     Diet Recall   B: Protein shake OR yogurt granola and frozen fruit OR banana oatmeal pancakes (no flour or syrup)    L: sandwiches OR healthy choice frozen meals Or spaghetti OR humus carrots and chicken strips  D: rotisserie chicken, apple cauliflower, salmon green beans and apple, Brasa (dinning out), pot roast green beans sweet potatoes and cabbage OR yogurt.   Snack: peach, apple and almonds, crackers, pretzels, carrots     Beverages: Water and crystal light, coffee    Progress Towards Previous Goals:  GOALS:  1. Breakfast options: -Met  -  eggs and 1 piece of whole wheat toast   - Egg and vegetable scramble with a little cheese   - Yogurt with fruit and 1/4 c granola or some nuts (almonds)   - Hard boiled eggs and a banana   - protein bar or shake, option to pair with fruit if needed.   2. Add a protein to your afternoon or nighttime snack this may help with the middle of the night hunger you are feeling. -Sometimes   3. Use whole fruit instead of fruit cups -Met  -try making a list of fruit you like and a list of vegetables to help with meal planning and grocery store shopping.   4. Increase walking to  3x a week -Met  5. Increase vegetable intake: have a handful of vegetables with 2 meals daily. Ideas below:-Met  - added vegetables to your eggs in the morning   - Have vegetable with dip for a snack or a side dish   - Prep a salad " for dinner or lunch  - Eat a small side salad before having the rest of dinner   - Put tuna/chicken salad on a bed of lettuce or eat with carrots, celery and cucumbers.   6. Continue to food journal, start writing down GI symptoms (gas, cramping, diarrhea, constipation, reflux etc.) -Food journaled but not GI symptoms, which have now improved.   7. Look over low-FODMAP diet handouts (will be sent vis Mail) -Met   8. Try a some Low FODMAP recipes and see if they cause GI symptoms or not before starting the full low- FODMAP diet. -Did not try      PHYSICAL ACTIVITY:  Walking with mom    NUTRITION DIAGNOSIS:   Previous: Obesity r/t long history of self-monitoring deficit and excessive energy intake aeb BMI >30.- Continues     Current:Obesity r/t long history of self-monitoring deficit and excessive energy intake aeb BMI >30.    INTERVENTION:  Nutrition Prescription:  Recommended energy/nutrient modification.    Implementation:  Assessed learning needs and learning preferences  Nutrition Education:   1. Reviewed and modified previous goals   2. Praised patient for changes, provided encouragement and support.     3. Dicussed exercise and increasing PT exercises   4. Dicussed having less pretzels and crackers for snacks and using a protein, fruit or vegetable instead.  5. AVS via My Chart     GOALS:  1.Continue to have 3 meals daily   2. Add a protein to your afternoon or nighttime snack this may help with the middle of the night hunger you are feeling.   3. Continue to use the small the plate   4. Increase walking to  3x a week  5. PT exercise exercise 10-15 minutes, put this in you calendar.    6. Decrease pretzels and crackers for snacks by 50%, if still hungry try a boiled egg, carrots, piece of string cheese or fruit.     Recipes:  Baked oatmeal cups: https://blog.Ufora/2015/07/06/baked-oatmeal-cups-for-on-the-go/  Baked parmesan Zucchini fries:  https://EasyProve/2018/05/14/baked-parmesan-zucchini-fries/   [place] chicken Meatballs:https://EasyProve/2013/10/25/buffalo-chicken-meatballs/  Pineapple chili lime kabobs: https://wwwQuill/pineapple-chile-lime-chicken-skewers-4147870  Cheesy shrimp and grits: https://EasyProve/2014/04/14/cheesy-grits-shrimp/  Shrimp and green beans: https://EasyProve/2018/03/09/one-pan-roasted-shrimp-adobo-tomatoes-green-beans/  BBQ chicken skillet: https://EasyProve/2018/06/22/one-skillet-bbq-chicken/    Patient Understanding: good  Expected Compliance: fair-good  Follow-Up Plans: Discuss meal and snack planning and portion sizes      FOLLOW UP  1 month    TIME SPENT WITH PATIENT: 24 Minutes     Deborah Beckman, MS, RD, LD

## 2020-11-04 NOTE — PATIENT INSTRUCTIONS
Thank you for allowing us the privilege of caring for you. We hope we provided you with the excellent service you deserve.    Please let us know if there is anything else we can do for you so that we can be sure you are completely satisfied with your care experience.    Your visit was with Dr. Slade    Instructions per today's visit:  --please continue to work on food and activity goals  --please work on taking your metformin more regularly (and moving it if that will help you)   --please discuss possible addition of semagltide (Ozempic) with your gastroenterologist--are they OK with a trial of this medication, starting at low dose and gradually advancing as we are able, to further help with appetite suppression?    Please call our contact center at [phone] to schedule your next appointments.    Meal Replacement Products:    Here is the link to our new e-store where you can purchase our meal replacement products    [email or web address]    The one week starter kit is a great way to sample a variety of products and see what works for you.    If you want more information about the product go to: Linksy    Free Shipping for orders over $75    Benefits of meal replacements products:    Portion and calorie control  Improved nutrition  Structured eating  Simplified food choices  Avoid contact with trigger foods    Interested in working with a health ?  Health coaches work with you to improve your overall health and wellbeing.  They look at the whole person, and may involve discussion of different areas of life, including, but not limited to the four pillars of health (sleep, exercise, nutrition, and stress management). Discuss with your care team if you would like to start working a health .    Health Coaching-3 Pack: Schedule by calling [phone]    $99 for three health coaching visits    Visits may be done in person or via phone    Coaching is  a partnership between the  and the client; Coaches do not prescribe or diagnose    Coaching helps inspire the client to reach his/her personal goals    Bluetooth Scale:    We hope to provide you with high quality telephone and virtual healthcare visits while social distancing for COVID-19 is necessary, as well as in the future when virtual visits may be more convenient for you.    Our technology team made it possible for Bluetooth scales to send weight measurements to our electronic medical record. This allows weights from you weighing at home to securely flow into the medical record, which will improve telephone and virtual visits.  Additionally, studies have shown that adults actually lose more weight when their weights are automatically sent to someone else, and also that this process is not stressful for those adults.    Below is a link for purchasing the scale, with a discount code for our patients. You may call your insurance company to see if they will reimburse you for the cost of the scale, as a piece of durable medical equipment. The scales only go up to a weight of 400 pounds. This is an issue and we are working with the developer on increasing this. We found no scales that go over 400lb that have blue-tooth for connecting to Max-Viz.    Scale to purchase: the Bioserie  Body  Scale: https://www.[employer].Orthomimetics/us/en/body/shop?gclid=[email or web address]&gclsrc=aw.ds    Discount Code: We have a discount code for our patients to bring the cost down to $50, the code is:  withmed     Steps to link the scale to Max-Viz via an Android Phone (you can always disconnect at any point in the future):  1. The order must be placed first before the patient can access Track My Health within Max-Viz.  2. Download Google Fit robby from the Google Play Store  a. Log in or register using your Google account  3. Download the Max-Viz robby from Google Play Store  a. Select add organization  b.  Search for [employer] and select it  c. Log into Wabeebwa  d. Select Track Segment Health  e. Select the green connect my account button  f. When prompted log into your Google account  g. Select okay to confirm the account  4. Download the Withings Health Mate junaid from Google Play Store  a. [place] for Withings  b. Go to profile  c. Tap google fit under the Apps section  d. Select the option to activate Google Fit integration  e. Select the same Google account  f. Select okay to confirm the account  g.  Steps to link the scale to Wabeebwa via an iPhone (you can always disconnect at any point in the future):  **Note SharePlow is not available for download on an iPad**  1. The order must be placed first before the patient can access Inventalator within Wabeebwa.  2. Locate the Health junaid on your iPhone.  a. Set up your Apple Health account as prompted  b. The Sources page will show Apps that communicate with your Health junaid. Once all steps are completed, you should see [employer] and Enclara Healthhart listed under the Apps section and your iPhone under the devices section.  i. Select Health Mate  1. Under 'ALLOW  HEALTH MATE  TO WRITE DATA ensure the toggle is on for Weight.  2. This will allow the scale to add your weight to the Apple Health  ii. Select MyChart  1. Under 'ALLOW  WabeebwaT  TO READ DATA ensure the toggle is on for Weight.  2. This allows Wabeebwa to grab the weight from SharePlow so your provider can see your weights.  3. Download the Azzure ITt junaid from the Junaid Store  a. Select add organization                                                  b. Search for [employer] and select it  c. Log into Wabeebwa  d. Select Track Segment Health  e. Select the green connect my account button  f. Follow prompts to link your device to Wabeebwa.  4. Download the Withings health mate junaid in the Junaid Store  a. [place] for Withings  b. Go to profile  c. Tap Health under the Apps section  d. If prompted to allow access with the Health Junaid,  toggle weight on for read and write access.      For any questions/concerns contact Susan Balderas LPN at [phone]    To schedule appointments with our team, please call [phone]    Please call during clinic hours Monday through Friday 8:00a - 4:00p if you have questions or you can contact us via Qwilrt at anytime.      Lab results will be communicated through My Chart or letter (if My Chart not used). Please call the clinic if you have not received communication after 1 week or if you have any questions.    Clinic Fax: [phone]    Thank you,  Medical Weight Management Team

## 2020-11-10 ENCOUNTER — VIRTUAL VISIT (OUTPATIENT)
Dept: ENDOCRINOLOGY | Facility: CLINIC | Age: 25
End: 2020-11-10
Payer: COMMERCIAL

## 2020-11-10 ENCOUNTER — RECORDS - HEALTHEAST (OUTPATIENT)
Dept: ADMINISTRATIVE | Facility: OTHER | Age: 25
End: 2020-11-10

## 2020-11-10 DIAGNOSIS — E66.9 OBESITY: Primary | ICD-10-CM

## 2020-11-10 PROCEDURE — 99207 PR NO CHARGE LOS: CPT | Mod: TEL

## 2020-11-10 NOTE — PROGRESS NOTES
"MICHELLE GARCIA     Progress Notes    Return Weight Management Health Coaching Note    Nehemias Mascorro          MRN: [id number]  : 1995  JASMEET: November 10, 2020    Health  Visit #: 9    ASSESSMENT:  Initial Weight:  249.3 lb    Current Weight (lbs): 257 lb  (257  On 10/28)     Grad Date end of Dec.      PREVIOUS GOAL(S) REVIEW:  Got some of the sleep study results back and will get the others back soon.No sleep apnea. Ask to do an in clinic study.    Walking 2-3 times per week. Doing my pt exercise when I remember.     Chosing healthier snacks like the nuts/crasions 100 calorie snack. May try a different snack that doesn't bother stomach.     Not as busy with school, now that I have one class but not working as much. Will look over the PP instead of reading the long chapters.       PILLAR(S) DISCUSSED IN THIS VISIT:      Started with short breathing exercise.     Reviewed previous goals    Eating more at night. Hungry after dinner. Can't tell if is boredom or hunger; feels like hunger.      Talked about how satisfying my dinner is: 7/10. Knows she needs to slow down.    GOALS:  Nov 10, 2020    I will not eat after dinner and practice self talk:  I will tell myself, \"it is ok. You can wait til the morning. It is ok to feel hungry.\"    I will slow down when I eat at dinner. I will taste each bite. I will pretend each bite is my last bite.     I will ask for an in clinic sleep study    I will walk 2-3 times per week    I will do my PT exercsies    I will chose healthy snacks    I will focus on Power Points instead of reading long chapters.       FOLLOW-UP:  To schedule your next visit, please call [phone].      TIME: 30 min       SIGNATURE:  Michelle Garcia, Certified Health  on 11/10/2020 at 8:31 AM  "

## 2020-11-10 NOTE — LETTER
"11/10/2020       RE: Nehemias Mascorro  [address]  [place] [address]     Dear Colleague,    Thank you for referring your patient, Nehemias Mascorro, to the [facility] WEIGHT MANAGEMENT CLINIC [place] at [facility]. Please see a copy of my visit note below.    MICHELLE GARCIA     Progress Notes    Return Weight Management Health Coaching Note    Nehemias Mascorro          MRN: [id number]  : 1995  JASMEET: November 10, 2020    Health  Visit #: 9    ASSESSMENT:  Initial Weight:  249.3 lb    Current Weight (lbs): 257 lb  (257  On 10/28)     Grad Date end of Dec.      PREVIOUS GOAL(S) REVIEW:  Got some of the sleep study results back and will get the others back soon.No sleep apnea. Ask to do an in clinic study.    Walking 2-3 times per week. Doing my pt exercise when I remember.     Chosing healthier snacks like the nuts/crasions 100 calorie snack. May try a different snack that doesn't bother stomach.     Not as busy with school, now that I have one class but not working as much. Will look over the PP instead of reading the long chapters.       PILLAR(S) DISCUSSED IN THIS VISIT:      Started with short breathing exercise.     Reviewed previous goals    Eating more at night. Hungry after dinner. Can't tell if is boredom or hunger; feels like hunger.      Talked about how satisfying my dinner is: 10. Knows she needs to slow down.    GOALS:  Nov 10, 2020    I will not eat after dinner and practice self talk:  I will tell myself, \"it is ok. You can wait til the morning. It is ok to feel hungry.\"    I will slow down when I eat at dinner. I will taste each bite. I will pretend each bite is my last bite.     I will ask for an in clinic sleep study    I will walk 2-3 times per week    I will do my PT exercsies    I will chose healthy snacks    I will focus on Power Points instead of reading long chapters.       FOLLOW-UP:  To schedule your next visit, " please call [phone].      TIME: 30 min       SIGNATURE:  Angela Jiménez, Certified Health  on 11/10/2020 at 8:31 AM

## 2020-11-10 NOTE — PATIENT INSTRUCTIONS
"GOALS:  Nov 10, 2020    I will not eat after dinner and practice self talk:  I will tell myself, \"it is ok. You can wait til the morning. It is ok to feel hungry.\"    I will slow down when I eat at dinner. I will taste each bite. I will pretend each bite is my last bite.     I will ask for an in clinic sleep study    I will walk 2-3 times per week    I will do my PT exercsies    I will chose healthy snacks    I will focus on Power Points instead of reading long chapters.       FOLLOW-UP:  To schedule your next visit, please call [phone].      TIME: 30 min       SIGNATURE:  Angela Jiménez, Certified Health  on 11/10/2020 at 8:31 AM    "

## 2020-11-24 ENCOUNTER — RECORDS - HEALTHEAST (OUTPATIENT)
Dept: ADMINISTRATIVE | Facility: OTHER | Age: 25
End: 2020-11-24

## 2020-11-24 ENCOUNTER — VIRTUAL VISIT (OUTPATIENT)
Dept: ENDOCRINOLOGY | Facility: CLINIC | Age: 25
End: 2020-11-24
Payer: COMMERCIAL

## 2020-11-24 DIAGNOSIS — E66.9 OBESITY: Primary | ICD-10-CM

## 2020-11-24 PROCEDURE — 99207 PR NO CHARGE LOS: CPT | Mod: TEL

## 2020-11-24 NOTE — PROGRESS NOTES
"Return Weight Management Health Coaching Note     Nehemias Mascorro          MRN: [id number]  : 1995  JASMEET: 2020     Health  Visit #: 10     ASSESSMENT:  Initial Weight:  249.3 lb     Current Weight (lbs):NA   (257 lb  On 10/28)      Grad Date end of Dec.    Next HC visits;  and .    PREVIOUS GOALS:    I will not eat after dinner and practice self talk:  I will tell myself, \"it is ok. You can wait til the morning. It is ok to feel hungry.\"  IN PROGRESS; pt says it is has been hard.  On the days that it worked, I was full/satisfied and I used self- talk, \"there is always tomorrow; I can have more food then.\"     I will slow down when I eat at dinner. I will taste each bite. I will pretend each bite is my last bite.  MEETING      I will ask for an in clinic sleep study NOT MET     I will walk 2-3 times per week MEETING     I will do my PT exercsies MEETING     I will chose healthy snacks  IN PROGRESS     I will focus on Power Points instead of reading long chapters. MEETING    GOALS; 2020    1.) I will have a satisfying dinner each night, so I won't eat after dinner. (comfort foods; talk to KENA Cabrera about ideas how to make comfort foods healthier, and what the portion size should be). Favorites are macaroni and cheese and mashed potatoes; how can I have this in small portions or upgrade to healthier version.    2.) I will stay busy after eating dinner; read, laundry, talk to my family.    3.) I will turn out the lights in the kitchen after dinner.    4.) I will eat slowly. I will taste each bite.    5.) Look at mindful eating resources and chose 1-2 to read over/complete.    Mindful Eating  http://www.What They [email or web address]    Mindful Eating Assessment  [email or web address]    Tips for Mindful Eating  [email or web address]    ---------------------------------------------  Next HC visits with Angela;  and .    FOLLOW-UP:  To schedule your next " visit, please call [phone].        TIME: 30 min         SIGNATURE:  Angela Jiménez, Certified Health

## 2020-11-24 NOTE — LETTER
"2020       RE: Nehemias Mascorro  [address]  [place] [address]     Dear Colleague,    Thank you for referring your patient, Nehemias Mascorro, to the [facility] WEIGHT MANAGEMENT CLINIC [place] at [facility]. Please see a copy of my visit note below.    Return Weight Management Health Coaching Note     Nehemias Mascorro          MRN: [id number]  : 1995  JASMEET: 2020     Health  Visit #: 10     ASSESSMENT:  Initial Weight:  249.3 lb     Current Weight (lbs):NA   (257 lb  On 10/28)      Grad Date end of Dec.    Next HC visits;  and .    PREVIOUS GOALS:    I will not eat after dinner and practice self talk:  I will tell myself, \"it is ok. You can wait til the morning. It is ok to feel hungry.\"  IN PROGRESS; pt says it is has been hard.  On the days that it worked, I was full/satisfied and I used self- talk, \"there is always tomorrow; I can have more food then.\"     I will slow down when I eat at dinner. I will taste each bite. I will pretend each bite is my last bite.  MEETING      I will ask for an in clinic sleep study NOT MET     I will walk 2-3 times per week MEETING     I will do my PT exercsies MEETING     I will chose healthy snacks  IN PROGRESS     I will focus on Power Points instead of reading long chapters. MEETING    GOALS; 2020    1.) I will have a satisfying dinner each night, so I won't eat after dinner. (comfort foods; talk to KENA Cabrera about ideas how to make comfort foods healthier, and what the portion size should be). Favorites are macaroni and cheese and mashed potatoes; how can I have this in small portions or upgrade to healthier version.    2.) I will stay busy after eating dinner; read, laundry, talk to my family.    3.) I will turn out the lights in the kitchen after dinner.    4.) I will eat slowly. I will taste each bite.    5.) Look at mindful eating resources and chose 1-2 to read " over/complete.    Mindful Eating  http://www.Strix [email or web address]    Mindful Eating Assessment  [email or web address]://[email or web address]    Tips for Mindful Eating  http://[email or web address]    ---------------------------------------------  Next HC visits with Angela; 12/8 and 1/6.    FOLLOW-UP:  To schedule your next visit, please call [phone].        TIME: 30 min         SIGNATURE:  Angela Jiménez, Certified Health

## 2020-11-24 NOTE — PATIENT INSTRUCTIONS
GOALS; 11/24/2020    1.) I will have a satisfying dinner each night, so I won't eat after dinner. (comfort foods; talk to KENA Cabrera about ideas how to make comfort foods healthier, and what the portion size should be). Favorites are macaroni and cheese and mashed potatoes; how can I have this in small portions or upgrade to healthier version.    2.) I will stay busy after eating dinner; read, laundry, talk to my family.    3.) I will turn out the lights in the kitchen after dinner.    4.) I will eat slowly. I will taste each bite.    5.) Look at mindful eating resources and chose 1-2 to read over/complete.    Mindful Eating  http://www.[email or web address]    Mindful Eating Assessment  [email or web address]://[email or web address]    Tips for Mindful Eating  [email or web address]://[email or web address]    ---------------------------------------------  Next HC visits with Angela; 12/8 and 1/6.    FOLLOW-UP:  To schedule your next visit, please call [phone].        TIME: 30 min         SIGNATURE:  Angela Jiménez, Certified Health

## 2020-11-25 ENCOUNTER — RECORDS - HEALTHEAST (OUTPATIENT)
Dept: ADMINISTRATIVE | Facility: OTHER | Age: 25
End: 2020-11-25

## 2020-11-25 ENCOUNTER — VIRTUAL VISIT (OUTPATIENT)
Dept: ENDOCRINOLOGY | Facility: CLINIC | Age: 25
End: 2020-11-25
Payer: COMMERCIAL

## 2020-11-25 DIAGNOSIS — E66.01 MORBID OBESITY DUE TO EXCESS CALORIES (H): Primary | ICD-10-CM

## 2020-11-25 DIAGNOSIS — Z71.3 NUTRITIONAL COUNSELING: ICD-10-CM

## 2020-11-25 PROCEDURE — 97803 MED NUTRITION INDIV SUBSEQ: CPT | Mod: 95 | Performed by: DIETITIAN, REGISTERED

## 2020-11-25 NOTE — PROGRESS NOTES
"Nehemias Mascorro is a 25 year old female who is being evaluated via a billable video visit.      The patient has been notified of following:     \"This video visit will be conducted via a call between you and your physician/provider. We have found that certain health care needs can be provided without the need for an in-person physical exam.  This service lets us provide the care you need with a video conversation.  If a prescription is necessary we can send it directly to your pharmacy.  If lab work is needed we can place an order for that and you can then stop by our lab to have the test done at a later time.    Video visits are billed at different rates depending on your insurance coverage.  Please reach out to your insurance provider with any questions.    If during the course of the call the physician/provider feels a video visit is not appropriate, you will not be charged for this service.\"    Patient has given verbal consent for Video visit? Yes  How would you like to obtain your AVS? MyChart    Will anyone else be joining your video visit? No        Video-Visit Details    Type of service:  Video Visit    Video Start Time: 9:33 AM  Video End Time: 10:00 AM    Originating Location (pt. Location): Home    Distant Location (provider location):  [facility]     Platform used for Video Visit: [email or web address]      [facility] 24 Week Healthy Lifestyle Nutrition Note    Reason for visit: Nutrition Assessment    Nehemias Mascorro is a 25 year old female presenting today for follow-up nutrition assessment consult.  Patient is accompanied by self. Pt is referred by Dr. Slade.    Use of meal replacements:no    Current vitamin/mineral supplements: did not discuss today    ANTHROPOMETRICS:   Estimated body mass index is 42.58 kg/m  as calculated from the following:    Height as of 2/28/20: 1.654 m (5' 5.12\").    Weight as of 9/25/20: 116.5 kg (256 lb 12.8 oz).   Recent pt reported weight on 10/23/2020: 257 lb ( " no recent weight per patient's report)    NUTRITION HISTORY:  Today, pt continues to use the small plate and make half her meals vegetables, discussed ways to make her CHO healthier, she finds that if she eats mac and cheese or mashed potatoes she does not snack after dinner, if she does not eat these items around dinner time she will snack in the evening. Feels more satisfied with her meal.  Mashed potatoes: homemade: recommended using skim milk/1% milk and 1/2 th amount of butter   Boxed Mac and cheese: used recommended using skim milk/1% milk and 1/2 th amount of butter   Homemade mac and cheese: use skim milk and chicken broth instead of heavy cream and adding in vegetables and whole wheat noodles     She would like to try a different protein shake. She likes the taste but afraid she maybe exposed to something she shouldn't be. The protein shake she is using is antibiotic and rBST/rGBH free.      Diet Recall   B: Protein shake terra's whey) OR yogurt granola and frozen fruit OR banana oatmeal pancakes (no flour or syrup)    L: sandwiches OR healthy choice frozen meals Or spaghetti OR humus carrots and chicken strips  D: rotisserie chicken, apple cauliflower, salmon green beans and apple, Brasa (dinning out), pot roast green beans sweet potatoes and cabbage OR yogurt.   Snack: peach, apple and almonds, craisins, cheese, salami, carrots     Beverages: Water and crystal light, coffee      Progress Towards Previous Goals:  1.Continue to have 3 meals daily -Met  2. Add a protein to your afternoon or nighttime snack this may help with the middle of the night hunger you are feeling. -Continues  3. Continue to use the small the plate -Met   4. Increase walking to  3x a week-continues walking 2x a week  5. PT exercise exercise 10-15 minutes, put this in you calendar.  -Doing exercises, when starting to feel pain.   6. Decrease pretzels and crackers for snacks by 50%, if still hungry try a boiled egg, carrots, piece of  string cheese or fruit. -Improving      PHYSICAL ACTIVITY:  Walking with mom    NUTRITION DIAGNOSIS:   Previous: Obesity r/t long history of self-monitoring deficit and excessive energy intake aeb BMI >30.- Continues     Current:Obesity r/t long history of self-monitoring deficit and excessive energy intake aeb BMI >30.    INTERVENTION:  Nutrition Prescription:  Recommended energy/nutrient modification.    Implementation:  Assessed learning needs and learning preferences  Nutrition Education:   1. Reviewed and modified previous goals   2. Praised patient for changes, provided encouragement and support.     3. Dicussed recipes substitutions: Mashed potatoes: homemade: recommended using skim milk/1% milk and 1/2 th amount of butter   Boxed Mac and cheese: used recommended using skim milk/1% milk and 1/2 th amount of butter   Homemade mac and cheese: use skim milk and chicken broth instead of heavy cream and adding in vegetables and whole wheat noodles   4. Dicussed less craisins for snack and having more fruit    5. AVS via My Chart     GOALS:  1.Continue to have 3 meals daily   2. Add a protein to your afternoon or nighttime snack this may help with the middle of the night hunger you are feeling.   3. Continue to use the small the plate   4. Increase walking to  3x a week  5. PT exercise exercise 10-15 minutes, put this in you calendar.    6. Side substitutions: Mashed potatoes: homemade: recommended using skim milk/1% milk and 1/2 th amount of butter   Boxed Mac and cheese: used recommended using skim milk/1% milk and 1/2 th amount of butter   Homemade mac and cheese: use skim milk and chicken broth instead of heavy cream and adding in vegetables and whole wheat noodles     https://www.[employer]/lighter-baked-macaroni-and-cheese/  https://www.[employer]/skinny-baked-broccoli-macaroni-and/      Patient Understanding: good  Expected Compliance: fair-good  Follow-Up Plans: Discuss snacks      FOLLOW UP  1  month    TIME SPENT WITH PATIENT: 27 Minutes     Deborah Beckman, MS, RD, LD

## 2020-11-25 NOTE — LETTER
"11/25/2020       RE: Nehemias Mascorro  [address]  [place] [address]     Dear Colleague,    Thank you for referring your patient, Nehemias Mascorro, to the [facility] [place] at [facility]. Please see a copy of my visit note below.    Nehemias Mascorro is a 25 year old female who is being evaluated via a billable video visit.      The patient has been notified of following:     \"This video visit will be conducted via a call between you and your physician/provider. We have found that certain health care needs can be provided without the need for an in-person physical exam.  This service lets us provide the care you need with a video conversation.  If a prescription is necessary we can send it directly to your pharmacy.  If lab work is needed we can place an order for that and you can then stop by our lab to have the test done at a later time.    Video visits are billed at different rates depending on your insurance coverage.  Please reach out to your insurance provider with any questions.    If during the course of the call the physician/provider feels a video visit is not appropriate, you will not be charged for this service.\"    Patient has given verbal consent for Video visit? Yes  How would you like to obtain your AVS? MyChart    Will anyone else be joining your video visit? No        Video-Visit Details    Type of service:  Video Visit    Video Start Time: 9:33 AM  Video End Time: 10:00 AM    Originating Location (pt. Location): Home    Distant Location (provider location):  [facility]     [facility]      Return 24 Week Healthy Lifestyle Nutrition Note    Reason for visit: Nutrition Assessment    Nehemias Mascorro is a 25 year old female presenting today for follow-up nutrition assessment consult.  Patient is accompanied by self. Pt is referred by Dr. Slade.    Use of meal " "replacements:no    Current vitamin/mineral supplements: did not discuss today    ANTHROPOMETRICS:   Estimated body mass index is 42.58 kg/m  as calculated from the following:    Height as of 2/28/20: 1.654 m (5' 5.12\").    Weight as of 9/25/20: 116.5 kg (256 lb 12.8 oz).   Recent pt reported weight on 10/23/2020: 257 lb ( no recent weight per patient's report)    NUTRITION HISTORY:  Today, pt continues to use the small plate and make half her meals vegetables, discussed ways to make her CHO healthier, she finds that if she eats mac and cheese or mashed potatoes she does not snack after dinner, if she does not eat these items around dinner time she will snack in the evening. Feels more satisfied with her meal.  Mashed potatoes: homemade: recommended using skim milk/1% milk and 1/2 th amount of butter   Boxed Mac and cheese: used recommended using skim milk/1% milk and 1/2 th amount of butter   Homemade mac and cheese: use skim milk and chicken broth instead of heavy cream and adding in vegetables and whole wheat noodles     She would like to try a different protein shake. She likes the taste but afraid she maybe exposed to something she shouldn't be. The protein shake she is using is antibiotic and rBST/rGBH free.      Diet Recall   B: Protein shake terra's whey) OR yogurt granola and frozen fruit OR banana oatmeal pancakes (no flour or syrup)    L: sandwiches OR healthy choice frozen meals Or spaghetti OR humus carrots and chicken strips  D: rotisserie chicken, apple cauliflower, salmon green beans and apple, Brasa (dinning out), pot roast green beans sweet potatoes and cabbage OR yogurt.   Snack: peach, apple and almonds, craisins, cheese, salami, carrots     Beverages: Water and crystal light, coffee      Progress Towards Previous Goals:  1.Continue to have 3 meals daily -Met  2. Add a protein to your afternoon or nighttime snack this may help with the middle of the night hunger you are feeling. -Continues  3. " Continue to use the small the plate -Met   4. Increase walking to  3x a week-continues walking 2x a week  5. PT exercise exercise 10-15 minutes, put this in you calendar.  -Doing exercises, when starting to feel pain.   6. Decrease pretzels and crackers for snacks by 50%, if still hungry try a boiled egg, carrots, piece of string cheese or fruit. -Improving      PHYSICAL ACTIVITY:  Walking with mom    NUTRITION DIAGNOSIS:   Previous: Obesity r/t long history of self-monitoring deficit and excessive energy intake aeb BMI >30.- Continues     Current:Obesity r/t long history of self-monitoring deficit and excessive energy intake aeb BMI >30.    INTERVENTION:  Nutrition Prescription:  Recommended energy/nutrient modification.    Implementation:  Assessed learning needs and learning preferences  Nutrition Education:   1. Reviewed and modified previous goals   2. Praised patient for changes, provided encouragement and support.     3. Dicussed recipes substitutions: Mashed potatoes: homemade: recommended using skim milk/1% milk and 1/2 th amount of butter   Boxed Mac and cheese: used recommended using skim milk/1% milk and 1/2 th amount of butter   Homemade mac and cheese: use skim milk and chicken broth instead of heavy cream and adding in vegetables and whole wheat noodles   4. Dicussed less craisins for snack and having more fruit    5. AVS via My Chart     GOALS:  1.Continue to have 3 meals daily   2. Add a protein to your afternoon or nighttime snack this may help with the middle of the night hunger you are feeling.   3. Continue to use the small the plate   4. Increase walking to  3x a week  5. PT exercise exercise 10-15 minutes, put this in you calendar.    6. Side substitutions: Mashed potatoes: homemade: recommended using skim milk/1% milk and 1/2 th amount of butter   Boxed Mac and cheese: used recommended using skim milk/1% milk and 1/2 th amount of butter   Homemade mac and cheese: use skim milk and chicken  broth instead of heavy cream and adding in vegetables and whole wheat noodles     [email or web address]/lighter-baked-macaroni-and-cheese/  https://www.[employer]/skinny-baked-broccoli-macaroni-and/      Patient Understanding: good  Expected Compliance: fair-good  Follow-Up Plans: Discuss snacks      FOLLOW UP  1 month    TIME SPENT WITH PATIENT: 27 Minutes     Deborah Beckman, MS, RD, LD

## 2020-11-25 NOTE — PATIENT INSTRUCTIONS
GOALS:  1.Continue to have 3 meals daily   2. Add a protein to your afternoon or nighttime snack this may help with the middle of the night hunger you are feeling.   3. Continue to use the small the plate   4. Increase walking to  3x a week  5. PT exercise exercise 10-15 minutes, put this in you calendar.    6. Side substitutions: Mashed potatoes: homemade: recommended using skim milk/1% milk and 1/2 th amount of butter   Boxed Mac and cheese: used recommended using skim milk/1% milk and 1/2 th amount of butter   Homemade mac and cheese: use skim milk and chicken broth instead of heavy cream and adding in vegetables and whole wheat noodles     https://[email or web address]/lighter-baked-macaroni-and-cheese/  https://www.[employer]/skinny-baked-broccoli-macaroni-and/    Follow up with RD in one month    Deborah Beckman, MS, RD, LD  If you need to schedule or reschedule with a dietitian please call [phone].

## 2020-11-30 ENCOUNTER — TELEPHONE (OUTPATIENT)
Dept: ENDOCRINOLOGY | Facility: CLINIC | Age: 25
End: 2020-11-30

## 2020-12-08 ENCOUNTER — RECORDS - HEALTHEAST (OUTPATIENT)
Dept: ADMINISTRATIVE | Facility: OTHER | Age: 25
End: 2020-12-08

## 2020-12-08 ENCOUNTER — VIRTUAL VISIT (OUTPATIENT)
Dept: ENDOCRINOLOGY | Facility: CLINIC | Age: 25
End: 2020-12-08
Payer: COMMERCIAL

## 2020-12-08 DIAGNOSIS — E66.9 OBESITY: Primary | ICD-10-CM

## 2020-12-08 PROCEDURE — 99207 PR NO CHARGE LOS: CPT | Mod: TEL

## 2020-12-08 NOTE — PATIENT INSTRUCTIONS
GOALS:      I will use myfitness pal to count calories.     Look at mindful eating resources and chose 1-2 to read over/complete.     Mindful Eating  [email or web address]     Mindful Eating Assessment  http://[email or web address]    I will stay busy after dinner time.    I will try some of Deborah ESCUDERO's ideas.    I will work on staying present during meal times/ eating slowly.      NEXT HC VISIT 1/6 (last visit)      FOLLOW-UP:  To schedule your next visit, please call [phone].      TIME: 30 min       SIGNATURE:  Angela Jiménez, Certified Health  on 12/8/2020 at 8:34 AM

## 2020-12-08 NOTE — PROGRESS NOTES
MICHELLE GARCIA     Progress Notes    Return Weight Management Health Coaching Note    Nehemias Mascorro          MRN: [id number]  : 1995  JASMEET: 2020    Health  Visit #: 11  ASSESSMENT:  Initial Weight:  249.3 lb    Current Weight (lbs): NA ( pt says may be up two pounds from last weight pt says)      PREVIOUS GOAL(S) REVIEW:    1.) I will have a satisfying dinner each night, so I won't eat after dinner. (comfort foods; talk to KENA Cabrera about ideas how to make comfort foods healthier, and what the portion size should be). Favorites are macaroni and cheese and mashed potatoes; how can I have this in small portions or upgrade to healthier version. MEETING.     2.) I will stay busy after eating dinner; read, laundry, talk to my family.  MEETING most days but somedays like to lounge and watch tv and snack. Wants to do a better job of being productive in the evening.      3.) I will turn out the lights in the kitchen after dinner. Meeting but others go back in and turn on, so I stay upstairs.     4.) I will eat slowly. I will taste each bite. NOT MEETING. I start off this way but then I forget. Wants to try to be present during the whole meal. Turn down background tv.     5.) Look at mindful eating resources and chose 1-2 to read over/complete.     Mindful Eating  [email or web address]     Mindful Eating Assessment  [email or web address]      PILLAR(S) DISCUSSED IN THIS VISIT:    Pt feels upset with herself for not working the program like she should. Though she says that she has noted some positive behaviors/habits that she feels good about.     Talked to her therapist about this; and realizes she is not in a place to be ready for it.  Or feels like she doesn't have the control to do it. Says the medications did not help. Lives with others, so hard to control what foods are available.    In the past, lost weight using myfitness pal, counting calories. That gave me more  freedom before, I could have chips for example if in my calorie range.        GOALS:      I will use myfitness pal to count calories.     Look at mindful eating resources and chose 1-2 to read over/complete.     Mindful Eating  [email or web address]     Mindful Eating Assessment  [email or web address]    I will stay busy after dinner time.    I will try some of Deborah ESCUDERO's ideas.    I will work on staying present during meal times/ eating slowly.      NEXT HC VISIT 1/6 (last visit)      FOLLOW-UP:  To schedule your next visit, please call [phone].      TIME: 30 min       SIGNATURE:  Angela Jiménez, Certified Health  on 12/8/2020 at 8:34 AM

## 2020-12-08 NOTE — LETTER
2020       RE: Nehemias Mascorro  [address]  [place] [address]     Dear Colleague,    Thank you for referring your patient, Nehemias Mascorro, to the [facility] [place] [facility]. Please see a copy of my visit note below.    MICHELLE GARCIA     Progress Notes    Return Weight Management Health Coaching Note    Nehemias Mascorro          MRN: [id number]  : 1995  JASMEET: 2020    Health  Visit #: 11  ASSESSMENT:  Initial Weight:  249.3 lb    Current Weight (lbs): NA ( pt says may be up two pounds from last weight pt says)      PREVIOUS GOAL(S) REVIEW:    1.) I will have a satisfying dinner each night, so I won't eat after dinner. (comfort foods; talk to KENA Cabrera about ideas how to make comfort foods healthier, and what the portion size should be). Favorites are macaroni and cheese and mashed potatoes; how can I have this in small portions or upgrade to healthier version. MEETING.     2.) I will stay busy after eating dinner; read, laundry, talk to my family.  MEETING most days but somedays like to lounge and watch tv and snack. Wants to do a better job of being productive in the evening.      3.) I will turn out the lights in the kitchen after dinner. Meeting but others go back in and turn on, so I stay upstairs.     4.) I will eat slowly. I will taste each bite. NOT MEETING. I start off this way but then I forget. Wants to try to be present during the whole meal. Turn down background tv.     5.) Look at mindful eating resources and chose 1-2 to read over/complete.     Mindful Eating  [email or web address]     Mindful Eating Assessment  [email or web address]      PILLAR(S) DISCUSSED IN THIS VISIT:    Pt feels upset with herself for not working the program like she should. Though she says that she has noted some positive behaviors/habits that she feels good about.     Talked to  her therapist about this; and realizes she is not in a place to be ready for it.  Or feels like she doesn't have the control to do it. Says the medications did not help. Lives with others, so hard to control what foods are available.    In the past, lost weight using my[employer]inero[employer]i pal, counting calories. That gave me more freedom before, I could have chips for example if in my calorie range.        GOALS:      I will use [employer] pal to count calories.     Look at mindful eating resources and chose 1-2 to read over/complete.     Mindful Eating  [email or web address]     Mindful Eating Assessment  [email or web address]    I will stay busy after dinner time.    I will try some of Deborah ESCUDERO's ideas.    I will work on staying present during meal times/ eating slowly.      NEXT  VISIT 1/6 (last visit)      FOLLOW-UP:  To schedule your next visit, please call [phone].      TIME: 30 min       SIGNATURE:  Angela Jiménez, Certified Health  on 12/8/2020 at 8:34 AM

## 2020-12-11 ENCOUNTER — RECORDS - HEALTHEAST (OUTPATIENT)
Dept: ADMINISTRATIVE | Facility: OTHER | Age: 25
End: 2020-12-11

## 2020-12-11 ENCOUNTER — VIRTUAL VISIT (OUTPATIENT)
Dept: ENDOCRINOLOGY | Facility: CLINIC | Age: 25
End: 2020-12-11
Attending: INTERNAL MEDICINE
Payer: COMMERCIAL

## 2020-12-11 DIAGNOSIS — E66.01 MORBID (SEVERE) OBESITY DUE TO EXCESS CALORIES (H): Primary | ICD-10-CM

## 2020-12-11 PROCEDURE — 99214 OFFICE O/P EST MOD 30 MIN: CPT | Mod: 95 | Performed by: INTERNAL MEDICINE

## 2020-12-11 NOTE — PROGRESS NOTES
"Nehemias Mascorro is a 25 year old female who is being evaluated via a billable video visit.      The patient has been notified of following:     \"This video visit will be conducted via a call between you and your physician/provider. We have found that certain health care needs can be provided without the need for an in-person physical exam.  This service lets us provide the care you need with a video conversation.  If a prescription is necessary we can send it directly to your pharmacy.  If lab work is needed we can place an order for that and you can then stop by our lab to have the test done at a later time.    Video visits are billed at different rates depending on your insurance coverage.  Please reach out to your insurance provider with any questions.    If during the course of the call the physician/provider feels a video visit is not appropriate, you will not be charged for this service.\"    Patient has given verbal consent for Video visit? Yes  How would you like to obtain your AVS? MyChart  If you are dropped from the video visit, the video invite should be resent to: Text to cell phone: Viry   Will anyone else be joining your video visit? No      Duration of visit--27 min    Yessi Coyle LPN          Return Medical Weight Management Note     Nehemias Mascorro  MRN:  [id number]  :  1995  JASMEET:  2020    Dear Ludwin Elam MD,    I had the pleasure of seeing your patient Nehemias Mascorro. She is a 25 year old female who I am continuing to see for treatment of morbid obesity; PMH includes the following:  Past Medical History:   Diagnosis Date     Depressive disorder      Uncomplicated asthma        INTERVAL HISTORY:        She was last seen about 6 wks ago.  She has also added in the support of the 24 wk program.  Reviewed and relevant history as extracted from last visit is as follows--  -------------------------------------------  \"Notes the program is helping with making changes " with food and be accountable and these are the things she notes she is most focussed on now--  1. Working on protein shake AM  2. Eat slower/mindful eating--working on this  3.  Walking several times per week with her mother--not happening yet with [employer] work going     Regarding the GI issue she has been working on with [facility]--doing PT to work on coordinating with BMs; pain and nausea and constipation.  That is improving some   --getting nausea still a couple of times per week; stomach cramping about 3 times per month, stomach pain a couple of times per week  --BMs 3 times per week;  In the past there were times that would be less than once per week        Regarding medications related/relevant to wt loss and co-morbidities I note the following--     1. lexapro changed to Prozac--now has stabilized with plan to titrate as needed---> in the interim Prozac dose was increased but is working much better than the other  2. Adderall continuing 25mg daily  4. Abilify--taking 15mg  5. buproprion at 300mg taking  6. Metformin 1000mg BID tolerating without any side effects)--unsure if this has helped any with appetite (escalated after last visit to full dose)  7. Prozac--40 mg daily...     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  Now sometimes having seconds at supper.     Pt is working through the 24 wk program--nutritionist is trying to connect with her for wk 4 visit, last visit with health  was 9/15/20     Goals before around structured eating--  1.  Have protein drink (Terra's Way)--110 calories; breakfast routine lately has been higher calorie (eggs/yogurt/oatmeal)  2.  Add protein to snack if having a snack  3.  Change from fruit cups  "to whole fruit (meeting)        ...in PT for the abd issues/anal sphinctor symptoms,  Some improvement in symptoms but still still with abd pains.  Learning how to lose the bathroom more regularly and less constipated now.  Noting BMs about 4 times per wk\"  -------------------------------------------        Again we discussed goals and barriers with regards to wt loss plans, and other health issues as related to working on wt loss.      Did not connect with GI yet about the possible     Went crazy eating when left town    Was talking with Angela Jiménez (24 wk program health ) and wants to use Myfitnesspal for a month and see how it goes before adding injections      Daily food habits schedule--  Mondays--day off (cleaning, relaxing, take a walk)  Tu/Thu school ([employer], Psych)  Wed homework and reading  Thu/Fri--work ([employer])    Food--  Br protein shake  Petra leftovers or frozen meals (lean cuisine)  Di family meal at home  ----Sometimes lunch and dinner will be at [employer]  ----Snacks--when snacking will be protein bar or fruit  ----Liquids--crystal lite, water, with coffee will use Truvia/1% milk    Activity-  Trying to get 60 active minutes per day (phone goal); 30 minutes for walks 2-3 days per wk      GI symptoms worse recently--more constipation and cramping recently    Talked to psychologist about her wt loss and they noted that that pt may not be ready to make that change; really wants to work on self mgmt over next month    24 wk program--may do this again      Goals over the next month--  1.  1400 Calories (Myfitnesspal, measuring food with scales, eating more whole foods)  2.  Activity goals--3 days per wk walking (30 min)  3.  Not eating after dinner (eat dinner and then stay busy after it)    CURRENT WEIGHT:   260# this morning  Wt Readings from Last 3 Encounters:   09/25/20 116.5 kg (256 lb 12.8 oz)   02/28/20 119.9 kg (264 lb 5.3 oz)   11/22/19 118.8 kg (261 lb 14.5 oz)                   Changes " and Difficulties 1/25/2019   I have made the following changes to my diet since my last visit: Eating less and more vegetables   With regards to my diet, I am still struggling with: -   I have made the following changes to my activity/exercise since my last visit: Joined a gym   With regards to my activity/exercise, I am still struggling with: -       VITALS:  There were no vitals taken for this visit.    MEDICATIONS:   Current Outpatient Medications   Medication Sig Dispense Refill     albuterol (PROAIR HFA/PROVENTIL HFA/VENTOLIN HFA) 108 (90 Base) MCG/ACT inhaler Inhale 2 puffs into the lungs       amphetamine-dextroamphetamine (ADDERALL XR) 30 MG 24 hr capsule TK 1 C PO D FOR ADHD       ARIPiprazole (ABILIFY) 15 MG tablet Take 1 tablet by mouth       BuPROPion HCl (WELLBUTRIN PO) Take 300 mg by mouth daily        cetirizine (ZYRTEC) 10 MG tablet Take 10 mg by mouth daily Reported on 4/27/2017       GLYCOPYRROLATE PO Take 2 mg by mouth daily       IBUPROFEN PO Take 600 mg by mouth       Lansoprazole (PREVACID PO)        NATAZIA 3/2-2/2-3/1 MG TABS TK 1 T PO QD  0     TIZANIDINE HCL PO Take 4 mg by mouth       DIAZEPAM PO Take 5 mg by mouth       ESCITALOPRAM OXALATE PO Take 20 mg by mouth daily       Fexofenadine HCl (ALLEGRA PO) Take by mouth daily as needed for allergies       GUANFACINE HCL PO Take 2 mg by mouth daily       Ipratropium-Albuterol (COMBIVENT RESPIMAT)  MCG/ACT inhaler Inhale 1 puff into the lungs 4 times daily       metFORMIN (GLUCOPHAGE) 500 MG tablet For 2 wks take 2 tablets AM with breakfast then take 1 tablet twice daily with meal, and then increase to 2 tablets twice daily, as tolerated (Patient not taking: Reported on 12/11/2020) 360 tablet 1     norethindrone-ethinyl estradiol (JUNEL FE 1/20) 1-20 MG-MCG per tablet Take 1 tablet by mouth daily       Probiotic Product (PROBIOTIC DAILY PO)          Weight Loss Medication History Reviewed With Patient 1/25/2019   Which weight loss  medications are you currently taking on a regular basis?  Naltrexone   Are you having any side effects from the weight loss medication that we have prescribed you? No         ASSESSMENT;  Morbid obesity in pt with wt gain over past few years, more since her TBI; working now with neuro and psych. Is having some wt loss over last couple of month        PLAN:   (continues)  Decrease portion sizes  No meals in front of TV screen  Purge house of food triggers  No meal skipping and avoid snacking  Decrease caloric beverages--avoid soda  Volumetrics low carb eating plan--structured plan and avoding snacking  Low Calorie/low fat diet  Meal planning/journaling           additionally--  --would recommend to continue on metformin at current full dose (2 g)--however it she has had gaps in taking this she should start back up with 2 tabets at breakfast for 1-2 wks and then add a tablet at supper for 1-2 wks and then get back to 2 tablets twice daily ; today we again considered possible GLP1 agonist addition with her.  --continue with our 24 week health coaching/nutrition program      Goals she discussed--  1.  1400 Calories (Myfitnesspal, measuring food with scales, eating more whole foods)  2.  Activity goals--3 days per wk walking (30 min)  3.  Not eating after dinner (eat dinner and then stay busy after it)    She will discuss possible semaglutide low dose start with her GI doctor, as noted above         Sincerely,    Luis Slade MD

## 2020-12-11 NOTE — LETTER
"2020       RE: Nehemias Mascorro  [address]  [place] [address]     Dear Colleague,    Thank you for referring your patient, Nehemias Mascorro, to the [facility] at [facility]. Please see a copy of my visit note below.    Nehemias Mascorro is a 25 year old female who is being evaluated via a billable video visit.      The patient has been notified of following:     \"This video visit will be conducted via a call between you and your physician/provider. We have found that certain health care needs can be provided without the need for an in-person physical exam.  This service lets us provide the care you need with a video conversation.  If a prescription is necessary we can send it directly to your pharmacy.  If lab work is needed we can place an order for that and you can then stop by our lab to have the test done at a later time.    Video visits are billed at different rates depending on your insurance coverage.  Please reach out to your insurance provider with any questions.    If during the course of the call the physician/provider feels a video visit is not appropriate, you will not be charged for this service.\"    Patient has given verbal consent for Video visit? Yes  How would you like to obtain your AVS? MyChart  If you are dropped from the video visit, the video invite should be resent to: Text to cell phone: Brodyt   Will anyone else be joining your video visit? No      Duration of visit--27 min    Yessi Coyle LPN          Return Medical Weight Management Note     Nehemias Mascorro  MRN:  [id number]  :  1995  JASMEET:  2020    Dear Ludwin Elam MD,    I had the pleasure of seeing your patient Nehemias Mascorro. She is a 25 year old female who I am continuing to see for treatment of morbid obesity; PMH includes the following:  Past Medical History:   Diagnosis Date     Depressive disorder      " "Uncomplicated asthma        INTERVAL HISTORY:        She was last seen about 6 wks ago.  She has also added in the support of the 24 wk program.  Reviewed and relevant history as extracted from last visit is as follows--  -------------------------------------------  \"Notes the program is helping with making changes with food and be accountable and these are the things she notes she is most focussed on now--  1. Working on protein shake AM  2. Eat slower/mindful eating--working on this  3.  Walking several times per week with her mother--not happening yet with [employer] work going     Regarding the GI issue she has been working on with [facility]--doing PT to work on coordinating with BMs; pain and nausea and constipation.  That is improving some   --getting nausea still a couple of times per week; stomach cramping about 3 times per month, stomach pain a couple of times per week  --BMs 3 times per week;  In the past there were times that would be less than once per week        Regarding medications related/relevant to wt loss and co-morbidities I note the following--     1. lexapro changed to Prozac--now has stabilized with plan to titrate as needed---> in the interim Prozac dose was increased but is working much better than the other  2. Adderall continuing 25mg daily  4. Abilify--taking 15mg  5. buproprion at 300mg taking  6. Metformin 1000mg BID tolerating without any side effects)--unsure if this has helped any with appetite (escalated after last visit to full dose)  7. Prozac--40 mg daily...     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  Now sometimes having seconds at supper.     Pt is working through the 24 wk program--nutritionist is trying " "to connect with her for wk 4 visit, last visit with health  was 9/15/20     Goals before around structured eating--  1.  Have protein drink (Terra's Way)--110 calories; breakfast routine lately has been higher calorie (eggs/yogurt/oatmeal)  2.  Add protein to snack if having a snack  3.  Change from fruit cups to whole fruit (meeting)        ...in PT for the abd issues/anal sphinctor symptoms,  Some improvement in symptoms but still still with abd pains.  Learning how to lose the bathroom more regularly and less constipated now.  Noting BMs about 4 times per wk\"  -------------------------------------------        Again we discussed goals and barriers with regards to wt loss plans, and other health issues as related to working on wt loss.      Did not connect with GI yet about the possible     Went crazy eating when left town    Was talking with Angela Jiménez (24 wk program health ) and wants to use Myfitnesspal for a month and see how it goes before adding injections      Daily food habits schedule--  Mondays--day off (cleaning, relaxing, take a walk)  Tu/Thu school ([employer], Psych)  Wed homework and reading  Thu/Fri--work ([employer])    Food--  Br protein shake  Petra leftovers or frozen meals (lean cuisine)  Di family meal at home  ----Sometimes lunch and dinner will be at [employer]  ----Snacks--when snacking will be protein bar or fruit  ----Liquids--crystal lite, water, with coffee will use Truvia/1% milk    Activity-  Trying to get 60 active minutes per day (phone goal); 30 minutes for walks 2-3 days per wk      GI symptoms worse recently--more constipation and cramping recently    Talked to psychologist about her wt loss and they noted that that pt may not be ready to make that change; really wants to work on self mgmt over next month    24 wk program--may do this again      Goals over the next month--  1.  1400 Calories (Myfitnesspal, measuring food with scales, eating more whole foods)  2.  Activity " goals--3 days per wk walking (30 min)  3.  Not eating after dinner (eat dinner and then stay busy after it)    CURRENT WEIGHT:   260# this morning  Wt Readings from Last 3 Encounters:   09/25/20 116.5 kg (256 lb 12.8 oz)   02/28/20 119.9 kg (264 lb 5.3 oz)   11/22/19 118.8 kg (261 lb 14.5 oz)                   Changes and Difficulties 1/25/2019   I have made the following changes to my diet since my last visit: Eating less and more vegetables   With regards to my diet, I am still struggling with: -   I have made the following changes to my activity/exercise since my last visit: Joined a gym   With regards to my activity/exercise, I am still struggling with: -       VITALS:  There were no vitals taken for this visit.    MEDICATIONS:   Current Outpatient Medications   Medication Sig Dispense Refill     albuterol (PROAIR HFA/PROVENTIL HFA/VENTOLIN HFA) 108 (90 Base) MCG/ACT inhaler Inhale 2 puffs into the lungs       amphetamine-dextroamphetamine (ADDERALL XR) 30 MG 24 hr capsule TK 1 C PO D FOR ADHD       ARIPiprazole (ABILIFY) 15 MG tablet Take 1 tablet by mouth       BuPROPion HCl (WELLBUTRIN PO) Take 300 mg by mouth daily        cetirizine (ZYRTEC) 10 MG tablet Take 10 mg by mouth daily Reported on 4/27/2017       GLYCOPYRROLATE PO Take 2 mg by mouth daily       IBUPROFEN PO Take 600 mg by mouth       Lansoprazole (PREVACID PO)        NATAZIA 3/2-2/2-3/1 MG TABS TK 1 T PO QD  0     TIZANIDINE HCL PO Take 4 mg by mouth       DIAZEPAM PO Take 5 mg by mouth       ESCITALOPRAM OXALATE PO Take 20 mg by mouth daily       Fexofenadine HCl (ALLEGRA PO) Take by mouth daily as needed for allergies       GUANFACINE HCL PO Take 2 mg by mouth daily       Ipratropium-Albuterol (COMBIVENT RESPIMAT)  MCG/ACT inhaler Inhale 1 puff into the lungs 4 times daily       metFORMIN (GLUCOPHAGE) 500 MG tablet For 2 wks take 2 tablets AM with breakfast then take 1 tablet twice daily with meal, and then increase to 2 tablets twice  daily, as tolerated (Patient not taking: Reported on 12/11/2020) 360 tablet 1     norethindrone-ethinyl estradiol (JUNEL FE 1/20) 1-20 MG-MCG per tablet Take 1 tablet by mouth daily       Probiotic Product (PROBIOTIC DAILY PO)          Weight Loss Medication History Reviewed With Patient 1/25/2019   Which weight loss medications are you currently taking on a regular basis?  Naltrexone   Are you having any side effects from the weight loss medication that we have prescribed you? No         ASSESSMENT;  Morbid obesity in pt with wt gain over past few years, more since her TBI; working now with neuro and psych. Is having some wt loss over last couple of month        PLAN:   (continues)  Decrease portion sizes  No meals in front of TV screen  Purge house of food triggers  No meal skipping and avoid snacking  Decrease caloric beverages--avoid soda  Volumetrics low carb eating plan--structured plan and avoding snacking  Low Calorie/low fat diet  Meal planning/journaling           additionally--  --would recommend to continue on metformin at current full dose (2 g)--however it she has had gaps in taking this she should start back up with 2 tabets at breakfast for 1-2 wks and then add a tablet at supper for 1-2 wks and then get back to 2 tablets twice daily ; today we again considered possible GLP1 agonist addition with her.  --continue with our 24 week health coaching/nutrition program      Goals she discussed--  1.  1400 Calories (Myfitnesspal, measuring food with scales, eating more whole foods)  2.  Activity goals--3 days per wk walking (30 min)  3.  Not eating after dinner (eat dinner and then stay busy after it)    She will discuss possible semaglutide low dose start with her GI doctor, as noted above         Sincerely,    Luis Slade MD

## 2020-12-20 ENCOUNTER — HEALTH MAINTENANCE LETTER (OUTPATIENT)
Age: 25
End: 2020-12-20

## 2020-12-24 NOTE — PATIENT INSTRUCTIONS
Thank you for allowing us the privilege of caring for you. We hope we provided you with the excellent service you deserve.    Please let us know if there is anything else we can do for you so that we can be sure you are completely satisfied with your care experience.    Your visit was with Dr. Slade today.    Instructions per today's visit:  --would recommend to continue on metformin at current full dose (2 g)--however you have had gaps in taking this but are OK with restarting please start back up with 2 tabets at breakfast for 1-2 wks and then add a tablet at supper for 1-2 wks and then get back to 2 tablets twice daily ; today we again considered possible GLP1 agonist addition and discussed that you could just ask your gastroenterologist if they would be OK with in the future--we are not saying we would definitely start it but just want to think about that option to use if needed  --continue with our 24 week health coaching/nutrition program       Goals you discussed--  1.  1400 Calories (Myfitnesspal, measuring food with scales, eating more whole foods)  2.  Activity goals--3 days per wk walking (30 min)  3.  Not eating after dinner (eat dinner and then stay busy after it)            Please call our contact center at [phone] to schedule your next appointments.    Meal Replacement Products:    Here is the link to our new e-store where you can purchase our meal replacement products    [email or web address] [place] E-Store  [email or web address]    The one week starter kit is a great way to sample a variety of products and see what works for you.    If you want more information about the product go to: [employer]    Free Shipping for orders over $75    Benefits of meal replacements products:    Portion and calorie control  Improved nutrition  Structured eating  Simplified food choices  Avoid contact with trigger foods    Interested in working with a health ?  Health coaches work with you  to improve your overall health and wellbeing.  They look at the whole person, and may involve discussion of different areas of life, including, but not limited to the four pillars of health (sleep, exercise, nutrition, and stress management). Discuss with your care team if you would like to start working a health .    Health Coaching-3 Pack: Schedule by calling [phone]    $99 for three health coaching visits    Visits may be done in person or via phone    Coaching is a partnership between the  and the client; Coaches do not prescribe or diagnose    Coaching helps inspire the client to reach his/her personal goals    Bluetooth Scale:    We hope to provide you with high quality telephone and virtual healthcare visits while social distancing for COVID-19 is necessary, as well as in the future when virtual visits may be more convenient for you.    Our technology team made it possible for Bluetooth scales to send weight measurements to our electronic medical record. This allows weights from you weighing at home to securely flow into the medical record, which will improve telephone and virtual visits.  Additionally, studies have shown that adults actually lose more weight when their weights are automatically sent to someone else, and also that this process is not stressful for those adults.    Below is a link for purchasing the scale, with a discount code for our patients. You may call your insurance company to see if they will reimburse you for the cost of the scale, as a piece of durable medical equipment. The scales only go up to a weight of 400 pounds. This is an issue and we are working with the developer on increasing this. We found no scales that go over 400lb that have blue-tooth for connecting to [employer].    Scale to purchase: the [employer] Network  Body  Scale: [email or web address]=aw.ds    Discount Code: We have a  discount code for our patients to bring the cost down to $50, the code is:  withmed     Steps to link the scale to [employer] Seraphimt via an Android Phone (you can always disconnect at any point in the future):  1. The order must be placed first before the patient can access Track My Health within [employer]aph[employer].  2. Download Google Fit rboby from the Google Play Store  a. Log in or register using your Google account  3. Download the Health Market Sciencehart robby from Google Play Store  a. Select add organization  b. Search for [employer] and select it  c. Log into Muufri  d. Select Track My Health  e. Select the green connect my account button  f. When prompted log into your Google account  g. Select okay to confirm the account  4. Download the Withings Health Mate robby from Google Play Store  a. Helen for ZeroTurnaround  b. Go to profile  c. Tap google fit under the Apps section  d. Select the option to activate Google Fit integration  e. Select the same Google account  f. Select okay to confirm the account  g.  Steps to link the scale to Muufri via an iPhone (you can always disconnect at any point in the future):  **Note Avalara is not available for download on an iPad**  1. The order must be placed first before the patient can access Track My Health within Muufri.  2. Locate the Health robby on your iPhone.  a. Set up your Apple Health account as prompted  b. The Sources page will show Apps that communicate with your Health robby. Once all steps are completed, you should see FinalCAD and MyChart listed under the Apps section and your iPhone under the devices section.  i. Select Health Mate  1. Under 'ALLOW  HEALTH MATE  TO WRITE DATA ensure the toggle is on for Weight.  2. This will allow the scale to add your weight to the [employer] Health  ii. Select MyChart  1. Under 'ALLOW  MYCHART  TO READ DATA ensure the toggle is on for Weight.  2. This allows MyCRocketBoltt to grab the weight from Avalara so your provider can see your weights.  3. Download the  Admaxim junaid from the Junaid Store  a. Select add organization                                                  b. Search for [employer] NOCKLIST and select it  c. Log into Admaxim  d. Select Track My Health  e. Select the green connect my account button  f. Follow prompts to link your device to Admaxim.  4. Download the Withings health mate junaid in the Junadi Store  a. [place] for Beep  b. Go to profile  c. Happy [employer] Health under the Apps section  d. If prompted to allow access with the Health Junaid, toggle weight on for read and write access.      For any questions/concerns contact Tamara Green.    To schedule appointments with our team, please call [phone]    Please call during clinic hours Monday through Friday 8:00a - 4:00p if you have questions or you can contact us via Admaxim at anytime.      Lab results will be communicated through My Chart or letter (if My Chart not used). Please call the clinic if you have not received communication after 1 week or if you have any questions.    Clinic Fax: [phone]    Thank you,  Medical Weight Management Team

## 2021-01-06 ENCOUNTER — RECORDS - HEALTHEAST (OUTPATIENT)
Dept: ADMINISTRATIVE | Facility: OTHER | Age: 26
End: 2021-01-06

## 2021-01-06 ENCOUNTER — VIRTUAL VISIT (OUTPATIENT)
Dept: ENDOCRINOLOGY | Facility: CLINIC | Age: 26
End: 2021-01-06
Payer: COMMERCIAL

## 2021-01-06 DIAGNOSIS — E66.9 OBESITY: Primary | ICD-10-CM

## 2021-01-06 PROCEDURE — 99207 PR NO CHARGE LOS: CPT | Mod: TEL

## 2021-01-06 NOTE — LETTER
2021       RE: Nehemias Mascorro  [address]  [place] [address]     Dear Colleague,    Thank you for referring your patient, Nehemias Mascorro, to the [facility] [place] at [facility]. Please see a copy of my visit note below.    MICHELLE GARCIA     Progress Notes    Return Weight Management Health Coaching Note    Nehemias Mascorro          MRN: [id number]  : 1995  JASMEET: 2021    Health  Visit #: 12    ASSESSMENT:  Initial Weight:  249.3 lb    Current Weight (lbs): 258 lb    260 lb on  with [place] apt    Will email pt the links to cooking and exercise classes today.    Weight Loss Medication: Not taking it currently but needs to get refil from pharmacy.    Meal Products: None of the products; but using Healthy Choice Frozen Meals when I need to take lunch with me.         PREVIOUS GOAL(S) REVIEW:  I will use [employer] pal to count calories.  Using [employer] pal on and off when she remembers.      Look at mindful eating resources and chose 1-2 to read over/complete. In progress; eating downstairs now and not in bedroom.     Mindful Eating  [email or web address]     Mindful Eating Assessment  [email or web address]     I will stay busy after dinner time.     I will try some of Deborah ESCUDERO's ideas.     I will work on staying present during meal times/ eating slowly.    Previous Goals set with Dr Slade;  1.  1400 Calories (Myfitnesspal, measuring food with scales, eating more whole foods)  2.  Activity goals--3 days per wk walking (30 min)  3.  Not eating after dinner (eat dinner and then stay busy after it) Pt says this has been harder, and thinks maybe because she is hungry because limiting portion sizes too much and/or not eating the right food. Pt says it will help to eat more veggies; drink more water and tea. Pt says she doesn't eat a lot of protein; include beans in my  diet. Limit tv will help.    Per [place]: Notes the program is helping with making changes with food and be accountable and these are the things she notes she is most focussed on now--  1. Working on protein shake AM  2. Eat slower/mindful eating--working on this  3.  Walking several times per week with her mother--not happening yet with [employer] work going.         GOALS: January 6, 2021     1.) I will eat more protein so I feel satisfied.    2.) I will continue with mindful eating. I will focus on the eating experience and really focus on my food.    3.) I will track my food on myJustGo pal. Turn on reminder texts.    MY PLAN Post 24 week:    1.) I will share my goals with my mom and write them down and post them in my room.     2.) I will continue to see Dr Slade; Check with Insurance to see if the RD visits are covered; consider 3 pack for health coaching, $99 or doing the 24 week program a second time.     3.) I will continue seeing my therapist    4.) I will fill my weight loss med prescription.    5.) New Year Goals; Read and Journal.       NOTES:      FOLLOW-UP:  To schedule your next visit, please call [phone].      TIME: 40 minutes       SIGNATURE:  Angela Jiménez, Certified Health  on 1/6/2021 at 9:20 AM

## 2021-01-06 NOTE — PATIENT INSTRUCTIONS
GOALS: January 6, 2021     1.) I will eat more protein so I feel satisfied.    2.) I will continue with mindful eating. I will focus on the eating experience and really focus on my food.    3.) I will track my food on myfitness pal. Turn on reminder texts.    MY PLAN Post 24 week:    1.) I will share my goals with my mom and write them down and post them in my room.     2.) I will continue to see Dr Slade; Check with Insurance to see if the RD visits are covered; consider 3 pack for health coaching, $99 or doing the 24 week program a second time.     3.) I will continue seeing my therapist    4.) I will fill my weight loss med prescription.    5.) New Year Goals; Read and Journal.           FOLLOW-UP:  To schedule your next visit, please call [phone].             SIGNATURE:  Angela Jiménez, Certified Health  on 1/6/2021 at 9:20 AM

## 2021-01-06 NOTE — PROGRESS NOTES
MICHELLE GARCIA     Progress Notes    Return Weight Management Health Coaching Note    Nehemias Mascorro          MRN: [id number]  : 1995  JASMEET: 2021    Health  Visit #: 12    ASSESSMENT:  Initial Weight:  249.3 lb    Current Weight (lbs): 258 lb    260 lb on  with [place] apt    Will email pt the links to cooking and exercise classes today.    Weight Loss Medication: Not taking it currently but needs to get refil from pharmacy.    Meal Products: None of the products; but using Healthy Choice Frozen Meals when I need to take lunch with me.         PREVIOUS GOAL(S) REVIEW:  I will use daPulse pal to count calories.  Using myfitness pal on and off when she remembers.      Look at mindful eating resources and chose 1-2 to read over/complete. In progress; eating downstairs now and not in bedroom.     Mindful Eating  [email or web address]     Mindful Eating Assessment  [email or web address]     I will stay busy after dinner time.     I will try some of Deborah ESCUDERO's ideas.     I will work on staying present during meal times/ eating slowly.    Previous Goals set with Dr Slade;  1.  1400 Calories (Myfitnesspal, measuring food with scales, eating more whole foods)  2.  Activity goals--3 days per wk walking (30 min)  3.  Not eating after dinner (eat dinner and then stay busy after it) Pt says this has been harder, and thinks maybe because she is hungry because limiting portion sizes too much and/or not eating the right food. Pt says it will help to eat more veggies; drink more water and tea. Pt says she doesn't eat a lot of protein; include beans in my diet. Limit tv will help.    Per [place]: Notes the program is helping with making changes with food and be accountable and these are the things she notes she is most focussed on now--  1. Working on protein shake AM  2. Eat slower/mindful eating--working on this  3.  Walking several times per week with her mother--not  happening yet with [employer] work going.         GOALS: January 6, 2021     1.) I will eat more protein so I feel satisfied.    2.) I will continue with mindful eating. I will focus on the eating experience and really focus on my food.    3.) I will track my food on myfitness pal. Turn on reminder texts.    MY PLAN Post 24 week:    1.) I will share my goals with my mom and write them down and post them in my room.     2.) I will continue to see Dr Slade; Check with Insurance to see if the RD visits are covered; consider 3 pack for health coaching, $99 or doing the 24 week program a second time.     3.) I will continue seeing my therapist    4.) I will fill my weight loss med prescription.    5.) New Year Goals; Read and Journal.       NOTES:      FOLLOW-UP:  To schedule your next visit, please call [phone].      TIME: 40 minutes       SIGNATURE:  Angela Jiménez, Certified Health  on 1/6/2021 at 9:20 AM

## 2021-01-25 PROBLEM — M62.89 PELVIC FLOOR DYSFUNCTION: Status: RESOLVED | Noted: 2020-07-28 | Resolved: 2021-01-25

## 2021-01-25 NOTE — PROGRESS NOTES
Discharge Note    Progress reporting period is from initial evaluation date (please see noted date below) to Sep 10, 2020.  No linked episodes      Immanuelle failed to follow up and current status is unknown.  Please see information below for last relevant information on current status.  Patient seen for 5 visits.    SUBJECTIVE  Subjective changes noted by patient:  The pt notes her stomach has been bothering her today. She drank prune juice yesterday. The pt had a bowel movement this morning. The pt traveled this past week and she thinks that contributed to her constipation. The pt reports she is elimating more when practicing the belly big belly hard. The pt feels like she is eliminating fully 30% of the time. Pt has a goal of trying to drink 8, 8oz of water a day.  .  Current pain level is  .     Previous pain level was  3/10.   Changes in function:  Yes (See Goal flowsheet attached for changes in current functional level)  Adverse reaction to treatment or activity: None    OBJECTIVE  Changes noted in objective findings: frequency of BM: 2x a week, bristol stool scale: type 4 today, type 2 and 5 this past week.     ASSESSMENT/PLAN  Diagnosis: pelvic floor dysfunction   Updated problem list and treatment plan:   Pain - HEP  Decreased ROM/flexibility - HEP  Decreased function - HEP  Impaired muscle performance - HEP  STG/LTGs have been met or progress has been made towards goals:  Yes, please see goal flowsheet for most current information  Assessment of Progress: current status is unknown.    Last current status: Pt is progressing as expected   Self Management Plans:  HEP  I have re-evaluated this patient and find that the nature, scope, duration and intensity of the therapy is appropriate for the medical condition of the patient.  Immanuelle continues to require the following intervention to meet STG and LTG's:  HEP.    Recommendations:  Discharge with current home program.  Patient to follow up with MD as  needed.    Please refer to the daily flowsheet for treatment today, total treatment time and time spent performing 1:1 timed codes.  Discharge Note    Progress reporting period is from initial evaluation date (please see noted date below) to Sep 10, 2020.  No linked episodes      Immanuelle failed to follow up and current status is unknown.  Please see information below for last relevant information on current status.  Patient seen for 5 visits.    SUBJECTIVE  Subjective changes noted by patient:  The pt notes her stomach has been bothering her today. She drank prune juice yesterday. The pt had a bowel movement this morning. The pt traveled this past week and she thinks that contributed to her constipation. The pt reports she is elimating more when practicing the belly big belly hard. The pt feels like she is eliminating fully 30% of the time. Pt has a goal of trying to drink 8, 8oz of water a day.  .  Current pain level is  .     Previous pain level was  3/10.   Changes in function:  Yes (See Goal flowsheet attached for changes in current functional level)  Adverse reaction to treatment or activity: None    OBJECTIVE  Changes noted in objective findings: frequency of BM: 2x a week, bristol stool scale: type 4 today, type 2 and 5 this past week.     ASSESSMENT/PLAN  Diagnosis: pelvic floor dysfunction   Updated problem list and treatment plan:   Pain - HEP  Decreased ROM/flexibility - HEP  Decreased function - HEP  Decreased strength - HEP  Impaired muscle performance - HEP  STG/LTGs have been met or progress has been made towards goals:  Yes, please see goal flowsheet for most current information  Assessment of Progress: current status is unknown.    Last current status: Pt is progressing as expected   Self Management Plans:  HEP  I have re-evaluated this patient and find that the nature, scope, duration and intensity of the therapy is appropriate for the medical condition of the patient.  Immanuelle continues to  require the following intervention to meet STG and LTG's:  HEP.    Recommendations:  Discharge with current home program.  Patient to follow up with MD as needed.    Please refer to the daily flowsheet for treatment today, total treatment time and time spent performing 1:1 timed codes.

## 2021-01-29 ENCOUNTER — TELEPHONE (OUTPATIENT)
Dept: PEDIATRICS | Facility: CLINIC | Age: 26
End: 2021-01-29

## 2021-01-29 NOTE — TELEPHONE ENCOUNTER
Called mom and left message re: Calling to schedule follow up [facility] appointment with Dr. Slade on 2/26/21.  Left direct call back number to schedule.

## 2021-02-26 ENCOUNTER — VIRTUAL VISIT (OUTPATIENT)
Dept: ENDOCRINOLOGY | Facility: CLINIC | Age: 26
End: 2021-02-26
Attending: INTERNAL MEDICINE
Payer: COMMERCIAL

## 2021-02-26 ENCOUNTER — RECORDS - HEALTHEAST (OUTPATIENT)
Dept: ADMINISTRATIVE | Facility: OTHER | Age: 26
End: 2021-02-26

## 2021-02-26 VITALS — WEIGHT: 263 LBS | BODY MASS INDEX: 43.61 KG/M2

## 2021-02-26 DIAGNOSIS — E66.01 MORBID (SEVERE) OBESITY DUE TO EXCESS CALORIES (H): Primary | ICD-10-CM

## 2021-02-26 PROCEDURE — 99214 OFFICE O/P EST MOD 30 MIN: CPT | Mod: 95 | Performed by: INTERNAL MEDICINE

## 2021-02-26 RX ORDER — FLUOXETINE 40 MG/1
40 CAPSULE ORAL DAILY
COMMUNITY
Start: 2021-01-14 | End: 2023-07-27

## 2021-02-26 NOTE — NURSING NOTE
How would you like to obtain your AVS? Reji Mascorro complains of    Chief Complaint   Patient presents with     Follow Up     Weight MGMT       Patient would like the video invitation sent by: Send to e-mail at: [email or web address]     Patient is located in [place]? Yes     I have reviewed and updated the patient's medication list, allergies and preferred pharmacy.    Wt 263 lb (119.3 kg)   BMI 43.61 kg/m      Bernice Fischer, CMA

## 2021-02-26 NOTE — LETTER
"2021       RE: Nehemias Mascorro  [address]  [place] [address]     Dear Colleague,    Thank you for referring your patient, Nehemias Mascorro, to the [facility] at [facility]. Please see a copy of my visit note below.      Nehemias is a 25 year old who is being evaluated via a billable video visit.        Type of service:  Billable Video Visit        Return Medical Weight Management Note     Nehemias Mascorro  MRN:  [id number]  :  1995  JASMEET:  2021    Dear Ludwin Elam MD,    I had the pleasure of seeing your patient Nehemias Mascorro. She is a 25 year old female who I am continuing to see for treatment of obesity related to:    No flowsheet data found.    INTERVAL HISTORY:    She was last seen about 1 mo ago.  Reviewed and relevant history as extracted from last visit is as follows--  -------------------------------------------  \"Notes the program is helping with making changes with food and be accountable and these are the things she notes she is most focussed on now--  1. Working on protein shake AM  2. Eat slower/mindful eating--working on this  3.  Walking several times per week with her mother--not happening yet with [employer] work going     Regarding the GI issue she has been working on with [facility]--doing PT to work on coordinating with BMs; pain and nausea and constipation.  That is improving some   --getting nausea still a couple of times per week; stomach cramping about 3 times per month, stomach pain a couple of times per week  --BMs 3 times per week;  In the past there were times that would be less than once per week        Regarding medications related/relevant to wt loss and co-morbidities I note the following--     1. lexapro changed to Prozac--now has stabilized with plan to titrate as needed---> in the interim Prozac dose was increased but is working much better than " "the other  2. Adderall continuing 25mg daily  4. Abilify--taking 15mg  5. buproprion at 300mg taking  6. Metformin 1000mg BID tolerating without any side effects)--unsure if this has helped any with appetite (escalated after last visit to full dose)  7. Prozac--40 mg daily...     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  Now sometimes having seconds at supper.     Pt is working through the 24 wk program--nutritionist is trying to connect with her for wk 4 visit, last visit with health  was 9/15/20     Goals before around structured eating--  1.  Have protein drink (Terra's Way)--110 calories; breakfast routine lately has been higher calorie (eggs/yogurt/oatmeal)  2.  Add protein to snack if having a snack  3.  Change from fruit cups to whole fruit (meeting)        ...in PT for the abd issues/anal sphinctor symptoms,  Some improvement in symptoms but still still with abd pains.  Learning how to lose the bathroom more regularly and less constipated now.  Noting BMs about 4 times per wk\"     Food habits/plan--  Br protein shake  Petra leftovers or frozen meals (lean cuisine)  Di family meal at home  ----Sometimes lunch and dinner will be at [employer]  ----Snacks--when snacking will be protein bar or fruit  ----Liquids--crystal lite, water, with coffee will use Truvia/1% milk     Activity-  Trying to get 60 active minutes per day (phone goal); 30 minutes for walks 2-3 days per wk\"  -----------------------------------        Goals she had last month--  1.  1400 Calories (Myfitnesspal, measuring food with scales, eating more whole foods)  2.  Activity goals--3 days per wk walking (30 min)  3.  Not eating after dinner (eat dinner and then stay busy after " "it)     24 wk program--may do this again    --not sure about whether to do the health coaching  --pt notes she did not feel like participating  --pt notes it \"was just hard for me\" (was not ready and did not have enough time), feels that things are even crazier but in a better place with eating now    Changes lately--  Eating 2 meals and one snack  --doing more cooking (pasta, salmon, chicken; banana muffins and will share at [employer]; made cookies and had 2 but took the rest to [employer])    [employer] instant breakfast (before school)  Protein shake at noon (post class)  6-7p dinner (salmon, kale, roasted sweet potatoes)    Last couple of days waking up hungry (will eat 1/2 of a Tomi bar, fruit)    Daily activity/steps (4000 steps); walking now and will have a goal of 5000 steps per day    She notes her GI doctor gave the OK if GLP-1 agnonist is needed to support wt loss    CURRENT WEIGHT:     263# today      WEIGHT last visit:   260#      Wt Readings from Last 3 Encounters:   09/25/20 116.5 kg (256 lb 12.8 oz)   02/28/20 119.9 kg (264 lb 5.3 oz)   11/22/19 118.8 kg (261 lb 14.5 oz)            Changes and Difficulties 1/25/2019   I have made the following changes to my diet since my last visit: Eating less and more vegetables   With regards to my diet, I am still struggling with: -   I have made the following changes to my activity/exercise since my last visit: Joined a gym   With regards to my activity/exercise, I am still struggling with: -       MEDICATIONS:   Current Outpatient Medications   Medication Sig Dispense Refill     albuterol (PROAIR HFA/PROVENTIL HFA/VENTOLIN HFA) 108 (90 Base) MCG/ACT inhaler Inhale 2 puffs into the lungs       amphetamine-dextroamphetamine (ADDERALL XR) 30 MG 24 hr capsule TK 1 C PO D FOR ADHD       ARIPiprazole (ABILIFY) 15 MG tablet Take 1 tablet by mouth       BuPROPion HCl (WELLBUTRIN PO) Take 300 mg by mouth daily        cetirizine (ZYRTEC) 10 MG tablet Take 10 mg by mouth daily " Reported on 4/27/2017       DIAZEPAM PO Take 5 mg by mouth       ESCITALOPRAM OXALATE PO Take 20 mg by mouth daily       Fexofenadine HCl (ALLEGRA PO) Take by mouth daily as needed for allergies       GLYCOPYRROLATE PO Take 2 mg by mouth daily       GUANFACINE HCL PO Take 2 mg by mouth daily       IBUPROFEN PO Take 600 mg by mouth       Ipratropium-Albuterol (COMBIVENT RESPIMAT)  MCG/ACT inhaler Inhale 1 puff into the lungs 4 times daily       Lansoprazole (PREVACID PO)        metFORMIN (GLUCOPHAGE) 500 MG tablet For 2 wks take 2 tablets AM with breakfast then take 1 tablet twice daily with meal, and then increase to 2 tablets twice daily, as tolerated (Patient not taking: Reported on 12/11/2020) 360 tablet 1     NATAZIA 3/2-2/2-3/1 MG TABS TK 1 T PO QD  0     norethindrone-ethinyl estradiol (JUNEL FE 1/20) 1-20 MG-MCG per tablet Take 1 tablet by mouth daily       Probiotic Product (PROBIOTIC DAILY PO)        TIZANIDINE HCL PO Take 4 mg by mouth         Weight Loss Medication History Reviewed With Patient 1/25/2019   Which weight loss medications are you currently taking on a regular basis?  Naltrexone   Are you having any side effects from the weight loss medication that we have prescribed you? No       ASSESSMENT;  Morbid obesity in pt with wt gain over past few years, more since her TBI; working now with neuro and psych. Is having some wt loss over last couple of month        PLAN:   (continues)  Decrease portion sizes  No meals in front of TV screen  Purge house of food triggers  No meal skipping and avoid snacking  Decrease caloric beverages--avoid soda  Volumetrics low carb eating plan--structured plan and avoding snacking  Low Calorie/low fat diet  Meal planning/journaling           additionally--  --would recommend to continue on metformin at current full dose (2 g)--however it she has had gaps in taking this she should start back up with 2 tabets at breakfast for 1-2 wks and then add a tablet at  supper for 1-2 wks and then get back to 2 tablets twice daily ; today we again considered possible GLP1 agonist addition with her.  --continue with our 24 week health coaching/nutrition program      Goals she discussed--  1.  1400 Calories (Myfitnesspal, measuring food with scales, eating more whole foods)  2.  Not eating after dinner (eat dinner and then stay busy after it)    Additionally--  She is interested in GLP-1 trial; placing order  Goals--continue with current food plan, goal for 5000 steps daily, weigh weekly  Return in about 2 mo      Time: approx 37 min spent on evaluation, management, counseling, education, & motivational interviewing during visit coupled with pre-visit prep and post visit follow up/charting    Sincerely,    Luis Slade MD

## 2021-03-15 ENCOUNTER — RECORDS - HEALTHEAST (OUTPATIENT)
Dept: ADMINISTRATIVE | Facility: OTHER | Age: 26
End: 2021-03-15

## 2021-04-05 ENCOUNTER — TELEPHONE (OUTPATIENT)
Dept: PEDIATRICS | Facility: CLINIC | Age: 26
End: 2021-04-05

## 2021-04-05 NOTE — TELEPHONE ENCOUNTER
Called Immanuelle and left message re: Calling to discuss medication and follow up appointment.  Left direct call back number.

## 2021-04-23 ENCOUNTER — VIRTUAL VISIT (OUTPATIENT)
Dept: ENDOCRINOLOGY | Facility: CLINIC | Age: 26
End: 2021-04-23
Attending: INTERNAL MEDICINE
Payer: COMMERCIAL

## 2021-04-23 ENCOUNTER — RECORDS - HEALTHEAST (OUTPATIENT)
Dept: ADMINISTRATIVE | Facility: OTHER | Age: 26
End: 2021-04-23

## 2021-04-23 DIAGNOSIS — E66.01 MORBID (SEVERE) OBESITY DUE TO EXCESS CALORIES (H): Primary | ICD-10-CM

## 2021-04-23 PROCEDURE — 99215 OFFICE O/P EST HI 40 MIN: CPT | Mod: 95 | Performed by: INTERNAL MEDICINE

## 2021-04-23 RX ORDER — DOXYCYCLINE 100 MG/1
CAPSULE ORAL
COMMUNITY
End: 2021-11-10

## 2021-04-23 NOTE — PROGRESS NOTES
"Nehemias is a 25 year old who is being evaluated via a billable telephone visit.       How would you like to obtain your AVS? Flippshart  Phone call duration: 36 minutes    Return Medical Weight Management Note     Nehemias Mascorro  MRN:  [id number]  :  1995  JASMEET:  2021    Dear Ludwin Elam MD,    I had the pleasure of seeing your patient Nehemias Mascorro. She is a 25 year old female who I am continuing to see for treatment of obesity; other PMH includes the following:  Past Medical History:   Diagnosis Date     Depressive disorder      Uncomplicated asthma          INTERVAL HISTORY:    She was last seen about 2 mo ago.  Reviewed and relevant history as extracted from last visit is as follows--  -------------------------------------------  \"Notes the program is helping with making changes with food and be accountable and these are the things she notes she is most focussed on now--  1. Working on protein shake AM  2. Eat slower/mindful eating--working on this  3.  Walking several times per week with her mother--not happening yet with [employer] work going     Regarding the GI issue she has been working on with [facility]--doing PT to work on coordinating with BMs; pain and nausea and constipation.  That is improving some   --getting nausea still a couple of times per week; stomach cramping about 3 times per month, stomach pain a couple of times per week  --BMs 3 times per week;  In the past there were times that would be less than once per week        Regarding medications related/relevant to wt loss and co-morbidities I note the following--     1. lexapro changed to Prozac--now has stabilized with plan to titrate as needed---> in the interim Prozac dose was increased but is working much better than the other  2. Adderall continuing 25mg daily  4. Abilify--taking 15mg  5. buproprion at 300mg taking  6. Metformin 1000mg BID tolerating without any side effects)--unsure if this has helped any " "with appetite (escalated after last visit to full dose)  7. Prozac--40 mg daily...     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  Now sometimes having seconds at supper.     Pt is working through the 24 wk program--nutritionist is trying to connect with her for wk 4 visit, last visit with health  was 9/15/20     Goals before around structured eating--  1.  Have protein drink (Terra's Way)--110 calories; breakfast routine lately has been higher calorie (eggs/yogurt/oatmeal)  2.  Add protein to snack if having a snack  3.  Change from fruit cups to whole fruit (meeting)        ...in PT for the abd issues/anal sphinctor symptoms,  Some improvement in symptoms but still still with abd pains.  Learning how to lose the bathroom more regularly and less constipated now.  Noting BMs about 4 times per wk\"     Food habits/plan--  Br protein shake  Petra leftovers or frozen meals (lean cuisine)  Di family meal at home  ----Sometimes lunch and dinner will be at [employer]  ----Snacks--when snacking will be protein bar or fruit  ----Liquids--crystal lite, water, with coffee will use Truvia/1% milk     Activity-  Trying to get 60 active minutes per day (phone goal); 30 minutes for walks 2-3 days per wk\"  -----------------------------------        Goals she had prior:  1.  1400 Calories (Myfitnesspal, measuring food with scales, eating more whole foods)  2.  Activity goals--3 days per wk walking (30 min)  3.  Not eating after dinner (eat dinner and then stay busy after it)       24 wk program--may do this again  --not sure about whether to do the health coaching  --pt notes she did not feel like participating  --pt notes it \"was just hard for me\" (was not ready and did " "not have enough time), feels that things are even crazier but in a better place with eating now       Her GI doctor gave the OK if GLP-1 agnonist is needed to support wt loss; attempts to get approval for Saxenda and Ozempic were denied by insurance in the interim.      On an antibiotic now--maybe for another month?-- for staph infection (under breast)---> no dairy and this has affected her eating    Current schedule  Up around 8a  Eating around 8:50 (banana, Belvita crackers/crystal light)  Lunch (PB sandwich/applesauce/crystal light)  3p snack--popcorn 100 Calories/ or banana/ or applesauce  Dinner--fish/chicken/grilled ribs, cabbage, sweet potatoes (casserole)/crystal light  SF pecan turtles (0-1)    Eating out weekly (pizza)    Prior food goals--  Protein shake--Br  Meal replacement healthy lunch options/leftovers--lean protein/nonstrchy veggies--Petra  Afternoon snack--fresh fruit  Dinner--9\" plate      4000 steps per day; change to 5000 was a goal before but this was not met    Finals week is next wk--schedule  Will be less crazy after that    Goals she discussed/re-set--  1.  1400 Calories (Myfitnesspal, using meal replacements, eating more whole foods)  2.  Not eating after dinner (eat dinner and then stay busy after it)  3.  Weigh in weekly  4. Labs (nonfasting)--please do them within the next few mo--Psychiatrist can do theirs at the same time    Not remembering to take metformin--that can be discontinued as she was not noting any benefit from it, was toelrating it.    Psychiatrist wanting labs; I can reorder my labs planned for last summer and pt can get all labs at the same      CURRENT WEIGHT:     Last wt 2/26/221-at last visit           Wt Readings from Last 3 Encounters:   02/26/21 119.3 kg (263 lb)   09/25/20 116.5 kg (256 lb 12.8 oz)   02/28/20 119.9 kg (264 lb 5.3 oz)             Changes and Difficulties 1/25/2019   I have made the following changes to my diet since my last visit: Eating less and more " vegetables   With regards to my diet, I am still struggling with: -   I have made the following changes to my activity/exercise since my last visit: Joined a gym   With regards to my activity/exercise, I am still struggling with: -       VITALS:  There were no vitals taken for this visit.    MEDICATIONS:   Current Outpatient Medications   Medication Sig Dispense Refill     albuterol (PROAIR HFA/PROVENTIL HFA/VENTOLIN HFA) 108 (90 Base) MCG/ACT inhaler Inhale 2 puffs into the lungs       amphetamine-dextroamphetamine (ADDERALL XR) 30 MG 24 hr capsule TK 1 C PO D FOR ADHD       ARIPiprazole (ABILIFY) 15 MG tablet Take 1 tablet by mouth       BuPROPion HCl (WELLBUTRIN PO) Take 300 mg by mouth daily        doxycycline hyclate (VIBRAMYCIN) 100 MG capsule doxycycline hyclate 100 mg capsule       FLUoxetine (PROZAC) 40 MG capsule Take 40 mg by mouth daily       GLYCOPYRROLATE PO Take 2 mg by mouth daily       IBUPROFEN PO Take 600 mg by mouth       Lansoprazole (PREVACID PO) Take 30 mg by mouth daily        NATAZIA 3/2-2/2-3/1 MG TABS TK 1 T PO QD  0     cetirizine (ZYRTEC) 10 MG tablet Take 10 mg by mouth daily Reported on 4/27/2017       DIAZEPAM PO Take 5 mg by mouth       ESCITALOPRAM OXALATE PO Take 20 mg by mouth daily       Fexofenadine HCl (ALLEGRA PO) Take by mouth daily as needed for allergies       GUANFACINE HCL PO Take 2 mg by mouth daily       Ipratropium-Albuterol (COMBIVENT RESPIMAT)  MCG/ACT inhaler Inhale 1 puff into the lungs 4 times daily       liraglutide - Weight Management (SAXENDA) 18 MG/3ML pen Wk one--0.6mg; wk two--1.2mg; wk three--1.8mg; wk four--2.4mg; wk five and after 3.0mg/wk as tolerated (Patient not taking: Reported on 4/23/2021) 27 mL 3     metFORMIN (GLUCOPHAGE) 500 MG tablet For 2 wks take 2 tablets AM with breakfast then take 1 tablet twice daily with meal, and then increase to 2 tablets twice daily, as tolerated (Patient not taking: Reported on 12/11/2020) 360 tablet 1      norethindrone-ethinyl estradiol (JUNEL FE 1/20) 1-20 MG-MCG per tablet Take 1 tablet by mouth daily       Probiotic Product (PROBIOTIC DAILY PO)        TIZANIDINE HCL PO Take 4 mg by mouth         Weight Loss Medication History Reviewed With Patient 1/25/2019   Which weight loss medications are you currently taking on a regular basis?  Naltrexone   Are you having any side effects from the weight loss medication that we have prescribed you? No         ASSESSMENT;  Morbid obesity in pt with wt gain over past few years, more since her TBI        PLAN:   (continues)  Decrease portion sizes  No meals in front of TV screen  Purge house of food triggers  No meal skipping and avoid snacking  Decrease caloric beverages--avoid soda  Volumetrics low carb eating plan--structured plan and avoding snacking  Low Calorie/low fat diet  Meal planning/journaling           additionally--  4000 steps per day; change to 5000 was a goal before but this was not met;re-set    Goals she discussed/re-set--  1.  1400 Calories (Myfitnesspal, using meal replacements, eating more whole foods)  2.  Not eating after dinner (eat dinner and then stay busy after it)  3.  Weigh in weekly  4. Labs (nonfasting)--please do them within the next few mo--Psychiatrist can do theirs at the same time     RTC virtually in a mo     Time: approx 37 min spent on evaluation, management, counseling, education, & motivational interviewing during visit coupled with pre-visit prep and post visit follow up/charting     Sincerely,     Luis Slade MD        Sincerely,    Luis Slade MD

## 2021-04-23 NOTE — LETTER
"2021       RE: Nehemias Mascorro  [address]  [place] [address]     Dear Colleague,    Thank you for referring your patient, Nehemias Mascorro, to the [facility] WEIGHT [facility]NAGEMENT [facility]IN[facility] at [facility]. Please see a copy of my visit note below.    Nehemias is a 25 year old who is being evaluated via a billable telephone visit.       How would you like to obtain your AVS? MyChart  Phone call duration: 36 minutes    Return Medical Weight Management Note     Nehemias Mascorro  MRN:  [id number]  :  1995  JASMEET:  2021    Dear Ludwin Elam MD,    I had the pleasure of seeing your patient Nehemias Mascorro. She is a 25 year old female who I am continuing to see for treatment of obesity; other PMH includes the following:  Past Medical History:   Diagnosis Date     Depressive disorder      Uncomplicated asthma          INTERVAL HISTORY:    She was last seen about 2 mo ago.  Reviewed and relevant history as extracted from last visit is as follows--  -------------------------------------------  \"Notes the program is helping with making changes with food and be accountable and these are the things she notes she is most focussed on now--  1. Working on protein shake AM  2. Eat slower/mindful eating--working on this  3.  Walking several times per week with her mother--not happening yet with [employer] work going     Regarding the GI issue she has been working on with [facility]--doing PT to work on coordinating with BMs; pain and nausea and constipation.  That is improving some   --getting nausea still a couple of times per week; stomach cramping about 3 times per month, stomach pain a couple of times per week  --BMs 3 times per week;  In the past there were times that would be less than once per week        Regarding medications related/relevant to wt loss and co-morbidities I note the following--     1. lexapro changed " "to Prozac--now has stabilized with plan to titrate as needed---> in the interim Prozac dose was increased but is working much better than the other  2. Adderall continuing 25mg daily  4. Abilify--taking 15mg  5. buproprion at 300mg taking  6. Metformin 1000mg BID tolerating without any side effects)--unsure if this has helped any with appetite (escalated after last visit to full dose)  7. Prozac--40 mg daily...     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  Now sometimes having seconds at supper.     Pt is working through the 24 wk program--nutritionist is trying to connect with her for wk 4 visit, last visit with health  was 9/15/20     Goals before around structured eating--  1.  Have protein drink (Terra's Way)--110 calories; breakfast routine lately has been higher calorie (eggs/yogurt/oatmeal)  2.  Add protein to snack if having a snack  3.  Change from fruit cups to whole fruit (meeting)        ...in PT for the abd issues/anal sphinctor symptoms,  Some improvement in symptoms but still still with abd pains.  Learning how to lose the bathroom more regularly and less constipated now.  Noting BMs about 4 times per wk\"     Food habits/plan--  Br protein shake  Petra leftovers or frozen meals (lean cuisine)  Di family meal at home  ----Sometimes lunch and dinner will be at [employer]  ----Snacks--when snacking will be protein bar or fruit  ----Liquids--crystal lite, water, with coffee will use Truvia/1% milk     Activity-  Trying to get 60 active minutes per day (phone goal); 30 minutes for walks 2-3 days per wk\"  -----------------------------------        Goals she had prior:  1.  1400 Calories (Myfitnesspal, measuring food with scales, eating more whole " "foods)  2.  Activity goals--3 days per wk walking (30 min)  3.  Not eating after dinner (eat dinner and then stay busy after it)       24 wk program--may do this again  --not sure about whether to do the health coaching  --pt notes she did not feel like participating  --pt notes it \"was just hard for me\" (was not ready and did not have enough time), feels that things are even crazier but in a better place with eating now       Her GI doctor gave the OK if GLP-1 agnonist is needed to support wt loss; attempts to get approval for Saxenda and Ozempic were denied by insurance in the interim.      On an antibiotic now--maybe for another month?-- for staph infection (under breast)---> no dairy and this has affected her eating    Current schedule  Up around 8a  Eating around 8:50 (banana, Belvita crackers/crystal light)  Lunch (PB sandwich/applesauce/crystal light)  3p snack--popcorn 100 Calories/ or banana/ or applesauce  Dinner--fish/chicken/grilled ribs, cabbage, sweet potatoes (casserole)/crystal light  SF pecan turtles (0-1)    Eating out weekly (pizza)    Prior food goals--  Protein shake--Br  Meal replacement healthy lunch options/leftovers--lean protein/nonstrchy veggies--Petra  Afternoon snack--fresh fruit  Dinner--9\" plate      4000 steps per day; change to 5000 was a goal before but this was not met    Finals week is next wk--schedule  Will be less crazy after that    Goals she discussed/re-set--  1.  1400 Calories (Myfitnesspal, using meal replacements, eating more whole foods)  2.  Not eating after dinner (eat dinner and then stay busy after it)  3.  Weigh in weekly  4. Labs (nonfasting)--please do them within the next few mo--Psychiatrist can do theirs at the same time    Not remembering to take metformin--that can be discontinued as she was not noting any benefit from it, was toelrating it.    Psychiatrist wanting labs; I can reorder my labs planned for last summer and pt can get all labs at the " same      CURRENT WEIGHT:     Last wt 2/26/221-at last visit           Wt Readings from Last 3 Encounters:   02/26/21 119.3 kg (263 lb)   09/25/20 116.5 kg (256 lb 12.8 oz)   02/28/20 119.9 kg (264 lb 5.3 oz)             Changes and Difficulties 1/25/2019   I have made the following changes to my diet since my last visit: Eating less and more vegetables   With regards to my diet, I am still struggling with: -   I have made the following changes to my activity/exercise since my last visit: Joined a gym   With regards to my activity/exercise, I am still struggling with: -       VITALS:  There were no vitals taken for this visit.    MEDICATIONS:   Current Outpatient Medications   Medication Sig Dispense Refill     albuterol (PROAIR HFA/PROVENTIL HFA/VENTOLIN HFA) 108 (90 Base) MCG/ACT inhaler Inhale 2 puffs into the lungs       amphetamine-dextroamphetamine (ADDERALL XR) 30 MG 24 hr capsule TK 1 C PO D FOR ADHD       ARIPiprazole (ABILIFY) 15 MG tablet Take 1 tablet by mouth       BuPROPion HCl (WELLBUTRIN PO) Take 300 mg by mouth daily        doxycycline hyclate (VIBRAMYCIN) 100 MG capsule doxycycline hyclate 100 mg capsule       FLUoxetine (PROZAC) 40 MG capsule Take 40 mg by mouth daily       GLYCOPYRROLATE PO Take 2 mg by mouth daily       IBUPROFEN PO Take 600 mg by mouth       Lansoprazole (PREVACID PO) Take 30 mg by mouth daily        NATAZIA 3/2-2/2-3/1 MG TABS TK 1 T PO QD  0     cetirizine (ZYRTEC) 10 MG tablet Take 10 mg by mouth daily Reported on 4/27/2017       DIAZEPAM PO Take 5 mg by mouth       ESCITALOPRAM OXALATE PO Take 20 mg by mouth daily       Fexofenadine HCl (ALLEGRA PO) Take by mouth daily as needed for allergies       GUANFACINE HCL PO Take 2 mg by mouth daily       Ipratropium-Albuterol (COMBIVENT RESPIMAT)  MCG/ACT inhaler Inhale 1 puff into the lungs 4 times daily       liraglutide - Weight Management (SAXENDA) 18 MG/3ML pen Wk one--0.6mg; wk two--1.2mg; wk three--1.8mg; wk  four--2.4mg; wk five and after 3.0mg/wk as tolerated (Patient not taking: Reported on 4/23/2021) 27 mL 3     metFORMIN (GLUCOPHAGE) 500 MG tablet For 2 wks take 2 tablets AM with breakfast then take 1 tablet twice daily with meal, and then increase to 2 tablets twice daily, as tolerated (Patient not taking: Reported on 12/11/2020) 360 tablet 1     norethindrone-ethinyl estradiol (JUNEL FE 1/20) 1-20 MG-MCG per tablet Take 1 tablet by mouth daily       Probiotic Product (PROBIOTIC DAILY PO)        TIZANIDINE HCL PO Take 4 mg by mouth         Weight Loss Medication History Reviewed With Patient 1/25/2019   Which weight loss medications are you currently taking on a regular basis?  Naltrexone   Are you having any side effects from the weight loss medication that we have prescribed you? No         ASSESSMENT;  Morbid obesity in pt with wt gain over past few years, more since her TBI        PLAN:   (continues)  Decrease portion sizes  No meals in front of TV screen  Purge house of food triggers  No meal skipping and avoid snacking  Decrease caloric beverages--avoid soda  Volumetrics low carb eating plan--structured plan and avoding snacking  Low Calorie/low fat diet  Meal planning/journaling           additionally--  4000 steps per day; change to 5000 was a goal before but this was not met;re-set    Goals she discussed/re-set--  1.  1400 Calories (Myfitnesspal, using meal replacements, eating more whole foods)  2.  Not eating after dinner (eat dinner and then stay busy after it)  3.  Weigh in weekly  4. Labs (nonfasting)--please do them within the next few mo--Psychiatrist can do theirs at the same time     RTC virtually in a mo     Time: approx 37 min spent on evaluation, management, counseling, education, & motivational interviewing during visit coupled with pre-visit prep and post visit follow up/charting     Sincerely,     Luis Slade MD        Sincerely,    Luis Slade MD

## 2021-04-23 NOTE — NURSING NOTE
Humbleroyal Mascorro is a 25 year old female who is being evaluated via a billable telephone visit.      How would you like to obtain your AVS? Reji    Nehemias Mascorro complains of    Chief Complaint   Patient presents with     RECHECK     follow up        Patient is located in [place]? Yes     I have reviewed and updated the patient's medication list, allergies and preferred pharmacy.    Shira Coyle LPN

## 2021-04-23 NOTE — LETTER
LAB REQUEST    Date: 2021 Regarding: Nehemias Mascorro  [address]  [place] [address]     MRN: [id number]     :  1995     Ordering Provider:  Luis Ruiz MD                Diagnosis (ICD-10) Code:  Morbid (severe) obesity due to excess calories (H) [E66.01]    TEST:    Orders Placed This Encounter   Procedures     Basic metabolic panel     Ferritin     Vitamin B12     Hemoglobin A1c        REASON:  Monitor Therapy   DURATION:  1 year   SPECIAL INSTRUCTIONS:  None      Please fax results once available to ATTN: DR. RUIZ at [phone]  If you or the family have questions or concerns regarding the above lab test request, please feel free to contact the RN Care Coordinator office by calling [phone].  Thank you for your assistance with Nehemias nunez care.    Sincerely,        Luis Ruiz MD  [facility]  [facility], [facility] [phone]

## 2021-04-24 ENCOUNTER — HEALTH MAINTENANCE LETTER (OUTPATIENT)
Age: 26
End: 2021-04-24

## 2021-05-27 ASSESSMENT — PATIENT HEALTH QUESTIONNAIRE - PHQ9
SUM OF ALL RESPONSES TO PHQ QUESTIONS 1-9: 9
SUM OF ALL RESPONSES TO PHQ QUESTIONS 1-9: 8

## 2021-05-28 NOTE — TELEPHONE ENCOUNTER
Referral Request  Type of referral: Infectiosus disease   Who s requesting: Patient  Why the request: Iron and stomach issues  Have you been seen for this request: Yes  Does patient have a preference on a group/provider? [facility]  Okay to leave a detailed message?  Yes

## 2021-05-28 NOTE — TELEPHONE ENCOUNTER
My advice is that either she come and talk to me about this or she return to her GI doctor and have them reassess the pain and bleeding.

## 2021-05-28 NOTE — TELEPHONE ENCOUNTER
Called pt. She will check with her mom and let us know if she wants to make an appt with Dr Elam or if she will have a follow up with her GI

## 2021-05-28 NOTE — TELEPHONE ENCOUNTER
Dr. Elam,     Patient asking for referral to Infectious Disease for abdominal pain and blood in stool, ongoing for quiet some time.     Patient is taking iron supplement due to low iron and fatigue and the lab value has not changed.       Cecille Serrano LPN

## 2021-05-28 NOTE — TELEPHONE ENCOUNTER
I am not quite sure why she wants to see an infectious disease doctor for abdominal pain and blood in her stool.  This seems more likely a case for a return for reevaluation by GI.

## 2021-05-28 NOTE — TELEPHONE ENCOUNTER
Called pt. She states her mom sees infectious disease and suggested she do that as well but her mom does not have the same symptoms. Please advise.

## 2021-05-29 NOTE — PROGRESS NOTES
"  Office Visit - Follow Up   Nehemias Mascorro   23 y.o. female    Date of Visit: 5/17/2019         Assessment and Plan   1. Iron deficiency anemia due to chronic blood loss  Has been on iron for 2 months . Still feeling tired . Will check hemogram today   - HM1(CBC and Differential)  - Iron and Transferrin Iron Binding Capacity  - HM1 (CBC with Diff)    2. Encounter for weight management  Is on wellbutrin and naltrexone with no change in weight . Reassured her to continue with  with weight  management clinic    3. Morbid obesity (H)      4. Otalgia, unspecified laterality  Normal tympanic membranes , normal hearing . Most likely eustachian tube dysfunction . Recommend conservative care.             No follow-ups on file.     History of Present Illness   This 23 y.o. old   Chief Complaint   Patient presents with     Follow-up     FU low iron tx, take OTC iron tabs BID for months, still having fatigue     Weight Loss     would like to discuss wt concerns        Review of Systems: A comprehensive review of systems was negative except as noted.     Medications, Allergies and Problem List   Reviewed, reconciled and updated  Patient Active Problem List   Diagnosis     Primary Focal Hyperhidrosis     Acne     Anxiety     Excessive and frequent menstruation     Iron deficiency anemia     Major depressive disorder     Post concussion syndrome     Encounter for weight management     Morbid obesity (H)     Ear pain        Physical Exam   General Appearance:       /80 (Patient Site: Right Arm)   Pulse 90   Ht 5' 6\" (1.676 m)   Wt (!) 259 lb (117.5 kg)   LMP 05/08/2019 (Approximate)   SpO2 98%   BMI 41.80 kg/m      General appearance - alert, well appearing, and in no distress  Mental status - alert, oriented to person, place, and time  Neck - supple, no significant adenopathy  Lymphatics - no palpable lymphadenopathy, no hepatosplenomegaly  Chest - clear to auscultation, no wheezes, rales or rhonchi, symmetric " air entry  Heart - normal rate, regular rhythm, normal S1, S2, no murmurs, rubs, clicks or gallops                                                                                         Additional Information   Current Outpatient Medications   Medication Sig Dispense Refill     albuterol (PROAIR HFA;PROVENTIL HFA;VENTOLIN HFA) 90 mcg/actuation inhaler Inhale 2 puffs every 6 (six) hours as needed for wheezing. 8.5 g 12     ARIPiprazole (ABILIFY) 10 MG tablet TK 1 T PO D IN THE MORNING  4     ARIPiprazole (ABILIFY) 5 MG tablet 7.5mg daily  1     buPROPion (WELLBUTRIN XL) 300 MG 24 hr tablet Take 300 mg by mouth every morning.  3     escitalopram oxalate (LEXAPRO) 20 MG tablet   1     lansoprazole (PREVACID) 30 MG capsule Take 30 mg by mouth daily.       LUTERA, 28, 0.1-20 mg-mcg per tablet TK 1 T PO D  0     naltrexone (DEPADE) 50 mg tablet Take 1/2 tablet.  Time it one to two hours prior to worst cravings or take with AM bupropion; increase to 1/2 tablet twice daily in 1 wk as tolerated       ondansetron (ZOFRAN-ODT) 4 MG disintegrating tablet        polyethylene glycol (GLYCOLAX) 17 gram/dose powder Take 17 g by mouth daily.       prazosin (MINIPRESS) 1 MG capsule        sulfacetamide sodium-sulfur 10-5 % (w/w) Clsr        predniSONE (DELTASONE) 10 mg tablet 10 mg daily .  0     No current facility-administered medications for this visit.      Allergies   Allergen Reactions     Azithromycin      Erythromycin Nausea And Vomiting     Sulfa (Sulfonamide Antibiotics) Nausea Only     Social History     Social History Narrative    Lives with her parents working part time . Had to leave college       reports that she has never smoked. She has never used smokeless tobacco. Her alcohol and drug histories are not on file.   No past medical history on file.        Time: total time spent with the patient was 15 minutes of which >50% was spent in counseling and coordination of care     Alexandra Rodrigues MD

## 2021-05-29 NOTE — TELEPHONE ENCOUNTER
Dr. Rodrigues,  Patient would like you to review lab results from 5/17/19.  Please advise.  Thank you.  Isha CARDOSO, GISELL/CMT....................5:09 PM

## 2021-05-29 NOTE — TELEPHONE ENCOUNTER
Test Results  Who is calling?:   hoda  Who ordered the test:   Dr Rodrigues  Type of test: Lab  Date of test:   5/17/2019  Where was the test performed:  [facility]  What are your questions/concerns?:   Results of labs  Okay to leave a detailed message?:  Yes

## 2021-05-31 VITALS — BODY MASS INDEX: 37.77 KG/M2 | WEIGHT: 235 LBS | HEIGHT: 66 IN

## 2021-05-31 VITALS — BODY MASS INDEX: 38.04 KG/M2 | WEIGHT: 235.7 LBS

## 2021-06-01 VITALS — HEIGHT: 66 IN | WEIGHT: 238 LBS | BODY MASS INDEX: 38.25 KG/M2

## 2021-06-02 VITALS — BODY MASS INDEX: 39.53 KG/M2 | HEIGHT: 66 IN | WEIGHT: 246 LBS

## 2021-06-02 VITALS — WEIGHT: 235 LBS | HEIGHT: 66 IN | BODY MASS INDEX: 37.77 KG/M2

## 2021-06-02 VITALS — WEIGHT: 247 LBS | BODY MASS INDEX: 39.7 KG/M2 | HEIGHT: 66 IN

## 2021-06-03 VITALS — HEIGHT: 66 IN | BODY MASS INDEX: 41.62 KG/M2 | WEIGHT: 259 LBS

## 2021-06-04 VITALS
DIASTOLIC BLOOD PRESSURE: 78 MMHG | HEIGHT: 66 IN | WEIGHT: 261 LBS | SYSTOLIC BLOOD PRESSURE: 120 MMHG | BODY MASS INDEX: 41.95 KG/M2 | OXYGEN SATURATION: 98 % | HEART RATE: 88 BPM

## 2021-06-04 VITALS
BODY MASS INDEX: 42.59 KG/M2 | SYSTOLIC BLOOD PRESSURE: 120 MMHG | WEIGHT: 265 LBS | OXYGEN SATURATION: 98 % | DIASTOLIC BLOOD PRESSURE: 72 MMHG | HEART RATE: 93 BPM | HEIGHT: 66 IN

## 2021-06-05 NOTE — PROGRESS NOTES
[facility] Follow Up Note    Nehemias Mascorro   24 y.o. female    Date of Visit: 1/10/2020    Chief Complaint   Patient presents with     Follow-up     needs some labs done     Subjective  This is a 24-year-old lady with known iron deficiency anemia.  She still has some fatigue but this could be multifactorial she also has issues with ADHD, morbid obesity and postconcussive syndrome.  She is due for some lab work relative to her anemia and that is the primary reason she was coming in today.  In addition she has been having some muscle spasm and her therapist had suggested we might want to check a magnesium level which is reasonable.  She continues to work with the weight loss clinic and is a little frustrated with this at this point because they want to start her on a medication which insurance is hesitant to pay for.  She does have a follow-up with him soon.  No other new complaints.    ROS A comprehensive review of systems was performed and was otherwise negative    Medications, allergies, and problem list were reviewed and updated    Exam  General Appearance:   On examination her blood pressure is 120/72.  Weight is 265 pounds and height is 66 inches.  BMI is 42.77.    Heart rhythm is stable with rate of 92 and no ectopy.    No new edema.    The patient is alert and oriented x3.      Assessment/Plan  1. Iron deficiency anemia, unspecified iron deficiency anemia type  HM1(CBC and Differential)    Ferritin    Iron    HM1 (CBC with Diff)   2. Morbid obesity (H)     3. Muscle spasm  Magnesium     Iron deficiency anemia.  We will recheck a CBC, iron and ferritin level.    Muscle spasm.  We will check a magnesium as requested.    Obesity.  Continue follow-up with the weight clinic.    ADHD and postconcussive issues.  Continue to follow-up with her psychiatrist.  The following high BMI interventions were performed this visit: weight monitoring    Ludwin Elam MD      Current Outpatient  Medications on File Prior to Visit   Medication Sig     ADDERALL XR 30 mg 24 hr capsule TK 1 C PO D FOR ADHD     albuterol (PROAIR HFA;PROVENTIL HFA;VENTOLIN HFA) 90 mcg/actuation inhaler Inhale 2 puffs every 6 (six) hours as needed for wheezing.     ARIPiprazole (ABILIFY) 10 MG tablet TK 1 T PO D IN THE MORNING     buPROPion (WELLBUTRIN XL) 300 MG 24 hr tablet Take 300 mg by mouth every morning.     escitalopram oxalate (LEXAPRO) 20 MG tablet      lansoprazole (PREVACID) 30 MG capsule Take 30 mg by mouth daily.     NATAZIA 3 mg/2 mg-2 mg/ 2 mg-3 mg/1 mg tablet      polyethylene glycol (GLYCOLAX) 17 gram/dose powder Take 17 g by mouth daily.     prazosin (MINIPRESS) 1 MG capsule      naltrexone (DEPADE) 50 mg tablet Take 1/2 tablet.  Time it one to two hours prior to worst cravings or take with AM bupropion; increase to 1/2 tablet twice daily in 1 wk as tolerated     [DISCONTINUED] fluconazole (DIFLUCAN) 150 MG tablet Take 1 tablet (150 mg total) by mouth daily.     [DISCONTINUED] LUTERA, 28, 0.1-20 mg-mcg per tablet TK 1 T PO D     [DISCONTINUED] ondansetron (ZOFRAN-ODT) 4 MG disintegrating tablet      [DISCONTINUED] predniSONE (DELTASONE) 10 mg tablet 10 mg daily .     [DISCONTINUED] sulfacetamide sodium-sulfur 10-5 % (w/w) Clsr      No current facility-administered medications on file prior to visit.      Allergies   Allergen Reactions     Azithromycin      Erythromycin Nausea And Vomiting     Sulfa (Sulfonamide Antibiotics) Nausea Only     Social History     Tobacco Use     Smoking status: Never Smoker     Smokeless tobacco: Never Used   Substance Use Topics     Alcohol use: Not on file     Drug use: Not on file

## 2021-06-06 NOTE — PROGRESS NOTES
Office Visit - Follow Up   Nehemias REID Ludwin   24 y.o. female    Date of Visit: 2/18/2020         Assessment and Plan   1. Acute recurrent maxillary sinusitis  Reassured patient.  There is no evidence of bacterial sinusitis.  She does not have significant purulent discharge.  Ears mouth indentation look normal.  Most likely she has chronic allergic rhinosinusitis.  Recommend Claritin D short-term followed by regular Claritin and Flonase.  Also taught her about nasal irrigation and printed out some information.  However if she still continues to have significant sinus tenderness and significant yellow-green discharge and is not better in the next 2 to 3 days she can email or call us and we can treat her with empiric antibiotics.  - loratadine-pseudoephedrine (CLARITIN-D 12 HOUR) 5-120 mg Tb12; Take 1 tablet by mouth 2 (two) times a day.  Dispense: 14 tablet; Refill: 0    2. Acute pain of left knee  Anterior pain and that is felt on climbing stairs is typical of patellofemoral syndrome.  Given her printed out information about that recommend ice heat, nonsteroidals for pain control and recommendation to physical therapy.  If there is no improvement she can contact us and we can refer to orthopedics.  - Ambulatory referral to Adult PT- Internal            If no improvement in 2 weeks     History of Present Illness   This 24 y.o. old   Chief Complaint   Patient presents with     Sinus Problem     Facial pain, pressure, teeth hurt, right ear pain, all started ~5 days ago    She denies fever, significant purulent nasal discharge, hearing loss tinnitus vertigo or not present.  She also has some anterior pain in the right and left knees when she climbs up stairs.  There is no swelling, no locking in the knee does not give way and.  It does not occur at night or at rest.  She has no history of recurrent sinus infections, asthma or pneumonia.  He is not exposed to anybody was sick.    Review of Systems: A comprehensive  "review of systems was negative except as noted.     Medications, Allergies and Problem List   Reviewed, reconciled and updated  Patient Active Problem List   Diagnosis     Primary Focal Hyperhidrosis     Acne     Anxiety     Excessive and frequent menstruation     Iron deficiency anemia     Major depressive disorder     Post concussion syndrome     Encounter for weight management     Morbid obesity (H)     Ear pain        Physical Exam   General Appearance:       /78 (Patient Site: Right Arm, Patient Position: Sitting, Cuff Size: Adult Large)   Pulse 88   Ht 5' 6\" (1.676 m)   Wt (!) 261 lb (118.4 kg)   SpO2 98%   BMI 42.13 kg/m      General appearance - alert, well appearing, and in no distress  Mental status - alert, oriented to person, place, and time  Neck - supple, no significant adenopathy  Lymphatics - no palpable lymphadenopathy, no hepatosplenomegaly  Chest - clear to auscultation, no wheezes, rales or rhonchi, symmetric air entry  Heart - normal rate, regular rhythm, normal S1, S2, no murmurs, rubs, clicks or gallops  Musculoskeletal - no joint tenderness, deformity or swelling  Extremities - peripheral pulses normal, no pedal edema, no clubbing or cyanosis  Skin - normal coloration and turgor, no rashes, no suspicious skin lesions noted  Ears both tympanic membranes are visible and normal.  Throat is normal there is no tonsillar enlargement.  Nasal turbinates are hypertrophied there is no purulent secretions.  Teeth look normal with no abscess significant lung disease.  Right knee left knee exam full range of motion with no swelling      mild point tenderness over the patellofemoral region.                                                                                Additional Information   Current Outpatient Medications   Medication Sig Dispense Refill     ADDERALL XR 30 mg 24 hr capsule TK 1 C PO D FOR ADHD       albuterol (PROAIR HFA;PROVENTIL HFA;VENTOLIN HFA) 90 mcg/actuation inhaler " Inhale 2 puffs every 6 (six) hours as needed for wheezing. 8.5 g 12     ARIPiprazole (ABILIFY) 15 MG tablet Take 1 tablet by mouth daily.       buPROPion (WELLBUTRIN XL) 300 MG 24 hr tablet Take 300 mg by mouth every morning.  3     escitalopram oxalate (LEXAPRO) 20 MG tablet   1     lansoprazole (PREVACID) 30 MG capsule Take 30 mg by mouth daily.       NATAZIA 3 mg/2 mg-2 mg/ 2 mg-3 mg/1 mg tablet        polyethylene glycol (GLYCOLAX) 17 gram/dose powder Take 17 g by mouth daily.       prazosin (MINIPRESS) 1 MG capsule        loratadine-pseudoephedrine (CLARITIN-D 12 HOUR) 5-120 mg Tb12 Take 1 tablet by mouth 2 (two) times a day. 14 tablet 0     No current facility-administered medications for this visit.      Allergies   Allergen Reactions     Azithromycin      Erythromycin Nausea And Vomiting     Sulfa (Sulfonamide Antibiotics) Nausea Only     Social History     Social History Narrative    Lives with her parents working part time . Had to leave college       reports that she has never smoked. She has never used smokeless tobacco. No history on file for alcohol and drug.   No past medical history on file.        Time:     Alexandra Rodrigues MD

## 2021-06-06 NOTE — PATIENT INSTRUCTIONS - HE
Take claritin D and ibuprofen 400-600mg three times aday for one week    call or mychart  if you are not better by Thursday

## 2021-06-08 ENCOUNTER — RECORDS - HEALTHEAST (OUTPATIENT)
Dept: ADMINISTRATIVE | Facility: OTHER | Age: 26
End: 2021-06-08

## 2021-06-08 ENCOUNTER — AMBULATORY - HEALTHEAST (OUTPATIENT)
Dept: LAB | Facility: CLINIC | Age: 26
End: 2021-06-08

## 2021-06-08 DIAGNOSIS — E66.01 MORBID (SEVERE) OBESITY DUE TO EXCESS CALORIES (H): ICD-10-CM

## 2021-06-09 NOTE — PROGRESS NOTES
"Nehemias Mascorro is a 25 y.o. female who is being evaluated via a billable video visit.      The patient has been notified of following:     \"This video visit will be conducted via a call between you and your physician/provider. We have found that certain health care needs can be provided without the need for an in-person physical exam.  This service lets us provide the care you need with a video conversation.  If a prescription is necessary we can send it directly to your pharmacy.  If lab work is needed we can place an order for that and you can then stop by our lab to have the test done at a later time.    Video visits are billed at different rates depending on your insurance coverage. Please reach out to your insurance provider with any questions.    If during the course of the call the physician/provider feels a video visit is not appropriate, you will not be charged for this service.\"    Patient has given verbal consent to a Video visit? Yes  How would you like to obtain your AVS? AVS Preference: [facility].  Patient would like the video invitation sent by: [facility] virtual waiting room  Will anyone else be joining your video visit? No        Video Start Time: 2:00 PM    Additional provider notes:  Video visit with Nehemias, patient of Dr. Elam. Complains of vaginal itchiness with whitish discharge for 3 days. Discharge has foul odor. Had the same complaint in the past and was treated with diflucan which cleared her vaginal symptoms. No other complaints.       Video-Visit Details    1. Candidiasis of vagina  Advised she has recurrence of vaginal candidiasis. Will treat again with single dose of Diflucan.   - fluconazole (DIFLUCAN) 150 MG tablet; Take 1 tablet (150 mg total) by mouth once for 1 dose.  Dispense: 1 tablet; Refill: 0    Type of service:  Video Visit    Video End Time (time video stopped): 2:07 PM  Originating Location (pt. Location): Home    Distant Location (provider location):  [facility] INTERNAL MEDICINE     Platform used for Video Visit: Peace Eddy MD

## 2021-06-09 NOTE — TELEPHONE ENCOUNTER
Dr. Eddy,  Would you like the patient to schedule a virtual or phone visit?  Please advise.  Thank you.  Isha CARDOSO, GISELL/CMT....................9:53 AM

## 2021-06-09 NOTE — TELEPHONE ENCOUNTER
Spoke with the patient and relayed message below from Dr. Eddy.  She verbalized understanding and has scheduled a virtual visit with him for this afternoon.  Isha CARDOSO, GISELL/LIS....................10:38 AM

## 2021-06-10 NOTE — TELEPHONE ENCOUNTER
Dr Willingham, can we send in some diflucan for her?    Julianne Mcnair CMA ([facility])

## 2021-06-10 NOTE — TELEPHONE ENCOUNTER
Spoke with her and sent in script    Julianne Mcnair CMA ([facility])

## 2021-06-10 NOTE — TELEPHONE ENCOUNTER
Nehemias is taking an antibiotic for a staff infection and now feels that she has a yeast infection.  Symptoms are white discharge and itching.  Pharmacy of choice is [facility] on [address] in [place].  Nehemias is requesting to speak with Ludwin Rodriguez regarding getting an antibiotic sent to her pharmacy.    COVID 19 Nurse Triage Plan/Patient Instructions    Please be aware that novel coronavirus (COVID-19) may be circulating in the community. If you develop symptoms such as fever, cough, or SOB or if you have concerns about the presence of another infection including coronavirus (COVID-19), please contact your health care provider or visit [email or web address].     Disposition/Instructions    In-Person Visit with provider recommended. Reference Visit Selection Guide.    Thank you for taking steps to prevent the spread of this virus.  o Limit your contact with others.  o Wear a simple mask to cover your cough.  o Wash your hands well and often.    Resources    [facility]: About COVID-19: [email or web address]    CDC: What to Do If You're Sick: www.cdc.gov/coronavirus/2019-ncov/about/steps-when-sick.html    CDC: Ending Home Isolation: www.cdc.gov/coronavirus/2019-ncov/hcp/disposition-in-home-patients.html     CDC: Caring for Someone: www.cdc.gov/coronavirus/2019-ncov/if-you-are-sick/care-for-someone.html     [facility]: Interim Guidance for Hospital Discharge to Home: www.health.[facility].[place]/diseases/coronavirus/hcp/hospdischarge.pdf    [facility] clinical trials (COVID-19 research studies): clinicalaffairs.[facility]/n-clinical-trials     Below are the COVID-19 hotlines at the [place] Department of Health ([facility]). Interpreters are available.   o For health questions: Call 968-030-1121 or 1-545.371.2063 (7 a.m. to 7 p.m.)  o For questions about schools and childcare: Call 956-334-6726 or 1-934.582.4819 (7 a.m. to 7 p.m.)       Reason for Disposition    Bad smelling vaginal  discharge    Additional Information    Negative: Patient sounds very sick or weak to the triager    Negative: [1] Yellow or green vaginal discharge AND [2] fever    Negative: [1] Genital area looks infected (e.g., draining sore, spreading redness) AND [2] fever    Negative: [1] Constant abdominal pain AND [2] present > 2 hours    Negative: [1] Mild lower abdominal pain comes and goes (cramps) AND [2] lasts > 24 hours    Negative: Genital area looks infected (e.g., draining sore, spreading redness)    Negative: [1] Rash is tiny water blisters AND [2] 3 or more    Negative: [1] Rash (e.g., redness, tiny bumps, sore) of genital area AND [2] present > 24 hours    Protocols used: VAGINAL [id number]

## 2021-06-12 NOTE — PROGRESS NOTES
ASSESSMENT:   1. Sinusitis  amoxicillin-clavulanate (AUGMENTIN) 875-125 mg per tablet    fluconazole (DIFLUCAN) 150 MG tablet     At the end of the appt mentioned that she has constipation - has a BM about once a wk. Drinks minimal water, but eats good fruits and veggies.     PLAN:  Sinusitis  Likely viral at this time, if not improving in 3 - 5 days or if getting worse, then you may start the antibiotic that has been prescribed   Push fluids, get extra rest  Recommend hot tea with lemon/honey, lozenges, chloraseptic spray or salt water gargles to soothe your throat  Recommend hot steamy showers, saline nasal spray or a netti pot to relieve congestion  May use a cough suppressant or a cool mist humidifier to lessen cough  Return to clinic if symptoms are not improving as expected or if worsening in any way.     Constipation  Drink 10 - 12 8oz glasses of water daily  Increase fiber in your diet through fresh fruits and veggies.   May also take a fiber supplement such as Benefiber or Metamucil daily   Take a stool softener such as colace twice daily until have stools on a regular basis, then taper off  May use miralax as needed  Follow up with primary care if constipation is ongoing.     SUBJECTIVE:   Nehemias Mascorro is a 22 y.o. female presents today with cold symptoms for 5 days and is getting worse. She has had sore throat, fever of 99.9, nasal congestion, facial pressure/pain, PND, rhinorrhea, right ear, wet/dry cough. Sick contacts: none. Has tried dayquil without relief. Hx of asthma, started using inhaler yesterday for SOB with relief.     No past medical history on file.    History   Smoking Status     Never Smoker   Smokeless Tobacco     Never Used       Current Medications:  Current Outpatient Prescriptions   Medication Sig Dispense Refill     albuterol (PROVENTIL HFA;VENTOLIN HFA) 90 mcg/actuation inhaler Inhale 2 puffs every 6 (six) hours as needed for wheezing.       escitalopram oxalate (LEXAPRO) 20  MG tablet   1     FINACEA 15 % gel        fluconazole (DIFLUCAN) 150 MG tablet Take 1 tablet by mouth today.  Repeat this dosage in 2 days. 2 tablet 0     lansoprazole (PREVACID) 30 MG capsule Take 30 mg by mouth daily.       LUTERA, 28, 0.1-20 mg-mcg per tablet TK 1 T PO D  0     naproxen (NAPROSYN) 500 MG tablet        ondansetron (ZOFRAN-ODT) 4 MG disintegrating tablet        polyethylene glycol (GLYCOLAX) 17 gram/dose powder Take 17 g by mouth daily.       sulfacetamide sodium-sulfur 10-5 % (w/w) Clsr        TiZANidine (ZANAFLEX) 2 MG capsule Take 4 mg by mouth.       traZODone (DESYREL) 50 MG tablet TK 1/2 TO 2 TS PO QHS.  2     ADDERALL XR 15 mg 24 hr capsule        amitriptyline (ELAVIL) 10 MG tablet        amoxicillin-clavulanate (AUGMENTIN) 875-125 mg per tablet Take 1 tablet by mouth 2 (two) times a day for 10 days. 20 tablet 0     fluconazole (DIFLUCAN) 150 MG tablet Take 1 tablet (150 mg total) by mouth once for 1 dose. 1 tablet 0     guanFACINE 2 mg Tb24        omeprazole (PRILOSEC) 20 MG capsule        topiramate (TOPAMAX) 25 MG tablet        No current facility-administered medications for this visit.        Allergies:   Allergies   Allergen Reactions     Azithromycin        OBJECTIVE:   Vitals:    09/01/17 1444   BP: 128/78   Pulse: 75   Resp: 14   Temp: 99.1  F (37.3  C)   TempSrc: Oral   SpO2: 99%   Weight: (!) 235 lb 11.2 oz (106.9 kg)     Physical exam reveals a pleasant 22 y.o. female.   Appears healthy, alert and cooperative.  Eyes:  VINH, EOMI, fundi normal  Ears:  normal TMs bilaterally and normal canals bilaterally  Nose:    Mucosa normal. Scant, clear rhinorrhea.septum midline, normal mucosa and clear rhinorrhea  Mouth:  Mucosa pink and moist.  normal-appearing mucosa and no pharyngitis, no exudate   Neck: normal, supple and no adenopathy  Sinuses: maxillary sinus tender with palpation  Lungs: Chest is clear, no wheezing or rales. Symmetric air entry throughout both lung fields.  Heart:  regular rate and rhythm, no murmur, rub or gallop

## 2021-06-14 NOTE — PROGRESS NOTES
Assessment:   There were no encounter diagnoses.     Plan:   No medications were ordered this encounter    Patient Instructions     Based on the information provided, I would recommend you come in for an appointment to discuss these symptoms. Please schedule an appointment.    You will not be charged for this eVisit.    Return for further follow up if needed. Call [phone] or schedule an appointment via [employer]..    Subjective:   Nehemias Mascorro is a 22 y.o. female who submitted an eVisit request for evaluation of her Headache.  See the questionnaire and message section of encounter report for information related to history of present illness and review of systems.    The following portions of the patient's history were reviewed and updated as appropriate:  She  does not have any pertinent problems on file.  She is allergic to azithromycin..     Objective:   No exam performed today, patient submitted as eVisit.

## 2021-06-14 NOTE — PROGRESS NOTES
[facility] Follow Up Note    Nehemias JEANETTE Mascorro   22 y.o. female    Date of Visit: 12/18/2017    Chief Complaint   Patient presents with     Headache     2 weeks, has post concusion sydrome     Subjective  This is a 22-year-old lady who comes in primarily because of headaches.  She suffered a concussion about 1-1/2 years ago.  She has been seeing a neurologic specialist since that time for follow-up.  Approximately 2 weeks ago while having a nightmare she apparently struck herself in the face in the vicinity of the left eye.  No visual disturbances but she has had increased headaches in this area since that time.  She describes the pain as being fairly constant although variable in intensity.  She has not noticed any significant change in the pain level or pattern over the past 2 weeks.  She has had some progressive fatigue.  She also says she has been somewhat dizzy but has not fallen.  No specific weakness in any extremity.  No other particular symptoms at this time.  She does have chronic allergies which seem to be causing some issues at this time with postnasal drainage and a dryness in her throat.  Her medications are as listed.    ROS A comprehensive review of systems was performed and was otherwise negative    Medications, allergies, and problem list were reviewed and updated    Exam  General Appearance:   On examination her blood pressure is 120/68.  Weight is 235 pounds and height is 66 inches.  BMI is 37.93.    Heart is in a sinus rhythm with a rate of 70 and no ectopy.    Throat is normal.    No enlarged cervical lymph nodes.    Pupils are equal.  No nystagmus.    Cranial nerves are intact.    Normal strength and range of motion in all 4 extremities.    The patient is alert and oriented ×3.      Assessment/Plan  1. Headache  Comprehensive Metabolic Panel    CT Head With Contrast   2. Fatigue  Thyroid Stimulating Hormone (TSH)    HM1(CBC and Differential)    HM1 (CBC with Diff)    3. Healthcare maintenance  Lipid Cascade     Persistent headaches.  We discussed this at length and my recommendation was that we proceed with a CT scan but also that she phone her neurologist and discussed the situation with him.  They may wish to see her as well.    Fatigue.  I would like to do a TSH, CBC and CMP.  For screening purposes we will also do lipids as we are drawing blood.    Dry throat.  This is probably related to her allergies.    I will follow-up with her regarding the results.  Total time of this office visit was 25 minutes with greater than 50% of the time spent in care coordination of patient counseling.  The following high BMI interventions were performed this visit: weight monitoring    Ludwin Elam MD      Current Outpatient Prescriptions on File Prior to Visit   Medication Sig     albuterol (PROVENTIL HFA;VENTOLIN HFA) 90 mcg/actuation inhaler Inhale 2 puffs every 6 (six) hours as needed for wheezing.     escitalopram oxalate (LEXAPRO) 20 MG tablet      FINACEA 15 % gel      fluconazole (DIFLUCAN) 150 MG tablet Take 1 tablet by mouth today.  Repeat this dosage in 2 days.     lansoprazole (PREVACID) 30 MG capsule Take 30 mg by mouth daily.     LUTERA, 28, 0.1-20 mg-mcg per tablet TK 1 T PO D     naproxen (NAPROSYN) 500 MG tablet      ondansetron (ZOFRAN-ODT) 4 MG disintegrating tablet      polyethylene glycol (GLYCOLAX) 17 gram/dose powder Take 17 g by mouth daily.     sulfacetamide sodium-sulfur 10-5 % (w/w) Clsr      TiZANidine (ZANAFLEX) 2 MG capsule Take 4 mg by mouth.     [DISCONTINUED] ADDERALL XR 15 mg 24 hr capsule      [DISCONTINUED] amitriptyline (ELAVIL) 10 MG tablet      [DISCONTINUED] guanFACINE 2 mg Tb24      [DISCONTINUED] omeprazole (PRILOSEC) 20 MG capsule      [DISCONTINUED] topiramate (TOPAMAX) 25 MG tablet      [DISCONTINUED] traZODone (DESYREL) 50 MG tablet TK 1/2 TO 2 TS PO QHS.     No current facility-administered medications on file prior to visit.      Allergies    Allergen Reactions     Azithromycin      Erythromycin Nausea And Vomiting     Social History   Substance Use Topics     Smoking status: Never Smoker     Smokeless tobacco: Never Used     Alcohol use None

## 2021-06-16 NOTE — PROGRESS NOTES
[facility] Follow Up Note    Nehemias JEANETTE Mascorro   22 y.o. female    Date of Visit: 3/19/2018    Chief Complaint   Patient presents with     Pre-op Exam     3/21, Dr LT Saud Saxena, right wrist     Subjective  This is a 22-year-old lady who comes in today primarily for preoperative evaluation.  She has had ongoing problems with her right wrist with pain and numbness.  She has seen the orthopedic doctors and is scheduled for surgery on that wrist on March 21.  She comes in today for preop evaluation.  Other than a complaint of some fatigue she has been feeling in her usual state of health.  She has no other particular complaints at this time her medications are as listed on the chart.  The left wrist is been fine.  She is currently working on a diet in an attempt to lose some weight.    Past medical history: Prior surgeries have been on the hip and ankle.  Medical conditions include very mild asthma and some mild depression.  She is allergic to erythromycin and azithromycin.    Social history: The patient is not .  She is not a smoker.    Family history: This is noncontributory to the current problem.    ROS A comprehensive review of systems was performed and was otherwise negative    Medications, allergies, and problem list were reviewed and updated    Exam  General Appearance:   On examination her blood pressure is 126/62.  Weight is 238 pounds and height is 66 inches.  BMI is 38.41.    Eyes: Pupils are equal and conjunctiva are normal.    Ears nose and throat: Normal.    Neck: Supple with no masses and no neck vein distention.  No thyroid enlargement.    Lungs: Clear.    Cardiovascular: Heart is in sinus rhythm with a rate of 68 and no ectopy.  No gallops or murmurs.  Carotid pulses are full.  No peripheral edema.    GI: Abdomen is soft and nontender with no distention.  No masses organomegaly.    Musculoskeletal: Head and neck are normal to inspection with good range of  motion.  Good range of motion in all 4 extremities.    Neurologic: Cranial nerves are intact.  Gait is normal.    Psychiatric: The patient is alert and oriented ×3.      Assessment/Plan  1. Preop examination  Hemoglobin    Pregnancy (Beta-hCG, Qual), Urine     I do not see any contraindication to the proposed surgery.  I am not aware of any past issue with anesthesia or bleeding.  We will check a hemoglobin and a urine pregnancy test.  All this information will be available to surgery on Wednesday.  No additional recommendations to make at this time.  I will follow-up as needed.    Total time of this preoperative evaluation was 40 minutes with greater than 50% of the time spent in care coordination of patient counseling.  Body Mass Index was not assessed due to The patient was in for preop evaluation..    Ludwin Elam MD      Current Outpatient Prescriptions on File Prior to Visit   Medication Sig     albuterol (PROVENTIL HFA;VENTOLIN HFA) 90 mcg/actuation inhaler Inhale 2 puffs every 6 (six) hours as needed for wheezing.     azelastine-fluticasone (DYMISTA) 137-50 mcg/spray Spry      beclomethasone (QVAR) 80 mcg/actuation inhaler      doxepin (SINEQUAN) 10 MG capsule      escitalopram oxalate (LEXAPRO) 20 MG tablet      FINACEA 15 % gel      fluconazole (DIFLUCAN) 150 MG tablet Take 1 tablet by mouth today.  Repeat this dosage in 2 days.     lansoprazole (PREVACID) 30 MG capsule Take 30 mg by mouth daily.     LUTERA, 28, 0.1-20 mg-mcg per tablet TK 1 T PO D     ondansetron (ZOFRAN-ODT) 4 MG disintegrating tablet      polyethylene glycol (GLYCOLAX) 17 gram/dose powder Take 17 g by mouth daily.     prazosin (MINIPRESS) 1 MG capsule      sulfacetamide sodium-sulfur 10-5 % (w/w) Clsr      TiZANidine (ZANAFLEX) 2 MG capsule Take 4 mg by mouth.     [DISCONTINUED] naproxen (NAPROSYN) 500 MG tablet      No current facility-administered medications on file prior to visit.      Allergies   Allergen Reactions      Azithromycin      Erythromycin Nausea And Vomiting     Social History   Substance Use Topics     Smoking status: Never Smoker     Smokeless tobacco: Never Used     Alcohol use None

## 2021-06-20 NOTE — LETTER
Letter by Ludwin Elam MD at      Author: Ludwin Elam MD Service: -- Author Type: --    Filed:  Encounter Date: 1/13/2020 Status: Signed         [address]  [place] [address]             January 13, 2020         Dear Ms. Mascorro,    Below are the results from your recent visit:    Resulted Orders   Ferritin   Result Value Ref Range    Ferritin 24 10 - 130 ng/mL   Iron   Result Value Ref Range    Iron 93 42 - 175 ug/dL   Magnesium   Result Value Ref Range    Magnesium 1.8 1.8 - 2.6 mg/dL   HM1 (CBC with Diff)   Result Value Ref Range    WBC 5.5 4.0 - 11.0 thou/uL    RBC 4.38 3.80 - 5.40 mill/uL    Hemoglobin 13.2 12.0 - 16.0 g/dL    Hematocrit 39.1 35.0 - 47.0 %    MCV 89 80 - 100 fL    MCH 30.2 27.0 - 34.0 pg    MCHC 33.9 32.0 - 36.0 g/dL    RDW 12.2 11.0 - 14.5 %    Platelets 262 140 - 440 thou/uL    MPV 7.9 7.0 - 10.0 fL    Neutrophils % 56 50 - 70 %    Lymphocytes % 36 20 - 40 %    Monocytes % 6 2 - 10 %    Eosinophils % 1 0 - 6 %    Basophils % 1 0 - 2 %    Neutrophils Absolute 3.1 2.0 - 7.7 thou/uL    Lymphocytes Absolute 1.9 0.8 - 4.4 thou/uL    Monocytes Absolute 0.3 0.0 - 0.9 thou/uL    Eosinophils Absolute 0.1 0.0 - 0.4 thou/uL    Basophils Absolute 0.0 0.0 - 0.2 thou/uL       Your blood tests are all within the normal range.    Please call with questions or contact us using [employer].    Sincerely,        Electronically signed by Ludwin Elam MD

## 2021-06-21 NOTE — PROGRESS NOTES
[facility] Follow Up Note    Nehemias Mascorro   23 y.o. female    Date of Visit: 10/29/2018    Chief Complaint   Patient presents with     Follow-up     pt is in a weight managment clinic and they had done some blood work, which is where they found the different iron levels-pt states that she mentioned to them how she was tired all the time and she thinks that that could be one of the reasons that they claudy her iron levels     Subjective  This is a 23-year-old lady who comes in today primarily because of fatigue.  She has been involved in a weight loss program and at this summer was told that her iron levels are low and that she should follow-up.  That was about 3 months ago.  She feels that there is been no improvement in the fatigue and that it might actually be getting slightly worse.  In light of this she thought she should come in and be seen.  She denies any other specific symptoms.  No headaches or dizziness and no chest pain or shortness of breath.  Appetite is been good and she has no bowel issues.  She also tells me she is not necessarily sleep very well at night which may be contributing to the fatigue.    ROS A comprehensive review of systems was performed and was otherwise negative    Medications, allergies, and problem list were reviewed and updated    Exam  General Appearance:   On examination her blood pressure is 132/80.  Weight is 235 pounds and height is 66 inches.  BMI is 37.93.    Heart is in a sinus rhythm with a rate of 95 and no ectopy.    No new edema.    The patient is alert and oriented x3.      Assessment/Plan  1. Fatigue  HM1(CBC and Differential)    Ferritin    Vitamin B12    Folate, Serum    HM1 (CBC with Diff)     Ongoing and possibly worsening fatigue.  I reviewed her results from the summer.  Liver tests kidney tests and thyroid were all okay.  Ferritin level was somewhat low.  We will go ahead today and do a CBC as well as recheck the ferritin as well as  B12 and folate.  I will follow-up with her regarding these results and decide what to do.  Within the past week she is started on 1 iron tablet daily.    I will follow-up as needed with the results and make additional recommendations as indicated.  The following high BMI interventions were performed this visit: weight monitoring    Ludwin Elam MD      Current Outpatient Prescriptions on File Prior to Visit   Medication Sig     albuterol (PROVENTIL HFA;VENTOLIN HFA) 90 mcg/actuation inhaler Inhale 2 puffs every 6 (six) hours as needed for wheezing.     ARIPiprazole (ABILIFY) 2 MG tablet      escitalopram oxalate (LEXAPRO) 20 MG tablet      FINACEA 15 % gel      lansoprazole (PREVACID) 30 MG capsule Take 30 mg by mouth daily.     LUTERA, 28, 0.1-20 mg-mcg per tablet TK 1 T PO D     ondansetron (ZOFRAN-ODT) 4 MG disintegrating tablet      polyethylene glycol (GLYCOLAX) 17 gram/dose powder Take 17 g by mouth daily.     prazosin (MINIPRESS) 1 MG capsule      sulfacetamide sodium-sulfur 10-5 % (w/w) Clsr      [DISCONTINUED] azelastine-fluticasone (DYMISTA) 137-50 mcg/spray Spry      [DISCONTINUED] beclomethasone (QVAR) 80 mcg/actuation inhaler      [DISCONTINUED] doxepin (SINEQUAN) 10 MG capsule      [DISCONTINUED] fluconazole (DIFLUCAN) 150 MG tablet Take 1 tablet by mouth today.  Repeat this dosage in 2 days.     [DISCONTINUED] TiZANidine (ZANAFLEX) 2 MG capsule Take 4 mg by mouth.     No current facility-administered medications on file prior to visit.      Allergies   Allergen Reactions     Azithromycin      Erythromycin Nausea And Vomiting     Social History   Substance Use Topics     Smoking status: Never Smoker     Smokeless tobacco: Never Used     Alcohol use None

## 2021-06-22 NOTE — PROGRESS NOTES
[facility] Follow Up Note    Nehemias Mascorro   23 y.o. female    Date of Visit: 12/5/2018    Chief Complaint   Patient presents with     Palpitations     started this morining     Headache     with some SOB     Subjective  This is a 23-year-old lady who comes in primarily because of some palpitations this morning.  It began after she got up and was moving around the house.  She was not doing anything particularly strenuous.  She just felt that her heart was beating more rapidly and skipping at times.  There was no associated chest pain.  She did notice some dyspnea when she was climbing steps.  The symptoms have continued off and on but are not steady.  She has been doing nothing unusual or out of the ordinary in the past few days.  No changes in medication and no over-the-counter supplements.  I did see her a few weeks ago and she was having some fatigue and this is continued.  Ferritin levels were low and she is now started on some iron.  No other specific symptoms.    ROS A comprehensive review of systems was performed and was otherwise negative    Medications, allergies, and problem list were reviewed and updated    Exam  General Appearance:   Examination her blood pressure is 118/60.  Weight is 246 pounds and height is 66 inches.    Lungs are clear.    Heart is in a sinus rhythm at this time with a rate of 80 and no ectopy.  I hear no gallops or murmurs.    The patient is alert and oriented x3.    We did do an EKG which shows a normal sinus rhythm and no abnormalities.      Assessment/Plan  1. Palpitations  Electrocardiogram Perform - Clinic   2. Dyspnea on exertion  Thyroid Stimulating Hormone (TSH)   3. Fatigue, unspecified type  Ferritin     Palpitations and shortness of breath.  I see no obvious cause.  When combined with her fatigue I thought we should at least check a TSH.  We will contact her with this result.  If the symptoms persist we will consider doing a Holter  monitor.    Fatigue.  We will recheck her ferritin level today.    I will follow-up as noted.  Total time of this office visit was 25 minutes with greater than 50% of the time spent in care coordination and patient counseling.  The following high BMI interventions were performed this visit: weight monitoring    Ludwin Elam MD      Current Outpatient Medications on File Prior to Visit   Medication Sig     albuterol (PROVENTIL HFA;VENTOLIN HFA) 90 mcg/actuation inhaler Inhale 2 puffs every 6 (six) hours as needed for wheezing.     ARIPiprazole (ABILIFY) 2 MG tablet      ARIPiprazole (ABILIFY) 5 MG tablet TK SS T PO D FOR 4 DAYS THEN INCREASE TO 1 T D FOR 2 WEEKS THEN INCREASE TO 1 AND SS TS AFTER     buPROPion (WELLBUTRIN XL) 300 MG 24 hr tablet Take 300 mg by mouth every morning.     buPROPion (WELLBUTRIN) 75 MG tablet Take 300 mg by mouth.     escitalopram oxalate (LEXAPRO) 20 MG tablet      FINACEA 15 % gel      lansoprazole (PREVACID) 30 MG capsule Take 30 mg by mouth daily.     LUTERA, 28, 0.1-20 mg-mcg per tablet TK 1 T PO D     naltrexone (DEPADE) 50 mg tablet Take 1/2 tablet.  Time it one to two hours prior to worst cravings or take with AM bupropion; increase to 1/2 tablet twice daily in 1 wk as tolerated     ondansetron (ZOFRAN-ODT) 4 MG disintegrating tablet      polyethylene glycol (GLYCOLAX) 17 gram/dose powder Take 17 g by mouth daily.     prazosin (MINIPRESS) 1 MG capsule      sulfacetamide sodium-sulfur 10-5 % (w/w) Clsr      No current facility-administered medications on file prior to visit.      Allergies   Allergen Reactions     Azithromycin      Erythromycin Nausea And Vomiting     Social History     Tobacco Use     Smoking status: Never Smoker     Smokeless tobacco: Never Used   Substance Use Topics     Alcohol use: Not on file     Drug use: Not on file

## 2021-06-23 NOTE — PROGRESS NOTES
[facility] Follow Up Note    Nehemias Mascorro   23 y.o. female    Date of Visit: 1/14/2019    Chief Complaint   Patient presents with     Cough     for about a week, someone at [facility] told her she has walking pneumonia     Subjective  This is a 23-year-old woman who comes in with a respiratory infection that is now been going on for about 8 or 9 days.  Symptoms have included a cough productive of brown and yellow sputum, congestion and drainage with some right facial discomfort, low-grade fever which is cleared but she is still having some chills.  She had experienced some shortness of breath but this also seems to have resolved.  Throat is not sore.    ROS A comprehensive review of systems was performed and was otherwise negative    Medications, allergies, and problem list were reviewed and updated    Exam  General Appearance:   Examination her blood pressure is 122/64.  Weight is 247 pounds and height is 66 inches.    Heart is in a sinus rhythm with a rate of 70 and no ectopy.    No enlarged cervical lymph nodes.    Some right-sided facial tenderness over the frontal sinus.    Lungs are clear.    The patient is alert and oriented x3.      Assessment/Plan  1. Upper respiratory tract infection, unspecified type       Persistent respiratory infection with a productive cough and chills.  I am going to put her on some Augmentin for 10 days and see if we can clear the symptoms.  She will follow-up if she is not improving.  Body Mass Index was not assessed due to Patient was in with an acute medical issue..    Ludwin Elam MD      Current Outpatient Medications on File Prior to Visit   Medication Sig     albuterol (PROAIR HFA;PROVENTIL HFA;VENTOLIN HFA) 90 mcg/actuation inhaler Inhale 2 puffs every 6 (six) hours as needed for wheezing.     ARIPiprazole (ABILIFY) 5 MG tablet 7.5mg daily     buPROPion (WELLBUTRIN XL) 300 MG 24 hr tablet Take 300 mg by mouth every morning.     escitalopram  oxalate (LEXAPRO) 20 MG tablet      lansoprazole (PREVACID) 30 MG capsule Take 30 mg by mouth daily.     LUTERA, 28, 0.1-20 mg-mcg per tablet TK 1 T PO D     naltrexone (DEPADE) 50 mg tablet Take 1/2 tablet.  Time it one to two hours prior to worst cravings or take with AM bupropion; increase to 1/2 tablet twice daily in 1 wk as tolerated     ondansetron (ZOFRAN-ODT) 4 MG disintegrating tablet      polyethylene glycol (GLYCOLAX) 17 gram/dose powder Take 17 g by mouth daily.     prazosin (MINIPRESS) 1 MG capsule      sulfacetamide sodium-sulfur 10-5 % (w/w) Clsr      [DISCONTINUED] FINACEA 15 % gel      [DISCONTINUED] ARIPiprazole (ABILIFY) 2 MG tablet      [DISCONTINUED] buPROPion (WELLBUTRIN) 75 MG tablet Take 300 mg by mouth.     No current facility-administered medications on file prior to visit.      Allergies   Allergen Reactions     Azithromycin      Erythromycin Nausea And Vomiting     Social History     Tobacco Use     Smoking status: Never Smoker     Smokeless tobacco: Never Used   Substance Use Topics     Alcohol use: Not on file     Drug use: Not on file

## 2021-06-24 ENCOUNTER — AMBULATORY - HEALTHEAST (OUTPATIENT)
Dept: LAB | Facility: CLINIC | Age: 26
End: 2021-06-24

## 2021-06-24 DIAGNOSIS — E66.01 MORBID (SEVERE) OBESITY DUE TO EXCESS CALORIES (H): ICD-10-CM

## 2021-06-24 LAB
ANION GAP SERPL CALCULATED.3IONS-SCNC: 13 MMOL/L (ref 5–18)
BUN SERPL-MCNC: 12 MG/DL (ref 8–22)
CALCIUM SERPL-MCNC: 9 MG/DL (ref 8.5–10.5)
CHLORIDE BLD-SCNC: 106 MMOL/L (ref 98–107)
CO2 SERPL-SCNC: 20 MMOL/L (ref 22–31)
CREAT SERPL-MCNC: 0.93 MG/DL (ref 0.6–1.1)
FERRITIN SERPL-MCNC: 8 NG/ML (ref 10–130)
GFR SERPL CREATININE-BSD FRML MDRD: >60 ML/MIN/1.73M2
GLUCOSE BLD-MCNC: 107 MG/DL (ref 70–125)
HBA1C MFR BLD: 5.9 %
POTASSIUM BLD-SCNC: 4.3 MMOL/L (ref 3.5–5)
SODIUM SERPL-SCNC: 139 MMOL/L (ref 136–145)
VIT B12 SERPL-MCNC: 496 PG/ML (ref 213–816)

## 2021-06-25 ENCOUNTER — VIRTUAL VISIT (OUTPATIENT)
Dept: ENDOCRINOLOGY | Facility: CLINIC | Age: 26
End: 2021-06-25
Attending: INTERNAL MEDICINE
Payer: COMMERCIAL

## 2021-06-25 DIAGNOSIS — R73.03 PREDIABETES: ICD-10-CM

## 2021-06-25 DIAGNOSIS — E66.01 MORBID (SEVERE) OBESITY DUE TO EXCESS CALORIES (H): Primary | ICD-10-CM

## 2021-06-25 PROCEDURE — 99214 OFFICE O/P EST MOD 30 MIN: CPT | Mod: 95 | Performed by: INTERNAL MEDICINE

## 2021-06-25 NOTE — PROGRESS NOTES
"    Return Medical Weight Management Consult    PATIENT:  Nehemias Mascorro  MRN:         [id number]  :         1995  JASMEET:         2021        I had the pleasure of seeing your patient, Nehemias Mascorro. Full intake/assessment was done to determine barriers to weight loss success and develop a treatment plan. Nehemias Mascorro is a 26 year old female interested in treatment of medical problems associated with excess weight.       She has the following PMH:  Past Medical History:   Diagnosis Date     Depressive disorder      Uncomplicated asthma    history of TBI      She was last seen about 2 mo ago.  Reviewed and relevant history as extracted from last visit is as follows--  -------------------------------------------  \"Notes the program is helping with making changes with food and be accountable and these are the things she notes she is most focussed on now--  1. Working on protein shake AM  2. Eat slower/mindful eating--working on this  3.  Walking several times per week with her mother--not happening yet with [employer] work going     Regarding the GI issue she has been working on with [facility]--doing PT to work on coordinating with BMs; pain and nausea and constipation.  That is improving some   --getting nausea still a couple of times per week; stomach cramping about 3 times per month, stomach pain a couple of times per week  --BMs 3 times per week;  In the past there were times that would be less than once per week        Regarding medications related/relevant to wt loss and co-morbidities I note the following--     1. lexapro changed to Prozac--now has stabilized with plan to titrate as needed---> in the interim Prozac dose was increased but is working much better than the other  2. Adderall continuing 25mg daily  4. Abilify--taking 15mg  5. buproprion at 300mg taking  6. Metformin 1000mg BID tolerating without any side effects)--unsure if this has helped any with appetite " "(escalated after last visit to full dose)  7. Prozac--40 mg daily...     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  Now sometimes having seconds at supper.     Pt is working through the 24 wk program--nutritionist is trying to connect with her for wk 4 visit, last visit with health  was 9/15/20     Goals before around structured eating--  1.  Have protein drink (Terra's Way)--110 calories; breakfast routine lately has been higher calorie (eggs/yogurt/oatmeal)  2.  Add protein to snack if having a snack  3.  Change from fruit cups to whole fruit (meeting)        ...in PT for the abd issues/anal sphinctor symptoms,  Some improvement in symptoms but still still with abd pains.  Learning how to lose the bathroom more regularly and less constipated now.  Noting BMs about 4 times per wk     Goals she had prior:  1.  1400 Calories (Myfitnesspal, measuring food with scales, eating more whole foods)  2.  Activity goals--3 days per wk walking (30 min)  3.  Not eating after dinner (eat dinner and then stay busy after it)\"     -----------------------------------    Wt 2 wks ago 263#    Wt Readings from Last 3 Encounters:   02/26/21 119.3 kg (263 lb)   09/25/20 116.5 kg (256 lb 12.8 oz)   02/28/20 119.9 kg (264 lb 5.3 oz)       Since last seen she notes the following--    Summer schedule--  --Had second job planned but not needed now, willl be working more with her mother with the meal prep    School will start up again after Labor Day    Daily schedule   Up around 8  Grabbing an rodney  Typically a doctor's appointment or PT (hip [since 2013 has intermittently had pain, post surgery] and vestibular therapy)  Lunch--leftovers (fish or baked chicken, veggie, piece " of fruit)  Working with mother in the afternoon  Afternoon snack (sometimes chips/salsa)  Supper--around 6p (salmon, roasted potaeoes, broccoli, cauliflower)  Finishing up birthday cake (sometimes candy)    Liquid calories--occasionally pop (with stomach pain) but usually crystal light    Wanting to get out walking but fatigue and hip pain have limited this--is now iron deficient    Waking up tired after getting as much as 8 hours; had an in-home study awhile back and no significant issues found per pt report (within the last yr)    Insurance changing to [employer]      PAST MEDICAL HISTORY:  Past Medical History:   Diagnosis Date     Depressive disorder      Uncomplicated asthma          MEDICATIONS:   Current Outpatient Medications   Medication Sig Dispense Refill     albuterol (PROAIR HFA/PROVENTIL HFA/VENTOLIN HFA) 108 (90 Base) MCG/ACT inhaler Inhale 2 puffs into the lungs       amphetamine-dextroamphetamine (ADDERALL XR) 30 MG 24 hr capsule TK 1 C PO D FOR ADHD       ARIPiprazole (ABILIFY) 15 MG tablet Take 1 tablet by mouth       BuPROPion HCl (WELLBUTRIN PO) Take 300 mg by mouth daily        cetirizine (ZYRTEC) 10 MG tablet Take 10 mg by mouth daily Reported on 4/27/2017       DIAZEPAM PO Take 5 mg by mouth       doxycycline hyclate (VIBRAMYCIN) 100 MG capsule doxycycline hyclate 100 mg capsule       ESCITALOPRAM OXALATE PO Take 20 mg by mouth daily       Fexofenadine HCl (ALLEGRA PO) Take by mouth daily as needed for allergies       FLUoxetine (PROZAC) 40 MG capsule Take 40 mg by mouth daily       GLYCOPYRROLATE PO Take 2 mg by mouth daily       GUANFACINE HCL PO Take 2 mg by mouth daily       IBUPROFEN PO Take 600 mg by mouth       Ipratropium-Albuterol (COMBIVENT RESPIMAT)  MCG/ACT inhaler Inhale 1 puff into the lungs 4 times daily       Lansoprazole (PREVACID PO) Take 30 mg by mouth daily        liraglutide - Weight Management (SAXENDA) 18 MG/3ML pen Wk one--0.6mg; wk two--1.2mg; wk three--1.8mg; wk  four--2.4mg; wk five and after 3.0mg/wk as tolerated 27 mL 3     NATAZIA 3/2-2/2-3/1 MG TABS TK 1 T PO QD  0     norethindrone-ethinyl estradiol (JUNEL FE 1/20) 1-20 MG-MCG per tablet Take 1 tablet by mouth daily       Probiotic Product (PROBIOTIC DAILY PO)        TIZANIDINE HCL PO Take 4 mg by mouth         ALLERGIES:   Allergies   Allergen Reactions     Azithromycin      Erythromycin Nausea and Vomiting     Sulfa Drugs Nausea     HbA1c 5.9, Ferritin allie @ 8 (labs yesterday reviewed during the visit)    ASSESSMENT;  Morbid obesity in pt with wt gain over past few years, more since her TBI   Prediabetes     PLAN:   (continues)  Decrease portion sizes  No meals in front of TV screen  Purge house of food triggers  No meal skipping and avoid snacking  Decrease caloric beverages--avoid soda  Volumetrics low carb eating plan--structured plan and avoding snacking  Low Calorie/low fat diet  Meal planning/journaling           additionally--  4000 steps per day; change to 5000 was a goal before--could be a future goal, same with tracking for 1400 calories daily     Goal setting going forward--  1.  Eating out once per wk or less  2.  Going with father grocery shopping ( to have healthier options at home)  3.  Protein shake and try avoiding the afternoon snack       Pt will connect with PCP on the low iron to get supplement started and monitoring    I ordered Ozempic (side effect profile and titration discussed again in detail, also reviewed Victoza)  for us to start if insurance will cover given the new issue of prediabetes with morbid obesity. Pt hopes to get started on GLP1 now, and has prediabetes level HbA1c       Time: approx 45 min spent on evaluation, management, counseling, education, & motivational interviewing during visit (video visit 32 min) combined with pre visit prep and post visit charting/follow up care same day         Sincerely,    Luis Slade MD

## 2021-06-25 NOTE — PATIENT INSTRUCTIONS
"  Thank you for allowing us the privilege of caring for you. We hope we provided you with the excellent service you deserve.   Please let us know if there is anything else we can do for you so that we can be sure you are leaving completely satisfied with your care experience.    To ensure the quality of our services you may be receiving a patient satisfaction survey from an independent patient satisfaction monitoring company.    The greatest compliment you can give is a \"Likely to Recommend\"    You saw Dr. Slade today.    Instructions per today's visit:   Medications started today: considering semaglutide (Ozempic) or liraglutide (Victoza); we will see if insurance covers       Goal setting going forward--you are planning the followin.  Eating out once per wk or less  2.  Going with father grocery shopping to have additional healthy options at home  3.  For lunch having a protein shake and try avoiding the afternoon snack    Please connect with your primary care provider on the low iron level we discussed to get supplement started and monitoring        Follow up appointments:  2 months        Interested in working with a health ?  Health coaches work with you to improve your overall health and wellbeing.  They look at the whole person, and may involve discussion of different areas of life, including, but not limited to the four pillars of health (sleep, exercise, nutrition, and stress management). Discuss with your care team if you would like to start working a health .  Health Coaching-3 Pack:    $99 for three health coaching visits    Visits may be done in person or via phone    Coaching is a partnership between the  and the client; Coaches do not prescribe or diagnose    Coaching helps inspire the client to reach his/her personal goals     For any questions/concerns contact Tamara Green, [phone]    To schedule appointments with our team, please call [phone]     Please call during " clinic hours Monday through Friday 8:00a - 4:00p if you have questions or you can contact us via "[employer]" at anytime. ?    Lab results will be communicated through My Chart or letter (if My Chart not used). Please call the clinic if you have not received communication after 1 week or if you have any questions.?      Fax: [phone]    Thank you,  Medical Weight Management Team

## 2021-06-25 NOTE — LETTER
"2021       RE: Nehemias Mascorro  [address]  [place] [address]     Dear Colleague,    Thank you for referring your patient, Nehemias Mascorro, to the [facility] WEIGHT MANAGEMENT CLINIC at [facility]. Please see a copy of my visit note below.        New Medical Weight Management Consult    PATIENT:  Nehemias Mascorro  MRN:         [id number]  :         1995  JASMEET:         2021        I had the pleasure of seeing your patient, Nehemias Msacorro. Full intake/assessment was done to determine barriers to weight loss success and develop a treatment plan. Nehemias Mascorro is a 26 year old female interested in treatment of medical problems associated with excess weight.       She has the following PMH:  Past Medical History:   Diagnosis Date     Depressive disorder      Uncomplicated asthma    history of TBI      She was last seen about 2 mo ago.  Reviewed and relevant history as extracted from last visit is as follows--  -------------------------------------------  \"Notes the program is helping with making changes with food and be accountable and these are the things she notes she is most focussed on now--  1. Working on protein shake AM  2. Eat slower/mindful eating--working on this  3.  Walking several times per week with her mother--not happening yet with [employer] work going     Regarding the GI issue she has been working on with [facility]--doing PT to work on coordinating with BMs; pain and nausea and constipation.  That is improving some   --getting nausea still a couple of times per week; stomach cramping about 3 times per month, stomach pain a couple of times per week  --BMs 3 times per week;  In the past there were times that would be less than once per week        Regarding medications related/relevant to wt loss and co-morbidities I note the following--     1. lexapro changed to Prozac--now has stabilized with " "plan to titrate as needed---> in the interim Prozac dose was increased but is working much better than the other  2. Adderall continuing 25mg daily  4. Abilify--taking 15mg  5. buproprion at 300mg taking  6. Metformin 1000mg BID tolerating without any side effects)--unsure if this has helped any with appetite (escalated after last visit to full dose)  7. Prozac--40 mg daily...     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  Now sometimes having seconds at supper.     Pt is working through the 24 wk program--nutritionist is trying to connect with her for wk 4 visit, last visit with health  was 9/15/20     Goals before around structured eating--  1.  Have protein drink (Terra's Way)--110 calories; breakfast routine lately has been higher calorie (eggs/yogurt/oatmeal)  2.  Add protein to snack if having a snack  3.  Change from fruit cups to whole fruit (meeting)        ...in PT for the abd issues/anal sphinctor symptoms,  Some improvement in symptoms but still still with abd pains.  Learning how to lose the bathroom more regularly and less constipated now.  Noting BMs about 4 times per wk     Goals she had prior:  1.  1400 Calories (Myfitnesspal, measuring food with scales, eating more whole foods)  2.  Activity goals--3 days per wk walking (30 min)  3.  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    School will start up again after Labor Day    Daily schedule   Up around 8  Grabbing an rodney  Typically a doctor's appointment or PT (hip [since 2013 has intermittently had pain, post surgery] and vestibular therapy)  Lunch--leftovers (fish or baked chicken, veggie, piece of fruit)  Working with mother in the afternoon  Afternoon snack (sometimes chips/salsa)  Supper--around 6p (salmon, roasted potaeoes, broccoli, cauliflower)  Finishing up birthday cake (sometimes candy)    Liquid calories--occasionally pop (with stomach pain) but usually crystal light    Wanting to get out walking but fatigue and hip pain have limited this--is now iron deficient    Waking up tired after getting as much as 8 hours; had an in-home study awhile back and no significant issues found per pt report (within the last yr)    Insurance changing to [employer]      PAST MEDICAL HISTORY:  Past Medical History:   Diagnosis Date     Depressive disorder      Uncomplicated asthma          MEDICATIONS:   Current Outpatient Medications   Medication Sig Dispense Refill     albuterol (PROAIR HFA/PROVENTIL HFA/VENTOLIN HFA) 108 (90 Base) MCG/ACT inhaler Inhale 2 puffs into the lungs       amphetamine-dextroamphetamine (ADDERALL XR) 30 MG 24 hr capsule TK 1 C PO D FOR ADHD       ARIPiprazole (ABILIFY) 15 MG tablet Take 1 tablet by mouth       BuPROPion HCl (WELLBUTRIN PO) Take 300 mg by mouth daily        cetirizine (ZYRTEC) 10 MG tablet Take 10 mg by mouth daily Reported on 4/27/2017       DIAZEPAM PO Take 5 mg by mouth       doxycycline hyclate (VIBRAMYCIN) 100 MG capsule doxycycline hyclate 100 mg capsule       ESCITALOPRAM OXALATE PO Take 20 mg by mouth daily       Fexofenadine HCl (ALLEGRA PO) Take by mouth daily as needed for allergies       FLUoxetine (PROZAC) 40 MG capsule Take 40 mg by mouth daily       GLYCOPYRROLATE PO Take 2 mg by mouth daily       GUANFACINE HCL PO Take 2 mg by mouth daily       IBUPROFEN  PO Take 600 mg by mouth       Ipratropium-Albuterol (COMBIVENT RESPIMAT)  MCG/ACT inhaler Inhale 1 puff into the lungs 4 times daily       Lansoprazole (PREVACID PO) Take 30 mg by mouth daily        liraglutide - Weight Management (SAXENDA) 18 MG/3ML pen Wk one--0.6mg; wk two--1.2mg; wk three--1.8mg; wk four--2.4mg; wk five and after 3.0mg/wk as tolerated 27 mL 3     NATAZIA 3/2-2/2-3/1 MG TABS TK 1 T PO QD  0     norethindrone-ethinyl estradiol (JUNEL FE 1/20) 1-20 MG-MCG per tablet Take 1 tablet by mouth daily       Probiotic Product (PROBIOTIC DAILY PO)        TIZANIDINE HCL PO Take 4 mg by mouth         ALLERGIES:   Allergies   Allergen Reactions     Azithromycin      Erythromycin Nausea and Vomiting     Sulfa Drugs Nausea     HbA1c 5.9, Ferritin allie @ 8 (labs yesterday reviewed during the visit)    ASSESSMENT;  Morbid obesity in pt with wt gain over past few years, more since her TBI   Prediabetes     PLAN:   (continues)  Decrease portion sizes  No meals in front of TV screen  Purge house of food triggers  No meal skipping and avoid snacking  Decrease caloric beverages--avoid soda  Volumetrics low carb eating plan--structured plan and avoding snacking  Low Calorie/low fat diet  Meal planning/journaling           additionally--  4000 steps per day; change to 5000 was a goal before--could be a future goal, same with tracking for 1400 calories daily     Goal setting going forward--  1.  Eating out once per wk or less  2.  Going with father grocery shopping ( to have healthier options at home)  3.  Protein shake and try avoiding the afternoon snack       Pt will connect with PCP on the low iron to get supplement started and monitoring    I ordered Ozempic (side effect profile and titration discussed again in detail, also reviewed Victoza)  for us to start if insurance will cover given the new issue of prediabetes with morbid obesity. Pt hopes to get started on GLP1 now, and has prediabetes level  HbA1c       Time: approx 45 min spent on evaluation, management, counseling, education, & motivational interviewing during visit (video visit 32 min) combined with pre visit prep and post visit charting/follow up care same day         Sincerely,    Luis Slade MD

## 2021-06-25 NOTE — NURSING NOTE
How would you like to obtain your AVS? Reji Mascorro complains of  No chief complaint on file.      Patient would like the video invitation sent by: Send to e-mail at: [email or web address]     Patient is located in [place]? Yes     I have reviewed and updated the patient's medication list, allergies and preferred pharmacy.      Mika Cochran LPN

## 2021-06-29 ENCOUNTER — TELEPHONE (OUTPATIENT)
Dept: ENDOCRINOLOGY | Facility: CLINIC | Age: 26
End: 2021-06-29

## 2021-06-29 NOTE — TELEPHONE ENCOUNTER
Central Prior Authorization Team   Phone: [phone]      PA Initiation    Medication: Semaglutide,0.25 or 0.5MG/DOS, 2 MG/1.5ML SOPN  Insurance Company: [employer] [place] - Phone [phone] Fax [phone]  Pharmacy Filling the Rx: [facility] #[id number] - [place], [place] - [address] [place] [address] AT [facility] & [facility]  Filling Pharmacy Phone: [phone]  Filling Pharmacy Fax:    Start Date: 6/29/2021

## 2021-06-29 NOTE — TELEPHONE ENCOUNTER
Prior Authorization Retail Medication Request    Medication/Dose: Semaglutide,0.25 or 0.5MG/DOS, 2 MG/1.5ML SOPN  ICD code (if different than what is on RX):  Morbid (severe) obesity due to excess calories (H) [E66.01], Prediabetes [R73.03]   Previously Tried and Failed:    Rationale:      Insurance Name:  [facility]  Insurance ID:  [id number]    Pharmacy Information (if different than what is on RX)  Name: [facility] #[id number] - [place], [place] - [address] [place] [address] & [facility]  Phone:  [phone]

## 2021-06-30 NOTE — TELEPHONE ENCOUNTER
PRIOR AUTHORIZATION DENIED    Medication: Semaglutide,0.25 or 0.5MG/DOS, 2 MG/1.5ML SOPN-PA denied    Denial Date: 6/30/2021    Denial Rational:         Appeal Information:

## 2021-07-15 ENCOUNTER — TELEPHONE (OUTPATIENT)
Dept: ENDOCRINOLOGY | Facility: CLINIC | Age: 26
End: 2021-07-15

## 2021-07-15 DIAGNOSIS — E66.01 MORBID (SEVERE) OBESITY DUE TO EXCESS CALORIES (H): ICD-10-CM

## 2021-07-15 DIAGNOSIS — R73.03 PREDIABETES: Primary | ICD-10-CM

## 2021-07-15 RX ORDER — LIRAGLUTIDE 6 MG/ML
INJECTION SUBCUTANEOUS
Qty: 27 ML | Refills: 3 | Status: SHIPPED | OUTPATIENT
Start: 2021-07-15 | End: 2021-11-10

## 2021-07-15 NOTE — TELEPHONE ENCOUNTER
Prior Authorization Retail Medication Request    Medication/Dose: Victoza  ICD code (if different than what is on RX):    Prediabetes [R73.03]  - Primary       Morbid (severe) obesity due to excess calories (H) [E66.01]           Previously Tried and Failed:  History of diet and exercise  Rationale:  Nehemias Mascorro is a 26 year old female interested in treatment of medical problems associated with excess weight. Ozempic and Saxenda denied.     Insurance Name:    Insurance ID:        Pharmacy Information (if different than what is on RX)  Name:    [facility] #[id number] - [place], [place] - [address] AT [facility] & [facility]       Phone:  [phone]

## 2021-07-19 NOTE — TELEPHONE ENCOUNTER
Central Prior Authorization Team   Phone: [phone]      PA Initiation    Medication: liraglutide (VICTOZA PEN) 18 MG/3ML solution-PA initiated  Insurance Company: [employer] - Phone [phone] Fax [phone]  Pharmacy Filling the Rx: [facility] #[id number] - [place], [place] - [address] [place] [address] & [facility]  Filling Pharmacy Phone: [phone]  Filling Pharmacy Fax:    Start Date: 7/19/2021

## 2021-07-19 NOTE — TELEPHONE ENCOUNTER
PRIOR AUTHORIZATION DENIED    Medication: liraglutide (VICTOZA PEN) 18 MG/3ML solution-PA denied    Denial Date: 7/19/2021    Denial Rational:         Appeal Information:

## 2021-07-26 ENCOUNTER — NURSE TRIAGE (OUTPATIENT)
Dept: NURSING | Facility: CLINIC | Age: 26
End: 2021-07-26

## 2021-07-27 NOTE — TELEPHONE ENCOUNTER
Pt reports having a persistent headache x 2 weeks, states her Neurologist prescribed her a steroid medication which has helped somewhat.  OTC pain medications do not help.  She questions if she needs to see her PCP.      Pt also reports a history of a concussion with post-concussion syndrome, started 4 years ago.  She sees her Neurologist for nerve block injections.      Triage disposition:  See a provider within 24 hours.  Pt has an appt scheduled for Friday 07/30.  RN advised trying to get an appointment for tomorrow.  Also advised to call her Neurology provider tomorrow to discuss, as they might want to see her in clinic.  Patient verbalized understanding and had no further questions.  Call transferred to scheduling.     COVID 19 Nurse Triage Plan/Patient Instructions    Please be aware that novel coronavirus (COVID-19) may be circulating in the community. If you develop symptoms such as fever, cough, or SOB or if you have concerns about the presence of another infection including coronavirus (COVID-19), please contact your health care provider or visit [email or web address].     Disposition/Instructions    In-Person Visit with provider recommended. Reference Visit Selection Guide.    Thank you for taking steps to prevent the spread of this virus.  o Limit your contact with others.  o Wear a simple mask to cover your cough.  o Wash your hands well and often.    Resources    [facility]: About COVID-19: [email or web address]    CDC: What to Do If You're Sick: www.cdc.gov/coronavirus/2019-ncov/about/steps-when-sick.html    CDC: Ending Home Isolation: www.cdc.gov/coronavirus/2019-ncov/hcp/disposition-in-home-patients.html     CDC: Caring for Someone: www.cdc.gov/coronavirus/2019-ncov/if-you-are-sick/care-for-someone.html     [facility]: Interim Guidance for Hospital Discharge to Home: www.health.[facility].[place]/diseases/coronavirus/hcp/hospdischarge.pdf    [facility] clinical trials  "(COVID-19 research studies): [email or web address]-clinical-trials     Below are the COVID-19 hotlines at the [place] Department of Health ([facility]). Interpreters are available.   o For health questions: Call 768-658-3120 or 1-528.352.2699 (7 a.m. to 7 p.m.)  o For questions about schools and childcare: Call 418-374-3832 or 1-223.384.4955 (7 a.m. to 7 p.m.)             Kerri Casper RN/YUNG        Reason for Disposition    [1] MODERATE headache (e.g., interferes with normal activities) AND [2] present > 24 hours AND [3] unexplained  (Exceptions: analgesics not tried, typical migraine, or headache part of viral illness)    Additional Information    Negative: Difficult to awaken or acting confused (e.g., disoriented, slurred speech)    Negative: [1] Weakness of the face, arm or leg on one side of the body AND [2] new onset    Negative: [1] Numbness of the face, arm or leg on one side of the body AND [2] new onset    Negative: [1] Loss of speech or garbled speech AND [2] new onset    Negative: Passed out (i.e., lost consciousness, collapsed and was not responding)    Negative: Sounds like a life-threatening emergency to the triager    Negative: Unable to walk, or can only walk with assistance (e.g., requires support)    Negative: Stiff neck (can't touch chin to chest)    Negative: Severe pain in one eye    Negative: [1] Other family members (or roommates) with headaches AND [2] possibility of carbon monoxide exposure    Negative: [1] SEVERE headache (e.g., excruciating) AND [2] \"worst headache\" of life    Negative: [1] SEVERE headache AND [2] sudden-onset (i.e., reaching maximum intensity within seconds)    Negative: [1] SEVERE headache AND [2] fever    Negative: Loss of vision or double vision (Exception: same as prior migraines)    Negative: [1] Fever > 100.0 F (37.8 C) AND [2] diabetes mellitus or weak immune system (e.g., HIV positive, cancer chemo, splenectomy, organ transplant, chronic steroids)    Negative: " Patient sounds very sick or weak to the triager    Negative: [1] SEVERE headache (e.g., excruciating) AND [2] not improved after 2 hours of pain medicine    Negative: [1] Vomiting AND [2] 2 or more times (Exception: similar to previous migraines)    Negative: Fever > 104 F (40 C)    Protocols used: HEADACHE-A-AH

## 2021-07-30 ENCOUNTER — TRANSFERRED RECORDS (OUTPATIENT)
Dept: HEALTH INFORMATION MANAGEMENT | Facility: CLINIC | Age: 26
End: 2021-07-30

## 2021-09-14 DIAGNOSIS — E66.01 MORBID (SEVERE) OBESITY DUE TO EXCESS CALORIES (H): ICD-10-CM

## 2021-09-14 DIAGNOSIS — R73.03 PREDIABETES: ICD-10-CM

## 2021-09-16 RX ORDER — SEMAGLUTIDE 1.34 MG/ML
INJECTION, SOLUTION SUBCUTANEOUS
Qty: 4.5 ML | Refills: 1 | OUTPATIENT
Start: 2021-09-16

## 2021-10-03 ENCOUNTER — HEALTH MAINTENANCE LETTER (OUTPATIENT)
Age: 26
End: 2021-10-03

## 2021-10-15 ENCOUNTER — TELEPHONE (OUTPATIENT)
Dept: PEDIATRICS | Facility: CLINIC | Age: 26
End: 2021-10-15
Payer: COMMERCIAL

## 2021-10-15 DIAGNOSIS — E66.01 MORBID OBESITY (H): Primary | ICD-10-CM

## 2021-10-15 NOTE — TELEPHONE ENCOUNTER
Prior Authorization Retail Medication Request     Medication/Dose: Semaglutide,0.25 or 0.5MG/DOS, 2 MG/1.5ML SOPN  ICD code (if different than what is on RX):  Morbid (severe) obesity due to excess calories (H) [E66.01], Prediabetes [R73.03]   Previously Tried and Failed: Lifestyle modification  Rationale:       Insurance Name:  [facility]  Insurance ID:  [id number]     Pharmacy Information (if different than what is on RX)  Name: [facility] #[id number] - [place], [place] - [address] [place] [address] AT [facility] & [facility]  Phone:  [phone]

## 2021-10-19 NOTE — TELEPHONE ENCOUNTER
Sarkis initiated PA for 4.5 mL/28 days instead of 1.5 mL/28 days.     Central Prior Authorization Team   Phone: [phone]      PA Initiation    Medication: Semaglutide,0.25 or 0.5MG/DOS, 2 MG/1.5ML SOPN-PA initiated  Insurance Company: [employer] - Phone [phone] Fax [phone]  Pharmacy Filling the Rx: [facility] #[id number] - [place], [place] - [address] AT [facility] & [facility]  Filling Pharmacy Phone: [phone]  Filling Pharmacy Fax:    Start Date: 10/19/2021

## 2021-10-20 NOTE — TELEPHONE ENCOUNTER
PRIOR AUTHORIZATION DENIED-This was originally denied on 6/2/21 encounter, so this submission was first level appeal.     Medication: Semaglutide,0.25 or 0.5MG/DOS, 2 MG/1.5ML SOPN-PA denied    Denial Date: 10/20/2021    Denial Rational: Dx is not covered. Preferred alternatives for this Dx are Saxenda and Wegovy          Second Level Appeal Information:  Second level appeals will be managed by the clinic staff and provider. Please contact the [facility] Prior Authorization Team if additional information about the denial is needed.

## 2021-11-10 ENCOUNTER — OFFICE VISIT (OUTPATIENT)
Dept: INTERNAL MEDICINE | Facility: CLINIC | Age: 26
End: 2021-11-10
Payer: COMMERCIAL

## 2021-11-10 VITALS
HEART RATE: 98 BPM | DIASTOLIC BLOOD PRESSURE: 72 MMHG | WEIGHT: 261.1 LBS | HEIGHT: 65 IN | OXYGEN SATURATION: 98 % | BODY MASS INDEX: 43.5 KG/M2 | SYSTOLIC BLOOD PRESSURE: 124 MMHG

## 2021-11-10 DIAGNOSIS — Z00.00 HEALTHCARE MAINTENANCE: ICD-10-CM

## 2021-11-10 DIAGNOSIS — R73.03 PREDIABETES: ICD-10-CM

## 2021-11-10 DIAGNOSIS — R53.83 OTHER FATIGUE: Primary | ICD-10-CM

## 2021-11-10 DIAGNOSIS — F32.1 CURRENT MODERATE EPISODE OF MAJOR DEPRESSIVE DISORDER, UNSPECIFIED WHETHER RECURRENT (H): ICD-10-CM

## 2021-11-10 LAB
ANION GAP SERPL CALCULATED.3IONS-SCNC: 11 MMOL/L (ref 5–18)
BUN SERPL-MCNC: 11 MG/DL (ref 8–22)
CALCIUM SERPL-MCNC: 9.7 MG/DL (ref 8.5–10.5)
CHLORIDE BLD-SCNC: 106 MMOL/L (ref 98–107)
CO2 SERPL-SCNC: 24 MMOL/L (ref 22–31)
CREAT SERPL-MCNC: 0.9 MG/DL (ref 0.6–1.1)
ERYTHROCYTE [DISTWIDTH] IN BLOOD BY AUTOMATED COUNT: 13.3 % (ref 10–15)
GFR SERPL CREATININE-BSD FRML MDRD: 89 ML/MIN/1.73M2
GLUCOSE BLD-MCNC: 80 MG/DL (ref 70–125)
HBA1C MFR BLD: 5.7 % (ref 0–5.6)
HCT VFR BLD AUTO: 41.1 % (ref 35–47)
HGB BLD-MCNC: 13.1 G/DL (ref 11.7–15.7)
MCH RBC QN AUTO: 28.2 PG (ref 26.5–33)
MCHC RBC AUTO-ENTMCNC: 31.9 G/DL (ref 31.5–36.5)
MCV RBC AUTO: 89 FL (ref 78–100)
PLATELET # BLD AUTO: 251 10E3/UL (ref 150–450)
POTASSIUM BLD-SCNC: 4.1 MMOL/L (ref 3.5–5)
RBC # BLD AUTO: 4.64 10E6/UL (ref 3.8–5.2)
SODIUM SERPL-SCNC: 141 MMOL/L (ref 136–145)
TSH SERPL DL<=0.005 MIU/L-ACNC: 0.94 UIU/ML (ref 0.3–5)
WBC # BLD AUTO: 4.1 10E3/UL (ref 4–11)

## 2021-11-10 PROCEDURE — 85027 COMPLETE CBC AUTOMATED: CPT | Performed by: INTERNAL MEDICINE

## 2021-11-10 PROCEDURE — 83921 ORGANIC ACID SINGLE QUANT: CPT | Performed by: INTERNAL MEDICINE

## 2021-11-10 PROCEDURE — 99214 OFFICE O/P EST MOD 30 MIN: CPT | Performed by: INTERNAL MEDICINE

## 2021-11-10 PROCEDURE — 80048 BASIC METABOLIC PNL TOTAL CA: CPT | Performed by: INTERNAL MEDICINE

## 2021-11-10 PROCEDURE — 84443 ASSAY THYROID STIM HORMONE: CPT | Performed by: INTERNAL MEDICINE

## 2021-11-10 PROCEDURE — 36415 COLL VENOUS BLD VENIPUNCTURE: CPT | Performed by: INTERNAL MEDICINE

## 2021-11-10 PROCEDURE — 83036 HEMOGLOBIN GLYCOSYLATED A1C: CPT | Performed by: INTERNAL MEDICINE

## 2021-11-10 RX ORDER — GALCANEZUMAB 120 MG/ML
120 INJECTION, SOLUTION SUBCUTANEOUS
COMMUNITY
Start: 2021-11-09 | End: 2024-01-08

## 2021-11-10 RX ORDER — ALBUTEROL SULFATE 90 UG/1
2 AEROSOL, METERED RESPIRATORY (INHALATION) 4 TIMES DAILY
Qty: 18 G | Refills: 3 | Status: SHIPPED | OUTPATIENT
Start: 2021-11-10 | End: 2022-11-22

## 2021-11-10 RX ORDER — SPIRONOLACTONE 100 MG/1
100 TABLET, FILM COATED ORAL
COMMUNITY
Start: 2021-06-15 | End: 2022-05-24

## 2021-11-10 ASSESSMENT — MIFFLIN-ST. JEOR: SCORE: 1925.22

## 2021-11-10 NOTE — PROGRESS NOTES
"  Assessment & Plan     Other fatigue  She continues to have some nonspecific fatigue.  She really has no focal signs or symptoms.  Her iron was low last time but she is not anemic.  We will check labs again.  Did tell her that she there are multiple medications and depression diagnosis fatigue is a common side effect.  I do not see any need to do sleep apnea testing at this point  - albuterol (PROAIR HFA/PROVENTIL HFA/VENTOLIN HFA) 108 (90 Base) MCG/ACT inhaler  Dispense: 18 g; Refill: 3  - CBC with platelets  - TSH  - Methylmalonic Acid  - Basic metabolic panel  (Ca, Cl, CO2, Creat, Gluc, K, Na, BUN)  - CBC with platelets  - TSH  - Methylmalonic Acid  - Basic metabolic panel  (Ca, Cl, CO2, Creat, Gluc, K, Na, BUN)    Prediabetes    - Hemoglobin A1c  - Basic metabolic panel  (Ca, Cl, CO2, Creat, Gluc, K, Na, BUN)  - Hemoglobin A1c  - Basic metabolic panel  (Ca, Cl, CO2, Creat, Gluc, K, Na, BUN)    Healthcare maintenance  She just had a Pap this year    Current moderate episode of major depressive disorder, unspecified whether recurrent (H)  She is being followed closely by psychiatry               BMI:   Estimated body mass index is 43.45 kg/m  as calculated from the following:    Height as of this encounter: 1.651 m (5' 5\").    Weight as of this encounter: 118.4 kg (261 lb 1.6 oz).           Return in about 6 months (around 5/10/2022) for Routine preventive.    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  She describes being tired she does not have a severe amount of clinical signs of depression.  She does not have daytime somnolence and does get to sleep at night.  She does not exercise a lot    HPI     Patient Active Problem List   Diagnosis     Hip pain     Obesity     Anxiety     Major depressive disorder " "    Morbid obesity (H)     Healthcare maintenance       Current Outpatient Medications   Medication     albuterol (PROAIR HFA/PROVENTIL HFA/VENTOLIN HFA) 108 (90 Base) MCG/ACT inhaler     amphetamine-dextroamphetamine (ADDERALL XR) 30 MG 24 hr capsule     ARIPiprazole (ABILIFY) 15 MG tablet     BuPROPion HCl (WELLBUTRIN PO)     cetirizine (ZYRTEC) 10 MG tablet     EMGALITY 120 MG/ML injection     FLUoxetine (PROZAC) 40 MG capsule     GLYCOPYRROLATE PO     IBUPROFEN PO     Lansoprazole (PREVACID PO)     loratadine-pseudoePHEDrine (CLARITIN-D 12-HOUR) 5-120 MG 12 hr tablet     norethindrone-ethinyl estradiol (JUNEL FE 1/20) 1-20 MG-MCG per tablet     spironolactone (ALDACTONE) 100 MG tablet     No current facility-administered medications for this visit.         Review of Systems   Constitutional, HEENT, cardiovascular, pulmonary, gi and gu systems are negative, except as otherwise noted.      Objective    /72 (BP Location: Left arm, Patient Position: Sitting, Cuff Size: Adult Regular)   Pulse 98   Ht 1.651 m (5' 5\")   Wt 118.4 kg (261 lb 1.6 oz)   SpO2 98%   BMI 43.45 kg/m    Body mass index is 43.45 kg/m .  Physical Exam   GENERAL: healthy, alert and no distress  NECK: no adenopathy, no asymmetry, masses, or scars and thyroid normal to palpation  RESP: lungs clear to auscultation - no rales, rhonchi or wheezes  CV: regular rate and rhythm, normal S1 S2, no S3 or S4, no murmur, click or rub, no peripheral edema and peripheral pulses strong    "

## 2021-11-11 PROBLEM — Z00.00 HEALTHCARE MAINTENANCE: Status: ACTIVE | Noted: 2021-11-11

## 2021-11-16 LAB — METHYLMALONATE SERPL-SCNC: 0.23 UMOL/L (ref 0–0.4)

## 2021-11-17 DIAGNOSIS — K21.9 GASTROESOPHAGEAL REFLUX DISEASE WITHOUT ESOPHAGITIS: Primary | ICD-10-CM

## 2021-11-19 RX ORDER — LANSOPRAZOLE 30 MG/1
30 CAPSULE, DELAYED RELEASE ORAL DAILY
Qty: 90 CAPSULE | Refills: 3 | Status: SHIPPED | OUTPATIENT
Start: 2021-11-19 | End: 2022-05-24

## 2021-11-19 NOTE — TELEPHONE ENCOUNTER
"  Disp Refills Start End GERALD    lansoprazole (PREVACID) 30 MG capsule   1/9/2020  No   Sig - Route: Take 1 capsule by mouth. - Oral   Class: Historical Med       lansoprazole (PREVACID) 30 MG capsule [[id number]]  Patient-reported historical medication  Ordering date: 07/09/21 1059 Authorized by: PROVIDER, HISTORICAL   Frequency:  01/09/20 - Until Discontinued     Routing refill request to provider for review/approval because:  Medication is reported/historical    Last office visit provider:  11/10/21     Requested Prescriptions   Pending Prescriptions Disp Refills     LANsoprazole (PREVACID) 30 MG DR capsule       Sig: Take 1 capsule (30 mg) by mouth daily       PPI Protocol Passed - 11/17/2021  1:27 PM        Passed - Not on Clopidogrel (unless Pantoprazole ordered)        Passed - No diagnosis of osteoporosis on record        Passed - Recent (12 mo) or future (30 days) visit within the authorizing provider's specialty     Patient has had an office visit with the authorizing provider or a provider within the authorizing providers department within the previous 12 mos or has a future within next 30 days. See \"Patient Info\" tab in inbasket, or \"Choose Columns\" in Meds & Orders section of the refill encounter.              Passed - Medication is active on med list        Passed - Patient is age 18 or older        Passed - No active pregnacy on record        Passed - No positive pregnancy test in past 12 months             Ottoniel Rushing RN 11/19/21 11:08 AM  "

## 2021-11-23 ENCOUNTER — TELEPHONE (OUTPATIENT)
Dept: INTERNAL MEDICINE | Facility: CLINIC | Age: 26
End: 2021-11-23
Payer: COMMERCIAL

## 2021-11-23 NOTE — TELEPHONE ENCOUNTER
[facility] for lansoprazole 30 mg DR. [place] to PA team.

## 2021-11-23 NOTE — TELEPHONE ENCOUNTER
Central Prior Authorization Team   Phone: [phone]    PA Initiation    Medication: Lansoprazole 30MG dr capsules  Insurance Company: [employer] [place] - Phone [phone] Fax [phone]  Pharmacy Filling the Rx: [id number] - [place], [place] - [address] [place] [address] AT [facility]  Filling Pharmacy Phone: [phone]  Filling Pharmacy Fax:    Start Date: 11/23/2021

## 2021-11-24 NOTE — TELEPHONE ENCOUNTER
PRIOR AUTHORIZATION DENIED    Medication: Lansoprazole 30MG dr capsules    Denial Date: 11/24/2021    Denial Rational:  PATIENT IS ABLE TO GET UP TO 1 CAPSULE PER DAY FOR A MAXIMUM  DAYS WITHIN 365 DAYS. THIS IS THE DURATION SET BY PATIENTS PLAN FOR PPI'S. THIS LIMIT IS SHARED ACROSS ALL PPI'S.                    Appeal Information:  If provider would like to appeal please provide a letter of medical necessity and route back to PA team.

## 2021-11-29 PROBLEM — K21.9 GASTROESOPHAGEAL REFLUX DISEASE: Status: ACTIVE | Noted: 2021-11-29

## 2021-12-04 ENCOUNTER — E-VISIT (OUTPATIENT)
Dept: INTERNAL MEDICINE | Facility: CLINIC | Age: 26
End: 2021-12-04
Payer: COMMERCIAL

## 2021-12-04 DIAGNOSIS — B37.31 YEAST INFECTION OF THE VAGINA: Primary | ICD-10-CM

## 2021-12-04 PROCEDURE — 99421 OL DIG E/M SVC 5-10 MIN: CPT | Performed by: INTERNAL MEDICINE

## 2021-12-06 NOTE — TELEPHONE ENCOUNTER
Provider E-Visit time total (minutes): Direct patient has symptoms of vaginal itching and discharge.  Diflucan was sent in as empiric treatment she has had yeast infections in the past no recent antibiotic use.

## 2021-12-07 ENCOUNTER — TELEPHONE (OUTPATIENT)
Dept: ENDOCRINOLOGY | Facility: CLINIC | Age: 26
End: 2021-12-07
Payer: COMMERCIAL

## 2021-12-07 ENCOUNTER — TELEPHONE (OUTPATIENT)
Dept: INTERNAL MEDICINE | Facility: CLINIC | Age: 26
End: 2021-12-07

## 2021-12-07 DIAGNOSIS — E66.01 MORBID (SEVERE) OBESITY DUE TO EXCESS CALORIES (H): Primary | ICD-10-CM

## 2021-12-07 RX ORDER — SEMAGLUTIDE 0.25 MG/.5ML
0.25 INJECTION, SOLUTION SUBCUTANEOUS WEEKLY
Qty: 2 ML | Refills: 0 | Status: SHIPPED | OUTPATIENT
Start: 2021-12-07 | End: 2021-12-17 | Stop reason: DRUGHIGH

## 2021-12-07 RX ORDER — FLUCONAZOLE 150 MG/1
150 TABLET ORAL DAILY
Qty: 5 TABLET | Refills: 1 | Status: SHIPPED | OUTPATIENT
Start: 2021-12-07 | End: 2021-12-12

## 2021-12-07 NOTE — TELEPHONE ENCOUNTER
Reason for Call:  Other  prescription    Detailed comments: pt was seen in a e visit and Dr Rodrigues was going to send in med and it didn't get sent in.    Please send to pharm: micheal Dockery  I will send in flucanazole for yeast         Phone Number Patient can be reached at: Home number on file [phone] (home)    Best Time: any    Can we leave a detailed message on this number? YES    Call taken on 12/7/2021 at 1:29 PM by Pam J. Behr

## 2021-12-07 NOTE — TELEPHONE ENCOUNTER
"[facility] PA REQUEST    Prior Authorization Retail Medication Request    Medication/Dose: Wegovy   ICD code (if different than what is on RX):  Morbid (severe) obesity due to excess calories (H) [E66.01]  - Primary   Previously Tried and Failed:  diet and exercise alone.   Rationale:  Patient qualifies for use of weight loss medication(s) because they have a BMI of at least 30 kg/m^2. Patient cannot take phentermine due to already taking stimulant Adderall XR. Therefore cannot take Qsymia either. Cannot take Contrave as already taking bupropion. Cannot take Orlistat due to already complicated gastrointestinal issues.     Estimated body mass index is 43.45 kg/m  as calculated from the following:    Height as of 11/10/21: 5' 5\" (1.651 m).    Weight as of 11/10/21: 261 lb 1.6 oz (118.4 kg).    Insurance Name:  [facility]   Insurance ID:  [id number]       Lauren Bloch, PharmD  Medication Therapy Management Pharmacist   [facility]            "

## 2021-12-09 ENCOUNTER — VIRTUAL VISIT (OUTPATIENT)
Dept: FAMILY MEDICINE | Facility: CLINIC | Age: 26
End: 2021-12-09
Payer: COMMERCIAL

## 2021-12-09 DIAGNOSIS — J01.80 OTHER ACUTE SINUSITIS, RECURRENCE NOT SPECIFIED: ICD-10-CM

## 2021-12-09 DIAGNOSIS — K21.9 GASTROESOPHAGEAL REFLUX DISEASE, UNSPECIFIED WHETHER ESOPHAGITIS PRESENT: Primary | ICD-10-CM

## 2021-12-09 PROCEDURE — 99213 OFFICE O/P EST LOW 20 MIN: CPT | Mod: 95 | Performed by: PHYSICIAN ASSISTANT

## 2021-12-09 RX ORDER — FAMOTIDINE 40 MG/1
40 TABLET, FILM COATED ORAL DAILY
Qty: 30 TABLET | Refills: 0 | Status: SHIPPED | OUTPATIENT
Start: 2021-12-09 | End: 2022-01-12

## 2021-12-09 NOTE — PROGRESS NOTES
Patient is being evaluated via a billable video visit.      How would you like to obtain your AVS? Keen Impressions       Video Start Time: 10:07 AM    Assessment & Plan benign exam,  Mixed picture acute on chronic gerd flare (off her meds for insurance reasons) vs/+/- acute sinusitis.  Will trial below ans as per AVS and she has f/u with ENT next week who can determine if sinusitis ABXs will be necessary  Problem List Items Addressed This Visit     Gastroesophageal reflux disease - Primary    Relevant Medications    famotidine (PEPCID) 40 MG tablet      Other Visit Diagnoses     Other acute sinusitis, recurrence not specified              16 minutes spent on the date of the encounter doing chart review, history and exam, documentation and further activities as noted above          Return in about 5 days (around 12/14/2021) for Specialist Follow-up.    ALEJANDRO Giraldo  [facility]    Subjective     Presents presents to clinic today for the following health issues     HPI   I am not sure if I have a sinus infection or if it's my acid reflux.  Seeing ENT next week.  Green and yellow mucous and sore face (this has resolved), nares and throat feel dry w/ FB sensation, + hot flashes x 2 days,  For about a weeks. Dayquil helped a little.  Has been off PPI for 2.5 weeks b/c insurance wont cover it for the full year.  2 days of epigastric abd ache. No vomiting or diarrhea.            Review of Systems   ENT sinus as above      Objective           Vitals:  No vitals were obtained today due to virtual visit.    Physical Exam   GENERAL: Healthy, alert and no distress  EYES: Eyes grossly normal to inspection.  No discharge or erythema, or obvious scleral/conjunctival abnormalities.  RESP: No audible wheeze, cough, or visible cyanosis.  No visible retractions or increased work of breathing.    SKIN: Visible skin clear. No significant rash, abnormal pigmentation or lesions.  NEURO: Cranial nerves  grossly intact.  Mentation and speech appropriate for age.  PSYCH: Mentation appears normal, affect normal/bright, judgement and insight intact, normal speech and appearance well-groomed.               Video-Visit Details    Type of service:  Video Visit    Video End Time:10:15 AM    Originating Location (pt. Location): Home    Distant Location (provider location):  [facility]     Platform used for Video Visit: [employer]

## 2021-12-09 NOTE — PATIENT INSTRUCTIONS
TRIAL over the counter PHENYLEPHRINE (during the day) AND AFRIN (Oxymetazolin) NASAL SPRAY  (for 3 days maximum) FOR 3 DAYS  AND IF NOT IMPROVING   Discuss with ENT next week    Try 1000 mg  tylenol for the hot flashes as needed\    Patient Education     Discharge Instructions for Gastroesophageal Reflux Disease (GERD)  Gastroesophageal reflux disease (GERD) is when acid flows back from the stomach into the swallowing tube (esophagus).  Home care  These home care steps can help you handle GERD:    Stay at a healthy weight. Get help to lose any extra pounds (kilograms).    Don't lie down after meals.    Don't eat late at night.    Raise the head of your bed by 4 to 6 inches. You can do this by placing wooden blocks or bed risers under the head of your bed. Or you can put a wedge under the mattress.    Don't wear tight-fitting clothes.    Don't eat foods that might bother your stomach, such as:  ? Alcohol  ? Fat  ? Chocolate  ? Caffeine  ? Spearmint or peppermint  ? Citrus and other acidic foods    Talk with your healthcare provider if you are taking certain medicines. These can make GERD symptoms worse:  ? Calcium channel blockers  ? Theophylline  ? Anticholinergic medicines, such as oxybutynin and benzatropine    Start an exercise program. Ask your healthcare provider how to get started. Simple activities, such as walking or gardening, can help.    Break the smoking habit. Join a stop-smoking program to improve your chances of success.    Limit alcohol intake to no more than 2 drinks a day.    Take your medicines as directed. Don t skip doses.    Don't take over-the-counter nonsteroidal anti-inflammatory drugs (NSAIDs), such as aspirin and ibuprofen, unless your healthcare provider advises them for certain health problems.     If possible, don't take nitrates (heart medicines, such as nitroglycerin and isosorbide dinitrate).    Talk with your healthcare provider about treatment if you are pregnant. GERD can happen  or get worse during pregnancy.  Follow-up care  Make a check-up visit as directed by our staff.  When to call the healthcare provider  Call your healthcare provider right away if you have:    Trouble swallowing    Pain when swallowing    Feeling of food caught in your chest or throat    Pain in the neck, chest, or back    Heartburn that causes you to vomit    Vomiting blood    Black or tarry stools (from digested blood)    More saliva (watering of the mouth) than usual    Weight loss of more than 3% to 5% of your total body weight in a month    Hoarseness or sore throat that won t go away    Choking, coughing, or wheezing  [employer] last reviewed this educational content on 4/1/2019 2000-2021 [employer]. All rights reserved. This information is not intended as a substitute for professional medical care. Always follow your healthcare professional's instructions.           Patient Education     * Sinusitis (No Antibiotics)    The sinuses are air-filled spaces within the bones of the face. They connect to the inside of the nose. Sinusitis is an inflammation of the tissue that lines the sinuses. Sinusitis can occur during a cold. It can also happen due to allergies to pollens and other particles in the air. It can cause symptoms such as sinus congestion, headache, sore throat, facial swelling, and a feeling of fullness. It may also cause a low-grade fever. Your sinusitis does not include an infection with bacteria. Because of this, antibiotics are not used to treat this problem.  Home care    Drink plenty of water, hot tea, and other liquids. This may help thin nasal mucus. It also may help your sinuses drain fluids.    Heat may help soothe painful areas of your face. Use a towel soaked in hot water. Or,  the shower and direct the warm spray onto your face. Using a vaporizer along with a menthol rub at night may also help soothe symptoms.     An expectorant with guaifenesin may help thin nasal  mucus and help your sinuses drain fluids.    You can use an over-the-counter decongestant, unless a similar medicine was prescribed to you. Nasal sprays work the fastest. Use one that contains phenylephrine or oxymetazoline. First blow your nose gently. Then use the spray. Do not use these medicines more often than directed on the label. If you do, your symptoms may get worse. You may also take pills that contain pseudoephedrine. Don t use products that combine multiple medicines. This is because side effects may be increased. Read all medicine labels. You can also ask the pharmacist for help. (People with high blood pressure should not use decongestants. They can raise blood pressure.)    Over-the-counter antihistamines may help if allergies contributed to your sinusitis.      Use acetaminophen or ibuprofen to control pain, unless another pain medicine was prescribed to you. If you have chronic liver or kidney disease or ever had a stomach ulcer, talk with your healthcare provider before using these medicines. (Aspirin should never be taken by anyone under age 18 who is ill with a fever. It may cause severe liver damage.)    Use nasal rinses or irrigation as instructed by your healthcare provider.    Don't smoke. This can make symptoms worse.  Follow-up care  Follow up with your healthcare provider or our staff if you are NOT better in 1 week.  When to seek medical advice  Call your healthcare provider if any of these occur:    Green or yellow fluid draining from your nose or into your throat    Facial pain or headache that gets worse    Stiff neck    Unusual drowsiness or confusion    Swelling of your forehead or eyelids    Vision problems, such as blurred or double vision    Fever of 100.4 F (38 C) or higher, or as directed by your healthcare provider    Seizure    Breathing problems    Symptoms that don't go away in 10 days  For informational purposes only. Not to replace the advice of your health care  provider.  Copyright   2018 [facility]. All rights reserved.

## 2021-12-09 NOTE — TELEPHONE ENCOUNTER
CENTRAL PRIOR AUTHORIZATION  [phone]    PA Initiation    Medication: Wegovy   Insurance Company: [employer] - Phone [phone] Fax [phone]  Pharmacy Filling the Rx: [id number] - [place], [place] - [address] AT [facility]  Filling Pharmacy Phone:    Filling Pharmacy Fax:    Start Date: 12/9/2021

## 2021-12-10 NOTE — TELEPHONE ENCOUNTER
Received fax from [employer], requesting that a specific form be filled out. Filled out form and also faxed chart notes with request.

## 2021-12-10 NOTE — TELEPHONE ENCOUNTER
Received call from a [employer] Rep Ewa confirm the dispensing quantity.    I did get a request for that information and fax back the information, but it was not received .  Answered the question over the phone. 4 pens per 28 days, which is what the quantity is for the specific dose.

## 2021-12-13 NOTE — TELEPHONE ENCOUNTER
I received a phone call from a [employer] Rep name Abhishek calling with additional questions and if the patient has tried/failed the preferred: phentermine. I provided the information that the patient cannot take due to she is already taking/being prescribed a stimulant medication, Adderall.

## 2021-12-14 NOTE — TELEPHONE ENCOUNTER
Prior Authorization Approval    Authorization Effective Date: 9/14/2021  Authorization Expiration Date: 12/13/2022  Medication: Wegovy -APPROVED   Approved Dose/Quantity: UP TO 4 PENS  DAYS  Reference #: [id number]   Insurance Company: [employer] - Phone [phone] Fax [phone]  Expected CoPay:       CoPay Card Available: No    Foundation Assistance Needed:    Which Pharmacy is filling the prescription (Not needed for infusion/clinic administered): [id number] - [place], [place] - [address]  Pharmacy Notified: Yes THE PHARMACY NEEDS TO ORDER IN THAT DOSE, BUT IT WILL BE TOMORROW.  Patient Notified: Yes SENT MESSAGE TO PATIENT.

## 2021-12-15 ENCOUNTER — TELEPHONE (OUTPATIENT)
Dept: ENDOCRINOLOGY | Facility: CLINIC | Age: 26
End: 2021-12-15
Payer: COMMERCIAL

## 2021-12-15 NOTE — TELEPHONE ENCOUNTER
Received letter from [employer] [place] denial of Service for Wegovy injection scan letter into patient chart

## 2021-12-17 ENCOUNTER — VIRTUAL VISIT (OUTPATIENT)
Dept: ENDOCRINOLOGY | Facility: CLINIC | Age: 26
End: 2021-12-17
Payer: COMMERCIAL

## 2021-12-17 VITALS — HEIGHT: 66 IN | BODY MASS INDEX: 42.14 KG/M2

## 2021-12-17 DIAGNOSIS — E66.01 MORBID (SEVERE) OBESITY DUE TO EXCESS CALORIES (H): Primary | ICD-10-CM

## 2021-12-17 DIAGNOSIS — R73.03 PREDIABETES: ICD-10-CM

## 2021-12-17 PROCEDURE — 99214 OFFICE O/P EST MOD 30 MIN: CPT | Mod: 95 | Performed by: INTERNAL MEDICINE

## 2021-12-17 ASSESSMENT — PAIN SCALES - GENERAL: PAINLEVEL: NO PAIN (0)

## 2021-12-17 NOTE — LETTER
"2021       RE: Nehemias Mascorro  [address]  [place] [address]     Dear Colleague,    Thank you for referring your patient, Nehemias Mascorro, to the [facility]. Please see a copy of my visit note below.    Nehemias is a 26 year old who is being evaluated via a billable video visit.      How would you like to obtain your AVS? MyChart  If the video visit is dropped, the invitation should be resent by: Send to e-mail at: [email or web address]  Will anyone else be joining your video visit? No  During this virtual visit the patient is located in [place], patient verifies this as the location during the entirety of this visit.   Video Start Time: 12:22  Video-Visit Details    Type of service:  Video Visit    Video End Time:12:33    Originating Location (pt. Location): Home    Distant Location (provider location):  [facility]     Platform used for Video Visit: Code Rebel        [facility] Note     Nehemias Mascorro  MRN:  [id number]  :  1995  AJSMEET:  2021    Dear Alexandra Rodrigues MD,    I had the pleasure of seeing your patient Nehemias Mascorro. She is a 26 year old female who I am continuing to see for treatment of obesity; PMH includes the following:  Past Medical History:   Diagnosis Date     Depressive disorder      Uncomplicated asthma          INTERVAL HISTORY:    She was last seen about 6 mo ago.  Reviewed and relevant history as extracted from last visit is as follows--  -------------------------------------------  \"Notes the program is helping with making changes with food and be accountable and these are the things she notes she is most focussed on now--  1. Working on protein shake AM  2. Eat slower/mindful eating--working on this  3.  Walking several times per week with her mother--not happening yet with [employer] " work going     Regarding the GI issue she has been working on with [facility]--doing PT to work on coordinating with BMs; pain and nausea and constipation.  That is improving some   --getting nausea still a couple of times per week; stomach cramping about 3 times per month, stomach pain a couple of times per week  --BMs 3 times per week;  In the past there were times that would be less than once per week        Regarding medications related/relevant to wt loss and co-morbidities I note the following--     1. lexapro changed to Prozac--now has stabilized with plan to titrate as needed---> in the interim Prozac dose was increased but is working much better than the other  2. Adderall continuing 25mg daily  4. Abilify--taking 15mg  5. buproprion at 300mg taking  6. Metformin 1000mg BID tolerating without any side effects)--unsure if this has helped any with appetite (escalated after last visit to full dose)  7. Prozac--40 mg daily...     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  Now sometimes having seconds at supper.     Pt is working through the 24 wk program--nutritionist is trying to connect with her for wk 4 visit, last visit with health  was 9/15/20     Goals before around structured eating--  1.  Have protein drink (Terra's Way)--110 calories; breakfast routine lately has been higher calorie (eggs/yogurt/oatmeal)  2.  Add protein to snack if having a snack  3.  Change from fruit cups to whole fruit (meeting)        ...in PT for the abd issues/anal sphinctor symptoms,  Some improvement in symptoms but still still with abd pains.  Learning how to lose the bathroom more regularly and less constipated now.  Noting BMs about 4 times per  "wk     Goals she had prior:  1.  1400 Calories (Myfitnesspal, measuring food with scales, eating more whole foods)  2.  Activity goals--3 days per wk walking (30 min)  3.  Not eating after dinner (eat dinner and then stay busy after it)\"     -----------------------------------     Wt 2 wks ago 263#         Wt Readings from Last 3 Encounters:   02/26/21 119.3 kg (263 lb)   09/25/20 116.5 kg (256 lb 12.8 oz)   02/28/20 119.9 kg (264 lb 5.3 oz)         Since last seen she notes the following--     --sinus infection recently she is recovering from  --was able to start Wegovy (just started this week); insurance changes at the beginning of the year. Anticipate that we will be going to the 0.5mg pen--will order that, 90, day 3 refills    RECENT WEIGHT:    (11/10/21)    BMI:   Estimated body mass index is 43.45 kg/m  as calculated from the following:    Height as of this encounter: 1.651 m (5' 5\").    Weight as of this encounter: 118.4 kg (261 lb 1.6 oz).       Wt Readings from Last 3 Encounters:   11/10/21 118.4 kg (261 lb 1.6 oz)   02/26/21 119.3 kg (263 lb)   09/25/20 116.5 kg (256 lb 12.8 oz)          Changes and Difficulties 12/17/2021   I have made the following changes to my diet since my last visit: Eating later in the day   With regards to my diet, I am still struggling with: Nothing   I have made the following changes to my activity/exercise since my last visit: No changes   With regards to my activity/exercise, I am still struggling with: Nothing       VITALS:  Ht 1.676 m (5' 6\")   BMI 42.14 kg/m      MEDICATIONS:   Current Outpatient Medications   Medication Sig Dispense Refill     albuterol (PROAIR HFA/PROVENTIL HFA/VENTOLIN HFA) 108 (90 Base) MCG/ACT inhaler Inhale 2 puffs into the lungs 4 times daily 18 g 3     amphetamine-dextroamphetamine (ADDERALL XR) 30 MG 24 hr capsule TK 1 C PO D FOR ADHD       ARIPiprazole (ABILIFY) 15 MG tablet Take 1 tablet by mouth       BuPROPion HCl (WELLBUTRIN PO) Take 300 mg by " mouth daily        EMGALITY 120 MG/ML injection        famotidine (PEPCID) 40 MG tablet Take 1 tablet (40 mg) by mouth daily 30 tablet 0     FLUoxetine (PROZAC) 40 MG capsule Take 40 mg by mouth daily       GLYCOPYRROLATE PO Take 2 mg by mouth daily       IBUPROFEN PO Take 600 mg by mouth       LANsoprazole (PREVACID) 30 MG DR capsule Take 1 capsule (30 mg) by mouth daily 90 capsule 3     loratadine-pseudoePHEDrine (CLARITIN-D 12-HOUR) 5-120 MG 12 hr tablet Take 1 tablet by mouth       norethindrone-ethinyl estradiol (JUNEL FE 1/20) 1-20 MG-MCG per tablet Take 1 tablet by mouth daily       Semaglutide-Weight Management (WEGOVY) 0.25 MG/0.5ML SOAJ Inject 0.25 mg Subcutaneous once a week 2 mL 0     spironolactone (ALDACTONE) 100 MG tablet Take 100 mg by mouth         Weight Loss Medication History Reviewed With Patient 12/17/2021   Which weight loss medications are you currently taking on a regular basis?  -   Are you having any side effects from the weight loss medication that we have prescribed you? No   If you are having side effects please describe: Patient taking Wegovy with no side effects   HbA1c 5.7 11/21, BMP wnl, TSH wnl         ASSESSMENT;  Morbid obesity in pt with wt gain over past few years, more since her TBI   Prediabetes     PLAN:   (continues)  Decrease portion sizes  No meals in front of TV screen  Purge house of food triggers  No meal skipping and avoid snacking  Decrease caloric beverages--avoid soda  Volumetrics low carb eating plan--structured plan and avoding snacking  Low Calorie/low fat diet  Meal planning/journaling           Additionally she has had goals for lifestyle including--  Steps/activity  4000 steps per day; change to 5000 was a goal before--could be a future goal, same with tracking for 1400 calories daily  With food  1.  Eating out once per wk or less  2.  With meals  emphasizing lean protein with meals, lower carb, lots of nonstarchy veggies  2.  Being part of the grocery  shopping ( to have healthier options at home)  3.  Avoiding high carb afternoon snacking      Also--  --advancing Wegovy as tolerated (she is already noticing more satiety but no side effects); plan will be to go ahead with the 0.5mg pens after she finishes her current prescription of 0.25mg pens (only has 1 mo)  --RTC with Lauren Bloch in 4-6 wks, me in 2-3 mo      Time: about 35 min spent on evaluation, management, counseling, education, & motivational interviewing (video visit) combined with previsit prep and post visit follow up care same day.    Sincerely,    Luis Slade MD      Again, thank you for allowing me to participate in the care of your patient.      Sincerely,    Luis Slade MD

## 2021-12-17 NOTE — PROGRESS NOTES
"Nehemias is a 26 year old who is being evaluated via a billable video visit.      How would you like to obtain your AVS? MyChart  If the video visit is dropped, the invitation should be resent by: Send to e-mail at: [email or web address]  Will anyone else be joining your video visit? No  During this virtual visit the patient is located in [place], patient verifies this as the location during the entirety of this visit.   Video Start Time: 12:22  Video-Visit Details    Type of service:  Video Visit    Video End Time:12:33    Originating Location (pt. Location): Home    Distant Location (provider location):  [facility] [place]     Platform used for Video Visit: [employer]        [facility] Weight Management Note     Nehemias Mascorro  MRN:  [id number]  :  1995  JASMEET:  2021    Dear Alexandra Rodrigues MD,    I had the pleasure of seeing your patient Nehemias Mascorro. She is a 26 year old female who I am continuing to see for treatment of obesity; PMH includes the following:  Past Medical History:   Diagnosis Date     Depressive disorder      Uncomplicated asthma          INTERVAL HISTORY:    She was last seen about 6 mo ago.  Reviewed and relevant history as extracted from last visit is as follows--  -------------------------------------------  \"Notes the program is helping with making changes with food and be accountable and these are the things she notes she is most focussed on now--  1. Working on protein shake AM  2. Eat slower/mindful eating--working on this  3.  Walking several times per week with her mother--not happening yet with [employer] work going     Regarding the GI issue she has been working on with [facility]--doing PT to work on coordinating with BMs; pain and nausea and constipation.  That is improving some   --getting nausea still a couple of times per week; stomach cramping about 3 times per month, stomach pain a couple of times per week  --BMs 3 " "times per week;  In the past there were times that would be less than once per week        Regarding medications related/relevant to wt loss and co-morbidities I note the following--     1. lexapro changed to Prozac--now has stabilized with plan to titrate as needed---> in the interim Prozac dose was increased but is working much better than the other  2. Adderall continuing 25mg daily  4. Abilify--taking 15mg  5. buproprion at 300mg taking  6. Metformin 1000mg BID tolerating without any side effects)--unsure if this has helped any with appetite (escalated after last visit to full dose)  7. Prozac--40 mg daily...     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  Now sometimes having seconds at supper.     Pt is working through the 24 wk program--nutritionist is trying to connect with her for wk 4 visit, last visit with health  was 9/15/20     Goals before around structured eating--  1.  Have protein drink (Terra's Way)--110 calories; breakfast routine lately has been higher calorie (eggs/yogurt/oatmeal)  2.  Add protein to snack if having a snack  3.  Change from fruit cups to whole fruit (meeting)        ...in PT for the abd issues/anal sphinctor symptoms,  Some improvement in symptoms but still still with abd pains.  Learning how to lose the bathroom more regularly and less constipated now.  Noting BMs about 4 times per wk     Goals she had prior:  1.  1400 Calories (Myfitnesspal, measuring food with scales, eating more whole foods)  2.  Activity goals--3 days per wk walking (30 min)  3.  Not eating after dinner (eat dinner and then stay busy after it)\"     -----------------------------------     Wt 2 wks ago 263#         Wt Readings from Last 3 " "Encounters:   02/26/21 119.3 kg (263 lb)   09/25/20 116.5 kg (256 lb 12.8 oz)   02/28/20 119.9 kg (264 lb 5.3 oz)         Since last seen she notes the following--     --sinus infection recently she is recovering from  --was able to start Wegovy (just started this week); insurance changes at the beginning of the year. Anticipate that we will be going to the 0.5mg pen--will order that, 90, day 3 refills    RECENT WEIGHT:    (11/10/21)    BMI:   Estimated body mass index is 43.45 kg/m  as calculated from the following:    Height as of this encounter: 1.651 m (5' 5\").    Weight as of this encounter: 118.4 kg (261 lb 1.6 oz).       Wt Readings from Last 3 Encounters:   11/10/21 118.4 kg (261 lb 1.6 oz)   02/26/21 119.3 kg (263 lb)   09/25/20 116.5 kg (256 lb 12.8 oz)          Changes and Difficulties 12/17/2021   I have made the following changes to my diet since my last visit: Eating later in the day   With regards to my diet, I am still struggling with: Nothing   I have made the following changes to my activity/exercise since my last visit: No changes   With regards to my activity/exercise, I am still struggling with: Nothing       VITALS:  Ht 1.676 m (5' 6\")   BMI 42.14 kg/m      MEDICATIONS:   Current Outpatient Medications   Medication Sig Dispense Refill     albuterol (PROAIR HFA/PROVENTIL HFA/VENTOLIN HFA) 108 (90 Base) MCG/ACT inhaler Inhale 2 puffs into the lungs 4 times daily 18 g 3     amphetamine-dextroamphetamine (ADDERALL XR) 30 MG 24 hr capsule TK 1 C PO D FOR ADHD       ARIPiprazole (ABILIFY) 15 MG tablet Take 1 tablet by mouth       BuPROPion HCl (WELLBUTRIN PO) Take 300 mg by mouth daily        EMGALITY 120 MG/ML injection        famotidine (PEPCID) 40 MG tablet Take 1 tablet (40 mg) by mouth daily 30 tablet 0     FLUoxetine (PROZAC) 40 MG capsule Take 40 mg by mouth daily       GLYCOPYRROLATE PO Take 2 mg by mouth daily       IBUPROFEN PO Take 600 mg by mouth       LANsoprazole (PREVACID) 30 MG " capsule Take 1 capsule (30 mg) by mouth daily 90 capsule 3     loratadine-pseudoePHEDrine (CLARITIN-D 12-HOUR) 5-120 MG 12 hr tablet Take 1 tablet by mouth       norethindrone-ethinyl estradiol (JUNEL FE 1/20) 1-20 MG-MCG per tablet Take 1 tablet by mouth daily       Semaglutide-Weight Management (WEGOVY) 0.25 MG/0.5ML SOAJ Inject 0.25 mg Subcutaneous once a week 2 mL 0     spironolactone (ALDACTONE) 100 MG tablet Take 100 mg by mouth         Weight Loss Medication History Reviewed With Patient 12/17/2021   Which weight loss medications are you currently taking on a regular basis?  -   Are you having any side effects from the weight loss medication that we have prescribed you? No   If you are having side effects please describe: Patient taking Wegovy with no side effects   HbA1c 5.7 11/21, BMP wnl, TSH wnl         ASSESSMENT;  Morbid obesity in pt with wt gain over past few years, more since her TBI   Prediabetes     PLAN:   (continues)  Decrease portion sizes  No meals in front of TV screen  Purge house of food triggers  No meal skipping and avoid snacking  Decrease caloric beverages--avoid soda  Volumetrics low carb eating plan--structured plan and avoding snacking  Low Calorie/low fat diet  Meal planning/journaling           Additionally she has had goals for lifestyle including--  Steps/activity  4000 steps per day; change to 5000 was a goal before--could be a future goal, same with tracking for 1400 calories daily  With food  1.  Eating out once per wk or less  2.  With meals  emphasizing lean protein with meals, lower carb, lots of nonstarchy veggies  2.  Being part of the grocery shopping ( to have healthier options at home)  3.  Avoiding high carb afternoon snacking      Also--  --advancing Wegovy as tolerated (she is already noticing more satiety but no side effects); plan will be to go ahead with the 0.5mg pens after she finishes her current prescription of 0.25mg pens (only has 1 mo)  --RTC with Beatriz  Bloch in 4-6 wks, me in 2-3 mo      Time: about 35 min spent on evaluation, management, counseling, education, & motivational interviewing (video visit) combined with previsit prep and post visit follow up care same day.    Sincerely,    Luis Slade MD

## 2021-12-17 NOTE — NURSING NOTE
"(   Chief Complaint   Patient presents with     RECHECK     Return MW    )    ( Weight:  (Patient unsure of current weight) )  ( Height: 167.6 cm (5' 6\") )  (   )  (   )  (   )  (   )  (   )  (   )  (   )    (   )  (   )  (   )  (   )  (   )  (   )  (   )    (   Patient Active Problem List   Diagnosis     Hip pain     Obesity     Anxiety     Major depressive disorder     Morbid obesity (H)     Healthcare maintenance     Gastroesophageal reflux disease    )  (   Current Outpatient Medications   Medication Sig Dispense Refill     albuterol (PROAIR HFA/PROVENTIL HFA/VENTOLIN HFA) 108 (90 Base) MCG/ACT inhaler Inhale 2 puffs into the lungs 4 times daily 18 g 3     amphetamine-dextroamphetamine (ADDERALL XR) 30 MG 24 hr capsule TK 1 C PO D FOR ADHD       ARIPiprazole (ABILIFY) 15 MG tablet Take 1 tablet by mouth       BuPROPion HCl (WELLBUTRIN PO) Take 300 mg by mouth daily        EMGALITY 120 MG/ML injection        famotidine (PEPCID) 40 MG tablet Take 1 tablet (40 mg) by mouth daily 30 tablet 0     FLUoxetine (PROZAC) 40 MG capsule Take 40 mg by mouth daily       GLYCOPYRROLATE PO Take 2 mg by mouth daily       IBUPROFEN PO Take 600 mg by mouth       LANsoprazole (PREVACID) 30 MG DR capsule Take 1 capsule (30 mg) by mouth daily 90 capsule 3     loratadine-pseudoePHEDrine (CLARITIN-D 12-HOUR) 5-120 MG 12 hr tablet Take 1 tablet by mouth       norethindrone-ethinyl estradiol (JUNEL FE 1/20) 1-20 MG-MCG per tablet Take 1 tablet by mouth daily       Semaglutide-Weight Management (WEGOVY) 0.25 MG/0.5ML SOAJ Inject 0.25 mg Subcutaneous once a week 2 mL 0     spironolactone (ALDACTONE) 100 MG tablet Take 100 mg by mouth      )  ( Diabetes Eval:    )    ( Pain Eval:  No Pain (0) )    ( Wound Eval:       )    (   History   Smoking Status     Never Smoker   Smokeless Tobacco     Never Used    )    ( Signed By:  Patti Hung, EMT; December 17, 2021; 11:49 AM )    "

## 2021-12-20 ENCOUNTER — TELEPHONE (OUTPATIENT)
Dept: ENDOCRINOLOGY | Facility: CLINIC | Age: 26
End: 2021-12-20
Payer: COMMERCIAL

## 2021-12-20 NOTE — TELEPHONE ENCOUNTER
quirinom to sammy 2-3 month follow up with MARVIN RUIZ, left call center phone number and sent [employer].

## 2022-01-02 ENCOUNTER — E-VISIT (OUTPATIENT)
Dept: INTERNAL MEDICINE | Facility: CLINIC | Age: 27
End: 2022-01-02
Payer: COMMERCIAL

## 2022-01-02 ENCOUNTER — NURSE TRIAGE (OUTPATIENT)
Dept: NURSING | Facility: CLINIC | Age: 27
End: 2022-01-02
Payer: COMMERCIAL

## 2022-01-02 DIAGNOSIS — Z53.9 ERRONEOUS ENCOUNTER--DISREGARD: Primary | ICD-10-CM

## 2022-01-03 ENCOUNTER — VIRTUAL VISIT (OUTPATIENT)
Dept: FAMILY MEDICINE | Facility: CLINIC | Age: 27
End: 2022-01-03
Payer: COMMERCIAL

## 2022-01-03 DIAGNOSIS — R06.02 SHORTNESS OF BREATH: ICD-10-CM

## 2022-01-03 DIAGNOSIS — J45.901 EXACERBATION OF ASTHMA, UNSPECIFIED ASTHMA SEVERITY, UNSPECIFIED WHETHER PERSISTENT: ICD-10-CM

## 2022-01-03 DIAGNOSIS — R05.9 COUGH: Primary | ICD-10-CM

## 2022-01-03 PROCEDURE — 99213 OFFICE O/P EST LOW 20 MIN: CPT | Mod: 95 | Performed by: FAMILY MEDICINE

## 2022-01-03 RX ORDER — ALBUTEROL SULFATE 1.25 MG/3ML
1.25 SOLUTION RESPIRATORY (INHALATION) EVERY 6 HOURS PRN
Qty: 90 ML | Refills: 0 | Status: SHIPPED | OUTPATIENT
Start: 2022-01-03 | End: 2023-10-16

## 2022-01-03 ASSESSMENT — PATIENT HEALTH QUESTIONNAIRE - PHQ9
SUM OF ALL RESPONSES TO PHQ QUESTIONS 1-9: 13
10. IF YOU CHECKED OFF ANY PROBLEMS, HOW DIFFICULT HAVE THESE PROBLEMS MADE IT FOR YOU TO DO YOUR WORK, TAKE CARE OF THINGS AT HOME, OR GET ALONG WITH OTHER PEOPLE: SOMEWHAT DIFFICULT
SUM OF ALL RESPONSES TO PHQ QUESTIONS 1-9: 13

## 2022-01-03 ASSESSMENT — ENCOUNTER SYMPTOMS
WHEEZING: 1
COUGH: 1
RHINORRHEA: 1
SHORTNESS OF BREATH: 1

## 2022-01-03 NOTE — TELEPHONE ENCOUNTER
Prescription for nebulizer     Not on med list    Pt states that the pharmacist also states they do not have it on their list for the patient    Advised patient that she would need to be seen before anyone could prescribe this medication     COVID 19 Nurse Triage Plan/Patient Instructions    Please be aware that novel coronavirus (COVID-19) may be circulating in the community. If you develop symptoms such as fever, cough, or SOB or if you have concerns about the presence of another infection including coronavirus (COVID-19), please contact your health care provider or visit [email or web address].     Disposition/Instructions    Virtual Visit with provider recommended. Reference Visit Selection Guide.  In-Person Visit with provider recommended. Reference Visit Selection Guide.    Thank you for taking steps to prevent the spread of this virus.  o Limit your contact with others.  o Wear a simple mask to cover your cough.  o Wash your hands well and often.    Resources    [facility]: About COVID-19: [email or web address]/    CDC: What to Do If You're Sick: www.cdc.gov/coronavirus/2019-ncov/about/steps-when-sick.html    CDC: Ending Home Isolation: www.cdc.gov/coronavirus/2019-ncov/hcp/disposition-in-home-patients.html     CDC: Caring for Someone: www.cdc.gov/coronavirus/2019-ncov/if-you-are-sick/care-for-someone.html     [facility]: Interim Guidance for Hospital Discharge to Home: www.health.[facility].[place]/diseases/coronavirus/hcp/hospdischarge.pdf    [facility] clinical trials (COVID-19 research studies): clinicalaffairs.[facility]/umn-clinical-trials     Below are the COVID-19 hotlines at the [place] Department of Health ([facility]). Interpreters are available.   o For health questions: Call 852-989-1180 or 1-786.321.5950 (7 a.m. to 7 p.m.)  o For questions about schools and childcare: Call 377-826-6689 or 1-626.318.7143 (7 a.m. to 7 p.m.)       Reason for Disposition    [1] Caller requesting a  NON-URGENT new prescription or refill AND [2] triager unable to refill per unit policy    Protocols used: MEDICATION QUESTION CALL-A-AH

## 2022-01-05 ENCOUNTER — MYC MEDICAL ADVICE (OUTPATIENT)
Dept: FAMILY MEDICINE | Facility: CLINIC | Age: 27
End: 2022-01-05

## 2022-01-05 ENCOUNTER — VIRTUAL VISIT (OUTPATIENT)
Dept: FAMILY MEDICINE | Facility: CLINIC | Age: 27
End: 2022-01-05
Payer: COMMERCIAL

## 2022-01-05 DIAGNOSIS — J06.9 UPPER RESPIRATORY TRACT INFECTION, UNSPECIFIED TYPE: Primary | ICD-10-CM

## 2022-01-05 PROCEDURE — 99213 OFFICE O/P EST LOW 20 MIN: CPT | Mod: 95 | Performed by: FAMILY MEDICINE

## 2022-01-05 NOTE — PROGRESS NOTES
Nehemias is a 26 year old who is being evaluated via a billable video visit.      How would you like to obtain your AVS? Kareen  If the video visit is dropped, the invitation should be resent by: kareen   Will anyone else be joining your video visit? No    Video Start Time: 8:34 AM    Assessment & Plan     Upper respiratory tract infection, unspecified type  Differential discussed in detail including viral URI. Symptoms improving significantly. Will do COVID-19 test for further evaluation. Suggested well hydration, warm fluids, over-the-counter antitussive and to continue albuterol inhaler, nebs. Wells score 0. Return criteria discussed in detail. Patient understood and in agreement with above plan. All questions answered.  - Symptomatic; Unknown COVID-19 Virus (Coronavirus) by PCR; Future      Nolberto Sexton MD  [facility]    Araceli Del Cid is a 26 year old who presents for the following health issues     HPI     Acute Illness  Acute illness concerns:   Onset/Duration: x3 days   Symptoms:  Fever: no  Chills/Sweats: no  Headache (location?): YES  Sinus Pressure: no  Conjunctivitis:  no  Ear Pain: no  Rhinorrhea: no  Congestion: YES  Sore Throat: no  Cough: YES - mucous   Wheeze: no  Decreased Appetite: no  Nausea: yes   Vomiting: no  Diarrhea: no  Dysuria/Freq.: no  Dysuria or Hematuria: no  Fatigue/Achiness: no  Sick/Strep Exposure: yes exposed to COVID   Therapies tried and outcome: None  Symptoms improving significantly. No fever, chills, sore throat or other relevant systemic symptoms.      Review of Systems   Constitutional, HEENT, cardiovascular, pulmonary, gi and gu systems are negative, except as otherwise noted.      Objective           Vitals:  No vitals were obtained today due to virtual visit.    Physical Exam   GENERAL: alert and no distress  EYES: Eyes grossly normal to inspection.  No discharge or erythema, or obvious scleral/conjunctival abnormalities.  RESP:  No audible wheeze, cough, or visible cyanosis.  No visible retractions or increased work of breathing.    SKIN: Visible skin clear. No significant rash, abnormal pigmentation or lesions.  NEURO: Cranial nerves grossly intact.  Mentation and speech appropriate for age.  PSYCH: Mentation appears normal, affect normal/bright, judgement and insight intact, normal speech and appearance well-groomed.            Video-Visit Details    Type of service:  Video Visit    Video End Time:8:42 AM    Originating Location (pt. Location): Home    Distant Location (provider location):  [facility]     Platform used for Video Visit: [employer]

## 2022-01-09 ENCOUNTER — NURSE TRIAGE (OUTPATIENT)
Dept: NURSING | Facility: CLINIC | Age: 27
End: 2022-01-09
Payer: COMMERCIAL

## 2022-01-09 NOTE — TELEPHONE ENCOUNTER
"    Reason for Disposition    Taking new prescription medication    Additional Information    Negative: [1] Abdomen pain is main symptom AND [2] male    Negative: [1] Abdomen pain is main symptom AND [2] adult female    Negative: Rectal bleeding or blood in stool is main symptom    Negative: Rectal pain or itching is main symptom    Negative: Constipation in a cancer patient who is currently (or recently) receiving chemotherapy or radiation therapy, or cancer patient who has metastatic or end-stage cancer and is receiving palliative care    Negative: Patient sounds very sick or weak to the triager    Negative: [1] Vomiting AND [2] abdomen looks much more swollen than usual    Negative: [1] Vomiting AND [2] contains bile (green color)    Negative: [1] Constant abdominal pain AND [2] present > 2 hours    Negative: [1] Rectal pain or fullness from fecal impaction (rectum full of stool) AND [2] NOT better after SITZ bath, suppository or enema    Negative: [1] Intermittent mild abdominal pain AND [2] fever    Negative: Abdomen is more swollen than usual    Negative: Last bowel movement (BM) > 4 days ago    Negative: Leaking stool    Negative: Unable to have a bowel movement (BM) without manually removing stool (using finger to pull out stool or perform disimpaction)    Negative: Unable to have a bowel movement (BM) without laxative or enema    Negative: [1] Constipation persists > 1 week AND [2] no improvement after using CARE ADVICE    Negative: [1] Weight loss > 10 pounds (5 kg) AND [2] not dieting    Negative: Pencil-like, narrow stools    Answer Assessment - Initial Assessment Questions  1. STOOL PATTERN OR FREQUENCY: \"How often do you pass bowel movements (BMs)?\"  (Normal range: tid to q 3 days)  \"When was the last BM passed?\"        varies  2. STRAINING: \"Do you have to strain to have a BM?\"       no  3. RECTAL PAIN: \"Does your rectum hurt when the stool comes out?\" If so, ask: \"Do you have hemorrhoids? How bad is " "the pain?\"  (Scale 1-10; or mild, moderate, severe)      no  4. STOOL COMPOSITION: \"Are the stools hard?\"       hard  5. BLOOD ON STOOLS: \"Has there been any blood on the toilet tissue or on the surface of the BM?\" If so, ask: \"When was the last time?\"       no  6. CHRONIC CONSTIPATION: \"Is this a new problem for you?\"  If no, ask: \"How long have you had this problem?\" (days, weeks, months)       recently  7. CHANGES IN DIET: \"Have there been any recent changes in your diet?\"       Taking weight management med that can cause constipation  8. MEDICATIONS: \"Have you been taking any new medications?\"      Yes Mitali  9. LAXATIVES: \"Have you been using any laxatives or enemas?\"  If yes, ask \"What, how often, and when was the last time?\"      no  10. CAUSE: \"What do you think is causing the constipation?\"         ?  11. OTHER SYMPTOMS: \"Do you have any other symptoms?\" (e.g., abdominal pain, fever, vomiting)        Some discomfort, gas  12. PREGNANCY: \"Is there any chance you are pregnant?\" \"When was your last menstrual period?\"        no    Protocols used: CONSTIPATION-A-AH      "

## 2022-01-12 DIAGNOSIS — K21.9 GASTROESOPHAGEAL REFLUX DISEASE, UNSPECIFIED WHETHER ESOPHAGITIS PRESENT: ICD-10-CM

## 2022-01-12 RX ORDER — FAMOTIDINE 40 MG/1
TABLET, FILM COATED ORAL
Qty: 30 TABLET | Refills: 0 | Status: SHIPPED | OUTPATIENT
Start: 2022-01-12 | End: 2022-04-20

## 2022-01-12 NOTE — TELEPHONE ENCOUNTER
Routing refill request to provider for review/approval because:  A break in medication, previous prescription .  Sole Bragg BSN, RN

## 2022-01-13 ENCOUNTER — LAB (OUTPATIENT)
Dept: FAMILY MEDICINE | Facility: CLINIC | Age: 27
End: 2022-01-13
Attending: FAMILY MEDICINE
Payer: COMMERCIAL

## 2022-01-13 DIAGNOSIS — J06.9 UPPER RESPIRATORY TRACT INFECTION, UNSPECIFIED TYPE: ICD-10-CM

## 2022-01-13 PROCEDURE — U0005 INFEC AGEN DETEC AMPLI PROBE: HCPCS

## 2022-01-13 PROCEDURE — U0003 INFECTIOUS AGENT DETECTION BY NUCLEIC ACID (DNA OR RNA); SEVERE ACUTE RESPIRATORY SYNDROME CORONAVIRUS 2 (SARS-COV-2) (CORONAVIRUS DISEASE [COVID-19]), AMPLIFIED PROBE TECHNIQUE, MAKING USE OF HIGH THROUGHPUT TECHNOLOGIES AS DESCRIBED BY CMS-2020-01-R: HCPCS

## 2022-01-14 LAB — SARS-COV-2 RNA RESP QL NAA+PROBE: NEGATIVE

## 2022-01-15 ENCOUNTER — E-VISIT (OUTPATIENT)
Dept: INTERNAL MEDICINE | Facility: CLINIC | Age: 27
End: 2022-01-15
Payer: COMMERCIAL

## 2022-01-15 DIAGNOSIS — N94.9 VAGINAL DISCOMFORT: Primary | ICD-10-CM

## 2022-01-15 PROCEDURE — 99207 PR NON-BILLABLE SERV PER CHARTING: CPT | Performed by: INTERNAL MEDICINE

## 2022-01-21 ENCOUNTER — OFFICE VISIT (OUTPATIENT)
Dept: INTERNAL MEDICINE | Facility: CLINIC | Age: 27
End: 2022-01-21
Payer: COMMERCIAL

## 2022-01-21 VITALS
HEART RATE: 92 BPM | SYSTOLIC BLOOD PRESSURE: 126 MMHG | DIASTOLIC BLOOD PRESSURE: 80 MMHG | OXYGEN SATURATION: 99 % | BODY MASS INDEX: 41.64 KG/M2 | WEIGHT: 259.1 LBS | HEIGHT: 66 IN

## 2022-01-21 DIAGNOSIS — Z11.51 SPECIAL SCREENING EXAMINATION FOR HUMAN PAPILLOMAVIRUS (HPV): ICD-10-CM

## 2022-01-21 DIAGNOSIS — B96.89 BACTERIAL VAGINOSIS: Primary | ICD-10-CM

## 2022-01-21 DIAGNOSIS — N76.0 VAGINITIS AND VULVOVAGINITIS: Primary | ICD-10-CM

## 2022-01-21 DIAGNOSIS — N76.0 BACTERIAL VAGINOSIS: Primary | ICD-10-CM

## 2022-01-21 DIAGNOSIS — U07.1 ASYMPTOMATIC COVID-19 VIRUS INFECTION: ICD-10-CM

## 2022-01-21 LAB
ALBUMIN UR-MCNC: NEGATIVE MG/DL
APPEARANCE UR: CLEAR
BILIRUB UR QL STRIP: NEGATIVE
CLUE CELLS: PRESENT
COLOR UR AUTO: YELLOW
GLUCOSE UR STRIP-MCNC: NEGATIVE MG/DL
HGB UR QL STRIP: NEGATIVE
KETONES UR STRIP-MCNC: NEGATIVE MG/DL
LEUKOCYTE ESTERASE UR QL STRIP: NEGATIVE
NITRATE UR QL: NEGATIVE
PH UR STRIP: 7 [PH] (ref 5–8)
SP GR UR STRIP: >=1.03 (ref 1–1.03)
TRICHOMONAS, WET PREP: ABNORMAL
UROBILINOGEN UR STRIP-ACNC: 1 E.U./DL
WBC'S/HIGH POWER FIELD, WET PREP: ABNORMAL
YEAST, WET PREP: ABNORMAL

## 2022-01-21 PROCEDURE — 99213 OFFICE O/P EST LOW 20 MIN: CPT | Performed by: INTERNAL MEDICINE

## 2022-01-21 PROCEDURE — 81003 URINALYSIS AUTO W/O SCOPE: CPT | Performed by: INTERNAL MEDICINE

## 2022-01-21 PROCEDURE — 87210 SMEAR WET MOUNT SALINE/INK: CPT | Performed by: INTERNAL MEDICINE

## 2022-01-21 RX ORDER — DROSPIRENONE AND ETHINYL ESTRADIOL 0.02-3(28)
1 KIT ORAL DAILY
COMMUNITY
Start: 2022-01-13 | End: 2024-05-08

## 2022-01-21 RX ORDER — METRONIDAZOLE 500 MG/1
500 TABLET ORAL 2 TIMES DAILY
Qty: 14 TABLET | Refills: 0 | Status: SHIPPED | OUTPATIENT
Start: 2022-01-21 | End: 2022-01-28

## 2022-01-21 RX ORDER — ARIPIPRAZOLE 20 MG/1
20 TABLET ORAL DAILY
COMMUNITY

## 2022-01-21 RX ORDER — METRONIDAZOLE 7.5 MG/G
1 GEL VAGINAL DAILY
Qty: 70 G | Refills: 1 | Status: SHIPPED | OUTPATIENT
Start: 2022-01-21 | End: 2022-05-24

## 2022-01-21 RX ORDER — BUPROPION HYDROCHLORIDE 300 MG/1
300 TABLET ORAL EVERY MORNING
COMMUNITY
Start: 2022-01-13

## 2022-01-21 ASSESSMENT — ASTHMA QUESTIONNAIRES
ACT_TOTALSCORE: 15
QUESTION_5 LAST FOUR WEEKS HOW WOULD YOU RATE YOUR ASTHMA CONTROL: WELL CONTROLLED
QUESTION_4 LAST FOUR WEEKS HOW OFTEN HAVE YOU USED YOUR RESCUE INHALER OR NEBULIZER MEDICATION (SUCH AS ALBUTEROL): ONE OR TWO TIMES PER DAY
QUESTION_3 LAST FOUR WEEKS HOW OFTEN DID YOUR ASTHMA SYMPTOMS (WHEEZING, COUGHING, SHORTNESS OF BREATH, CHEST TIGHTNESS OR PAIN) WAKE YOU UP AT NIGHT OR EARLIER THAN USUAL IN THE MORNING: ONCE OR TWICE
QUESTION_1 LAST FOUR WEEKS HOW MUCH OF THE TIME DID YOUR ASTHMA KEEP YOU FROM GETTING AS MUCH DONE AT WORK, SCHOOL OR AT HOME: MOST OF THE TIME
ACT_TOTALSCORE: 15
QUESTION_2 LAST FOUR WEEKS HOW OFTEN HAVE YOU HAD SHORTNESS OF BREATH: THREE TO SIX TIMES A WEEK

## 2022-01-21 ASSESSMENT — MIFFLIN-ST. JEOR: SCORE: 1932.02

## 2022-01-21 NOTE — PROGRESS NOTES
"  Assessment & Plan     Vaginitis and vulvovaginitis  Whitish discharge suggestive of yeast but wet prep shows clue cells   - UA Macro with Reflex to Micro and Culture - lab collect  - Wet prep - Clinic Collect  - UA Macro with Reflex to Micro and Culture - lab collect    Asymptomatic COVID-19 virus infection  Recovered uneventfully . Did get infected from an unvaccinated relative     Therefore Asthma control TEST  slightly worse            BMI:   Estimated body mass index is 41.82 kg/m  as calculated from the following:    Height as of this encounter: 1.676 m (5' 6\").    Weight as of this encounter: 117.5 kg (259 lb 1.6 oz).           Return for Routine preventive.    Alexandra Rodrigues MD  [facility]    Araceli Del Cid is a 26 year old who presents for the following health issues with vaginaits and urethral pain had cephalexin 3 weeks ago . Has never been tested but has had yeast infections in the past     HPI     Patient Active Problem List   Diagnosis     Hip pain     Obesity     Anxiety     Major depressive disorder     Morbid obesity (H)     Special screening examination for human papillomavirus (HPV)     Gastroesophageal reflux disease     Asymptomatic COVID-19 virus infection     Vaginitis and vulvovaginitis     Current Outpatient Medications   Medication     albuterol (ACCUNEB) 1.25 MG/3ML neb solution     albuterol (PROAIR HFA/PROVENTIL HFA/VENTOLIN HFA) 108 (90 Base) MCG/ACT inhaler     amphetamine-dextroamphetamine (ADDERALL XR) 30 MG 24 hr capsule     ARIPiprazole (ABILIFY) 20 MG tablet     buPROPion (WELLBUTRIN XL) 300 MG 24 hr tablet     BuPROPion HCl (WELLBUTRIN PO)     drospirenone-ethinyl estradiol (JESSY) 3-0.02 MG tablet     EMGALITY 120 MG/ML injection     famotidine (PEPCID) 40 MG tablet     FLUoxetine (PROZAC) 40 MG capsule     GLYCOPYRROLATE PO     IBUPROFEN PO     LANsoprazole (PREVACID) 30 MG DR capsule     Semaglutide-Weight Management 0.5 MG/0.5ML SOAJ " "    spironolactone (ALDACTONE) 100 MG tablet     No current facility-administered medications for this visit.           Review of Systems   Constitutional, HEENT, cardiovascular, pulmonary, gi and gu systems are negative, except as otherwise noted.      Objective    /80 (BP Location: Left arm, Patient Position: Sitting, Cuff Size: Adult Regular)   Pulse 92   Ht 1.676 m (5' 6\")   Wt 117.5 kg (259 lb 1.6 oz)   SpO2 99%   BMI 41.82 kg/m    Body mass index is 41.82 kg/m .  Physical Exam   Gyn exam    whitish discharge external , no intertrigo , urethra is normal   Wet prep taken                   "

## 2022-01-31 ENCOUNTER — TELEPHONE (OUTPATIENT)
Dept: ENDOCRINOLOGY | Facility: CLINIC | Age: 27
End: 2022-01-31
Payer: COMMERCIAL

## 2022-01-31 NOTE — TELEPHONE ENCOUNTER
"Prior Authorization Retail Medication Request    Medication/Dose: Wegovy  ICD code (if different than what is on RX):  Morbid (severe) obesity due to excess calories (H) [E66.01]  - Primary     Previously Tried and Failed:  history of diet and exercise  Rationale:  Patient qualifies for use of weight loss medication(s) because they have a BMI of at least 30 kg/m^2. Patient cannot take phentermine due to already taking stimulant Adderall XR. Therefore cannot take Qsymia either. Cannot take Contrave as already taking bupropion. Cannot take Orlistat due to already complicated gastrointestinal issues.      Estimated body mass index is 43.45 kg/m  as calculated from the following:    Height as of 11/10/21: 5' 5\" (1.651 m).    Weight as of 11/10/21: 261 lb 1.6 oz (118.4 kg).    Insurance Name:    Insurance ID:        Pharmacy Information (if different than what is on RX)  Name:  [id number] - [place], [place] - [address] & [facility]  Phone:  [phone]  "

## 2022-02-01 NOTE — TELEPHONE ENCOUNTER
Central Prior Authorization Team   Phone: [phone]      PA Initiation    Medication: Wegovy 0.5 MG-PA Initiated  Insurance Company: [employer] - Phone [phone] Fax [phone]  Pharmacy Filling the Rx: [id number] - [place], [place] - [address] [place] [address] & [facility]  Filling Pharmacy Phone: [phone]  Filling Pharmacy Fax:    Start Date: 2/1/2022

## 2022-02-03 NOTE — TELEPHONE ENCOUNTER
Sent additional information to [employer]   Muscle Hinge Flap Text: The defect edges were debeveled with a #15 scalpel blade.  Given the size, depth and location of the defect and the proximity to free margins a muscle hinge flap was deemed most appropriate.  Using a sterile surgical marker, an appropriate hinge flap was drawn incorporating the defect. The area thus outlined was incised with a #15 scalpel blade.  The skin margins were undermined to an appropriate distance in all directions utilizing iris scissors.

## 2022-02-04 ENCOUNTER — VIRTUAL VISIT (OUTPATIENT)
Dept: ENDOCRINOLOGY | Facility: CLINIC | Age: 27
End: 2022-02-04
Payer: COMMERCIAL

## 2022-02-04 VITALS — WEIGHT: 258 LBS | HEIGHT: 66 IN | BODY MASS INDEX: 41.46 KG/M2

## 2022-02-04 DIAGNOSIS — E66.01 MORBID OBESITY DUE TO EXCESS CALORIES (H): Primary | ICD-10-CM

## 2022-02-04 DIAGNOSIS — R73.03 PREDIABETES: ICD-10-CM

## 2022-02-04 PROCEDURE — 99213 OFFICE O/P EST LOW 20 MIN: CPT | Mod: 95 | Performed by: INTERNAL MEDICINE

## 2022-02-04 ASSESSMENT — MIFFLIN-ST. JEOR: SCORE: 1927.03

## 2022-02-04 ASSESSMENT — PAIN SCALES - GENERAL: PAINLEVEL: NO PAIN (0)

## 2022-02-04 NOTE — TELEPHONE ENCOUNTER
Medication Appeal Request    Please initiate an appeal for the requested medication: Wegovy 0.5 MG-PA denied    Has a letter of medical necessity been completed in EPIC?   yes    Any additional lab values/information to include?     Would you like to include any research articles?               If yes please include the hyperlink(s) below or fax to    [phone] for Specialty/Retail               [phone] for Infusion/Clinic Administered.                Include the patients name and MRN on the fax cover sheet.

## 2022-02-04 NOTE — TELEPHONE ENCOUNTER
PRIOR AUTHORIZATION DENIED    Medication: Wegovy 0.5 MG-PA denied    Denial Date: 2/3/2022    Denial Rational:         Appeal Information:

## 2022-02-04 NOTE — PROGRESS NOTES
"Nehemias is a 26 year old who is being evaluated via a billable video visit.      How would you like to obtain your AVS? MyChart  If the video visit is dropped, the invitation should be resent by: Email:  [email or web address]  Will anyone else be joining your video visit? No  {If patient encounters technical issues they should call [phone] :[id number]}    Video Start Time: {video visit start/end time for provider to select:458168}  Video-Visit Details    Type of service:  Video Visit    Video End Time:{video visit start/end time for provider to select:201537}    Originating Location (pt. Location): {video visit patient location:935741::\"Home\"}    Distant Location (provider location):  [facility] [place]     Platform used for Video Visit: {Virtual Visit Platforms:630805::\"AmWell\"}         Return Medical Weight Management Note     Nehemias Mascorro  MRN:  [id number]  :  1995  JASMEET:  2022    Dear Alexandra Rodrigues MD,    I had the pleasure of seeing your patient Nehemias Mascorro. She is a 26 year old female who I am continuing to see for treatment of obesity related to:    No flowsheet data found.    INTERVAL HISTORY:  ***    xxx  xxx    --N/V for 3 days post starting  --will stay on 0.5  --using miralax frequently (every other day)    CURRENT WEIGHT:   258 lbs 0 oz (last week)    Wt Readings from Last 3 Encounters:   22 117 kg (258 lb)   22 117.5 kg (259 lb 1.6 oz)   11/10/21 118.4 kg (261 lb 1.6 oz)                   Changes and Difficulties 2022   I have made the following changes to my diet since my last visit: -   With regards to my diet, I am still struggling with: Eating fresh vegetables and fruits.   I have made the following changes to my activity/exercise since my last visit: -   With regards to my activity/exercise, I am still struggling with: -       VITALS:  Ht 1.676 m (5' 6\")   Wt 117 kg (258 lb)   BMI 41.64 kg/m      MEDICATIONS: "   Current Outpatient Medications   Medication Sig Dispense Refill     albuterol (ACCUNEB) 1.25 MG/3ML neb solution Take 1 vial (1.25 mg) by nebulization every 6 hours as needed for shortness of breath / dyspnea or wheezing 90 mL 0     albuterol (PROAIR HFA/PROVENTIL HFA/VENTOLIN HFA) 108 (90 Base) MCG/ACT inhaler Inhale 2 puffs into the lungs 4 times daily 18 g 3     amphetamine-dextroamphetamine (ADDERALL XR) 30 MG 24 hr capsule TK 1 C PO D FOR ADHD       ARIPiprazole (ABILIFY) 20 MG tablet        buPROPion (WELLBUTRIN XL) 300 MG 24 hr tablet Take 300 mg by mouth every morning       BuPROPion HCl (WELLBUTRIN PO) Take 300 mg by mouth daily        drospirenone-ethinyl estradiol (JESSY) 3-0.02 MG tablet Take 1 tablet by mouth daily       EMGALITY 120 MG/ML injection        famotidine (PEPCID) 40 MG tablet TAKE 1 TABLET(40 MG) BY MOUTH DAILY 30 tablet 0     FLUoxetine (PROZAC) 40 MG capsule Take 40 mg by mouth daily       GLYCOPYRROLATE PO Take 2 mg by mouth daily       IBUPROFEN PO Take 600 mg by mouth       LANsoprazole (PREVACID) 30 MG DR capsule Take 1 capsule (30 mg) by mouth daily 90 capsule 3     metroNIDAZOLE (METROGEL) 0.75 % vaginal gel Place 1 applicator (5 g) vaginally daily After completion of oral flagyl 70 g 1     Semaglutide-Weight Management 0.5 MG/0.5ML SOAJ Inject 0.5 mg Subcutaneous once a week 6 mL 1     spironolactone (ALDACTONE) 100 MG tablet Take 100 mg by mouth         Weight Loss Medication History Reviewed With Patient 2/4/2022   Which weight loss medications are you currently taking on a regular basis?  -   Are you having any side effects from the weight loss medication that we have prescribed you? Yes   If you are having side effects please describe: I feel really full and throw up sometimes.       ASSESSMENT:   ***    xxx  xxx    --RTC 1 mo; nurse call in 1 wk  --hydration, stool softener occasionally, fiber for constipation  --continue same dose sema      PLAN:   {WT MGMT  SOLUTIONS:826465}  ***    FOLLOW-UP:    {WT MGMT RTC:516628}.    Time: *** min spent on evaluation, management, counseling, education, & motivational interviewing with greater than 50 % of the total time was spent on counseling and coordinating care    Sincerely,    Luis Slade MD

## 2022-02-04 NOTE — NURSING NOTE
"Chief Complaint   Patient presents with     RECHECK     Ret MwM       Vitals:    02/04/22 0846   Weight: 117 kg (258 lb)   Height: 1.676 m (5' 6\")       Body mass index is 41.64 kg/m .          THERESE GUZMÁN EMT      " [address]  [id number]  [facility] [address]  [facility]: [phone]  FAX: [phone]   Consent to Procedure/Sedation    Date: __4/28/2020_____    Time: ___12:54 PM ___    1.  I authorize the performanc Signature of Patient:  ___________________________    Signature of person authorized to consent for patient: Relationship to patient:  ___________________________    ___________________    Witness: Vida Connell RN____________________   Date: ___4/28/2020

## 2022-02-04 NOTE — PROGRESS NOTES
"Nehemias is a 26 year old who is being evaluated via a billable video visit.      How would you like to obtain your AVS? MyChart  If the video visit is dropped, the invitation should be resent by: Email:  [email or web address]  Will anyone else be joining your video visit? No    Video Start Time: 9:16  Video-Visit Details    Type of service:  Video Visit    Video End Time:9:25    Originating Location (pt. Location): Home    Distant Location (provider location):  [facility] [place]     Platform used for Video Visit: Continuity Software         [facility] Weight Management Note     Nehemias Mascorro  MRN:  [id number]  :  1995  JASMEET:  2022    Dear Alexandra Rodrigues MD,    I had the pleasure of seeing your patient Nehemias Mascorro. She is a 26 year old female who I am continuing to see for treatment of obesity; PMH includes the following:  Past Medical History:   Diagnosis Date     Depressive disorder      Uncomplicated asthma    Prediabetes ( HbA1c 5.7 and about 7 mo ago 5.9)    INTERVAL HISTORY:    She was last seen about 1.5 mo ago.  Reviewed and relevant history as extracted from earlier visits is as follows--    --she previosuly did the 24-wk program and has tried multiple meds for wt loss (including topiramate, addition of  Naltrexone to bupropion she is on, and most recently Wegovy; other meds are potentially problematic (phentermine, given that she is on Adderall). Additionally of note is the following:  -------------------------------------------  \"Note[d] the program [wa]s helping with making changes with food and be accountable, things she focussed on--  1. Working on protein shake AM  2. Eat slower/mindful eating--working on this  3.  Walking several times per week with her mother--not happening yet with [employer] work going     Regarding the GI issue she has been working on with [facility]--doing PT to work on coordinating with BMs; pain and nausea and " "constipation.  That is improving some   --getting nausea still a couple of times per week; stomach cramping about 3 times per month, stomach pain a couple of times per week  --BMs 3 times per week;  In the past there were times that would be less than once per week        Goals before around structured eating--  1.  Have protein drink (Terra's Way)--110 calories; breakfast routine lately has been higher calorie (eggs/yogurt/oatmeal)  2.  Add protein to snack if having a snack  3.  Change from fruit cups to whole fruit (meeting)       Goals she had prior:  1.  1400 Calories (Myfitnesspal, measuring food with scales, eating more whole foods)  2.  Activity goals--3 days per wk walking (30 min)  3.  Not eating after dinner (eat dinner and then stay busy after it)\"     -----------------------------------        EARLIER WEIGHT:    (11/10/21)    BMI:   Estimated body mass index is 43.45 kg/m  as calculated from the following:    Height: 1.651 m (5' 5\").    Weight: 118.4 kg (261 lb 1.6 oz).       More recently, on Wegovy 0.5mg weekly--  --N/V for 3 days post starting  --will stay on 0.5  --using miralax frequently (as much every other day) given constipation; discussed adding fiber and adequate hydration (approx 2L daily) and an occasional stool softener to help and with things improving can decrease use of miralax    CURRENT WEIGHT:   258 lbs 0 oz (last week)    Wt Readings from Last 3 Encounters:   02/04/22 117 kg (258 lb)   01/21/22 117.5 kg (259 lb 1.6 oz)   11/10/21 118.4 kg (261 lb 1.6 oz)                   Changes and Difficulties 2/4/2022   I have made the following changes to my diet since my last visit: -   With regards to my diet, I am still struggling with: Eating fresh vegetables and fruits.   I have made the following changes to my activity/exercise since my last visit: -   With regards to my activity/exercise, I am still struggling with: -       VITALS:  Ht 1.676 m (5' 6\")   Wt 117 kg (258 lb)   BMI 41.64 " kg/m      MEDICATIONS:   Current Outpatient Medications   Medication Sig Dispense Refill     albuterol (ACCUNEB) 1.25 MG/3ML neb solution Take 1 vial (1.25 mg) by nebulization every 6 hours as needed for shortness of breath / dyspnea or wheezing 90 mL 0     albuterol (PROAIR HFA/PROVENTIL HFA/VENTOLIN HFA) 108 (90 Base) MCG/ACT inhaler Inhale 2 puffs into the lungs 4 times daily 18 g 3     amphetamine-dextroamphetamine (ADDERALL XR) 30 MG 24 hr capsule TK 1 C PO D FOR ADHD       ARIPiprazole (ABILIFY) 20 MG tablet        buPROPion (WELLBUTRIN XL) 300 MG 24 hr tablet Take 300 mg by mouth every morning       BuPROPion HCl (WELLBUTRIN PO) Take 300 mg by mouth daily        drospirenone-ethinyl estradiol (JESSY) 3-0.02 MG tablet Take 1 tablet by mouth daily       EMGALITY 120 MG/ML injection        famotidine (PEPCID) 40 MG tablet TAKE 1 TABLET(40 MG) BY MOUTH DAILY 30 tablet 0     FLUoxetine (PROZAC) 40 MG capsule Take 40 mg by mouth daily       GLYCOPYRROLATE PO Take 2 mg by mouth daily       IBUPROFEN PO Take 600 mg by mouth       LANsoprazole (PREVACID) 30 MG DR capsule Take 1 capsule (30 mg) by mouth daily 90 capsule 3     metroNIDAZOLE (METROGEL) 0.75 % vaginal gel Place 1 applicator (5 g) vaginally daily After completion of oral flagyl 70 g 1     Semaglutide-Weight Management 0.5 MG/0.5ML SOAJ Inject 0.5 mg Subcutaneous once a week 6 mL 1     spironolactone (ALDACTONE) 100 MG tablet Take 100 mg by mouth         Weight Loss Medication History Reviewed With Patient 2/4/2022   Which weight loss medications are you currently taking on a regular basis?  -   Are you having any side effects from the weight loss medication that we have prescribed you? Yes   If you are having side effects please describe: I feel really full and throw up sometimes.          ASSESSMENT;  Morbid obesity in pt with wt gain over past few years, more since her TBI  Prediabetes     PLAN:   (continues)  Decrease portion sizes  No meals in front of  TV screen  Purge house of food triggers  No meal skipping and avoid snacking  Decrease caloric beverages--avoid soda  Volumetrics low carb eating plan--structured plan and avoding snacking  Low Calorie/low fat diet  Meal planning/journaling           Additionally she has had goals for lifestyle including--  Steps/activity  Prior had noted--4000 steps per day; change to 5000 was a goal before--could be a future goal, same with tracking for 1400 calories daily.  Readdress @ next visit  With food  1.  Eating out once per wk or less  2.  With meals  emphasizing lean protein with meals, lower carb, lots of nonstarchy veggies  2.  Being part of the grocery shopping ( to have healthier options at home)  3.  Avoiding high carb afternoon snacking      Also--  --advancing Wegovy as tolerated--for now will stay with 0.5mg weekly (if we can continue that--have an appeal going to insurance to continue this as it was denied for refill as of this note writing).  Discussed reducing portions and stopping when beginning to register fullness/satiety and good hydration/occasional stool softeners/fiber regularly to help her reduce of miralax for bowel irregulatiry  --return with me again in about 1 mo        Time: about 25 min spent on evaluation, management, counseling, education, & motivational interviewing (video visit) combined with previsit prep and post visit follow up care same day.    Sincerely,    Luis Slade MD

## 2022-02-04 NOTE — PATIENT INSTRUCTIONS
Thank you for allowing us the privilege of caring for you. We hope we provided you with the excellent service you deserve.    Please let us know if there is anything else we can do for you so that we can be sure you are completely satisfied with your care experience.    Your visit was with Dr. Slade today.    Instructions per today's visit:  --advancing Wegovy as tolerated--for now we can stay with 0.5mg weekly  --I need to let you know that after the visit I found out the renewal of this medication was denied by your insurance; we are submitting an appeal  --use mindful eating to help reduce side effects--try reducing portions (and using smaller plates/bowls) and stopping when beginning to register fullness/satiety even if you have not finished the food on your plate  --Please work on getting good hydration (about 2 liters or 64 oz daily) and you can use occasional stool softeners (like colace) and take a daily fiber supplement as directed (like psyllium) to help relieve constipation and allow you to  reduce of miralax for bowel irregulatiry  --we can plan for return with Dr. Salde again in about 1 mo to follow up on how this new medication is going    Please call our contact center at [phone] to schedule your next appointments.    Meal Replacement Products:    Here is the link to our new e-store where you can purchase our meal replacement products    [employer] E-Store  [email or web address]    The one week starter kit is a great way to sample a variety of products and see what works for you.    If you want more information about the product go to: Fresh MiMedia    Free Shipping for orders over $75    Benefits of meal replacements products:    Portion and calorie control  Improved nutrition  Structured eating  Simplified food choices  Avoid contact with trigger foods    Interested in working with a health ?  Health coaches work with you to improve your overall health and  wellbeing.  They look at the whole person, and may involve discussion of different areas of life, including, but not limited to the four pillars of health (sleep, exercise, nutrition, and stress management). Discuss with your care team if you would like to start working a health .    Health Coaching-3 Pack: Schedule by calling [phone]    $99 for three health coaching visits    Visits may be done in person or via phone    Coaching is a partnership between the  and the client; Coaches do not prescribe or diagnose    Coaching helps inspire the client to reach his/her personal goals    Bluetooth Scale:    We hope to provide you with high quality telephone and virtual healthcare visits while social distancing for COVID-19 is necessary, as well as in the future when virtual visits may be more convenient for you.    Our technology team made it possible for Bluetooth scales to send weight measurements to our electronic medical record. This allows weights from you weighing at home to securely flow into the medical record, which will improve telephone and virtual visits.  Additionally, studies have shown that adults actually lose more weight when their weights are automatically sent to someone else, and also that this process is not stressful for those adults.    Below is a link for purchasing the scale, with a discount code for our patients. You may call your insurance company to see if they will reimburse you for the cost of the scale, as a piece of durable medical equipment. The scales only go up to a weight of 400 pounds. This is an issue and we are working with the developer on increasing this. We found no scales that go over 400lb that have blue-tooth for connecting to [employer].    Scale to purchase: the [employer]  Body  Scale: [email or web address]/body/[email or web address]=aw.ds    Discount Code: We have a discount code for our patients to  bring the cost down to $50, the code is:  withmed     Steps to link the scale to MiMed[employer] Groupt via an Android Phone (you can always disconnect at any point in the future):  1. The order must be placed first before the patient can access Track My Health within MyCInvesticaret.  2. Download Google Fit junaid from the Google Play Store  a. Log in or register using your Google account  3. Download the MyChart junaid from Google Play Store  a. Select add organization  b. Search for [employer] and select it  c. Log into [employer]Med[employer] Groupt  d. Select Track My Health  e. Select the green connect my account button  f. When prompted log into your Google account  g. Select okay to confirm the account  4. Download the [employer] Health Mate junaid from Google Play Store  a. [place] for [employer]  b. Go to profile  c. Tap google fit under the Apps section  d. Select the option to activate Google Fit integration  e. Select the same Google account  f. Select okay to confirm the account  g.  Steps to link the scale to MiMed[employer] Groupt via an iPhone (you can always disconnect at any point in the future):  **Note [employer] is not available for download on an iPad**  1. The order must be placed first before the patient can access Track My Health within [employer]oupt.  2. Locate the Health junaid on your iPhone.  a. Set up your Apple Health account as prompted  b. The Sources page will show Apps that communicate with your Health junaid. Once all steps are completed, you should see Health Grooveshark and MyChart listed under the Apps section and your iPhone under the devices section.  i. Select Health Mate  1. Under 'ALLOW  HEALTH MATE  TO WRITE DATA ensure the toggle is on for Weight.  2. This will allow the scale to add your weight to the Ad.IQ Health  ii. Select MyChart  1. Under 'ALLOW  MYCHART  TO READ DATA ensure the toggle is on for Weight.  2. This allows [employer] Groupt to grab the weight from [employer] so your provider can see your weights.  3. Download the MyChart junaid from the Junaid Store  a.  Select add organization                                                  b. Search for [employer] and select it  c. Log into devsisters  d. Select Track My Health  e. Select the green connect my account button  f. Follow prompts to link your device to devsisters.  4. Download the Withings health mate junaid in the Junaid Store  a. [place] for [employer]  b. Go to profile  c. Tap Health under the Apps section  d. If prompted to allow access with the Health Junaid, toggle weight on for read and write access.      For any questions/concerns contact Susan Balderas LPN at [phone]    To schedule appointments with our team, please call [phone]    Please call during clinic hours Monday through Friday 8:00a - 4:00p if you have questions or you can contact us via devsisters at anytime.      Lab results will be communicated through My Chart or letter (if My Chart not used). Please call the clinic if you have not received communication after 1 week or if you have any questions.    Clinic Fax: [phone]    Thank you,  Medical Weight Management Team

## 2022-02-04 NOTE — LETTER
"2022       RE: Nehemias Mascorro  [address]  [place] [address]     Dear Colleague,    Thank you for referring your patient, Nehemias Mascorro, to the [facility] [place] at [facility]. Please see a copy of my visit note below.      Return Medical Weight Management Note     Nehemias Mascorro  MRN:  [id number]  :  1995  JASMEET:  2022    Dear Alexandra Rodrigues MD,    I had the pleasure of seeing your patient Nehemias Mascorro. She is a 26 year old female who I am continuing to see for treatment of obesity; PMH includes the following:  Past Medical History:   Diagnosis Date     Depressive disorder      Uncomplicated asthma    Prediabetes ( HbA1c 5.7 and about 7 mo ago 5.9)    INTERVAL HISTORY:    She was last seen about 1.5 mo ago.  Reviewed and relevant history as extracted from earlier visits is as follows--    --she previosuly did the 24-wk program and has tried multiple meds for wt loss (including topiramate, addition of  Naltrexone to bupropion she is on, and most recently Wegovy; other meds are potentially problematic (phentermine, given that she is on Adderall). Additionally of note is the following:  -------------------------------------------  \"Note[d] the program [wa]s helping with making changes with food and be accountable, things she focussed on--  1. Working on protein shake AM  2. Eat slower/mindful eating--working on this  3.  Walking several times per week with her mother--not happening yet with [employer] work going     Regarding the GI issue she has been working on with [facility]--doing PT to work on coordinating with BMs; pain and nausea and constipation.  That is improving some   --getting nausea still a couple of times per week; stomach cramping about 3 times per month, stomach pain a couple of times per week  --BMs 3 times per week;  In the past there were times that would be " "less than once per week        Goals before around structured eating--  1.  Have protein drink (Terra's Way)--110 calories; breakfast routine lately has been higher calorie (eggs/yogurt/oatmeal)  2.  Add protein to snack if having a snack  3.  Change from fruit cups to whole fruit (meeting)       Goals she had prior:  1.  1400 Calories (Myfitnesspal, measuring food with scales, eating more whole foods)  2.  Activity goals--3 days per wk walking (30 min)  3.  Not eating after dinner (eat dinner and then stay busy after it)\"     -----------------------------------        EARLIER WEIGHT:    (11/10/21)    BMI:   Estimated body mass index is 43.45 kg/m  as calculated from the following:    Height: 1.651 m (5' 5\").    Weight: 118.4 kg (261 lb 1.6 oz).       More recently, on Wegovy 0.5mg weekly--  --N/V for 3 days post starting  --will stay on 0.5  --using miralax frequently (as much every other day) given constipation; discussed adding fiber and adequate hydration (approx 2L daily) and an occasional stool softener to help and with things improving can decrease use of miralax    CURRENT WEIGHT:   258 lbs 0 oz (last week)    Wt Readings from Last 3 Encounters:   02/04/22 117 kg (258 lb)   01/21/22 117.5 kg (259 lb 1.6 oz)   11/10/21 118.4 kg (261 lb 1.6 oz)                   Changes and Difficulties 2/4/2022   I have made the following changes to my diet since my last visit: -   With regards to my diet, I am still struggling with: Eating fresh vegetables and fruits.   I have made the following changes to my activity/exercise since my last visit: -   With regards to my activity/exercise, I am still struggling with: -       VITALS:  Ht 1.676 m (5' 6\")   Wt 117 kg (258 lb)   BMI 41.64 kg/m      MEDICATIONS:   Current Outpatient Medications   Medication Sig Dispense Refill     albuterol (ACCUNEB) 1.25 MG/3ML neb solution Take 1 vial (1.25 mg) by nebulization every 6 hours as needed for shortness of breath / dyspnea or " wheezing 90 mL 0     albuterol (PROAIR HFA/PROVENTIL HFA/VENTOLIN HFA) 108 (90 Base) MCG/ACT inhaler Inhale 2 puffs into the lungs 4 times daily 18 g 3     amphetamine-dextroamphetamine (ADDERALL XR) 30 MG 24 hr capsule TK 1 C PO D FOR ADHD       ARIPiprazole (ABILIFY) 20 MG tablet        buPROPion (WELLBUTRIN XL) 300 MG 24 hr tablet Take 300 mg by mouth every morning       BuPROPion HCl (WELLBUTRIN PO) Take 300 mg by mouth daily        drospirenone-ethinyl estradiol (JESSY) 3-0.02 MG tablet Take 1 tablet by mouth daily       EMGALITY 120 MG/ML injection        famotidine (PEPCID) 40 MG tablet TAKE 1 TABLET(40 MG) BY MOUTH DAILY 30 tablet 0     FLUoxetine (PROZAC) 40 MG capsule Take 40 mg by mouth daily       GLYCOPYRROLATE PO Take 2 mg by mouth daily       IBUPROFEN PO Take 600 mg by mouth       LANsoprazole (PREVACID) 30 MG DR capsule Take 1 capsule (30 mg) by mouth daily 90 capsule 3     metroNIDAZOLE (METROGEL) 0.75 % vaginal gel Place 1 applicator (5 g) vaginally daily After completion of oral flagyl 70 g 1     Semaglutide-Weight Management 0.5 MG/0.5ML SOAJ Inject 0.5 mg Subcutaneous once a week 6 mL 1     spironolactone (ALDACTONE) 100 MG tablet Take 100 mg by mouth         Weight Loss Medication History Reviewed With Patient 2/4/2022   Which weight loss medications are you currently taking on a regular basis?  -   Are you having any side effects from the weight loss medication that we have prescribed you? Yes   If you are having side effects please describe: I feel really full and throw up sometimes.          ASSESSMENT;  Morbid obesity in pt with wt gain over past few years, more since her TBI  Prediabetes     PLAN:   (continues)  Decrease portion sizes  No meals in front of TV screen  Purge house of food triggers  No meal skipping and avoid snacking  Decrease caloric beverages--avoid soda  Volumetrics low carb eating plan--structured plan and avoding snacking  Low Calorie/low fat diet  Meal  planning/journaling           Additionally she has had goals for lifestyle including--  Steps/activity  Prior had noted--4000 steps per day; change to 5000 was a goal before--could be a future goal, same with tracking for 1400 calories daily.  Readdress @ next visit  With food  1.  Eating out once per wk or less  2.  With meals  emphasizing lean protein with meals, lower carb, lots of nonstarchy veggies  2.  Being part of the grocery shopping ( to have healthier options at home)  3.  Avoiding high carb afternoon snacking      Also--  --advancing Wegovy as tolerated--for now will stay with 0.5mg weekly (if we can continue that--have an appeal going to insurance to continue this as it was denied for refill as of this note writing).  Discussed reducing portions and stopping when beginning to register fullness/satiety and good hydration/occasional stool softeners/fiber regularly to help her reduce of miralax for bowel irregulatiry  --return with me again in about 1 mo        Time: about 25 min spent on evaluation, management, counseling, education, & motivational interviewing (video visit) combined with previsit prep and post visit follow up care same day.    Sincerely,    Luis Slade MD

## 2022-02-06 NOTE — TELEPHONE ENCOUNTER
Central Prior Authorization Team   Phone: [phone]      Medication Appeal Initiation    We have initiated an appeal for the requested medication:  Medication: Wegovy 0.5 MG-PA appeal initiated  Appeal Start Date:  2/6/2022  Insurance Company: [employer] - Phone [phone] Fax [phone]  Comments:  Patient has [facility] for 2022; [id number]; CHERRY; [id number]  Appeal and letter of medical necessity sent to [facility]

## 2022-02-10 ENCOUNTER — TELEPHONE (OUTPATIENT)
Dept: ENDOCRINOLOGY | Facility: CLINIC | Age: 27
End: 2022-02-10
Payer: COMMERCIAL

## 2022-02-10 NOTE — TELEPHONE ENCOUNTER
ELPIDIO and sent [facility] pt to schedule return in about 1 month (around 3/4/2022) with Dr. Slade.

## 2022-02-16 NOTE — TELEPHONE ENCOUNTER
I spoke to Raymond at [employer] to get update on appeal status. Jason (Teresita) at [facility] states this was approved and letter was sent today. I've asked her to fax it to me, which she will do so. Will update when received. Pharmacy was notified.

## 2022-02-17 NOTE — TELEPHONE ENCOUNTER
MEDICATION APPEAL APPROVED-I have not been able to get the approval faxed to me.    Medication: Wegovy 0.5 MG-PA appeal approved  Authorization Effective Date: 1/10/2022  Authorization Expiration Date: 5/10/2022  Approved Dose/Quantity:   Reference #:     Insurance Company: [employer] - Phone [phone] Fax [phone]  Expected CoPay:       CoPay Card Available:      Foundation Assistance Needed:    Which Pharmacy is filling the prescription (Not needed for infusion/clinic administered): [id number] - [place], [place] - [address] [place] [address] AT [facility]

## 2022-03-01 DIAGNOSIS — E66.01 MORBID OBESITY DUE TO EXCESS CALORIES (H): Primary | ICD-10-CM

## 2022-03-01 RX ORDER — SEMAGLUTIDE 1 MG/.5ML
1 INJECTION, SOLUTION SUBCUTANEOUS WEEKLY
Qty: 2 ML | Refills: 0 | Status: SHIPPED | OUTPATIENT
Start: 2022-03-01 | End: 2022-04-04

## 2022-03-01 NOTE — TELEPHONE ENCOUNTER
Patient requesting next dose of Wegovy. She is currently taking 0.5mg weekly and tolerating. Sending 1mg weekly dose.

## 2022-03-08 ENCOUNTER — MYC MEDICAL ADVICE (OUTPATIENT)
Dept: INTERNAL MEDICINE | Facility: CLINIC | Age: 27
End: 2022-03-08
Payer: COMMERCIAL

## 2022-03-08 DIAGNOSIS — R53.83 OTHER FATIGUE: Primary | ICD-10-CM

## 2022-03-10 ENCOUNTER — LAB (OUTPATIENT)
Dept: LAB | Facility: CLINIC | Age: 27
End: 2022-03-10
Payer: COMMERCIAL

## 2022-03-10 DIAGNOSIS — R53.83 OTHER FATIGUE: ICD-10-CM

## 2022-03-10 LAB
ERYTHROCYTE [DISTWIDTH] IN BLOOD BY AUTOMATED COUNT: 13.2 % (ref 10–15)
FERRITIN SERPL-MCNC: 18 NG/ML (ref 10–130)
HCT VFR BLD AUTO: 44.1 % (ref 35–47)
HGB BLD-MCNC: 14.3 G/DL (ref 11.7–15.7)
IRON SATN MFR SERPL: 12 % (ref 15–46)
IRON SERPL-MCNC: 50 UG/DL (ref 35–180)
MCH RBC QN AUTO: 28.7 PG (ref 26.5–33)
MCHC RBC AUTO-ENTMCNC: 32.4 G/DL (ref 31.5–36.5)
MCV RBC AUTO: 88 FL (ref 78–100)
PLATELET # BLD AUTO: 290 10E3/UL (ref 150–450)
RBC # BLD AUTO: 4.99 10E6/UL (ref 3.8–5.2)
TIBC SERPL-MCNC: 421 UG/DL (ref 240–430)
TSH SERPL DL<=0.005 MIU/L-ACNC: 0.89 UIU/ML (ref 0.3–5)
WBC # BLD AUTO: 5 10E3/UL (ref 4–11)

## 2022-03-10 PROCEDURE — 36415 COLL VENOUS BLD VENIPUNCTURE: CPT

## 2022-03-10 PROCEDURE — 82306 VITAMIN D 25 HYDROXY: CPT

## 2022-03-10 PROCEDURE — 85027 COMPLETE CBC AUTOMATED: CPT

## 2022-03-10 PROCEDURE — 82728 ASSAY OF FERRITIN: CPT

## 2022-03-10 PROCEDURE — 84443 ASSAY THYROID STIM HORMONE: CPT

## 2022-03-10 PROCEDURE — 83550 IRON BINDING TEST: CPT

## 2022-03-11 LAB — DEPRECATED CALCIDIOL+CALCIFEROL SERPL-MC: 63 UG/L (ref 30–80)

## 2022-04-04 DIAGNOSIS — E66.01 MORBID OBESITY DUE TO EXCESS CALORIES (H): Primary | ICD-10-CM

## 2022-04-04 RX ORDER — SEMAGLUTIDE 1.7 MG/.75ML
1.7 INJECTION, SOLUTION SUBCUTANEOUS WEEKLY
Qty: 3 ML | Refills: 0 | Status: SHIPPED | OUTPATIENT
Start: 2022-04-04 | End: 2022-04-22

## 2022-04-04 NOTE — TELEPHONE ENCOUNTER
Patient requesting an increase on her Wegovy. Sent next dose of 1.7mg to pharmacy. Patient has follow up appointment with Dr. Slade in 2 weeks.

## 2022-04-06 ENCOUNTER — MYC MEDICAL ADVICE (OUTPATIENT)
Dept: INTERNAL MEDICINE | Facility: CLINIC | Age: 27
End: 2022-04-06
Payer: COMMERCIAL

## 2022-04-06 DIAGNOSIS — K58.9 IRRITABLE BOWEL SYNDROME WITHOUT DIARRHEA: Primary | ICD-10-CM

## 2022-04-11 ENCOUNTER — E-VISIT (OUTPATIENT)
Dept: URGENT CARE | Facility: URGENT CARE | Age: 27
End: 2022-04-11
Payer: COMMERCIAL

## 2022-04-11 DIAGNOSIS — N94.9 VAGINAL DISCOMFORT: Primary | ICD-10-CM

## 2022-04-11 DIAGNOSIS — N89.8 VAGINAL DISCHARGE: Primary | ICD-10-CM

## 2022-04-11 PROCEDURE — 99207 PR NO CHARGE LOS: CPT | Performed by: PHYSICIAN ASSISTANT

## 2022-04-11 NOTE — PATIENT INSTRUCTIONS
Thank you for choosing us for your care. Given your symptoms, I would like you to do a lab-only visit to determine what is causing them.  I have placed the orders.  Please schedule an appointment with the lab right here in [facility], or call [phone].  I will let you know when the results are back and next steps to take.    Preventing Vaginitis     Use mild, unscented soap when you bathe or shower to avoid irritating your vagina.    Vaginitis is irritation or infection of the vagina or the outside opening of it (vulva). Vaginitis can be caused by bacteria, viruses, parasites, or yeast. Chemicals such as in perfumes or soaps or in spermicides can sometimes be a cause. Vaginitis can be caused by hormone changes in pregnancy or with menopause. You can help prevent vaginitis. Follow the tips below. And see your healthcare provider if you have any symptoms.   Hygiene    Stay away from chemicals. Don't use vaginal sprays. Don't use scented toilet paper or tampons that are scented. Sprays and scents have chemicals that can irritate your vagina.    Don't douche unless you are told to by your healthcare provider. Douching is rarely needed. And it upsets the normal balance in the vagina.    Wash yourself well. Wash the outer vaginal area (vulva) every day with mild, unscented soap. Keep it as dry as possible.    Wipe correctly. Make sure to wipe from front to back after a bowel movement. This helps keep from spreading bacteria from your anus to your vagina.    Change your tampon often. During your period, make sure to change your tampon as often as directed on the package. This allows the normal flow of vaginal discharge and blood.    Lifestyle    Limit your number of sexual partners. The more partners you have, the greater your risk of infection. Using condoms helps reduce your risk.    Get enough sleep. Sleep helps keep your body s immune system healthy. This helps you fight infection.    Lose weight, if needed. Excess  weight can reduce air circulation around your vagina. This can increase your risk of infection.    Exercise regularly. Regular activity helps keep your body healthy.    Take antibiotics only as directed. Antibiotics can change the normal chemical balance in the vagina.      Clothing    Don t sit in wet clothes. Yeast thrives when it s warm and damp.    Don t wear tight pants. And don t wear tights, leggings, or hose without a cotton crotch. These types of clothing trap warmth and moisture.    Wear cotton underwear. Cotton lets air circulate around the vagina.    Symptoms of vaginitis    Irritation, swelling, or itching of the genital area    Vaginal discharge    Bad vaginal odor    Pain or burning during urination    StayWell last reviewed this educational content on 11/1/2019 2000-2021 [employer]. All rights reserved. This information is not intended as a substitute for professional medical care. Always follow your healthcare professional's instructions.

## 2022-04-12 ENCOUNTER — LAB (OUTPATIENT)
Dept: LAB | Facility: CLINIC | Age: 27
End: 2022-04-12
Payer: COMMERCIAL

## 2022-04-12 DIAGNOSIS — N89.8 VAGINAL DISCHARGE: Primary | ICD-10-CM

## 2022-04-12 LAB
CLUE CELLS: ABNORMAL
TRICHOMONAS, WET PREP: ABNORMAL
WBC'S/HIGH POWER FIELD, WET PREP: ABNORMAL
YEAST, WET PREP: ABNORMAL

## 2022-04-12 PROCEDURE — 87210 SMEAR WET MOUNT SALINE/INK: CPT

## 2022-04-14 ENCOUNTER — TELEPHONE (OUTPATIENT)
Dept: INTERNAL MEDICINE | Facility: CLINIC | Age: 27
End: 2022-04-14
Payer: COMMERCIAL

## 2022-04-14 DIAGNOSIS — R19.7 DIARRHEA, UNSPECIFIED TYPE: Primary | ICD-10-CM

## 2022-04-14 NOTE — TELEPHONE ENCOUNTER
Reason for Call:  Requesting Call Back    Detailed comments: patient under the impression she would be have colonoscopy with [facility] - the referral is for a consultation.  Per patient the first available appt for consultation is 9/15/2022.    Patient does not want to wait that long, the date can change if PCP feels this is urgent and or she is referred to another Gastro provider    Requesting a call back with direction from Dr. Rodrigues how to proceed.     Phone Number Patient can be reached at: Cell number on file:    Telephone Information:   Mobile [phone]       Best Time: Any    Can we leave a detailed message on this number? YES     Sending a [employer] message is also an option to contact patient.    Call taken on 4/14/2022 at 9:27 AM by Marimar Pelaez

## 2022-04-15 ENCOUNTER — TELEPHONE (OUTPATIENT)
Dept: GASTROENTEROLOGY | Facility: CLINIC | Age: 27
End: 2022-04-15
Payer: COMMERCIAL

## 2022-04-15 DIAGNOSIS — Z11.59 ENCOUNTER FOR SCREENING FOR OTHER VIRAL DISEASES: Primary | ICD-10-CM

## 2022-04-15 NOTE — TELEPHONE ENCOUNTER
Caller: Immanuelle    Procedure: Colon    Date, Location, and Surgeon of Procedure Cancelled: 5/23/22 [facility]    Ordering Provider:Ravi    Reason for cancel (please be detailed, any staff messages or encounters to note?): Patient        Rescheduled: Yes     If rescheduled:    Date: 4/27/22   Location:    Prep Resent: None(changes to prep?)   Covid Test Rescheduled: Yes    Note any change or update to original order/sedation: None

## 2022-04-15 NOTE — TELEPHONE ENCOUNTER
Screening Questions  BlueKIND OF PREP RedLOCATION [review exclusion criteria] GreenSEDATION TYPE  1. Have you had a positive covid test in the last 90 days? N     2. Do you have a legal guardian or medical Power of ?  Are you able to give consent for your medical care?Y (Sedation review/consideration needed)    3. Are you active on mychart? Y    4. What insurance is in the chart? [employer]     3.   Ordering/Referring Provider: Alexandra Rodrigues MD     4. BMI 39.5 [BMI OVER 40-EXTENDED PREP]  If greater than 40 review exclusion criteria [PAC APPT IF @ [facility]]        5.  Respiratory Screening :  [If yes to any of the following HOSPITAL setting only]     Do you use daily home oxygen? N    Do you have mod to severe Obstructive Sleep Apnea? N  [OKAY @ [facility] SH PH RI]   Do you have Pulmonary Hypertension? N     Do you have UNCONTROLLED asthma? N        6.   Have you had a heart or lung transplant? N      7.   Are you currently on dialysis? N [ If yes, G-PREP & HOSPITAL setting only]     8.   Do you have chronic kidney disease? N [ If yes, G-PREP ]    9.   Have you had a stroke or Transient ischemic attack (TIA - aka  mini stroke ) within 6 months?  N (If yes, please review exclusion criteria)    10.   In the past 6 months, have you had any heart related issues including cardiomyopathy or heart attack? N           If yes, did it require cardiac stenting or other implantable device? N      11.   Do you have any implantable devices in your body (pacemaker, defib, LVAD)? N (If yes, please review exclusion criteria)    12.   Do you take nitroglycerin? N           If yes, how often? N  (if yes, HOSPITAL setting ONLY)    13.   Are you currently taking any blood thinners? N           [IF YES, INFORM PATIENT TO FOLLOW UP W/ ORDERING PROVIDER FOR BRIDGING INSTRUCTIONS]     14.   Do you have a diagnosis of diabetes? N   [ If yes, G-PREP ]    15.   [FEMALES] Are you currently pregnant? N    If yes, how many weeks? N    16.    Are you taking any prescription pain medications on a routine schedule?  N  [ If yes, EXTENDED PREP.] [If yes, MAC]    17.   Do you have any chemical dependencies such as alcohol, street drugs, or methadone?  N [If yes, MAC]    18.   Do you have any history of post-traumatic stress syndrome, severe anxiety or history of psychosis?  N  [If yes, MAC]    19.   Do you transfer independently?  Y    20.  On a regular basis do you go 3-5 days between bowel movements? Y   [ If yes, EXTENDED PREP.]    21.   Preferred LOCAL Pharmacy for Pre Prescription   [id number] - [place], [place] - [address] AT [facility]      Scheduling Details      Caller :   (Please ask for phone number if not scheduled by patient)    Type of Procedure Scheduled: COLON  Which Colonoscopy Prep was Sent?: EXTENDED  [facility] PATIENTS & [facility] PATIENTS NEEDS EXTENDED PREP  Surgeon: MANDY  Date of Procedure: 5/23  Location: [facility]      Sedation Type: CS  Conscious Sedation- Needs  for 6 hours after the procedure  MAC/General-Needs  for 24 hours after procedure    Pre-op Required at [facility] [place], [place] and OR for MAC sedation: N  (advise patient they will need a pre-op prior to procedure -)      Informed patient they will need an adult  Y  Cannot take any type of public or medical transportation alone    Pre-Procedure Covid test to be completed at [facility] or Externally: 5/19    Confirmed Nurse will call to complete assessment Y    Additional comments:           Yes

## 2022-04-20 ENCOUNTER — VIRTUAL VISIT (OUTPATIENT)
Dept: INTERNAL MEDICINE | Facility: CLINIC | Age: 27
End: 2022-04-20
Payer: COMMERCIAL

## 2022-04-20 DIAGNOSIS — K58.2 IRRITABLE BOWEL SYNDROME WITH BOTH CONSTIPATION AND DIARRHEA: Primary | ICD-10-CM

## 2022-04-20 PROCEDURE — 99213 OFFICE O/P EST LOW 20 MIN: CPT | Mod: 95 | Performed by: INTERNAL MEDICINE

## 2022-04-20 RX ORDER — FERROUS SULFATE 325(65) MG
325 TABLET ORAL
COMMUNITY
Start: 2022-02-23 | End: 2022-08-18

## 2022-04-20 RX ORDER — DEXTROAMPHETAMINE SACCHARATE, AMPHETAMINE ASPARTATE, DEXTROAMPHETAMINE SULFATE AND AMPHETAMINE SULFATE 2.5; 2.5; 2.5; 2.5 MG/1; MG/1; MG/1; MG/1
10 TABLET ORAL DAILY
COMMUNITY
End: 2022-11-16

## 2022-04-20 RX ORDER — DICYCLOMINE HCL 20 MG
20 TABLET ORAL 3 TIMES DAILY PRN
Qty: 90 TABLET | Refills: 1 | Status: SHIPPED | OUTPATIENT
Start: 2022-04-20 | End: 2022-08-18

## 2022-04-20 ASSESSMENT — ASTHMA QUESTIONNAIRES
QUESTION_1 LAST FOUR WEEKS HOW MUCH OF THE TIME DID YOUR ASTHMA KEEP YOU FROM GETTING AS MUCH DONE AT WORK, SCHOOL OR AT HOME: NONE OF THE TIME
QUESTION_5 LAST FOUR WEEKS HOW WOULD YOU RATE YOUR ASTHMA CONTROL: COMPLETELY CONTROLLED
QUESTION_4 LAST FOUR WEEKS HOW OFTEN HAVE YOU USED YOUR RESCUE INHALER OR NEBULIZER MEDICATION (SUCH AS ALBUTEROL): NOT AT ALL
ACT_TOTALSCORE: 25
ACT_TOTALSCORE: 25
QUESTION_3 LAST FOUR WEEKS HOW OFTEN DID YOUR ASTHMA SYMPTOMS (WHEEZING, COUGHING, SHORTNESS OF BREATH, CHEST TIGHTNESS OR PAIN) WAKE YOU UP AT NIGHT OR EARLIER THAN USUAL IN THE MORNING: NOT AT ALL
QUESTION_2 LAST FOUR WEEKS HOW OFTEN HAVE YOU HAD SHORTNESS OF BREATH: NOT AT ALL

## 2022-04-20 NOTE — PROGRESS NOTES
"Nehemias is a 26 year old who is being evaluated via a billable video visit.      How would you like to obtain your AVS? MyChart  If the video visit is dropped, the invitation should be resent by: Text to cell phone: [phone]  Will anyone else be joining your video visit? No    Video Start Time: 440 pm     Assessment & Plan     Irritable bowel syndrome with both constipation and diarrhea  Symptoms very typical of that . She does have some weight loss and night time diarrhea . Will get a diagnostic colonoscopy she is struggling with the pain we will start with dicyclomine.  Preliminary blood work was negative.  She can still consult with GI if symptoms persist.  Unlikely to be significant malabsorption.//   dyyclomine (BENTYL) 20 MG tablet  Dispense: 90 tablet; Refill: 1      She is stressed because of school and is feeling overwhelmed .   This may be triggering her IBS.   she can talk to her college counselor to see if any adjustments can be made due to her major depression diagnosis       BMI:   Estimated body mass index is 40.03 kg/m  as calculated from the following:    Height as of 4/22/22: 1.676 m (5' 6\").    Weight as of 4/22/22: 112.5 kg (248 lb).           Return for Routine preventive.    Alexandra Rodrigues MD  [facility]    Subjective   Nehemias is a 26 year old who presents for the following health issues     HPI     Patient Active Problem List   Diagnosis     Hip pain     Obesity     Anxiety     Major depressive disorder     Morbid obesity (H)     Special screening examination for human papillomavirus (HPV)     Gastroesophageal reflux disease     Asymptomatic COVID-19 virus infection     Vaginitis and vulvovaginitis     Current Outpatient Medications   Medication     albuterol (ACCUNEB) 1.25 MG/3ML neb solution     albuterol (PROAIR HFA/PROVENTIL HFA/VENTOLIN HFA) 108 (90 Base) MCG/ACT inhaler     amphetamine-dextroamphetamine (ADDERALL XR) 30 MG 24 hr capsule     " amphetamine-dextroamphetamine (ADDERALL) 10 MG tablet     ARIPiprazole (ABILIFY) 20 MG tablet     buPROPion (WELLBUTRIN XL) 300 MG 24 hr tablet     dicyclomine (BENTYL) 20 MG tablet     drospirenone-ethinyl estradiol (JESSY) 3-0.02 MG tablet     EMGALITY 120 MG/ML injection     FEROSUL 325 (65 Fe) MG tablet     FLUoxetine (PROZAC) 20 MG capsule     FLUoxetine (PROZAC) 40 MG capsule     GLYCOPYRROLATE PO     IBUPROFEN PO     LANsoprazole (PREVACID) 30 MG DR capsule     metroNIDAZOLE (METROGEL) 0.75 % vaginal gel     spironolactone (ALDACTONE) 100 MG tablet     Semaglutide-Weight Management (WEGOVY) 1.7 MG/0.75ML SOAJ     No current facility-administered medications for this visit.           Review of Systems   Constitutional, HEENT, cardiovascular, pulmonary, gi and gu systems are negative, except as otherwise noted.      Objective         Vitals:  No vitals were obtained today due to virtual visit.    Physical Exam   GENERAL: Healthy, alert and no distress  EYES: Eyes grossly normal to inspection.  No discharge or erythema, or obvious scleral/conjunctival abnormalities.  RESP: No audible wheeze, cough, or visible cyanosis.  No visible retractions or increased work of breathing.    RESP:   SKIN: Visible skin clear. No significant rash, abnormal pigmentation or lesions.  NEURO: Cranial nerves grossly intact.  Mentation and speech appropriate for age.  PSYCH: Mentation appears normal, affect normal/bright, judgement and insight intact, normal speech and appearance well-groomed.            Video-Visit Details    Type of service:  Video Visit    Video End Time:5 pm     Originating Location (pt. Location): Home    Distant Location (provider location):  [facility]     Platform used for Video Visit: [employer]

## 2022-04-22 ENCOUNTER — TELEPHONE (OUTPATIENT)
Dept: ENDOCRINOLOGY | Facility: CLINIC | Age: 27
End: 2022-04-22

## 2022-04-22 ENCOUNTER — VIRTUAL VISIT (OUTPATIENT)
Dept: ENDOCRINOLOGY | Facility: CLINIC | Age: 27
End: 2022-04-22
Payer: COMMERCIAL

## 2022-04-22 VITALS — HEIGHT: 66 IN | WEIGHT: 248 LBS | BODY MASS INDEX: 39.86 KG/M2

## 2022-04-22 DIAGNOSIS — E66.01 MORBID OBESITY DUE TO EXCESS CALORIES (H): ICD-10-CM

## 2022-04-22 PROCEDURE — 99214 OFFICE O/P EST MOD 30 MIN: CPT | Mod: 95 | Performed by: INTERNAL MEDICINE

## 2022-04-22 RX ORDER — SEMAGLUTIDE 1.7 MG/.75ML
1.7 INJECTION, SOLUTION SUBCUTANEOUS WEEKLY
Qty: 3 ML | Refills: 1 | Status: SHIPPED | OUTPATIENT
Start: 2022-04-22 | End: 2022-05-24

## 2022-04-22 ASSESSMENT — PAIN SCALES - GENERAL: PAINLEVEL: NO PAIN (0)

## 2022-04-22 NOTE — TELEPHONE ENCOUNTER
lvm to [facility] 4 weeks 5/22/22 with Lauren Bloch; 2-3 7/22/22 mo with Dr. Slade

## 2022-04-22 NOTE — PATIENT INSTRUCTIONS
Thank you for allowing us the privilege of caring for you. We hope we provided you with the excellent service you deserve.    Please let us know if there is anything else we can do for you so that we can be sure you are completely satisfied with your care experience.    Your visit was with Dr. Slade today.    Instructions per today's visit:  --you can add Benefiber or Metamucil daily as fiber sources, with a lot of water, as directed, to help with constipation  --we can plan returns with Lauren Bloch in about 4 wks and Dr. Slade in about 2-3 mo  --please connect with your primary care physician regarding the upper GI symptoms you have been having      Please call our contact center at [phone] to schedule your next appointments.    Meal Replacement Products:    Here is the link to our new e-store where you can purchase our meal replacement products    [email or web address]/store    The one week starter kit is a great way to sample a variety of products and see what works for you.    If you want more information about the product go to: [employer]    Free Shipping for orders over $75    Benefits of meal replacements products:    Portion and calorie control  Improved nutrition  Structured eating  Simplified food choices  Avoid contact with trigger foods    Interested in working with a health ?  Health coaches work with you to improve your overall health and wellbeing.  They look at the whole person, and may involve discussion of different areas of life, including, but not limited to the four pillars of health (sleep, exercise, nutrition, and stress management). Discuss with your care team if you would like to start working a health .    Health Coaching-3 Pack: Schedule by calling [phone]    $99 for three health coaching visits    Visits may be done in person or via phone    Coaching is a partnership between the  and the client; Coaches do not  prescribe or diagnose    Coaching helps inspire the client to reach his/her personal goals    Bluetooth Scale:    We hope to provide you with high quality telephone and virtual healthcare visits while social distancing for COVID-19 is necessary, as well as in the future when virtual visits may be more convenient for you.    Our technology team made it possible for Bluetooth scales to send weight measurements to our electronic medical record. This allows weights from you weighing at home to securely flow into the medical record, which will improve telephone and virtual visits.  Additionally, studies have shown that adults actually lose more weight when their weights are automatically sent to someone else, and also that this process is not stressful for those adults.    Below is a link for purchasing the scale, with a discount code for our patients. You may call your insurance company to see if they will reimburse you for the cost of the scale, as a piece of durable medical equipment. The scales only go up to a weight of 400 pounds. This is an issue and we are working with the developer on increasing this. We found no scales that go over 400lb that have blue-tooth for connecting to [facility].    Scale to purchase: the [employer]  Body  Scale: [email or web address]=aw.ds    Discount Code: We have a discount code for our patients to bring the cost down to $50, the code is:  withmed     Steps to link the scale to [facility] via an Android Phone (you can always disconnect at any point in the future):  1. The order must be placed first before the patient can access Track My Health within [facility].  2. Download Google Fit robby from the Google Play Store  a. Log in or register using your Google account  3. Download the [facility] robby from Google Play Store  a. Select add organization  b. Search for [employer] and select it  c. Log into [facility]  d.  Select Track [employer] Health  e. Select the green connect my account button  f. When prompted log into your Google account  g. Select okay to confirm the account  4. Download the [facility] Health Mate junaid from Google Play Store  a. [place] [facility]  b. Go to profile  c. Tap google fit under the Apps section  d. Select the option to activate Google Fit integration  e. Select the same Google account  f. Select okay to confirm the account  g.  Steps to link the scale to [facility] via an iPhone (you can always disconnect at any point in the future):  **Note GigsJam is not available for download on an iPad**  1. The order must be placed first before the patient can access [employer]String Health within [facility].  2. Locate the Health junaid on your iPhone.  a. Set up your Apple Health account as prompted  b. The Sources page will show Apps that communicate with your Health junaid. Once all steps are completed, you should see [employer]alk and [employer] listed under the Apps section and your iPhone under the devices section.  i. Select Health Mate  1. Under 'ALLOW  HEALTH MATE  TO WRITE DATA ensure the toggle is on for Weight.  2. This will allow the scale to add your weight to the "[employer] Health  ii. Select OTI Greent[employer]hart  1. Under 'ALLOW  AlignAlyticsT  TO READ DATA ensure the toggle is on for Weight.  2. This allows [facility] to grab the weight from GigsJam so your provider can see your weights.  3. Download the [employer] junaid from the Junaid Store  a. Select add organization                                                  b. Search for [employer] and select it  c. Log into [facility]  d. Select Track [employer] Health  e. Select the green connect my account button  f. Follow prompts to link your device to [facility].  4. Download the [facility] health mate junaid in the Junaid Store  a. [place] [employer] [facility]  b. Go to profile  c. Tap Health under the Apps section  d. If prompted to allow access with the Health Junaid, toggle weight on for read and write access.      For any  questions/concerns contact Susan Balderas LPN at [phone]    To schedule appointments with our team, please call [phone]    Please call during clinic hours Monday through Friday 8:00a - 4:00p if you have questions or you can contact us via Nveloped at anytime.      Lab results will be communicated through My Chart or letter (if My Chart not used). Please call the clinic if you have not received communication after 1 week or if you have any questions.    Clinic Fax: [phone]    Thank you,  Medical Weight Management Team

## 2022-04-22 NOTE — NURSING NOTE
"(   Chief Complaint   Patient presents with     RECHECK     Return MW    )    ( Weight: 112.5 kg (248 lb) (Patient reported) )  ( Height: 167.6 cm (5' 6\") )  ( BMI (Calculated): 40.03 )  (   )  (   )  (   )  (   )  (   )  (   )    (   )  (   )  (   )  (   )  (   )  (   )  (   )    (   Patient Active Problem List   Diagnosis     Hip pain     Obesity     Anxiety     Major depressive disorder     Morbid obesity (H)     Special screening examination for human papillomavirus (HPV)     Gastroesophageal reflux disease     Asymptomatic COVID-19 virus infection     Vaginitis and vulvovaginitis    )  (   Current Outpatient Medications   Medication Sig Dispense Refill     albuterol (ACCUNEB) 1.25 MG/3ML neb solution Take 1 vial (1.25 mg) by nebulization every 6 hours as needed for shortness of breath / dyspnea or wheezing 90 mL 0     albuterol (PROAIR HFA/PROVENTIL HFA/VENTOLIN HFA) 108 (90 Base) MCG/ACT inhaler Inhale 2 puffs into the lungs 4 times daily 18 g 3     amphetamine-dextroamphetamine (ADDERALL XR) 30 MG 24 hr capsule TK 1 C PO D FOR ADHD       amphetamine-dextroamphetamine (ADDERALL) 10 MG tablet Take 10 mg by mouth 2 times daily       ARIPiprazole (ABILIFY) 20 MG tablet        buPROPion (WELLBUTRIN XL) 300 MG 24 hr tablet Take 300 mg by mouth every morning       dicyclomine (BENTYL) 20 MG tablet Take 1 tablet (20 mg) by mouth 3 times daily as needed (abdominal pain) 90 tablet 1     drospirenone-ethinyl estradiol (JESSY) 3-0.02 MG tablet Take 1 tablet by mouth daily       EMGALITY 120 MG/ML injection        FEROSUL 325 (65 Fe) MG tablet        FLUoxetine (PROZAC) 20 MG capsule        FLUoxetine (PROZAC) 40 MG capsule Take 40 mg by mouth daily       GLYCOPYRROLATE PO Take 2 mg by mouth daily       IBUPROFEN PO Take 600 mg by mouth       LANsoprazole (PREVACID) 30 MG DR capsule Take 1 capsule (30 mg) by mouth daily 90 capsule 3     metroNIDAZOLE (METROGEL) 0.75 % vaginal gel Place 1 applicator (5 g) vaginally daily " After completion of oral flagyl 70 g 1     Semaglutide-Weight Management (WEGOVY) 1.7 MG/0.75ML SOAJ Inject 1.7 mg Subcutaneous once a week 3 mL 0     spironolactone (ALDACTONE) 100 MG tablet Take 100 mg by mouth      )  ( Diabetes Eval:    )    ( Pain Eval:  No Pain (0) )    ( Wound Eval:       )    (   History   Smoking Status     Never Smoker   Smokeless Tobacco     Never Used    )    ( Signed By:  Patti Hung, EMT; April 22, 2022; 10:15 AM )

## 2022-04-22 NOTE — LETTER
"2022       RE: Nehemias Mascorro  [address]  [place] [address]     Dear Colleague,    Thank you for referring your patient, Nehemias Mascorro, to the [facility] [place] [facility]. Please see a copy of my visit note below.        Return Medical Weight Management Note     Nehemias Mascorro  MRN:  [id number]  :  1995  JASMEET:  2022    Dear Alexandra Rodrigues MD,    I had the pleasure of seeing your patient Nehemias Mascorro. She is a 26 year old female who I am continuing to see for treatment of obesity related to:    No flowsheet data found.    INTERVAL HISTORY:    She was last seen about 2.5 mo ago.  Reviewed and relevant history as extracted from earlier visits is as follows--     --she previosuly did the 24-wk program and has tried multiple meds for wt loss (including topiramate, addition of  Naltrexone to bupropion she is on, and most recently Wegovy; other meds are potentially problematic (phentermine, given that she is on Adderall). Additionally of note is the following:  -------------------------------------------  \"Note[d] the program [wa]s helping with making changes with food and be accountable, things she focussed on--  1. Working on protein shake AM  2. Eat slower/mindful eating--working on this  3.  Walking several times per week with her mother--not happening yet with [employer] work going     Regarding the GI issue she has been working on with [facility]--doing PT to work on coordinating with BMs; pain and nausea and constipation.  That is improving some   --getting nausea still a couple of times per week; stomach cramping about 3 times per month, stomach pain a couple of times per week  --BMs 3 times per week;  In the past there were times that would be less than once per week        Goals before around structured eating--  1.  Have protein drink (Terra's Way)--110 calories; " "breakfast routine lately has been higher calorie (eggs/yogurt/oatmeal)  2.  Add protein to snack if having a snack  3.  Change from fruit cups to whole fruit (meeting)        Goals she had prior:  1.  1400 Calories (Myfitnesspal, measuring food with scales, eating more whole foods)  2.  Activity goals--3 days per wk walking (30 min)  3.  Not eating after dinner (eat dinner and then stay busy after it)\"     -----------------------------------        LAST WEIGHT:   258 lbs 0 oz          Since last seen she notes the following--  More recently, has escalated from Wegovy 0.5mg weekly and now up to the 1.7mg weekly dose--    --using miralax frequently (as much every other day) given constipation; discussed adding fiber and adequate hydration (approx 2L daily) and an occasional stool softener to help and with things improving can decrease use of miralax again. Specifically she could add benefiber or metamucil, as directed, daily to help with bowel irregularity    --constipation does continue, also over the last week heartburn, no N/V  --getting colonoscopy; will also talk with her PCP about her upper GI symptoms  --we also reviewed eating less and slowing down eating/stopping when full to help avoid GI issues with the Wegovy (she was having emesis when eating too much prior she notes, but with portion reduction this as resolved)    Current food patterns  Protein shake in the morning  Afternoon snack--fresh veggies or fruit or sometimes pretzels  Dinner--soup or fish; not eating after dinner    avoiding caloried liquids        CURRENT WEIGHT:   248 lbs 0 oz   Body mass index is 40.03 kg/m .  (246.8#)        Wt Readings from Last 3 Encounters:   04/22/22 112.5 kg (248 lb)   02/04/22 117 kg (258 lb)   01/21/22 117.5 kg (259 lb 1.6 oz)                Changes and Difficulties 4/20/2022   I have made the following changes to my diet since my last visit: Eating less   With regards to my diet, I am still struggling with: Wanting " "dessert   I have made the following changes to my activity/exercise since my last visit: -   With regards to my activity/exercise, I am still struggling with: Getting motivated       VITALS:  Ht 1.676 m (5' 6\")   Wt 112.5 kg (248 lb)   BMI 40.03 kg/m      MEDICATIONS:   Current Outpatient Medications   Medication Sig Dispense Refill     albuterol (ACCUNEB) 1.25 MG/3ML neb solution Take 1 vial (1.25 mg) by nebulization every 6 hours as needed for shortness of breath / dyspnea or wheezing 90 mL 0     albuterol (PROAIR HFA/PROVENTIL HFA/VENTOLIN HFA) 108 (90 Base) MCG/ACT inhaler Inhale 2 puffs into the lungs 4 times daily 18 g 3     amphetamine-dextroamphetamine (ADDERALL XR) 30 MG 24 hr capsule TK 1 C PO D FOR ADHD       amphetamine-dextroamphetamine (ADDERALL) 10 MG tablet Take 10 mg by mouth 2 times daily       ARIPiprazole (ABILIFY) 20 MG tablet        buPROPion (WELLBUTRIN XL) 300 MG 24 hr tablet Take 300 mg by mouth every morning       dicyclomine (BENTYL) 20 MG tablet Take 1 tablet (20 mg) by mouth 3 times daily as needed (abdominal pain) 90 tablet 1     drospirenone-ethinyl estradiol (JESSY) 3-0.02 MG tablet Take 1 tablet by mouth daily       EMGALITY 120 MG/ML injection        FEROSUL 325 (65 Fe) MG tablet        FLUoxetine (PROZAC) 20 MG capsule        FLUoxetine (PROZAC) 40 MG capsule Take 40 mg by mouth daily       GLYCOPYRROLATE PO Take 2 mg by mouth daily       IBUPROFEN PO Take 600 mg by mouth       LANsoprazole (PREVACID) 30 MG DR capsule Take 1 capsule (30 mg) by mouth daily 90 capsule 3     metroNIDAZOLE (METROGEL) 0.75 % vaginal gel Place 1 applicator (5 g) vaginally daily After completion of oral flagyl 70 g 1     Semaglutide-Weight Management (WEGOVY) 1.7 MG/0.75ML SOAJ Inject 1.7 mg Subcutaneous once a week 3 mL 0     spironolactone (ALDACTONE) 100 MG tablet Take 100 mg by mouth         Weight Loss Medication History Reviewed With Patient 4/20/2022   Which weight loss medications are you " currently taking on a regular basis?  -   Are you having any side effects from the weight loss medication that we have prescribed you? Yes   If you are having side effects please describe: Constipation       ASSESSMENT;  Morbid obesity in pt with wt gain over past few years, more since her TBI  Prediabetes     PLAN:   (continues)  Decrease portion sizes  No meals in front of TV screen  Purge house of food triggers  No meal skipping and avoid snacking  Decrease caloric beverages--avoid soda  Volumetrics low carb eating plan--structured plan and avoding snacking  Low Calorie/low fat diet  Meal planning/journaling        Additionally she has had goals for lifestyle including--  Steps/activity (not addressed specifically today)  Prior had noted--4000 steps per day; change to 5000 was a goal before--could be a future goal, same with tracking for 1400 calories daily. Continue to follow  With food (continuing)  1.  Eating out once per wk or less  2.  With meals  emphasizing lean protein with meals, lower carb, lots of nonstarchy veggies  2.  Being part of the grocery shopping ( to have healthier options at home)  3.  Avoiding high carb afternoon snacking      Also--  --advancing Wegovy as tolerated  --return with Lauren Bloch in 1 mo; with me again in about 2-3 mo        Time: about 31 min spent on evaluation, management, counseling, education, & motivational interviewing (video visit) combined with previsit prep and post visit follow up care same day.     Sincerely,    Luis Slade MD

## 2022-04-22 NOTE — PROGRESS NOTES
"Nehemias is a 26 year old who is being evaluated via a billable video visit.      How would you like to obtain your AVS? MyChart  If the video visit is dropped, the invitation should be resent by: [phone]  Will anyone else be joining your video visit? No    During this virtual visit the patient is located in [place], patient verifies this as the location during the entirety of this visit.     Video Start Time: 10:52  Video-Visit Details    Type of service:  Video Visit    Video End Time:11:06    Originating Location (pt. Location): Home    Distant Location (provider location):  [facility] [place]     Platform used for Video Visit: SCI Solution         [facility] Medical Weight Management Note     Nehemias Mascorro  MRN:  [id number]  :  1995  JASMEET:  2022    Dear Alexandra Rodrigues MD,    I had the pleasure of seeing your patient Nehemias Mascorro. She is a 26 year old female who I am continuing to see for treatment of obesity related to:    No flowsheet data found.    INTERVAL HISTORY:    She was last seen about 2.5 mo ago.  Reviewed and relevant history as extracted from earlier visits is as follows--     --she previosuly did the 24-wk program and has tried multiple meds for wt loss (including topiramate, addition of  Naltrexone to bupropion she is on, and most recently Wegovy; other meds are potentially problematic (phentermine, given that she is on Adderall). Additionally of note is the following:  -------------------------------------------  \"Note[d] the program [wa]s helping with making changes with food and be accountable, things she focussed on--  1. Working on protein shake AM  2. Eat slower/mindful eating--working on this  3.  Walking several times per week with her mother--not happening yet with [employer] work going     Regarding the GI issue she has been working on with [facility]--doing PT to work on coordinating with BMs; pain and nausea and constipation. " " That is improving some   --getting nausea still a couple of times per week; stomach cramping about 3 times per month, stomach pain a couple of times per week  --BMs 3 times per week;  In the past there were times that would be less than once per week        Goals before around structured eating--  1.  Have protein drink (Terra's Way)--110 calories; breakfast routine lately has been higher calorie (eggs/yogurt/oatmeal)  2.  Add protein to snack if having a snack  3.  Change from fruit cups to whole fruit (meeting)        Goals she had prior:  1.  1400 Calories (Myfitnesspal, measuring food with scales, eating more whole foods)  2.  Activity goals--3 days per wk walking (30 min)  3.  Not eating after dinner (eat dinner and then stay busy after it)\"     -----------------------------------        LAST WEIGHT:   258 lbs 0 oz          Since last seen she notes the following--  More recently, has escalated from Wegovy 0.5mg weekly and now up to the 1.7mg weekly dose--    --using miralax frequently (as much every other day) given constipation; discussed adding fiber and adequate hydration (approx 2L daily) and an occasional stool softener to help and with things improving can decrease use of miralax again. Specifically she could add benefiber or metamucil, as directed, daily to help with bowel irregularity    --constipation does continue, also over the last week heartburn, no N/V  --getting colonoscopy; will also talk with her PCP about her upper GI symptoms  --we also reviewed eating less and slowing down eating/stopping when full to help avoid GI issues with the Wegovy (she was having emesis when eating too much prior she notes, but with portion reduction this as resolved)    Current food patterns  Protein shake in the morning  Afternoon snack--fresh veggies or fruit or sometimes pretzels  Dinner--soup or fish; not eating after dinner    avoiding caloried liquids        CURRENT WEIGHT:   248 lbs 0 oz   Body mass index is " "40.03 kg/m .  (246.8#)        Wt Readings from Last 3 Encounters:   04/22/22 112.5 kg (248 lb)   02/04/22 117 kg (258 lb)   01/21/22 117.5 kg (259 lb 1.6 oz)                Changes and Difficulties 4/20/2022   I have made the following changes to my diet since my last visit: Eating less   With regards to my diet, I am still struggling with: Wanting dessert   I have made the following changes to my activity/exercise since my last visit: -   With regards to my activity/exercise, I am still struggling with: Getting motivated       VITALS:  Ht 1.676 m (5' 6\")   Wt 112.5 kg (248 lb)   BMI 40.03 kg/m      MEDICATIONS:   Current Outpatient Medications   Medication Sig Dispense Refill     albuterol (ACCUNEB) 1.25 MG/3ML neb solution Take 1 vial (1.25 mg) by nebulization every 6 hours as needed for shortness of breath / dyspnea or wheezing 90 mL 0     albuterol (PROAIR HFA/PROVENTIL HFA/VENTOLIN HFA) 108 (90 Base) MCG/ACT inhaler Inhale 2 puffs into the lungs 4 times daily 18 g 3     amphetamine-dextroamphetamine (ADDERALL XR) 30 MG 24 hr capsule TK 1 C PO D FOR ADHD       amphetamine-dextroamphetamine (ADDERALL) 10 MG tablet Take 10 mg by mouth 2 times daily       ARIPiprazole (ABILIFY) 20 MG tablet        buPROPion (WELLBUTRIN XL) 300 MG 24 hr tablet Take 300 mg by mouth every morning       dicyclomine (BENTYL) 20 MG tablet Take 1 tablet (20 mg) by mouth 3 times daily as needed (abdominal pain) 90 tablet 1     drospirenone-ethinyl estradiol (JESSY) 3-0.02 MG tablet Take 1 tablet by mouth daily       EMGALITY 120 MG/ML injection        FEROSUL 325 (65 Fe) MG tablet        FLUoxetine (PROZAC) 20 MG capsule        FLUoxetine (PROZAC) 40 MG capsule Take 40 mg by mouth daily       GLYCOPYRROLATE PO Take 2 mg by mouth daily       IBUPROFEN PO Take 600 mg by mouth       LANsoprazole (PREVACID) 30 MG DR capsule Take 1 capsule (30 mg) by mouth daily 90 capsule 3     metroNIDAZOLE (METROGEL) 0.75 % vaginal gel Place 1 applicator (5 " g) vaginally daily After completion of oral flagyl 70 g 1     Semaglutide-Weight Management (WEGOVY) 1.7 MG/0.75ML SOAJ Inject 1.7 mg Subcutaneous once a week 3 mL 0     spironolactone (ALDACTONE) 100 MG tablet Take 100 mg by mouth         Weight Loss Medication History Reviewed With Patient 4/20/2022   Which weight loss medications are you currently taking on a regular basis?  -   Are you having any side effects from the weight loss medication that we have prescribed you? Yes   If you are having side effects please describe: Constipation         ASSESSMENT;  Morbid obesity in pt with wt gain over past few years, more since her TBI  Prediabetes     PLAN:   (continues)  Decrease portion sizes  No meals in front of TV screen  Purge house of food triggers  No meal skipping and avoid snacking  Decrease caloric beverages--avoid soda  Volumetrics low carb eating plan--structured plan and avoding snacking  Low Calorie/low fat diet  Meal planning/journaling           Additionally she has had goals for lifestyle including--  Steps/activity (not addressed specifically today)  Prior had noted--4000 steps per day; change to 5000 was a goal before--could be a future goal, same with tracking for 1400 calories daily. Continue to follow  With food (continuing)  1.  Eating out once per wk or less  2.  With meals  emphasizing lean protein with meals, lower carb, lots of nonstarchy veggies  2.  Being part of the grocery shopping ( to have healthier options at home)  3.  Avoiding high carb afternoon snacking      Also--  --advancing Wegovy as tolerated  --return with Lauren Bloch in 1 mo; with me again in about 2-3 mo        Time: about 31 min spent on evaluation, management, counseling, education, & motivational interviewing (video visit) combined with previsit prep and post visit follow up care same day.     Sincerely,    Luis Slade MD

## 2022-04-23 ENCOUNTER — LAB (OUTPATIENT)
Dept: LAB | Facility: CLINIC | Age: 27
End: 2022-04-23
Payer: COMMERCIAL

## 2022-04-23 DIAGNOSIS — Z11.59 ENCOUNTER FOR SCREENING FOR OTHER VIRAL DISEASES: ICD-10-CM

## 2022-04-23 LAB — SARS-COV-2 RNA RESP QL NAA+PROBE: NEGATIVE

## 2022-04-23 PROCEDURE — U0005 INFEC AGEN DETEC AMPLI PROBE: HCPCS | Mod: 90 | Performed by: PATHOLOGY

## 2022-04-23 PROCEDURE — U0003 INFECTIOUS AGENT DETECTION BY NUCLEIC ACID (DNA OR RNA); SEVERE ACUTE RESPIRATORY SYNDROME CORONAVIRUS 2 (SARS-COV-2) (CORONAVIRUS DISEASE [COVID-19]), AMPLIFIED PROBE TECHNIQUE, MAKING USE OF HIGH THROUGHPUT TECHNOLOGIES AS DESCRIBED BY CMS-2020-01-R: HCPCS | Mod: 90 | Performed by: PATHOLOGY

## 2022-04-23 PROCEDURE — 99000 SPECIMEN HANDLING OFFICE-LAB: CPT | Performed by: PATHOLOGY

## 2022-04-25 DIAGNOSIS — E66.01 MORBID OBESITY DUE TO EXCESS CALORIES (H): Primary | ICD-10-CM

## 2022-04-25 RX ORDER — SEMAGLUTIDE 1 MG/.5ML
1 INJECTION, SOLUTION SUBCUTANEOUS WEEKLY
Qty: 2 ML | Refills: 0 | Status: SHIPPED | OUTPATIENT
Start: 2022-04-25 | End: 2022-05-24 | Stop reason: DRUGHIGH

## 2022-04-25 NOTE — TELEPHONE ENCOUNTER
Patient injected first dose of Wegovy 1.7mg and has been having side effects. She requested to go back down to 1mg weekly dose. Sent rx to pharmacy. Advised patient to keep the clinic updated on her symptoms and let us know if she is able to get the 1mg injections-may be insurance issues.

## 2022-04-27 ENCOUNTER — HOSPITAL ENCOUNTER (OUTPATIENT)
Facility: CLINIC | Age: 27
Discharge: HOME | End: 2022-04-27
Attending: COLON & RECTAL SURGERY | Admitting: COLON & RECTAL SURGERY
Payer: COMMERCIAL

## 2022-04-27 VITALS
RESPIRATION RATE: 16 BRPM | BODY MASS INDEX: 39.37 KG/M2 | DIASTOLIC BLOOD PRESSURE: 79 MMHG | OXYGEN SATURATION: 100 % | HEART RATE: 91 BPM | WEIGHT: 245 LBS | HEIGHT: 66 IN | SYSTOLIC BLOOD PRESSURE: 127 MMHG

## 2022-04-27 DIAGNOSIS — Z12.11 ENCOUNTER FOR SCREENING COLONOSCOPY: Primary | ICD-10-CM

## 2022-04-27 LAB — COLONOSCOPY: NORMAL

## 2022-04-27 PROCEDURE — 45380 COLONOSCOPY AND BIOPSY: CPT | Performed by: COLON & RECTAL SURGERY

## 2022-04-27 PROCEDURE — G0500 MOD SEDAT ENDO SERVICE >5YRS: HCPCS | Performed by: COLON & RECTAL SURGERY

## 2022-04-27 PROCEDURE — 88305 TISSUE EXAM BY PATHOLOGIST: CPT | Mod: 26 | Performed by: PATHOLOGY

## 2022-04-27 PROCEDURE — 88305 TISSUE EXAM BY PATHOLOGIST: CPT | Mod: TC | Performed by: COLON & RECTAL SURGERY

## 2022-04-27 PROCEDURE — 250N000011 HC RX IP 250 OP 636: Performed by: COLON & RECTAL SURGERY

## 2022-04-27 RX ORDER — FENTANYL CITRATE 50 UG/ML
INJECTION, SOLUTION INTRAMUSCULAR; INTRAVENOUS PRN
Status: COMPLETED | OUTPATIENT
Start: 2022-04-27 | End: 2022-04-27

## 2022-04-27 RX ORDER — LIDOCAINE 40 MG/G
CREAM TOPICAL
Status: DISCONTINUED | OUTPATIENT
Start: 2022-04-27 | End: 2022-04-27 | Stop reason: HOSPADM

## 2022-04-27 RX ORDER — ONDANSETRON 2 MG/ML
4 INJECTION INTRAMUSCULAR; INTRAVENOUS
Status: DISCONTINUED | OUTPATIENT
Start: 2022-04-27 | End: 2022-04-27 | Stop reason: HOSPADM

## 2022-04-27 RX ADMIN — MIDAZOLAM 2 MG: 1 INJECTION INTRAMUSCULAR; INTRAVENOUS at 09:03

## 2022-04-27 RX ADMIN — FENTANYL CITRATE 100 MCG: 50 INJECTION, SOLUTION INTRAMUSCULAR; INTRAVENOUS at 09:03

## 2022-04-27 RX ADMIN — MIDAZOLAM 1 MG: 1 INJECTION INTRAMUSCULAR; INTRAVENOUS at 09:06

## 2022-04-27 NOTE — H&P
Colon & Rectal Surgery History and Physical  Pre-Endoscopy Procedure Note    History of Present Illness   I have been asked by Dr. Rodrigues to evaluate this 26 year old female for alternating diarrhea and constipation. She currently denies any abdominal pain, weight loss, bleeding per rectum, or recent change in bowel habits.    Past Medical History  Diagnosis Date     ADHD (attention deficit hyperactivity disorder)      Depressive disorder      Ovarian cyst      Uncomplicated asthma        Past Surgical History  Procedure Laterality Date     ENT SURGERY      tonsilectomy age 8     ORTHOPEDIC SURGERY      Hip, emelia surgery in 2013     WRIST SURGERY          Medications  Medication Sig     albuterol (ACCUNEB) 1.25 MG/3ML neb solution Take 1 vial (1.25 mg) by nebulization every 6 hours as needed for shortness of breath / dyspnea or wheezing     albuterol (PROAIR HFA/PROVENTIL HFA/VENTOLIN HFA) 108 (90 Base) MCG/ACT inhaler Inhale 2 puffs into the lungs 4 times daily     amphetamine-dextroamphetamine (ADDERALL XR) 30 MG 24 hr capsule TK 1 C PO D FOR ADHD     ARIPiprazole (ABILIFY) 20 MG tablet      buPROPion (WELLBUTRIN XL) 300 MG 24 hr tablet Take 300 mg by mouth every morning     dicyclomine (BENTYL) 20 MG tablet Take 1 tablet (20 mg) by mouth 3 times daily as needed (abdominal pain)     drospirenone-ethinyl estradiol (JESSY) 3-0.02 MG tablet Take 1 tablet by mouth daily     EMGALITY 120 MG/ML injection      FEROSUL 325 (65 Fe) MG tablet      FLUoxetine (PROZAC) 20 MG capsule      GLYCOPYRROLATE PO Take 2 mg by mouth daily     LANsoprazole (PREVACID) 30 MG DR capsule Take 1 capsule (30 mg) by mouth daily     metroNIDAZOLE (METROGEL) 0.75 % vaginal gel Place 1 applicator (5 g) vaginally daily After completion of oral flagyl     Semaglutide-Weight Management (WEGOVY) 1 MG/0.5ML SOAJ Inject 1 mg Subcutaneous once a week     spironolactone (ALDACTONE) 100 MG tablet Take 100 mg by mouth     amphetamine-dextroamphetamine  "(ADDERALL) 10 MG tablet Take 10 mg by mouth 2 times daily     FLUoxetine (PROZAC) 40 MG capsule Take 40 mg by mouth daily     IBUPROFEN PO Take 600 mg by mouth     Semaglutide-Weight Management (WEGOVY) 1.7 MG/0.75ML SOAJ Inject 1.7 mg Subcutaneous once a week       Allergies  Allergen Reactions     Azithromycin      Erythromycin Nausea and Vomiting     Sulfa Drugs Nausea        Family History   Family history includes Depression in her maternal grandmother; Schizophrenia in her maternal uncle; Substance Abuse in her maternal uncle.     Social History   She reports that she has never smoked. She has never used smokeless tobacco. She reports current alcohol use. She reports that she does not use drugs.    Review of Systems   Constitutional:  No fever, weight change or fatigue.    Eyes:     No dry eyes or vision changes.   Ears/Nose/Throat/Neck:  No oral ulcers, sore throat or voice change.    Cardiovascular:   No palpitations, syncope, angina or edema.   Respiratory:    No chest pain, excessive sleepiness, shortness of breath or hemoptysis.    Gastrointestinal:   No abdominal pain, nausea, vomiting, diarrhea or heartburn.    Genitourinary:   No dysuria, hematuria, urinary retention or urinary frequency.   Musculoskeletal:  No joint swelling or arthralgias.    Dermatologic:  No skin rash or other skin changes.   Neurologic:    No focal weakness or numbness. No neuropathy.   Psychiatric:    No depression, anxiety, suicidal ideation, or paranoid ideation.   Endocrine:   No cold or heat intolerance, polydipsia, hirsutism, change in libido, or flushing.   Hematology/Lymphatic:  No bleeding or lymphadenopathy.    Allergy/Immunology:  No rhinitis or hives.     Physical Exam   Vitals:  /77, pulse 85, resp. rate 15, height 1.676 m (5' 6\"), weight 111.1 kg (245 lb), SpO2 100 %.    General:  Alert and oriented to person, place and time   Airway: Normal oropharyngeal airway and neck mobility   Lungs:  Clear " bilaterally   Heart:  Regular rate and rhythm   Abdomen: Soft, NT, ND, no masses   Extremities: Warm, good capillary refill    ASA Grade: II (mild systemic disease)    Impression: Cleared for use of conscious sedation for evaluation of alternating constipation and diarrhea    Plan: Proceed with colonoscopy     Alyse Leija MD  [facility]  [phone]

## 2022-04-29 LAB
PATH REPORT.COMMENTS IMP SPEC: NORMAL
PATH REPORT.COMMENTS IMP SPEC: NORMAL
PATH REPORT.FINAL DX SPEC: NORMAL
PATH REPORT.GROSS SPEC: NORMAL
PATH REPORT.MICROSCOPIC SPEC OTHER STN: NORMAL
PATH REPORT.RELEVANT HX SPEC: NORMAL
PHOTO IMAGE: NORMAL

## 2022-04-30 DIAGNOSIS — R53.83 OTHER FATIGUE: Primary | ICD-10-CM

## 2022-05-06 ENCOUNTER — OFFICE VISIT (OUTPATIENT)
Dept: FAMILY MEDICINE | Facility: CLINIC | Age: 27
End: 2022-05-06
Payer: COMMERCIAL

## 2022-05-06 VITALS
HEART RATE: 91 BPM | WEIGHT: 245.5 LBS | OXYGEN SATURATION: 99 % | HEIGHT: 66 IN | RESPIRATION RATE: 16 BRPM | BODY MASS INDEX: 39.46 KG/M2 | TEMPERATURE: 98.5 F | SYSTOLIC BLOOD PRESSURE: 104 MMHG | DIASTOLIC BLOOD PRESSURE: 72 MMHG

## 2022-05-06 DIAGNOSIS — R10.13 EPIGASTRIC PAIN: Primary | ICD-10-CM

## 2022-05-06 DIAGNOSIS — K21.9 GASTROESOPHAGEAL REFLUX DISEASE WITHOUT ESOPHAGITIS: ICD-10-CM

## 2022-05-06 PROBLEM — K25.9 GASTRIC ULCER WITHOUT HEMORRHAGE OR PERFORATION: Status: ACTIVE | Noted: 2018-02-20

## 2022-05-06 PROBLEM — D50.9 IRON DEFICIENCY ANEMIA: Status: ACTIVE | Noted: 2019-05-17

## 2022-05-06 PROBLEM — M54.81 OCCIPITAL NEURALGIA: Status: ACTIVE | Noted: 2020-01-31

## 2022-05-06 PROBLEM — R51.9 HEADACHE: Status: ACTIVE | Noted: 2021-08-04

## 2022-05-06 PROBLEM — K59.01 SLOW TRANSIT CONSTIPATION: Status: ACTIVE | Noted: 2022-05-06

## 2022-05-06 PROBLEM — M79.2 NEURALGIA AND NEURITIS, UNSPECIFIED: Status: ACTIVE | Noted: 2020-01-31

## 2022-05-06 PROBLEM — K59.00 CONSTIPATION: Status: ACTIVE | Noted: 2022-05-06

## 2022-05-06 PROBLEM — G43.709 CHRONIC MIGRAINE WITHOUT AURA: Status: ACTIVE | Noted: 2022-04-12

## 2022-05-06 PROBLEM — L70.9 ACNE: Status: ACTIVE | Noted: 2022-05-06

## 2022-05-06 PROBLEM — J45.909 ASTHMA: Status: ACTIVE | Noted: 2022-05-06

## 2022-05-06 PROBLEM — N83.209 CYST OF OVARY: Status: ACTIVE | Noted: 2022-05-06

## 2022-05-06 PROBLEM — F45.0 SOMATIZATION DISORDER: Status: ACTIVE | Noted: 2017-08-30

## 2022-05-06 PROCEDURE — 99214 OFFICE O/P EST MOD 30 MIN: CPT | Performed by: STUDENT IN AN ORGANIZED HEALTH CARE EDUCATION/TRAINING PROGRAM

## 2022-05-06 RX ORDER — MECOBALAMIN 5000 MCG
15 TABLET,DISINTEGRATING ORAL DAILY
Qty: 90 CAPSULE | Refills: 0 | Status: SHIPPED | OUTPATIENT
Start: 2022-05-06 | End: 2022-11-29

## 2022-05-06 RX ORDER — SUCRALFATE 1 G/1
1 TABLET ORAL 4 TIMES DAILY
Qty: 120 TABLET | Refills: 0 | Status: SHIPPED | OUTPATIENT
Start: 2022-05-06 | End: 2022-05-24

## 2022-05-06 RX ORDER — GLYCOPYRROLATE 1 MG/1
2 TABLET ORAL 2 TIMES DAILY
COMMUNITY
Start: 2022-05-05 | End: 2022-08-18

## 2022-05-06 ASSESSMENT — ANXIETY QUESTIONNAIRES
GAD7 TOTAL SCORE: 7
1. FEELING NERVOUS, ANXIOUS, OR ON EDGE: SEVERAL DAYS
5. BEING SO RESTLESS THAT IT IS HARD TO SIT STILL: NOT AT ALL
4. TROUBLE RELAXING: SEVERAL DAYS
GAD7 TOTAL SCORE: 7
7. FEELING AFRAID AS IF SOMETHING AWFUL MIGHT HAPPEN: MORE THAN HALF THE DAYS
7. FEELING AFRAID AS IF SOMETHING AWFUL MIGHT HAPPEN: MORE THAN HALF THE DAYS
6. BECOMING EASILY ANNOYED OR IRRITABLE: MORE THAN HALF THE DAYS
2. NOT BEING ABLE TO STOP OR CONTROL WORRYING: SEVERAL DAYS
GAD7 TOTAL SCORE: 7
3. WORRYING TOO MUCH ABOUT DIFFERENT THINGS: NOT AT ALL
8. IF YOU CHECKED OFF ANY PROBLEMS, HOW DIFFICULT HAVE THESE MADE IT FOR YOU TO DO YOUR WORK, TAKE CARE OF THINGS AT HOME, OR GET ALONG WITH OTHER PEOPLE?: SOMEWHAT DIFFICULT

## 2022-05-06 ASSESSMENT — PATIENT HEALTH QUESTIONNAIRE - PHQ9
10. IF YOU CHECKED OFF ANY PROBLEMS, HOW DIFFICULT HAVE THESE PROBLEMS MADE IT FOR YOU TO DO YOUR WORK, TAKE CARE OF THINGS AT HOME, OR GET ALONG WITH OTHER PEOPLE: VERY DIFFICULT
SUM OF ALL RESPONSES TO PHQ QUESTIONS 1-9: 10
SUM OF ALL RESPONSES TO PHQ QUESTIONS 1-9: 10

## 2022-05-06 NOTE — PROGRESS NOTES
Assessment & Plan   Problem List Items Addressed This Visit        Digestive    Gastroesophageal reflux disease    Relevant Medications    glycopyrrolate (ROBINUL) 1 MG tablet    sucralfate (CARAFATE) 1 GM tablet    LANsoprazole (PREVACID) 15 MG DR capsule      Other Visit Diagnoses     Epigastric pain    -  Primary    Relevant Medications    sucralfate (CARAFATE) 1 GM tablet    LANsoprazole (PREVACID) 15 MG DR capsule    Other Relevant Orders    Helicobacter pylori Antigen Stool           Physical exam and symptomology seem to be consistent with GERD.  Suspect that underlying constipation is aggravating the issue.  No evidence of acute infection and vitals are stable.  Should avoid spicy foods.  We will have her retrial PPI and put her on some sucralfate e for a month.  We will also test her for H. pylori.  Should follow-up with her PCP in a month and if no improvement, should consider endoscopy given history of gastric ulcer back in 2018.  Is not having any bloody bowel movements or hematemesis.      33 minutes spent on the date of the encounter doing chart review, history and exam, documentation and further activities per the note    Return in about 1 month (around 6/6/2022).    Nikki Harvey DO  [facility]    Subjective     Patient is a 26-year-old female with MDD, ADHD, IBS  morbid obesity, GERD who presents today for abdominal pain.    Patient reports that about a month ago, started to experience abdominal pain in the epigastric region.  Has been feeling nauseous, sensation of pain gets worse with food and has been persistent.  She is not sure why she had sudden onset of the pain.  Is not worse with laying down and she does not report any bitter taste in the back of her mouth.  She does like to eat spicy foods.  She tried to take Pepto-Bismol for 2 days and it did help with some of the pain but the effects are temporary.    Reports that she was on the Prevacid for a long period  "of time and was weaned off of it in January.  Was doing well initially.    Did recently have a colonoscopy tat was unremarkable. Was a diagnostic colonoscopy for diarrhea and chronic constipation. Feels she is constipated.        Review of Systems   As per HPI       Objective    /68 (BP Location: Right arm, Patient Position: Sitting, Cuff Size: Adult Regular)   Pulse 106   Temp 98.5  F (36.9  C) (Oral)   Resp 16   Ht 1.676 m (5' 6\")   Wt 111.4 kg (245 lb 8 oz)   LMP 04/14/2022 (Exact Date)   SpO2 98%   BMI 39.62 kg/m    Body mass index is 39.62 kg/m .  Physical Exam   GENERAL: healthy, alert and no distress  NECK: no adenopathy, no asymmetry, masses, or scars and thyroid normal to palpation  RESP: lungs clear to auscultation - no rales, rhonchi or wheezes  CV: regular rate and rhythm, normal S1 S2, no S3 or S4, no murmur, click or rub, no peripheral edema and peripheral pulses strong  ABDOMEN: soft, tender to palpation in the epigastric region, no rebound, involuntary guarding or masses noted,, no hepatosplenomegaly, no masses and hypoactive bowel sounds normal  MS: no gross musculoskeletal defects noted, no edema  PSYCH: mentation appears normal, affect anxious        "

## 2022-05-07 ASSESSMENT — ANXIETY QUESTIONNAIRES: GAD7 TOTAL SCORE: 7

## 2022-05-07 ASSESSMENT — PATIENT HEALTH QUESTIONNAIRE - PHQ9: SUM OF ALL RESPONSES TO PHQ QUESTIONS 1-9: 10

## 2022-05-14 SDOH — SOCIAL STABILITY: SOCIAL NETWORK: I HAVE TROUBLE DOING ALL OF MY USUAL WORK (INCLUDE WORK AT HOME): ALWAYS

## 2022-05-14 SDOH — SOCIAL STABILITY: SOCIAL NETWORK: I HAVE TROUBLE DOING ALL OF MY REGULAR LEISURE ACTIVITIES WITH OTHERS: USUALLY

## 2022-05-14 SDOH — SOCIAL STABILITY: SOCIAL NETWORK: I HAVE TROUBLE DOING ALL OF THE ACTIVITIES WITH FRIENDS THAT I WANT TO DO: USUALLY

## 2022-05-14 SDOH — SOCIAL STABILITY: SOCIAL NETWORK: PROMIS ABILITY TO PARTICIPATE IN SOCIAL ROLES & ACTIVITIES T-SCORE: 37.3

## 2022-05-14 SDOH — SOCIAL STABILITY: SOCIAL NETWORK: I HAVE TROUBLE DOING ALL OF THE FAMILY ACTIVITIES THAT I WANT TO DO: USUALLY

## 2022-05-14 SDOH — SOCIAL STABILITY: SOCIAL NETWORK

## 2022-05-14 ASSESSMENT — ENCOUNTER SYMPTOMS
HOT FLASHES: 0
SLEEP DISTURBANCES DUE TO BREATHING: 0
NECK PAIN: 1
SEIZURES: 0
TINGLING: 0
SINUS CONGESTION: 1
NUMBNESS: 1
VOMITING: 1
LIGHT-HEADEDNESS: 1
MUSCLE WEAKNESS: 0
LOSS OF CONSCIOUSNESS: 0
FLANK PAIN: 0
WEIGHT LOSS: 1
ARTHRALGIAS: 0
CONSTIPATION: 1
DECREASED APPETITE: 1
HEARTBURN: 0
LEG PAIN: 0
MUSCLE CRAMPS: 0
PANIC: 0
DIZZINESS: 0
SMELL DISTURBANCE: 0
HALLUCINATIONS: 0
FATIGUE: 1
NAUSEA: 1
JOINT SWELLING: 0
BLOATING: 1
ABDOMINAL PAIN: 1
MYALGIAS: 1
SINUS PAIN: 0
ALTERED TEMPERATURE REGULATION: 1
JAUNDICE: 0
DIARRHEA: 0
HEADACHES: 1
WEIGHT GAIN: 0
DEPRESSION: 1
HYPERTENSION: 0
EXERCISE INTOLERANCE: 0
DIFFICULTY URINATING: 1
HEMATURIA: 0
SPEECH CHANGE: 0
TASTE DISTURBANCE: 0
TROUBLE SWALLOWING: 1
HOARSE VOICE: 0
DISTURBANCES IN COORDINATION: 1
DECREASED LIBIDO: 0
RECTAL PAIN: 0
FEVER: 0
POLYDIPSIA: 1
NERVOUS/ANXIOUS: 1
ORTHOPNEA: 0
BACK PAIN: 1
INCREASED ENERGY: 1
SYNCOPE: 0
HYPOTENSION: 1
MEMORY LOSS: 0
BOWEL INCONTINENCE: 0
SORE THROAT: 1
DYSURIA: 0
CHILLS: 0
BLOOD IN STOOL: 0
STIFFNESS: 0
PARALYSIS: 0
WEAKNESS: 0
DECREASED CONCENTRATION: 1
INSOMNIA: 1
TREMORS: 1
NECK MASS: 0
PALPITATIONS: 0
POLYPHAGIA: 0
NIGHT SWEATS: 0

## 2022-05-14 ASSESSMENT — PATIENT HEALTH QUESTIONNAIRE - PHQ9
SUM OF ALL RESPONSES TO PHQ QUESTIONS 1-9: 10
10. IF YOU CHECKED OFF ANY PROBLEMS, HOW DIFFICULT HAVE THESE PROBLEMS MADE IT FOR YOU TO DO YOUR WORK, TAKE CARE OF THINGS AT HOME, OR GET ALONG WITH OTHER PEOPLE: VERY DIFFICULT
SUM OF ALL RESPONSES TO PHQ QUESTIONS 1-9: 10

## 2022-05-15 ENCOUNTER — HEALTH MAINTENANCE LETTER (OUTPATIENT)
Age: 27
End: 2022-05-15

## 2022-05-15 ASSESSMENT — PATIENT HEALTH QUESTIONNAIRE - PHQ9: SUM OF ALL RESPONSES TO PHQ QUESTIONS 1-9: 10

## 2022-05-16 ENCOUNTER — VIRTUAL VISIT (OUTPATIENT)
Dept: PHYSICAL MEDICINE AND REHAB | Facility: CLINIC | Age: 27
End: 2022-05-16
Attending: INTERNAL MEDICINE
Payer: COMMERCIAL

## 2022-05-16 DIAGNOSIS — R53.83 OTHER FATIGUE: ICD-10-CM

## 2022-05-16 DIAGNOSIS — Z53.9 ERRONEOUS ENCOUNTER--DISREGARD: Primary | ICD-10-CM

## 2022-05-16 NOTE — PROGRESS NOTES
A user error has taken place: encounter opened in error, closed for administrative reasons.

## 2022-05-16 NOTE — LETTER
Date:May 25, 2022      Provider requested that no letter be sent. Do not send.       [facility]

## 2022-05-16 NOTE — LETTER
5/16/2022       RE: Nehemias Mascorro  [address]  [place] [address]     Dear Colleague,    Thank you for referring your patient, Nehemias Mascorro, to the [facility] PHYSICAL MEDICINE AND REHABILITATION CLINIC [place] at [facility]. Please see a copy of my visit note below.    A user error has taken place: encounter opened in error, closed for administrative reasons.              Again, thank you for allowing me to participate in the care of your patient.      Sincerely,    Shaunna Siddiqui PA-C

## 2022-05-24 ENCOUNTER — VIRTUAL VISIT (OUTPATIENT)
Dept: PHARMACY | Facility: CLINIC | Age: 27
End: 2022-05-24
Payer: COMMERCIAL

## 2022-05-24 DIAGNOSIS — J45.20 MILD INTERMITTENT ASTHMA WITHOUT COMPLICATION: ICD-10-CM

## 2022-05-24 DIAGNOSIS — Z78.9 USES BIRTH CONTROL: ICD-10-CM

## 2022-05-24 DIAGNOSIS — K58.2 IRRITABLE BOWEL SYNDROME WITH BOTH CONSTIPATION AND DIARRHEA: ICD-10-CM

## 2022-05-24 DIAGNOSIS — K21.9 GASTROESOPHAGEAL REFLUX DISEASE, UNSPECIFIED WHETHER ESOPHAGITIS PRESENT: ICD-10-CM

## 2022-05-24 DIAGNOSIS — F90.9 ATTENTION DEFICIT HYPERACTIVITY DISORDER (ADHD), UNSPECIFIED ADHD TYPE: ICD-10-CM

## 2022-05-24 DIAGNOSIS — G43.709 CHRONIC MIGRAINE WITHOUT AURA WITHOUT STATUS MIGRAINOSUS, NOT INTRACTABLE: ICD-10-CM

## 2022-05-24 DIAGNOSIS — F32.1 CURRENT MODERATE EPISODE OF MAJOR DEPRESSIVE DISORDER, UNSPECIFIED WHETHER RECURRENT (H): ICD-10-CM

## 2022-05-24 DIAGNOSIS — R61 GENERALIZED HYPERHIDROSIS: ICD-10-CM

## 2022-05-24 DIAGNOSIS — D50.9 IRON DEFICIENCY ANEMIA, UNSPECIFIED IRON DEFICIENCY ANEMIA TYPE: ICD-10-CM

## 2022-05-24 DIAGNOSIS — E66.01 MORBID OBESITY DUE TO EXCESS CALORIES (H): Primary | ICD-10-CM

## 2022-05-24 PROCEDURE — 99607 MTMS BY PHARM ADDL 15 MIN: CPT | Performed by: PHARMACIST

## 2022-05-24 PROCEDURE — 99605 MTMS BY PHARM NP 15 MIN: CPT | Performed by: PHARMACIST

## 2022-05-24 RX ORDER — POLYETHYLENE GLYCOL 3350 17 G/17G
1 POWDER, FOR SOLUTION ORAL DAILY PRN
COMMUNITY

## 2022-05-24 RX ORDER — SEMAGLUTIDE 1.7 MG/.75ML
1.7 INJECTION, SOLUTION SUBCUTANEOUS WEEKLY
Qty: 3 ML | Refills: 1 | Status: SHIPPED | OUTPATIENT
Start: 2022-05-24 | End: 2022-09-23 | Stop reason: DRUGHIGH

## 2022-05-24 NOTE — PROGRESS NOTES
Medication Therapy Management (MTM) Encounter    ASSESSMENT:                            Medication Adherence/Access: No issues identified    Obesity: Due to Wegovy shortage issues, including 1 mg weekly, will retrial Wegovy at 1.7 mg weekly.     Asthma: ACT at goal of at least 20.     ADHD: Stable.     Depression: Stable.     IBS: Stable.     Birth Control: Stable.     Chronic Migraine: Stable.     Iron Deficiency Anemia: Stable.     Hyperhidrosis: If no greater benefit with relieving hyperhidrosis with glycopyrrolate, wonder if worth trial off glycopyrrolate and follow up with psychiatry about potentially adjusting medication as able that could be exacerbating sweating. Medications that could be impacting sweating: selective serotonin reuptake inhibitor (like fluoxetine) and antipsychotics (however, aripiprazole specifically showed < 1%). Did not appear bupropion, adderall had greater impact on hyperhydrosis from package insert data, but mechanistically with being more stimulating medications reasonable why this could be of consideration as well.     GERD: Stable.     PLAN:                            1. Will trial Wegovy 1.7 mg weekly. Care team to start Wegovy Prior Authorization renewal. Patient to reach out if issues tolerating retrial Wegovy 1.7 mg weekly.     2. Patient to follow up with psychiatrist regarding potential drug induced hyperhidrosis with psychiatric medications.     Follow-up: Schedule follow up with Dr. Slade 6-8 weeks from now. Follow up with MTM pharmacist as needed     SUBJECTIVE/OBJECTIVE:                          Nehemias Mascorro is a 26 year old female called for an initial visit. She was referred to me from Dr. Slade.      Reason for visit: Wegovy check in.    Allergies/ADRs: Reviewed in chart  Past Medical History: Reviewed in chart  Tobacco: She reports that she has never smoked. She has never used smokeless tobacco.  Alcohol: Less than 1 beverage / month    Medication  "Adherence/Access: no issues reported. Is overwhelmed by the number of medications she takes.     Obesity:   Wegovy 1 mg weekly     Followed by Dr. Luis Slade, seen 4/22/22 for Return Medical Weight Management. Patient is experiencing the follow side effects: None. Reprots that she went to Wegovy 1.7 mg weekly and has nausea, vomiting, \"sulfur burps\", when went back down to 1 mg weekly went better. Open to increasing to Wegovy 1.7 mg weekly again. She is feeling the fullness at and between meals, not as much as the 1.7 mg weekly. She still has two 1.7 mg pens in refrigerator at home. Prior Authorization renewal is needed for Wegovy.     Current weight today: 240 lb   Weight from time of Wegovy start: 261 lb   Cumulative Weight Loss: -21 lb, -8% from baseline   Wt Readings from Last 4 Encounters:   05/06/22 245 lb 8 oz (111.4 kg)   04/27/22 245 lb (111.1 kg)   04/22/22 248 lb (112.5 kg)   02/04/22 258 lb (117 kg)     Estimated body mass index is 39.62 kg/m  as calculated from the following:    Height as of 5/6/22: 5' 6\" (1.676 m).    Weight as of 5/6/22: 245 lb 8 oz (111.4 kg).    Asthma:   Short-Acting Bronchodilator: Albuterol MDI, Nebs and pt reports using 0 times per week.  Triggers include: upper respiratory infections and cold air.  Patient reports the following symptoms: none.  Asthma Action Plan on file: YES  ACT Total Scores 1/21/2022 4/20/2022   ACT TOTAL SCORE (Goal Greater than or Equal to 20) 15 25   In the past 12 months, how many times did you visit the emergency room for your asthma without being admitted to the hospital? 0 0   In the past 12 months, how many times were you hospitalized overnight because of your asthma? 0 0     ADHD:   Adderall XR 30 mg daily in AM   Adderal IR 10 mg daily noon if needed     Is able to get the items completed in her day to day with school. Finds that at times she still struggles with attentiveness and fatigue, finds this more so later in the day. School is over " soon. Does hold a job and finds it take a while to get work done.     Depression:   Aripiprazole 20 mg daily  Bupropion  mg daily  Fluoxetine 40 mg + 20 mg = 60 mg daily     Finds that mood is stable. Getting 8-10 hours of sleep per night. Some days when down but not where she was historically.     IBS:   Dicyclomine 20 mg three times daily as needed    Has used dicyclomine 2 times and helpful during those instances. No medication side effects when used. Reports as of right now greater issues of constipation.     Birth Control:   Jennifer once daily     No concerns. She does take the placebo pills. She does get her period, no concerns with menstrual cycle.     Chronic Migraine:  Preventative   Emgalitiy 120 mg every 28 days.     Abortive  Acetaminophen dose unknown as needed   Excedrin dose unknown as needed     Patient's triggers include: strong smells, brightness; and associated symptoms include: Photophobia, nausea. History of concussion so migraines can come on randomly as well from this she believes. Patient reports having 1-2 headache days per month.     Iron Deficiency Anemia:   Ferrous sulfate 325 mg daily     No concerns. Constipation at baseline and from this medication sometimes this is exacerbated.     Hemoglobin   Date Value Ref Range Status   03/10/2022 14.3 11.7 - 15.7 g/dL Final     Ferritin   Date Value Ref Range Status   03/10/2022 18 10 - 130 ng/mL Final   06/22/2018 3 (L) 12 - 150 ng/mL Final     Hyperhidrosis:   Glycopyrrolate 2 mg twice daily     Having greater issues of excessive sweating. Unsure if medication is helpful. Planning on following up with Psychiatrist about potential dose change of psych medications as Dermatology reports that greater sweating could be related to psychiatric medications.     GERD:   Prevacid (lansoprazole) 15 mg once daily.   Sucralfate 1 gram four times daily - not using.     Had been on PPI lansoprazole 30 mg daily previously, but had been off as ran off.  Restarted lansoprazole at lower 15 mg daily dose. Reports hasn't needed sucralfate. Pt reports no current symptoms.  Patient feels that current regimen is effective.  ----------------      I spent 30 minutes with this patient today. All changes were made via collaborative practice agreement with Dr. Slade. A copy of the visit note was provided to the patient's provider(s).    The patient was sent via clypd a summary of these recommendations.     Lauren Bloch, PharmD, BCACP   Medication Therapy Management Pharmacist   [facility] [place]    Telemedicine Visit Details  Type of service:  Telephone visit  Start Time: 3:30 PM  End Time: 4:00 PM  Originating Location (patient location): Home  Distant Location (provider location):  [facility]     Medication Therapy Recommendations  Obesity    Current Medication: Semaglutide-Weight Management (WEGOVY) 1 MG/0.5ML SOAJ (Discontinued)   Rationale: Dose too low - Dosage too low - Effectiveness   Recommendation: Increase Dose - Wegovy 1.7 MG/0.75ML Soaj   Status: Accepted per CPA

## 2022-05-24 NOTE — PATIENT INSTRUCTIONS
"Recommendations from today's MTM visit:                                                       1. Will trial Wegovy 1.7 mg weekly. Care team to start Wegovy Prior Authorization renewal. Patient to reach out if issues tolerating retrial Wegovy 1.7 mg weekly.     2. Please follow up with psychiatrist regarding potential medications that could be causing excessive sweating with psychiatric medications - fluoxetine, aripiprazole, etc.      Follow-up: Schedule follow up with Dr. Slade 6-8 weeks from now. Follow up with [place] pharmacist as needed     It was great speaking with you today.  I value your experience and would be very thankful for your time in providing feedback in our clinic survey. In the next few days, you may receive an email or text message from [employer] with a link to a survey related to your  clinical pharmacist.\"     My Clinical Pharmacist's contact information:                                                      Please feel free to contact me with any questions or concerns you have.      Lauren Bloch, PharmD  Medication Therapy Management Pharmacist   [facility] [place]             "

## 2022-05-26 ENCOUNTER — VIRTUAL VISIT (OUTPATIENT)
Dept: PHYSICAL MEDICINE AND REHAB | Facility: CLINIC | Age: 27
End: 2022-05-26
Payer: COMMERCIAL

## 2022-05-26 DIAGNOSIS — U09.9 LONG COVID: Primary | ICD-10-CM

## 2022-05-26 DIAGNOSIS — R68.2 DRY MOUTH: ICD-10-CM

## 2022-05-26 DIAGNOSIS — U09.9 POST-COVID CHRONIC FATIGUE: ICD-10-CM

## 2022-05-26 DIAGNOSIS — U09.9 POST-COVID CHRONIC CONCENTRATION DEFICIT: ICD-10-CM

## 2022-05-26 DIAGNOSIS — R41.841 COGNITIVE COMMUNICATION DEFICIT: ICD-10-CM

## 2022-05-26 DIAGNOSIS — K59.01 SLOW TRANSIT CONSTIPATION: ICD-10-CM

## 2022-05-26 DIAGNOSIS — G93.32 POST-COVID CHRONIC FATIGUE: ICD-10-CM

## 2022-05-26 DIAGNOSIS — R41.840 POST-COVID CHRONIC CONCENTRATION DEFICIT: ICD-10-CM

## 2022-05-26 PROCEDURE — 99205 OFFICE O/P NEW HI 60 MIN: CPT | Mod: 95 | Performed by: PHYSICIAN ASSISTANT

## 2022-05-26 RX ORDER — MULTIVITAMIN WITH IRON
1 TABLET ORAL DAILY
Qty: 30 TABLET | Refills: 1 | Status: SHIPPED | OUTPATIENT
Start: 2022-05-26 | End: 2022-06-25

## 2022-05-26 SDOH — SOCIAL STABILITY: SOCIAL NETWORK

## 2022-05-26 SDOH — SOCIAL STABILITY: SOCIAL NETWORK: I HAVE TROUBLE DOING ALL OF MY USUAL WORK (INCLUDE WORK AT HOME): USUALLY

## 2022-05-26 SDOH — SOCIAL STABILITY: SOCIAL NETWORK: I HAVE TROUBLE DOING ALL OF THE ACTIVITIES WITH FRIENDS THAT I WANT TO DO: USUALLY

## 2022-05-26 SDOH — SOCIAL STABILITY: SOCIAL NETWORK: PROMIS ABILITY TO PARTICIPATE IN SOCIAL ROLES & ACTIVITIES T-SCORE: 38.8

## 2022-05-26 SDOH — SOCIAL STABILITY: SOCIAL NETWORK: I HAVE TROUBLE DOING ALL OF MY REGULAR LEISURE ACTIVITIES WITH OTHERS: USUALLY

## 2022-05-26 SDOH — SOCIAL STABILITY: SOCIAL NETWORK: I HAVE TROUBLE DOING ALL OF THE FAMILY ACTIVITIES THAT I WANT TO DO: USUALLY

## 2022-05-26 ASSESSMENT — ENCOUNTER SYMPTOMS
TINGLING: 1
ABDOMINAL PAIN: 1
HALLUCINATIONS: 0
HEADACHES: 1
HEARTBURN: 0
VOMITING: 1
FEVER: 0
MUSCLE WEAKNESS: 0
TREMORS: 1
POLYDIPSIA: 1
NUMBNESS: 0
MYALGIAS: 1
NECK MASS: 0
ALTERED TEMPERATURE REGULATION: 1
DYSURIA: 0
SPEECH CHANGE: 0
DECREASED CONCENTRATION: 1
TROUBLE SWALLOWING: 0
BLOATING: 1
JAUNDICE: 0
WEIGHT LOSS: 0
NERVOUS/ANXIOUS: 1
FLANK PAIN: 0
HOARSE VOICE: 0
DECREASED APPETITE: 0
INSOMNIA: 1
SKIN CHANGES: 0
WEAKNESS: 0
RECTAL PAIN: 0
NAIL CHANGES: 0
BLOOD IN STOOL: 0
DECREASED LIBIDO: 0
LOSS OF CONSCIOUSNESS: 0
TASTE DISTURBANCE: 0
DEPRESSION: 1
BOWEL INCONTINENCE: 0
CHILLS: 0
DISTURBANCES IN COORDINATION: 1
DIZZINESS: 0
POOR WOUND HEALING: 0
POLYPHAGIA: 0
ARTHRALGIAS: 0
STIFFNESS: 0
SORE THROAT: 0
SINUS CONGESTION: 1
DIARRHEA: 0
NAUSEA: 1
MEMORY LOSS: 1
BACK PAIN: 1
NIGHT SWEATS: 0
FATIGUE: 1
WEIGHT GAIN: 0
PARALYSIS: 0
DIFFICULTY URINATING: 1
JOINT SWELLING: 0
SINUS PAIN: 0
INCREASED ENERGY: 1
CONSTIPATION: 1
MUSCLE CRAMPS: 0
SEIZURES: 0
NECK PAIN: 1
SMELL DISTURBANCE: 0
HOT FLASHES: 0
HEMATURIA: 0
PANIC: 0

## 2022-05-26 ASSESSMENT — PATIENT HEALTH QUESTIONNAIRE - PHQ9
SUM OF ALL RESPONSES TO PHQ QUESTIONS 1-9: 9
SUM OF ALL RESPONSES TO PHQ QUESTIONS 1-9: 9
10. IF YOU CHECKED OFF ANY PROBLEMS, HOW DIFFICULT HAVE THESE PROBLEMS MADE IT FOR YOU TO DO YOUR WORK, TAKE CARE OF THINGS AT HOME, OR GET ALONG WITH OTHER PEOPLE: VERY DIFFICULT

## 2022-05-26 ASSESSMENT — ANXIETY QUESTIONNAIRES
GAD7 TOTAL SCORE: 5
6. BECOMING EASILY ANNOYED OR IRRITABLE: MORE THAN HALF THE DAYS
5. BEING SO RESTLESS THAT IT IS HARD TO SIT STILL: NOT AT ALL
7. FEELING AFRAID AS IF SOMETHING AWFUL MIGHT HAPPEN: SEVERAL DAYS
GAD7 TOTAL SCORE: 5
2. NOT BEING ABLE TO STOP OR CONTROL WORRYING: NOT AT ALL
7. FEELING AFRAID AS IF SOMETHING AWFUL MIGHT HAPPEN: SEVERAL DAYS
4. TROUBLE RELAXING: SEVERAL DAYS
GAD7 TOTAL SCORE: 5
8. IF YOU CHECKED OFF ANY PROBLEMS, HOW DIFFICULT HAVE THESE MADE IT FOR YOU TO DO YOUR WORK, TAKE CARE OF THINGS AT HOME, OR GET ALONG WITH OTHER PEOPLE?: SOMEWHAT DIFFICULT
3. WORRYING TOO MUCH ABOUT DIFFERENT THINGS: NOT AT ALL
1. FEELING NERVOUS, ANXIOUS, OR ON EDGE: SEVERAL DAYS

## 2022-05-26 NOTE — PATIENT INSTRUCTIONS
Post COVID Self Care Suggestions:     Fatigue Management:       https://www.archives-pmr.org/action/showPdf?byj=-[email or web address]       Self Care:      [email or web address]  Recovery World Health Organization:    https://apps.who.int/iris/bitstream/handle/27484/197063/HIZ-JOZO-1688-281-12649-46364-eng.pdf  Breathing exercises:    [email or web address]/conditions-[email or web address]-[email or web address]/[email or web address]-recovery-breathing-exercises

## 2022-05-26 NOTE — PROGRESS NOTES
Nehemias is a 26 year old who is being evaluated via a billable video visit.      Patient denies any changes since echeck-in regarding medication and allergies and states all information entered during echeck-in remains accurate.    How would you like to obtain your AVS? Akihart  If the video visit is dropped, the invitation should be resent by: Text to cell phone: [phone]  Will anyone else be joining your video visit? No       Assessment/ Impression:   1. Long COVID  Discussed COVID and Post COVID with patient.  Educational materials provided and all questions answered.    2. Slow transit constipation  Patient has been struggling with GI issues since COVID. She initially had irritable bowel syndrome and then developed slow transit constipation. She states she still will go 1 week without a bowel movement. Advised to decrease iron to every other day and to start magnesium.  Encouraged fluid. She I scheduled to see GI in September.   - magnesium 250 MG tablet; Take 1 tablet (250 mg) by mouth daily  Dispense: 30 tablet; Refill: 1  - Magnesium; Future  - Iron; Future    3. Post-COVID chronic fatigue  Patient with daily fatigue. States she sometimes cannot get out of bed due to her fatigue.  Discussed energy conservation and placed referrals to Physical and occupational therapy. She states her fatigue makes it hard to complete her studies. Referrals and lab order placed   - Vitamin B12; Future  - Physical Therapy Referral; Future  - Occupational Therapy Referral; Future    4. Post-COVID chronic concentration deficit/ Cognitive communication deficit  Patient with trouble concentrating at school. States it is  hard for her to do her papers and will frequently look up words she cannot remember. She states she has increased trouble focusing and does have ADHD.  Already working with Psychiatry . Discussed OT/SLP referrals for cognitive therapy. Referrals placed.   - Occupational Therapy Referral; Future  - Speech  Therapy Referral; Future  - Vitamin B12; Future    5. Dry mouth  Patient with dry mouth after COVID that became worse. Will check labs for Sjogrens and may sent Rheumatology depending on result.  Referral placed.   - SSA Ro NAGI Antibody IgG; Future  - SSB La NAGI Antibody IgG; Future    Plan:  1. I reviewed present knowledge on long-Covid.  Education was provided and question were answered.  2. Orders/Referrals as above  3. Will advised patient on test results  4. I will follow up with Nehemias Mascorro in 3 months. I will review progress and consider need for any other therapeutic interventions. If there are any questions and/or concerns she will call the clinic.      On day of encounter time spent in chart review and with patient in consultation, exam, education and coordination of care, and documentation: 62 minutes         _________________________________  Shaunna Siddiqui PA-C  [facility] PHYSICAL MEDICINE AND REHABILITATION CLINIC [facility]   This 26 year old female presents to the [facility] Medicine Post-COVID clinic as a new consult to evaluate continuing symptoms after COVID infection initially diagnosed January 5th 2022.  Nehemias Mascorro presented to their primary care physician on January 5th complaining of Congestion, shortness of breath, cough and headache. Treatment was OTC. Continuing symptoms include Constipation, dry mouth, stomach pain, fatigue, generalized aches, headache, diarrhea and depression. Patient has notice constipation since COVID. She did initially have diarrhea but this resolve. She now goes up to a week with out having a bowel movement. Is on Iron from iron deficiency.  Fatigue she notice she has trouble getting out of bed sometimes. She states her memory became worse and will frequently for get words or forget what word she needs to use when typing papers. Patient also developed a dry mouth that became worse after COVID.  Patient Past medical history is significant for TBI, Migraines, Occiptal Neuralgia, depression, anxiety and obesity/morbid obesity. The patient was vaccinated against COVID X3.  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 Nehemias Mascorro feels they are functioning at 80% of pre COVID level.      History of COVID-19 infection: January 5th  Date of first symptoms: January 1st   Diagnosis: PCR  Hospitalization: No  Treatment OTC  Current Symptoms: See subjective  Goals of Care: Decrease costipation, increase energy, decrease anxiety, decrease depression, improve thinking and improve quality of life    PHQ Assesment Total Score(s) 5/26/2022   PHQ-9 Score 9   Some recent data might be hidden     TAE-7 Results 5/26/2022   TAE 7 TOTAL SCORE 5 (mild anxiety)   Some recent data might be hidden     PTSD Screen Score 5/26/2022   Have you ever experienced this kind of event? Yes   PTSD Screen (Score of 3 or more suggests positive screen) 2   Some recent data might be hidden     PROMIS-29 5/26/2022   PROMIS Physical Function T-Score 41.8 (mild dysfunction)   PROMIS Anxiety T-Score 53.7 (within normal limits)   PROMIS Depression T-Score 51.8 (within normal limits)   PROMIS Fatigue T-Score 62.7 (moderate)   PROMIS Sleep Disturbance T-Score 56.1 (mild)   PROMIS Ability to Participate in Social Roles & Activities T-Score 38.8 (moderate dysfunction)   PROMIS Pain Interference T-Score 58.5 (mild)   PROMIS Pain Intensity 3       Past Medical History:   Diagnosis Date     ADHD (attention deficit hyperactivity disorder)      Depressive disorder      Ovarian cyst      Uncomplicated asthma        Past Surgical History:   Procedure Laterality Date     COLONOSCOPY N/A 4/27/2022    Procedure: COLONOSCOPY, WITH POLYPECTOMY AND BIOPSY;  Surgeon: Alyse Leija MD;  Location:  GI     ENT SURGERY      tonsilectomy age 8     ORTHOPEDIC SURGERY      Hip, emelia surgery in 2013     WRIST SURGERY         Family History   Problem  Relation Age of Onset     Depression Maternal Grandmother      Schizophrenia Maternal Uncle      Substance Abuse Maternal Uncle        Social History     Tobacco Use     Smoking status: Never Smoker     Smokeless tobacco: Never Used   Vaping Use     Vaping Use: Never used   Substance Use Topics     Alcohol use: Yes     Comment: rare     Drug use: Never         Current Outpatient Medications:      albuterol (ACCUNEB) 1.25 MG/3ML neb solution, Take 1 vial (1.25 mg) by nebulization every 6 hours as needed for shortness of breath / dyspnea or wheezing, Disp: 90 mL, Rfl: 0     albuterol (PROAIR HFA/PROVENTIL HFA/VENTOLIN HFA) 108 (90 Base) MCG/ACT inhaler, Inhale 2 puffs into the lungs 4 times daily, Disp: 18 g, Rfl: 3     amphetamine-dextroamphetamine (ADDERALL XR) 30 MG 24 hr capsule, TK 1 C PO D FOR ADHD, Disp: , Rfl:      amphetamine-dextroamphetamine (ADDERALL) 10 MG tablet, Take 10 mg by mouth daily noon, Disp: , Rfl:      ARIPiprazole (ABILIFY) 20 MG tablet, Take 20 mg by mouth daily, Disp: , Rfl:      buPROPion (WELLBUTRIN XL) 300 MG 24 hr tablet, Take 300 mg by mouth every morning, Disp: , Rfl:      dicyclomine (BENTYL) 20 MG tablet, Take 1 tablet (20 mg) by mouth 3 times daily as needed (abdominal pain), Disp: 90 tablet, Rfl: 1     drospirenone-ethinyl estradiol (JESSY) 3-0.02 MG tablet, Take 1 tablet by mouth daily, Disp: , Rfl:      EMGALITY 120 MG/ML injection, Inject 120 mg Subcutaneous every 28 days, Disp: , Rfl:      FEROSUL 325 (65 Fe) MG tablet, Take 325 mg by mouth daily (with breakfast), Disp: , Rfl:      FLUoxetine (PROZAC) 20 MG capsule, Take 20 mg by mouth daily 20 mg + 40 mg = 60 mg, Disp: , Rfl:      FLUoxetine (PROZAC) 40 MG capsule, Take 40 mg by mouth daily 20 mg + 40 mg = 60 mg, Disp: , Rfl:      glycopyrrolate (ROBINUL) 1 MG tablet, Take 2 mg by mouth 2 times daily, Disp: , Rfl:      LANsoprazole (PREVACID) 15 MG DR capsule, Take 1 capsule (15 mg) by mouth daily, Disp: 90 capsule, Rfl: 0      "polyethylene glycol (MIRALAX) 17 GM/Dose powder, Take 1 capful by mouth daily as needed for constipation, Disp: , Rfl:      Semaglutide-Weight Management (WEGOVY) 1.7 MG/0.75ML SOAJ, Inject 1.7 mg Subcutaneous once a week, Disp: 3 mL, Rfl: 1      Answers for HPI/ROS submitted by the patient on 5/26/2022  Review of Systems   Constitutional: Positive for fatigue. Negative for chills and fever.   HENT: Negative for ear discharge, ear pain, hearing loss, mouth sores, nosebleeds, sinus pain, sore throat, tinnitus and trouble swallowing.    Gastrointestinal: Positive for abdominal pain, constipation, nausea and vomiting. Negative for diarrhea, heartburn and rectal pain.   Endocrine: Positive for polydipsia. Negative for polyphagia.   Genitourinary: Positive for difficulty urinating, urgency and vaginal discharge. Negative for dyspareunia, dysuria, flank pain, genital sores and hematuria.   Musculoskeletal: Positive for back pain, myalgias and neck pain. Negative for arthralgias and joint swelling.   Skin: Negative for rash.   Neurological: Positive for tremors and headaches. Negative for dizziness, seizures, weakness and numbness.   Psychiatric/Behavioral: Positive for decreased concentration. Negative for hallucinations. The patient is nervous/anxious.           Objective    Vitals - Patient Reported  Weight (Patient Reported): 108.9 kg (240 lb)  Pain Score: Moderate Pain (4)  Pain Loc: Abdomen  BMI:   Estimated body mass index is 39.62 kg/m  as calculated from the following:    Height as of 5/6/22: 1.676 m (5' 6\").    Weight as of 5/6/22: 111.4 kg (245 lb 8 oz).     Physical Exam   GENERAL: Healthy, alert and no distress  EYES: Eyes grossly normal to inspection.  No discharge or erythema, or obvious scleral/conjunctival abnormalities.  RESP: No audible wheeze, cough, or visible cyanosis.  No visible retractions or increased work of breathing.    SKIN: Visible skin clear. No significant rash, abnormal pigmentation or " lesions.  NEURO: Cranial nerves grossly intact.  Mentation and speech appropriate for age.  PSYCH: Mentation appears normal, affect normal/bright, judgement and insight intact, normal speech and appearance well-groomed.      No results found for: CRP   No results found for: SED   Last Renal Panel:  Sodium   Date Value Ref Range Status   11/10/2021 141 136 - 145 mmol/L Final   06/22/2018 143 133 - 144 mmol/L Final     Potassium   Date Value Ref Range Status   11/10/2021 4.1 3.5 - 5.0 mmol/L Final   06/22/2018 4.0 3.4 - 5.3 mmol/L Final     Chloride   Date Value Ref Range Status   11/10/2021 106 98 - 107 mmol/L Final   06/22/2018 110 (H) 94 - 109 mmol/L Final     Carbon Dioxide   Date Value Ref Range Status   06/22/2018 24 20 - 32 mmol/L Final     Carbon Dioxide (CO2)   Date Value Ref Range Status   11/10/2021 24 22 - 31 mmol/L Final     Anion Gap   Date Value Ref Range Status   11/10/2021 11 5 - 18 mmol/L Final   06/22/2018 9 3 - 14 mmol/L Final     Glucose   Date Value Ref Range Status   11/10/2021 80 70 - 125 mg/dL Final   06/22/2018 84 70 - 99 mg/dL Final     Urea Nitrogen   Date Value Ref Range Status   11/10/2021 11 8 - 22 mg/dL Final   06/22/2018 15 7 - 30 mg/dL Final     Creatinine   Date Value Ref Range Status   11/10/2021 0.90 0.60 - 1.10 mg/dL Final   06/22/2018 0.77 0.52 - 1.04 mg/dL Final     GFR Estimate   Date Value Ref Range Status   11/10/2021 89 >60 mL/min/1.73m2 Final     Comment:     As of July 11, 2021, eGFR is calculated by the CKD-EPI creatinine equation, without race adjustment. eGFR can be influenced by muscle mass, exercise, and diet. The reported eGFR is an estimation only and is only applicable if the renal function is stable.   06/24/2021 >60 >60 mL/min/1.73m2 Final   06/22/2018 >90 >60 mL/min/1.7m2 Final     Comment:     Non  GFR Calc     Calcium   Date Value Ref Range Status   11/10/2021 9.7 8.5 - 10.5 mg/dL Final   06/22/2018 8.5 8.5 - 10.1 mg/dL Final     Albumin   Date  Value Ref Range Status   08/10/2020 3.7 3.5 - 5.0 g/dL Final      Lab Results   Component Value Date    WBC 5.0 03/10/2022     Lab Results   Component Value Date    RBC 4.99 03/10/2022     Lab Results   Component Value Date    HGB 14.3 03/10/2022     Lab Results   Component Value Date    HCT 44.1 03/10/2022     No components found for: MCT  Lab Results   Component Value Date    MCV 88 03/10/2022     Lab Results   Component Value Date    MCH 28.7 03/10/2022     Lab Results   Component Value Date    MCHC 32.4 03/10/2022     Lab Results   Component Value Date    RDW 13.2 03/10/2022     Lab Results   Component Value Date     03/10/2022      Lab Results   Component Value Date    A1C 5.7 11/10/2021    A1C 5.9 06/24/2021    A1C 5.4 10/11/2019    A1C 5.1 12/11/2014      TSH   Date Value Ref Range Status   03/10/2022 0.89 0.30 - 5.00 uIU/mL Final   06/22/2018 0.70 0.40 - 4.00 mU/L Final      Lab Results   Component Value Date    VITDT 63 03/10/2022      Recent Labs   Lab Test 01/10/20  1135   MAG 1.8     Last Comprehensive Metabolic Panel:  Sodium   Date Value Ref Range Status   11/10/2021 141 136 - 145 mmol/L Final   06/22/2018 143 133 - 144 mmol/L Final     Potassium   Date Value Ref Range Status   11/10/2021 4.1 3.5 - 5.0 mmol/L Final   06/22/2018 4.0 3.4 - 5.3 mmol/L Final     Chloride   Date Value Ref Range Status   11/10/2021 106 98 - 107 mmol/L Final   06/22/2018 110 (H) 94 - 109 mmol/L Final     Carbon Dioxide   Date Value Ref Range Status   06/22/2018 24 20 - 32 mmol/L Final     Carbon Dioxide (CO2)   Date Value Ref Range Status   11/10/2021 24 22 - 31 mmol/L Final     Anion Gap   Date Value Ref Range Status   11/10/2021 11 5 - 18 mmol/L Final   06/22/2018 9 3 - 14 mmol/L Final     Glucose   Date Value Ref Range Status   11/10/2021 80 70 - 125 mg/dL Final   06/22/2018 84 70 - 99 mg/dL Final     Urea Nitrogen   Date Value Ref Range Status   11/10/2021 11 8 - 22 mg/dL Final   06/22/2018 15 7 - 30 mg/dL Final      Creatinine   Date Value Ref Range Status   11/10/2021 0.90 0.60 - 1.10 mg/dL Final   06/22/2018 0.77 0.52 - 1.04 mg/dL Final     GFR Estimate   Date Value Ref Range Status   11/10/2021 89 >60 mL/min/1.73m2 Final     Comment:     As of July 11, 2021, eGFR is calculated by the CKD-EPI creatinine equation, without race adjustment. eGFR can be influenced by muscle mass, exercise, and diet. The reported eGFR is an estimation only and is only applicable if the renal function is stable.   06/24/2021 >60 >60 mL/min/1.73m2 Final   06/22/2018 >90 >60 mL/min/1.7m2 Final     Comment:     Non  GFR Calc     Calcium   Date Value Ref Range Status   11/10/2021 9.7 8.5 - 10.5 mg/dL Final   06/22/2018 8.5 8.5 - 10.1 mg/dL Final     Bilirubin Total   Date Value Ref Range Status   08/10/2020 0.5 0.0 - 1.0 mg/dL Final     Alkaline Phosphatase   Date Value Ref Range Status   08/10/2020 69 45 - 120 U/L Final     ALT   Date Value Ref Range Status   08/10/2020 33 0 - 45 U/L Final   06/22/2018 18 0 - 50 U/L Final     AST   Date Value Ref Range Status   08/10/2020 19 0 - 40 U/L Final   06/22/2018 14 0 - 45 U/L Final     Most Recent D-dimer:No lab results found.      Video-Visit Details    Video visit Start time:3:32 PM    Type of service:  Video Visit    Video End Time:4:04 PM    Originating Location (pt. Location): Home    Distant Location (provider location):  [facility] PHYSICAL MEDICINE AND REHABILITATION CLINIC [place]     Platform used for Video Visit: Artimi

## 2022-05-27 ENCOUNTER — TELEPHONE (OUTPATIENT)
Dept: PHYSICAL MEDICINE AND REHAB | Facility: CLINIC | Age: 27
End: 2022-05-27
Payer: COMMERCIAL

## 2022-05-27 ENCOUNTER — TELEPHONE (OUTPATIENT)
Dept: ENDOCRINOLOGY | Facility: CLINIC | Age: 27
End: 2022-05-27

## 2022-05-27 NOTE — TELEPHONE ENCOUNTER
Prior Authorization Retail Medication Request    Medication/Dose: Semaglutide-Weight Management (WEGOVY) 1.7 MG/0.75ML SOAJ  ICD code (if different than what is on RX):  Morbid obesity due to excess calories (H) [E66.01]   Previously Tried and Failed:    Rationale:      Insurance Name:  [employer]  Insurance ID:  [id number]    Pharmacy Information (if different than what is on RX)  Name:  [id number] - [place], [place] - [address] & [facility]  Phone:  [phone]

## 2022-06-01 NOTE — TELEPHONE ENCOUNTER
Central Prior Authorization Team   Phone: [phone]    PA Initiation    Medication: Semaglutide-Weight Management (WEGOVY) 1.7 MG/0.75ML SOAJ  Insurance Company: [employer] - Phone [phone] Fax [phone]  Pharmacy Filling the Rx: [id number] - [place], [place] - [address] & [facility]  Filling Pharmacy Phone: [phone]  Filling Pharmacy Fax: [phone]  Start Date: 6/1/2022      Initiate PA by phone at [phone]. Case # [id number]

## 2022-06-02 ENCOUNTER — LAB (OUTPATIENT)
Dept: LAB | Facility: CLINIC | Age: 27
End: 2022-06-02
Payer: COMMERCIAL

## 2022-06-02 ENCOUNTER — DOCUMENTATION ONLY (OUTPATIENT)
Dept: PHYSICAL MEDICINE AND REHAB | Facility: CLINIC | Age: 27
End: 2022-06-02

## 2022-06-02 DIAGNOSIS — U09.9 LONG COVID: ICD-10-CM

## 2022-06-02 DIAGNOSIS — R68.2 DRY MOUTH: ICD-10-CM

## 2022-06-02 DIAGNOSIS — U09.9 POST-COVID CHRONIC FATIGUE: ICD-10-CM

## 2022-06-02 DIAGNOSIS — K59.01 SLOW TRANSIT CONSTIPATION: ICD-10-CM

## 2022-06-02 DIAGNOSIS — G93.32 POST-COVID CHRONIC FATIGUE: ICD-10-CM

## 2022-06-02 LAB
HBA1C MFR BLD: 5.3 % (ref 0–5.6)
IRON SERPL-MCNC: 60 UG/DL (ref 42–175)
MAGNESIUM SERPL-MCNC: 1.9 MG/DL (ref 1.8–2.6)
VIT B12 SERPL-MCNC: 328 PG/ML (ref 213–816)

## 2022-06-02 PROCEDURE — 83540 ASSAY OF IRON: CPT

## 2022-06-02 PROCEDURE — 36415 COLL VENOUS BLD VENIPUNCTURE: CPT

## 2022-06-02 PROCEDURE — 82607 VITAMIN B-12: CPT

## 2022-06-02 PROCEDURE — 83735 ASSAY OF MAGNESIUM: CPT

## 2022-06-02 PROCEDURE — 83036 HEMOGLOBIN GLYCOSYLATED A1C: CPT

## 2022-06-02 PROCEDURE — 86235 NUCLEAR ANTIGEN ANTIBODY: CPT

## 2022-06-02 PROCEDURE — 86235 NUCLEAR ANTIGEN ANTIBODY: CPT | Mod: 59

## 2022-06-02 NOTE — TELEPHONE ENCOUNTER
Prior Authorization Approval    Authorization Effective Date: 5/2/2022  Authorization Expiration Date: 6/1/2023  Medication: Semaglutide-Weight Management (WEGOVY) 1.7 MG/0.75ML SOAJ-PA APPROVED   Approved Dose/Quantity:   Reference #:     Insurance Company: [employer] - Phone [phone] Fax [phone]  Expected CoPay:       CoPay Card Available:      Foundation Assistance Needed:    Which Pharmacy is filling the prescription (Not needed for infusion/clinic administered): [id number] - [place], [place] - [address] AT [facility] & [facility]  Pharmacy Notified: Yes- **Instructed pharmacy to notify patient when script is ready to /ship.**  Patient Notified: Yes

## 2022-06-02 NOTE — PROGRESS NOTES
PT scheduled 6/3/22.  OT scheduled 6/28/22.  Patient was contacted to schedule Speech Therapy    Follow-up with Artur scheduled 8/29/22.    Jericho Boyle

## 2022-06-03 ENCOUNTER — HOSPITAL ENCOUNTER (OUTPATIENT)
Dept: PHYSICAL THERAPY | Facility: REHABILITATION | Age: 27
Discharge: HOME | End: 2022-06-03
Attending: PHYSICIAN ASSISTANT
Payer: COMMERCIAL

## 2022-06-03 DIAGNOSIS — U09.9 POST-COVID CHRONIC FATIGUE: ICD-10-CM

## 2022-06-03 DIAGNOSIS — G93.32 POST-COVID CHRONIC FATIGUE: ICD-10-CM

## 2022-06-03 PROCEDURE — 97530 THERAPEUTIC ACTIVITIES: CPT | Mod: GP | Performed by: PHYSICAL THERAPIST

## 2022-06-03 PROCEDURE — 97161 PT EVAL LOW COMPLEX 20 MIN: CPT | Mod: GP | Performed by: PHYSICAL THERAPIST

## 2022-06-03 PROCEDURE — 97110 THERAPEUTIC EXERCISES: CPT | Mod: GP | Performed by: PHYSICAL THERAPIST

## 2022-06-03 NOTE — PROGRESS NOTES
Subjective: Was diagnosed with COVID on 1/5/22. Since then she has been experiencing significant fatigue. Has been having consistent headaches after getting a concussion in 2016, but they have been made worse since COVID.    Assessment: Lebron presents to therapy today with c/o excessive fatigue and decreased exercise tolerance. This started in January after giancarlo COVID-19. She has difficulty fully participating in her life due to her levels of fatigue. She has a slightly elevated resting hear rate or 100 BPM as well. Lebron will benefit from therapy to promote increased cardiovascular endurance and reduce her resting levels of fatigue.    Therapeutic Exercise    Date 6/3/22   Exercise    Rows GTB x10   GTB  GTB x10   Biceps curl GTB x10   Horizontal abduction GTB x10                                     Therapeutic Activity    Date 6/3/22   Exercise    TM ambulation x6'   Eccentric squats x10   Step up x10 B

## 2022-06-03 NOTE — PROGRESS NOTES
[facility]    OUTPATIENT PHYSICAL THERAPY ORTHOPEDIC EVALUATION  PLAN OF TREATMENT FOR OUTPATIENT REHABILITATION  (COMPLETE FOR INITIAL CLAIMS ONLY)  Patient's Last Name, First Name, M.I.  YOB: 1995  Nehemias Mascorro    Provider s Name:  [facility]   Medical Record No.  [id number]   Start of Care Date:  06/03/22   Onset Date:  01/05/22   Type:     _X__PT   ___OT   ___SLP Medical Diagnosis:  Post-COVID chronic fatigue     PT Diagnosis:  Decreased activity tolerance   Visits from SOC:  1      _________________________________________________________________________________  Plan of Treatment/Functional Goals:  gait training, neuromuscular re-education, ROM, strengthening, stretching, motor coordination training           Goals  Goal Identifier: 6 minute walk test  Goal Description: Patient will be able to ambualte at least 700 feet with her  Target Date: 09/01/22    Goal Identifier: Activity duration  Goal Description: Patient will be able to be active for 5 hours at a time in order to fully participate in her life and better complete ADLs.  Target Date: 09/01/22    Goal Identifier: Study duration  Goal Description: Patient will be able to study for at least 1 hour at a time.  Target Date: 09/01/22                  Therapy Frequency:  1 time/week  Predicted Duration of Therapy Intervention:  12 weeks    THERESE HERNANDEZ PT                 I CERTIFY THE NEED FOR THESE SERVICES FURNISHED UNDER        THIS PLAN OF TREATMENT AND WHILE UNDER MY CARE     (Physician co-signature of this document indicates review and certification of the therapy plan).                     Certification Date From:  06/03/22   Certification Date To:  08/02/22    Referring Provider:  Shaunna Siddiqui PA-C    Initial Assessment        See Epic Evaluation Start of Care Date: 06/03/22                 06/03/22 0800   General Information   Type of Visit Initial OP Ortho PT Evaluation   Start of Care Date 06/03/22   Referring Physician Shaunna Siddiqui PA-C   Orders Evaluate and Treat   Date of Order 05/26/22   Certification Required? Yes   Medical Diagnosis Post-COVID chronic fatigue   Surgical/Medical history reviewed Yes   Precautions/Limitations no known precautions/limitations   Presentation and Etiology   Pertinent history of current problem (include personal factors and/or comorbidities that impact the POC) See note   Impairments N. Headaches;R. Other   Impairment comment Fatigue   Functional Limitations perform required work activities;perform activities of daily living;perform desired leisure / sports activities   How/Where did it occur Other  (Post-COVID)   Onset date of current episode/exacerbation 01/05/22   Chronicity Chronic   Frequency of pain/symptoms C. With activity   Pain/symptoms are: Worse in the morning   Fall Risk Screen   Fall screen completed by PT   Have you fallen 2 or more times in the past year? No   Have you fallen and had an injury in the past year? No   Is patient a fall risk? No   Abuse Screen (yes response referral indicated)   Feels Unsafe at Home or Work/School no   Feels Threatened by Someone no   Does Anyone Try to Keep You From Having Contact with Others or Doing Things Outside Your Home? no   Physical Signs of Abuse Present no   Patient needs abuse support services and resources No   Functional Scales   Functional Scales Other   Other Scales  LEFS: 58/80, 6 minute walk test: .11 miles (580 feet)   Planned Therapy Interventions   Planned Therapy Interventions gait training;neuromuscular re-education;ROM;strengthening;stretching;motor coordination training   Clinical Impression   Criteria for Skilled Therapeutic Interventions Met yes, treatment indicated   PT Diagnosis Decreased activity tolerance   Influenced by the following impairments Chronic fatigue Post-COVID    Functional limitations due to impairments Difficulty grocery shopping   Clinical Presentation Evolving/Changing   Clinical Decision Making (Complexity) Low complexity   Therapy Frequency 1 time/week   Predicted Duration of Therapy Intervention (days/wks) 12 weeks   Risk & Benefits of therapy have been explained Yes   Patient, Family & other staff in agreement with plan of care Yes   ORTHO GOALS   PT Ortho Eval Goals 1;2;3   Ortho Goal 1   Goal Identifier 6 minute walk test   Goal Description Patient will be able to ambualte at least 700 feet with her   Goal Progress 580 feet   Target Date 09/01/22   Ortho Goal 2   Goal Identifier Activity duration   Goal Description Patient will be able to be active for 5 hours at a time in order to fully participate in her life and better complete ADLs.   Goal Progress 1-2 hours   Target Date 09/01/22   Ortho Goal 3   Goal Identifier Study duration   Goal Description Patient will be able to study for at least 1 hour at a time.   Goal Progress 30 minutes   Target Date 09/01/22   Total Evaluation Time   PT Eval, Low Complexity Minutes (27529) 23   Therapy Certification   Certification date from 06/03/22   Certification date to 08/02/22   Medical Diagnosis Post-COVID chronic fatigue

## 2022-06-06 LAB
ENA SS-A AB SER IA-ACNC: <0.5 U/ML
ENA SS-A AB SER IA-ACNC: NEGATIVE
ENA SS-B IGG SER IA-ACNC: <0.6 U/ML
ENA SS-B IGG SER IA-ACNC: NEGATIVE

## 2022-07-07 NOTE — PROGRESS NOTES
[facility]    Outpatient Physical Therapy Discharge Note  Patient: Nehemias Mascorro  : 1995    Beginning/End Dates of Reporting Period:  6/3/22 to 6/3/22    Referring Provider: Shaunna Siddiqui PA-C    Therapy Diagnosis: Decreased activity tolerance     Client Self Report: See note    Objective Measurements:  Objective Measure: 6 minute walk test  Details: 580 feet, HR max of 111    Goals:  Goal Identifier 6 minute walk test   Goal Description Patient will be able to ambualte at least 700 feet with her   Target Date 22   Date Met      Progress (detail required for progress note): 580 feet     Goal Identifier Activity duration   Goal Description Patient will be able to be active for 5 hours at a time in order to fully participate in her life and better complete ADLs.   Target Date 22   Date Met      Progress (detail required for progress note): 1-2 hours     Goal Identifier Study duration   Goal Description Patient will be able to study for at least 1 hour at a time.   Target Date 22   Date Met      Progress (detail required for progress note): 30 minutes     Plan:  Discharge from therapy.    Discharge:    Reason for Discharge: Patient has failed to schedule further appointments.    Discharge Plan: Patient to continue home program.

## 2022-07-07 NOTE — ADDENDUM NOTE
Encounter addended by: Slim Garcia, PT on: 7/7/2022 8:17 AM   Actions taken: Episode resolved, Clinical Note Signed

## 2022-07-12 NOTE — TELEPHONE ENCOUNTER
REFERRAL INFORMATION:    Referring Provider:  Ravi    Referring Clinic:  [facility]     Reason for Visit/Diagnosis: diarrhea, constipation, burning in stomach     FUTURE VISIT INFORMATION:    Appointment Date: 9.12.22    Appointment Time: 2 PM     NOTES STATUS DETAILS   OFFICE NOTE from Referring Provider Internal 4.14.22, 4.20.22 Ravi [facility]   OFFICE NOTE from Other Specialist Internal/ Received  5.26.22 Artur NIKKO PMR  5.6.22 CORINNE Harvey  11.4.20    [facility]:  - 12/17/18 Office visit with Dr. Abhishek Stevenson  - 5/19/2020, 1/31/18 Office visit with Dr. Yana Stover   - 3/3/15 Office visit with Slim Crum PA-C   - 2/3/15 Office visit with Dr. Yury Harris  - 11/30/15, 4/14/15, 7/1/14, 4/16/14 Office visit with Selam Jordan PA-C   - 3/28/14 Office visit with Dr. Jeanine Carey     [facility]:  - 7/6/2020 Office visit with Dr. Angela Brown    HOSPITAL DISCHARGE SUMMARY/  ED VISITS N/A    OPERATIVE REPORT N/A    MEDICATION LIST Internal         ENDOSCOPY  Received EGD: 2/20/18, 3/31/14 ([facility])      COLONOSCOPY Received 4.27.22   ERCP N/A    EUS N/A    STOOL TESTING N/A    PERTINENT LABS Internal/ Care Everywhere    PATHOLOGY REPORTS (RELATED) Received/ Internal  4.27.22 2/20/18, 3/31/14 ([facility])    IMAGING (CT, MRI, EGD, MRCP, Small Bowel Follow Through/SBT, MR/CT Enterography) N/A      Action 7.12.22 MJ   Action Taken Sent request to [facility]       7/18/2022 2:41pm Received recs from [facility]; sent to scan on 7/26/2022. A copy was placed in MD's folder. -Andreas     7/26/2022 11:05am Fax request sent to [facility] ([phone]) for med recs. -Andreas     7/27/2022 2:58pm Received recs from [facility]; sent to scan. -Andreas

## 2022-07-17 ENCOUNTER — NURSE TRIAGE (OUTPATIENT)
Dept: NURSING | Facility: CLINIC | Age: 27
End: 2022-07-17

## 2022-07-17 NOTE — TELEPHONE ENCOUNTER
TRIAGE CALL - Is this a 2nd Level Triage? NO    Patient calling      Situation: ear ache, nausea and sore throat    Background:    Last week with coughing with some yellow discharged 2 days ago  Assessment:  Left ear - No discharge or injury  Hurts and itchiness   No swimming   Some dizziness at times  Concern / symptoms started 2 weeks worse today   Pain 4/10    Denies difficulty with breathing,  fever, vomiting, or diarrhea.   Medications/measures taken Tylenol - does not help   Patient is able to speak in full long sentences without getting short of breath.  Pt s PCP/Clinic:Alexandra Rodrigues MD - [facility]   Recommended Disposition per protocol Appt in 24 hrs  - Transferred to scheduling.Care advise given and caller s questions were answered. Reminded we will be here 24/7 and can call back and ask to speak with a nurse. Anila Cardoza RN [place] Nurse Advisor,  5:42 PM 7/17/2022    Reason for Disposition    White, yellow, or green discharge    Additional Information    Negative: [1] Stiff neck (unable to touch chin to chest) AND [2] fever    Negative: [1] Bony area of skull behind the ear is pink or swollen AND [2] fever    Negative: Moving the earlobe or touching the ear clearly increases the pain    Negative: Foreign body struck in the ear (e.g., bug, piece of cotton)    Negative: Followed an ear injury    Negative: [1] Recently diagnosed with otitis media AND [2] currently on oral antibiotics    Negative: Fever > 104 F (40 C)    Negative: Patient sounds very sick or weak to the triager    Negative: [1] SEVERE pain AND [2] not improved 2 hours after taking analgesic medication (e.g., ibuprofen or acetaminophen)    Negative: Walking is very unsteady    Negative: Sudden onset of ear pain after long - thin object was inserted into the ear canal (e.g., pencil, Q-tip)    Negative: Diabetes mellitus or weak immune system (e.g., HIV positive, cancer chemo, splenectomy, organ transplant, chronic steroids)     Negative: New blurred vision or vision changes    Protocols used: EARACHE-A-AH

## 2022-07-18 ENCOUNTER — OFFICE VISIT (OUTPATIENT)
Dept: FAMILY MEDICINE | Facility: CLINIC | Age: 27
End: 2022-07-18
Payer: COMMERCIAL

## 2022-07-18 VITALS
BODY MASS INDEX: 39.62 KG/M2 | WEIGHT: 245.5 LBS | SYSTOLIC BLOOD PRESSURE: 122 MMHG | OXYGEN SATURATION: 99 % | TEMPERATURE: 97.4 F | HEART RATE: 89 BPM | DIASTOLIC BLOOD PRESSURE: 92 MMHG

## 2022-07-18 DIAGNOSIS — J02.9 SORE THROAT: Primary | ICD-10-CM

## 2022-07-18 DIAGNOSIS — N89.8 VAGINAL DISCHARGE: ICD-10-CM

## 2022-07-18 LAB
CLUE CELLS: ABNORMAL
DEPRECATED S PYO AG THROAT QL EIA: NEGATIVE
GROUP A STREP BY PCR: NOT DETECTED
TRICHOMONAS, WET PREP: ABNORMAL
WBC'S/HIGH POWER FIELD, WET PREP: ABNORMAL
YEAST, WET PREP: ABNORMAL

## 2022-07-18 PROCEDURE — 99214 OFFICE O/P EST MOD 30 MIN: CPT | Performed by: STUDENT IN AN ORGANIZED HEALTH CARE EDUCATION/TRAINING PROGRAM

## 2022-07-18 PROCEDURE — 87210 SMEAR WET MOUNT SALINE/INK: CPT | Performed by: STUDENT IN AN ORGANIZED HEALTH CARE EDUCATION/TRAINING PROGRAM

## 2022-07-18 PROCEDURE — 87102 FUNGUS ISOLATION CULTURE: CPT | Performed by: STUDENT IN AN ORGANIZED HEALTH CARE EDUCATION/TRAINING PROGRAM

## 2022-07-18 PROCEDURE — 87651 STREP A DNA AMP PROBE: CPT | Performed by: STUDENT IN AN ORGANIZED HEALTH CARE EDUCATION/TRAINING PROGRAM

## 2022-07-18 PROCEDURE — 87106 FUNGI IDENTIFICATION YEAST: CPT | Performed by: STUDENT IN AN ORGANIZED HEALTH CARE EDUCATION/TRAINING PROGRAM

## 2022-07-18 RX ORDER — TAZAROTENE 0.1 MG/G
CREAM CUTANEOUS
COMMUNITY
Start: 2022-05-12

## 2022-07-18 RX ORDER — BENZONATATE 100 MG/1
200 CAPSULE ORAL 3 TIMES DAILY PRN
Qty: 60 CAPSULE | Refills: 0 | Status: SHIPPED | OUTPATIENT
Start: 2022-07-18 | End: 2022-11-16

## 2022-07-18 RX ORDER — BENZTROPINE MESYLATE 1 MG/1
TABLET ORAL
COMMUNITY
Start: 2022-07-08 | End: 2022-09-23

## 2022-07-18 RX ORDER — MULTIVITAMIN WITH IRON
TABLET ORAL
COMMUNITY
Start: 2022-07-10 | End: 2022-11-16

## 2022-07-18 ASSESSMENT — PATIENT HEALTH QUESTIONNAIRE - PHQ9
SUM OF ALL RESPONSES TO PHQ QUESTIONS 1-9: 8
10. IF YOU CHECKED OFF ANY PROBLEMS, HOW DIFFICULT HAVE THESE PROBLEMS MADE IT FOR YOU TO DO YOUR WORK, TAKE CARE OF THINGS AT HOME, OR GET ALONG WITH OTHER PEOPLE: SOMEWHAT DIFFICULT
SUM OF ALL RESPONSES TO PHQ QUESTIONS 1-9: 8

## 2022-07-18 ASSESSMENT — PAIN SCALES - GENERAL: PAINLEVEL: MODERATE PAIN (5)

## 2022-07-18 NOTE — PROGRESS NOTES
Assessment and Plan     27-year-old female who presents with symptoms of left ear and throat pain going on for few weeks with associated vaginal discharge.  I recommended various cultures of throat including fungal to rule out candidiasis and a strep swab as she does have some white material on her oropharynx and tongue.  We will give her Tessalon Perles to use as needed for pain until results return.  Had her do a wet prep herself and will look for yeast or bacterial vaginosis for bacterial discharge    1. Sore throat  - Fungus Culture, non-blood; Future  - Streptococcus A Rapid Screen w/Reflex to PCR - Clinic Collect    2. Vaginal discharge  - Wet prep - Clinic Collect    Follow up: PRN  Options for treatment and follow-up care were reviewed with the patient and/or guardian. Nehemias Mascorro and/or guardian engaged in the decision making process and verbalized understanding of the options discussed and agreed with the final plan.    Dr. Bubba Tate         HPI:   Nehemias Mascorro is a 27 year old  female who presents for:    Chief Complaint   Patient presents with     Ear Problem     Left Ear and throat pain for a few weeks.      Throat Problem     Left Ear and throat pain for a few weeks     Vaginal Problem     Vaginal discharge, not sexually active. Not currently on period.      Patient tells me that a couple weeks she has had symptoms of left ear pain and started having a sore throat a little while later.asome mild cough. No congestion. She had a rash on her left wrist that was red papules bu  This is improving. No sick contacts. She did a COVID test last week at [facility] and negative. She is having some vaginal discharge. No foul odor. Brown in color. Some vaginal itchiness. Never sexually active. LMP 8th of July.      Answers for HPI/ROS submitted by the patient on 7/18/2022  If you checked off any problems, how difficult have these problems made it for you to do your work, take care of things at home,  or get along with other people?: Somewhat difficult  PHQ9 TOTAL SCORE: 8  How many servings of fruits and vegetables do you eat daily?: 0-1  On average, how many sweetened beverages do you drink each day (Examples: soda, juice, sweet tea, etc.  Do NOT count diet or artificially sweetened beverages)?: 0  How many minutes a day do you exercise enough to make your heart beat faster?: 9 or less  How many days a week do you exercise enough to make your heart beat faster?: 3 or less  How many days per week do you miss taking your medication?: 1  What is the reason for your visit today?: Sore throat, ear ache, nausea, dry mouth  When did your symptoms begin?: 1-2 weeks ago  How would you describe these symptoms?: Moderate  Are your symptoms:: Staying the same  Have you had these symptoms before?: No  Is there anything that makes you feel worse?: Yawning, talking           PMHX:     Patient Active Problem List   Diagnosis     Hip pain     Obesity     Anxiety     Major depressive disorder     Morbid obesity (H)     Special screening examination for human papillomavirus (HPV)     Gastroesophageal reflux disease     Asymptomatic COVID-19 virus infection     Vaginitis and vulvovaginitis     Acne     Asthma     Chronic migraine without aura     Cyst of ovary     Slow transit constipation     Constipation     Iron deficiency anemia     Headache     Gastric ulcer without hemorrhage or perforation     Occipital neuralgia     Neuralgia and neuritis, unspecified     Other reactions to severe stress     Somatization disorder     Post-COVID chronic fatigue       Current Outpatient Medications   Medication Sig Dispense Refill     albuterol (ACCUNEB) 1.25 MG/3ML neb solution Take 1 vial (1.25 mg) by nebulization every 6 hours as needed for shortness of breath / dyspnea or wheezing 90 mL 0     albuterol (PROAIR HFA/PROVENTIL HFA/VENTOLIN HFA) 108 (90 Base) MCG/ACT inhaler Inhale 2 puffs into the lungs 4 times daily 18 g 3      amphetamine-dextroamphetamine (ADDERALL XR) 30 MG 24 hr capsule TK 1 C PO D FOR ADHD       amphetamine-dextroamphetamine (ADDERALL) 10 MG tablet Take 10 mg by mouth daily noon       ARIPiprazole (ABILIFY) 20 MG tablet Take 20 mg by mouth daily       benztropine (COGENTIN) 1 MG tablet TAKE 1 TABLET BY MOUTH DAILY AS DIRECTED       buPROPion (WELLBUTRIN XL) 300 MG 24 hr tablet Take 300 mg by mouth every morning       dicyclomine (BENTYL) 20 MG tablet Take 1 tablet (20 mg) by mouth 3 times daily as needed (abdominal pain) 90 tablet 1     drospirenone-ethinyl estradiol (JESSY) 3-0.02 MG tablet Take 1 tablet by mouth daily       EMGALITY 120 MG/ML injection Inject 120 mg Subcutaneous every 28 days       FLUoxetine (PROZAC) 20 MG capsule Take 20 mg by mouth daily 20 mg + 40 mg = 60 mg       FLUoxetine (PROZAC) 40 MG capsule Take 40 mg by mouth daily 20 mg + 40 mg = 60 mg       glycopyrrolate (ROBINUL) 1 MG tablet Take 2 mg by mouth 2 times daily       LANsoprazole (PREVACID) 15 MG DR capsule Take 1 capsule (15 mg) by mouth daily 90 capsule 0     magnesium 250 MG tablet        polyethylene glycol (MIRALAX) 17 GM/Dose powder Take 1 capful by mouth daily as needed for constipation       Semaglutide-Weight Management (WEGOVY) 1.7 MG/0.75ML SOAJ Inject 1.7 mg Subcutaneous once a week 3 mL 1     TAZORAC 0.1 % external cream APPLY TOPICALLY TO THE AFFECTED AREA AT BEDTIME       FEROSUL 325 (65 Fe) MG tablet Take 325 mg by mouth daily (with breakfast) (Patient not taking: Reported on 7/18/2022)         Social History     Tobacco Use     Smoking status: Never Smoker     Smokeless tobacco: Never Used   Vaping Use     Vaping Use: Never used   Substance Use Topics     Alcohol use: Yes     Comment: rare     Drug use: Never       Social History     Social History Narrative    Lives with her parents working part time . Had to leave college        Allergies   Allergen Reactions     Azithromycin      Erythromycin Nausea and Vomiting      Sulfa Drugs Nausea       No results found for this or any previous visit (from the past 24 hour(s)).         Review of Systems:    ROS: 10 point ROS neg other than the symptoms noted above in the HPI.         Physical Exam:     Vitals:    07/18/22 1527   BP: (!) 122/92   BP Location: Left arm   Patient Position: Sitting   Cuff Size: Adult Regular   Pulse: 89   Temp: 97.4  F (36.3  C)   TempSrc: Temporal   SpO2: 99%   Weight: 111.4 kg (245 lb 8 oz)     Body mass index is 39.62 kg/m .    General appearance: Alert, cooperative, no distress, appears stated age, obese  Head: Normocephalic, atraumatic, without obvious abnormality  Eyes: Pupils equal round, reactive.  Conjunctiva clear.  Ears:  No cerumen, TM's grey dull with structures seen bilaterally  Nose: Nares normal, no drainage.  Throat: Lips, mucosa, tongue normal mucosa pink and moist, white mucous over tongue and on oropharynx, oropharynx is pink, no trismus  Neck: Supple, symmetric, trachea midline  Lungs: Clear to auscultation bilaterally, no wheezing or crackles present.  Respirations unlabored  Heart: Regular rate and rhythm, normal S1 and S2, no murmur, rub or gallop.  Extremities: Extremities normal, atraumatic.  No cyanosis or edema.  Skin: Skin color, texture, turgor normal no rashes or lesions on limited skin exam  Neurologic: CN II through XII intact, normal strength.

## 2022-07-20 ENCOUNTER — NURSE TRIAGE (OUTPATIENT)
Dept: NURSING | Facility: CLINIC | Age: 27
End: 2022-07-20

## 2022-07-20 DIAGNOSIS — B37.0 OROPHARYNGEAL CANDIDIASIS: Primary | ICD-10-CM

## 2022-07-20 LAB — BACTERIA SPEC CULT: ABNORMAL

## 2022-07-20 RX ORDER — NYSTATIN 100000/ML
500000 SUSPENSION, ORAL (FINAL DOSE FORM) ORAL 4 TIMES DAILY
Qty: 200 ML | Refills: 0 | Status: SHIPPED | OUTPATIENT
Start: 2022-07-20 | End: 2022-07-30

## 2022-07-20 NOTE — TELEPHONE ENCOUNTER
Spoke with patient and relayed information below from Dr Rodrigues. Patient verbalized understanding and has no further questions at this time.

## 2022-07-20 NOTE — TELEPHONE ENCOUNTER
Please reassure patient that the blood pressure is normal even if it is high for her it will not harm her body . Because of the high heat , patients are getting dehydrated but as long as she can swallow well she can take gatorade and rest at home in a cool place . Urgent care is an option but in practical terms will not be helpful as she likely would have to wait to be seen . I am happy to talk to her in a phone encounter if she is not better by tomorrow

## 2022-07-20 NOTE — RESULT ENCOUNTER NOTE
I called and spoke with the patient about their recent clinic visit results. I answered any questions they had. Placed orders for swish and swallow nystatin for 10 days. Recommended some additional testing looking for common causes of oral candida. She will have these drawn at [facility]. She will call      Dr. Bubba Tate

## 2022-07-21 ENCOUNTER — ALLIED HEALTH/NURSE VISIT (OUTPATIENT)
Dept: FAMILY MEDICINE | Facility: CLINIC | Age: 27
End: 2022-07-21

## 2022-07-21 ENCOUNTER — LAB (OUTPATIENT)
Dept: LAB | Facility: CLINIC | Age: 27
End: 2022-07-21
Payer: COMMERCIAL

## 2022-07-21 VITALS — DIASTOLIC BLOOD PRESSURE: 82 MMHG | HEART RATE: 99 BPM | SYSTOLIC BLOOD PRESSURE: 112 MMHG | OXYGEN SATURATION: 97 %

## 2022-07-21 DIAGNOSIS — B37.0 OROPHARYNGEAL CANDIDIASIS: ICD-10-CM

## 2022-07-21 DIAGNOSIS — F41.9 ANXIETY: Primary | ICD-10-CM

## 2022-07-21 LAB
BASOPHILS # BLD AUTO: 0 10E3/UL (ref 0–0.2)
BASOPHILS NFR BLD AUTO: 1 %
EOSINOPHIL # BLD AUTO: 0 10E3/UL (ref 0–0.7)
EOSINOPHIL NFR BLD AUTO: 1 %
ERYTHROCYTE [DISTWIDTH] IN BLOOD BY AUTOMATED COUNT: 12.9 % (ref 10–15)
HBA1C MFR BLD: 5.2 % (ref 0–5.6)
HCT VFR BLD AUTO: 41.3 % (ref 35–47)
HGB BLD-MCNC: 13.4 G/DL (ref 11.7–15.7)
IMM GRANULOCYTES # BLD: 0 10E3/UL
IMM GRANULOCYTES NFR BLD: 0 %
LYMPHOCYTES # BLD AUTO: 1.8 10E3/UL (ref 0.8–5.3)
LYMPHOCYTES NFR BLD AUTO: 31 %
MCH RBC QN AUTO: 28.9 PG (ref 26.5–33)
MCHC RBC AUTO-ENTMCNC: 32.4 G/DL (ref 31.5–36.5)
MCV RBC AUTO: 89 FL (ref 78–100)
MONOCYTES # BLD AUTO: 0.4 10E3/UL (ref 0–1.3)
MONOCYTES NFR BLD AUTO: 7 %
NEUTROPHILS # BLD AUTO: 3.5 10E3/UL (ref 1.6–8.3)
NEUTROPHILS NFR BLD AUTO: 61 %
PLATELET # BLD AUTO: 284 10E3/UL (ref 150–450)
RBC # BLD AUTO: 4.63 10E6/UL (ref 3.8–5.2)
WBC # BLD AUTO: 5.8 10E3/UL (ref 4–11)

## 2022-07-21 PROCEDURE — 99207 PR NO CHARGE NURSE ONLY: CPT

## 2022-07-21 PROCEDURE — 83036 HEMOGLOBIN GLYCOSYLATED A1C: CPT

## 2022-07-21 PROCEDURE — 36415 COLL VENOUS BLD VENIPUNCTURE: CPT

## 2022-07-21 PROCEDURE — 87389 HIV-1 AG W/HIV-1&-2 AB AG IA: CPT

## 2022-07-21 PROCEDURE — 85025 COMPLETE CBC W/AUTO DIFF WBC: CPT

## 2022-07-22 LAB — HIV 1+2 AB+HIV1 P24 AG SERPL QL IA: NONREACTIVE

## 2022-07-22 NOTE — RESULT ENCOUNTER NOTE
Immanuelle   Your results from your recent clinic visit show:  Your hemoglobin A1c was normal.  This is a test looking for diabetes and measures your average blood sugars over the last 3 months.  Your CBC is normal with no anemia or signs of infection seen  I am still waiting on your HIV test but expect this will be normal    If you have more questions please call the clinic at [phone] or send me a [employer] message    Dr. Bubba Tate

## 2022-07-27 ENCOUNTER — E-VISIT (OUTPATIENT)
Dept: URGENT CARE | Facility: CLINIC | Age: 27
End: 2022-07-27
Payer: COMMERCIAL

## 2022-07-27 DIAGNOSIS — N39.0 ACUTE UTI (URINARY TRACT INFECTION): Primary | ICD-10-CM

## 2022-07-27 PROCEDURE — 99207 PR NO CHARGE LOS: CPT | Performed by: PHYSICIAN ASSISTANT

## 2022-07-27 RX ORDER — NITROFURANTOIN 25; 75 MG/1; MG/1
100 CAPSULE ORAL 2 TIMES DAILY
Qty: 10 CAPSULE | Refills: 0 | Status: SHIPPED | OUTPATIENT
Start: 2022-07-27 | End: 2022-08-01

## 2022-07-28 ENCOUNTER — VIRTUAL VISIT (OUTPATIENT)
Dept: INTERNAL MEDICINE | Facility: CLINIC | Age: 27
End: 2022-07-28
Payer: COMMERCIAL

## 2022-07-28 DIAGNOSIS — R19.7 DIARRHEA, UNSPECIFIED TYPE: Primary | ICD-10-CM

## 2022-07-28 DIAGNOSIS — Z87.820 HISTORY OF TRAUMATIC BRAIN INJURY: ICD-10-CM

## 2022-07-28 DIAGNOSIS — R10.13 EPIGASTRIC PAIN: ICD-10-CM

## 2022-07-28 DIAGNOSIS — R82.90 URINE ABNORMALITY: ICD-10-CM

## 2022-07-28 PROBLEM — G43.909 MIGRAINE HEADACHE: Status: ACTIVE | Noted: 2020-03-27

## 2022-07-28 PROBLEM — N92.0 EXCESSIVE AND FREQUENT MENSTRUATION: Status: ACTIVE | Noted: 2019-05-17

## 2022-07-28 PROBLEM — K62.5 HEMORRHAGE OF RECTUM AND ANUS: Status: ACTIVE | Noted: 2018-12-17

## 2022-07-28 PROBLEM — R13.10 DYSPHAGIA: Status: ACTIVE | Noted: 2018-01-31

## 2022-07-28 PROBLEM — M25.531 WRIST PAIN, RIGHT: Status: ACTIVE | Noted: 2018-03-08

## 2022-07-28 PROBLEM — L74.519 PRIMARY FOCAL HYPERHIDROSIS: Status: ACTIVE | Noted: 2022-07-28

## 2022-07-28 PROBLEM — M62.838 MUSCLE SPASM: Status: ACTIVE | Noted: 2018-02-09

## 2022-07-28 PROBLEM — H92.09 OTALGIA: Status: ACTIVE | Noted: 2019-05-17

## 2022-07-28 PROBLEM — R42 DIZZINESS: Status: ACTIVE | Noted: 2021-08-12

## 2022-07-28 PROBLEM — R06.83 SNORING: Status: ACTIVE | Noted: 2020-10-19

## 2022-07-28 PROBLEM — R20.2 PARESTHESIA OF SKIN: Status: ACTIVE | Noted: 2018-04-10

## 2022-07-28 PROCEDURE — 99214 OFFICE O/P EST MOD 30 MIN: CPT | Mod: 95 | Performed by: NURSE PRACTITIONER

## 2022-07-28 RX ORDER — FAMOTIDINE 20 MG/1
20 TABLET, FILM COATED ORAL 2 TIMES DAILY
Qty: 60 TABLET | Refills: 0 | Status: SHIPPED | OUTPATIENT
Start: 2022-07-28 | End: 2022-09-03

## 2022-07-28 NOTE — PROGRESS NOTES
"Nehemias is a 27 year old who is being evaluated via a billable video visit.      How would you like to obtain your AVS? Mail a copy  If the video visit is dropped, the invitation should be resent by: Text to cell phone: [phone]  Will anyone else be joining your video visit? No        Assessment & Plan     Diarrhea, unspecified type  She has no diarrhea since taking imodium   She still feels like she has cramping  She is taking nystatin for thrush   Mother also similarly ill - no known cause   Probably a gastroenteritis   Stool tests ordered if stools recur  - Enteric Bacteria and Virus Panel by BENNETT Stool; Future  - Clostridium difficile Toxin B PCR; Future  - Helicobacter pylori Antigen Stool; Future    Epigastric pain  She was to do a h pylori test and has not   Discussed prevacid at bedtime and pepcid twice daily morning and dinner   - Helicobacter pylori Antigen Stool; Future  - famotidine (PEPCID) 20 MG tablet; Take 1 tablet (20 mg) by mouth 2 times daily    History of traumatic brain injury  Has a hard time understanding and remembering    urine abnormality     Did virtual visit and got x for macrobid   Has not started   Sounds like she is dehydrated - urien is dark   She got fluid in urgency room  7/22 before reported urine issue or diarrhea     She seems to be seeing multiple providers- would be better for her to stick with one provider who knows her history and can help with decidion making       34 minutes spent on the date of the encounter doing chart review, history and exam, documentation and further activities per the note       BMI:   Estimated body mass index is 39.62 kg/m  as calculated from the following:    Height as of 5/6/22: 1.676 m (5' 6\").    Weight as of 7/18/22: 111.4 kg (245 lb 8 oz).       Patient Instructions   Pepcid twice daily for a month     Do stool test     Zofran prior to eating may help with eating       Return in about 2 weeks (around 8/11/2022) for if not improved " .    JERALD Hawthorne CNP  [facility]    Araceli Del Cid is a 27 year old, presenting for the following health issues:  Diarrhea (3 days)      HPI     Diarrhea for 3 days   Watery and stomach cramps   Anti diarrhea pills- imodium- yesterday morning and no stool since     No change in diet   No recent antibiotics   Yeast infection in mouth - swish and swallow - 6 days     Eating and drinking small amount   Stomach hurts   Nausea   cramping and bubbling     Prevacid acid reflux   Colonoscopy normal   Endoscopy not done     Yesterday  could  Not pee a lot   Feels full after peeing   Urine dark   No pain with urination   macrobid prescription     Friday got a bag of fluid at urgency room     Concussion in past and processing slow     Mom has diarrhea also   Review of Systems   Constitutional, HEENT, cardiovascular, pulmonary, GI, , musculoskeletal, neuro, skin, endocrine and psych systems are negative, except as otherwise noted.      Objective           Vitals:  No vitals were obtained today due to virtual visit.    Physical Exam   GENERAL: alert and no distress  EYES: Eyes grossly normal to inspection.  No discharge or erythema, or obvious scleral/conjunctival abnormalities.  RESP: No audible wheeze, cough, or visible cyanosis.  No visible retractions or increased work of breathing.    SKIN: Visible skin clear. No significant rash, abnormal pigmentation or lesions.  NEURO: Cranial nerves grossly intact.  Mentation and speech appropriate for age.  PSYCH: Mentation appears normal, affect normal/bright, judgement and insight intact, normal speech and appearance well-groomed.    stool            Video-Visit Details    Video Start Time: 8:23 AM    Type of service:  Video Visit    Video End Time:8:49 AM    Originating Location (pt. Location): Home    Distant Location (provider location):  [facility]     Platform used for Video Visit: [employer]

## 2022-07-30 ENCOUNTER — NURSE TRIAGE (OUTPATIENT)
Dept: NURSING | Facility: CLINIC | Age: 27
End: 2022-07-30

## 2022-07-30 NOTE — TELEPHONE ENCOUNTER
"Triage Call:    Patient calling to report she had missed 6 doses of nystatin swish and swallow.  Patient is being treated for oropharyngeal candidiasis and she is wondering what to do since she missed doses.  Advised patient to begin taking again to complete total of 10 days.  She reports she is still having a very dry mouth where \"I can't breath until I have a sip of water\".  She reports she is using Biotene mouthwash and lozenges, but only helps for short period of time.  Patient inuring if there is a medicated mouth rinse she should use.    Routing note to PCP and Care Team per patient request.    Nettie Centeno RN  07/30/22 6:39 PM  [facility] Nurse Advisor    Reason for Disposition    Caller has medication question only, adult not sick, and triager answers question    Additional Information    Negative: Drug overdose and triager unable to answer question    Negative: Caller requesting information unrelated to medicine    Negative: Caller requesting a prescription for Strep throat and has a positive culture result    Negative: Rash while taking a medication or within 3 days of stopping it    Negative: Immunization reaction suspected    Negative: [1] Asthma and [2] having symptoms of asthma (cough, wheezing, etc.)    Negative: [1] Influenza symptoms AND [2] anti-viral med prescription request, such as Tamiflu    Negative: [1] Symptom of illness (e.g., headache, abdominal pain, earache, vomiting) AND [2] more than mild    Negative: MORE THAN A DOUBLE DOSE of a prescription or over-the-counter (OTC) drug    Negative: [1] DOUBLE DOSE (an extra dose or lesser amount) of over-the-counter (OTC) drug AND [2] any symptoms (e.g., dizziness, nausea, pain, sleepiness)    Negative: [1] DOUBLE DOSE (an extra dose or lesser amount) of prescription drug AND [2] any symptoms (e.g., dizziness, nausea, pain, sleepiness)    Negative: Took another person's prescription drug    Negative: [1] DOUBLE DOSE (an extra dose or " "lesser amount) of prescription drug AND [2] NO symptoms (Exception: a double dose of antibiotics)    Negative: Diabetes drug error or overdose (e.g., took wrong type of insulin or took extra dose)    Negative: [1] Request for URGENT new prescription or refill of \"essential\" medication (i.e., likelihood of harm to patient if not taken) AND [2] triager unable to fill per unit policy    Negative: [1] Prescription not at pharmacy AND [2] was prescribed by PCP recently    Negative: [1] Pharmacy calling with prescription questions AND [2] triager unable to answer question    Negative: [1] Caller has URGENT medication question about med that PCP or specialist prescribed AND [2] triager unable to answer question    Negative: [1] Caller has NON-URGENT medication question about med that PCP prescribed AND [2] triager unable to answer question    Negative: [1] Caller requesting a NON-URGENT new prescription or refill AND [2] triager unable to refill per unit policy    Negative: [1] Caller has medication question about med not prescribed by PCP AND [2] triager unable to answer question (e.g., compatibility with other med, storage)    Negative: Caller requesting a CONTROLLED substance prescription refill (e.g., narcotics, ADHD medicines)    Negative: Caller wants to use a complementary or alternative medicine    Negative: [1] Prescription prescribed recently is not at pharmacy AND [2] triager has access to patient's EMR AND [3] prescription is recorded in the EMR    Negative: [1] DOUBLE DOSE (an extra dose or lesser amount) of over-the-counter (OTC) drug AND [2] NO symptoms    Negative: [1] DOUBLE DOSE (an extra dose or lesser amount) of antibiotic drug AND [2] NO symptoms    Protocols used: MEDICATION QUESTION CALL-A-AH      "

## 2022-08-01 NOTE — TELEPHONE ENCOUNTER
Spoke with patient and reiterated what she was told by the triage RN. Visit scheduled for 8/18 per patient request.

## 2022-08-03 ENCOUNTER — TRANSFERRED RECORDS (OUTPATIENT)
Dept: HEALTH INFORMATION MANAGEMENT | Facility: CLINIC | Age: 27
End: 2022-08-03

## 2022-08-15 DIAGNOSIS — K21.9 GASTROESOPHAGEAL REFLUX DISEASE WITHOUT ESOPHAGITIS: ICD-10-CM

## 2022-08-15 NOTE — TELEPHONE ENCOUNTER
"Routing refill request to provider for review/approval because:  Unsure if this is needed/current, per pharmacy note on 5/24/22:    \"Prevacid (lansoprazole) 15 mg once daily.   Sucralfate 1 gram four times daily - not using.      Had been on PPI lansoprazole 30 mg daily previously, but had been off as ran off. Restarted lansoprazole at lower 15 mg daily dose. Reports hasn't needed sucralfate. Pt reports no current symptoms.  Patient feels that current regimen is effective.\"    Last Written Prescription Date:  XXX  Last Fill Quantity: XXX,  # refills: XXX  Last office visit provider:  XXX        Requested Prescriptions   Pending Prescriptions Disp Refills     LANsoprazole (PREVACID) 30 MG DR capsule 90 capsule 3     Sig: Take 1 capsule (30 mg) by mouth daily       PPI Protocol Passed - 8/15/2022 11:42 AM        Passed - Not on Clopidogrel (unless Pantoprazole ordered)        Passed - No diagnosis of osteoporosis on record        Passed - Recent (12 mo) or future (30 days) visit within the authorizing provider's specialty     Patient has had an office visit with the authorizing provider or a provider within the authorizing providers department within the previous 12 mos or has a future within next 30 days. See \"Patient Info\" tab in inbasket, or \"Choose Columns\" in Meds & Orders section of the refill encounter.              Passed - Medication is active on med list        Passed - Patient is age 18 or older        Passed - No active pregnacy on record        Passed - No positive pregnancy test in past 12 months             KEVEN KONG RN 08/15/22 2:43 PM  "

## 2022-08-16 RX ORDER — LANSOPRAZOLE 30 MG/1
30 CAPSULE, DELAYED RELEASE ORAL DAILY
Qty: 90 CAPSULE | Refills: 3 | Status: SHIPPED | OUTPATIENT
Start: 2022-08-16 | End: 2022-08-18

## 2022-08-18 ENCOUNTER — OFFICE VISIT (OUTPATIENT)
Dept: INTERNAL MEDICINE | Facility: CLINIC | Age: 27
End: 2022-08-18
Payer: COMMERCIAL

## 2022-08-18 VITALS
BODY MASS INDEX: 38.37 KG/M2 | OXYGEN SATURATION: 99 % | SYSTOLIC BLOOD PRESSURE: 120 MMHG | HEART RATE: 94 BPM | DIASTOLIC BLOOD PRESSURE: 84 MMHG | WEIGHT: 237.7 LBS | TEMPERATURE: 97.3 F | RESPIRATION RATE: 16 BRPM

## 2022-08-18 DIAGNOSIS — R68.2 DRY MOUTH: ICD-10-CM

## 2022-08-18 DIAGNOSIS — Z12.4 CERVICAL CANCER SCREENING: Primary | ICD-10-CM

## 2022-08-18 PROCEDURE — 99213 OFFICE O/P EST LOW 20 MIN: CPT | Performed by: INTERNAL MEDICINE

## 2022-08-18 RX ORDER — ALUMINUM CHLORIDE 20 %
SOLUTION, NON-ORAL TOPICAL
COMMUNITY
Start: 2021-09-08 | End: 2022-09-23

## 2022-08-18 ASSESSMENT — ASTHMA QUESTIONNAIRES: ACT_TOTALSCORE: 20

## 2022-08-18 ASSESSMENT — PATIENT HEALTH QUESTIONNAIRE - PHQ9: SUM OF ALL RESPONSES TO PHQ QUESTIONS 1-9: 11

## 2022-08-18 ASSESSMENT — PAIN SCALES - GENERAL: PAINLEVEL: NO PAIN (0)

## 2022-08-18 ASSESSMENT — ENCOUNTER SYMPTOMS: COUGH: 1

## 2022-08-18 NOTE — PROGRESS NOTES
"  Assessment & Plan     Cervical cancer screening  Due this year.  We will do it at her next physical in a couple of months    Dry mouth  Oral cavity exam is normal.  Do believe she has medication related side effects all her medications can cause dry mouth.  She was recently started on Cogentin for hyperhidrosis Cogentin is not very highly likely to be the culprit.  Commend stopping.  Talking to psychiatrist.  He can continue saliva substitutes.  I do believe she tends to have large numbers of medications for multiple issues and that relates to a compounding of side effects.               BMI:   Estimated body mass index is 38.37 kg/m  as calculated from the following:    Height as of 5/6/22: 1.676 m (5' 6\").    Weight as of this encounter: 107.8 kg (237 lb 11.2 oz).           Return in about 3 months (around 11/18/2022) for Routine preventive.    Alexandra Rodrigues MD  [facility]   Nehemias is a 27 year old, presenting for the following health issues:  Recheck Medication and Cough (Dry mouth,sore throat,cough for a week/No fever)      Cough    History of Present Illness       Reason for visit:  Cough  Symptom onset:  3-7 days ago  Symptoms include:  Dry mouth , sore throat ,cough  Symptom intensity:  Moderate  Symptom progression:  Worsening  Had these symptoms before:  No  What makes it worse:  No  What makes it better:  Cold water,inhaler    She eats 2-3 servings of fruits and vegetables daily.She consumes 0 sweetened beverage(s) daily.She exercises with enough effort to increase her heart rate 9 or less minutes per day.  She exercises with enough effort to increase her heart rate 3 or less days per week. She is missing 1 dose(s) of medications per week.  She is not taking prescribed medications regularly due to remembering to take.       Acute Illness  Acute illness concerns: cough,dry mouth, sore throay  Onset/Duration: 1 week  Symptoms:  Fever: No  Chills/Sweats: " No  Headache (location?): YES  Sinus Pressure: YES  Conjunctivitis:  No  Ear Pain: no  Rhinorrhea: No  Congestion: YES  Sore Throat: YES  Cough: no  Wheeze: No  Decreased Appetite: YES  Nausea: YES  Vomiting: No  Diarrhea: No  Dysuria/Freq.: No  Dysuria or Hematuria: No  Fatigue/Achiness: YES  Sick/Strep Exposure: No  Therapies tried and outcome: None  Patient Active Problem List   Diagnosis     Hip pain     Obesity     Anxiety     Major depressive disorder     Morbid obesity (H)     Special screening examination for human papillomavirus (HPV)     Gastroesophageal reflux disease     Asymptomatic COVID-19 virus infection     Acne     Asthma     Chronic migraine without aura     Cyst of ovary     Slow transit constipation     Constipation     Iron deficiency anemia     Headache     Gastric ulcer without hemorrhage or perforation     Occipital neuralgia     Neuralgia and neuritis, unspecified     Other reactions to severe stress     Somatization disorder     Post-COVID chronic fatigue     Dizziness     Dysphagia     Encephalopathy, unspecified     Excessive and frequent menstruation     Indigestion     Migraine headache     Muscle spasm     Nausea     Otalgia     Paresthesia of skin     Post concussion syndrome     Primary focal hyperhidrosis     Hemorrhage of rectum and anus     Right upper quadrant pain     Snoring     Strain of muscle, fascia and tendon at neck level, sequela     Tension-type headache, unspecified, not intractable     Wrist pain, right     Current Outpatient Medications   Medication     albuterol (ACCUNEB) 1.25 MG/3ML neb solution     albuterol (PROAIR HFA/PROVENTIL HFA/VENTOLIN HFA) 108 (90 Base) MCG/ACT inhaler     amphetamine-dextroamphetamine (ADDERALL XR) 30 MG 24 hr capsule     amphetamine-dextroamphetamine (ADDERALL) 10 MG tablet     ARIPiprazole (ABILIFY) 20 MG tablet     benzonatate (TESSALON) 100 MG capsule     benztropine (COGENTIN) 1 MG tablet     buPROPion (WELLBUTRIN XL) 300 MG 24 hr  tablet     drospirenone-ethinyl estradiol (JESSY) 3-0.02 MG tablet     EMGALITY 120 MG/ML injection     famotidine (PEPCID) 20 MG tablet     FLUoxetine (PROZAC) 20 MG capsule     FLUoxetine (PROZAC) 40 MG capsule     LANsoprazole (PREVACID) 15 MG DR capsule     magnesium 250 MG tablet     polyethylene glycol (MIRALAX) 17 GM/Dose powder     Semaglutide-Weight Management (WEGOVY) 1.7 MG/0.75ML SOAJ     TAZORAC 0.1 % external cream     DRYSOL 20 % external solution     No current facility-administered medications for this visit.         Review of Systems   Respiratory: Positive for cough.       Constitutional, HEENT, cardiovascular, pulmonary, gi and gu systems are negative, except as otherwise noted.      Objective    /84 (BP Location: Left arm, Patient Position: Sitting, Cuff Size: Adult Large)   Pulse 94   Temp 97.3  F (36.3  C) (Tympanic)   Resp 16   Wt 107.8 kg (237 lb 11.2 oz)   LMP 07/08/2022 (Exact Date)   SpO2 99%   BMI 38.37 kg/m    Body mass index is 38.37 kg/m .  Physical Exam   GENERAL: healthy, alert and no distress  NECK: no adenopathy, no asymmetry, masses, or scars and thyroid normal to palpation  RESP: lungs clear to auscultation - no rales, rhonchi or wheezes  CV: regular rate and rhythm, normal S1 S2, no S3 or S4, no murmur, click or rub, no peripheral edema and peripheral pulses strong  ABDOMEN: soft, nontender, no hepatosplenomegaly, no masses and bowel sounds normal  MS: no gross musculoskeletal defects noted, no edema  Oral cavity no thrush, no ulcerations noted tonsils look good                  .  ..

## 2022-08-24 ENCOUNTER — TELEPHONE (OUTPATIENT)
Dept: GASTROENTEROLOGY | Facility: CLINIC | Age: 27
End: 2022-08-24

## 2022-08-24 NOTE — TELEPHONE ENCOUNTER
LVM that 9/12 appt with Bernice was rescheduled to Darren Castorena at the same date and time. Bernice no longer practicing at the [facility]. Sent mychart.

## 2022-09-02 DIAGNOSIS — R10.13 EPIGASTRIC PAIN: ICD-10-CM

## 2022-09-03 RX ORDER — FAMOTIDINE 20 MG/1
TABLET, FILM COATED ORAL
Qty: 180 TABLET | Refills: 2 | Status: SHIPPED | OUTPATIENT
Start: 2022-09-03 | End: 2022-10-28

## 2022-09-10 ENCOUNTER — HEALTH MAINTENANCE LETTER (OUTPATIENT)
Age: 27
End: 2022-09-10

## 2022-09-12 ENCOUNTER — MYC MEDICAL ADVICE (OUTPATIENT)
Dept: GASTROENTEROLOGY | Facility: CLINIC | Age: 27
End: 2022-09-12

## 2022-09-12 ENCOUNTER — PRE VISIT (OUTPATIENT)
Dept: GASTROENTEROLOGY | Facility: CLINIC | Age: 27
End: 2022-09-12

## 2022-09-12 ENCOUNTER — VIRTUAL VISIT (OUTPATIENT)
Dept: GASTROENTEROLOGY | Facility: CLINIC | Age: 27
End: 2022-09-12
Attending: INTERNAL MEDICINE

## 2022-09-12 VITALS — HEIGHT: 66 IN | BODY MASS INDEX: 37.77 KG/M2 | WEIGHT: 235 LBS

## 2022-09-12 DIAGNOSIS — K58.2 IRRITABLE BOWEL SYNDROME WITH BOTH CONSTIPATION AND DIARRHEA: ICD-10-CM

## 2022-09-12 DIAGNOSIS — R19.5 LOOSE STOOLS: Primary | ICD-10-CM

## 2022-09-12 DIAGNOSIS — R11.0 NAUSEA: Primary | ICD-10-CM

## 2022-09-12 PROCEDURE — 99204 OFFICE O/P NEW MOD 45 MIN: CPT | Mod: 95 | Performed by: PHYSICIAN ASSISTANT

## 2022-09-12 ASSESSMENT — PATIENT HEALTH QUESTIONNAIRE - PHQ9: SUM OF ALL RESPONSES TO PHQ QUESTIONS 1-9: 10

## 2022-09-12 ASSESSMENT — PAIN SCALES - GENERAL: PAINLEVEL: MILD PAIN (3)

## 2022-09-12 NOTE — PROGRESS NOTES
Nehemias is a 27 year old who is being evaluated via a billable video visit.      How would you like to obtain your AVS? MyChart  If the video visit is dropped, the invitation should be resent by: Send to e-mail at: [email or web address]  Will anyone else be joining your video visit? No   No BP taken

## 2022-09-12 NOTE — PROGRESS NOTES
GASTROENTEROLOGY Follow-up VIDEO VISIT    CC/REFERRING MD:    Alexandra Rodrigues  Referred Self    REASON FOR CONSULTATION:   Referred Self for No chief complaint on file.      HISTORY OF PRESENT ILLNESS:    Nehemias Mascorro is a 27 year old female with a past medical history significant for IBS, constipation predominance, migraines, GERD, ADHD, depression, and anxiety, who is being evaluated via a billable video visit for chronic constipation, abdominal plain bloating, and more recently, episodic diarrhea.  Patient states that she has had constipation since she was a child with associated abdominal discomfort.  This has been evaluated through [facility] and the pelvic floor center, has been recommended to increase fiber and was advised to undergo physical therapy for this.  Over the past several months, she has had more episodic diarrhea.  Has loose, nonbloody bowel movements up to 5 times a day when episodes occur.  No known triggers for this.  Has used Imodium as needed.  Otherwise, tends to use MiraLAX as needed for constipation.  There is been associated nausea without vomiting.  She does have known GERD, has been well controlled with famotidine and Prevacid.  Notably, she underwent diagnostic colonoscopy in April of this year for evaluation of this and it was normal with negative biopsies.  She did undergo EGD in 2018 which was normal as well.  She recalls being prescribed amitriptyline for IBS in the past, but was not helpful.  She has no history of abdominal surgeries.  No known family history of bowel conditions.      I have reviewed and updated the patient's Past Medical History, Social History, Family History and Medication List.    Exam:    General appearance:  Healthy appearing adult, in no acute distress  Eyes:  Sclera anicteric, Pupils round and reactive to light  Ears, nose, mouth and throat:  No obvious external lesions of ears and nose.  Hearing intact  Neck:  Symmetric, No  obvious external lesions  Respiratory:  Normal respiration, no use of accessory muscles   MSK:  No visual upper extremity, neck or facial muscle atrophy  ABD:  No visual abdominal distention, no audible borborygmi  Skin:  No rashes or jaundice   Psychiatric:  Oriented to person, place and time, Appropriate mood and affect.   Neurologic:  Peripheral muscle function and dexterity appear to be intact      PERTINENT STUDIES have been reviewed.    ASSESSMENT/PLAN:    Nehemias Mascorro is a 27 year old female who presents for evaluation of recurring diarrhea in the setting of known IBS with constipation predominance and a diagnosis of obstruction defecation to the pelvic floor center.  We spent some time discussing potential etiologies.  Given the relative change in her bowel habits, I did recommend infectious work-up as well as inflammatory work-up, will screen for celiac as well.  She has failed TCA in the past, if screening tests are negative may consider rifaximin or SNRI.  We will follow-up with patient after testing.    1. Irritable bowel syndrome with both constipation and diarrhea  - Adult Gastro Ref - Consult Only    2. Loose stools  - CRP, inflammation; Future  - Calprotectin Feces; Future  - Clostridium difficile Toxin B PCR; Future  - Enteric Bacteria and Virus Panel by BENNETT Stool; Future  - Tissue transglutaminase elmer IgA and IgG; Future  - IgA; Future      Video-Visit Details    Type of service:  Video Visit    Total Video Time: 13 minutes    Originating Location (pt. Location): Home    Distant Location (provider location):  [facility]     Mode of Communication:  Video Conference via vLine    A total of 52 minutes was spent with reviewing the chart, discussing with the patient, documentation and coordination of care.    Darren Castorena PA-C    RTC after testing

## 2022-09-13 ENCOUNTER — NURSE TRIAGE (OUTPATIENT)
Dept: NURSING | Facility: CLINIC | Age: 27
End: 2022-09-13

## 2022-09-13 RX ORDER — ONDANSETRON 4 MG/1
4 TABLET, ORALLY DISINTEGRATING ORAL EVERY 8 HOURS PRN
Qty: 30 TABLET | Refills: 0 | Status: SHIPPED | OUTPATIENT
Start: 2022-09-13 | End: 2023-06-20

## 2022-09-13 NOTE — TELEPHONE ENCOUNTER
"Triage Call:    Caller: Patient   Shaking, nauseated, \"felt like I was going to throw up and \"feeling funny\".  This feeling started around 1500 today.  She is reporting that her hands are visibly shaking and she has had some sugar intake, but it isn't going away.  She also reports being a little lightheaded.  She was nauseated a bit yesterday, but it went away.    She reports not peeing a lot, last time was around 1415 today, but not having a large amount of output.      Advised patient that she should be seen in an Urgent Care this evening and patient verbalized understanding.    Alpa Julian RN on 9/13/2022 at 6:24 PM       Reason for Disposition    [1] Dizziness caused by heat exposure, sudden standing, or poor fluid intake AND [2] no improvement after 2 hours of rest and fluids    Additional Information    Negative: Shock suspected (e.g., cold/pale/clammy skin, too weak to stand, low BP, rapid pulse)    Negative: Sounds like a life-threatening emergency to the triager    Negative: [1] Nausea or vomiting AND [2] pregnancy < 20 weeks    Negative: Menstrual Period - Missed or Late (i.e., pregnancy suspected)    Negative: Heat exhaustion suspected (i.e., dehydration from heat exposure)    Negative: Motion sickness suspected (i.e., nausea with car, plane, boat, or train travel)    Negative: Anxiety or stress suspected (i.e., nausea with anxiety attacks or stressful situations)    Negative: Traumatic Brain Injury (TBI) suspected    Negative: Nausea (or Vomiting) in a cancer patient who is currently (or recently) receiving chemotherapy or radiation therapy, or cancer patient who has metastatic or end-stage cancer and is receiving palliative care    Negative: Vomiting occurs    Negative: Other symptom is present, see that guideline.  (e.g., chest pain, headache, dizziness, abdominal pain, colds, sore throat, etc.).    Negative: Unable to walk, or can only walk with assistance (e.g., requires support)    Negative: " Difficulty breathing    Negative: [1] Insulin-dependent diabetes (Type I) AND [2] glucose > 400 mg/dl (22 mmol/l)    Negative: [1] Drinking very little AND [2] dehydration suspected (e.g., no urine > 12 hours, very dry mouth, very lightheaded)    Negative: Patient sounds very sick or weak to the triager    Negative: [1] Fever > 100.0 F (37.8 C) AND [2] diabetes mellitus or weak immune system (e.g., HIV positive, cancer chemo, splenectomy, organ transplant, chronic steroids)    Negative: [1] Fever > 100.0 F (37.8 C) AND [2] bedridden (e.g., nursing home patient, CVA, chronic illness, recovering from surgery)    Negative: [1] Fever > 101 F (38.3 C) AND [2] age > 60 years    Negative: Fever > 104 F (40 C)    Negative: Taking any of the following medications: digoxin (Lanoxin), lithium, theophylline, phenytoin (Dilantin)    Negative: Yellowish color of the skin or white of the eye (i.e., jaundice)    Negative: Fever present > 3 days (72 hours)    Negative: Receiving cancer chemotherapy medication    Negative: Taking prescription medication that could cause nausea (e.g., narcotics/opiates, antibiotics, OCPs, many others)    Negative: SEVERE difficulty breathing (e.g., struggling for each breath, speaks in single words)    Negative: [1] Difficulty breathing or swallowing AND [2] started suddenly after medicine, an allergic food or bee sting    Negative: Shock suspected (e.g., cold/pale/clammy skin, too weak to stand, low BP, rapid pulse)    Negative: Difficult to awaken or acting confused (e.g., disoriented, slurred speech)    Negative: [1] Weakness (i.e., paralysis, loss of muscle strength) of the face, arm or leg on one side of the body AND [2] sudden onset AND [3] present now    Negative: [1] Numbness (i.e., loss of sensation) of the face, arm or leg on one side of the body AND [2] sudden onset AND [3] present now    Negative: [1] Loss of speech or garbled speech AND [2] sudden onset AND [3] present now    Negative:  "Overdose (accidental or intentional) of medications    Negative: [1] Fainted > 15 minutes ago AND [2] still feels too weak or dizzy to stand    Negative: Heart beating < 50 beats per minute OR > 140 beats per minute    Negative: Sounds like a life-threatening emergency to the triager    Negative: Chest pain    Negative: Rectal bleeding, bloody stool, or tarry-black stool    Negative: [1] Vomiting AND [2] contains red blood or black (\"coffee ground\") material    Negative: Vomiting is main symptom    Negative: Diarrhea is main symptom    Negative: Headache is main symptom    Negative: Patient states that they are having an anxiety or panic attack    Negative: Dizziness from low blood sugar (i.e., < 60 mg/dl or 3.5 mmol/l)    Negative: Dizziness is described as a spinning sensation (i.e., vertigo)    Negative: Heat exhaustion suspected (i.e., dehydration from heat exposure)    Negative: Difficulty breathing    Negative: SEVERE dizziness (e.g., unable to stand, requires support to walk, feels like passing out now)    Negative: Extra heart beats OR irregular heart beating (i.e., \"palpitations\")    Negative: [1] Drinking very little AND [2] dehydration suspected (e.g., no urine > 12 hours, very dry mouth, very lightheaded)    Negative: [1] Weakness (i.e., paralysis, loss of muscle strength) of the face, arm / hand, or leg / foot on one side of the body AND [2] sudden onset AND [3] brief (now gone)    Negative: [1] Numbness (i.e., loss of sensation) of the face, arm / hand, or leg / foot on one side of the body AND [2] sudden onset AND [3] brief (now gone)    Negative: [1] Loss of speech or garbled speech AND [2] sudden onset AND [3] brief (now gone)    Negative: Loss of vision or double vision (Exception: similar to previous migraines)    Negative: Patient sounds very sick or weak to the triager    Protocols used: DIZZINESS - [id number], NAUSEA-A-AH      "

## 2022-09-16 ENCOUNTER — VIRTUAL VISIT (OUTPATIENT)
Dept: PHYSICAL MEDICINE AND REHAB | Facility: CLINIC | Age: 27
End: 2022-09-16
Payer: COMMERCIAL

## 2022-09-16 DIAGNOSIS — U09.9 POST-COVID CHRONIC CONCENTRATION DEFICIT: Primary | ICD-10-CM

## 2022-09-16 DIAGNOSIS — U09.9 LONG COVID: ICD-10-CM

## 2022-09-16 DIAGNOSIS — U09.9 POST-COVID CHRONIC FATIGUE: ICD-10-CM

## 2022-09-16 DIAGNOSIS — R41.841 COGNITIVE COMMUNICATION DEFICIT: ICD-10-CM

## 2022-09-16 DIAGNOSIS — K58.2 IRRITABLE BOWEL SYNDROME WITH BOTH CONSTIPATION AND DIARRHEA: ICD-10-CM

## 2022-09-16 DIAGNOSIS — R41.840 POST-COVID CHRONIC CONCENTRATION DEFICIT: Primary | ICD-10-CM

## 2022-09-16 DIAGNOSIS — G93.32 POST-COVID CHRONIC FATIGUE: ICD-10-CM

## 2022-09-16 PROCEDURE — 99215 OFFICE O/P EST HI 40 MIN: CPT | Mod: 95 | Performed by: PHYSICIAN ASSISTANT

## 2022-09-16 ASSESSMENT — ANXIETY QUESTIONNAIRES
GAD7 TOTAL SCORE: 4
5. BEING SO RESTLESS THAT IT IS HARD TO SIT STILL: NOT AT ALL
3. WORRYING TOO MUCH ABOUT DIFFERENT THINGS: NOT AT ALL
6. BECOMING EASILY ANNOYED OR IRRITABLE: MORE THAN HALF THE DAYS
7. FEELING AFRAID AS IF SOMETHING AWFUL MIGHT HAPPEN: NOT AT ALL
GAD7 TOTAL SCORE: 4
GAD7 TOTAL SCORE: 4
1. FEELING NERVOUS, ANXIOUS, OR ON EDGE: SEVERAL DAYS
4. TROUBLE RELAXING: SEVERAL DAYS
2. NOT BEING ABLE TO STOP OR CONTROL WORRYING: NOT AT ALL
7. FEELING AFRAID AS IF SOMETHING AWFUL MIGHT HAPPEN: NOT AT ALL
IF YOU CHECKED OFF ANY PROBLEMS ON THIS QUESTIONNAIRE, HOW DIFFICULT HAVE THESE PROBLEMS MADE IT FOR YOU TO DO YOUR WORK, TAKE CARE OF THINGS AT HOME, OR GET ALONG WITH OTHER PEOPLE: NOT DIFFICULT AT ALL
8. IF YOU CHECKED OFF ANY PROBLEMS, HOW DIFFICULT HAVE THESE MADE IT FOR YOU TO DO YOUR WORK, TAKE CARE OF THINGS AT HOME, OR GET ALONG WITH OTHER PEOPLE?: NOT DIFFICULT AT ALL

## 2022-09-16 ASSESSMENT — ENCOUNTER SYMPTOMS
NUMBNESS: 0
NECK MASS: 0
POOR WOUND HEALING: 0
NECK PAIN: 1
POLYPHAGIA: 0
SORE THROAT: 0
LOSS OF CONSCIOUSNESS: 0
HEADACHES: 1
BLOOD IN STOOL: 0
MUSCLE WEAKNESS: 0
BOWEL INCONTINENCE: 0
WEAKNESS: 0
MYALGIAS: 1
TREMORS: 1
EYE WATERING: 0
EYE REDNESS: 0
WEIGHT LOSS: 1
ALTERED TEMPERATURE REGULATION: 1
JOINT SWELLING: 0
ARTHRALGIAS: 1
DECREASED APPETITE: 1
SINUS PAIN: 0
CONSTIPATION: 1
BLOATING: 1
DECREASED CONCENTRATION: 1
MUSCLE CRAMPS: 0
HOT FLASHES: 0
NAUSEA: 1
FEVER: 0
VOMITING: 1
EYE PAIN: 0
MEMORY LOSS: 0
NIGHT SWEATS: 1
HALLUCINATIONS: 0
TASTE DISTURBANCE: 0
WEIGHT GAIN: 0
HEARTBURN: 1
FATIGUE: 1
TINGLING: 1
JAUNDICE: 0
HOARSE VOICE: 0
SPEECH CHANGE: 0
RECTAL PAIN: 0
PARALYSIS: 0
NERVOUS/ANXIOUS: 1
DIZZINESS: 0
DECREASED LIBIDO: 0
EYE IRRITATION: 0
DEPRESSION: 1
NAIL CHANGES: 0
PANIC: 0
SEIZURES: 0
INCREASED ENERGY: 0
BACK PAIN: 1
CHILLS: 0
STIFFNESS: 0
INSOMNIA: 0
ABDOMINAL PAIN: 1
DISTURBANCES IN COORDINATION: 0
POLYDIPSIA: 0
SMELL DISTURBANCE: 0
SKIN CHANGES: 0
DIARRHEA: 1
SINUS CONGESTION: 0
TROUBLE SWALLOWING: 0
DOUBLE VISION: 0

## 2022-09-16 ASSESSMENT — PATIENT HEALTH QUESTIONNAIRE - PHQ9
10. IF YOU CHECKED OFF ANY PROBLEMS, HOW DIFFICULT HAVE THESE PROBLEMS MADE IT FOR YOU TO DO YOUR WORK, TAKE CARE OF THINGS AT HOME, OR GET ALONG WITH OTHER PEOPLE: VERY DIFFICULT
SUM OF ALL RESPONSES TO PHQ QUESTIONS 1-9: 12
SUM OF ALL RESPONSES TO PHQ QUESTIONS 1-9: 12

## 2022-09-16 NOTE — PROGRESS NOTES
Nehemias is a 27 year old who is being evaluated via a billable video visit.      How would you like to obtain your AVS? MyChart  If the video visit is dropped, the invitation should be resent by: Send to e-mail at: [email or web address]  Will anyone else be joining your video visit? No       Assessment/Impression   1. Post-COVID chronic concentration deficit /Cognitive communication deficit  Patient with trouble concentration still and having fatigue that is mostly mental. Discussed energy conservation and re discussed seeing occupational and physical therapy. Will recheck her B12 now that she had been taking replacement.   - Vitamin B12; Future  - Occupational Therapy Referral; Future  - Speech Therapy Referral; Future    2. Post-COVID chronic fatigue  Patient still with lingering fatigue. Discussed energy conservation and provided information on fatigue management.      3. Irritable bowel syndrome with both constipation and diarrhea  Patient seeing GI and appreciate recommendations.  Dicussed prevention for nausea. Encouraged to eat smaller meals more frequently, try ashtyn and also hydrate slowly through out the day.     4. Long COVID  Discussed COVID and Post COVID with patient.  Educational materials provided and all questions answered.        Plan:  1. I reviewed present knowledge on long-Covid.  Education was provided and question were answered.  2. Orders/Referrals as above  3. I will advised patient on test results  4. I will follow up with Nehemias Mascorro in 3 months. I will review progress and consider need for any other therapeutic interventions. If there are any questions and/or concerns she will call the clinic.      On day of encounter time spent in chart review and with patient in consultation, exam, education,coordination of care, and documentation:  45 minutes     _________________________________  Shaunna Siddiqui PA-C  [facility] PHYSICAL MEDICINE AND REHABILITATION CLINIC  [place]    Subjective   HPI   Briefly patient was seen on 5/26/22 in the Post COVID clinic for lingering symptoms from their  COVID infection in January 2022. At the time of that appointment they were complaining of GI symptoms, fatigue, concentration difficulties and dry mouth. Patient since her last visit has had lots of GI issues. She has been having constipation and nausea. She does note that the symptoms are still fairly present. She is not sleeping very well due to her GI symptoms.  She is vomiting randomly.  She is working with GI to figure out the cause of her symptoms. She still has fatigue and concentration issues as well.  She had trouble completing a homework assignment. She had to take two naps.  She still is having trouble focusing despite being on Adderal. Patient also has dry mouth but this improved after removing a medication.      Current Symptoms:  Fatigue (functional assessment based on ADLS): stable  Nausea/Vomiting/Diarrhea: stable/worse  Cognitive impairment: stable    Goals of Care: improve concentration, improve GI symptoms, improve fatigue    Current concerns: No    PHQ Assesment Total Score(s) 9/16/2022   PHQ-9 Score 12   Some recent data might be hidden     TAE-7 Results 9/16/2022   TAE 7 TOTAL SCORE 4 (minimal anxiety)   Some recent data might be hidden     PTSD Screen Score 9/16/2022   Have you ever experienced this kind of event? Yes   PTSD Screen (Score of 3 or more suggests positive screen) 3   Some recent data might be hidden     PROMIS-29 5/26/2022   PROMIS Physical Function T-Score 41.8 (mild dysfunction)   PROMIS Anxiety T-Score 53.7 (within normal limits)   PROMIS Depression T-Score 51.8 (within normal limits)   PROMIS Fatigue T-Score 62.7 (moderate)   PROMIS Sleep Disturbance T-Score 56.1 (mild)   PROMIS Ability to Participate in Social Roles & Activities T-Score 38.8 (moderate dysfunction)   PROMIS Pain Interference T-Score 58.5 (mild)   PROMIS Pain Intensity 3       Past  Medical History:   Diagnosis Date     ADHD (attention deficit hyperactivity disorder)      Depressive disorder      Ovarian cyst      Uncomplicated asthma        Past Surgical History:   Procedure Laterality Date     COLONOSCOPY N/A 4/27/2022    Procedure: COLONOSCOPY, WITH POLYPECTOMY AND BIOPSY;  Surgeon: Alyse Leija MD;  Location:  GI     ENT SURGERY      tonsilectomy age 8     ORTHOPEDIC SURGERY      Hip, emelia surgery in 2013     WRIST SURGERY         Family History   Problem Relation Age of Onset     Depression Maternal Grandmother      Schizophrenia Maternal Uncle      Substance Abuse Maternal Uncle        Social History     Tobacco Use     Smoking status: Never Smoker     Smokeless tobacco: Never Used   Vaping Use     Vaping Use: Never used   Substance Use Topics     Alcohol use: Yes     Comment: rare     Drug use: Never         Current Outpatient Medications:      albuterol (ACCUNEB) 1.25 MG/3ML neb solution, Take 1 vial (1.25 mg) by nebulization every 6 hours as needed for shortness of breath / dyspnea or wheezing, Disp: 90 mL, Rfl: 0     albuterol (PROAIR HFA/PROVENTIL HFA/VENTOLIN HFA) 108 (90 Base) MCG/ACT inhaler, Inhale 2 puffs into the lungs 4 times daily, Disp: 18 g, Rfl: 3     amphetamine-dextroamphetamine (ADDERALL XR) 30 MG 24 hr capsule, TK 1 C PO D FOR ADHD, Disp: , Rfl:      amphetamine-dextroamphetamine (ADDERALL) 10 MG tablet, Take 10 mg by mouth daily noon, Disp: , Rfl:      ARIPiprazole (ABILIFY) 20 MG tablet, Take 20 mg by mouth daily, Disp: , Rfl:      benzonatate (TESSALON) 100 MG capsule, Take 2 capsules (200 mg) by mouth 3 times daily as needed for other (sore throat), Disp: 60 capsule, Rfl: 0     benztropine (COGENTIN) 1 MG tablet, TAKE 1 TABLET BY MOUTH DAILY AS DIRECTED, Disp: , Rfl:      buPROPion (WELLBUTRIN XL) 300 MG 24 hr tablet, Take 300 mg by mouth every morning, Disp: , Rfl:      drospirenone-ethinyl estradiol (JESSY) 3-0.02 MG tablet, Take 1 tablet by mouth daily,  Disp: , Rfl:      DRYSOL 20 % external solution, APPLY TOPICALLY TO THE AFFECTED AREA 2 TIMES A WEEK AS DIRECTED, Disp: , Rfl:      EMGALITY 120 MG/ML injection, Inject 120 mg Subcutaneous every 28 days, Disp: , Rfl:      famotidine (PEPCID) 20 MG tablet, TAKE 1 TABLET(20 MG) BY MOUTH TWICE DAILY, Disp: 180 tablet, Rfl: 2     FLUoxetine (PROZAC) 20 MG capsule, Take 20 mg by mouth daily 20 mg + 40 mg = 60 mg, Disp: , Rfl:      FLUoxetine (PROZAC) 40 MG capsule, Take 40 mg by mouth daily 20 mg + 40 mg = 60 mg, Disp: , Rfl:      LANsoprazole (PREVACID) 15 MG DR capsule, Take 1 capsule (15 mg) by mouth daily, Disp: 90 capsule, Rfl: 0     magnesium 250 MG tablet, , Disp: , Rfl:      ondansetron (ZOFRAN ODT) 4 MG ODT tab, Take 1 tablet (4 mg) by mouth every 8 hours as needed for nausea, Disp: 30 tablet, Rfl: 0     polyethylene glycol (MIRALAX) 17 GM/Dose powder, Take 1 capful by mouth daily as needed for constipation, Disp: , Rfl:      Semaglutide-Weight Management (WEGOVY) 1.7 MG/0.75ML SOAJ, Inject 1.7 mg Subcutaneous once a week, Disp: 3 mL, Rfl: 1     TAZORAC 0.1 % external cream, APPLY TOPICALLY TO THE AFFECTED AREA AT BEDTIME, Disp: , Rfl:       Answers for HPI/ROS submitted by the patient on 9/16/2022  Review of Systems   Constitutional: Positive for fatigue. Negative for chills and fever.   HENT: Positive for ear pain. Negative for ear discharge, hearing loss, mouth sores, nosebleeds, sinus pain, sore throat, tinnitus and trouble swallowing.    Eyes: Negative for pain and redness.   Gastrointestinal: Positive for abdominal pain, constipation, diarrhea, heartburn, nausea and vomiting. Negative for rectal pain.   Endocrine: Negative for polydipsia and polyphagia.   Genitourinary: Positive for vaginal discharge. Negative for dyspareunia and genital sores.   Musculoskeletal: Positive for arthralgias, back pain, myalgias and neck pain. Negative for joint swelling.   Skin: Positive for rash.   Neurological: Positive for  tremors and headaches. Negative for dizziness, seizures, weakness and numbness.   Psychiatric/Behavioral: Positive for decreased concentration. Negative for hallucinations. The patient is nervous/anxious.           Objective    Vitals - Patient Reported  Pain Score: Mild Pain (2)  Pain Loc:  (Shoulder blades)      Objective         Vitals:  No vitals were obtained today due to virtual visit.    Physical Exam   GENERAL: Healthy, alert and no distress  EYES: Eyes grossly normal to inspection.  No discharge or erythema, or obvious scleral/conjunctival abnormalities.  RESP: No audible wheeze, cough, or visible cyanosis.  No visible retractions or increased work of breathing.    NEURO: Cranial nerves grossly intact.  Mentation and speech appropriate for age.  PSYCH: Mentation appears normal, affect normal/bright, judgement and insight intact, normal speech and appearance well-groomed.      Last Renal Panel:  Sodium   Date Value Ref Range Status   11/10/2021 141 136 - 145 mmol/L Final   06/22/2018 143 133 - 144 mmol/L Final     Potassium   Date Value Ref Range Status   11/10/2021 4.1 3.5 - 5.0 mmol/L Final   06/22/2018 4.0 3.4 - 5.3 mmol/L Final     Chloride   Date Value Ref Range Status   11/10/2021 106 98 - 107 mmol/L Final   06/22/2018 110 (H) 94 - 109 mmol/L Final     Carbon Dioxide   Date Value Ref Range Status   06/22/2018 24 20 - 32 mmol/L Final     Carbon Dioxide (CO2)   Date Value Ref Range Status   11/10/2021 24 22 - 31 mmol/L Final     Anion Gap   Date Value Ref Range Status   11/10/2021 11 5 - 18 mmol/L Final   06/22/2018 9 3 - 14 mmol/L Final     Glucose   Date Value Ref Range Status   11/10/2021 80 70 - 125 mg/dL Final   06/22/2018 84 70 - 99 mg/dL Final     Urea Nitrogen   Date Value Ref Range Status   11/10/2021 11 8 - 22 mg/dL Final   06/22/2018 15 7 - 30 mg/dL Final     Creatinine   Date Value Ref Range Status   11/10/2021 0.90 0.60 - 1.10 mg/dL Final   06/22/2018 0.77 0.52 - 1.04 mg/dL Final     GFR  Estimate   Date Value Ref Range Status   11/10/2021 89 >60 mL/min/1.73m2 Final     Comment:     As of July 11, 2021, eGFR is calculated by the CKD-EPI creatinine equation, without race adjustment. eGFR can be influenced by muscle mass, exercise, and diet. The reported eGFR is an estimation only and is only applicable if the renal function is stable.   06/24/2021 >60 >60 mL/min/1.73m2 Final   06/22/2018 >90 >60 mL/min/1.7m2 Final     Comment:     Non  GFR Calc     Calcium   Date Value Ref Range Status   11/10/2021 9.7 8.5 - 10.5 mg/dL Final   06/22/2018 8.5 8.5 - 10.1 mg/dL Final     Albumin   Date Value Ref Range Status   08/10/2020 3.7 3.5 - 5.0 g/dL Final      Lab Results   Component Value Date    WBC 5.8 07/21/2022     Lab Results   Component Value Date    RBC 4.63 07/21/2022     Lab Results   Component Value Date    HGB 13.4 07/21/2022     Lab Results   Component Value Date    HCT 41.3 07/21/2022     No components found for: MCT  Lab Results   Component Value Date    MCV 89 07/21/2022     Lab Results   Component Value Date    MCH 28.9 07/21/2022     Lab Results   Component Value Date    MCHC 32.4 07/21/2022     Lab Results   Component Value Date    RDW 12.9 07/21/2022     Lab Results   Component Value Date     07/21/2022      Lab Results   Component Value Date    A1C 5.2 07/21/2022    A1C 5.3 06/02/2022    A1C 5.7 11/10/2021    A1C 5.9 06/24/2021    A1C 5.4 10/11/2019      TSH   Date Value Ref Range Status   03/10/2022 0.89 0.30 - 5.00 uIU/mL Final   06/22/2018 0.70 0.40 - 4.00 mU/L Final      Lab Results   Component Value Date    VITDT 63 03/10/2022      Recent Labs   Lab Test 06/02/22  1151 01/10/20  1135   MAG 1.9 1.8     Last Comprehensive Metabolic Panel:  Sodium   Date Value Ref Range Status   11/10/2021 141 136 - 145 mmol/L Final   06/22/2018 143 133 - 144 mmol/L Final     Potassium   Date Value Ref Range Status   11/10/2021 4.1 3.5 - 5.0 mmol/L Final   06/22/2018 4.0 3.4 - 5.3  mmol/L Final     Chloride   Date Value Ref Range Status   11/10/2021 106 98 - 107 mmol/L Final   06/22/2018 110 (H) 94 - 109 mmol/L Final     Carbon Dioxide   Date Value Ref Range Status   06/22/2018 24 20 - 32 mmol/L Final     Carbon Dioxide (CO2)   Date Value Ref Range Status   11/10/2021 24 22 - 31 mmol/L Final     Anion Gap   Date Value Ref Range Status   11/10/2021 11 5 - 18 mmol/L Final   06/22/2018 9 3 - 14 mmol/L Final     Glucose   Date Value Ref Range Status   11/10/2021 80 70 - 125 mg/dL Final   06/22/2018 84 70 - 99 mg/dL Final     Urea Nitrogen   Date Value Ref Range Status   11/10/2021 11 8 - 22 mg/dL Final   06/22/2018 15 7 - 30 mg/dL Final     Creatinine   Date Value Ref Range Status   11/10/2021 0.90 0.60 - 1.10 mg/dL Final   06/22/2018 0.77 0.52 - 1.04 mg/dL Final     GFR Estimate   Date Value Ref Range Status   11/10/2021 89 >60 mL/min/1.73m2 Final     Comment:     As of July 11, 2021, eGFR is calculated by the CKD-EPI creatinine equation, without race adjustment. eGFR can be influenced by muscle mass, exercise, and diet. The reported eGFR is an estimation only and is only applicable if the renal function is stable.   06/24/2021 >60 >60 mL/min/1.73m2 Final   06/22/2018 >90 >60 mL/min/1.7m2 Final     Comment:     Non  GFR Calc     Calcium   Date Value Ref Range Status   11/10/2021 9.7 8.5 - 10.5 mg/dL Final   06/22/2018 8.5 8.5 - 10.1 mg/dL Final     Bilirubin Total   Date Value Ref Range Status   08/10/2020 0.5 0.0 - 1.0 mg/dL Final     Alkaline Phosphatase   Date Value Ref Range Status   08/10/2020 69 45 - 120 U/L Final     ALT   Date Value Ref Range Status   08/10/2020 33 0 - 45 U/L Final   06/22/2018 18 0 - 50 U/L Final     AST   Date Value Ref Range Status   08/10/2020 19 0 - 40 U/L Final   06/22/2018 14 0 - 45 U/L Final     Most Recent D-dimer:No lab results found.    Lab on 07/21/2022   Component Date Value Ref Range Status     Hemoglobin A1C 07/21/2022 5.2  0.0 - 5.6 % Final     Normal <5.7%   Prediabetes 5.7-6.4%    Diabetes 6.5% or higher     Note: Adopted from ADA consensus guidelines.     HIV Antigen Antibody Combo 07/21/2022 Nonreactive  Nonreactive Final    HIV-1 p24 Ag & HIV-1/HIV-2 Ab Not Detected     WBC Count 07/21/2022 5.8  4.0 - 11.0 10e3/uL Final     RBC Count 07/21/2022 4.63  3.80 - 5.20 10e6/uL Final     Hemoglobin 07/21/2022 13.4  11.7 - 15.7 g/dL Final     Hematocrit 07/21/2022 41.3  35.0 - 47.0 % Final     MCV 07/21/2022 89  78 - 100 fL Final     MCH 07/21/2022 28.9  26.5 - 33.0 pg Final     MCHC 07/21/2022 32.4  31.5 - 36.5 g/dL Final     RDW 07/21/2022 12.9  10.0 - 15.0 % Final     Platelet Count 07/21/2022 284  150 - 450 10e3/uL Final     % Neutrophils 07/21/2022 61  % Final     % Lymphocytes 07/21/2022 31  % Final     % Monocytes 07/21/2022 7  % Final     % Eosinophils 07/21/2022 1  % Final     % Basophils 07/21/2022 1  % Final     % Immature Granulocytes 07/21/2022 0  % Final     Absolute Neutrophils 07/21/2022 3.5  1.6 - 8.3 10e3/uL Final     Absolute Lymphocytes 07/21/2022 1.8  0.8 - 5.3 10e3/uL Final     Absolute Monocytes 07/21/2022 0.4  0.0 - 1.3 10e3/uL Final     Absolute Eosinophils 07/21/2022 0.0  0.0 - 0.7 10e3/uL Final     Absolute Basophils 07/21/2022 0.0  0.0 - 0.2 10e3/uL Final     Absolute Immature Granulocytes 07/21/2022 0.0  <=0.4 10e3/uL Final       Video-Visit Details    Video Visit start Time: 08:30 AM    Type of service:  Video Visit    Video End Time: 09:00 AM    Originating Location (pt. Location): Home    Distant Location (provider location):  [facility] PHYSICAL MEDICINE AND REHABILITATION [facility]     Platform used for Video Visit: [employer]

## 2022-09-16 NOTE — LETTER
9/16/2022       RE: Nehemias Mascorro  [address]  [place] [address]     Dear Colleague,    Thank you for referring your patient, Nehemias Mascorro, to the [facility] PHYSICAL MEDICINE AND REHABILITATION CLINIC [place] [facility]. Please see a copy of my visit note below.    Assessment/Impression   1. Post-COVID chronic concentration deficit /Cognitive communication deficit  Patient with trouble concentration still and having fatigue that is mostly mental. Discussed energy conservation and re discussed seeing occupational and physical therapy. Will recheck her B12 now that she had been taking replacement.   - Vitamin B12; Future  - Occupational Therapy Referral; Future  - Speech Therapy Referral; Future    2. Post-COVID chronic fatigue  Patient still with lingering fatigue. Discussed energy conservation and provided information on fatigue management.      3. Irritable bowel syndrome with both constipation and diarrhea  Patient seeing GI and appreciate recommendations.  Dicussed prevention for nausea. Encouraged to eat smaller meals more frequently, try ashtyn and also hydrate slowly through out the day.     4. Long COVID  Discussed COVID and Post COVID with patient.  Educational materials provided and all questions answered.        Plan:  1. I reviewed present knowledge on long-Covid.  Education was provided and question were answered.  2. Orders/Referrals as above  3. I will advised patient on test results  4. I will follow up with Nehemias Mascorro in 3 months. I will review progress and consider need for any other therapeutic interventions. If there are any questions and/or concerns she will call the clinic.      On day of encounter time spent in chart review and with patient in consultation, exam, education,coordination of care, and documentation:  45 minutes     _________________________________  Shaunna Siddiqui PA-C  [facility]  [place] PHYSICAL MEDICINE AND REHABILITATION CLINIC [facility]   HPI   Briefly patient was seen on 5/26/22 in the Post COVID clinic for lingering symptoms from their  COVID infection in January 2022. At the time of that appointment they were complaining of GI symptoms, fatigue, concentration difficulties and dry mouth. Patient since her last visit has had lots of GI issues. She has been having constipation and nausea. She does note that the symptoms are still fairly present. She is not sleeping very well due to her GI symptoms.  She is vomiting randomly.  She is working with GI to figure out the cause of her symptoms. She still has fatigue and concentration issues as well.  She had trouble completing a homework assignment. She had to take two naps.  She still is having trouble focusing despite being on Adderal. Patient also has dry mouth but this improved after removing a medication.      Current Symptoms:  Fatigue (functional assessment based on ADLS): stable  Nausea/Vomiting/Diarrhea: stable/worse  Cognitive impairment: stable    Goals of Care: improve concentration, improve GI symptoms, improve fatigue    Current concerns: No    PHQ Assesment Total Score(s) 9/16/2022   PHQ-9 Score 12   Some recent data might be hidden     TAE-7 Results 9/16/2022   TAE 7 TOTAL SCORE 4 (minimal anxiety)   Some recent data might be hidden     PTSD Screen Score 9/16/2022   Have you ever experienced this kind of event? Yes   PTSD Screen (Score of 3 or more suggests positive screen) 3   Some recent data might be hidden     PROMIS-29 5/26/2022   PROMIS Physical Function T-Score 41.8 (mild dysfunction)   PROMIS Anxiety T-Score 53.7 (within normal limits)   PROMIS Depression T-Score 51.8 (within normal limits)   PROMIS Fatigue T-Score 62.7 (moderate)   PROMIS Sleep Disturbance T-Score 56.1 (mild)   PROMIS Ability to Participate in Social Roles & Activities T-Score 38.8 (moderate dysfunction)   PROMIS Pain Interference  T-Score 58.5 (mild)   PROMIS Pain Intensity 3       Past Medical History:   Diagnosis Date     ADHD (attention deficit hyperactivity disorder)      Depressive disorder      Ovarian cyst      Uncomplicated asthma        Past Surgical History:   Procedure Laterality Date     COLONOSCOPY N/A 4/27/2022    Procedure: COLONOSCOPY, WITH POLYPECTOMY AND BIOPSY;  Surgeon: Alyse Leija MD;  Location:  GI     ENT SURGERY      tonsilectomy age 8     ORTHOPEDIC SURGERY      Hip, emelia surgery in 2013     WRIST SURGERY         Family History   Problem Relation Age of Onset     Depression Maternal Grandmother      Schizophrenia Maternal Uncle      Substance Abuse Maternal Uncle        Social History     Tobacco Use     Smoking status: Never Smoker     Smokeless tobacco: Never Used   Vaping Use     Vaping Use: Never used   Substance Use Topics     Alcohol use: Yes     Comment: rare     Drug use: Never         Current Outpatient Medications:      albuterol (ACCUNEB) 1.25 MG/3ML neb solution, Take 1 vial (1.25 mg) by nebulization every 6 hours as needed for shortness of breath / dyspnea or wheezing, Disp: 90 mL, Rfl: 0     albuterol (PROAIR HFA/PROVENTIL HFA/VENTOLIN HFA) 108 (90 Base) MCG/ACT inhaler, Inhale 2 puffs into the lungs 4 times daily, Disp: 18 g, Rfl: 3     amphetamine-dextroamphetamine (ADDERALL XR) 30 MG 24 hr capsule, TK 1 C PO D FOR ADHD, Disp: , Rfl:      amphetamine-dextroamphetamine (ADDERALL) 10 MG tablet, Take 10 mg by mouth daily noon, Disp: , Rfl:      ARIPiprazole (ABILIFY) 20 MG tablet, Take 20 mg by mouth daily, Disp: , Rfl:      benzonatate (TESSALON) 100 MG capsule, Take 2 capsules (200 mg) by mouth 3 times daily as needed for other (sore throat), Disp: 60 capsule, Rfl: 0     benztropine (COGENTIN) 1 MG tablet, TAKE 1 TABLET BY MOUTH DAILY AS DIRECTED, Disp: , Rfl:      buPROPion (WELLBUTRIN XL) 300 MG 24 hr tablet, Take 300 mg by mouth every morning, Disp: , Rfl:      drospirenone-ethinyl  estradiol (JESSY) 3-0.02 MG tablet, Take 1 tablet by mouth daily, Disp: , Rfl:      DRYSOL 20 % external solution, APPLY TOPICALLY TO THE AFFECTED AREA 2 TIMES A WEEK AS DIRECTED, Disp: , Rfl:      EMGALITY 120 MG/ML injection, Inject 120 mg Subcutaneous every 28 days, Disp: , Rfl:      famotidine (PEPCID) 20 MG tablet, TAKE 1 TABLET(20 MG) BY MOUTH TWICE DAILY, Disp: 180 tablet, Rfl: 2     FLUoxetine (PROZAC) 20 MG capsule, Take 20 mg by mouth daily 20 mg + 40 mg = 60 mg, Disp: , Rfl:      FLUoxetine (PROZAC) 40 MG capsule, Take 40 mg by mouth daily 20 mg + 40 mg = 60 mg, Disp: , Rfl:      LANsoprazole (PREVACID) 15 MG DR capsule, Take 1 capsule (15 mg) by mouth daily, Disp: 90 capsule, Rfl: 0     magnesium 250 MG tablet, , Disp: , Rfl:      ondansetron (ZOFRAN ODT) 4 MG ODT tab, Take 1 tablet (4 mg) by mouth every 8 hours as needed for nausea, Disp: 30 tablet, Rfl: 0     polyethylene glycol (MIRALAX) 17 GM/Dose powder, Take 1 capful by mouth daily as needed for constipation, Disp: , Rfl:      Semaglutide-Weight Management (WEGOVY) 1.7 MG/0.75ML SOAJ, Inject 1.7 mg Subcutaneous once a week, Disp: 3 mL, Rfl: 1     TAZORAC 0.1 % external cream, APPLY TOPICALLY TO THE AFFECTED AREA AT BEDTIME, Disp: , Rfl:       Answers for HPI/ROS submitted by the patient on 9/16/2022  Review of Systems   Constitutional: Positive for fatigue. Negative for chills and fever.   HENT: Positive for ear pain. Negative for ear discharge, hearing loss, mouth sores, nosebleeds, sinus pain, sore throat, tinnitus and trouble swallowing.    Eyes: Negative for pain and redness.   Gastrointestinal: Positive for abdominal pain, constipation, diarrhea, heartburn, nausea and vomiting. Negative for rectal pain.   Endocrine: Negative for polydipsia and polyphagia.   Genitourinary: Positive for vaginal discharge. Negative for dyspareunia and genital sores.   Musculoskeletal: Positive for arthralgias, back pain, myalgias and neck pain. Negative for joint  swelling.   Skin: Positive for rash.   Neurological: Positive for tremors and headaches. Negative for dizziness, seizures, weakness and numbness.   Psychiatric/Behavioral: Positive for decreased concentration. Negative for hallucinations. The patient is nervous/anxious.           Objective    Vitals - Patient Reported  Pain Score: Mild Pain (2)  Pain Loc:  (Shoulder blades)      Objective         Vitals:  No vitals were obtained today due to virtual visit.    Physical Exam   GENERAL: Healthy, alert and no distress  EYES: Eyes grossly normal to inspection.  No discharge or erythema, or obvious scleral/conjunctival abnormalities.  RESP: No audible wheeze, cough, or visible cyanosis.  No visible retractions or increased work of breathing.    NEURO: Cranial nerves grossly intact.  Mentation and speech appropriate for age.  PSYCH: Mentation appears normal, affect normal/bright, judgement and insight intact, normal speech and appearance well-groomed.      Last Renal Panel:  Sodium   Date Value Ref Range Status   11/10/2021 141 136 - 145 mmol/L Final   06/22/2018 143 133 - 144 mmol/L Final     Potassium   Date Value Ref Range Status   11/10/2021 4.1 3.5 - 5.0 mmol/L Final   06/22/2018 4.0 3.4 - 5.3 mmol/L Final     Chloride   Date Value Ref Range Status   11/10/2021 106 98 - 107 mmol/L Final   06/22/2018 110 (H) 94 - 109 mmol/L Final     Carbon Dioxide   Date Value Ref Range Status   06/22/2018 24 20 - 32 mmol/L Final     Carbon Dioxide (CO2)   Date Value Ref Range Status   11/10/2021 24 22 - 31 mmol/L Final     Anion Gap   Date Value Ref Range Status   11/10/2021 11 5 - 18 mmol/L Final   06/22/2018 9 3 - 14 mmol/L Final     Glucose   Date Value Ref Range Status   11/10/2021 80 70 - 125 mg/dL Final   06/22/2018 84 70 - 99 mg/dL Final     Urea Nitrogen   Date Value Ref Range Status   11/10/2021 11 8 - 22 mg/dL Final   06/22/2018 15 7 - 30 mg/dL Final     Creatinine   Date Value Ref Range Status   11/10/2021 0.90 0.60 - 1.10  mg/dL Final   06/22/2018 0.77 0.52 - 1.04 mg/dL Final     GFR Estimate   Date Value Ref Range Status   11/10/2021 89 >60 mL/min/1.73m2 Final     Comment:     As of July 11, 2021, eGFR is calculated by the CKD-EPI creatinine equation, without race adjustment. eGFR can be influenced by muscle mass, exercise, and diet. The reported eGFR is an estimation only and is only applicable if the renal function is stable.   06/24/2021 >60 >60 mL/min/1.73m2 Final   06/22/2018 >90 >60 mL/min/1.7m2 Final     Comment:     Non  GFR Calc     Calcium   Date Value Ref Range Status   11/10/2021 9.7 8.5 - 10.5 mg/dL Final   06/22/2018 8.5 8.5 - 10.1 mg/dL Final     Albumin   Date Value Ref Range Status   08/10/2020 3.7 3.5 - 5.0 g/dL Final      Lab Results   Component Value Date    WBC 5.8 07/21/2022     Lab Results   Component Value Date    RBC 4.63 07/21/2022     Lab Results   Component Value Date    HGB 13.4 07/21/2022     Lab Results   Component Value Date    HCT 41.3 07/21/2022     No components found for: MCT  Lab Results   Component Value Date    MCV 89 07/21/2022     Lab Results   Component Value Date    MCH 28.9 07/21/2022     Lab Results   Component Value Date    MCHC 32.4 07/21/2022     Lab Results   Component Value Date    RDW 12.9 07/21/2022     Lab Results   Component Value Date     07/21/2022      Lab Results   Component Value Date    A1C 5.2 07/21/2022    A1C 5.3 06/02/2022    A1C 5.7 11/10/2021    A1C 5.9 06/24/2021    A1C 5.4 10/11/2019      TSH   Date Value Ref Range Status   03/10/2022 0.89 0.30 - 5.00 uIU/mL Final   06/22/2018 0.70 0.40 - 4.00 mU/L Final      Lab Results   Component Value Date    VITDT 63 03/10/2022      Recent Labs   Lab Test 06/02/22  1151 01/10/20  1135   MAG 1.9 1.8     Last Comprehensive Metabolic Panel:  Sodium   Date Value Ref Range Status   11/10/2021 141 136 - 145 mmol/L Final   06/22/2018 143 133 - 144 mmol/L Final     Potassium   Date Value Ref Range Status    11/10/2021 4.1 3.5 - 5.0 mmol/L Final   06/22/2018 4.0 3.4 - 5.3 mmol/L Final     Chloride   Date Value Ref Range Status   11/10/2021 106 98 - 107 mmol/L Final   06/22/2018 110 (H) 94 - 109 mmol/L Final     Carbon Dioxide   Date Value Ref Range Status   06/22/2018 24 20 - 32 mmol/L Final     Carbon Dioxide (CO2)   Date Value Ref Range Status   11/10/2021 24 22 - 31 mmol/L Final     Anion Gap   Date Value Ref Range Status   11/10/2021 11 5 - 18 mmol/L Final   06/22/2018 9 3 - 14 mmol/L Final     Glucose   Date Value Ref Range Status   11/10/2021 80 70 - 125 mg/dL Final   06/22/2018 84 70 - 99 mg/dL Final     Urea Nitrogen   Date Value Ref Range Status   11/10/2021 11 8 - 22 mg/dL Final   06/22/2018 15 7 - 30 mg/dL Final     Creatinine   Date Value Ref Range Status   11/10/2021 0.90 0.60 - 1.10 mg/dL Final   06/22/2018 0.77 0.52 - 1.04 mg/dL Final     GFR Estimate   Date Value Ref Range Status   11/10/2021 89 >60 mL/min/1.73m2 Final     Comment:     As of July 11, 2021, eGFR is calculated by the CKD-EPI creatinine equation, without race adjustment. eGFR can be influenced by muscle mass, exercise, and diet. The reported eGFR is an estimation only and is only applicable if the renal function is stable.   06/24/2021 >60 >60 mL/min/1.73m2 Final   06/22/2018 >90 >60 mL/min/1.7m2 Final     Comment:     Non  GFR Calc     Calcium   Date Value Ref Range Status   11/10/2021 9.7 8.5 - 10.5 mg/dL Final   06/22/2018 8.5 8.5 - 10.1 mg/dL Final     Bilirubin Total   Date Value Ref Range Status   08/10/2020 0.5 0.0 - 1.0 mg/dL Final     Alkaline Phosphatase   Date Value Ref Range Status   08/10/2020 69 45 - 120 U/L Final     ALT   Date Value Ref Range Status   08/10/2020 33 0 - 45 U/L Final   06/22/2018 18 0 - 50 U/L Final     AST   Date Value Ref Range Status   08/10/2020 19 0 - 40 U/L Final   06/22/2018 14 0 - 45 U/L Final     Most Recent D-dimer:No lab results found.    Lab on 07/21/2022   Component Date Value  Ref Range Status     Hemoglobin A1C 07/21/2022 5.2  0.0 - 5.6 % Final    Normal <5.7%   Prediabetes 5.7-6.4%    Diabetes 6.5% or higher     Note: Adopted from ADA consensus guidelines.     HIV Antigen Antibody Combo 07/21/2022 Nonreactive  Nonreactive Final    HIV-1 p24 Ag & HIV-1/HIV-2 Ab Not Detected     WBC Count 07/21/2022 5.8  4.0 - 11.0 10e3/uL Final     RBC Count 07/21/2022 4.63  3.80 - 5.20 10e6/uL Final     Hemoglobin 07/21/2022 13.4  11.7 - 15.7 g/dL Final     Hematocrit 07/21/2022 41.3  35.0 - 47.0 % Final     MCV 07/21/2022 89  78 - 100 fL Final     MCH 07/21/2022 28.9  26.5 - 33.0 pg Final     MCHC 07/21/2022 32.4  31.5 - 36.5 g/dL Final     RDW 07/21/2022 12.9  10.0 - 15.0 % Final     Platelet Count 07/21/2022 284  150 - 450 10e3/uL Final     % Neutrophils 07/21/2022 61  % Final     % Lymphocytes 07/21/2022 31  % Final     % Monocytes 07/21/2022 7  % Final     % Eosinophils 07/21/2022 1  % Final     % Basophils 07/21/2022 1  % Final     % Immature Granulocytes 07/21/2022 0  % Final     Absolute Neutrophils 07/21/2022 3.5  1.6 - 8.3 10e3/uL Final     Absolute Lymphocytes 07/21/2022 1.8  0.8 - 5.3 10e3/uL Final     Absolute Monocytes 07/21/2022 0.4  0.0 - 1.3 10e3/uL Final     Absolute Eosinophils 07/21/2022 0.0  0.0 - 0.7 10e3/uL Final     Absolute Basophils 07/21/2022 0.0  0.0 - 0.2 10e3/uL Final     Absolute Immature Granulocytes 07/21/2022 0.0  <=0.4 10e3/uL Final           Sincerely,    Shaunna Siddiqui PA-C

## 2022-09-16 NOTE — NURSING NOTE
Patient reports no changes to medication/allergy list since verified during e-check in this morning completed by patient.    Jessa REYES, Virtual Visit Facilitator 8:11 AM September 16, 2022

## 2022-09-16 NOTE — PATIENT INSTRUCTIONS
Post COVID Self Care Suggestions:     Fatigue Management:       https://www.archives-pmr.org/action/showPdf?fpa=-[email or web address]%[email or web address]       Self Care:      [email or web address]  Recovery World Health Organization:    https://apps.who.int/iris/[employer]/handle/85936/662550/PUL-OMNC-8496-490-68454-58192-eng.pdf  Breathing exercises:    [email or web address]-[email or web address]-[email or web address]/[email or web address]-breathing-exercises      Rehab Department:  Please call [phone] for [facility], [phone]

## 2022-09-22 ENCOUNTER — LAB (OUTPATIENT)
Dept: LAB | Facility: CLINIC | Age: 27
End: 2022-09-22
Payer: COMMERCIAL

## 2022-09-22 DIAGNOSIS — R19.5 LOOSE STOOLS: ICD-10-CM

## 2022-09-22 DIAGNOSIS — R41.840 POST-COVID CHRONIC CONCENTRATION DEFICIT: ICD-10-CM

## 2022-09-22 DIAGNOSIS — U09.9 POST-COVID CHRONIC CONCENTRATION DEFICIT: ICD-10-CM

## 2022-09-22 LAB
CRP SERPL-MCNC: 16.8 MG/L
VIT B12 SERPL-MCNC: 1194 PG/ML (ref 232–1245)

## 2022-09-22 PROCEDURE — 86140 C-REACTIVE PROTEIN: CPT

## 2022-09-22 PROCEDURE — 36415 COLL VENOUS BLD VENIPUNCTURE: CPT

## 2022-09-22 PROCEDURE — 82607 VITAMIN B-12: CPT

## 2022-09-22 PROCEDURE — 82784 ASSAY IGA/IGD/IGG/IGM EACH: CPT

## 2022-09-22 PROCEDURE — 86364 TISS TRNSGLTMNASE EA IG CLAS: CPT

## 2022-09-23 ENCOUNTER — OFFICE VISIT (OUTPATIENT)
Dept: ENDOCRINOLOGY | Facility: CLINIC | Age: 27
End: 2022-09-23
Payer: COMMERCIAL

## 2022-09-23 ENCOUNTER — MYC MEDICAL ADVICE (OUTPATIENT)
Dept: GASTROENTEROLOGY | Facility: CLINIC | Age: 27
End: 2022-09-23

## 2022-09-23 VITALS
SYSTOLIC BLOOD PRESSURE: 119 MMHG | DIASTOLIC BLOOD PRESSURE: 81 MMHG | TEMPERATURE: 99 F | BODY MASS INDEX: 37.93 KG/M2 | OXYGEN SATURATION: 100 % | WEIGHT: 236 LBS | HEART RATE: 88 BPM | HEIGHT: 66 IN

## 2022-09-23 DIAGNOSIS — E66.812 CLASS 2 SEVERE OBESITY DUE TO EXCESS CALORIES WITH SERIOUS COMORBIDITY AND BODY MASS INDEX (BMI) OF 38.0 TO 38.9 IN ADULT (H): Primary | ICD-10-CM

## 2022-09-23 DIAGNOSIS — E66.01 CLASS 2 SEVERE OBESITY DUE TO EXCESS CALORIES WITH SERIOUS COMORBIDITY AND BODY MASS INDEX (BMI) OF 38.0 TO 38.9 IN ADULT (H): Primary | ICD-10-CM

## 2022-09-23 LAB
IGA SERPL-MCNC: 153 MG/DL (ref 84–499)
TTG IGA SER-ACNC: 0.6 U/ML
TTG IGG SER-ACNC: <0.6 U/ML

## 2022-09-23 PROCEDURE — 99215 OFFICE O/P EST HI 40 MIN: CPT | Performed by: INTERNAL MEDICINE

## 2022-09-23 ASSESSMENT — PAIN SCALES - GENERAL: PAINLEVEL: MODERATE PAIN (5)

## 2022-09-23 NOTE — PROGRESS NOTES
"    Return Medical Weight Management Note     Nehemias Mascorro  MRN:  [id number]  :  1995  JASMEET:  2022    Dear Alexandra Rodrigues MD,    I had the pleasure of seeing your patient Nehemias Mascorro. She is a 27 year old female who I am continuing to see for treatment of morbid obesity related to:  prediabetes      INTERVAL HISTORY:    She was last seen about 5 mo ago.  Reviewed and relevant history as extracted from earlier visits is as follows--     --she previosuly did the 24-wk program and has tried multiple meds for wt loss (including topiramate, addition of  Naltrexone to bupropion she is on, and most recently Wegovy; other meds are potentially problematic (phentermine, given that she is on Adderall). Additionally of note is the following:  -------------------------------------------  \"Note[d] the program [wa]s helping with making changes with food and be accountable, things she focussed on--  1. Working on protein shake AM  2. Eat slower/mindful eating--working on this  3.  Walking several times per week with her mother--not happening yet with barcoo work going     Regarding the GI issue she has been working on with [facility]--doing PT to work on coordinating with BMs; pain and nausea and constipation.  That is improving some   --getting nausea still a couple of times per week; stomach cramping about 3 times per month, stomach pain a couple of times per week  --BMs 3 times per week;  In the past there were times that would be less than once per week        Goals before around structured eating--  1.  Have protein drink (Terra's Way)--110 calories; breakfast routine lately has been higher calorie (eggs/yogurt/oatmeal)  2.  Add protein to snack if having a snack  3.  Change from fruit cups to whole fruit (meeting)        Goals she had prior:  1.  1400 Calories (Myfitnesspal, measuring food with scales, eating more whole foods)  2.  Activity goals--3 days per wk walking (30 min)  3.  Not " "eating after dinner (eat dinner and then stay busy after it)\"     -----------------------------------        Last visit food patterns  Protein shake in the morning  Afternoon snack--fresh veggies or fruit or sometimes pretzels  Dinner--soup or fish; not eating after dinner     avoiding caloried liquids      Since last seen--she notes she had been doing well but now over last 1-2 mo has been having significant GI symptoms (nausea/BM changes, also lightheaded at times, not completely sure if there are antededent triggers but will follow for this. They checked BS after a meal when she was symptomatic and it was in the 80s. She saw GI and labs are pending. We discussed whether to stay on the 1.7 mg weekly dose of semaglutide or try dropping to 1 mg at least at present while these symptoms are being worked up. I will change the prescription.           LAST WEIGHT:   248 lbs 0 oz   Body mass index is 40.03 kg/m .  (246.8#)       CURRENT WEIGHT:   236 lbs 0 oz  Body mass index is 38.09 kg/m .         Wt Readings from Last 3 Encounters:   09/23/22 107 kg (236 lb)   09/12/22 106.6 kg (235 lb)   08/18/22 107.8 kg (237 lb 11.2 oz)             Changes and Difficulties 9/23/2022   I have made the following changes to my diet since my last visit: Trying to eat more of a Mediterranean diet   With regards to my diet, I am still struggling with: Finding time to food prep.   I have made the following changes to my activity/exercise since my last visit: Walking   With regards to my activity/exercise, I am still struggling with: Energy and motivation.     Past Medical History:   Diagnosis Date     ADHD (attention deficit hyperactivity disorder)      Depressive disorder      Ovarian cyst      Uncomplicated asthma    Prediabetes (last HbA1c is lower, but this is d/t treatment with GLP1 agonist)      VITALS:  /81 (BP Location: Left arm, Patient Position: Chair, Cuff Size: Adult Large)   Pulse 88   Temp 99  F (37.2  C) (Oral)   Ht " "1.676 m (5' 6\")   Wt 107 kg (236 lb)   SpO2 100%   BMI 38.09 kg/m      MEDICATIONS:   Current Outpatient Medications   Medication Sig Dispense Refill     albuterol (ACCUNEB) 1.25 MG/3ML neb solution Take 1 vial (1.25 mg) by nebulization every 6 hours as needed for shortness of breath / dyspnea or wheezing 90 mL 0     albuterol (PROAIR HFA/PROVENTIL HFA/VENTOLIN HFA) 108 (90 Base) MCG/ACT inhaler Inhale 2 puffs into the lungs 4 times daily 18 g 3     amphetamine-dextroamphetamine (ADDERALL XR) 30 MG 24 hr capsule TK 1 C PO D FOR ADHD       amphetamine-dextroamphetamine (ADDERALL) 10 MG tablet Take 10 mg by mouth daily noon       ARIPiprazole (ABILIFY) 20 MG tablet Take 20 mg by mouth daily       benzonatate (TESSALON) 100 MG capsule Take 2 capsules (200 mg) by mouth 3 times daily as needed for other (sore throat) 60 capsule 0     buPROPion (WELLBUTRIN XL) 300 MG 24 hr tablet Take 300 mg by mouth every morning       drospirenone-ethinyl estradiol (JESSY) 3-0.02 MG tablet Take 1 tablet by mouth daily       EMGALITY 120 MG/ML injection Inject 120 mg Subcutaneous every 28 days       famotidine (PEPCID) 20 MG tablet TAKE 1 TABLET(20 MG) BY MOUTH TWICE DAILY 180 tablet 2     FLUoxetine (PROZAC) 20 MG capsule Take 20 mg by mouth daily 20 mg + 40 mg = 60 mg       FLUoxetine (PROZAC) 40 MG capsule Take 40 mg by mouth daily 20 mg + 40 mg = 60 mg       LANsoprazole (PREVACID) 15 MG DR capsule Take 1 capsule (15 mg) by mouth daily 90 capsule 0     magnesium 250 MG tablet        ondansetron (ZOFRAN ODT) 4 MG ODT tab Take 1 tablet (4 mg) by mouth every 8 hours as needed for nausea 30 tablet 0     polyethylene glycol (MIRALAX) 17 GM/Dose powder Take 1 capful by mouth daily as needed for constipation       Semaglutide-Weight Management (WEGOVY) 1.7 MG/0.75ML SOAJ Inject 1.7 mg Subcutaneous once a week 3 mL 1     TAZORAC 0.1 % external cream APPLY TOPICALLY TO THE AFFECTED AREA AT BEDTIME       benztropine (COGENTIN) 1 MG tablet " TAKE 1 TABLET BY MOUTH DAILY AS DIRECTED       DRYSOL 20 % external solution APPLY TOPICALLY TO THE AFFECTED AREA 2 TIMES A WEEK AS DIRECTED         Weight Loss Medication History Reviewed With Patient 9/23/2022   Which weight loss medications are you currently taking on a regular basis?  Wegovy   Are you having any side effects from the weight loss medication that we have prescribed you? Yes   If you are having side effects please describe: Possible blood sugar issues.         ASSESSMENT;  Morbid obesity in pt with wt gain over past few years, more since her TBI  Prediabetes     PLAN:   (continues)  Decrease portion sizes  No meals in front of TV screen  Purge house of food triggers  No meal skipping and avoid snacking  Decrease caloric beverages--avoid soda  Volumetrics low carb eating plan--structured plan and avoding snacking  Low Calorie/low fat diet  Meal planning/journaling           Additionally she has had goals for lifestyle including--  Steps/activity (not addressed specifically today)  Prior had noted--4000 steps per day; change to 5000 was a goal before--could be a future goal, same with tracking for 1400 calories daily. Continue to follow  With food (continuing)  1.  Eating out once per wk or less  2.  With meals  emphasizing lean protein with meals, lower carb, lots of nonstarchy veggies  2.  Being part of the grocery shopping ( to have healthier options at home)  3.  Avoiding high carb afternoon snacking      Also--  --Wegovy as tolerated; will at least temparily drop it back to 1 mg weekly given the GI symptoms that are being worked up  --return with me again in about 2-3 mo        Time: about 40 min spent on evaluation, management, counseling, education, & motivational interviewing (video visit) combined with previsit prep and post visit follow up care same day.      Sincerely,    Luis Slade MD

## 2022-09-23 NOTE — PATIENT INSTRUCTIONS
"Welcome to our weight management program!   We are excited to join you on your weight loss journey    Thank you for allowing us the privilege of caring for you. We hope we provided you with the excellent service you deserve.   Please let us know if there is anything else we can do for you so that we can be sure you are leaving completely satisfied with your care experience.    To ensure the quality of our services you may be receiving a patient satisfaction survey from an independent patient satisfaction monitoring company.    The greatest compliment you can give is a \"Likely to Recommend\"    You saw Fr. Slade today.    Instructions per today's visit:   Medications started today: continue same      Follow up appointments:return in 2-3 mo with Dr. Slade  Interested in working with a health ?  Health coaches work with you to improve your overall health and wellbeing.  They look at the whole person, and may involve discussion of different areas of life, including, but not limited to the four pillars of health (sleep, exercise, nutrition, and stress management). Discuss with your care team if you would like to start working a health .  Health Coaching-3 Pack:    $99 for three health coaching visits    Visits may be done in person or via phone    Coaching is a partnership between the  and the client; Coaches do not prescribe or diagnose    Coaching helps inspire the client to reach his/her personal goals     For any questions/concerns contact Susan Hunter LPN at [phone]     To schedule appointments with our team, please call [phone]     Please call during clinic hours Monday through Friday 8:00a - 4:00p if you have questions or you can contact us via [employer] at anytime. ?    Lab results will be communicated through My Chart or letter (if My Chart not used). Please call the clinic if you have not received communication after 1 week or if you have any questions.?      Fax: [phone]    Thank " you,  Medical Weight Management Team

## 2022-09-23 NOTE — TELEPHONE ENCOUNTER
MyChart message sent explaining CRP test and what elevation in CRP level could mean.   Reminded patient that she has stool tests that need to be collected; informed her she could  /dropo charles stool collection kit at [facility] lab.      Niurka Casey RN

## 2022-09-23 NOTE — NURSING NOTE
"No chief complaint on file.      Vitals:    09/23/22 1109   BP: 119/81   BP Location: Left arm   Patient Position: Chair   Cuff Size: Adult Large   Pulse: 88   Temp: 99  F (37.2  C)   TempSrc: Oral   SpO2: 100%   Weight: 107 kg (236 lb)   Height: 1.676 m (5' 6\")       Body mass index is 38.09 kg/m .      Sade Torres LPN    "

## 2022-09-23 NOTE — LETTER
"2022       RE: Nehemias Mascorro  [address]  [place] [address]     Dear Colleague,    Thank you for referring your patient, Nehemias Mascorro, to the [facility] [place] at [facility]. Please see a copy of my visit note below.        Return Medical Weight Management Note     Neehmias Mascorro  MRN:  [id number]  :  1995  JASMEET:  2022    Dear Alexandra Rodrigues MD,    I had the pleasure of seeing your patient Nehemias Mascorro. She is a 27 year old female who I am continuing to see for treatment of morbid obesity related to:  prediabetes      INTERVAL HISTORY:    She was last seen about 5 mo ago.  Reviewed and relevant history as extracted from earlier visits is as follows--     --she previosuly did the 24-wk program and has tried multiple meds for wt loss (including topiramate, addition of  Naltrexone to bupropion she is on, and most recently Wegovy; other meds are potentially problematic (phentermine, given that she is on Adderall). Additionally of note is the following:  -------------------------------------------  \"Note[d] the program [wa]s helping with making changes with food and be accountable, things she focussed on--  1. Working on protein shake AM  2. Eat slower/mindful eating--working on this  3.  Walking several times per week with her mother--not happening yet with [employer] work going     Regarding the GI issue she has been working on with [facility]--doing PT to work on coordinating with BMs; pain and nausea and constipation.  That is improving some   --getting nausea still a couple of times per week; stomach cramping about 3 times per month, stomach pain a couple of times per week  --BMs 3 times per week;  In the past there were times that would be less than once per week        Goals before around structured eating--  1.  Have protein drink (Terra's Way)--110 calories; breakfast " "routine lately has been higher calorie (eggs/yogurt/oatmeal)  2.  Add protein to snack if having a snack  3.  Change from fruit cups to whole fruit (meeting)        Goals she had prior:  1.  1400 Calories (Myfitnesspal, measuring food with scales, eating more whole foods)  2.  Activity goals--3 days per wk walking (30 min)  3.  Not eating after dinner (eat dinner and then stay busy after it)\"     -----------------------------------        Last visit food patterns  Protein shake in the morning  Afternoon snack--fresh veggies or fruit or sometimes pretzels  Dinner--soup or fish; not eating after dinner     avoiding caloried liquids      Since last seen--she notes she had been doing well but now over last 1-2 mo has been having significant GI symptoms (nausea/BM changes, also lightheaded at times, not completely sure if there are antededent triggers but will follow for this. They checked BS after a meal when she was symptomatic and it was in the 80s. She saw GI and labs are pending. We discussed whether to stay on the 1.7 mg weekly dose of semaglutide or try dropping to 1 mg at least at present while these symptoms are being worked up. I will change the prescription.           LAST WEIGHT:   248 lbs 0 oz   Body mass index is 40.03 kg/m .  (246.8#)       CURRENT WEIGHT:   236 lbs 0 oz  Body mass index is 38.09 kg/m .         Wt Readings from Last 3 Encounters:   09/23/22 107 kg (236 lb)   09/12/22 106.6 kg (235 lb)   08/18/22 107.8 kg (237 lb 11.2 oz)             Changes and Difficulties 9/23/2022   I have made the following changes to my diet since my last visit: Trying to eat more of a Mediterranean diet   With regards to my diet, I am still struggling with: Finding time to food prep.   I have made the following changes to my activity/exercise since my last visit: Walking   With regards to my activity/exercise, I am still struggling with: Energy and motivation.     Past Medical History:   Diagnosis Date     ADHD " "(attention deficit hyperactivity disorder)      Depressive disorder      Ovarian cyst      Uncomplicated asthma    Prediabetes (last HbA1c is lower, but this is d/t treatment with GLP1 agonist)      VITALS:  /81 (BP Location: Left arm, Patient Position: Chair, Cuff Size: Adult Large)   Pulse 88   Temp 99  F (37.2  C) (Oral)   Ht 1.676 m (5' 6\")   Wt 107 kg (236 lb)   SpO2 100%   BMI 38.09 kg/m      MEDICATIONS:   Current Outpatient Medications   Medication Sig Dispense Refill     albuterol (ACCUNEB) 1.25 MG/3ML neb solution Take 1 vial (1.25 mg) by nebulization every 6 hours as needed for shortness of breath / dyspnea or wheezing 90 mL 0     albuterol (PROAIR HFA/PROVENTIL HFA/VENTOLIN HFA) 108 (90 Base) MCG/ACT inhaler Inhale 2 puffs into the lungs 4 times daily 18 g 3     amphetamine-dextroamphetamine (ADDERALL XR) 30 MG 24 hr capsule TK 1 C PO D FOR ADHD       amphetamine-dextroamphetamine (ADDERALL) 10 MG tablet Take 10 mg by mouth daily noon       ARIPiprazole (ABILIFY) 20 MG tablet Take 20 mg by mouth daily       benzonatate (TESSALON) 100 MG capsule Take 2 capsules (200 mg) by mouth 3 times daily as needed for other (sore throat) 60 capsule 0     buPROPion (WELLBUTRIN XL) 300 MG 24 hr tablet Take 300 mg by mouth every morning       drospirenone-ethinyl estradiol (JESSY) 3-0.02 MG tablet Take 1 tablet by mouth daily       EMGALITY 120 MG/ML injection Inject 120 mg Subcutaneous every 28 days       famotidine (PEPCID) 20 MG tablet TAKE 1 TABLET(20 MG) BY MOUTH TWICE DAILY 180 tablet 2     FLUoxetine (PROZAC) 20 MG capsule Take 20 mg by mouth daily 20 mg + 40 mg = 60 mg       FLUoxetine (PROZAC) 40 MG capsule Take 40 mg by mouth daily 20 mg + 40 mg = 60 mg       LANsoprazole (PREVACID) 15 MG DR capsule Take 1 capsule (15 mg) by mouth daily 90 capsule 0     magnesium 250 MG tablet        ondansetron (ZOFRAN ODT) 4 MG ODT tab Take 1 tablet (4 mg) by mouth every 8 hours as needed for nausea 30 tablet 0     " polyethylene glycol (MIRALAX) 17 GM/Dose powder Take 1 capful by mouth daily as needed for constipation       Semaglutide-Weight Management (WEGOVY) 1.7 MG/0.75ML SOAJ Inject 1.7 mg Subcutaneous once a week 3 mL 1     TAZORAC 0.1 % external cream APPLY TOPICALLY TO THE AFFECTED AREA AT BEDTIME       benztropine (COGENTIN) 1 MG tablet TAKE 1 TABLET BY MOUTH DAILY AS DIRECTED       DRYSOL 20 % external solution APPLY TOPICALLY TO THE AFFECTED AREA 2 TIMES A WEEK AS DIRECTED         Weight Loss Medication History Reviewed With Patient 9/23/2022   Which weight loss medications are you currently taking on a regular basis?  Wegovy   Are you having any side effects from the weight loss medication that we have prescribed you? Yes   If you are having side effects please describe: Possible blood sugar issues.         ASSESSMENT;  Morbid obesity in pt with wt gain over past few years, more since her TBI  Prediabetes     PLAN:   (continues)  Decrease portion sizes  No meals in front of TV screen  Purge house of food triggers  No meal skipping and avoid snacking  Decrease caloric beverages--avoid soda  Volumetrics low carb eating plan--structured plan and avoding snacking  Low Calorie/low fat diet  Meal planning/journaling           Additionally she has had goals for lifestyle including--  Steps/activity (not addressed specifically today)  Prior had noted--4000 steps per day; change to 5000 was a goal before--could be a future goal, same with tracking for 1400 calories daily. Continue to follow  With food (continuing)  1.  Eating out once per wk or less  2.  With meals  emphasizing lean protein with meals, lower carb, lots of nonstarchy veggies  2.  Being part of the grocery shopping ( to have healthier options at home)  3.  Avoiding high carb afternoon snacking      Also--  --Wegovy as tolerated; will at least temparily drop it back to 1 mg weekly given the GI symptoms that are being worked up  --return with me again in about  2-3 mo        Time: about 40 min spent on evaluation, management, counseling, education, & motivational interviewing (video visit) combined with previsit prep and post visit follow up care same day.      Sincerely,    Luis Slade MD

## 2022-09-26 ENCOUNTER — MYC MEDICAL ADVICE (OUTPATIENT)
Dept: INTERNAL MEDICINE | Facility: CLINIC | Age: 27
End: 2022-09-26

## 2022-09-28 ENCOUNTER — LAB (OUTPATIENT)
Dept: LAB | Facility: CLINIC | Age: 27
End: 2022-09-28
Payer: COMMERCIAL

## 2022-09-28 DIAGNOSIS — R19.5 LOOSE STOOLS: ICD-10-CM

## 2022-09-28 LAB
C COLI+JEJUNI+LARI FUSA STL QL NAA+PROBE: NOT DETECTED
C DIFF TOX B STL QL: NEGATIVE
EC STX1 GENE STL QL NAA+PROBE: NOT DETECTED
EC STX2 GENE STL QL NAA+PROBE: NOT DETECTED
NOROV GI+II ORF1-ORF2 JNC STL QL NAA+PR: NOT DETECTED
RVA NSP5 STL QL NAA+PROBE: NOT DETECTED
SALMONELLA SP RPOD STL QL NAA+PROBE: NOT DETECTED
SHIGELLA SP+EIEC IPAH STL QL NAA+PROBE: NOT DETECTED
V CHOL+PARA RFBL+TRKH+TNAA STL QL NAA+PR: NOT DETECTED
Y ENTERO RECN STL QL NAA+PROBE: NOT DETECTED

## 2022-09-28 PROCEDURE — 87493 C DIFF AMPLIFIED PROBE: CPT | Mod: 59

## 2022-09-28 PROCEDURE — 87506 IADNA-DNA/RNA PROBE TQ 6-11: CPT

## 2022-09-28 PROCEDURE — 83993 ASSAY FOR CALPROTECTIN FECAL: CPT

## 2022-09-29 ENCOUNTER — HOSPITAL ENCOUNTER (OUTPATIENT)
Dept: OCCUPATIONAL THERAPY | Facility: CLINIC | Age: 27
Setting detail: THERAPIES SERIES
Discharge: HOME | End: 2022-09-29
Attending: PHYSICIAN ASSISTANT
Payer: COMMERCIAL

## 2022-09-29 PROCEDURE — 97165 OT EVAL LOW COMPLEX 30 MIN: CPT | Mod: GO

## 2022-09-29 PROCEDURE — 97535 SELF CARE MNGMENT TRAINING: CPT | Mod: GO

## 2022-09-29 ASSESSMENT — ACTIVITIES OF DAILY LIVING (ADL)
HOME/FINANCIAL_MANAGEMENT: INDEPENDENT
IADL_QUICK_ADDS: MEAL PLANNING/PREPARATION;HOME/FINANCIAL/MANAGEMENT;COMMUNICATION/COMPUTER USE

## 2022-09-29 NOTE — PROGRESS NOTES
09/29/22 0900   Quick Adds   Type of Visit Initial Outpatient Occupational Therapy Evaluation   General Information   Start Of Care Date 09/29/22   Referring Physician Shaunna Leyva PA-C   Orders Evaluate and treat as indicated   Other Orders   (covid program)   Orders Date 05/26/22   Medical Diagnosis post covid chronic fatigue   Onset of Illness/Injury or Date of Surgery 05/26/22  (order date)   Additional Occupational Profile Info/Pertinent History of Current Problem Pt is a 27 year old female occupational therapy to address post-covid related issues and maximize participation in daily activities. Per medical record and patient report, patient was diagnoses with COVID-19 infection January 2022. She states symptoms have been consistent since. 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  PMH significant for head injury 2016, ADHD. Currently, she describes post-COVID related symptoms have impacted participated in daily tasks (primarily household tasks), school  work performance. Pt describes  the following cognitive limitations: difficulty with memory, recalling and retaining information, concentrating, and describes mental fatigue/brain fog. She also describes  physical fatigue and poor sleep quality   Role/Living Environment   Current Community Support Family/friend caregiver   Patient role/Employment history Student;Employed   Current Living Environment House  (lives with her parents)   Prior Level - Transfers Independent   Prior Level - Ambulation Independent   Prior Level - ADLS Independent   Prior Responsibilities - IADL Meal Preparation   Patient/family Goals Statement Express thoughts clearly, reduce brain fog, improve energy levels   Pain   Patient currently in pain No   Fall Risk Screen   Fall screen completed by OT   Have you fallen 2 or more times in the past year?  No   System Outcome Measures   FACIT Fatigue Subscale (score out of 52). The higher the score, the better the QOL. 23.83   Cognitive Status Examination   Orientation Orientation to person, place and time   Level of Consciousness Alert   Follows Commands and Answers Questions 100% of the time   Personal Safety and Judgment Intact   Memory Intact   Memory Comments Pt descirbes memory difficulties including: forgetting what she is saying in conversations, forgetting why she walked into a room   Attention Reports problems attending  (Pt reports ADHD but symptoms have worsened since COVID)   Organization/Problem Solving Reports problems with organization  (Pt descirbes difficulty with prioritizing and organizing as she is tired)   Cognitive Comment Pt describes the following cognitive limitations: difficulty with memory, recalling/retaining information, thought process in conversation, concentrating, lacks motiviation to complete task, and describes mental fatigue. She has an order for outpatient speech therapy for cognition.   Bathing   Level of [place] - Bathing independent   Upper Body Dressing   Level of [place]: Dress Upper Body independent   Lower Body Dressing   Level of [place]: Dress Lower Body independent   Toileting   Level of [place]: Toilet independent   Grooming   Level of [place]: Grooming independent   Eating/Self-Feeding   Level of [place]: Eating independent   Activity Tolerance   Activity Tolerance Pt describes she approximately 15 minute intervals of school work due to disctractions (ie: phone), feeling tired, and not motivated to complete task. She states physical/mental fatigue throughout day and needing to nap 2-3 hours   Instrumental Activities of Daily Living Assessment   IADL Assessment/Observations Pt shares IADL's with her parents   IADL Quick Adds Meal Planning/Preparation;Home/Financial/Management;Communication/Computer Use   Meal Planning/Preparation Pt reports  she enjos cooking and has cooking youtube channel, however, impacted by fatigue   Home/Financial Management Independent   Communication/Computer Use Computer use dependent on mental fatigue   Planned Therapy Interventions   Planned Therapy Interventions ADL training;IADL training;Self care/Home management;Therapeutic activities   Intervention Comments Pt benefits from skilled outpatient occupational therapy for education regarding sleep hygiene, fatigue management, strategies to aide in memory and attention/concentration, energy conservation/work simplificaiton and activity modification as needed, strategies to improve self-management of physical/cognitive faitigue, and maximize independence in ADL/IADL, and work/school   Adult OT Eval Goals   OT Eval Goals (Adult) 1;2;3    OT Goal 1   Goal Identifier Sleep Hygiene   Goal Description Pt will verbalize understanding and implement strategies for imrpoved sleep hygiene to enable maximal paticipaiton in daily activities   Goal Progress met this date   Target Date 09/29/22    OT Goal 2   Goal Identifier Fatigue Management   Goal Description Patient will identify at least 3 methods to manage her fatigue/energy to demonstrate at least a 5 point improvement in her perceived level of fatigue as measured by the FACIT   Goal Progress initial   Target Date 10/13/22    OT Goal 3   Goal Identifier Energy Consevation   Goal Description Goal Description: The patient will identify 2-3 energy conservation/work simplification/activity modification strategies during daily  routine to increase participation in ADL/IADL   Goal Progress initial   Target Date 10/20/22   OT Goal 4   Goal Identifier Attention/concentration   Goal Description Pt will verbalize understanding about strategies to help with improving attention (concentration) to maximize performance in school/work and daily activities.   OT Goal 5   Goal Identifier Memory   Goal Description Pt will verbalize and/or demonstration  understanding of internal and external strategies to help with memory and recall including and initiate into daily routine for improved ADL/IADL performance.   Goal Progress initial   Target Date 10/20/22   Clinical Impression   Criteria for Skilled Therapeutic Interventions Met Yes, treatment indicated   OT Diagnosis impaired ADL/IADL   Influenced by the following impairments physical fatigue, mental fatigue, memory/concentration difficulties   Assessment of Occupational Performance 1-3 Performance Deficits   Identified Performance Deficits performance in work, school, and higher level IADL's (ie: cooking)   Clinical Decision Making (Complexity) Low complexity   Therapy Frequency 1x/week   Predicted Duration of Therapy Intervention (days/wks) up to 8 weeks   Risks and Benefits of Treatment have been explained. Yes   Patient, Family & other staff in agreement with plan of care Yes   Clinical Impression Comments Pt is a 27 year old female occupational therapy to address post-covid related issues and maximize participation in daily activities. Pt presents with post-covid symptoms that vary in severity including brain fog/ mental fatigue, memory/concentration difficulties, and generalized fatigue, all of which impact participation in participation in daily activities, work, and school.   Education Assessment   Barriers To Learning Other  (physica/mental fatigue)   Total Evaluation Time   OT Bindu Low Complexity Minutes (98258) 30

## 2022-09-30 LAB — CALPROTECTIN STL-MCNT: 132 MG/KG (ref 0–49.9)

## 2022-10-06 ENCOUNTER — HOSPITAL ENCOUNTER (OUTPATIENT)
Dept: OCCUPATIONAL THERAPY | Facility: CLINIC | Age: 27
Setting detail: THERAPIES SERIES
Discharge: HOME | End: 2022-10-06
Attending: PHYSICIAN ASSISTANT
Payer: COMMERCIAL

## 2022-10-06 PROCEDURE — 97535 SELF CARE MNGMENT TRAINING: CPT | Mod: GO

## 2022-10-13 ENCOUNTER — OFFICE VISIT (OUTPATIENT)
Dept: GASTROENTEROLOGY | Facility: CLINIC | Age: 27
End: 2022-10-13
Payer: COMMERCIAL

## 2022-10-13 VITALS
TEMPERATURE: 99.7 F | HEART RATE: 85 BPM | OXYGEN SATURATION: 100 % | BODY MASS INDEX: 37.8 KG/M2 | SYSTOLIC BLOOD PRESSURE: 125 MMHG | DIASTOLIC BLOOD PRESSURE: 84 MMHG | WEIGHT: 235.2 LBS | HEIGHT: 66 IN

## 2022-10-13 DIAGNOSIS — R79.82 ELEVATED C-REACTIVE PROTEIN: Primary | ICD-10-CM

## 2022-10-13 DIAGNOSIS — R19.5 ELEVATED FECAL CALPROTECTIN: ICD-10-CM

## 2022-10-13 DIAGNOSIS — R10.84 ABDOMINAL PAIN, GENERALIZED: ICD-10-CM

## 2022-10-13 DIAGNOSIS — K52.9 CHRONIC DIARRHEA: ICD-10-CM

## 2022-10-13 DIAGNOSIS — M25.50 POLYARTHRALGIA: ICD-10-CM

## 2022-10-13 LAB
CRP SERPL-MCNC: 23.4 MG/L (ref 0–8)
ERYTHROCYTE [SEDIMENTATION RATE] IN BLOOD BY WESTERGREN METHOD: 16 MM/HR (ref 0–20)

## 2022-10-13 PROCEDURE — 86140 C-REACTIVE PROTEIN: CPT | Performed by: PHYSICIAN ASSISTANT

## 2022-10-13 PROCEDURE — 36415 COLL VENOUS BLD VENIPUNCTURE: CPT | Performed by: PHYSICIAN ASSISTANT

## 2022-10-13 PROCEDURE — 85652 RBC SED RATE AUTOMATED: CPT | Performed by: PHYSICIAN ASSISTANT

## 2022-10-13 PROCEDURE — 99214 OFFICE O/P EST MOD 30 MIN: CPT | Performed by: PHYSICIAN ASSISTANT

## 2022-10-13 PROCEDURE — 86200 CCP ANTIBODY: CPT | Performed by: PHYSICIAN ASSISTANT

## 2022-10-13 PROCEDURE — 86431 RHEUMATOID FACTOR QUANT: CPT | Performed by: PHYSICIAN ASSISTANT

## 2022-10-13 ASSESSMENT — PAIN SCALES - GENERAL: PAINLEVEL: EXTREME PAIN (8)

## 2022-10-13 NOTE — PROGRESS NOTES
GI CLINIC VISIT    CC/REFERRING MD:  No ref. provider found  REASON FOR CONSULTATION:   No ref. provider found for   Chief Complaint   Patient presents with     Follow Up     Follow up to discuss lab results, abdominal cramping.       ASSESSMENT/PLAN: Patient is here for follow-up from recent visit and to discuss lab results.  She does have elevated CRP and fecal calprotectin.  We discussed that while her colonoscopy did not show any abnormalities back in April and biopsies were negative, the terminal ileum was not evaluated.  Crohn's disease remains on the differential, and therefore we will proceed with MR enterography.  She may need repeat colonoscopy, depending on findings.  We also discussed her joint pains.  I did speak with rheumatology provider and we will obtain some baseline labs in advance of potential referral.  Patient stated understanding of plan and we will follow-up after testing is complete.      RTC after labs and imaging    Thank you for this consultation.  It was a pleasure to participate in the care of this patient; please contact us with any further questions.      40 minutes spent on the date of the encounter doing chart review, patient visit and documentation    This note was created with voice recognition software, and while reviewed for accuracy, typos may remain.     Darren Castorena PA-C  Division of Gastroenterology, Hepatology and Nutrition  [facility]  Nehemias Mascorro is a 27-year-old female with past medical history significant for GERD, asthma, migraines, depression, anxiety, and ADHD that presents for follow-up regarding chronic abdominal pain and diarrhea.  I initially met patient a couple weeks ago and had ordered testing for infectious diarrhea, as well as inflammatory bowel disease work-up.  She did have an elevated CRP of 16 and fecal calprotectin of 132.  She had undergone colonoscopy in April of this year for the symptoms,  had negative biopsies for microscopic colitis, but to the terminal ileum was not evaluated.  Since our last visit, she has continued to have recurring loose stool.  She has had migrating abdominal pains, and both the upper and lower quadrants.  She did see OB/GYN recently and had negative ultrasound, they felt like her pain was not related to pelvic organs.  She has had associated joint pain, and back and bilateral knees, not necessarily worse in the morning, can be bothersome at any time.  Her mother does have lupus and so she has concerns about autoimmune disease.  She otherwise has no recent cross-sectional abdominal imaging.    ROS:    10 point ROS neg other than the symptoms noted above in the HPI.    PREVIOUS ENDOSCOPY:  See HPI    PERTINENT RELEVANT IMAGING OR LABS:  See HPI    ALLERGIES:     Allergies   Allergen Reactions     Azithromycin      Erythromycin Nausea and Vomiting     Sulfa Drugs Nausea       PERTINENT MEDICATIONS:    Current Outpatient Medications:      albuterol (ACCUNEB) 1.25 MG/3ML neb solution, Take 1 vial (1.25 mg) by nebulization every 6 hours as needed for shortness of breath / dyspnea or wheezing, Disp: 90 mL, Rfl: 0     albuterol (PROAIR HFA/PROVENTIL HFA/VENTOLIN HFA) 108 (90 Base) MCG/ACT inhaler, Inhale 2 puffs into the lungs 4 times daily, Disp: 18 g, Rfl: 3     amphetamine-dextroamphetamine (ADDERALL XR) 30 MG 24 hr capsule, TK 1 C PO D FOR ADHD, Disp: , Rfl:      amphetamine-dextroamphetamine (ADDERALL) 10 MG tablet, Take 10 mg by mouth daily noon, Disp: , Rfl:      ARIPiprazole (ABILIFY) 20 MG tablet, Take 20 mg by mouth daily, Disp: , Rfl:      benzonatate (TESSALON) 100 MG capsule, Take 2 capsules (200 mg) by mouth 3 times daily as needed for other (sore throat), Disp: 60 capsule, Rfl: 0     buPROPion (WELLBUTRIN XL) 300 MG 24 hr tablet, Take 300 mg by mouth every morning, Disp: , Rfl:      drospirenone-ethinyl estradiol (JESSY) 3-0.02 MG tablet, Take 1 tablet by mouth daily,  Disp: , Rfl:      EMGALITY 120 MG/ML injection, Inject 120 mg Subcutaneous every 28 days, Disp: , Rfl:      famotidine (PEPCID) 20 MG tablet, TAKE 1 TABLET(20 MG) BY MOUTH TWICE DAILY, Disp: 180 tablet, Rfl: 2     FLUoxetine (PROZAC) 20 MG capsule, Take 20 mg by mouth daily 20 mg + 40 mg = 60 mg, Disp: , Rfl:      FLUoxetine (PROZAC) 40 MG capsule, Take 40 mg by mouth daily 20 mg + 40 mg = 60 mg, Disp: , Rfl:      LANsoprazole (PREVACID) 15 MG DR capsule, Take 1 capsule (15 mg) by mouth daily, Disp: 90 capsule, Rfl: 0     magnesium 250 MG tablet, , Disp: , Rfl:      ondansetron (ZOFRAN ODT) 4 MG ODT tab, Take 1 tablet (4 mg) by mouth every 8 hours as needed for nausea, Disp: 30 tablet, Rfl: 0     polyethylene glycol (MIRALAX) 17 GM/Dose powder, Take 1 capful by mouth daily as needed for constipation, Disp: , Rfl:      Semaglutide-Weight Management 1 MG/0.5ML SOAJ, Inject 1 mg Subcutaneous once a week, Disp: 2 mL, Rfl: 5     TAZORAC 0.1 % external cream, APPLY TOPICALLY TO THE AFFECTED AREA AT BEDTIME, Disp: , Rfl:     PROBLEM LIST  Patient Active Problem List    Diagnosis Date Noted     Primary focal hyperhidrosis 07/28/2022     Priority: Medium     Formatting of this note might be different from the original.  Created by Conversion       Post-COVID chronic fatigue 06/03/2022     Priority: Medium     Acne 05/06/2022     Priority: Medium     Formatting of this note might be different from the original.  Created by Conversion       Asthma 05/06/2022     Priority: Medium     Cyst of ovary 05/06/2022     Priority: Medium     Slow transit constipation 05/06/2022     Priority: Medium     Constipation 05/06/2022     Priority: Medium     Chronic migraine without aura 04/12/2022     Priority: Medium     Asymptomatic COVID-19 virus infection 01/21/2022     Priority: Medium     Gastroesophageal reflux disease 11/29/2021     Priority: Medium     Special screening examination for human papillomavirus (HPV) 11/11/2021      Priority: Medium     Pap 2021 in outside facility        Dizziness 08/12/2021     Priority: Medium     Headache 08/04/2021     Priority: Medium     Morbid obesity (H) 02/26/2021     Priority: Medium     Snoring 10/19/2020     Priority: Medium     Migraine headache 03/27/2020     Priority: Medium     Occipital neuralgia 01/31/2020     Priority: Medium     Neuralgia and neuritis, unspecified 01/31/2020     Priority: Medium     Iron deficiency anemia 05/17/2019     Priority: Medium     Excessive and frequent menstruation 05/17/2019     Priority: Medium     Otalgia 05/17/2019     Priority: Medium     Formatting of this note might be different from the original.  Was given prednisone for 5 days .       Hemorrhage of rectum and anus 12/17/2018     Priority: Medium     Paresthesia of skin 04/10/2018     Priority: Medium     Wrist pain, right 03/08/2018     Priority: Medium     Gastric ulcer without hemorrhage or perforation 02/20/2018     Priority: Medium     Muscle spasm 02/09/2018     Priority: Medium     Dysphagia 01/31/2018     Priority: Medium     Somatization disorder 08/30/2017     Priority: Medium     Major depressive disorder 05/03/2017     Priority: Medium     Other reactions to severe stress 11/01/2016     Priority: Medium     Encephalopathy, unspecified 10/25/2016     Priority: Medium     Strain of muscle, fascia and tendon at neck level, sequela 10/25/2016     Priority: Medium     Tension-type headache, unspecified, not intractable 10/25/2016     Priority: Medium     Post concussion syndrome 09/24/2016     Priority: Medium     Nausea 11/30/2015     Priority: Medium     Right upper quadrant pain 11/30/2015     Priority: Medium     Obesity 04/24/2015     Priority: Medium     Anxiety 04/24/2015     Priority: Medium     Indigestion 02/03/2015     Priority: Medium     Hip pain 05/30/2014     Priority: Medium       PERTINENT PAST MEDICAL HISTORY:  Past Medical History:   Diagnosis Date     ADHD (attention deficit  "hyperactivity disorder)      Depressive disorder      Ovarian cyst      Uncomplicated asthma        PREVIOUS SURGERIES:  Past Surgical History:   Procedure Laterality Date     COLONOSCOPY N/A 4/27/2022    Procedure: COLONOSCOPY, WITH POLYPECTOMY AND BIOPSY;  Surgeon: Alyse Leija MD;  Location:  GI     ENT SURGERY      tonsilectomy age 8     ORTHOPEDIC SURGERY      Hip, emelia surgery in 2013     WRIST SURGERY         SOCIAL HISTORY:  Social History     Socioeconomic History     Marital status: Single     Spouse name: Not on file     Number of children: Not on file     Years of education: Not on file     Highest education level: Not on file   Occupational History     Not on file   Tobacco Use     Smoking status: Never     Smokeless tobacco: Never   Vaping Use     Vaping Use: Never used   Substance and Sexual Activity     Alcohol use: Yes     Comment: rare     Drug use: Never     Sexual activity: Not Currently   Other Topics Concern     Parent/sibling w/ CABG, MI or angioplasty before 65F 55M? Not Asked   Social History Narrative    Lives with her parents working part time . Had to leave college      Social Determinants of Health     Financial Resource Strain: Not on file   Food Insecurity: Not on file   Transportation Needs: Not on file   Physical Activity: Not on file   Stress: Not on file   Social Connections: Not on file   Intimate Partner Violence: Not on file   Housing Stability: Not on file       FAMILY HISTORY:  Family History   Problem Relation Age of Onset     Depression Maternal Grandmother      Schizophrenia Maternal Uncle      Substance Abuse Maternal Uncle        Past/family/social history reviewed and no changes    PHYSICAL EXAMINATION:  Constitutional: aaox3, cooperative, pleasant, not dyspneic/diaphoretic, no acute distress  Vitals reviewed: /84 (BP Location: Left arm, Patient Position: Sitting, Cuff Size: Adult Large)   Pulse 85   Temp 99.7  F (37.6  C) (Oral)   Ht 1.676 m (5' 6\")  "  Wt 106.7 kg (235 lb 3.2 oz)   SpO2 100%   BMI 37.96 kg/m    Wt:   Wt Readings from Last 2 Encounters:   10/13/22 106.7 kg (235 lb 3.2 oz)   09/23/22 107 kg (236 lb)      Eyes: Sclera anicteric/injected  Ears/nose/mouth/throat: Normal oropharynx without ulcers or exudate, mucus membranes moist, hearing intact  Neck: supple, thyroid normal size  CV: No edema  Respiratory: Unlabored breathing  Lymph: No axillary, submandibular, supraclavicular or inguinal lymphadenopathy  Abd: Nondistended, +bs, no hepatosplenomegaly, mildly tender in left lower quadrant, no peritoneal signs  Skin: warm, perfused, no jaundice  Psych: Normal affect  MSK: Normal gait

## 2022-10-13 NOTE — LETTER
10/13/2022         RE: Nehemias Mascorro  [address]  [facility] [address]        Dear Colleague,    Thank you for referring your patient, Nehemias Mascorro, to the [facility]. Please see a copy of my visit note below.    GI CLINIC VISIT    CC/REFERRING MD:  No ref. provider found  REASON FOR CONSULTATION:   No ref. provider found for   Chief Complaint   Patient presents with     Follow Up     Follow up to discuss lab results, abdominal cramping.       ASSESSMENT/PLAN: Patient is here for follow-up from recent visit and to discuss lab results.  She does have elevated CRP and fecal calprotectin.  We discussed that while her colonoscopy did not show any abnormalities back in April and biopsies were negative, the terminal ileum was not evaluated.  Crohn's disease remains on the differential, and therefore we will proceed with MR enterography.  She may need repeat colonoscopy, depending on findings.  We also discussed her joint pains.  I did speak with rheumatology provider and we will obtain some baseline labs in advance of potential referral.  Patient stated understanding of plan and we will follow-up after testing is complete.      RTC after labs and imaging    Thank you for this consultation.  It was a pleasure to participate in the care of this patient; please contact us with any further questions.      40 minutes spent on the date of the encounter doing chart review, patient visit and documentation    This note was created with voice recognition software, and while reviewed for accuracy, typos may remain.     Darren Castorena PA-C  Division of Gastroenterology, Hepatology and Nutrition  [facility]  Nehemias Mascorro is a 27-year-old female with past medical history significant for GERD, asthma, migraines, depression, anxiety, and ADHD that presents for follow-up regarding chronic abdominal pain and diarrhea.  I initially  met patient a couple weeks ago and had ordered testing for infectious diarrhea, as well as inflammatory bowel disease work-up.  She did have an elevated CRP of 16 and fecal calprotectin of 132.  She had undergone colonoscopy in April of this year for the symptoms, had negative biopsies for microscopic colitis, but to the terminal ileum was not evaluated.  Since our last visit, she has continued to have recurring loose stool.  She has had migrating abdominal pains, and both the upper and lower quadrants.  She did see OB/GYN recently and had negative ultrasound, they felt like her pain was not related to pelvic organs.  She has had associated joint pain, and back and bilateral knees, not necessarily worse in the morning, can be bothersome at any time.  Her mother does have lupus and so she has concerns about autoimmune disease.  She otherwise has no recent cross-sectional abdominal imaging.    ROS:    10 point ROS neg other than the symptoms noted above in the HPI.    PREVIOUS ENDOSCOPY:  See HPI    PERTINENT RELEVANT IMAGING OR LABS:  See HPI    ALLERGIES:     Allergies   Allergen Reactions     Azithromycin      Erythromycin Nausea and Vomiting     Sulfa Drugs Nausea       PERTINENT MEDICATIONS:    Current Outpatient Medications:      albuterol (ACCUNEB) 1.25 MG/3ML neb solution, Take 1 vial (1.25 mg) by nebulization every 6 hours as needed for shortness of breath / dyspnea or wheezing, Disp: 90 mL, Rfl: 0     albuterol (PROAIR HFA/PROVENTIL HFA/VENTOLIN HFA) 108 (90 Base) MCG/ACT inhaler, Inhale 2 puffs into the lungs 4 times daily, Disp: 18 g, Rfl: 3     amphetamine-dextroamphetamine (ADDERALL XR) 30 MG 24 hr capsule, TK 1 C PO D FOR ADHD, Disp: , Rfl:      amphetamine-dextroamphetamine (ADDERALL) 10 MG tablet, Take 10 mg by mouth daily noon, Disp: , Rfl:      ARIPiprazole (ABILIFY) 20 MG tablet, Take 20 mg by mouth daily, Disp: , Rfl:      benzonatate (TESSALON) 100 MG capsule, Take 2 capsules (200 mg) by mouth 3  times daily as needed for other (sore throat), Disp: 60 capsule, Rfl: 0     buPROPion (WELLBUTRIN XL) 300 MG 24 hr tablet, Take 300 mg by mouth every morning, Disp: , Rfl:      drospirenone-ethinyl estradiol (JESSY) 3-0.02 MG tablet, Take 1 tablet by mouth daily, Disp: , Rfl:      EMGALITY 120 MG/ML injection, Inject 120 mg Subcutaneous every 28 days, Disp: , Rfl:      famotidine (PEPCID) 20 MG tablet, TAKE 1 TABLET(20 MG) BY MOUTH TWICE DAILY, Disp: 180 tablet, Rfl: 2     FLUoxetine (PROZAC) 20 MG capsule, Take 20 mg by mouth daily 20 mg + 40 mg = 60 mg, Disp: , Rfl:      FLUoxetine (PROZAC) 40 MG capsule, Take 40 mg by mouth daily 20 mg + 40 mg = 60 mg, Disp: , Rfl:      LANsoprazole (PREVACID) 15 MG DR capsule, Take 1 capsule (15 mg) by mouth daily, Disp: 90 capsule, Rfl: 0     magnesium 250 MG tablet, , Disp: , Rfl:      ondansetron (ZOFRAN ODT) 4 MG ODT tab, Take 1 tablet (4 mg) by mouth every 8 hours as needed for nausea, Disp: 30 tablet, Rfl: 0     polyethylene glycol (MIRALAX) 17 GM/Dose powder, Take 1 capful by mouth daily as needed for constipation, Disp: , Rfl:      Semaglutide-Weight Management 1 MG/0.5ML SOAJ, Inject 1 mg Subcutaneous once a week, Disp: 2 mL, Rfl: 5     TAZORAC 0.1 % external cream, APPLY TOPICALLY TO THE AFFECTED AREA AT BEDTIME, Disp: , Rfl:     PROBLEM LIST  Patient Active Problem List    Diagnosis Date Noted     Primary focal hyperhidrosis 07/28/2022     Priority: Medium     Formatting of this note might be different from the original.  Created by Conversion       Post-COVID chronic fatigue 06/03/2022     Priority: Medium     Acne 05/06/2022     Priority: Medium     Formatting of this note might be different from the original.  Created by Conversion       Asthma 05/06/2022     Priority: Medium     Cyst of ovary 05/06/2022     Priority: Medium     Slow transit constipation 05/06/2022     Priority: Medium     Constipation 05/06/2022     Priority: Medium     Chronic migraine without aura  04/12/2022     Priority: Medium     Asymptomatic COVID-19 virus infection 01/21/2022     Priority: Medium     Gastroesophageal reflux disease 11/29/2021     Priority: Medium     Special screening examination for human papillomavirus (HPV) 11/11/2021     Priority: Medium     Pap 2021 in outside facility        Dizziness 08/12/2021     Priority: Medium     Headache 08/04/2021     Priority: Medium     Morbid obesity (H) 02/26/2021     Priority: Medium     Snoring 10/19/2020     Priority: Medium     Migraine headache 03/27/2020     Priority: Medium     Occipital neuralgia 01/31/2020     Priority: Medium     Neuralgia and neuritis, unspecified 01/31/2020     Priority: Medium     Iron deficiency anemia 05/17/2019     Priority: Medium     Excessive and frequent menstruation 05/17/2019     Priority: Medium     Otalgia 05/17/2019     Priority: Medium     Formatting of this note might be different from the original.  Was given prednisone for 5 days .       Hemorrhage of rectum and anus 12/17/2018     Priority: Medium     Paresthesia of skin 04/10/2018     Priority: Medium     Wrist pain, right 03/08/2018     Priority: Medium     Gastric ulcer without hemorrhage or perforation 02/20/2018     Priority: Medium     Muscle spasm 02/09/2018     Priority: Medium     Dysphagia 01/31/2018     Priority: Medium     Somatization disorder 08/30/2017     Priority: Medium     Major depressive disorder 05/03/2017     Priority: Medium     Other reactions to severe stress 11/01/2016     Priority: Medium     Encephalopathy, unspecified 10/25/2016     Priority: Medium     Strain of muscle, fascia and tendon at neck level, sequela 10/25/2016     Priority: Medium     Tension-type headache, unspecified, not intractable 10/25/2016     Priority: Medium     Post concussion syndrome 09/24/2016     Priority: Medium     Nausea 11/30/2015     Priority: Medium     Right upper quadrant pain 11/30/2015     Priority: Medium     Obesity 04/24/2015      Priority: Medium     Anxiety 04/24/2015     Priority: Medium     Indigestion 02/03/2015     Priority: Medium     Hip pain 05/30/2014     Priority: Medium       PERTINENT PAST MEDICAL HISTORY:  Past Medical History:   Diagnosis Date     ADHD (attention deficit hyperactivity disorder)      Depressive disorder      Ovarian cyst      Uncomplicated asthma        PREVIOUS SURGERIES:  Past Surgical History:   Procedure Laterality Date     COLONOSCOPY N/A 4/27/2022    Procedure: COLONOSCOPY, WITH POLYPECTOMY AND BIOPSY;  Surgeon: Alyse Leija MD;  Location:  GI     ENT SURGERY      tonsilectomy age 8     ORTHOPEDIC SURGERY      Hip, emelia surgery in 2013     WRIST SURGERY         SOCIAL HISTORY:  Social History     Socioeconomic History     Marital status: Single     Spouse name: Not on file     Number of children: Not on file     Years of education: Not on file     Highest education level: Not on file   Occupational History     Not on file   Tobacco Use     Smoking status: Never     Smokeless tobacco: Never   Vaping Use     Vaping Use: Never used   Substance and Sexual Activity     Alcohol use: Yes     Comment: rare     Drug use: Never     Sexual activity: Not Currently   Other Topics Concern     Parent/sibling w/ CABG, MI or angioplasty before 65F 55M? Not Asked   Social History Narrative    Lives with her parents working part time . Had to leave college      Social Determinants of Health     Financial Resource Strain: Not on file   Food Insecurity: Not on file   Transportation Needs: Not on file   Physical Activity: Not on file   Stress: Not on file   Social Connections: Not on file   Intimate Partner Violence: Not on file   Housing Stability: Not on file       FAMILY HISTORY:  Family History   Problem Relation Age of Onset     Depression Maternal Grandmother      Schizophrenia Maternal Uncle      Substance Abuse Maternal Uncle        Past/family/social history reviewed and no changes    PHYSICAL  "EXAMINATION:  Constitutional: aaox3, cooperative, pleasant, not dyspneic/diaphoretic, no acute distress  Vitals reviewed: /84 (BP Location: Left arm, Patient Position: Sitting, Cuff Size: Adult Large)   Pulse 85   Temp 99.7  F (37.6  C) (Oral)   Ht 1.676 m (5' 6\")   Wt 106.7 kg (235 lb 3.2 oz)   SpO2 100%   BMI 37.96 kg/m    Wt:   Wt Readings from Last 2 Encounters:   10/13/22 106.7 kg (235 lb 3.2 oz)   09/23/22 107 kg (236 lb)      Eyes: Sclera anicteric/injected  Ears/nose/mouth/throat: Normal oropharynx without ulcers or exudate, mucus membranes moist, hearing intact  Neck: supple, thyroid normal size  CV: No edema  Respiratory: Unlabored breathing  Lymph: No axillary, submandibular, supraclavicular or inguinal lymphadenopathy  Abd: Nondistended, +bs, no hepatosplenomegaly, mildly tender in left lower quadrant, no peritoneal signs  Skin: warm, perfused, no jaundice  Psych: Normal affect  MSK: Normal gait                        Again, thank you for allowing me to participate in the care of your patient.        Sincerely,        Darren Castorena PA-C    "

## 2022-10-13 NOTE — NURSING NOTE
"Nehemias Mascorro's goals for this visit include:   Chief Complaint   Patient presents with     Follow Up     Follow up to discuss lab results, abdominal cramping.     She requests these members of her care team be copied on today's visit information: PCP    PCP: Alexandra Rodrigues    Vitals:    10/13/22 0936   BP: 125/84   BP Location: Left arm   Patient Position: Sitting   Cuff Size: Adult Large   Pulse: 85   Temp: 99.7  F (37.6  C)   TempSrc: Oral   SpO2: 100%   Weight: 106.7 kg (235 lb 3.2 oz)   Height: 1.676 m (5' 6\")       Body mass index is 37.96 kg/m .    Do you need any medication refills at today's visit? No        Fang Dow CMA    "

## 2022-10-14 ENCOUNTER — MYC MEDICAL ADVICE (OUTPATIENT)
Dept: GASTROENTEROLOGY | Facility: CLINIC | Age: 27
End: 2022-10-14

## 2022-10-14 ENCOUNTER — TELEPHONE (OUTPATIENT)
Dept: NURSING | Facility: CLINIC | Age: 27
End: 2022-10-14

## 2022-10-14 DIAGNOSIS — R10.84 ABDOMINAL PAIN, GENERALIZED: Primary | ICD-10-CM

## 2022-10-14 DIAGNOSIS — K52.9 CHRONIC DIARRHEA: ICD-10-CM

## 2022-10-14 LAB — RHEUMATOID FACT SER NEPH-ACNC: <6 IU/ML

## 2022-10-14 RX ORDER — HYOSCYAMINE SULFATE 0.125 MG
0.12 TABLET ORAL EVERY 6 HOURS PRN
Qty: 30 TABLET | Refills: 0 | Status: SHIPPED | OUTPATIENT
Start: 2022-10-14 | End: 2022-11-16

## 2022-10-15 NOTE — TELEPHONE ENCOUNTER
Pt calling to ask if she can take some other kind of medication that would work like hyoscyamine.  Pt reports that pharmacy informed her that her insurance will not allow this medication to be filled for 3 more days.  Pt states she has no idea why.    Pt states her mom is going to call pharmacy to ask above questions.    Paulette Mendoza RN, Nurse Advisor 10:28 PM 10/14/2022

## 2022-10-17 ENCOUNTER — ANCILLARY PROCEDURE (OUTPATIENT)
Dept: CT IMAGING | Facility: CLINIC | Age: 27
End: 2022-10-17
Attending: PHYSICIAN ASSISTANT
Payer: COMMERCIAL

## 2022-10-17 DIAGNOSIS — R10.84 ABDOMINAL PAIN, GENERALIZED: ICD-10-CM

## 2022-10-17 LAB — CCP AB SER IA-ACNC: 1.5 U/ML

## 2022-10-17 PROCEDURE — 74177 CT ABD & PELVIS W/CONTRAST: CPT | Mod: GC | Performed by: RADIOLOGY

## 2022-10-17 RX ORDER — IOPAMIDOL 755 MG/ML
122 INJECTION, SOLUTION INTRAVASCULAR ONCE
Status: COMPLETED | OUTPATIENT
Start: 2022-10-17 | End: 2022-10-17

## 2022-10-17 RX ADMIN — IOPAMIDOL 122 ML: 755 INJECTION, SOLUTION INTRAVASCULAR at 15:20

## 2022-10-17 NOTE — TELEPHONE ENCOUNTER
I called patient and spoke with her regarding her symptoms.  She had worsening generalized abdominal pain over the weekend and has also actually had some breakthrough vaginal bleeding.  She believes she is about midcycle, has been consistent with her OCP, has not really had this happen before.  She currently does have MR enterography scheduled for about 2 weeks from now.  Given the generally acute increases in pain as well as the possibility that she may have Crohn's disease, I do think it is prudent to obtain CT abdomen pelvis to make sure she does not have an infection or abscess.  I placed order for CT abdomen pelvis and she will call to schedule.    Darren Castorena PA-C

## 2022-10-18 NOTE — RESULT ENCOUNTER NOTE
Grace Del Cid,    The lab tests specifically evaluating for rheumatologic condition have come back negative, aside from the elevated CRP level.  I do still think it is worth your time to get the opinion from a rheumatologist.  I have placed referral, if you do not hear back from a  soon, please call our scheduling office to get an appointment.    Sincerely,  Darren Castorena PA-C

## 2022-10-18 NOTE — RESULT ENCOUNTER NOTE
Grace Del Cid,    The results of your CT scan from yesterday afternoon are available for you to review.  In short, there was really nothing abnormal noted.  This is reassuring.    I would still recommend pursuing the MRI that we have scheduled for next month.    Sincerely,  Darren Castorena PA-C

## 2022-10-21 DIAGNOSIS — E66.01 MORBID OBESITY DUE TO EXCESS CALORIES (H): ICD-10-CM

## 2022-10-22 RX ORDER — SEMAGLUTIDE 1.7 MG/.75ML
1.7 INJECTION, SOLUTION SUBCUTANEOUS WEEKLY
Qty: 3 ML | Refills: 1 | OUTPATIENT
Start: 2022-10-22

## 2022-10-25 ENCOUNTER — TELEPHONE (OUTPATIENT)
Dept: ENDOCRINOLOGY | Facility: CLINIC | Age: 27
End: 2022-10-25

## 2022-10-25 DIAGNOSIS — E66.812 CLASS 2 SEVERE OBESITY DUE TO EXCESS CALORIES WITH SERIOUS COMORBIDITY AND BODY MASS INDEX (BMI) OF 38.0 TO 38.9 IN ADULT (H): Primary | ICD-10-CM

## 2022-10-25 DIAGNOSIS — E66.01 CLASS 2 SEVERE OBESITY DUE TO EXCESS CALORIES WITH SERIOUS COMORBIDITY AND BODY MASS INDEX (BMI) OF 38.0 TO 38.9 IN ADULT (H): Primary | ICD-10-CM

## 2022-10-25 RX ORDER — SEMAGLUTIDE 1.7 MG/.75ML
1.7 INJECTION, SOLUTION SUBCUTANEOUS WEEKLY
Qty: 3 ML | Refills: 1 | Status: SHIPPED | OUTPATIENT
Start: 2022-10-25 | End: 2022-12-13 | Stop reason: DRUGHIGH

## 2022-10-25 NOTE — TELEPHONE ENCOUNTER
Patient wishes to get her medications filled with [facility], [facility]. Wegovy prescription has been sent to [facility] already. SP is needing prescriptions for the rest of her medications. Please sent new prescriptions. Thank you!

## 2022-10-25 NOTE — TELEPHONE ENCOUNTER
"Per Dr. Slade, ok to repeat Wegovy 1.7mg weekly dose. Patient reports her blood sugars have been \"okay. Sometimes low, but not really low. Mostly 80s\". Tried decreasing to 1mg, but were unable to due to shortage. Routed to available provider for sign off.   "

## 2022-11-02 ENCOUNTER — HOSPITAL ENCOUNTER (OUTPATIENT)
Dept: MRI IMAGING | Facility: CLINIC | Age: 27
Discharge: HOME | End: 2022-11-02
Attending: PHYSICIAN ASSISTANT | Admitting: PHYSICIAN ASSISTANT
Payer: COMMERCIAL

## 2022-11-02 DIAGNOSIS — R19.5 ELEVATED FECAL CALPROTECTIN: ICD-10-CM

## 2022-11-02 DIAGNOSIS — R10.84 ABDOMINAL PAIN, GENERALIZED: ICD-10-CM

## 2022-11-02 DIAGNOSIS — K52.9 CHRONIC DIARRHEA: ICD-10-CM

## 2022-11-02 DIAGNOSIS — R79.82 ELEVATED C-REACTIVE PROTEIN: ICD-10-CM

## 2022-11-02 PROCEDURE — 255N000002 HC RX 255 OP 636: Performed by: PHYSICIAN ASSISTANT

## 2022-11-02 PROCEDURE — 72197 MRI PELVIS W/O & W/DYE: CPT

## 2022-11-02 PROCEDURE — 72197 MRI PELVIS W/O & W/DYE: CPT | Mod: 26 | Performed by: RADIOLOGY

## 2022-11-02 PROCEDURE — 74183 MRI ABD W/O CNTR FLWD CNTR: CPT | Mod: 26 | Performed by: RADIOLOGY

## 2022-11-02 PROCEDURE — 250N000011 HC RX IP 250 OP 636: Performed by: PHYSICIAN ASSISTANT

## 2022-11-02 PROCEDURE — A9585 GADOBUTROL INJECTION: HCPCS | Performed by: PHYSICIAN ASSISTANT

## 2022-11-02 RX ORDER — GADOBUTROL 604.72 MG/ML
10 INJECTION INTRAVENOUS ONCE
Status: COMPLETED | OUTPATIENT
Start: 2022-11-02 | End: 2022-11-02

## 2022-11-02 RX ADMIN — GADOBUTROL 10 ML: 604.72 INJECTION INTRAVENOUS at 10:10

## 2022-11-02 RX ADMIN — GLUCAGON HYDROCHLORIDE 1 MG: 1 INJECTION, POWDER, FOR SOLUTION INTRAMUSCULAR; INTRAVENOUS; SUBCUTANEOUS at 09:56

## 2022-11-03 NOTE — RESULT ENCOUNTER NOTE
Grace Del Cid,    The MRI of your abdomen looks normal, no evidence of inflammation or infection.  As we discussed, lets continue to pursue treating your EOE and hopefully we will see improvement in your symptoms.    Please let me know if you have any questions.    Sincerely,  Darren Castorena PA-C

## 2022-11-14 ENCOUNTER — TRANSFERRED RECORDS (OUTPATIENT)
Dept: HEALTH INFORMATION MANAGEMENT | Facility: CLINIC | Age: 27
End: 2022-11-14

## 2022-11-16 ENCOUNTER — VIRTUAL VISIT (OUTPATIENT)
Dept: GASTROENTEROLOGY | Facility: CLINIC | Age: 27
End: 2022-11-16
Payer: COMMERCIAL

## 2022-11-16 VITALS — BODY MASS INDEX: 37.93 KG/M2 | WEIGHT: 235 LBS

## 2022-11-16 DIAGNOSIS — K58.1 IRRITABLE BOWEL SYNDROME WITH CONSTIPATION: Primary | ICD-10-CM

## 2022-11-16 DIAGNOSIS — K52.9 CHRONIC DIARRHEA: ICD-10-CM

## 2022-11-16 DIAGNOSIS — R10.84 ABDOMINAL PAIN, GENERALIZED: ICD-10-CM

## 2022-11-16 PROCEDURE — 99214 OFFICE O/P EST MOD 30 MIN: CPT | Mod: 95 | Performed by: PHYSICIAN ASSISTANT

## 2022-11-16 RX ORDER — METHYLPHENIDATE HYDROCHLORIDE 36 MG/1
TABLET, EXTENDED RELEASE ORAL
COMMUNITY
Start: 2022-11-10

## 2022-11-16 RX ORDER — BENZTROPINE MESYLATE 0.5 MG/1
0.5 TABLET ORAL
COMMUNITY
Start: 2022-10-06 | End: 2022-11-22

## 2022-11-16 RX ORDER — HYOSCYAMINE SULFATE 0.125 MG
0.12 TABLET ORAL EVERY 6 HOURS PRN
Qty: 30 TABLET | Refills: 0 | Status: SHIPPED | OUTPATIENT
Start: 2022-11-16

## 2022-11-16 RX ORDER — NORTRIPTYLINE HCL 10 MG
10 CAPSULE ORAL AT BEDTIME
Qty: 30 CAPSULE | Refills: 0 | Status: SHIPPED | OUTPATIENT
Start: 2022-11-16 | End: 2022-11-22

## 2022-11-16 RX ORDER — KETOCONAZOLE 20 MG/G
CREAM TOPICAL
COMMUNITY
Start: 2022-11-14 | End: 2024-03-27

## 2022-11-16 ASSESSMENT — PATIENT HEALTH QUESTIONNAIRE - PHQ9: SUM OF ALL RESPONSES TO PHQ QUESTIONS 1-9: 13

## 2022-11-16 ASSESSMENT — PAIN SCALES - GENERAL: PAINLEVEL: MODERATE PAIN (4)

## 2022-11-16 NOTE — PROGRESS NOTES
GASTROENTEROLOGY Follow-up VIDEO VISIT    CC/REFERRING MD:    Alexandra Rodrigues  Referred Self    REASON FOR CONSULTATION:   Referred Self for   Chief Complaint   Patient presents with     Video Visit       HISTORY OF PRESENT ILLNESS:    Nehemias Mascorro is a 27 year old female who is being evaluated via a billable video visit for follow-up of chronic abdominal pain and altered bowel habits.  To review, patient first saw me in September of this year, described having years of chronic abdominal pain and constipation, but more recently had had some episodic diarrhea.  She had already undergone diagnostic colonoscopy in April 2022 with negative biopsies for microscopic colitis.  I had recommended checking CRP and fecal calprotectin in addition to celiac serologies and enteric pathogen work-up.  These results were notable for a CRP of 16 and a calprotectin level of 132.  Given that her previous colonoscopy had not evaluated or biopsied the terminal ileum, I recommended proceeding with MR enterography to further evaluate the small bowel.  In the interim, I had prescribed hyoscyamine for symptom management.  She recently did have MR enterography done and this was reassuringly normal.  A recheck of her CRP had increased up to 23, and so I had added some additional rheumatologic labs on, which were subsequently normal.  She follows up today stating that her abdominal pain has unfortunately continued.  She has found some relief from the hyoscyamine.  She states that now, she is back to dealing with chronic constipation and has been using MiraLAX as well, did have a little bit of hematochezia recently, which has happened to her in the past.  The plan today is to discuss next steps in terms of work-up and treatment.      I have reviewed and updated the patient's Past Medical History, Social History, Family History and Medication List.    Exam:    General appearance:  Healthy appearing adult, in no acute distress  Eyes:   Sclera anicteric, Pupils round and reactive to light  Ears, nose, mouth and throat:  No obvious external lesions of ears and nose.  Hearing intact  Neck:  Symmetric, No obvious external lesions  Respiratory:  Normal respiration, no use of accessory muscles   MSK:  No visual upper extremity, neck or facial muscle atrophy  ABD:  No visual abdominal distention, no audible borborygmi  Skin:  No rashes or jaundice   Psychiatric:  Oriented to person, place and time, Appropriate mood and affect.   Neurologic:  Peripheral muscle function and dexterity appear to be intact      PERTINENT STUDIES have been reviewed.    ASSESSMENT/PLAN:    Nehemias Mascorro is a 27 year old female who presents for follow up of chronic abdominal pain and altered bowel habits.  We reviewed her work-up so far.  With the elevations in CRP and calprotectin level in September, my concern was that we needed delve further into possibility of IBD.  With her normal colonoscopy in April and normal-appearing MR enterography recently, I now think that IBD is unlikely.  She is no longer having episodic diarrhea, and thus the mild increase in fecal calprotectin could have been an infection.  I did recommend repeating CRP and calprotectin level to make sure these are returning to normal.  I suspect her symptoms of abdominal pain and constipation are most likely due to IBS-C.  She has found some relief with hyoscyamine, which she can continue to take as needed, though we need to be cautious about that medications ability to cause constipation.  She may continue using MiraLAX as needed as well.  Ultimately, I think she should have a good trial on a TCA.  She is already on Abilify, bupropion, and fluoxetine for depression.  We would need to be cautious about any additional antidepressant medication.  I did send a prescription for 10 mg nortriptyline to her pharmacy.  I would like her to speak with her psychiatrist about this, whom she has an appointment within  2 weeks, to see if they think this would be an appropriate addition to her medication regimen.  I did also recommend consultation with our nutritionist to discuss potential diet modifications with IBS.  I asked her to follow-up with me by Reji after she speaks with her psychiatrist.    1. Abdominal pain, generalized  - hyoscyamine (LEVSIN) 0.125 MG tablet; Take 1 tablet (125 mcg) by mouth every 6 hours as needed for cramping  Dispense: 30 tablet; Refill: 0  - Nutrition Referral; Future  - CRP, inflammation; Future  - Calprotectin Feces; Future    2. Irritable bowel syndrome with constipation  - Nutrition Referral; Future  - nortriptyline (PAMELOR) 10 MG capsule; Take 1 capsule (10 mg) by mouth At Bedtime  Dispense: 30 capsule; Refill: 0      Video-Visit Details    Video Visit Time: 15 minutes    Type of service:  Video Visit    Originating Location (pt. Location): Home    Distant Location (provider location):  Off-site    Platform used for Video Visit: Peace    A total of 32 minutes was spent with reviewing the chart, discussing with the patient, documentation and coordination of care.    Darren Castorena PA-C  Division of Gastroenterology, Hepatology, and Nutrition  [facility]  [phone]    RTC PRN

## 2022-11-16 NOTE — PROGRESS NOTES
Nehemias is a 27 year old who is being evaluated via a billable video visit.      How would you like to obtain your AVS? MyChart  If the video visit is dropped, the invitation should be resent by: Send to e-mail at: [email or web address]  Will anyone else be joining your video visit? No

## 2022-11-22 ENCOUNTER — OFFICE VISIT (OUTPATIENT)
Dept: INTERNAL MEDICINE | Facility: CLINIC | Age: 27
End: 2022-11-22
Payer: COMMERCIAL

## 2022-11-22 VITALS — TEMPERATURE: 99.9 F

## 2022-11-22 DIAGNOSIS — R68.89 FLU-LIKE SYMPTOMS: ICD-10-CM

## 2022-11-22 DIAGNOSIS — G89.29 CHRONIC PELVIC PAIN IN FEMALE: ICD-10-CM

## 2022-11-22 DIAGNOSIS — M25.50 MULTIPLE JOINT PAIN: ICD-10-CM

## 2022-11-22 DIAGNOSIS — R53.83 OTHER FATIGUE: ICD-10-CM

## 2022-11-22 DIAGNOSIS — R79.82 ELEVATED C-REACTIVE PROTEIN (CRP): Primary | ICD-10-CM

## 2022-11-22 DIAGNOSIS — R10.84 ABDOMINAL PAIN, GENERALIZED: ICD-10-CM

## 2022-11-22 DIAGNOSIS — J10.1 INFLUENZA A: Primary | ICD-10-CM

## 2022-11-22 DIAGNOSIS — R10.2 CHRONIC PELVIC PAIN IN FEMALE: ICD-10-CM

## 2022-11-22 LAB
DEPRECATED S PYO AG THROAT QL EIA: NEGATIVE
FLUAV AG SPEC QL IA: POSITIVE
FLUBV AG SPEC QL IA: NEGATIVE
GROUP A STREP BY PCR: NOT DETECTED
SARS-COV-2 RNA RESP QL NAA+PROBE: NEGATIVE

## 2022-11-22 PROCEDURE — 86140 C-REACTIVE PROTEIN: CPT | Performed by: INTERNAL MEDICINE

## 2022-11-22 PROCEDURE — 99214 OFFICE O/P EST MOD 30 MIN: CPT | Mod: CS | Performed by: INTERNAL MEDICINE

## 2022-11-22 PROCEDURE — 85613 RUSSELL VIPER VENOM DILUTED: CPT | Performed by: INTERNAL MEDICINE

## 2022-11-22 PROCEDURE — 85390 FIBRINOLYSINS SCREEN I&R: CPT | Performed by: PATHOLOGY

## 2022-11-22 PROCEDURE — U0003 INFECTIOUS AGENT DETECTION BY NUCLEIC ACID (DNA OR RNA); SEVERE ACUTE RESPIRATORY SYNDROME CORONAVIRUS 2 (SARS-COV-2) (CORONAVIRUS DISEASE [COVID-19]), AMPLIFIED PROBE TECHNIQUE, MAKING USE OF HIGH THROUGHPUT TECHNOLOGIES AS DESCRIBED BY CMS-2020-01-R: HCPCS | Performed by: INTERNAL MEDICINE

## 2022-11-22 PROCEDURE — 86225 DNA ANTIBODY NATIVE: CPT | Performed by: INTERNAL MEDICINE

## 2022-11-22 PROCEDURE — 87804 INFLUENZA ASSAY W/OPTIC: CPT | Performed by: INTERNAL MEDICINE

## 2022-11-22 PROCEDURE — 85730 THROMBOPLASTIN TIME PARTIAL: CPT | Performed by: INTERNAL MEDICINE

## 2022-11-22 PROCEDURE — 86038 ANTINUCLEAR ANTIBODIES: CPT | Performed by: INTERNAL MEDICINE

## 2022-11-22 PROCEDURE — 87651 STREP A DNA AMP PROBE: CPT | Performed by: INTERNAL MEDICINE

## 2022-11-22 PROCEDURE — U0005 INFEC AGEN DETEC AMPLI PROBE: HCPCS | Performed by: INTERNAL MEDICINE

## 2022-11-22 PROCEDURE — 36415 COLL VENOUS BLD VENIPUNCTURE: CPT | Performed by: INTERNAL MEDICINE

## 2022-11-22 RX ORDER — OSELTAMIVIR PHOSPHATE 75 MG/1
75 CAPSULE ORAL 2 TIMES DAILY
Qty: 10 CAPSULE | Refills: 0 | Status: SHIPPED | OUTPATIENT
Start: 2022-11-22 | End: 2022-12-06

## 2022-11-22 RX ORDER — ALBUTEROL SULFATE 90 UG/1
AEROSOL, METERED RESPIRATORY (INHALATION)
Qty: 18 G | Refills: 3 | Status: SHIPPED | OUTPATIENT
Start: 2022-11-22 | End: 2023-10-16

## 2022-11-22 ASSESSMENT — PATIENT HEALTH QUESTIONNAIRE - PHQ9
SUM OF ALL RESPONSES TO PHQ QUESTIONS 1-9: 12
10. IF YOU CHECKED OFF ANY PROBLEMS, HOW DIFFICULT HAVE THESE PROBLEMS MADE IT FOR YOU TO DO YOUR WORK, TAKE CARE OF THINGS AT HOME, OR GET ALONG WITH OTHER PEOPLE: VERY DIFFICULT
SUM OF ALL RESPONSES TO PHQ QUESTIONS 1-9: 12

## 2022-11-22 NOTE — TELEPHONE ENCOUNTER
"Last Written Prescription Date:  11/10/21  Last Fill Quantity: 18,  # refills: 3   Last office visit provider:  11/22/22     Requested Prescriptions   Pending Prescriptions Disp Refills     VENTOLIN  (90 Base) MCG/ACT inhaler [Pharmacy Med Name: VENTOLIN HFA INH W/DOS CTR [id number]] 18 g 3     Sig: INHALE 2 PUFFS INTO THE LUNGS FOUR TIMES DAILY       Asthma Maintenance Inhalers - Anticholinergics Passed - 11/22/2022  4:29 AM        Passed - Patient is age 12 years or older        Passed - Asthma control assessment score within normal limits in last 6 months     Please review ACT score.           Passed - Medication is active on med list        Passed - Recent (6 mo) or future (30 days) visit within the authorizing provider's specialty     Patient had office visit in the last 6 months or has a visit in the next 30 days with authorizing provider or within the authorizing provider's specialty.  See \"Patient Info\" tab in inbasket, or \"Choose Columns\" in Meds & Orders section of the refill encounter.           Short-Acting Beta Agonist Inhalers Protocol  Passed - 11/22/2022  4:29 AM        Passed - Patient is age 12 or older        Passed - Asthma control assessment score within normal limits in last 6 months     Please review ACT score.           Passed - Medication is active on med list        Passed - Recent (6 mo) or future (30 days) visit within the authorizing provider's specialty     Patient had office visit in the last 6 months or has a visit in the next 30 days with authorizing provider or within the authorizing provider's specialty.  See \"Patient Info\" tab in inbasket, or \"Choose Columns\" in Meds & Orders section of the refill encounter.                 Marimar Arellano, RN 11/22/22 5:24 PM  "

## 2022-11-22 NOTE — PROGRESS NOTES
Assessment & Plan     1. Elevated C-reactive protein (CRP)  I do believe her pain is of fibromyalgia.  Compounded with major depression.  She has had some preliminary rheumatology, work-up by GI which was negative.  We will add specific lupus testing as well.  Today the abnormal test is a slightly high CRP.  And elevated leukocytes in the stool.  But extensive GI work-up has revealed no evidence of Crohn's or inflammatory bowel disease.  Given her that the similar trajectory with her mother who eventually was diagnosed to have lupus will refer to rheumatology as they request.  I declined to review infectious disease referral.  There is no basis for that at this stage.  Give her information on fibromyalgia.  We could consider duloxetine but she is already on multiple psychotropic medications.  Could consider gabapentin at some point or Lyrica as the next step although it is can the weight gain is a concern.  - Adult Rheumatology  Referral; Future    2. Multiple joint pain    - Adult Rheumatology  Referral; Future  - Lupus Anticoagulant Panel; Future  - DNA double stranded antibodies; Future  - Anti Nuclear Marichuy IgG by IFA with Reflex; Future  - CRP, inflammation; Future  - Lupus Anticoagulant Panel  - DNA double stranded antibodies  - Anti Nuclear Marichuy IgG by IFA with Reflex  - CRP, inflammation    3. Chronic pelvic pain in female  I did recommend pelvic floor rehabilitation for her chronic pelvic pain  - Physical Therapy Referral; Future    4. Flu-like symptoms  The most important thing is to rule out influenza or COVID and then work on her chronic pain issues.  Will swab for influenza COVID and strep.  Influenza test was positive and she Tamiflu was s  - Symptomatic; Unknown COVID-19 Virus (Coronavirus) by PCR Nose  - Influenza A & B Antigen - Clinic Collect  - Streptococcus A Rapid Screen w/Reflex to PCR - Clinic Collect  - Group A Streptococcus PCR Throat Swab    5. Abdominal pain,  "generalized    - Calprotectin Feces    Overall this is a complex case with significant depression symptoms that are ongoing despite seeing psychiatry significant GI symptoms that are suggestive of IBS and followed by GI.  And significant weight loss issues medications as well.  He is at the same time trying to study and I do think this is all been overwhelming for her.           BMI:   Estimated body mass index is 37.93 kg/m  as calculated from the following:    Height as of 10/13/22: 1.676 m (5' 6\").    Weight as of 11/16/22: 106.6 kg (235 lb).           Return in about 4 weeks (around 12/20/2022), or if symptoms worsen or fail to improve, for Follow up.    Alexandra Rodrigues MD  [facility]    Araceli Del Cid is a 27 year old, presenting for the following health issues:  No chief complaint on file.  Hitesh is here accompanied by her mother she has multiple issues.  They are all aggravated by recent fever that she acquired yesterday.  Chronic problems include  Pelvic pain , chronic pain , feels like insides 'twisting' ,   Has some morning stiffness around 30 minutes , lifting   Has a lot of fatigue   Usually sleeps Ten hours   She has no specific joint arthralgias or skin rash.  Her mother had similar symptoms and was diagnosed eventually to have lupus and was is currently on Plaquenil and she is concerned about that.  Symptoms are  Now has some fever, mild cough feeling a little out of it and  History of Present Illness       Reason for visit:  Discuss possible autoimmune disorder and treatment plan. Also, just got sick yesterday with a cold.    She eats 0-1 servings of fruits and vegetables daily.She consumes 0 sweetened beverage(s) daily.She exercises with enough effort to increase her heart rate 9 or less minutes per day.  She exercises with enough effort to increase her heart rate 3 or less days per week. She is missing 1 dose(s) of medications per week.  She is not taking " on file    Food insecurity:     Worry: Not on file     Inability: Not on file    Transportation needs:     Medical: Not on file     Non-medical: Not on file   Tobacco Use    Smoking status: Current Every Day Smoker     Packs/day: 1.00     Years: 30.00     Pack years: 30.00     Types: Cigarettes    Smokeless tobacco: Never Used    Tobacco comment: 1 PPD x 15 yrs   Substance and Sexual Activity    Alcohol use: Yes     Alcohol/week: 0.0 standard drinks     Comment: occ    Drug use: No    Sexual activity: Not Currently     Partners: Male   Lifestyle    Physical activity:     Days per week: Not on file     Minutes per session: Not on file    Stress: Not on file   Relationships    Social connections:     Talks on phone: Not on file     Gets together: Not on file     Attends Worship service: Not on file     Active member of club or organization: Not on file     Attends meetings of clubs or organizations: Not on file     Relationship status: Not on file    Intimate partner violence:     Fear of current or ex partner: Not on file     Emotionally abused: Not on file     Physically abused: Not on file     Forced sexual activity: Not on file   Other Topics Concern    Not on file   Social History Narrative    Not on file       Family History   Problem Relation Age of Onset    Diabetes Father     Other Father         Bone Spurs    Breast Cancer Mother     COPD Mother     Emphysema Mother     Breast Cancer Other     Breast Cancer Maternal Aunt     Breast Cancer Maternal Grandmother     Diabetes Maternal Grandmother     Diabetes Maternal Grandfather        Allergies:  Codeine    Home Medications:  Prior to Admission medications    Medication Sig Start Date End Date Taking?  Authorizing Provider   magnesium oxide (MAG-OX) 400 (240 Mg) MG tablet take 1 tablet by mouth once daily 2/26/18   Nelson Kerns MD RA VITAMIN B-12 TR 1000 MCG TBCR take 1 tablet once daily 2/20/18   Nelson Kerns MD prescribed medications regularly due to remembering to take and other.    Today's PHQ-9         PHQ-9 Total Score: 12    PHQ-9 Q9 Thoughts of better off dead/self-harm past 2 weeks :   Not at all    How difficult have these problems made it for you to do your work, take care of things at home, or get along with other people: Very difficult     Patient Active Problem List   Diagnosis     Hip pain     Obesity     Anxiety     Major depressive disorder     Morbid obesity (H)     Special screening examination for human papillomavirus (HPV)     Gastroesophageal reflux disease     Asymptomatic COVID-19 virus infection     Acne     Asthma     Chronic migraine without aura     Cyst of ovary     Slow transit constipation     Constipation     Iron deficiency anemia     Headache     Gastric ulcer without hemorrhage or perforation     Occipital neuralgia     Neuralgia and neuritis, unspecified     Other reactions to severe stress     Somatization disorder     Post-COVID chronic fatigue     Dizziness     Dysphagia     Encephalopathy, unspecified     Excessive and frequent menstruation     Indigestion     Migraine headache     Muscle spasm     Nausea     Otalgia     Paresthesia of skin     Post concussion syndrome     Primary focal hyperhidrosis     Hemorrhage of rectum and anus     Right upper quadrant pain     Snoring     Strain of muscle, fascia and tendon at neck level, sequela     Tension-type headache, unspecified, not intractable     Wrist pain, right     Current Outpatient Medications   Medication     LANsoprazole (PREVACID) 15 MG DR capsule     albuterol (ACCUNEB) 1.25 MG/3ML neb solution     ARIPiprazole (ABILIFY) 20 MG tablet     buPROPion (WELLBUTRIN XL) 300 MG 24 hr tablet     CONCERTA 36 MG CR tablet     drospirenone-ethinyl estradiol (JESSY) 3-0.02 MG tablet     EMGALITY 120 MG/ML injection     famotidine (PEPCID) 20 MG tablet     FLUoxetine (PROZAC) 40 MG capsule     hyoscyamine (LEVSIN) 0.125 MG tablet      vitamin B-2 (RIBOFLAVIN) 100 MG TABS tablet take 1 tablet by mouth once daily 1/29/18   Radha Kennedy MD   hydrOXYzine (VISTARIL) 25 MG capsule take 1 capsule by mouth three times a day if needed for anxiety 1/29/18   Radha Kennedy MD   lisinopril-hydrochlorothiazide (PRINZIDE;ZESTORETIC) 10-12.5 MG per tablet take 1 tablet by mouth once daily **STOP CARDIZEM 1/29/18   Radha Kennedy MD   VENTOLIN  (90 Base) MCG/ACT inhaler inhale 1 puff by mouth every 6 hours if needed for wheezing 1/2/18   Radha Kennedy MD   vitamin D (ERGOCALCIFEROL) 41668 units CAPS capsule take 1 capsule by mouth every week 10/23/17   Radha Kennedy MD   PARoxetine (PAXIL) 40 MG tablet take 1 tablet by mouth once daily 9/12/17   Radha Kennedy MD   metFORMIN (GLUCOPHAGE-XR) 500 MG extended release tablet take 1 tablet by mouth once daily WITH BREAKFAST 7/31/17   Radha Kennedy MD   risperiDONE (RISPERDAL) 0.5 MG tablet Take 1 tablet by mouth every evening 5/30/17   Radha Kennedy MD   cyclobenzaprine (FLEXERIL) 5 MG tablet Take 1 tablet by mouth 3 times daily as needed for Muscle spasms (only as needed) Causes sedation, do not drive while taking this medication 5/30/17   Radha Kennedy MD   nicotine (NICODERM CQ) 21 MG/24HR Place 1 patch onto the skin every 24 hours 3/22/17 3/22/18  Radha Kennedy MD   SUMAtriptan (IMITREX) 100 MG tablet take 1 tablet by mouth once daily if needed for migraines 2/3/17   Radha Kennedy MD   Ciclopirox (LOPROX) 0.77 % gel Apply in between toes twice daily.  6/28/16   Billy Foster DPM   RA ALLERGY RELIEF 180 MG tablet take 1 tablet by mouth once daily 6/26/16   Radha Kennedy MD   tiotropium (SPIRIVA HANDIHALER) 18 MCG inhalation capsule Inhale 1 capsule into the lungs daily 3/1/16   Radha Kennedy MD   docusate sodium (COLACE) 100 MG capsule Take 1 capsule by mouth 2 times daily as needed for Constipation 2/27/16   Travis Lynn, ketoconazole (NIZORAL) 2 % external cream     ondansetron (ZOFRAN ODT) 4 MG ODT tab          polyethylene glycol (MIRALAX) 17 GM/Dose powder     Semaglutide-Weight Management (WEGOVY) 1.7 MG/0.75ML SOAJ     TAZORAC 0.1 % external cream     VENTOLIN  (90 Base) MCG/ACT inhaler     No current facility-administered medications for this visit.               Review of Systems   Constitutional, HEENT, cardiovascular, pulmonary, gi and gu systems are negative, except as otherwise noted.      Objective    Temp 99.9  F (37.7  C)   There is no height or weight on file to calculate BMI.  Physical Exam   GENERAL: healthy, alert and no distress  HENT: ear canals and TM's normal, nose and mouth without ulcers or lesions  NECK: no adenopathy, no asymmetry, masses, or scars and thyroid normal to palpation  RESP: lungs clear to auscultation - no rales, rhonchi or wheezes  CV: regular rate and rhythm, normal S1 S2, no S3 or S4, no murmur, click or rub, no peripheral edema and peripheral pulses strong  ABDOMEN: soft, nontender, no hepatosplenomegaly, no masses and bowel sounds normal  MS: no gross musculoskeletal defects noted, no edema                     inguinal region       DIFFERENTIAL  DIAGNOSIS     PLAN (LABS / IMAGING / EKG):  Orders Placed This Encounter   Procedures    Culture Blood #1    Culture Blood #1    Urine Culture    XR CHEST PORTABLE    CT CHEST ABDOMEN PELVIS W CONTRAST    CBC Auto Differential    Comprehensive Metabolic Panel    Lactic Acid, Whole Blood    Protime-INR    Urinalysis with Microscopic    APTT    Troponin    Urine Drug Screen    Hemoglobin and hematocrit, blood    SODIUM (POC)    POTASSIUM (POC)    CHLORIDE (POC)    CALCIUM, IONIC (POC)    Immature Platelet Fraction    TOX SCR, BLD, ED    HCG Qualitative, Serum    TSH with Reflex    LACTIC ACID, WHOLE BLOOD    Height and weight    Pharmacy to Dose: Vancomycin    Inpatient consult to Nephrology    Inpatient consult to General Surgery    Inpatient consult to Critical Care    Venous Blood Gas, POC    Creatinine W/GFR Point of Care    Lactic Acid, POC    POCT Glucose    Anion Gap (Calc) POC    EKG 12 Lead    PATIENT STATUS (FROM ED OR OR/PROCEDURAL) Inpatient       MEDICATIONS ORDERED:  Orders Placed This Encounter   Medications    0.9 % sodium chloride bolus    EPINEPHrine 1 MG/ML injection 0.3 mg    piperacillin-tazobactam (ZOSYN) 3.375 g in dextrose 5 % 50 mL IVPB (mini-bag)    vancomycin (VANCOCIN) intermittent dosing (placeholder)    0.9 % sodium chloride bolus    vancomycin (VANCOCIN) 1500 mg in dextrose 5 % 250 mL IVPB    norepinephrine (LEVOPHED) 64 MCG/ML infusion Maggie Henao: cabinet override    iohexol (OMNIPAQUE 350) solution 75 mL    norepinephrine (LEVOPHED) 64 MCG/ML infusion ZENAIDA MONTEZ: cabinet override       DDX:  ACS, arrhythmia, thyrotoxicosis, AAA rupture, cystitis, abscess, pneumonia, PE    DIAGNOSTIC RESULTS / EMERGENCY DEPARTMENT COURSE / MDM     LABS:  Results for orders placed or performed during the hospital encounter of 07/20/19   Culture Blood #1   Result Value Ref Range    Specimen Description mL/min    GFR Comment          GFR Staging NOT REPORTED    Lactic Acid, Whole Blood   Result Value Ref Range    Lactic Acid, Whole Blood 4.6 (H) 0.7 - 2.1 mmol/L   Protime-INR   Result Value Ref Range    Protime 10.5 9.0 - 12.0 sec    INR 1.0    APTT   Result Value Ref Range    PTT 17.2 (L) 20.5 - 30.5 sec   Troponin   Result Value Ref Range    Troponin, High Sensitivity 7 0 - 14 ng/L    Troponin T NOT REPORTED <0.03 ng/mL    Troponin Interp NOT REPORTED    Hemoglobin and hematocrit, blood   Result Value Ref Range    POC Hemoglobin 18.3 (H) 12.0 - 16.0 g/dL    POC Hematocrit 54 (H) 36 - 46 %   SODIUM (POC)   Result Value Ref Range    POC Sodium 134 (L) 138 - 146 mmol/L   POTASSIUM (POC)   Result Value Ref Range    POC Potassium 3.3 (L) 3.5 - 4.5 mmol/L   CHLORIDE (POC)   Result Value Ref Range    POC Chloride 103 98 - 107 mmol/L   CALCIUM, IONIC (POC)   Result Value Ref Range    POC Ionized Calcium 1.06 (L) 1.15 - 1.33 mmol/L   Immature Platelet Fraction   Result Value Ref Range    Platelet, Immature Fraction 3.6 1.1 - 10.3 %    Platelet, Fluorescence 119 (L) 138 - 453 k/uL   TOX SCR, BLD, ED   Result Value Ref Range    Ethanol <10 <10 mg/dL    Ethanol percent <7.119 <6.978 %    Salicylate Lvl <1 (L) 3 - 10 mg/dL    Acetaminophen Level <5 (L) 10 - 30 ug/mL    Toxic Tricyclic Sc,Blood NEGATIVE NEGATIVE   HCG Qualitative, Serum   Result Value Ref Range    hCG Qual NEGATIVE NEGATIVE   TSH with Reflex   Result Value Ref Range    TSH 3.18 0.30 - 5.00 mIU/L   LACTIC ACID, WHOLE BLOOD   Result Value Ref Range    Lactic Acid, Whole Blood 2.7 (H) 0.7 - 2.1 mmol/L   Venous Blood Gas, POC   Result Value Ref Range    pH, Dov 7.425 7.320 - 7.430    pCO2, Dov 24.8 (L) 41.0 - 51.0 mm Hg    pO2, Dov 21.7 (L) 30.0 - 50.0 mm Hg    HCO3, Venous 16.3 (L) 22.0 - 29.0 mmol/L    Total CO2, Venous 17 (L) 23.0 - 30.0 mmol/L    Negative Base Excess, Dov 6 (H) 0.0 - 2.0    Positive Base Excess, Dov NOT REPORTED 0.0 - 3.0    O2 Sat, Dov 40 (L)

## 2022-11-23 LAB
ANA SER QL IF: NEGATIVE
CRP SERPL-MCNC: 35.4 MG/L
DRVVT SCREEN RATIO: 0.94
INR PPP: 0.98 (ref 0.85–1.15)
LA PPP-IMP: NEGATIVE
LUPUS INTERPRETATION: NORMAL
PTT RATIO: 0.97
THROMBIN TIME: 16.8 SECONDS (ref 13–19)

## 2022-11-25 DIAGNOSIS — M25.50 MULTIPLE JOINT PAIN: Primary | ICD-10-CM

## 2022-11-25 LAB — DSDNA AB SER-ACNC: <0.6 IU/ML

## 2022-11-28 DIAGNOSIS — R10.13 EPIGASTRIC PAIN: ICD-10-CM

## 2022-11-28 DIAGNOSIS — K21.9 GASTROESOPHAGEAL REFLUX DISEASE WITHOUT ESOPHAGITIS: ICD-10-CM

## 2022-11-29 RX ORDER — MECOBALAMIN 5000 MCG
15 TABLET,DISINTEGRATING ORAL DAILY
Qty: 90 CAPSULE | Refills: 3 | Status: SHIPPED | OUTPATIENT
Start: 2022-11-29 | End: 2023-12-11

## 2022-11-29 NOTE — TELEPHONE ENCOUNTER
"Routing refill request to provider for review/approval because:  A break in medication    Last Written Prescription Date:  5/6/22  Last Fill Quantity: 90,  # refills: 0   Last office visit provider:  11/22/22     Requested Prescriptions   Pending Prescriptions Disp Refills     LANsoprazole (PREVACID) 15 MG DR capsule 90 capsule 0     Sig: Take 1 capsule (15 mg) by mouth daily       PPI Protocol Passed - 11/29/2022 10:01 AM        Passed - Not on Clopidogrel (unless Pantoprazole ordered)        Passed - No diagnosis of osteoporosis on record        Passed - Recent (12 mo) or future (30 days) visit within the authorizing provider's specialty     Patient has had an office visit with the authorizing provider or a provider within the authorizing providers department within the previous 12 mos or has a future within next 30 days. See \"Patient Info\" tab in inbasket, or \"Choose Columns\" in Meds & Orders section of the refill encounter.              Passed - Medication is active on med list        Passed - Patient is age 18 or older        Passed - No active pregnacy on record        Passed - No positive pregnancy test in past 12 months             Ottoniel Rushing RN 11/29/22 10:02 AM  "

## 2022-11-30 ENCOUNTER — VIRTUAL VISIT (OUTPATIENT)
Dept: NUTRITION | Facility: CLINIC | Age: 27
End: 2022-11-30
Payer: COMMERCIAL

## 2022-11-30 DIAGNOSIS — R10.84 ABDOMINAL PAIN, GENERALIZED: ICD-10-CM

## 2022-11-30 DIAGNOSIS — K58.1 IRRITABLE BOWEL SYNDROME WITH CONSTIPATION: Primary | ICD-10-CM

## 2022-11-30 PROCEDURE — 97802 MEDICAL NUTRITION INDIV IN: CPT | Mod: 95 | Performed by: DIETITIAN, REGISTERED

## 2022-11-30 NOTE — LETTER
11/30/2022         RE: Nehemias Mascorro  [address]  [place] [address]    Dear Darren,    Thank you for referring your patient, Nehemias Mascorro, to the nutrition services at [facility]. I enjoyed meeting with Nehemias and helping her create some meal plans with recipes to address her GI concerns. We will be working through an elimination and reintroduction diet to identify adverse reactions to foods starting with taking out some lower fodmap/inflammatory foods such as wheat/gluten as well as dairy and adding in more nutrients for healing. Please see a copy of my visit note below for more details. I look forward to following up with Nehemias in a few weeks to monitor her progress.    Medical Nutrition Therapy  Visit Type: Initial assessment and intervention    Visit Details    How would you like to obtain your AVS? MyChart  If the correspondence for visit is dropped, how would you like your dietitian to reconnect with you:   call back by phone? Yes    Text to cell phone: [phone]  Will anyone else be joining your video visit or telephone call? No  {If patient encounters technical issues they should call [phone] :15    Type of service:  Video Visit   Start Time: 2:39 PM    End Time:3:13 PM    Originating Location (pt. Location): Home    Distant Location (provider location):  [facility] WORKING VIRTUAL FROM HOME     Platform used for Video Visit: Peace      Referring Provider: Darren Castorena  [facility]     REASON FOR REFERRAL:   Nehemias Mascorro is a 27 year old female who is interested in Medical Nutrition Therapy (MNT) and education related to   Abdominal pain, generalized   Irritable bowel syndrome with constipation   And diarrhea   Interested in what foods are triggers and what are ok worried about having early stages of chron's diease.   She is accompanied by self.     NUTRITION ASSESSMENT:     Nutritional  Goals 11/29/2022   Nutritional Goal Create a plan to lose weight;Work on meal planning/recipes;Overcome emotional eating;Manage Digestive Issues;Eliminate cravings for sugar and refined carbohydrates;Increase energy;What to eat for my specific health concerns        Neurological 11/29/2022   Migraine Headaches Current   Tension Headache Current       No flowsheet data found.   Immune/Blood 11/29/2022   Anemia Past      Gastrointestinal 11/29/2022   Constipation Current   Nausea or Vomiting Current   Gas/bloating Current   Heartburn/Reflux/GERD Current   Irritable Bowel Syndrome (IBS) Current   Abdominal Pain Current   Diverticulosis/Diverticulitis Current       Food Sensitivities 11/29/2022   Lactose intolerance Current      Endocrine 11/29/2022   Overweight/obesity Current      Skin 11/29/2022   Acne Current      No flowsheet data found.   No flowsheet data found.   Psychological 11/29/2022   ADD/ADHD/Asperger's Current   Depression/Anxiety Current      No flowsheet data found.   Womens Health Assessment 11/29/2022   Hysterectomy No   I am pregnant.  No   I am breastfeeding. No   I want to become pregnant within the next year . No   What do you use for contraception?  not needed/not sexually active   Any problems with current birth control method?   Yes   Date of last menstrual period 59143   Are your periods irregular? No   Days from the start of one period to the next. 28   Days of Menstral Flow 5   Associated with menstral periods. Painful/cramping;Heavy bleeding;Headaches;Bloating;Diarrhea/constipation   Are you officially in menopause? (no period for one year or longer)  No        Past Medical History:  Past Medical History:   Diagnosis Date     ADHD (attention deficit hyperactivity disorder)      Depressive disorder      Ovarian cyst      Uncomplicated asthma        Previous Surgeries:   Past Surgical History:   Procedure Laterality Date     COLONOSCOPY N/A 4/27/2022    Procedure: COLONOSCOPY, WITH  POLYPECTOMY AND BIOPSY;  Surgeon: Alyse Leija MD;  Location:  GI     ENT SURGERY      tonsilectomy age 8     ORTHOPEDIC SURGERY      Hip, emelia surgery in 2013     WRIST SURGERY          Family History:  Family History   Problem Relation Age of Onset     Depression Maternal Grandmother      Schizophrenia Maternal Uncle      Substance Abuse Maternal Uncle         Lifestyle History:  Lifestyle 11/29/2022   Do you feel your life is stressful right now?  Yes   What is the cause(s) of stress in your life?  Finances;Work;Health Concerns;Emotional problems;Multi-tasking and multiple deadlines to meet   Are you currently implementing any strategies to help manage stress? No        Exercise History:  Exercise 11/29/2022   Does your occupation require extended periods of sitting?  Yes   Does your occupation require extended periods of repetitive movements (ex: walking or lifting)?  Yes   Do you currently participate in any forms of exercise? No   Rate your level of motivation for including exercise in your routine (0=none, 10=high): 2   Do you have any medical conditions, pain, injuries, surgeries etc. restricting you from exercise? Yes        Sleep History:  Sleep 11/29/2022   How many hours (on average) do you sleep per night? 9-11   What time do you turn off the lights? 10 PM   How long does it take for you to fall asleep? 15-20 mins   What time do you stop using electronic devices? 11 PM   What time do you wake up? 8 AM   When do you eat your first meal?  9 AM   Do you feel well-rested during the day?  No   Do you take naps?  Yes   Do you have a comfortable bedroom environment (cool, quiet, dark, etc)? Yes   Do you have a sleep routine/ ritual that you do before bed?  No   How many hours do you spend per day looking at a screen (TV, computer, tablet and phone)? 5 to 6   Select all factors that apply to your current sleep habits: Have difficulty waking up in the morning;Wake up in the middle of the night;Have  nightmares;Eat large meals within 3 hours of going to bed;Night sweats;Snack during the night;Feel tired/sluggish/fatigued during the day;Have tried supplements (ie: melatonin) for sleep        Nutrition History:  Nutrition 11/29/2022   Have you ever had a nutrition consultation? No   Do you currently follow a special diet or nutritional program? No   What do you feel are the biggest barriers getting in the way of achieving you nutritional goals? Motivation/Readiness to change;Financial concerns;Lack of time;Work (such as lack of time to eat, unhealthy choices, etc.);Lack of control over emotions/behaviors;Stress   Do you have any food allergies, sensitivities or intolerances?  Yes   Specific food(s) causing adverse reactions Dairy       Digestion 11/29/2022   Do you experience stomach pains/cramping? 2-3 times per week   Do you experience bloating?  Daily   Do you experience gas?  Daily   Do you experience heartburn/acid reflux/indigestion? Rarely   How often do you have a bowel movement? Once per week or less   What is a typical bowel movement like for you? Select all that apply: Varies a lot;Formed and soft;Mucous, greasy      Food Access:  11/29/2022   Who does the grocery shopping? Self;Mother;Father   How often is grocery shopping done? Weekly   Where do you usually receive your groceries from? Select all that apply: Target;[employer]co;[employer];Delivery (Amazon, Whole Foods, Instacart, etc.)   Do you read food labels? No   Who does the cooking? Select all that apply: Self;Mother;Father   How many meals do you eat out per week?  1 to 3   What restaurants do you typically choose? Fast Food (Taco Bell, Paredes's, Subway, etc.)      Daily Patterns: 11/29/2022   How many days per week do you have breakfast? 5   How many days per week do you have lunch? 6   How many days per week do you have dinner? 7   How many days per week do you have snacks? 7      Protein Intake: 11/29/2022    How many times per day do you typically consume a protein source(s)? 3   What types of protein do you currently eat?  Ground Beef;Steak;Beef Roast;Pork Chops;Pork Ribs;Roast Ham;Deli Ham;[place];Cod;Tuna;Shrimp;Other Fish;Chicken Breast;Chicken Thighs/Legs;Other Chicken;Turkey Breast;Ground Turkey;Beans       Fat Intake:  11/29/2022   How many times per day do you typically consume healthy fat(s)? 2   What types of health fats do you currently eat? Select all that apply:  Almonds;Walnuts;Pecans;Cashews;[place] butter;Simms;Butter;Vegetable oil;Salad dressings;Coconut oil;Olive oil;Avocado       Fruit Intake:  11/29/2022   How many times per day do you typically consume fruits? 1   What types of fruit do currently eat? Apple;Banana;Blackberries;Blueberries;Grapes;Sylvester;Peaches;Pineapple;Raspberries;Other       Vegetable Intake:  11/29/2022   How many times per day do you typically consume vegetables? 1   What types of vegetables do you currently eat? Asparagus;Beans;Broccoli;Custer sprouts;Cabbage;Carrots;Cauliflower;Corn;Eggplant;Greens (rashaun, kale etc);Onions;Plantain;Potato (baked, boiled, mashed, French fries);Spinach;Tomato;Yam, sweet potato      Grain Intake:  11/29/2022   How many times per day do you typically consume grains? 4   What types of grains do currently eat? Select all that apply:  Oats (gluten free);Pancakes/waffles (gluten free);White rice (gluten free);Bagel (non-gluten free);Breads (non-gluten free);Cereals (non-gluten free);Chips (non-gluten free);Crackers (non-gluten free);Muffins (non-gluten free);Pancakes/waffles (non-gluten free);Pasta (non-gluten free);Other (non-gluten free)       Dairy Intake:  11/29/2022   How many times per day do you typically consume dairy? 2   What types of dairy do currently eat? Select all that apply:  Milk;Cheese;Yogurt;Ice cream       Non-Dairy Alternative Intake:  11/29/2022   How many times per day do you typically consume non-dairy alternatives? 2   What  types of non-dairy alternatives do currently eat? Select all that apply:  [place] milk       Sweets Intake:  11/29/2022   How many times per day do you typically consume sweets? 2      Beverage Intake:  11/29/2022   How many 8 oz cups of water do you typically consume per day?  2 to 3   How many 8 oz cups of caffeine do you typically consume per day?  0   How many drinks of alcohol do you typically consume per week (1 drink = 5 oz wine, 12 oz beer, 1.5 oz spirits)?   0       Lifestyle Recall:  11/29/2022   What time did you wake up? 8 AM   What time did you go to sleep? 10 PM   What time did you have breakfast? 9-10 AM   Where did you have breakfast?  Home   What time did you have a morning snack? 10-11 AM   Where did you have your morning snack? Home   What time did you have lunch? 1-2 PM   Where did you have lunch?  Home   What time did you have an afternoon snack? 5-6 PM   Where did you have your afternoon snack? Home   What time did you have dinner? 7-8 PM   Where did you have dinner?  Home   What time did you have an evening snack? 10-11PM   Where did you have your evening snack? Home   What time of day did you exercise? No Exercise          Additional concerns:   Breakfast: Oatmeal with brown sugar with nuts/pecans or cereal with lactate or almond milk   Lunch: sandwiches wheat or white black turkey roast beef/cheese and lettuce   Dinner: fish with chicken baked sometimes red meat doesn't eat a lot of pork more ground beef or steak   Snacks. Chips, apples, popcorn, carrots       MEDICATIONS:  Current Outpatient Medications   Medication Sig Dispense Refill     LANsoprazole (PREVACID) 15 MG DR capsule Take 1 capsule (15 mg) by mouth daily 90 capsule 3     albuterol (ACCUNEB) 1.25 MG/3ML neb solution Take 1 vial (1.25 mg) by nebulization every 6 hours as needed for shortness of breath / dyspnea or wheezing 90 mL 0     ARIPiprazole (ABILIFY) 20 MG tablet Take 20 mg by mouth daily       buPROPion (WELLBUTRIN XL) 300  MG 24 hr tablet Take 300 mg by mouth every morning       CONCERTA 36 MG CR tablet TAKE 1 TABLET BY MOUTH DAILY IN THE MORNING FOR ADHD. START 11/18/22       drospirenone-ethinyl estradiol (JESSY) 3-0.02 MG tablet Take 1 tablet by mouth daily       EMGALITY 120 MG/ML injection Inject 120 mg Subcutaneous every 28 days       famotidine (PEPCID) 20 MG tablet Take 1 tablet (20 mg) by mouth 2 times daily 180 tablet 1     FLUoxetine (PROZAC) 40 MG capsule Take 40 mg by mouth daily 20 mg + 40 mg = 60 mg       hyoscyamine (LEVSIN) 0.125 MG tablet Take 1 tablet (125 mcg) by mouth every 6 hours as needed for cramping 30 tablet 0     ketoconazole (NIZORAL) 2 % external cream        ondansetron (ZOFRAN ODT) 4 MG ODT tab Take 1 tablet (4 mg) by mouth every 8 hours as needed for nausea 30 tablet 0     oseltamivir (TAMIFLU) 75 MG capsule Take 1 capsule (75 mg) by mouth 2 times daily 10 capsule 0     polyethylene glycol (MIRALAX) 17 GM/Dose powder Take 1 capful by mouth daily as needed for constipation       Semaglutide-Weight Management (WEGOVY) 1.7 MG/0.75ML SOAJ Inject 1.7 mg Subcutaneous once a week 3 mL 1     TAZORAC 0.1 % external cream APPLY TOPICALLY TO THE AFFECTED AREA AT BEDTIME       VENTOLIN  (90 Base) MCG/ACT inhaler INHALE 2 PUFFS INTO THE LUNGS FOUR TIMES DAILY 18 g 3       Dietary Supplements 11/29/2022   Individual Vitamin Supplements D;Other   Herbal Complimentary products Laxative;Probiotic         ALLERGIES:   Allergies   Allergen Reactions     Azithromycin      Erythromycin Nausea and Vomiting     Sulfa Drugs Nausea        .na  LABS:  Last Basic Metabolic Panel:  Lab Results   Component Value Date     11/10/2021     06/24/2021     08/10/2020     06/22/2018     08/07/2015      Lab Results   Component Value Date    POTASSIUM 4.1 11/10/2021    POTASSIUM 4.3 06/24/2021    POTASSIUM 4.4 08/10/2020    POTASSIUM 4.0 06/22/2018    POTASSIUM 4.5 08/07/2015     Lab Results   Component  Value Date    CHLORIDE 106 11/10/2021    CHLORIDE 106 06/24/2021    CHLORIDE 106 08/10/2020    CHLORIDE 110 06/22/2018    CHLORIDE 108 08/07/2015     Lab Results   Component Value Date    ELIU 9.7 11/10/2021    ELIU 9.0 06/24/2021    ELIU 9.0 08/10/2020    ELIU 8.5 06/22/2018    ELIU 9.0 08/07/2015     Lab Results   Component Value Date    CO2 24 11/10/2021    CO2 20 06/24/2021    CO2 25 08/10/2020    CO2 24 06/22/2018    CO2 28 08/07/2015     Lab Results   Component Value Date    BUN 11 11/10/2021    BUN 12 06/24/2021    BUN 11 08/10/2020    BUN 15 06/22/2018    BUN 10 08/07/2015     Lab Results   Component Value Date    CR 0.90 11/10/2021    CR 0.93 06/24/2021    CR 0.83 08/10/2020    CR 0.77 06/22/2018    CR 0.72 08/07/2015     Lab Results   Component Value Date    GLC 80 11/10/2021     06/24/2021     08/10/2020    GLC 84 06/22/2018    GLC 86 08/07/2015       Last Glucose Profile:   Hemoglobin A1C   Date Value Ref Range Status   07/21/2022 5.2 0.0 - 5.6 % Final     Comment:     Normal <5.7%   Prediabetes 5.7-6.4%    Diabetes 6.5% or higher     Note: Adopted from ADA consensus guidelines.   06/02/2022 5.3 0.0 - 5.6 % Final     Comment:     Normal <5.7%   Prediabetes 5.7-6.4%    Diabetes 6.5% or higher     Note: Adopted from ADA consensus guidelines.   11/10/2021 5.7 (H) 0.0 - 5.6 % Final     Comment:     Normal <5.7%   Prediabetes 5.7-6.4%    Diabetes 6.5% or higher     Note: Adopted from ADA consensus guidelines.       Last Lipid Profile:   Cholesterol   Date Value Ref Range Status   10/11/2019 142 <=199 mg/dL Final   12/18/2017 118 <=199 mg/dL Final     Direct Measure HDL   Date Value Ref Range Status   10/11/2019 58 >=50 mg/dL Final   12/18/2017 41 (L) >=50 mg/dL Final     LDL Cholesterol Calculated   Date Value Ref Range Status   10/11/2019 70 <=129 mg/dL Final   12/18/2017 70 <=129 mg/dL Final     Triglycerides   Date Value Ref Range Status   10/11/2019 71 <=149 mg/dL Final   12/18/2017 34 <=149  "mg/dL Final     No results found for: CHOLHDLRATIO    Most recent CBC:  Recent Labs   Lab Test 07/21/22  1358 03/10/22  0942 11/10/21  1623   WBC 5.8 5.0 4.1   HGB 13.4 14.3 13.1   HCT 41.3 44.1 41.1    290 251     Most recent hepatic panel:  Recent Labs   Lab Test 08/10/20  1100 06/22/18  1302   ALT 33 18   AST 19 14     Most recent creatinine:  Recent Labs   Lab Test 11/10/21  1623 06/24/21  0925   CR 0.90 0.93       No components found for: GFRESETIMATEDLASTLAB(gfrestblack:1@  Lab Results   Component Value Date    ALBUMIN 3.7 08/10/2020       Last Thyroid Profile:   TSH   Date Value Ref Range Status   03/10/2022 0.89 0.30 - 5.00 uIU/mL Final   11/10/2021 0.94 0.30 - 5.00 uIU/mL Final   10/11/2019 0.74 0.30 - 5.00 uIU/mL Final   06/22/2018 0.70 0.40 - 4.00 mU/L Final     T4 Free   Date Value Ref Range Status   06/22/2018 0.79 0.76 - 1.46 ng/dL Final       Last Mineral Profile:   Ferritin   Date Value Ref Range Status   03/10/2022 18 10 - 130 ng/mL Final   06/22/2018 3 (L) 12 - 150 ng/mL Final     Iron   Date Value Ref Range Status   06/02/2022 60 42 - 175 ug/dL Final   06/22/2018 37 35 - 180 ug/dL Final     Iron Binding Cap   Date Value Ref Range Status   06/22/2018 451 (H) 240 - 430 ug/dL Final     Iron Binding Capacity   Date Value Ref Range Status   03/10/2022 421 240 - 430 ug/dL Final       Autoimmune & Inflammatory   CRP Inflammation   Date Value Ref Range Status   11/22/2022 35.40 (H) <5.00 mg/L Final   10/13/2022 23.4 (H) 0.0 - 8.0 mg/L Final         Last Vitamin Profile:   No results found for: HVK618, RDYN585, ZZXN25ASYCF, VITD3, D2VIT, D3VIT, DTOT, [id number], [id number], [id number], [id number], [id number], [id number]    ANTHROPOMETRICS:  Vitals:   BP Readings from Last 1 Encounters:   10/13/22 125/84     Pulse Readings from Last 1 Encounters:   10/13/22 85     Estimated body mass index is 37.93 kg/m  as calculated from the following:    Height as of 10/13/22: 1.676 m (5' 6\").    Weight as of " 11/16/22: 106.6 kg (235 lb).    Wt Readings from Last 5 Encounters:   11/16/22 106.6 kg (235 lb)   10/13/22 106.7 kg (235 lb 3.2 oz)   09/23/22 107 kg (236 lb)   09/12/22 106.6 kg (235 lb)   08/18/22 107.8 kg (237 lb 11.2 oz)       NUTRITION DIAGNOSIS:    1. Altered GI function related to higher intake of coffee and carbs (fodmap) wheat as evidenced by food recall lower intake of healthy fats, and protein.     2. Excessive intake of high FODMAP foods related to food and nutrition knowledge deficit regarding effect of FODMAPs on IBS, as evidenced by symptoms of diarrhea, abdominal pain, constipation and elevated CRP.     3. Inadequate fiber intake related to food and nutrition knowledge deficit regarding desirable quantities of fiber, as evidenced by abdominal pain, diarrhea and constipation.         NUTRITION INTERVENTION:    Long Term Goals:   Goal: 1-2 bowl movement daily Lindstrom Stool Type 4 soft and formed  Goal: Decrease digestive symptoms -constipation but more diarrhea  Goal: Decrease elevated CRP      Short Term Goals:  Goal 1: Focus on starting smoothie for breakfast - see the recipe provided in handouts and book below under resources - add in unsweetened flax, hemp or coconut milk, 1 tbsp of healthy fat such as flax seed ground or titus seed ground. serving of fruit 1 cup of berries. Veggie (optional), 1 scoop of plant based protein powder or collagen powder, 1 serving of lowfodmap fruit and veggie such as kale, spinach plus optional GI boost (glutamine powder and or GI revive powder to boost and provide GI support) - see replace section below for details)      Also, check out some of the other breakfast ideas provided such as oatmeal (naturally gluten free) with dairy free alternative milk, flax seed, nuts such as walnuts, fruit such as berries and protein like eggs or chicken/turkey sausage.      Goal 2:   - cutting back on gluten the protein in wheat or fructose the sugar in wheat both can trigger  digestive issues.  eat consistent with having some gluten and dairy free snacks at least 1-2 snacks daily around 10am and 3pm.  Eating every three to four hours will help keep your insulin and blood sugar normal to help with weight loss  - see meal plan and recipe ideas in the cardi metabolic guides provided.      Goal 3:  My symptoms Tracker robby and start tracking what you are eating as well as severity of symptoms. Www.mysymptoms.net       Also nice to check out [employer] journal it can be beneficial in setting short term and long term goals for success beyond just looking at calories in and out.    https://I Gotchu/collections/dailygreainfibond/products/dailyVolunteerSpot-wellness-journal-90-day?[email or web address]       Goal 4:  Start probiotic at least once per day - recommend 100 billion to start from oma pretty MD - lactic acid base to see how handle at first let me know if symptoms get better or if they get worse after 1-2 weeks of starting the probiotic.     https://[email or web address]/products/probiotic-capsules-100-billion         Smoothie Recipes to Kickoff Your Success!!     Banana Date Split Smoothie      Makes 1-2 servings     1-2 cup unsweetened almond/macadamia/hemp/coconut or pea dairy free milk alternative   1 scoop mcgrath chocolate plant based protein powder or collagen protein powder     -1  banana   1 handful spinach    - 1 avocado  1 -3 tsp cinnamon   1 tbsp titus seeds   1-2 dates (optional)        Method:     In  (recommend vitamix or blend tech) add dairy free alternative milk or filtered water. Add in the rest of ingredients blend on high for 2 mins. If desire thinner consistency add in the water or dairy alternative. Enjoy :)     Kiwi Spirulina Smoothie      Makes 1 servings     1 cup unsweetened macadamia nut milk or filtered water   1 scoop mcgrath vanilla plant based protein powder or 1-2 scoops collagen powder from vital proteins (plain)   1-2 Kiwi s  peeled   1 handful cilantro   1 lime juiced    - 1 avocado   2 tsp spirulina   1 inch ashtyn fresh pilled      Method:     In  (recommend vitamix or blend tech) add dairy free alternative milk or filtered water. Add in the rest of ingredients blend on high for 2 mins. If desire thinner consistency add in the water or dairy alternative. Enjoy :)     Green Detox Smoothie      Makes 1 servings     1 cup unsweetened macadamia nut milk or filtered water (I like filtered water more for this recipe)  1-2 scoops collagen powder from vital proteins (plain)   1 small granny zheng apple (prefer organic - lower pesticides and endocrine disruptors)   1 handful cilantro   1 lime juiced    - 1 avocado (optional)   1 cucumber      Method:     In  (recommend vitamix or blend tech) add dairy free alternative milk or filtered water. Add in the rest of ingredients blend on high for 2 mins. If desire thinner consistency add in the water or dairy alternative. Enjoy :)        Spirulina Banana Smoothie      Makes 1 serving     1-2 cup unsweetened macadamia nut milk or filtered water (I like filtered water more for this recipe)  1 scoop mcgrath vanilla plant based protein powder     banana   1 handful spinach    - 1 avocado  1-2 tsp spirulina    1 tbsp ground flax seed      Method:     In  (recommend vitamix or blend tech) add dairy free alternative milk or filtered water. Add in the rest of ingredients blend on high for 2 mins. If desire thinner consistency add in the water or dairy alternative. Enjoy :)     Berry Blast Smoothie      Makes 1 serving     1-2 cup unsweetened macadamia nut milk or filtered water (I like filtered water more for this recipe)  1 scoop mcgrath vanilla plant based protein powder or collagen protein powder by vital proteins (plain)   1 cup mixed berries    1 handful spinach    - 1 avocado  1-2 tsp raw organic maqui berry powder by Sunfood superfoods (optional but very immune boosting and jammed with  phytonutrients)   1 tbsp ground flax seed      Method:     In  (recommend vitamix or blend tech) add dairy free alternative milk or filtered water. Add in the rest of ingredients blend on high for 2 mins. If desire thinner consistency add in the water or dairy alternative. Enjoy :)     Superfood + Immune Boosting Powders:  Maqui Berry Powder   Turmeric Powder   Spirulina Powder   Glutamine Powder   Matcha Powder   Catarina fresh or powdered   Kale, dandelion greens or spinach         Omega 3 and Protein Desired Toppings:   Add some healthy protein and omega 3 packed seeds for an extra special nutrient packed topping and a little extra crunch!   1 tsp -1 tbps ground flax seed   1 tsp -1 tbsp hemp seeds               1 tsp-1 tbsp titus seeds       Follow 5 Steps to Healing Gut and Immune System    REMOVE  Remove anything that could be irritating to the gut such as reactive foods, stress    Avoid Common Trigger foods. Dairy tops the list. The proteins like casein and whey in dairy can irritate and inflame your gut, while gluten the protein in wheat, rye and barley is a close second. Give those foods up for at least 3-6 months and see if digestion, blood sugars, weight and overall health improve.            Continue to eliminate gluten, dairy, soy, corn, processed refined grains, sugar, and caffeine          Monitor if high FODMAP foods (wheat, dairy, onion, garlic are some of the top fodmaps) and nightshades contribute to your symptoms. Top common nightshades are white potatoes, tomatoes, egg plant, bell peppers and hot peppers. Also, monitor foods high in histamine and fermented foods to see if trigger symptoms - see Elimination Food Plan Guide plus food plan and FODMAPS handout     Track what you eat. Writing down or tracking through an robby what you eat as well as how you feel and help you identify patterns in your symptoms. This can help you become more aware and create a diet that is right for you.    Pick a  food tracking robby:          Through the mysymptoms robby, you can track symptoms, bowel movements, medications, stress, exercise, sleep and foods as well as beverages to become more mindful. [email or web address]    **track by paper if robby feels like too much. Make list of foods that cause symptoms to provide and review at follow up. I recommend the dailygreatness journal as you can track your goals, mindset, foods etc to keep you on track and motivated.     REINTRODUCE    Eat real anti-inflammatory foods. When you eat whole, real foods in their unprocessed forms, you take the first step to healing our gut and overall health. Eat plenty of vegetables, fruits, non-gluten whole grains (monitor for symptoms), beans, nuts, seeds, lean meats, healthy fats and other plant foods.    Start following reintroduction phase (see guide for details) of elimination diet to see if certain foods trigger symptoms. If you are avoiding FODMAPS focus on keeping out wheat and gluten and then add in cashew butter or high fodmap nuts 1 serving at a time to see if this high fodmap food for example gives you issues. The same can be done for nightshades. Tracking your foods and symptoms can be beneficial in helping you identify patterns while becoming more aware of what you eat.    Focus on Reintroduction Diet: Introduce foods only one food at a time for one day, followed by a 24 hour observation period. I like to focus on keeping the food out for at least 3-4 days and monitor symptoms. If no reactions occur, add in another food.  It's important to wait till symptoms subside before you add in another food to help you better pinpoint a trigger food.    Food plan - 1,800-2,200 calories per day     Protein 10-12 servings per day - include at each meal to stabilize blood sugars   (Choose 3oz or 21g per meal and aim for 1oz of 7g for snacks)   - Strive for 1-2 servings of fish per week especially of higher omega-3 fatty acid containing fish  such as salmon.     Legumes <2 serving per day (monitor for digestive issues try hummus first)    Dairy alternatives 2-3 serving per day     Nuts and seeds 3-4 servings per day - great to incorporate as snacks    Fats and oils 4 servings per day     Non starchy vegetables 8-10 servings per day     Starchy vegetable limit 1 serving per day as they tend to impact blood sugar (they are moderate-GI).     Fruits 2 servings per day -  best to couple with a little bit of protein or fat to offset a rise in blood sugars (they are low-moderate-GI foods and monitor if high fodmap are harder to tolerate or trigger symptoms)  Whole grains <2 serving per day - try gluten free whole grains instead (focus on cutting back on wheat to see if this higher fodmap food triggers symptoms)     Incorporate protein powder daily:          Plant based hemp (recommended brands: [place], Nutiva, Just Hemp Protein, [employer])           Plant based pea (recommended brands: Naked Pea, Now Sports). If you want to try a combo of pea and hemp the brand Chow in vanilla or chocolate is a great option.          Try Bone broth protein powder or collagen peptides in liquid bone broth, vegetable broth or 12 oz of water as snack. The bone broth powder and collagen can be used for soups as well. This can help provide essential amino acids and minerals that heal your gut as well as balance blood sugars. A great option if you have a hard time tolerating solids.    Can also consider pre made shakes if unable to make smoothies.      Brand examples that are gluten and dairy free to try:   1) Orgain Vegan Protein Shakes, 20g of Plant Based Protein, Creamy Chocolate - Gluten Free, No Dairy, Soy, or Preservatives, No Added Sugar  2) Pirq, Vegan Protein Shake, Turmeric Curcumin, Amira, Plant-Based Protein Drink, Gluten-Free, Dairy-Free, Soy-Free, Non-GMO, Vegetarian, Kosher, Keto, Low Carb, Low Calorie  3) OWYN Pro Elite Vegan Plant-Based High Protein  Shake, 35g Protein, 9 Amino Acids, Omega-3, Prebiotics, Superfoods Greens for Workout and Recovery, 0g Net Carbs, Zero Sugar, Keto  4) Ripple Vegan Protein Shake  Chocolate  20g Nutritious Plant Based Pea Protein  Shelf Stable  No GMOs, Soy, Nut, Gluten, Lactose  5) SPark! Glucose Support 1.2 Plant based, dairy free, low glycemic index  [email or web address]://Tumri.Rallyhood/products/glucose-support-1-2-vanilla    Choose Low Glycemic (GI) foods: Regulate your sugar levels by eating foods that do not spike blood sugars.  Eat low -GI foods so only small fluctuations in blood glucose and insulin levels are produced.     Examples of low-GI foods: nuts, seeds, GF oats, most vegetables especially non-starchy and fruits.    Medium or high-GI foods should be eaten with a protein or fat, both of which blunt the glycemic effect of these foods. This reduces the overall glycemic impact of a meal.  Ex: Most grains and starchy veggies are medium/high GI.    Avoid foods containing refined sugars, artificial sweeteners, and refined grains they are considered high-GI because they lead to sharp increases in blood sugars levels, which increase insulin sensitivity causing increased TG, and low good cholesterol (HDL).  Ex: cakes, cookies, pies, bread, sodas, fruit drinks, presweetened tea, coffee drinks, energy or sport drinks, flavored milk and other processed foods.    Choose High Quality Fats: Adding anti-inflammatory fats into your diet such as fish (salmon, herring, mackerel, and sardines), omega 3 eggs, titus seeds, ground flax seeds/milk, hemp seeds/milk, and some other certain leafy greens will increase omega-3 fats to omeaga-6 fats ratio.    Therapeutic fats both monounsaturated and polyunsaturated to include daily: ground flaxseeds, unsalted seeds, avocados, olives, extra-virgin olive oil.    Emphasize high-quality oils and fats in the diet daily such as avocado oil, coconut oil, flaxseed, olive, sesame. Ex: 1 tsp to 1 tbsp of MCT  oil from coconuts can be added into smoothies, and salad dressings per day.          Avoid trans fats found in processed foods    Drink more water. Hydration is critical, so drink at least six to eight glasses of water a day. Drink more water between meals and less at meals.     Alcohol and caffeine can stimulate your intestines, which may cause diarrhea. Artificial sweeteners that contain sugar alcohols such as sorbitol, mannitol and xylitol may cause diarrhea too. Carbonated drinks can produce gas.    Fiber draws water from your body to move foods through your intestine. Without enough  water and fluids, you may become constipated.    Try adding herbal teas (sugar free) or lemon/lime/cucumber/fruit to water for flavor. Avoid artificial sweetener packets to flavor your water.    Cut back on coffee switch to green tea. Avoid adding sugar and milk to coffee instead use dairy alternatives such as almond, flax, coconut milk.Cut back on coffee switch to green tea. Avoid adding sugar and milk to coffee instead use dairy alternatives such as almond, flax, coconut milk.Try another coffee substitute such as   -https://INNFOCUS/  -https://Meta    REPLACE  Replace certain elements that are key to digestion and absorption for proper digestion of fat, carbs and protein.            Try supplementing with glutamine powder daily. 1 scoop in water or smoothie can help support gut cell proliferation and mucosal integrity, resulting in decreased intestinal permeability and better immune function.                            Focus on high quality micro-nutrients    Start Pure Encapsulations ONE Multivitamin Daily - take 1 capsule daily with food     Start Nordic Naturals Plus vitamin D - 1-2 capsules daily with food       REPOPULATE  Rebuild the friendly bacteria in your microbime. Start by taking a probiotic supplement, they will help rebuild the healthy bacteria so essential to good gut health. Recolonization your  digestive tract with healthy, beneficial good bacteria (probiotics). You can do that by taking very high-potency probiotics (look for at least 25 billion live CFU s from diversified strains of Lactobacillus, Bifidobacterium, and Saccharomyces boulardii), taken twice a day for one to two months.  Start slowly and observe how the probiotics affect your gut.  In some cases, certain individuals may need to delay probiotics until their gut is more intact.    Eat fermented foods. Include plenty of probiotic-rich foods like kimchi, kombucha, miso, or sauerkraut. Sometimes, you can also eat yogurt if you are not allergic to dairy. Try unsweetened sheep or goat yogurt. These are all foods that help your gut marli get and stay healthy.    Reintroduce prebiotics from foods/supplements as tolerated that will help rebalance the microbiome.          Probiotics: Consume foods rich in beneficial bacteria such as coconut kefir, coconut yogurt, sauerkraut, kimchi, kombucha or Kevita.            Prebiotics: Incorporate carbohydrates that bacteria love to eat such as inulin from chicory, onions, garlic, leeks, artichokes, dandelion leaves, nino gum, asparagus, apples, titus/flax seeds, psyllium, beans and resistant starches (seeds, cashews, legumes, plantains, green bananas and potatoes that are cooked and cooled.    **avoid any foods that cause adverse reactions and not tolerated when you reintroduce. Introduce slowly small amounts of gas and bloating is normal and should pass.      Choose foods high in fiber: Aim for at least 5 grams of fiber per serving of food or a total of 25-35 grams fiber per day. Remember, when looking at the label, you can take the fiber away from the total carbs. Ex:15g of total carbs - 4g of fiber = 11g net carbs    Insoluble fiber acts like a bulky  inner broom,  sweeping out debris from the intestine and creating more motility and movement.     Soluble fiber attracts water and swells, creating a gel that  slows digestion.  Also, slows the release of glucose from foods into the blood which stops spikes in blood sugar levels.  Soluble fiber traps toxins in the gut, helping to carry them to excretion and provides healthy bacteria in the digestive tract.    Rebuild your friendly bacteria in your microbime. Start taking probiotic supplements. They will help rebuild the healthy bacteria so essential to good gut health.          Recommend trying BIOHM probiotic.  Take 1 capsule daily anytime with our without food. Does not need to be refrigerated.      REBLANCE  Establish regular eating habits. Eating your meals at the same time each day may help regulate your bowels.      Eat small, frequent meals instead of large ones. This will ease the amount of food moving through your intestinal tract.    Manage your stress. Stress can negatively impact the way you digest foods and absorb nutrients leading to more digestive issues (constipation, diarrhea, indigestion, nausea etc), imbalanced blood sugars and weight imbalances. Try to focus on the following relaxation techniques:          Regular exercise such as walking          Yoga          Meditation          Breathing techniques          Time management    Increase physical activity. Moving our body helps move our bowels and speeds up your metabolism.          Exercise 15-60 minutes daily--whether that looks like burst training, yoga, or vigorous walking. Make sure you get out and get sunshine for natural vitamin d.    Work on getting great sleep. Sleep plays a huge role on gut health.           Try to get at least 7-9 hours of sleep per night. Try to shut off lights and computer around 10pm and to go to bed before midnight.           Try to have supper by 6-7pm and don't eat late within 3 hours of bedtime. Try to have smaller meal for dinner and light snack if needed around 9-10pm.      NUTRITION RESOURCES:          IFM Elimination Diet - Recipes, guide, meal plan, supplement  handouts         21 Day Low FODMAP Smoothie Challenge (21 Day Low FODMAP Challenges Book 1) Karen Edition by Sole Noguera         Low FODMAP Smoothies Recipe Book: A Beginners Guide to Low FODMAP Smoothies for Gut Health     Beginners guide to fodmaps handout     Low fodmaps smoothies guide           PATIENT'S BEHAVIOR CHANGE GOALS:   See nutrition intervention for patient stated behavior change goals. AVS was printed and given to patient at today's appointment.    MONITOR / EVALUATE:  Registered Dietitian will monitor/evaluate the following:     Beliefs and attitudes related to food    Food and nutrition knowledge / skills    Food / Beverage / Nutrient intake     Pertinent Labs    Progress toward meeting stated nutrition-related goals    Readiness to change nutrition-related behaviors    Weight change    Digestion     COORDINATION OF CARE:  Follow up referring provider       FOLLOW-UP:  Follow-up appointment scheduled on  Dec 27th at 11am video visit     Time spent in minutes: 30 minutes 2 units   Encounter: Individual    Mirna Lopez RD, CLT, LD  Integrative Registered Dietitian       Again, thank you for allowing me to participate in the care of your patient.        Sincerely,        Mirna Lopez RD

## 2022-11-30 NOTE — LETTER
11/30/2022         RE: Nehemias Mascorro  [address]  [place] [address]        Dear Darren,    Thank you for referring your patient, Nehemias Mascorro, to the nutrition services at [facility]. I enjoyed meeting with Nehemias and helping her create some meal plans with recipes to address her GI concerns. We will be working through an elimination and reintroduction diet to identify adverse reactions to foods starting with taking out some lower fodmap/inflammatory foods such as wheat/gluten as well as dairy and adding in more nutrients for healing. Please see a copy of my visit note below for more details. I look forward to following up with Nehemias in a few weeks to monitor her progress.     Medical Nutrition Therapy  Visit Type: Initial assessment and intervention    Visit Details    How would you like to obtain your AVS? MyChart  If the correspondence for visit is dropped, how would you like your dietitian to reconnect with you:   call back by phone? Yes    Text to cell phone: [phone]  Will anyone else be joining your video visit or telephone call? No  {If patient encounters technical issues they should call [phone] :15    Type of service:  Video Visit   Start Time: 2:39 PM    End Time:3:13 PM    Originating Location (pt. Location): Home    Distant Location (provider location):  [facility] WORKING VIRTUAL FROM HOME     Platform used for Video Visit: Peace      Referring Provider: Darren Castorena  [facility]     REASON FOR REFERRAL:   Nehemias Mascorro is a 27 year old female who is interested in Medical Nutrition Therapy (MNT) and education related to   Abdominal pain, generalized   Irritable bowel syndrome with constipation   And diarrhea   Interested in what foods are triggers and what are ok worried about having early stages of chron's diease.   She is accompanied by self.     NUTRITION ASSESSMENT:      Nutritional Goals 11/29/2022   Nutritional Goal Create a plan to lose weight;Work on meal planning/recipes;Overcome emotional eating;Manage Digestive Issues;Eliminate cravings for sugar and refined carbohydrates;Increase energy;What to eat for my specific health concerns        Neurological 11/29/2022   Migraine Headaches Current   Tension Headache Current       No flowsheet data found.   Immune/Blood 11/29/2022   Anemia Past      Gastrointestinal 11/29/2022   Constipation Current   Nausea or Vomiting Current   Gas/bloating Current   Heartburn/Reflux/GERD Current   Irritable Bowel Syndrome (IBS) Current   Abdominal Pain Current   Diverticulosis/Diverticulitis Current       Food Sensitivities 11/29/2022   Lactose intolerance Current      Endocrine 11/29/2022   Overweight/obesity Current      Skin 11/29/2022   Acne Current      No flowsheet data found.   No flowsheet data found.   Psychological 11/29/2022   ADD/ADHD/Asperger's Current   Depression/Anxiety Current      No flowsheet data found.   Womens Health Assessment 11/29/2022   Hysterectomy No   I am pregnant.  No   I am breastfeeding. No   I want to become pregnant within the next year . No   What do you use for contraception?  not needed/not sexually active   Any problems with current birth control method?   Yes   Date of last menstrual period 25076   Are your periods irregular? No   Days from the start of one period to the next. 28   Days of Menstral Flow 5   Associated with menstral periods. Painful/cramping;Heavy bleeding;Headaches;Bloating;Diarrhea/constipation   Are you officially in menopause? (no period for one year or longer)  No        Past Medical History:  Past Medical History:   Diagnosis Date     ADHD (attention deficit hyperactivity disorder)      Depressive disorder      Ovarian cyst      Uncomplicated asthma        Previous Surgeries:   Past Surgical History:   Procedure Laterality Date     COLONOSCOPY N/A 4/27/2022    Procedure: COLONOSCOPY,  WITH POLYPECTOMY AND BIOPSY;  Surgeon: Alyse Leija MD;  Location:  GI     ENT SURGERY      tonsilectomy age 8     ORTHOPEDIC SURGERY      Hip, emelia surgery in 2013     WRIST SURGERY          Family History:  Family History   Problem Relation Age of Onset     Depression Maternal Grandmother      Schizophrenia Maternal Uncle      Substance Abuse Maternal Uncle         Lifestyle History:  Lifestyle 11/29/2022   Do you feel your life is stressful right now?  Yes   What is the cause(s) of stress in your life?  Finances;Work;Health Concerns;Emotional problems;Multi-tasking and multiple deadlines to meet   Are you currently implementing any strategies to help manage stress? No        Exercise History:  Exercise 11/29/2022   Does your occupation require extended periods of sitting?  Yes   Does your occupation require extended periods of repetitive movements (ex: walking or lifting)?  Yes   Do you currently participate in any forms of exercise? No   Rate your level of motivation for including exercise in your routine (0=none, 10=high): 2   Do you have any medical conditions, pain, injuries, surgeries etc. restricting you from exercise? Yes        Sleep History:  Sleep 11/29/2022   How many hours (on average) do you sleep per night? 9-11   What time do you turn off the lights? 10 PM   How long does it take for you to fall asleep? 15-20 mins   What time do you stop using electronic devices? 11 PM   What time do you wake up? 8 AM   When do you eat your first meal?  9 AM   Do you feel well-rested during the day?  No   Do you take naps?  Yes   Do you have a comfortable bedroom environment (cool, quiet, dark, etc)? Yes   Do you have a sleep routine/ ritual that you do before bed?  No   How many hours do you spend per day looking at a screen (TV, computer, tablet and phone)? 5 to 6   Select all factors that apply to your current sleep habits: Have difficulty waking up in the morning;Wake up in the middle of the  night;Have nightmares;Eat large meals within 3 hours of going to bed;Night sweats;Snack during the night;Feel tired/sluggish/fatigued during the day;Have tried supplements (ie: melatonin) for sleep        Nutrition History:  Nutrition 11/29/2022   Have you ever had a nutrition consultation? No   Do you currently follow a special diet or nutritional program? No   What do you feel are the biggest barriers getting in the way of achieving you nutritional goals? Motivation/Readiness to change;Financial concerns;Lack of time;Work (such as lack of time to eat, unhealthy choices, etc.);Lack of control over emotions/behaviors;Stress   Do you have any food allergies, sensitivities or intolerances?  Yes   Specific food(s) causing adverse reactions Dairy       Digestion 11/29/2022   Do you experience stomach pains/cramping? 2-3 times per week   Do you experience bloating?  Daily   Do you experience gas?  Daily   Do you experience heartburn/acid reflux/indigestion? Rarely   How often do you have a bowel movement? Once per week or less   What is a typical bowel movement like for you? Select all that apply: Varies a lot;Formed and soft;Mucous, greasy      Food Access:  11/29/2022   Who does the grocery shopping? Self;Mother;Father   How often is grocery shopping done? Weekly   Where do you usually receive your groceries from? Select all that apply: Target;[employer]co;[employer];Delivery (Amazon, Whole Foods, Instacart, etc.)   Do you read food labels? No   Who does the cooking? Select all that apply: Self;Mother;Father   How many meals do you eat out per week?  1 to 3   What restaurants do you typically choose? Fast Food (Taco Bell, Paredes's, Subway, etc.)      Daily Patterns: 11/29/2022   How many days per week do you have breakfast? 5   How many days per week do you have lunch? 6   How many days per week do you have dinner? 7   How many days per week do you have snacks? 7      Protein Intake:  11/29/2022   How many times per day do you typically consume a protein source(s)? 3   What types of protein do you currently eat?  Ground Beef;Steak;Beef Roast;Pork Chops;Pork Ribs;Roast Ham;Deli Ham;[place];Cod;Tuna;Shrimp;Other Fish;Chicken Breast;Chicken Thighs/Legs;Other Chicken;Turkey Breast;Ground Turkey;Beans       Fat Intake:  11/29/2022   How many times per day do you typically consume healthy fat(s)? 2   What types of health fats do you currently eat? Select all that apply:  Almonds;Walnuts;Pecans;Cashews;Taylor butter;Simms;Butter;Vegetable oil;Salad dressings;Coconut oil;Olive oil;Avocado       Fruit Intake:  11/29/2022   How many times per day do you typically consume fruits? 1   What types of fruit do currently eat? Apple;Banana;Blackberries;Blueberries;Grapes;Migel;Peaches;Pineapple;Raspberries;Other       Vegetable Intake:  11/29/2022   How many times per day do you typically consume vegetables? 1   What types of vegetables do you currently eat? Asparagus;Beans;Broccoli;[place] sprouts;Cabbage;Carrots;Cauliflower;Corn;Eggplant;Greens (rashaun, kale etc);Onions;Plantain;Potato (baked, boiled, mashed, French fries);Spinach;Tomato;Yam, sweet potato      Grain Intake:  11/29/2022   How many times per day do you typically consume grains? 4   What types of grains do currently eat? Select all that apply:  Oats (gluten free);Pancakes/waffles (gluten free);White rice (gluten free);Bagel (non-gluten free);Breads (non-gluten free);Cereals (non-gluten free);Chips (non-gluten free);Crackers (non-gluten free);Muffins (non-gluten free);Pancakes/waffles (non-gluten free);Pasta (non-gluten free);Other (non-gluten free)       Dairy Intake:  11/29/2022   How many times per day do you typically consume dairy? 2   What types of dairy do currently eat? Select all that apply:  Milk;Cheese;Yogurt;Ice cream       Non-Dairy Alternative Intake:  11/29/2022   How many times per day do you typically consume non-dairy  alternatives? 2   What types of non-dairy alternatives do currently eat? Select all that apply:  [place] milk       Sweets Intake:  11/29/2022   How many times per day do you typically consume sweets? 2      Beverage Intake:  11/29/2022   How many 8 oz cups of water do you typically consume per day?  2 to 3   How many 8 oz cups of caffeine do you typically consume per day?  0   How many drinks of alcohol do you typically consume per week (1 drink = 5 oz wine, 12 oz beer, 1.5 oz spirits)?   0       Lifestyle Recall:  11/29/2022   What time did you wake up? 8 AM   What time did you go to sleep? 10 PM   What time did you have breakfast? 9-10 AM   Where did you have breakfast?  Home   What time did you have a morning snack? 10-11 AM   Where did you have your morning snack? Home   What time did you have lunch? 1-2 PM   Where did you have lunch?  Home   What time did you have an afternoon snack? 5-6 PM   Where did you have your afternoon snack? Home   What time did you have dinner? 7-8 PM   Where did you have dinner?  Home   What time did you have an evening snack? 10-11PM   Where did you have your evening snack? Home   What time of day did you exercise? No Exercise          Additional concerns:   Breakfast: Oatmeal with brown sugar with nuts/pecans or cereal with lactate or almond milk   Lunch: sandwiches wheat or white black turkey roast beef/cheese and lettuce   Dinner: fish with chicken baked sometimes red meat doesn't eat a lot of pork more ground beef or steak   Snacks. Chips, apples, popcorn, carrots       MEDICATIONS:  Current Outpatient Medications   Medication Sig Dispense Refill     LANsoprazole (PREVACID) 15 MG DR capsule Take 1 capsule (15 mg) by mouth daily 90 capsule 3     albuterol (ACCUNEB) 1.25 MG/3ML neb solution Take 1 vial (1.25 mg) by nebulization every 6 hours as needed for shortness of breath / dyspnea or wheezing 90 mL 0     ARIPiprazole (ABILIFY) 20 MG tablet Take 20 mg by mouth daily        buPROPion (WELLBUTRIN XL) 300 MG 24 hr tablet Take 300 mg by mouth every morning       CONCERTA 36 MG CR tablet TAKE 1 TABLET BY MOUTH DAILY IN THE MORNING FOR ADHD. START 11/18/22       drospirenone-ethinyl estradiol (JESSY) 3-0.02 MG tablet Take 1 tablet by mouth daily       EMGALITY 120 MG/ML injection Inject 120 mg Subcutaneous every 28 days       famotidine (PEPCID) 20 MG tablet Take 1 tablet (20 mg) by mouth 2 times daily 180 tablet 1     FLUoxetine (PROZAC) 40 MG capsule Take 40 mg by mouth daily 20 mg + 40 mg = 60 mg       hyoscyamine (LEVSIN) 0.125 MG tablet Take 1 tablet (125 mcg) by mouth every 6 hours as needed for cramping 30 tablet 0     ketoconazole (NIZORAL) 2 % external cream        ondansetron (ZOFRAN ODT) 4 MG ODT tab Take 1 tablet (4 mg) by mouth every 8 hours as needed for nausea 30 tablet 0     oseltamivir (TAMIFLU) 75 MG capsule Take 1 capsule (75 mg) by mouth 2 times daily 10 capsule 0     polyethylene glycol (MIRALAX) 17 GM/Dose powder Take 1 capful by mouth daily as needed for constipation       Semaglutide-Weight Management (WEGOVY) 1.7 MG/0.75ML SOAJ Inject 1.7 mg Subcutaneous once a week 3 mL 1     TAZORAC 0.1 % external cream APPLY TOPICALLY TO THE AFFECTED AREA AT BEDTIME       VENTOLIN  (90 Base) MCG/ACT inhaler INHALE 2 PUFFS INTO THE LUNGS FOUR TIMES DAILY 18 g 3       Dietary Supplements 11/29/2022   Individual Vitamin Supplements D;Other   Herbal Complimentary products Laxative;Probiotic         ALLERGIES:   Allergies   Allergen Reactions     Azithromycin      Erythromycin Nausea and Vomiting     Sulfa Drugs Nausea        .na  LABS:  Last Basic Metabolic Panel:  Lab Results   Component Value Date     11/10/2021     06/24/2021     08/10/2020     06/22/2018     08/07/2015      Lab Results   Component Value Date    POTASSIUM 4.1 11/10/2021    POTASSIUM 4.3 06/24/2021    POTASSIUM 4.4 08/10/2020    POTASSIUM 4.0 06/22/2018    POTASSIUM 4.5 08/07/2015      Lab Results   Component Value Date    CHLORIDE 106 11/10/2021    CHLORIDE 106 06/24/2021    CHLORIDE 106 08/10/2020    CHLORIDE 110 06/22/2018    CHLORIDE 108 08/07/2015     Lab Results   Component Value Date    ELIU 9.7 11/10/2021    ELIU 9.0 06/24/2021    ELIU 9.0 08/10/2020    ELIU 8.5 06/22/2018    ELIU 9.0 08/07/2015     Lab Results   Component Value Date    CO2 24 11/10/2021    CO2 20 06/24/2021    CO2 25 08/10/2020    CO2 24 06/22/2018    CO2 28 08/07/2015     Lab Results   Component Value Date    BUN 11 11/10/2021    BUN 12 06/24/2021    BUN 11 08/10/2020    BUN 15 06/22/2018    BUN 10 08/07/2015     Lab Results   Component Value Date    CR 0.90 11/10/2021    CR 0.93 06/24/2021    CR 0.83 08/10/2020    CR 0.77 06/22/2018    CR 0.72 08/07/2015     Lab Results   Component Value Date    GLC 80 11/10/2021     06/24/2021     08/10/2020    GLC 84 06/22/2018    GLC 86 08/07/2015       Last Glucose Profile:   Hemoglobin A1C   Date Value Ref Range Status   07/21/2022 5.2 0.0 - 5.6 % Final     Comment:     Normal <5.7%   Prediabetes 5.7-6.4%    Diabetes 6.5% or higher     Note: Adopted from ADA consensus guidelines.   06/02/2022 5.3 0.0 - 5.6 % Final     Comment:     Normal <5.7%   Prediabetes 5.7-6.4%    Diabetes 6.5% or higher     Note: Adopted from ADA consensus guidelines.   11/10/2021 5.7 (H) 0.0 - 5.6 % Final     Comment:     Normal <5.7%   Prediabetes 5.7-6.4%    Diabetes 6.5% or higher     Note: Adopted from ADA consensus guidelines.       Last Lipid Profile:   Cholesterol   Date Value Ref Range Status   10/11/2019 142 <=199 mg/dL Final   12/18/2017 118 <=199 mg/dL Final     Direct Measure HDL   Date Value Ref Range Status   10/11/2019 58 >=50 mg/dL Final   12/18/2017 41 (L) >=50 mg/dL Final     LDL Cholesterol Calculated   Date Value Ref Range Status   10/11/2019 70 <=129 mg/dL Final   12/18/2017 70 <=129 mg/dL Final     Triglycerides   Date Value Ref Range Status   10/11/2019 71 <=149 mg/dL  Final   12/18/2017 34 <=149 mg/dL Final     No results found for: CHOLHDLRATIO    Most recent CBC:  Recent Labs   Lab Test 07/21/22  1358 03/10/22  0942 11/10/21  1623   WBC 5.8 5.0 4.1   HGB 13.4 14.3 13.1   HCT 41.3 44.1 41.1    290 251     Most recent hepatic panel:  Recent Labs   Lab Test 08/10/20  1100 06/22/18  1302   ALT 33 18   AST 19 14     Most recent creatinine:  Recent Labs   Lab Test 11/10/21  1623 06/24/21  0925   CR 0.90 0.93       No components found for: GFRESETIMATEDLASTLAB(gfrestblack:1@  Lab Results   Component Value Date    ALBUMIN 3.7 08/10/2020       Last Thyroid Profile:   TSH   Date Value Ref Range Status   03/10/2022 0.89 0.30 - 5.00 uIU/mL Final   11/10/2021 0.94 0.30 - 5.00 uIU/mL Final   10/11/2019 0.74 0.30 - 5.00 uIU/mL Final   06/22/2018 0.70 0.40 - 4.00 mU/L Final     T4 Free   Date Value Ref Range Status   06/22/2018 0.79 0.76 - 1.46 ng/dL Final       Last Mineral Profile:   Ferritin   Date Value Ref Range Status   03/10/2022 18 10 - 130 ng/mL Final   06/22/2018 3 (L) 12 - 150 ng/mL Final     Iron   Date Value Ref Range Status   06/02/2022 60 42 - 175 ug/dL Final   06/22/2018 37 35 - 180 ug/dL Final     Iron Binding Cap   Date Value Ref Range Status   06/22/2018 451 (H) 240 - 430 ug/dL Final     Iron Binding Capacity   Date Value Ref Range Status   03/10/2022 421 240 - 430 ug/dL Final       Autoimmune & Inflammatory   CRP Inflammation   Date Value Ref Range Status   11/22/2022 35.40 (H) <5.00 mg/L Final   10/13/2022 23.4 (H) 0.0 - 8.0 mg/L Final         Last Vitamin Profile:   No results found for: RZB807, SEHC316, NYZK03XJGHO, VITD3, D2VIT, D3VIT, DTOT, [id number], [id number], [id number], [id number], [id number], [id number]    ANTHROPOMETRICS:  Vitals:   BP Readings from Last 1 Encounters:   10/13/22 125/84     Pulse Readings from Last 1 Encounters:   10/13/22 85     Estimated body mass index is 37.93 kg/m  as calculated from the following:    Height as of 10/13/22: 1.676 m  "(5' 6\").    Weight as of 11/16/22: 106.6 kg (235 lb).    Wt Readings from Last 5 Encounters:   11/16/22 106.6 kg (235 lb)   10/13/22 106.7 kg (235 lb 3.2 oz)   09/23/22 107 kg (236 lb)   09/12/22 106.6 kg (235 lb)   08/18/22 107.8 kg (237 lb 11.2 oz)       NUTRITION DIAGNOSIS:    1. Altered GI function related to higher intake of coffee and carbs (fodmap) wheat as evidenced by food recall lower intake of healthy fats, and protein.     2. Excessive intake of high FODMAP foods related to food and nutrition knowledge deficit regarding effect of FODMAPs on IBS, as evidenced by symptoms of diarrhea, abdominal pain, constipation and elevated CRP.     3. Inadequate fiber intake related to food and nutrition knowledge deficit regarding desirable quantities of fiber, as evidenced by abdominal pain, diarrhea and constipation.         NUTRITION INTERVENTION:    Long Term Goals:   Goal: 1-2 bowl movement daily [place] Stool Type 4 soft and formed  Goal: Decrease digestive symptoms -constipation but more diarrhea  Goal: Decrease elevated CRP      Short Term Goals:  Goal 1: Focus on starting smoothie for breakfast - see the recipe provided in handouts and book below under resources - add in unsweetened flax, hemp or coconut milk, 1 tbsp of healthy fat such as flax seed ground or titus seed ground. serving of fruit 1 cup of berries. Veggie (optional), 1 scoop of plant based protein powder or collagen powder, 1 serving of lowfodmap fruit and veggie such as kale, spinach plus optional GI boost (glutamine powder and or GI revive powder to boost and provide GI support) - see replace section below for details)      Also, check out some of the other breakfast ideas provided such as oatmeal (naturally gluten free) with dairy free alternative milk, flax seed, nuts such as walnuts, fruit such as berries and protein like eggs or chicken/turkey sausage.      Goal 2:   - cutting back on gluten the protein in wheat or fructose the sugar in " wheat both can trigger digestive issues.  eat consistent with having some gluten and dairy free snacks at least 1-2 snacks daily around 10am and 3pm.  Eating every three to four hours will help keep your insulin and blood sugar normal to help with weight loss  - see meal plan and recipe ideas in the cardi metabolic guides provided.      Goal 3:  My symptoms Tracker robby and start tracking what you are eating as well as severity of symptoms. [email or web address]       Also nice to check out SmartDocs (Teknowmics) journal it can be beneficial in setting short term and long term goals for success beyond just looking at calories in and out.    https://"[email or web address]/dailyRentStuff.comeaGuang[place] [email or web address]/products/dailyLegalReach-wellness-journal-90-day?[email or web address]       Goal 4:  Start probiotic at least once per day - recommend 100 billion to start from oma pretty MD - lactic acid base to see how handle at first let me know if symptoms get better or if they get worse after 1-2 weeks of starting the probiotic.     https://Devver.Trupanion/products/probiotic-capsules-100-billion         Smoothie Recipes to Kickoff Your Success!!     Banana Date Split Smoothie      Makes 1-2 servings     1-2 cup unsweetened almond/macadamia/hemp/coconut or pea dairy free milk alternative   1 scoop mcgrath chocolate plant based protein powder or collagen protein powder     -1  banana   1 handful spinach    - 1 avocado  1 -3 tsp cinnamon   1 tbsp titus seeds   1-2 dates (optional)        Method:     In  (recommend vitamix or blend tech) add dairy free alternative milk or filtered water. Add in the rest of ingredients blend on high for 2 mins. If desire thinner consistency add in the water or dairy alternative. Enjoy :)     Kiwi Spirulina Smoothie      Makes 1 servings     1 cup unsweetened macadamia nut milk or filtered water   1 scoop mcgrath vanilla plant based protein powder or 1-2 scoops collagen powder from vital proteins  (plain)   1-2 Kiwi s peeled   1 handful cilantro   1 lime juiced    - 1 avocado   2 tsp spirulina   1 inch ashtyn fresh pilled      Method:     In  (recommend vitamix or blend tech) add dairy free alternative milk or filtered water. Add in the rest of ingredients blend on high for 2 mins. If desire thinner consistency add in the water or dairy alternative. Enjoy :)     Green Detox Smoothie      Makes 1 servings     1 cup unsweetened macadamia nut milk or filtered water (I like filtered water more for this recipe)  1-2 scoops collagen powder from vital proteins (plain)   1 small granny zheng apple (prefer organic - lower pesticides and endocrine disruptors)   1 handful cilantro   1 lime juiced    - 1 avocado (optional)   1 cucumber      Method:     In  (recommend vitamix or blend tech) add dairy free alternative milk or filtered water. Add in the rest of ingredients blend on high for 2 mins. If desire thinner consistency add in the water or dairy alternative. Enjoy :)        Spirulina Banana Smoothie      Makes 1 serving     1-2 cup unsweetened macadamia nut milk or filtered water (I like filtered water more for this recipe)  1 scoop mcgrath vanilla plant based protein powder     banana   1 handful spinach    - 1 avocado  1-2 tsp spirulina    1 tbsp ground flax seed      Method:     In  (recommend vitamix or blend tech) add dairy free alternative milk or filtered water. Add in the rest of ingredients blend on high for 2 mins. If desire thinner consistency add in the water or dairy alternative. Enjoy :)     Berry Blast Smoothie      Makes 1 serving     1-2 cup unsweetened macadamia nut milk or filtered water (I like filtered water more for this recipe)  1 scoop mcgrath vanilla plant based protein powder or collagen protein powder by vital proteins (plain)   1 cup mixed berries    1 handful spinach    - 1 avocado  1-2 tsp raw organic maqui berry powder by Sunfood superfoods (optional but very immune  boosting and jammed with phytonutrients)   1 tbsp ground flax seed      Method:     In  (recommend vitamix or blend tech) add dairy free alternative milk or filtered water. Add in the rest of ingredients blend on high for 2 mins. If desire thinner consistency add in the water or dairy alternative. Enjoy :)     Superfood + Immune Boosting Powders:  Maqui Berry Powder   Turmeric Powder   Spirulina Powder   Glutamine Powder   Matcha Powder   Catarina fresh or powdered   Kale, dandelion greens or spinach         Omega 3 and Protein Desired Toppings:   Add some healthy protein and omega 3 packed seeds for an extra special nutrient packed topping and a little extra crunch!   1 tsp -1 tbps ground flax seed   1 tsp -1 tbsp hemp seeds               1 tsp-1 tbsp titus seeds       Follow 5 Steps to Healing Gut and Immune System    REMOVE  Remove anything that could be irritating to the gut such as reactive foods, stress    Avoid Common Trigger foods. Dairy tops the list. The proteins like casein and whey in dairy can irritate and inflame your gut, while gluten the protein in wheat, rye and barley is a close second. Give those foods up for at least 3-6 months and see if digestion, blood sugars, weight and overall health improve.            Continue to eliminate gluten, dairy, soy, corn, processed refined grains, sugar, and caffeine          Monitor if high FODMAP foods (wheat, dairy, onion, garlic are some of the top fodmaps) and nightshades contribute to your symptoms. Top common nightshades are white potatoes, tomatoes, egg plant, bell peppers and hot peppers. Also, monitor foods high in histamine and fermented foods to see if trigger symptoms - see Elimination Food Plan Guide plus food plan and FODMAPS handout     Track what you eat. Writing down or tracking through an robby what you eat as well as how you feel and help you identify patterns in your symptoms. This can help you become more aware and create a diet that is  right for you.    Pick a food tracking robby:          Through the mysymptoms robby, you can track symptoms, bowel movements, medications, stress, exercise, sleep and foods as well as beverages to become more mindful. [email or web address]    **track by paper if robby feels like too much. Make list of foods that cause symptoms to provide and review at follow up. I recommend the dailygreatness journal as you can track your goals, mindset, foods etc to keep you on track and motivated.     REINTRODUCE    Eat real anti-inflammatory foods. When you eat whole, real foods in their unprocessed forms, you take the first step to healing our gut and overall health. Eat plenty of vegetables, fruits, non-gluten whole grains (monitor for symptoms), beans, nuts, seeds, lean meats, healthy fats and other plant foods.    Start following reintroduction phase (see guide for details) of elimination diet to see if certain foods trigger symptoms. If you are avoiding FODMAPS focus on keeping out wheat and gluten and then add in cashew butter or high fodmap nuts 1 serving at a time to see if this high fodmap food for example gives you issues. The same can be done for nightshades. Tracking your foods and symptoms can be beneficial in helping you identify patterns while becoming more aware of what you eat.    Focus on Reintroduction Diet: Introduce foods only one food at a time for one day, followed by a 24 hour observation period. I like to focus on keeping the food out for at least 3-4 days and monitor symptoms. If no reactions occur, add in another food.  It's important to wait till symptoms subside before you add in another food to help you better pinpoint a trigger food.    Food plan - 1,800-2,200 calories per day     Protein 10-12 servings per day - include at each meal to stabilize blood sugars   (Choose 3oz or 21g per meal and aim for 1oz of 7g for snacks)   - Strive for 1-2 servings of fish per week especially of higher omega-3  fatty acid containing fish such as salmon.     Legumes <2 serving per day (monitor for digestive issues try hummus first)    Dairy alternatives 2-3 serving per day     Nuts and seeds 3-4 servings per day - great to incorporate as snacks    Fats and oils 4 servings per day     Non starchy vegetables 8-10 servings per day     Starchy vegetable limit 1 serving per day as they tend to impact blood sugar (they are moderate-GI).     Fruits 2 servings per day -  best to couple with a little bit of protein or fat to offset a rise in blood sugars (they are low-moderate-GI foods and monitor if high fodmap are harder to tolerate or trigger symptoms)  Whole grains <2 serving per day - try gluten free whole grains instead (focus on cutting back on wheat to see if this higher fodmap food triggers symptoms)     Incorporate protein powder daily:          Plant based hemp (recommended brands: [place], Nutiva, Just Hemp Protein, [employer])           Plant based pea (recommended brands: Naked Pea, Now Sports). If you want to try a combo of pea and hemp the brand Chow in vanilla or chocolate is a great option.          Try Bone broth protein powder or collagen peptides in liquid bone broth, vegetable broth or 12 oz of water as snack. The bone broth powder and collagen can be used for soups as well. This can help provide essential amino acids and minerals that heal your gut as well as balance blood sugars. A great option if you have a hard time tolerating solids.    Can also consider pre made shakes if unable to make smoothies.      Brand examples that are gluten and dairy free to try:   1) Orgain Vegan Protein Shakes, 20g of Plant Based Protein, Creamy Chocolate - Gluten Free, No Dairy, Soy, or Preservatives, No Added Sugar  2) Pirq, Vegan Protein Shake, Turmeric Curcumin, Amira, Plant-Based Protein Drink, Gluten-Free, Dairy-Free, Soy-Free, Non-GMO, Vegetarian, Kosher, Keto, Low Carb, Low Calorie  3) OWYN Pro Elite Vegan  Plant-Based High Protein Shake, 35g Protein, 9 Amino Acids, Omega-3, Prebiotics, Superfoods Greens for Workout and Recovery, 0g Net Carbs, Zero Sugar, Keto  4) Ripple Vegan Protein Shake  Chocolate  20g Nutritious Plant Based Pea Protein  Shelf Stable  No GMOs, Soy, Nut, Gluten, Lactose  5) [employer] Glucose Support 1.2 Plant based, dairy free, low glycemic index  [email or web address]://[email or web address].Green Clean/products/glucose-support-1-2-vanilla    Choose Low Glycemic (GI) foods: Regulate your sugar levels by eating foods that do not spike blood sugars.  Eat low -GI foods so only small fluctuations in blood glucose and insulin levels are produced.     Examples of low-GI foods: nuts, seeds, GF oats, most vegetables especially non-starchy and fruits.    Medium or high-GI foods should be eaten with a protein or fat, both of which blunt the glycemic effect of these foods. This reduces the overall glycemic impact of a meal.  Ex: Most grains and starchy veggies are medium/high GI.    Avoid foods containing refined sugars, artificial sweeteners, and refined grains they are considered high-GI because they lead to sharp increases in blood sugars levels, which increase insulin sensitivity causing increased TG, and low good cholesterol (HDL).  Ex: cakes, cookies, pies, bread, sodas, fruit drinks, presweetened tea, coffee drinks, energy or sport drinks, flavored milk and other processed foods.    Choose High Quality Fats: Adding anti-inflammatory fats into your diet such as fish (salmon, herring, mackerel, and sardines), omega 3 eggs, titus seeds, ground flax seeds/milk, hemp seeds/milk, and some other certain leafy greens will increase omega-3 fats to omeaga-6 fats ratio.    Therapeutic fats both monounsaturated and polyunsaturated to include daily: ground flaxseeds, unsalted seeds, avocados, olives, extra-virgin olive oil.    Emphasize high-quality oils and fats in the diet daily such as avocado oil, coconut oil, flaxseed, olive, sesame.  Ex: 1 tsp to 1 tbsp of MCT oil from coconuts can be added into smoothies, and salad dressings per day.          Avoid trans fats found in processed foods    Drink more water. Hydration is critical, so drink at least six to eight glasses of water a day. Drink more water between meals and less at meals.     Alcohol and caffeine can stimulate your intestines, which may cause diarrhea. Artificial sweeteners that contain sugar alcohols such as sorbitol, mannitol and xylitol may cause diarrhea too. Carbonated drinks can produce gas.    Fiber draws water from your body to move foods through your intestine. Without enough  water and fluids, you may become constipated.    Try adding herbal teas (sugar free) or lemon/lime/cucumber/fruit to water for flavor. Avoid artificial sweetener packets to flavor your water.    Cut back on coffee switch to green tea. Avoid adding sugar and milk to coffee instead use dairy alternatives such as almond, flax, coconut milk.Cut back on coffee switch to green tea. Avoid adding sugar and milk to coffee instead use dairy alternatives such as almond, flax, coconut milk.Try another coffee substitute such as   -https://[employer]/  -https://OptuLink    REPLACE  Replace certain elements that are key to digestion and absorption for proper digestion of fat, carbs and protein.            Try supplementing with glutamine powder daily. 1 scoop in water or smoothie can help support gut cell proliferation and mucosal integrity, resulting in decreased intestinal permeability and better immune function.                            Focus on high quality micro-nutrients    Start Pure Encapsulations ONE Multivitamin Daily - take 1 capsule daily with food     Start Nordic Naturals Plus vitamin D - 1-2 capsules daily with food       REPOPULATE  Rebuild the friendly bacteria in your microbime. Start by taking a probiotic supplement, they will help rebuild the healthy bacteria so essential to good gut  health. Recolonization your digestive tract with healthy, beneficial good bacteria (probiotics). You can do that by taking very high-potency probiotics (look for at least 25 billion live CFU s from diversified strains of Lactobacillus, Bifidobacterium, and Saccharomyces boulardii), taken twice a day for one to two months.  Start slowly and observe how the probiotics affect your gut.  In some cases, certain individuals may need to delay probiotics until their gut is more intact.    Eat fermented foods. Include plenty of probiotic-rich foods like kimchi, kombucha, miso, or sauerkraut. Sometimes, you can also eat yogurt if you are not allergic to dairy. Try unsweetened sheep or goat yogurt. These are all foods that help your gut marli get and stay healthy.    Reintroduce prebiotics from foods/supplements as tolerated that will help rebalance the microbiome.          Probiotics: Consume foods rich in beneficial bacteria such as coconut kefir, coconut yogurt, sauerkraut, kimchi, kombucha or Kevita.            Prebiotics: Incorporate carbohydrates that bacteria love to eat such as inulin from chicory, onions, garlic, leeks, artichokes, dandelion leaves, nino gum, asparagus, apples, titus/flax seeds, psyllium, beans and resistant starches (seeds, cashews, legumes, plantains, green bananas and potatoes that are cooked and cooled.    **avoid any foods that cause adverse reactions and not tolerated when you reintroduce. Introduce slowly small amounts of gas and bloating is normal and should pass.      Choose foods high in fiber: Aim for at least 5 grams of fiber per serving of food or a total of 25-35 grams fiber per day. Remember, when looking at the label, you can take the fiber away from the total carbs. Ex:15g of total carbs - 4g of fiber = 11g net carbs    Insoluble fiber acts like a bulky  inner broom,  sweeping out debris from the intestine and creating more motility and movement.     Soluble fiber attracts water and  swells, creating a gel that slows digestion.  Also, slows the release of glucose from foods into the blood which stops spikes in blood sugar levels.  Soluble fiber traps toxins in the gut, helping to carry them to excretion and provides healthy bacteria in the digestive tract.    Rebuild your friendly bacteria in your microbime. Start taking probiotic supplements. They will help rebuild the healthy bacteria so essential to good gut health.          Recommend trying BIOHM probiotic.  Take 1 capsule daily anytime with our without food. Does not need to be refrigerated.      REBLANCE  Establish regular eating habits. Eating your meals at the same time each day may help regulate your bowels.      Eat small, frequent meals instead of large ones. This will ease the amount of food moving through your intestinal tract.    Manage your stress. Stress can negatively impact the way you digest foods and absorb nutrients leading to more digestive issues (constipation, diarrhea, indigestion, nausea etc), imbalanced blood sugars and weight imbalances. Try to focus on the following relaxation techniques:          Regular exercise such as walking          Yoga          Meditation          Breathing techniques          Time management    Increase physical activity. Moving our body helps move our bowels and speeds up your metabolism.          Exercise 15-60 minutes daily--whether that looks like burst training, yoga, or vigorous walking. Make sure you get out and get sunshine for natural vitamin d.    Work on getting great sleep. Sleep plays a huge role on gut health.           Try to get at least 7-9 hours of sleep per night. Try to shut off lights and computer around 10pm and to go to bed before midnight.           Try to have supper by 6-7pm and don't eat late within 3 hours of bedtime. Try to have smaller meal for dinner and light snack if needed around 9-10pm.      NUTRITION RESOURCES:          IFM Elimination Diet - Recipes,  guide, meal plan, supplement handouts         21 Day Low FODMAP Smoothie Challenge (21 Day Low FODMAP Challenges Book 1) Karen Edition by Sole Noguera         Low FODMAP Smoothies Recipe Book: A Beginners Guide to Low FODMAP Smoothies for Gut Health     Beginners guide to fodmaps handout     Low fodmaps smoothies guide           PATIENT'S BEHAVIOR CHANGE GOALS:   See nutrition intervention for patient stated behavior change goals. AVS was printed and given to patient at today's appointment.    MONITOR / EVALUATE:  Registered Dietitian will monitor/evaluate the following:     Beliefs and attitudes related to food    Food and nutrition knowledge / skills    Food / Beverage / Nutrient intake     Pertinent Labs    Progress toward meeting stated nutrition-related goals    Readiness to change nutrition-related behaviors    Weight change    Digestion     COORDINATION OF CARE:  Follow up referring provider       FOLLOW-UP:  Follow-up appointment scheduled on  Dec 27th at 11am video visit     Time spent in minutes: 30 minutes 2 units   Encounter: Individual    Mirna Lopez RD, CLT, LD  Integrative Registered Dietitian       Again, thank you for allowing me to participate in the care of your patient.        Sincerely,        Mirna Lopez RD

## 2022-11-30 NOTE — PROGRESS NOTES
Medical Nutrition Therapy  Visit Type: Initial assessment and intervention    Visit Details    How would you like to obtain your AVS? MyChart  If the correspondence for visit is dropped, how would you like your dietitian to reconnect with you:   call back by phone? Yes    Text to cell phone: [phone]  Will anyone else be joining your video visit or telephone call? No  {If patient encounters technical issues they should call [phone] :[phone]    Type of service:  Video Visit   Start Time: 2:39 PM    End Time:3:13 PM    Originating Location (pt. Location): Home    Distant Location (provider location):  [facility] WORKING VIRTUAL FROM HOME     Platform used for Video Visit: Peace      Referring Provider: Darren Castorena  [facility]     REASON FOR REFERRAL:   Nehemias Mascorro is a 27 year old female who is interested in Medical Nutrition Therapy (MNT) and education related to   Abdominal pain, generalized   Irritable bowel syndrome with constipation   And diarrhea   Interested in what foods are triggers and what are ok worried about having early stages of chron's diease.   She is accompanied by self.     NUTRITION ASSESSMENT:     Nutritional Goals 11/29/2022   Nutritional Goal Create a plan to lose weight;Work on meal planning/recipes;Overcome emotional eating;Manage Digestive Issues;Eliminate cravings for sugar and refined carbohydrates;Increase energy;What to eat for my specific health concerns        Neurological 11/29/2022   Migraine Headaches Current   Tension Headache Current       No flowsheet data found.   Immune/Blood 11/29/2022   Anemia Past      Gastrointestinal 11/29/2022   Constipation Current   Nausea or Vomiting Current   Gas/bloating Current   Heartburn/Reflux/GERD Current   Irritable Bowel Syndrome (IBS) Current   Abdominal Pain Current   Diverticulosis/Diverticulitis Current       Food Sensitivities 11/29/2022   Lactose intolerance Current       Endocrine 11/29/2022   Overweight/obesity Current      Skin 11/29/2022   Acne Current      No flowsheet data found.   No flowsheet data found.   Psychological 11/29/2022   ADD/ADHD/Asperger's Current   Depression/Anxiety Current      No flowsheet data found.   Womens Health Assessment 11/29/2022   Hysterectomy No   I am pregnant.  No   I am breastfeeding. No   I want to become pregnant within the next year . No   What do you use for contraception?  not needed/not sexually active   Any problems with current birth control method?   Yes   Date of last menstrual period [id number]   Are your periods irregular? No   Days from the start of one period to the next. 28   Days of Menstral Flow 5   Associated with menstral periods. Painful/cramping;Heavy bleeding;Headaches;Bloating;Diarrhea/constipation   Are you officially in menopause? (no period for one year or longer)  No        Past Medical History:  Past Medical History:   Diagnosis Date     ADHD (attention deficit hyperactivity disorder)      Depressive disorder      Ovarian cyst      Uncomplicated asthma        Previous Surgeries:   Past Surgical History:   Procedure Laterality Date     COLONOSCOPY N/A 4/27/2022    Procedure: COLONOSCOPY, WITH POLYPECTOMY AND BIOPSY;  Surgeon: Alyse Leija MD;  Location:  GI     ENT SURGERY      tonsilectomy age 8     ORTHOPEDIC SURGERY      Hip, emelia surgery in 2013     WRIST SURGERY          Family History:  Family History   Problem Relation Age of Onset     Depression Maternal Grandmother      Schizophrenia Maternal Uncle      Substance Abuse Maternal Uncle         Lifestyle History:  Lifestyle 11/29/2022   Do you feel your life is stressful right now?  Yes   What is the cause(s) of stress in your life?  Finances;Work;Health Concerns;Emotional problems;Multi-tasking and multiple deadlines to meet   Are you currently implementing any strategies to help manage stress? No        Exercise History:  Exercise 11/29/2022   Does your  occupation require extended periods of sitting?  Yes   Does your occupation require extended periods of repetitive movements (ex: walking or lifting)?  Yes   Do you currently participate in any forms of exercise? No   Rate your level of motivation for including exercise in your routine (0=none, 10=high): 2   Do you have any medical conditions, pain, injuries, surgeries etc. restricting you from exercise? Yes        Sleep History:  Sleep 11/29/2022   How many hours (on average) do you sleep per night? 9-11   What time do you turn off the lights? 10 PM   How long does it take for you to fall asleep? 15-20 mins   What time do you stop using electronic devices? 11 PM   What time do you wake up? 8 AM   When do you eat your first meal?  9 AM   Do you feel well-rested during the day?  No   Do you take naps?  Yes   Do you have a comfortable bedroom environment (cool, quiet, dark, etc)? Yes   Do you have a sleep routine/ ritual that you do before bed?  No   How many hours do you spend per day looking at a screen (TV, computer, tablet and phone)? 5 to 6   Select all factors that apply to your current sleep habits: Have difficulty waking up in the morning;Wake up in the middle of the night;Have nightmares;Eat large meals within 3 hours of going to bed;Night sweats;Snack during the night;Feel tired/sluggish/fatigued during the day;Have tried supplements (ie: melatonin) for sleep        Nutrition History:  Nutrition 11/29/2022   Have you ever had a nutrition consultation? No   Do you currently follow a special diet or nutritional program? No   What do you feel are the biggest barriers getting in the way of achieving you nutritional goals? Motivation/Readiness to change;Financial concerns;Lack of time;Work (such as lack of time to eat, unhealthy choices, etc.);Lack of control over emotions/behaviors;Stress   Do you have any food allergies, sensitivities or intolerances?  Yes   Specific food(s) causing adverse reactions Dairy        Digestion 11/29/2022   Do you experience stomach pains/cramping? 2-3 times per week   Do you experience bloating?  Daily   Do you experience gas?  Daily   Do you experience heartburn/acid reflux/indigestion? Rarely   How often do you have a bowel movement? Once per week or less   What is a typical bowel movement like for you? Select all that apply: Varies a lot;Formed and soft;Mucous, greasy      Food Access:  11/29/2022   Who does the grocery shopping? Self;Mother;Father   How often is grocery shopping done? Weekly   Where do you usually receive your groceries from? Select all that apply: [employer];Delivery (Amazon, Whole Foods, Instacart, etc.)   Do you read food labels? No   Who does the cooking? Select all that apply: Self;Mother;Father   How many meals do you eat out per week?  1 to 3   What restaurants do you typically choose? Fast Food (Taco Bell, Paredes's, Subway, etc.)      Daily Patterns: 11/29/2022   How many days per week do you have breakfast? 5   How many days per week do you have lunch? 6   How many days per week do you have dinner? 7   How many days per week do you have snacks? 7      Protein Intake: 11/29/2022   How many times per day do you typically consume a protein source(s)? 3   What types of protein do you currently eat?  Ground Beef;Steak;Beef Roast;Pork Chops;Pork Ribs;Roast Ham;Deli Ham;McLeod;Cod;Tuna;Shrimp;Other Fish;Chicken Breast;Chicken Thighs/Legs;Other Chicken;Turkey Breast;Ground Turkey;Beans       Fat Intake:  11/29/2022   How many times per day do you typically consume healthy fat(s)? 2   What types of health fats do you currently eat? Select all that apply:  Almonds;Walnuts;Pecans;Cashews;[place] butter;Simms;Butter;Vegetable oil;Salad dressings;Coconut oil;Olive oil;Avocado       Fruit Intake:  11/29/2022   How many times per day do you typically consume fruits? 1   What types of fruit do currently eat?  Apple;Banana;Blackberries;Blueberries;Grapes;Migel;Peaches;Pineapple;Raspberries;Other       Vegetable Intake:  11/29/2022   How many times per day do you typically consume vegetables? 1   What types of vegetables do you currently eat? Asparagus;Beans;Broccoli;[place] sprouts;Cabbage;Carrots;Cauliflower;Corn;Eggplant;Greens (rashaun, kale etc);Onions;Plantain;Potato (baked, boiled, mashed, French fries);Spinach;Tomato;Yam, sweet potato      Grain Intake:  11/29/2022   How many times per day do you typically consume grains? 4   What types of grains do currently eat? Select all that apply:  Oats (gluten free);Pancakes/waffles (gluten free);White rice (gluten free);Bagel (non-gluten free);Breads (non-gluten free);Cereals (non-gluten free);Chips (non-gluten free);Crackers (non-gluten free);Muffins (non-gluten free);Pancakes/waffles (non-gluten free);Pasta (non-gluten free);Other (non-gluten free)       Dairy Intake:  11/29/2022   How many times per day do you typically consume dairy? 2   What types of dairy do currently eat? Select all that apply:  Milk;Cheese;Yogurt;Ice cream       Non-Dairy Alternative Intake:  11/29/2022   How many times per day do you typically consume non-dairy alternatives? 2   What types of non-dairy alternatives do currently eat? Select all that apply:  [place] milk       Sweets Intake:  11/29/2022   How many times per day do you typically consume sweets? 2      Beverage Intake:  11/29/2022   How many 8 oz cups of water do you typically consume per day?  2 to 3   How many 8 oz cups of caffeine do you typically consume per day?  0   How many drinks of alcohol do you typically consume per week (1 drink = 5 oz wine, 12 oz beer, 1.5 oz spirits)?   0       Lifestyle Recall:  11/29/2022   What time did you wake up? 8 AM   What time did you go to sleep? 10 PM   What time did you have breakfast? 9-10 AM   Where did you have breakfast?  Home   What time did you have a morning snack? 10-11 AM   Where did  you have your morning snack? Home   What time did you have lunch? 1-2 PM   Where did you have lunch?  Home   What time did you have an afternoon snack? 5-6 PM   Where did you have your afternoon snack? Home   What time did you have dinner? 7-8 PM   Where did you have dinner?  Home   What time did you have an evening snack? 10-11PM   Where did you have your evening snack? Home   What time of day did you exercise? No Exercise          Additional concerns:   Breakfast: Oatmeal with brown sugar with nuts/pecans or cereal with lactate or almond milk   Lunch: sandwiches wheat or white black turkey roast beef/cheese and lettuce   Dinner: fish with chicken baked sometimes red meat doesn't eat a lot of pork more ground beef or steak   Snacks. Chips, apples, popcorn, carrots       MEDICATIONS:  Current Outpatient Medications   Medication Sig Dispense Refill     LANsoprazole (PREVACID) 15 MG DR capsule Take 1 capsule (15 mg) by mouth daily 90 capsule 3     albuterol (ACCUNEB) 1.25 MG/3ML neb solution Take 1 vial (1.25 mg) by nebulization every 6 hours as needed for shortness of breath / dyspnea or wheezing 90 mL 0     ARIPiprazole (ABILIFY) 20 MG tablet Take 20 mg by mouth daily       buPROPion (WELLBUTRIN XL) 300 MG 24 hr tablet Take 300 mg by mouth every morning       CONCERTA 36 MG CR tablet TAKE 1 TABLET BY MOUTH DAILY IN THE MORNING FOR ADHD. START 11/18/22       drospirenone-ethinyl estradiol (JESSY) 3-0.02 MG tablet Take 1 tablet by mouth daily       EMGALITY 120 MG/ML injection Inject 120 mg Subcutaneous every 28 days       famotidine (PEPCID) 20 MG tablet Take 1 tablet (20 mg) by mouth 2 times daily 180 tablet 1     FLUoxetine (PROZAC) 40 MG capsule Take 40 mg by mouth daily 20 mg + 40 mg = 60 mg       hyoscyamine (LEVSIN) 0.125 MG tablet Take 1 tablet (125 mcg) by mouth every 6 hours as needed for cramping 30 tablet 0     ketoconazole (NIZORAL) 2 % external cream        ondansetron (ZOFRAN ODT) 4 MG ODT tab Take 1  tablet (4 mg) by mouth every 8 hours as needed for nausea 30 tablet 0     oseltamivir (TAMIFLU) 75 MG capsule Take 1 capsule (75 mg) by mouth 2 times daily 10 capsule 0     polyethylene glycol (MIRALAX) 17 GM/Dose powder Take 1 capful by mouth daily as needed for constipation       Semaglutide-Weight Management (WEGOVY) 1.7 MG/0.75ML SOAJ Inject 1.7 mg Subcutaneous once a week 3 mL 1     TAZORAC 0.1 % external cream APPLY TOPICALLY TO THE AFFECTED AREA AT BEDTIME       VENTOLIN  (90 Base) MCG/ACT inhaler INHALE 2 PUFFS INTO THE LUNGS FOUR TIMES DAILY 18 g 3       Dietary Supplements 11/29/2022   Individual Vitamin Supplements D;Other   Herbal Complimentary products Laxative;Probiotic         ALLERGIES:   Allergies   Allergen Reactions     Azithromycin      Erythromycin Nausea and Vomiting     Sulfa Drugs Nausea        .na  LABS:  Last Basic Metabolic Panel:  Lab Results   Component Value Date     11/10/2021     06/24/2021     08/10/2020     06/22/2018     08/07/2015      Lab Results   Component Value Date    POTASSIUM 4.1 11/10/2021    POTASSIUM 4.3 06/24/2021    POTASSIUM 4.4 08/10/2020    POTASSIUM 4.0 06/22/2018    POTASSIUM 4.5 08/07/2015     Lab Results   Component Value Date    CHLORIDE 106 11/10/2021    CHLORIDE 106 06/24/2021    CHLORIDE 106 08/10/2020    CHLORIDE 110 06/22/2018    CHLORIDE 108 08/07/2015     Lab Results   Component Value Date    ELIU 9.7 11/10/2021    ELIU 9.0 06/24/2021    ELIU 9.0 08/10/2020    ELIU 8.5 06/22/2018    ELIU 9.0 08/07/2015     Lab Results   Component Value Date    CO2 24 11/10/2021    CO2 20 06/24/2021    CO2 25 08/10/2020    CO2 24 06/22/2018    CO2 28 08/07/2015     Lab Results   Component Value Date    BUN 11 11/10/2021    BUN 12 06/24/2021    BUN 11 08/10/2020    BUN 15 06/22/2018    BUN 10 08/07/2015     Lab Results   Component Value Date    CR 0.90 11/10/2021    CR 0.93 06/24/2021    CR 0.83 08/10/2020    CR 0.77 06/22/2018    CR 0.72  08/07/2015     Lab Results   Component Value Date    GLC 80 11/10/2021     06/24/2021     08/10/2020    GLC 84 06/22/2018    GLC 86 08/07/2015       Last Glucose Profile:   Hemoglobin A1C   Date Value Ref Range Status   07/21/2022 5.2 0.0 - 5.6 % Final     Comment:     Normal <5.7%   Prediabetes 5.7-6.4%    Diabetes 6.5% or higher     Note: Adopted from ADA consensus guidelines.   06/02/2022 5.3 0.0 - 5.6 % Final     Comment:     Normal <5.7%   Prediabetes 5.7-6.4%    Diabetes 6.5% or higher     Note: Adopted from ADA consensus guidelines.   11/10/2021 5.7 (H) 0.0 - 5.6 % Final     Comment:     Normal <5.7%   Prediabetes 5.7-6.4%    Diabetes 6.5% or higher     Note: Adopted from ADA consensus guidelines.       Last Lipid Profile:   Cholesterol   Date Value Ref Range Status   10/11/2019 142 <=199 mg/dL Final   12/18/2017 118 <=199 mg/dL Final     Direct Measure HDL   Date Value Ref Range Status   10/11/2019 58 >=50 mg/dL Final   12/18/2017 41 (L) >=50 mg/dL Final     LDL Cholesterol Calculated   Date Value Ref Range Status   10/11/2019 70 <=129 mg/dL Final   12/18/2017 70 <=129 mg/dL Final     Triglycerides   Date Value Ref Range Status   10/11/2019 71 <=149 mg/dL Final   12/18/2017 34 <=149 mg/dL Final     No results found for: CHOLHDLRATIO    Most recent CBC:  Recent Labs   Lab Test 07/21/22  1358 03/10/22  0942 11/10/21  1623   WBC 5.8 5.0 4.1   HGB 13.4 14.3 13.1   HCT 41.3 44.1 41.1    290 251     Most recent hepatic panel:  Recent Labs   Lab Test 08/10/20  1100 06/22/18  1302   ALT 33 18   AST 19 14     Most recent creatinine:  Recent Labs   Lab Test 11/10/21  1623 06/24/21  0925   CR 0.90 0.93       No components found for: GFRESETIMATEDLASTLAB(gfrestblack:1@  Lab Results   Component Value Date    ALBUMIN 3.7 08/10/2020       Last Thyroid Profile:   TSH   Date Value Ref Range Status   03/10/2022 0.89 0.30 - 5.00 uIU/mL Final   11/10/2021 0.94 0.30 - 5.00 uIU/mL Final   10/11/2019 0.74 0.30  "- 5.00 uIU/mL Final   06/22/2018 0.70 0.40 - 4.00 mU/L Final     T4 Free   Date Value Ref Range Status   06/22/2018 0.79 0.76 - 1.46 ng/dL Final       Last Mineral Profile:   Ferritin   Date Value Ref Range Status   03/10/2022 18 10 - 130 ng/mL Final   06/22/2018 3 (L) 12 - 150 ng/mL Final     Iron   Date Value Ref Range Status   06/02/2022 60 42 - 175 ug/dL Final   06/22/2018 37 35 - 180 ug/dL Final     Iron Binding Cap   Date Value Ref Range Status   06/22/2018 451 (H) 240 - 430 ug/dL Final     Iron Binding Capacity   Date Value Ref Range Status   03/10/2022 421 240 - 430 ug/dL Final       Autoimmune & Inflammatory   CRP Inflammation   Date Value Ref Range Status   11/22/2022 35.40 (H) <5.00 mg/L Final   10/13/2022 23.4 (H) 0.0 - 8.0 mg/L Final         Last Vitamin Profile:   No results found for: AEZ487, BYGQ331, INWH01TVIUR, VITD3, D2VIT, D3VIT, DTOT, [id number], [id number], [id number], [id number], [id number], [id number]    ANTHROPOMETRICS:  Vitals:   BP Readings from Last 1 Encounters:   10/13/22 125/84     Pulse Readings from Last 1 Encounters:   10/13/22 85     Estimated body mass index is 37.93 kg/m  as calculated from the following:    Height as of 10/13/22: 1.676 m (5' 6\").    Weight as of 11/16/22: 106.6 kg (235 lb).    Wt Readings from Last 5 Encounters:   11/16/22 106.6 kg (235 lb)   10/13/22 106.7 kg (235 lb 3.2 oz)   09/23/22 107 kg (236 lb)   09/12/22 106.6 kg (235 lb)   08/18/22 107.8 kg (237 lb 11.2 oz)       NUTRITION DIAGNOSIS:    1. Altered GI function related to higher intake of coffee and carbs (fodmap) wheat as evidenced by food recall lower intake of healthy fats, and protein.     2. Excessive intake of high FODMAP foods related to food and nutrition knowledge deficit regarding effect of FODMAPs on IBS, as evidenced by symptoms of diarrhea, abdominal pain, constipation and elevated CRP.     3. Inadequate fiber intake related to food and nutrition knowledge deficit regarding desirable " quantities of fiber, as evidenced by abdominal pain, diarrhea and constipation.         NUTRITION INTERVENTION:    Long Term Goals:   Goal: 1-2 bowl movement daily Sebastian Stool Type 4 soft and formed  Goal: Decrease digestive symptoms -constipation but more diarrhea  Goal: Decrease elevated CRP      Short Term Goals:  Goal 1: Focus on starting smoothie for breakfast - see the recipe provided in handouts and book below under resources - add in unsweetened flax, hemp or coconut milk, 1 tbsp of healthy fat such as flax seed ground or titus seed ground. serving of fruit 1 cup of berries. Veggie (optional), 1 scoop of plant based protein powder or collagen powder, 1 serving of lowfodmap fruit and veggie such as kale, spinach plus optional GI boost (glutamine powder and or GI revive powder to boost and provide GI support) - see replace section below for details)      Also, check out some of the other breakfast ideas provided such as oatmeal (naturally gluten free) with dairy free alternative milk, flax seed, nuts such as walnuts, fruit such as berries and protein like eggs or chicken/turkey sausage.      Goal 2:   - cutting back on gluten the protein in wheat or fructose the sugar in wheat both can trigger digestive issues.  eat consistent with having some gluten and dairy free snacks at least 1-2 snacks daily around 10am and 3pm.  Eating every three to four hours will help keep your insulin and blood sugar normal to help with weight loss  - see meal plan and recipe ideas in the cardi metabolic guides provided.      Goal 3:  My symptoms Tracker robby and start tracking what you are eating as well as severity of symptoms. Www.mysymptoms.net       Also nice to check out dailygreatness.co Sample6 wellness journal it can be beneficial in setting short term and long term goals for success beyond just looking at calories in and out.     https://[employer]/collections/[employer]/products/[employer]-wellness-journal-90-day?utfghaa=54151173884625       Goal 4:  Start probiotic at least once per day - recommend 100 billion to start from oma pretty MD - lactic acid base to see how handle at first let me know if symptoms get better or if they get worse after 1-2 weeks of starting the probiotic.     https://store.[employer]/products/probiotic-capsules-100-billion         Smoothie Recipes to Kickoff Your Success!!     Banana Date Split Smoothie      Makes 1-2 servings     1-2 cup unsweetened almond/macadamia/hemp/coconut or pea dairy free milk alternative   1 scoop mcgrath chocolate plant based protein powder or collagen protein powder     -1  banana   1 handful spinach    - 1 avocado  1 -3 tsp cinnamon   1 tbsp titus seeds   1-2 dates (optional)        Method:     In  (recommend vitamix or blend tech) add dairy free alternative milk or filtered water. Add in the rest of ingredients blend on high for 2 mins. If desire thinner consistency add in the water or dairy alternative. Enjoy :)     Kiwi Spirulina Smoothie      Makes 1 servings     1 cup unsweetened macadamia nut milk or filtered water   1 scoop mcgrath vanilla plant based protein powder or 1-2 scoops collagen powder from vital proteins (plain)   1-2 Kiwi s peeled   1 handful cilantro   1 lime juiced    - 1 avocado   2 tsp spirulina   1 inch ashtyn fresh pilled      Method:     In  (recommend vitamix or blend tech) add dairy free alternative milk or filtered water. Add in the rest of ingredients blend on high for 2 mins. If desire thinner consistency add in the water or dairy alternative. Enjoy :)     Green Detox Smoothie      Makes 1 servings     1 cup unsweetened macadamia nut milk or filtered water (I like filtered water more for this recipe)  1-2 scoops collagen powder from vital proteins (plain)   1 small granny zheng apple (prefer organic - lower pesticides and  endocrine disruptors)   1 handful cilantro   1 lime juiced    - 1 avocado (optional)   1 cucumber      Method:     In  (recommend vitamix or blend tech) add dairy free alternative milk or filtered water. Add in the rest of ingredients blend on high for 2 mins. If desire thinner consistency add in the water or dairy alternative. Enjoy :)        Spirulina Banana Smoothie      Makes 1 serving     1-2 cup unsweetened macadamia nut milk or filtered water (I like filtered water more for this recipe)  1 scoop mcgrath vanilla plant based protein powder     banana   1 handful spinach    - 1 avocado  1-2 tsp spirulina    1 tbsp ground flax seed      Method:     In  (recommend vitamix or blend tech) add dairy free alternative milk or filtered water. Add in the rest of ingredients blend on high for 2 mins. If desire thinner consistency add in the water or dairy alternative. Enjoy :)     Berry Blast Smoothie      Makes 1 serving     1-2 cup unsweetened macadamia nut milk or filtered water (I like filtered water more for this recipe)  1 scoop mcgrath vanilla plant based protein powder or collagen protein powder by vital proteins (plain)   1 cup mixed berries    1 handful spinach    - 1 avocado  1-2 tsp raw organic maqui berry powder by [employer] superfoods (optional but very immune boosting and jammed with phytonutrients)   1 tbsp ground flax seed      Method:     In  (recommend vitamix or blend tech) add dairy free alternative milk or filtered water. Add in the rest of ingredients blend on high for 2 mins. If desire thinner consistency add in the water or dairy alternative. Enjoy :)     Superfood + Immune Boosting Powders:  Maqui Berry Powder   Turmeric Powder   Spirulina Powder   Glutamine Powder   Matcha Powder   Catarina fresh or powdered   Kale, dandelion greens or spinach         Omega 3 and Protein Desired Toppings:   Add some healthy protein and omega 3 packed seeds for an extra special nutrient packed topping  and a little extra crunch!   1 tsp -1 tbps ground flax seed   1 tsp -1 tbsp hemp seeds               1 tsp-1 tbsp titus seeds       Follow 5 Steps to Healing Gut and Immune System    REMOVE  Remove anything that could be irritating to the gut such as reactive foods, stress    Avoid Common Trigger foods. Dairy tops the list. The proteins like casein and whey in dairy can irritate and inflame your gut, while gluten the protein in wheat, rye and barley is a close second. Give those foods up for at least 3-6 months and see if digestion, blood sugars, weight and overall health improve.            Continue to eliminate gluten, dairy, soy, corn, processed refined grains, sugar, and caffeine          Monitor if high FODMAP foods (wheat, dairy, onion, garlic are some of the top fodmaps) and nightshades contribute to your symptoms. Top common nightshades are white potatoes, tomatoes, egg plant, bell peppers and hot peppers. Also, monitor foods high in histamine and fermented foods to see if trigger symptoms - see Elimination Food Plan Guide plus food plan and FODMAPS handout     Track what you eat. Writing down or tracking through an robby what you eat as well as how you feel and help you identify patterns in your symptoms. This can help you become more aware and create a diet that is right for you.    Pick a food tracking robby:          Through the [employer] robby, you can track symptoms, bowel movements, medications, stress, exercise, sleep and foods as well as beverages to become more mindful. [email or web address]    **track by paper if robby feels like too much. Make list of foods that cause symptoms to provide and review at follow up. I recommend the dailygreatness journal as you can track your goals, mindset, foods etc to keep you on track and motivated.     REINTRODUCE    Eat real anti-inflammatory foods. When you eat whole, real foods in their unprocessed forms, you take the first step to healing our gut and overall  health. Eat plenty of vegetables, fruits, non-gluten whole grains (monitor for symptoms), beans, nuts, seeds, lean meats, healthy fats and other plant foods.    Start following reintroduction phase (see guide for details) of elimination diet to see if certain foods trigger symptoms. If you are avoiding FODMAPS focus on keeping out wheat and gluten and then add in cashew butter or high fodmap nuts 1 serving at a time to see if this high fodmap food for example gives you issues. The same can be done for nightshades. Tracking your foods and symptoms can be beneficial in helping you identify patterns while becoming more aware of what you eat.    Focus on Reintroduction Diet: Introduce foods only one food at a time for one day, followed by a 24 hour observation period. I like to focus on keeping the food out for at least 3-4 days and monitor symptoms. If no reactions occur, add in another food.  It's important to wait till symptoms subside before you add in another food to help you better pinpoint a trigger food.    Food plan - 1,800-2,200 calories per day     Protein 10-12 servings per day - include at each meal to stabilize blood sugars   (Choose 3oz or 21g per meal and aim for 1oz of 7g for snacks)   - Strive for 1-2 servings of fish per week especially of higher omega-3 fatty acid containing fish such as salmon.     Legumes <2 serving per day (monitor for digestive issues try hummus first)    Dairy alternatives 2-3 serving per day     Nuts and seeds 3-4 servings per day - great to incorporate as snacks    Fats and oils 4 servings per day     Non starchy vegetables 8-10 servings per day     Starchy vegetable limit 1 serving per day as they tend to impact blood sugar (they are moderate-GI).     Fruits 2 servings per day -  best to couple with a little bit of protein or fat to offset a rise in blood sugars (they are low-moderate-GI foods and monitor if high fodmap are harder to tolerate or trigger symptoms)  Whole  grains <2 serving per day - try gluten free whole grains instead (focus on cutting back on wheat to see if this higher fodmap food triggers symptoms)     Incorporate protein powder daily:          Plant based hemp (recommended brands: [place], Nutiva, Just Hemp Protein, [employer])           Plant based pea (recommended brands: Naked Pea, Now Sports). If you want to try a combo of pea and hemp the brand Chow in vanilla or chocolate is a great option.          Try Bone broth protein powder or collagen peptides in liquid bone broth, vegetable broth or 12 oz of water as snack. The bone broth powder and collagen can be used for soups as well. This can help provide essential amino acids and minerals that heal your gut as well as balance blood sugars. A great option if you have a hard time tolerating solids.    Can also consider pre made shakes if unable to make smoothies.      Brand examples that are gluten and dairy free to try:   1) Orgain Vegan Protein Shakes, 20g of Plant Based Protein, Creamy Chocolate - Gluten Free, No Dairy, Soy, or Preservatives, No Added Sugar  2) Pirq, Vegan Protein Shake, Turmeric Curcumin, Amira, Plant-Based Protein Drink, Gluten-Free, Dairy-Free, Soy-Free, Non-GMO, Vegetarian, Kosher, Keto, Low Carb, Low Calorie  3) OWYN Pro Elite Vegan Plant-Based High Protein Shake, 35g Protein, 9 Amino Acids, Omega-3, Prebiotics, Superfoods Greens for Workout and Recovery, 0g Net Carbs, Zero Sugar, Keto  4) Ripple Vegan Protein Shake  Chocolate  20g Nutritious Plant Based Pea Protein  Shelf Stable  No GMOs, Soy, Nut, Gluten, Lactose  5) AdmitSee Glucose Support 1.2 Plant based, dairy free, low glycemic index  https://ResearchGate.Sensoraide/products/glucose-support-1-2-vanilla    Choose Low Glycemic (GI) foods: Regulate your sugar levels by eating foods that do not spike blood sugars.  Eat low -GI foods so only small fluctuations in blood glucose and insulin levels are produced.     Examples  of low-GI foods: nuts, seeds, GF oats, most vegetables especially non-starchy and fruits.    Medium or high-GI foods should be eaten with a protein or fat, both of which blunt the glycemic effect of these foods. This reduces the overall glycemic impact of a meal.  Ex: Most grains and starchy veggies are medium/high GI.    Avoid foods containing refined sugars, artificial sweeteners, and refined grains they are considered high-GI because they lead to sharp increases in blood sugars levels, which increase insulin sensitivity causing increased TG, and low good cholesterol (HDL).  Ex: cakes, cookies, pies, bread, sodas, fruit drinks, presweetened tea, coffee drinks, energy or sport drinks, flavored milk and other processed foods.    Choose High Quality Fats: Adding anti-inflammatory fats into your diet such as fish (salmon, herring, mackerel, and sardines), omega 3 eggs, titus seeds, ground flax seeds/milk, hemp seeds/milk, and some other certain leafy greens will increase omega-3 fats to omeaga-6 fats ratio.    Therapeutic fats both monounsaturated and polyunsaturated to include daily: ground flaxseeds, unsalted seeds, avocados, olives, extra-virgin olive oil.    Emphasize high-quality oils and fats in the diet daily such as avocado oil, coconut oil, flaxseed, olive, sesame. Ex: 1 tsp to 1 tbsp of MCT oil from coconuts can be added into smoothies, and salad dressings per day.          Avoid trans fats found in processed foods    Drink more water. Hydration is critical, so drink at least six to eight glasses of water a day. Drink more water between meals and less at meals.     Alcohol and caffeine can stimulate your intestines, which may cause diarrhea. Artificial sweeteners that contain sugar alcohols such as sorbitol, mannitol and xylitol may cause diarrhea too. Carbonated drinks can produce gas.    Fiber draws water from your body to move foods through your intestine. Without enough  water and fluids, you may become  constipated.    Try adding herbal teas (sugar free) or lemon/lime/cucumber/fruit to water for flavor. Avoid artificial sweetener packets to flavor your water.    Cut back on coffee switch to green tea. Avoid adding sugar and milk to coffee instead use dairy alternatives such as almond, flax, coconut milk.Cut back on coffee switch to green tea. Avoid adding sugar and milk to coffee instead use dairy alternatives such as almond, flax, coconut milk.Try another coffee substitute such as   -[email or web address]/  -https://Olacabs.[employer]    REPLACE  Replace certain elements that are key to digestion and absorption for proper digestion of fat, carbs and protein.            Try supplementing with glutamine powder daily. 1 scoop in water or smoothie can help support gut cell proliferation and mucosal integrity, resulting in decreased intestinal permeability and better immune function.                            Focus on high quality micro-nutrients    Start Pure Encapsulations ONE Multivitamin Daily - take 1 capsule daily with food     Start Nordic Naturals Plus vitamin D - 1-2 capsules daily with food       REPOPULATE  Rebuild the friendly bacteria in your microbime. Start by taking a probiotic supplement, they will help rebuild the healthy bacteria so essential to good gut health. Recolonization your digestive tract with healthy, beneficial good bacteria (probiotics). You can do that by taking very high-potency probiotics (look for at least 25 billion live CFU s from diversified strains of Lactobacillus, Bifidobacterium, and Saccharomyces boulardii), taken twice a day for one to two months.  Start slowly and observe how the probiotics affect your gut.  In some cases, certain individuals may need to delay probiotics until their gut is more intact.    Eat fermented foods. Include plenty of probiotic-rich foods like kimchi, kombucha, miso, or sauerkraut. Sometimes, you can also eat yogurt if you are not allergic to dairy.  Try unsweetened sheep or goat yogurt. These are all foods that help your gut marli get and stay healthy.    Reintroduce prebiotics from foods/supplements as tolerated that will help rebalance the microbiome.          Probiotics: Consume foods rich in beneficial bacteria such as coconut kefir, coconut yogurt, sauerkraut, kimchi, kombucha or Kevita.            Prebiotics: Incorporate carbohydrates that bacteria love to eat such as inulin from chicory, onions, garlic, leeks, artichokes, dandelion leaves, nino gum, asparagus, apples, titus/flax seeds, psyllium, beans and resistant starches (seeds, cashews, legumes, plantains, green bananas and potatoes that are cooked and cooled.    **avoid any foods that cause adverse reactions and not tolerated when you reintroduce. Introduce slowly small amounts of gas and bloating is normal and should pass.      Choose foods high in fiber: Aim for at least 5 grams of fiber per serving of food or a total of 25-35 grams fiber per day. Remember, when looking at the label, you can take the fiber away from the total carbs. Ex:15g of total carbs - 4g of fiber = 11g net carbs    Insoluble fiber acts like a bulky  inner broom,  sweeping out debris from the intestine and creating more motility and movement.     Soluble fiber attracts water and swells, creating a gel that slows digestion.  Also, slows the release of glucose from foods into the blood which stops spikes in blood sugar levels.  Soluble fiber traps toxins in the gut, helping to carry them to excretion and provides healthy bacteria in the digestive tract.    Rebuild your friendly bacteria in your microbime. Start taking probiotic supplements. They will help rebuild the healthy bacteria so essential to good gut health.          Recommend trying BIOHM probiotic.  Take 1 capsule daily anytime with our without food. Does not need to be refrigerated.      REBLANCE  Establish regular eating habits. Eating your meals at the same time  each day may help regulate your bowels.      Eat small, frequent meals instead of large ones. This will ease the amount of food moving through your intestinal tract.    Manage your stress. Stress can negatively impact the way you digest foods and absorb nutrients leading to more digestive issues (constipation, diarrhea, indigestion, nausea etc), imbalanced blood sugars and weight imbalances. Try to focus on the following relaxation techniques:          Regular exercise such as walking          Yoga          Meditation          Breathing techniques          Time management    Increase physical activity. Moving our body helps move our bowels and speeds up your metabolism.          Exercise 15-60 minutes daily--whether that looks like burst training, yoga, or vigorous walking. Make sure you get out and get sunshine for natural vitamin d.    Work on getting great sleep. Sleep plays a huge role on gut health.           Try to get at least 7-9 hours of sleep per night. Try to shut off lights and computer around 10pm and to go to bed before midnight.           Try to have supper by 6-7pm and don't eat late within 3 hours of bedtime. Try to have smaller meal for dinner and light snack if needed around 9-10pm.      NUTRITION RESOURCES:          IFM Elimination Diet - Recipes, guide, meal plan, supplement handouts         21 Day Low FODMAP Smoothie Challenge (21 Day Low FODMAP Challenges Book 1) Karen Edition by S[employer] Noguera         Low FODMAP Smoothies Recipe Book: A Beginners Guide to Low FODMAP Smoothies for Gut Health     Beginners guide to fodmaps handout     Low fodmaps smoothies guide           PATIENT'S BEHAVIOR CHANGE GOALS:   See nutrition intervention for patient stated behavior change goals. AVS was printed and given to patient at today's appointment.    MONITOR / EVALUATE:  Registered Dietitian will monitor/evaluate the following:     Beliefs and attitudes related to food    Food and nutrition knowledge /  skills    Food / Beverage / Nutrient intake     Pertinent Labs    Progress toward meeting stated nutrition-related goals    Readiness to change nutrition-related behaviors    Weight change    Digestion     COORDINATION OF CARE:  Follow up referring provider       FOLLOW-UP:  Follow-up appointment scheduled on  Dec 27th at 11am video visit     Time spent in minutes: 30 minutes 2 units   Encounter: Individual    Mirna Lopez RD, CLT, LD  Integrative Registered Dietitian

## 2022-12-02 NOTE — PATIENT INSTRUCTIONS
NUTRITION INTERVENTION:    Long Term Goals:   Goal: 1-2 bowl movement daily [place] Stool Type 4 soft and formed  Goal: Decrease digestive symptoms -constipation but more diarrhea  Goal: Decrease elevated CRP      Short Term Goals:  Goal 1: Focus on starting smoothie for breakfast - see the recipe provided in handouts and book below under resources - add in unsweetened flax, hemp or coconut milk, 1 tbsp of healthy fat such as flax seed ground or titus seed ground. serving of fruit 1 cup of berries. Veggie (optional), 1 scoop of plant based protein powder or collagen powder, 1 serving of lowfodmap fruit and veggie such as kale, spinach plus optional GI boost (glutamine powder and or GI revive powder to boost and provide GI support) - see replace section below for details)      Also, check out some of the other breakfast ideas provided such as oatmeal (naturally gluten free) with dairy free alternative milk, flax seed, nuts such as walnuts, fruit such as berries and protein like eggs or chicken/turkey sausage.      Goal 2:   - cutting back on gluten the protein in wheat or fructose the sugar in wheat both can trigger digestive issues.  eat consistent with having some gluten and dairy free snacks at least 1-2 snacks daily around 10am and 3pm.  Eating every three to four hours will help keep your insulin and blood sugar normal to help with weight loss  - see meal plan and recipe ideas in the cardi metabolic guides provided.      Goal 3:  My symptoms Tracker robby and start tracking what you are eating as well as severity of symptoms. Www.mysymptoms.net       Also nice to check out [employer] Ruci.cn journal it can be beneficial in setting short term and long term goals for success beyond just looking at calories in and out.    https://dailyHypori.co/collections/dailygreatKarmaHire/products/dailyHypori-wellness-journal-90-day?uxxxdgc=04022230686124       Goal 4:  Start probiotic at least once per day -  recommend 100 billion to start from oma pretty MD - lactic acid base to see how handle at first let me know if symptoms get better or if they get worse after 1-2 weeks of starting the probiotic.     https://store.Venturocket/products/probiotic-capsules-100-billion         Smoothie Recipes to Kickoff Your Success!!     Banana Date Split Smoothie      Makes 1-2 servings     1-2 cup unsweetened almond/macadamia/hemp/coconut or pea dairy free milk alternative   1 scoop mcgrath chocolate plant based protein powder or collagen protein powder     -1  banana   1 handful spinach    - 1 avocado  1 -3 tsp cinnamon   1 tbsp titus seeds   1-2 dates (optional)        Method:     In  (recommend vitamix or blend tech) add dairy free alternative milk or filtered water. Add in the rest of ingredients blend on high for 2 mins. If desire thinner consistency add in the water or dairy alternative. Enjoy :)     Kiwi Spirulina Smoothie      Makes 1 servings     1 cup unsweetened macadamia nut milk or filtered water   1 scoop mcgrath vanilla plant based protein powder or 1-2 scoops collagen powder from vital proteins (plain)   1-2 Kiwi s peeled   1 handful cilantro   1 lime juiced    - 1 avocado   2 tsp spirulina   1 inch ashtyn fresh pilled      Method:     In  (recommend vitamix or blend tech) add dairy free alternative milk or filtered water. Add in the rest of ingredients blend on high for 2 mins. If desire thinner consistency add in the water or dairy alternative. Enjoy :)     Green Detox Smoothie      Makes 1 servings     1 cup unsweetened macadamia nut milk or filtered water (I like filtered water more for this recipe)  1-2 scoops collagen powder from vital proteins (plain)   1 small granny zheng apple (prefer organic - lower pesticides and endocrine disruptors)   1 handful cilantro   1 lime juiced    - 1 avocado (optional)   1 cucumber      Method:     In  (recommend vitamix or blend tech) add dairy free alternative  milk or filtered water. Add in the rest of ingredients blend on high for 2 mins. If desire thinner consistency add in the water or dairy alternative. Enjoy :)        Spirulina Banana Smoothie      Makes 1 serving     1-2 cup unsweetened macadamia nut milk or filtered water (I like filtered water more for this recipe)  1 scoop mcgrath vanilla plant based protein powder     banana   1 handful spinach    - 1 avocado  1-2 tsp spirulina    1 tbsp ground flax seed      Method:     In  (recommend vitamix or blend tech) add dairy free alternative milk or filtered water. Add in the rest of ingredients blend on high for 2 mins. If desire thinner consistency add in the water or dairy alternative. Enjoy :)     Berry Blast Smoothie      Makes 1 serving     1-2 cup unsweetened macadamia nut milk or filtered water (I like filtered water more for this recipe)  1 scoop mcgrath vanilla plant based protein powder or collagen protein powder by vital proteins (plain)   1 cup mixed berries    1 handful spinach    - 1 avocado  1-2 tsp raw organic maqui berry powder by Sunfood superfoods (optional but very immune boosting and jammed with phytonutrients)   1 tbsp ground flax seed      Method:     In  (recommend vitamix or blend tech) add dairy free alternative milk or filtered water. Add in the rest of ingredients blend on high for 2 mins. If desire thinner consistency add in the water or dairy alternative. Enjoy :)     Superfood + Immune Boosting Powders:  Maqui Berry Powder   Turmeric Powder   Spirulina Powder   Glutamine Powder   Matcha Powder   Catarina fresh or powdered   Kale, dandelion greens or spinach         Omega 3 and Protein Desired Toppings:   Add some healthy protein and omega 3 packed seeds for an extra special nutrient packed topping and a little extra crunch!   1 tsp -1 tbps ground flax seed   1 tsp -1 tbsp hemp seeds               1 tsp-1 tbsp titus seeds       Follow 5 Steps to Healing Gut and Immune  System    REMOVE  Remove anything that could be irritating to the gut such as reactive foods, stress    Avoid Common Trigger foods. Dairy tops the list. The proteins like casein and whey in dairy can irritate and inflame your gut, while gluten the protein in wheat, rye and barley is a close second. Give those foods up for at least 3-6 months and see if digestion, blood sugars, weight and overall health improve.           Continue to eliminate gluten, dairy, soy, corn, processed refined grains, sugar, and caffeine         Monitor if high FODMAP foods (wheat, dairy, onion, garlic are some of the top fodmaps) and nightshades contribute to your symptoms. Top common nightshades are white potatoes, tomatoes, egg plant, bell peppers and hot peppers. Also, monitor foods high in histamine and fermented foods to see if trigger symptoms - see Elimination Food Plan Guide plus food plan and FODMAPS handout     Track what you eat. Writing down or tracking through an robby what you eat as well as how you feel and help you identify patterns in your symptoms. This can help you become more aware and create a diet that is right for you.    Pick a food tracking robby:         Through the [place] robby, you can track symptoms, bowel movements, medications, stress, exercise, sleep and foods as well as beverages to become more mindful. Https://[employer]/wp/.    **track by paper if robby feels like too much. Make list of foods that cause symptoms to provide and review at follow up. I recommend the dailygreatness journal as you can track your goals, mindset, foods etc to keep you on track and motivated.     REINTRODUCE    Eat real anti-inflammatory foods. When you eat whole, real foods in their unprocessed forms, you take the first step to healing our gut and overall health. Eat plenty of vegetables, fruits, non-gluten whole grains (monitor for symptoms), beans, nuts, seeds, lean meats, healthy fats and other plant foods.    Start  following reintroduction phase (see guide for details) of elimination diet to see if certain foods trigger symptoms. If you are avoiding FODMAPS focus on keeping out wheat and gluten and then add in cashew butter or high fodmap nuts 1 serving at a time to see if this high fodmap food for example gives you issues. The same can be done for nightshades. Tracking your foods and symptoms can be beneficial in helping you identify patterns while becoming more aware of what you eat.    Focus on Reintroduction Diet: Introduce foods only one food at a time for one day, followed by a 24 hour observation period. I like to focus on keeping the food out for at least 3-4 days and monitor symptoms. If no reactions occur, add in another food.  It's important to wait till symptoms subside before you add in another food to help you better pinpoint a trigger food.    Food plan - 1,800-2,200 calories per day   Protein 10-12 servings per day - include at each meal to stabilize blood sugars   (Choose 3oz or 21g per meal and aim for 1oz of 7g for snacks)   Strive for 1-2 servings of fish per week especially of higher omega-3 fatty acid containing fish such as salmon.   Legumes <2 serving per day (monitor for digestive issues try hummus first)  Dairy alternatives 2-3 serving per day   Nuts and seeds 3-4 servings per day - great to incorporate as snacks  Fats and oils 4 servings per day   Non starchy vegetables 8-10 servings per day   Starchy vegetable limit 1 serving per day as they tend to impact blood sugar (they are moderate-GI).   Fruits 2 servings per day -  best to couple with a little bit of protein or fat to offset a rise in blood sugars (they are low-moderate-GI foods and monitor if high fodmap are harder to tolerate or trigger symptoms)  Whole grains <2 serving per day - try gluten free whole grains instead (focus on cutting back on wheat to see if this higher fodmap food triggers symptoms)     Incorporate protein powder daily:          Plant based hemp (recommended brands: [place], Nutiva, Just Hemp Protein, Baike.[employer] [place])          Plant based pea (recommended brands: Naked Pea, Now Sports). If you want to try a combo of pea and hemp the brand Chow in vanilla or chocolate is a great option.         Try Bone broth protein powder or collagen peptides in liquid bone broth, vegetable broth or 12 oz of water as snack. The bone broth powder and collagen can be used for soups as well. This can help provide essential amino acids and minerals that heal your gut as well as balance blood sugars. A great option if you have a hard time tolerating solids.    Can also consider pre made shakes if unable to make smoothies.      Brand examples that are gluten and dairy free to try:   1) Orgain Vegan Protein Shakes, 20g of Plant Based Protein, Creamy Chocolate - Gluten Free, No Dairy, Soy, or Preservatives, No Added Sugar  2) Pirq, Vegan Protein Shake, Turmeric Curcumin, Amira, Plant-Based Protein Drink, Gluten-Free, Dairy-Free, Soy-Free, Non-GMO, Vegetarian, Kosher, Keto, Low Carb, Low Calorie  3) OWYN Pro Elite Vegan Plant-Based High Protein Shake, 35g Protein, 9 Amino Acids, Omega-3, Prebiotics, Superfoods Greens for Workout and Recovery, 0g Net Carbs, Zero Sugar, Keto  4) Ripple Vegan Protein Shake  Chocolate  20g Nutritious Plant Based Pea Protein  Shelf Stable  No GMOs, Soy, Nut, Gluten, Lactose  5) SpecialtyCare Glucose Support 1.2 Plant based, dairy free, low glycemic index  https://Mo-DV.Geenapp/products/glucose-support-1-2-vanilla    Choose Low Glycemic (GI) foods: Regulate your sugar levels by eating foods that do not spike blood sugars.  Eat low -GI foods so only small fluctuations in blood glucose and insulin levels are produced.     Examples of low-GI foods: nuts, seeds, GF oats, most vegetables especially non-starchy and fruits.    Medium or high-GI foods should be eaten with a protein or fat, both of which blunt the glycemic  effect of these foods. This reduces the overall glycemic impact of a meal.  Ex: Most grains and starchy veggies are medium/high GI.    Avoid foods containing refined sugars, artificial sweeteners, and refined grains they are considered high-GI because they lead to sharp increases in blood sugars levels, which increase insulin sensitivity causing increased TG, and low good cholesterol (HDL).  Ex: cakes, cookies, pies, bread, sodas, fruit drinks, presweetened tea, coffee drinks, energy or sport drinks, flavored milk and other processed foods.    Choose High Quality Fats: Adding anti-inflammatory fats into your diet such as fish (salmon, herring, mackerel, and sardines), omega 3 eggs, titus seeds, ground flax seeds/milk, hemp seeds/milk, and some other certain leafy greens will increase omega-3 fats to omeaga-6 fats ratio.    Therapeutic fats both monounsaturated and polyunsaturated to include daily: ground flaxseeds, unsalted seeds, avocados, olives, extra-virgin olive oil.    Emphasize high-quality oils and fats in the diet daily such as avocado oil, coconut oil, flaxseed, olive, sesame. Ex: 1 tsp to 1 tbsp of MCT oil from coconuts can be added into smoothies, and salad dressings per day.         Avoid trans fats found in processed foods    Drink more water. Hydration is critical, so drink at least six to eight glasses of water a day. Drink more water between meals and less at meals.    Alcohol and caffeine can stimulate your intestines, which may cause diarrhea. Artificial sweeteners that contain sugar alcohols such as sorbitol, mannitol and xylitol may cause diarrhea too. Carbonated drinks can produce gas.  Fiber draws water from your body to move foods through your intestine. Without enough  water and fluids, you may become constipated.  Try adding herbal teas (sugar free) or lemon/lime/cucumber/fruit to water for flavor. Avoid artificial sweetener packets to flavor your water.  Cut back on coffee switch to green  tea. Avoid adding sugar and milk to coffee instead use dairy alternatives such as almond, flax, coconut milk.Cut back on coffee switch to green tea. Avoid adding sugar and milk to coffee instead use dairy alternatives such as almond, flax, coconut milk.Try another coffee substitute such as   -https://Hiberna.Sporting Mouth.BIMA/  -https://Classkick.BIMA    REPLACE  Replace certain elements that are key to digestion and absorption for proper digestion of fat, carbs and protein.            Try supplementing with glutamine powder daily. 1 scoop in water or smoothie can help support gut cell proliferation and mucosal integrity, resulting in decreased intestinal permeability and better immune function.                            Focus on high quality micro-nutrients  Start Pure Encapsulations ONE Multivitamin Daily - take 1 capsule daily with food   Start Nordic Naturals Plus vitamin D - 1-2 capsules daily with food       REPOPULATE  Rebuild the friendly bacteria in your microbime. Start by taking a probiotic supplement, they will help rebuild the healthy bacteria so essential to good gut health. Recolonization your digestive tract with healthy, beneficial good bacteria (probiotics). You can do that by taking very high-potency probiotics (look for at least 25 billion live CFU s from diversified strains of Lactobacillus, Bifidobacterium, and Saccharomyces boulardii), taken twice a day for one to two months.  Start slowly and observe how the probiotics affect your gut.  In some cases, certain individuals may need to delay probiotics until their gut is more intact.    Eat fermented foods. Include plenty of probiotic-rich foods like kimchi, kombucha, miso, or sauerkraut. Sometimes, you can also eat yogurt if you are not allergic to dairy. Try unsweetened sheep or goat yogurt. These are all foods that help your gut marli get and stay healthy.    Reintroduce prebiotics from foods/supplements as tolerated that will help rebalance the  microbiome.         Probiotics: Consume foods rich in beneficial bacteria such as coconut kefir, coconut yogurt, sauerkraut, kimchi, kombucha or Kevita.           Prebiotics: Incorporate carbohydrates that bacteria love to eat such as inulin from chicory, onions, garlic, leeks, artichokes, dandelion leaves, nino gum, asparagus, apples, titus/flax seeds, psyllium, beans and resistant starches (seeds, cashews, legumes, plantains, green bananas and potatoes that are cooked and cooled.    **avoid any foods that cause adverse reactions and not tolerated when you reintroduce. Introduce slowly small amounts of gas and bloating is normal and should pass.      Choose foods high in fiber: Aim for at least 5 grams of fiber per serving of food or a total of 25-35 grams fiber per day. Remember, when looking at the label, you can take the fiber away from the total carbs. Ex:15g of total carbs - 4g of fiber = 11g net carbs    Insoluble fiber acts like a bulky  inner broom,  sweeping out debris from the intestine and creating more motility and movement.     Soluble fiber attracts water and swells, creating a gel that slows digestion.  Also, slows the release of glucose from foods into the blood which stops spikes in blood sugar levels.  Soluble fiber traps toxins in the gut, helping to carry them to excretion and provides healthy bacteria in the digestive tract.    Rebuild your friendly bacteria in your microbime. Start taking probiotic supplements. They will help rebuild the healthy bacteria so essential to good gut health.         Recommend trying BIOHM probiotic.  Take 1 capsule daily anytime with our without food. Does not need to be refrigerated.      REBLANCE  Establish regular eating habits. Eating your meals at the same time each day may help regulate your bowels.      Eat small, frequent meals instead of large ones. This will ease the amount of food moving through your intestinal tract.    Manage your stress. Stress can  negatively impact the way you digest foods and absorb nutrients leading to more digestive issues (constipation, diarrhea, indigestion, nausea etc), imbalanced blood sugars and weight imbalances. Try to focus on the following relaxation techniques:         Regular exercise such as walking         Yoga         Meditation         Breathing techniques         Time management    Increase physical activity. Moving our body helps move our bowels and speeds up your metabolism.         Exercise 15-60 minutes daily--whether that looks like burst training, yoga, or vigorous walking. Make sure you get out and get sunshine for natural vitamin d.    Work on getting great sleep. Sleep plays a huge role on gut health.          Try to get at least 7-9 hours of sleep per night. Try to shut off lights and computer around 10pm and to go to bed before midnight.          Try to have supper by 6-7pm and don't eat late within 3 hours of bedtime. Try to have smaller meal for dinner and light snack if needed around 9-10pm.      NUTRITION RESOURCES:         IFM Elimination Diet - Recipes, guide, meal plan, supplement handouts        21 Day Low FODMAP Smoothie Challenge (21 Day Low FODMAP Challenges Book 1) Karen Edition by Sole Noguera        Low FODMAP Smoothies Recipe Book: A Beginners Guide to Low FODMAP Smoothies for Gut Health   Beginners guide to fodmaps handout

## 2022-12-05 ENCOUNTER — MYC MEDICAL ADVICE (OUTPATIENT)
Dept: GASTROENTEROLOGY | Facility: CLINIC | Age: 27
End: 2022-12-05

## 2022-12-05 DIAGNOSIS — R11.0 NAUSEA: Primary | ICD-10-CM

## 2022-12-06 RX ORDER — PROCHLORPERAZINE MALEATE 10 MG
10 TABLET ORAL EVERY 6 HOURS PRN
Qty: 30 TABLET | Refills: 0 | Status: SHIPPED | OUTPATIENT
Start: 2022-12-06 | End: 2023-04-11

## 2022-12-12 ENCOUNTER — TELEPHONE (OUTPATIENT)
Dept: ENDOCRINOLOGY | Facility: CLINIC | Age: 27
End: 2022-12-12

## 2022-12-12 DIAGNOSIS — E66.812 CLASS 2 SEVERE OBESITY DUE TO EXCESS CALORIES WITH SERIOUS COMORBIDITY AND BODY MASS INDEX (BMI) OF 38.0 TO 38.9 IN ADULT (H): ICD-10-CM

## 2022-12-12 DIAGNOSIS — E66.01 CLASS 2 SEVERE OBESITY DUE TO EXCESS CALORIES WITH SERIOUS COMORBIDITY AND BODY MASS INDEX (BMI) OF 38.0 TO 38.9 IN ADULT (H): ICD-10-CM

## 2022-12-12 RX ORDER — SEMAGLUTIDE 1.7 MG/.75ML
1.7 INJECTION, SOLUTION SUBCUTANEOUS WEEKLY
Qty: 3 ML | Refills: 1 | Status: CANCELLED | OUTPATIENT
Start: 2022-12-12

## 2022-12-13 ENCOUNTER — TRANSFERRED RECORDS (OUTPATIENT)
Dept: HEALTH INFORMATION MANAGEMENT | Facility: CLINIC | Age: 27
End: 2022-12-13

## 2022-12-13 DIAGNOSIS — E66.812 CLASS 2 SEVERE OBESITY DUE TO EXCESS CALORIES WITH SERIOUS COMORBIDITY AND BODY MASS INDEX (BMI) OF 38.0 TO 38.9 IN ADULT (H): ICD-10-CM

## 2022-12-13 DIAGNOSIS — E66.01 CLASS 2 SEVERE OBESITY DUE TO EXCESS CALORIES WITH SERIOUS COMORBIDITY AND BODY MASS INDEX (BMI) OF 38.0 TO 38.9 IN ADULT (H): ICD-10-CM

## 2022-12-13 DIAGNOSIS — E66.01 MORBID OBESITY DUE TO EXCESS CALORIES (H): Primary | ICD-10-CM

## 2022-12-13 DIAGNOSIS — R73.03 PREDIABETES: ICD-10-CM

## 2022-12-13 RX ORDER — SEMAGLUTIDE 2.4 MG/.75ML
2.4 INJECTION, SOLUTION SUBCUTANEOUS WEEKLY
Qty: 3 ML | Refills: 5 | Status: SHIPPED | OUTPATIENT
Start: 2022-12-13 | End: 2023-05-12

## 2022-12-13 RX ORDER — SEMAGLUTIDE 2.4 MG/.75ML
INJECTION, SOLUTION SUBCUTANEOUS
Status: CANCELLED | OUTPATIENT
Start: 2022-12-13

## 2022-12-14 ENCOUNTER — TRANSFERRED RECORDS (OUTPATIENT)
Dept: HEALTH INFORMATION MANAGEMENT | Facility: CLINIC | Age: 27
End: 2022-12-14

## 2022-12-15 NOTE — TELEPHONE ENCOUNTER
Semaglutide-Weight Management (WEGOVY) 2.4 MG/0.75ML SOAJ 3 mL 5 12/13/2022  --   Sig - Route: Inject 2.4 mg Subcutaneous once a week - Subcutaneous   Sent to pharmacy as: Wegovy 2.4 MG/0.75ML Subcutaneous Solution Auto-injector (Semaglutide-Weight Management)   Class: E-Prescribe   Order: [id number]   E-Prescribing Status: Receipt confirmed by pharmacy (12/13/2022  9:31 PM CST)     Printout Tracking    External Result Report     Pharmacy    [id number] - [place], [place] - [address] [place] [address] AT [facility]

## 2022-12-16 ENCOUNTER — TRANSFERRED RECORDS (OUTPATIENT)
Dept: HEALTH INFORMATION MANAGEMENT | Facility: CLINIC | Age: 27
End: 2022-12-16

## 2023-01-10 ENCOUNTER — MYC MEDICAL ADVICE (OUTPATIENT)
Dept: GASTROENTEROLOGY | Facility: CLINIC | Age: 28
End: 2023-01-10

## 2023-01-11 ENCOUNTER — OFFICE VISIT (OUTPATIENT)
Dept: FAMILY MEDICINE | Facility: CLINIC | Age: 28
End: 2023-01-11
Payer: COMMERCIAL

## 2023-01-11 ENCOUNTER — APPOINTMENT (OUTPATIENT)
Dept: LAB | Facility: CLINIC | Age: 28
End: 2023-01-11
Payer: COMMERCIAL

## 2023-01-11 VITALS
OXYGEN SATURATION: 100 % | HEART RATE: 78 BPM | SYSTOLIC BLOOD PRESSURE: 119 MMHG | HEIGHT: 66 IN | TEMPERATURE: 99.2 F | RESPIRATION RATE: 18 BRPM | BODY MASS INDEX: 36.96 KG/M2 | DIASTOLIC BLOOD PRESSURE: 81 MMHG | WEIGHT: 230 LBS

## 2023-01-11 DIAGNOSIS — R10.11 RUQ ABDOMINAL PAIN: Primary | ICD-10-CM

## 2023-01-11 LAB
BASOPHILS # BLD AUTO: 0 10E3/UL (ref 0–0.2)
BASOPHILS NFR BLD AUTO: 1 %
CRP SERPL-MCNC: 19.2 MG/L
EOSINOPHIL # BLD AUTO: 0 10E3/UL (ref 0–0.7)
EOSINOPHIL NFR BLD AUTO: 0 %
ERYTHROCYTE [DISTWIDTH] IN BLOOD BY AUTOMATED COUNT: 13.2 % (ref 10–15)
HCT VFR BLD AUTO: 39.5 % (ref 35–47)
HGB BLD-MCNC: 12.9 G/DL (ref 11.7–15.7)
LIPASE SERPL-CCNC: 48 U/L (ref 13–60)
LYMPHOCYTES # BLD AUTO: 2 10E3/UL (ref 0.8–5.3)
LYMPHOCYTES NFR BLD AUTO: 37 %
MCH RBC QN AUTO: 29.1 PG (ref 26.5–33)
MCHC RBC AUTO-ENTMCNC: 32.7 G/DL (ref 31.5–36.5)
MCV RBC AUTO: 89 FL (ref 78–100)
MONOCYTES # BLD AUTO: 0.5 10E3/UL (ref 0–1.3)
MONOCYTES NFR BLD AUTO: 10 %
NEUTROPHILS # BLD AUTO: 2.8 10E3/UL (ref 1.6–8.3)
NEUTROPHILS NFR BLD AUTO: 52 %
PLATELET # BLD AUTO: 275 10E3/UL (ref 150–450)
RBC # BLD AUTO: 4.44 10E6/UL (ref 3.8–5.2)
WBC # BLD AUTO: 5.4 10E3/UL (ref 4–11)

## 2023-01-11 PROCEDURE — 86140 C-REACTIVE PROTEIN: CPT

## 2023-01-11 PROCEDURE — 36415 COLL VENOUS BLD VENIPUNCTURE: CPT

## 2023-01-11 PROCEDURE — 83690 ASSAY OF LIPASE: CPT

## 2023-01-11 PROCEDURE — 99213 OFFICE O/P EST LOW 20 MIN: CPT

## 2023-01-11 PROCEDURE — 85025 COMPLETE CBC W/AUTO DIFF WBC: CPT

## 2023-01-11 ASSESSMENT — PATIENT HEALTH QUESTIONNAIRE - PHQ9
SUM OF ALL RESPONSES TO PHQ QUESTIONS 1-9: 13
SUM OF ALL RESPONSES TO PHQ QUESTIONS 1-9: 13
10. IF YOU CHECKED OFF ANY PROBLEMS, HOW DIFFICULT HAVE THESE PROBLEMS MADE IT FOR YOU TO DO YOUR WORK, TAKE CARE OF THINGS AT HOME, OR GET ALONG WITH OTHER PEOPLE: VERY DIFFICULT

## 2023-01-11 ASSESSMENT — PAIN SCALES - GENERAL: PAINLEVEL: SEVERE PAIN (6)

## 2023-01-11 NOTE — TELEPHONE ENCOUNTER
[employer] message sent to patient that update/message will be forwarded to provider.       Niurka Casey RN

## 2023-01-11 NOTE — PROGRESS NOTES
Assessment & Plan     RUQ abdominal pain  - CRP, inflammation; Future  - CBC with platelets and differential; Future  - Lipase; Future  - US Abdomen Complete; Future  Unclear etiology for right upper quadrant pain.  This may be consistent with patient's normal irritable bowel syndrome issues.  However, she reports that it is a different quality than her normal level of pain.  Given that this occurred with a fatty meal in the morning, and she had mild guarding on exam, we can perform an abdominal ultrasound to rule out gallstone issue and lipase.  Minimal concern for appendicitis given the severity of presentation on exam.  I did discuss with patient how when she has abdominal pain flares in the future, she may consider trying some Metamucil or fiber supplement to promote regularity.  This pain may also be consistent with constipation as she has had pellet-like bowel movements.    Return if symptoms worsen or fail to improve.    Iván Razo NP  [facility] is a 27 year old presenting for the following health issues:  Abdominal Pain    Patient has RUQ abdominal pain that started this morning. Mouth filling with saliva and general. Constant. Worse with eating breakfast (baked salmon with potatoes). No vomiting.  Patient reports that she usually has constipated, but it was like hard/cherelle (which she had to strain to get them released). Normally has more of sausage quality, but not usually pellets. Nothing seems to improve her pain.  Pain did not improve with BM. Looked like a regular color (a little bit of mucous).  Patient has had a headache with this as well.    No fevers, chills.    Normally, pain occurs on the epigastric and bilateral lower abdomen.      History of Present Illness       Reason for visit:  Abdominal pain    She eats 2-3 servings of fruits and vegetables daily.She consumes 1 sweetened beverage(s) daily.She exercises with enough effort to  "increase her heart rate 9 or less minutes per day.  She exercises with enough effort to increase her heart rate 3 or less days per week. She is missing 2 dose(s) of medications per week.  She is not taking prescribed medications regularly due to side effects, remembering to take and other.    Today's PHQ-9         PHQ-9 Total Score: 13    PHQ-9 Q9 Thoughts of better off dead/self-harm past 2 weeks :   Not at all    How difficult have these problems made it for you to do your work, take care of things at home, or get along with other people: Very difficult       Concern - Abdominal Pain  Onset: 1/11/2023 - this morning  Description: Dull ache, on right side of abdomen  Intensity: moderate  Progression of Symptoms:  worsening  Accompanying Signs & Symptoms: Constipation nausea  Previous history of similar problem: N/A  Precipitating factors:        Worsened by: After eating, moving  Alleviating factors:        Improved by: None  Therapies tried and outcome: None      Review of Systems   Constitutional, HEENT, cardiovascular, pulmonary, gi and gu systems are negative, except as otherwise noted.      Objective    /81 (BP Location: Right arm, Patient Position: Sitting, Cuff Size: Adult Large)   Pulse 78   Temp 99.2  F (37.3  C) (Oral)   Resp 18   Ht 1.667 m (5' 5.63\")   Wt 104.3 kg (230 lb)   LMP 12/21/2022 (Exact Date)   SpO2 100%   BMI 37.54 kg/m    Body mass index is 37.54 kg/m .  Physical Exam   GENERAL: healthy, alert and no distress  ABDOMEN: Generalized tenderness over the abdomen, worse in the medial RUQ and epigastric region. Mild guarding with epigastric palpation. No hepatosplenomegaly, no masses and bowel sounds normal  PSYCH: mentation appears normal, affect normal/bright              "

## 2023-01-11 NOTE — TELEPHONE ENCOUNTER
Replied to [employer] message requesting additional information.    Niurka Casey RN     no change progress slowly

## 2023-01-12 ENCOUNTER — HOSPITAL ENCOUNTER (OUTPATIENT)
Dept: ULTRASOUND IMAGING | Facility: HOSPITAL | Age: 28
Discharge: HOME | End: 2023-01-12
Payer: COMMERCIAL

## 2023-01-12 DIAGNOSIS — R10.11 RUQ ABDOMINAL PAIN: ICD-10-CM

## 2023-01-12 PROCEDURE — 76700 US EXAM ABDOM COMPLETE: CPT

## 2023-01-20 ENCOUNTER — VIRTUAL VISIT (OUTPATIENT)
Dept: ENDOCRINOLOGY | Facility: CLINIC | Age: 28
End: 2023-01-20
Payer: COMMERCIAL

## 2023-01-20 VITALS — HEIGHT: 66 IN | BODY MASS INDEX: 33.43 KG/M2 | WEIGHT: 208 LBS

## 2023-01-20 DIAGNOSIS — E66.812 CLASS 2 OBESITY DUE TO EXCESS CALORIES WITHOUT SERIOUS COMORBIDITY IN ADULT, UNSPECIFIED BMI: Primary | ICD-10-CM

## 2023-01-20 DIAGNOSIS — E66.09 CLASS 2 OBESITY DUE TO EXCESS CALORIES WITHOUT SERIOUS COMORBIDITY IN ADULT, UNSPECIFIED BMI: Primary | ICD-10-CM

## 2023-01-20 PROCEDURE — 99213 OFFICE O/P EST LOW 20 MIN: CPT | Mod: 95 | Performed by: INTERNAL MEDICINE

## 2023-01-20 ASSESSMENT — PAIN SCALES - GENERAL: PAINLEVEL: MILD PAIN (3)

## 2023-01-20 NOTE — PROGRESS NOTES
"Ideo visit today--pt agrees to telehealth video visit and is located in [place]  [employer] platform  --video visit start 10:52, end 11:05    Return Medical Weight Management Note     Nehemias Mascorro  MRN:  [id number]  :  1995  JASMEET:  2023    Dear Alexandra Rodrigues MD,    I had the pleasure of seeing your patient Nehemias Mascorro. She is a 27 year old female who I am continuing to see for treatment of obesity; PMH includes the following:  Past Medical History:   Diagnosis Date     ADHD (attention deficit hyperactivity disorder)      Depressive disorder      Ovarian cyst      Uncomplicated asthma            INTERVAL HISTORY:    She was last seen about 4 mo ago.  Reviewed and relevant history as extracted from earlier visits is as follows--     --she previosuly did the 24-wk program and has tried multiple meds for wt loss (including topiramate, addition of  Naltrexone to bupropion she is on, and most recently Wegovy; other meds are potentially problematic (phentermine, given that she is on Adderall). Additionally of note is the following:  -------------------------------------------  \"Note[d] the program [wa]s helping with making changes with food and be accountable, things she focussed on--  1. Working on protein shake AM  2. Eat slower/mindful eating--working on this  3.  Walking several times per week with her mother--not happening yet with [employer] work going     Regarding the GI issue she has been working on with [facility]--doing PT to work on coordinating with BMs; pain and nausea and constipation.  That is improving some   --getting nausea still a couple of times per week; stomach cramping about 3 times per month, stomach pain a couple of times per week  --BMs 3 times per week;  In the past there were times that would be less than once per week        Goals before around structured eating--  1.  Have protein drink (Terra's Way)--110 calories; breakfast routine lately has been higher calorie " "(eggs/yogurt/oatmeal)  2.  Add protein to snack if having a snack  3.  Change from fruit cups to whole fruit (meeting)        Goals she had prior:  1.  1400 Calories (Myfitnesspal, measuring food with scales, eating more whole foods)  2.  Activity goals--3 days per wk walking (30 min)  3.  Not eating after dinner (eat dinner and then stay busy after it)\"     -----------------------------------       Still having GI issues--  --elimination diet with GI  --having nausea/constipation/crampy pain (was having BMs less than once per wk but last wk has had a couple of BMs);on ondestron for nausea  --pt asked to increase from 1.7 to 2.4 mg Wegovy weekly and wants to stay on this dose now, notes that her GI symptoms did not worsen with the increase dose and that her GI symptoms are a little better recently    --less hunger and cravings on the GLP1 agonist; very happy with her GLP agonist support for wt loss    Foods habits/patterns--  --currently with good habits  --Br protein shake or oatmeal/fruit  --Petra sometimes (leftovers or tuna on GF bread)  --Sometimes no dinner or if having--protein (like chiicken or steak), healthy carb, nonstarchy veggie  (doing more cooking and food prep)    --generally about 2 meals per day    --using Miralax sometimes--1/2 times per week         LAST WEIGHT:   236 lbs 0 oz  Body mass index is 38.09 kg/m     CURRENT WEIGHT:   208 lbs 0 oz   Body mass index is 33.95 kg/m .    Initial Weight (lbs): 248 lbs  Last Visits Weight: 104.3 kg (230 lb)  Cumulative weight loss (lbs): 40  Weight Loss Percentage: 16.13%    Changes and Difficulties 1/20/2023   I have made the following changes to my diet since my last visit: I have started the elimination diet.   With regards to my diet, I am still struggling with: Wanting dessert sometimes   I have made the following changes to my activity/exercise since my last visit: -   With regards to my activity/exercise, I am still struggling with: Motivation " "      VITALS:  Ht 1.667 m (5' 5.63\")   Wt 94.3 kg (208 lb)   LMP 12/21/2022 (Exact Date)   BMI 33.95 kg/m      MEDICATIONS:   Current Outpatient Medications   Medication Sig Dispense Refill     albuterol (ACCUNEB) 1.25 MG/3ML neb solution Take 1 vial (1.25 mg) by nebulization every 6 hours as needed for shortness of breath / dyspnea or wheezing 90 mL 0     ARIPiprazole (ABILIFY) 20 MG tablet Take 20 mg by mouth daily       buPROPion (WELLBUTRIN XL) 300 MG 24 hr tablet Take 300 mg by mouth every morning       CONCERTA 36 MG CR tablet TAKE 1 TABLET BY MOUTH DAILY IN THE MORNING FOR ADHD. START 11/18/22       drospirenone-ethinyl estradiol (JESSY) 3-0.02 MG tablet Take 1 tablet by mouth daily       EMGALITY 120 MG/ML injection Inject 120 mg Subcutaneous every 28 days       famotidine (PEPCID) 20 MG tablet Take 1 tablet (20 mg) by mouth 2 times daily 180 tablet 1     FLUoxetine (PROZAC) 40 MG capsule Take 40 mg by mouth daily 20 mg + 40 mg = 60 mg       hyoscyamine (LEVSIN) 0.125 MG tablet Take 1 tablet (125 mcg) by mouth every 6 hours as needed for cramping 30 tablet 0     ketoconazole (NIZORAL) 2 % external cream        LANsoprazole (PREVACID) 15 MG DR capsule Take 1 capsule (15 mg) by mouth daily 90 capsule 3     ondansetron (ZOFRAN ODT) 4 MG ODT tab Take 1 tablet (4 mg) by mouth every 8 hours as needed for nausea 30 tablet 0     polyethylene glycol (MIRALAX) 17 GM/Dose powder Take 1 capful by mouth daily as needed for constipation       prochlorperazine (COMPAZINE) 10 MG tablet Take 1 tablet (10 mg) by mouth every 6 hours as needed for nausea or vomiting 30 tablet 0     Semaglutide-Weight Management (WEGOVY) 2.4 MG/0.75ML SOAJ Inject 2.4 mg Subcutaneous once a week 3 mL 5     TAZORAC 0.1 % external cream APPLY TOPICALLY TO THE AFFECTED AREA AT BEDTIME       VENTOLIN  (90 Base) MCG/ACT inhaler INHALE 2 PUFFS INTO THE LUNGS FOUR TIMES DAILY 18 g 3       Weight Loss Medication History Reviewed With Patient " 1/20/2023   Which weight loss medications are you currently taking on a regular basis?  Wegovy   Are you having any side effects from the weight loss medication that we have prescribed you? Yes   If you are having side effects please describe: Not sure if I am or if it's GI problems            ASSESSMENT;  Morbid obesity in pt with wt gain over past few years, more since her TBI  Prediabetes     PLAN:   (continues)  Decrease portion sizes  No meals in front of TV screen  Purge house of food triggers  No meal skipping and avoid snacking  Decrease caloric beverages--avoid soda  Volumetrics low carb eating plan--structured plan and avoding snacking  Low Calorie/low fat diet  Meal planning/journaling           Additionally she has had goals for lifestyle including--  Steps/activity (not addressed specifically today)  Prior had noted--4000 steps per day; change to 5000 was a goal before--could be a future goal, same with tracking for 1400 calories daily. Continue to follow  With food (continuing)  1.  Eating out once per wk or less  2.  With meals  emphasizing lean protein with meals, lower carb, lots of nonstarchy veggies  2.  Being part of the grocery shopping ( to have healthier options at home)  3.  Avoiding high carb afternoon snacking      Also--  --RTC in about 3 mo; continue current wt vicente med (including Wegovy 2.4 mg weekly; discussed that should GI issues worsen we would want to consider lowering the dose of that to see if there is improviement; the side effects of that med overlap with her GI symptoms)        Time: about 21 min spent on evaluation, management, counseling, education, & motivational interviewing (video visit) combined with previsit prep and post visit follow up care same day.        Sincerely,    Luis Slade MD

## 2023-01-20 NOTE — PATIENT INSTRUCTIONS
Thank you for allowing us the privilege of caring for you. We hope we provided you with the excellent service you deserve.    Please let us know if there is anything else we can do for you so that we can be sure you are completely satisfied with your care experience.    Your visit was with Dr. Slade today.    Instructions per today's visit:  --we can plan on return with Dr. Slade in about 3 mo in support of your healthy eating  --we can continue the 2.4 mg Wegovy weekly in support of your healthy eating and wt loss goals  --let us know if any questions or concerns between visits    Please call our contact center at [phone] to schedule your next appointments.    Meal Replacement Products:    Here is the link to our new e-store where you can purchase our meal replacement products    [email or web address]    The one week starter kit is a great way to sample a variety of products and see what works for you.    If you want more information about the product go to: [employer]ie    Free Shipping for orders over $75    Benefits of meal replacements products:    Portion and calorie control  Improved nutrition  Structured eating  Simplified food choices  Avoid contact with trigger foods    Interested in working with a health ?  Health coaches work with you to improve your overall health and wellbeing.  They look at the whole person, and may involve discussion of different areas of life, including, but not limited to the four pillars of health (sleep, exercise, nutrition, and stress management). Discuss with your care team if you would like to start working a health .    Health Coaching-3 Pack: Schedule by calling [phone]    $99 for three health coaching visits    Visits may be done in person or via phone    Coaching is a partnership between the  and the client; Coaches do not prescribe or diagnose    Coaching helps inspire the client to reach  his/her personal goals    Bluetooth Scale:    We hope to provide you with high quality telephone and virtual healthcare visits while social distancing for COVID-19 is necessary, as well as in the future when virtual visits may be more convenient for you.    Our technology team made it possible for Bluetooth scales to send weight measurements to our electronic medical record. This allows weights from you weighing at home to securely flow into the medical record, which will improve telephone and virtual visits.  Additionally, studies have shown that adults actually lose more weight when their weights are automatically sent to someone else, and also that this process is not stressful for those adults.    Below is a link for purchasing the scale, with a discount code for our patients. You may call your insurance company to see if they will reimburse you for the cost of the scale, as a piece of durable medical equipment. The scales only go up to a weight of 400 pounds. This is an issue and we are working with the developer on increasing this. We found no scales that go over 400lb that have blue-tooth for connecting to [employer].    Scale to purchase: the [employer]  Body  Scale: [email or web address]=aw.ds    Discount Code: We have a discount code for our patients to bring the cost down to $50, the code is:  withmed     Steps to link the scale to [employer] Machines via an Android Phone (you can always disconnect at any point in the future):  1. The order must be placed first before the patient can access Track My Health within [employer] Mach[employer].  2. Download Google Fit robby from the Google Play Store  a. Log in or register using your Google account  3. Download the Soft Machines robby from Google Play Store  a. Select add organization  b. Search for [employer] and select it  c. Log into Soft Machines  d. Select Track My Health  e. Select the green connect my account  button  f. When prompted log into your Google account  g. Select okay to confirm the account  4. Download the Withings Health Mate junaid from Google Play Store  a. [place] for [employer]oop  b. Go to profile  c. Tap google fit under the Apps section  d. Select the option to activate Google Fit integration  e. Select the same Google account  f. Select okay to confirm the account  g.  Steps to link the scale to REscour via an iPhone (you can always disconnect at any point in the future):  **Note Intio is not available for download on an iPad**  1. The order must be placed first before the patient can access Track My Health within REscour.  2. Locate the Health junaid on your iPhone.  a. Set up your Apple Health account as prompted  b. The Sources page will show Apps that communicate with your Health junaid. Once all steps are completed, you should see [employer] and [employer] listed under the Apps section and your iPhone under the devices section.  i. Select Health Phase Focus  1. Under 'ALLOW  [employer]  TO WRITE DATA ensure the toggle is on for Weight.  2. This will allow the scale to add your weight to the Apple Health  ii. Select Athenixhart  1. Under 'ALLOW  MeetricsT  TO READ DATA ensure the toggle is on for Weight.  2. This allows REscour to grab the weight from Intio so your provider can see your weights.  3. Download the [employer] junaid from the Junaid Store  a. Select add organization                                                  b. Search for [employer] and select it  c. Log into REscour  d. Select Track My Health  e. Select the green connect my account button  f. Follow prompts to link your device to REscour.  4. Download the Withings health mate junaid in the Junaid Store  a. [place] for WithinZoop  b. Go to profile  c. Tap Health under the Apps section  d. If prompted to allow access with the Health Junaid, toggle weight on for read and write access.      For any questions/concerns contact Susan Balderas LPN at [phone]    To  schedule appointments with our team, please call [phone]    Please call during clinic hours Monday through Friday 8:00a - 4:00p if you have questions or you can contact us via HW at anytime.      Lab results will be communicated through My Chart or letter (if My Chart not used). Please call the clinic if you have not received communication after 1 week or if you have any questions.    Clinic Fax: [phone]    Thank you,  Medical Weight Management Team

## 2023-01-20 NOTE — NURSING NOTE
"Chief Complaint   Patient presents with     RECHECK     Immanuelle, is participating in a virtual visit today for a 2-3 month follow up, as reported by patient.       Vitals:    01/20/23 1006   Weight: 94.3 kg (208 lb)   Height: 1.667 m (5' 5.63\")       Body mass index is 33.95 kg/m .      Sade Torres LPN    "

## 2023-01-20 NOTE — LETTER
"2023       RE: Nehemias Mascorro  [address]  [place] [address]     Dear Colleague,    Thank you for referring your patient, Nehemias Mascorro, to the [facility] [place] [facility]. Please see a copy of my visit note below.    Ideo visit today--pt agrees to telehealth video visit and is located in [facility]    Return Medical Weight Management Note     Nehemias Mascorro  MRN:  [id number]  :  1995  JASMEET:  2023    Dear Alexandra Rodrigues MD,    I had the pleasure of seeing your patient Nehemias Mascorro. She is a 27 year old female who I am continuing to see for treatment of obesity; PMH includes the following:  Past Medical History:   Diagnosis Date     ADHD (attention deficit hyperactivity disorder)      Depressive disorder      Ovarian cyst      Uncomplicated asthma            INTERVAL HISTORY:    She was last seen about 4 mo ago.  Reviewed and relevant history as extracted from earlier visits is as follows--     --she previosuly did the 24-wk program and has tried multiple meds for wt loss (including topiramate, addition of  Naltrexone to bupropion she is on, and most recently Wegovy; other meds are potentially problematic (phentermine, given that she is on Adderall). Additionally of note is the following:  -------------------------------------------  \"Note[d] the program [wa]s helping with making changes with food and be accountable, things she focussed on--  1. Working on protein shake AM  2. Eat slower/mindful eating--working on this  3.  Walking several times per week with her mother--not happening yet with [employer] work going     Regarding the GI issue she has been working on with [facility]--doing PT to work on coordinating with BMs; pain and nausea and constipation.  That is improving some   --getting nausea still a couple of times per week; stomach cramping about 3 times " "per month, stomach pain a couple of times per week  --BMs 3 times per week;  In the past there were times that would be less than once per week        Goals before around structured eating--  1.  Have protein drink (Terra's Way)--110 calories; breakfast routine lately has been higher calorie (eggs/yogurt/oatmeal)  2.  Add protein to snack if having a snack  3.  Change from fruit cups to whole fruit (meeting)        Goals she had prior:  1.  1400 Calories (Myfitnesspal, measuring food with scales, eating more whole foods)  2.  Activity goals--3 days per wk walking (30 min)  3.  Not eating after dinner (eat dinner and then stay busy after it)\"     -----------------------------------       Still having GI issues--  --elimination diet with GI  --having nausea/constipation/crampy pain (was having BMs less than once per wk but last wk has had a couple of BMs);on ondestron for nausea  --pt asked to increase from 1.7 to 2.4 mg Wegovy weekly and wants to stay on this dose now, notes that her GI symptoms did not worsen with the increase dose and that her GI symptoms are a little better recently    --less hunger and cravings on the GLP1 agonist; very happy with her GLP agonist support for wt loss    Foods habits/patterns--  --currently with good habits  --Br protein shake or oatmeal/fruit  --Petra sometimes (leftovers or tuna on GF bread)  --Sometimes no dinner or if having--protein (like chiicken or steak), healthy carb, nonstarchy veggie  (doing more cooking and food prep)    --generally about 2 meals per day    --using Miralax sometimes--1/2 times per week         LAST WEIGHT:   236 lbs 0 oz  Body mass index is 38.09 kg/m     CURRENT WEIGHT:   208 lbs 0 oz   Body mass index is 33.95 kg/m .    Initial Weight (lbs): 248 lbs  Last Visits Weight: 104.3 kg (230 lb)  Cumulative weight loss (lbs): 40  Weight Loss Percentage: 16.13%    Changes and Difficulties 1/20/2023   I have made the following changes to my diet since my last " "visit: I have started the elimination diet.   With regards to my diet, I am still struggling with: Wanting dessert sometimes   I have made the following changes to my activity/exercise since my last visit: -   With regards to my activity/exercise, I am still struggling with: Motivation       VITALS:  Ht 1.667 m (5' 5.63\")   Wt 94.3 kg (208 lb)   LMP 12/21/2022 (Exact Date)   BMI 33.95 kg/m      MEDICATIONS:   Current Outpatient Medications   Medication Sig Dispense Refill     albuterol (ACCUNEB) 1.25 MG/3ML neb solution Take 1 vial (1.25 mg) by nebulization every 6 hours as needed for shortness of breath / dyspnea or wheezing 90 mL 0     ARIPiprazole (ABILIFY) 20 MG tablet Take 20 mg by mouth daily       buPROPion (WELLBUTRIN XL) 300 MG 24 hr tablet Take 300 mg by mouth every morning       CONCERTA 36 MG CR tablet TAKE 1 TABLET BY MOUTH DAILY IN THE MORNING FOR ADHD. START 11/18/22       drospirenone-ethinyl estradiol (JESSY) 3-0.02 MG tablet Take 1 tablet by mouth daily       EMGALITY 120 MG/ML injection Inject 120 mg Subcutaneous every 28 days       famotidine (PEPCID) 20 MG tablet Take 1 tablet (20 mg) by mouth 2 times daily 180 tablet 1     FLUoxetine (PROZAC) 40 MG capsule Take 40 mg by mouth daily 20 mg + 40 mg = 60 mg       hyoscyamine (LEVSIN) 0.125 MG tablet Take 1 tablet (125 mcg) by mouth every 6 hours as needed for cramping 30 tablet 0     ketoconazole (NIZORAL) 2 % external cream        LANsoprazole (PREVACID) 15 MG DR capsule Take 1 capsule (15 mg) by mouth daily 90 capsule 3     ondansetron (ZOFRAN ODT) 4 MG ODT tab Take 1 tablet (4 mg) by mouth every 8 hours as needed for nausea 30 tablet 0     polyethylene glycol (MIRALAX) 17 GM/Dose powder Take 1 capful by mouth daily as needed for constipation       prochlorperazine (COMPAZINE) 10 MG tablet Take 1 tablet (10 mg) by mouth every 6 hours as needed for nausea or vomiting 30 tablet 0     Semaglutide-Weight Management (WEGOVY) 2.4 MG/0.75ML SOAJ Inject " 2.4 mg Subcutaneous once a week 3 mL 5     TAZORAC 0.1 % external cream APPLY TOPICALLY TO THE AFFECTED AREA AT BEDTIME       VENTOLIN  (90 Base) MCG/ACT inhaler INHALE 2 PUFFS INTO THE LUNGS FOUR TIMES DAILY 18 g 3       Weight Loss Medication History Reviewed With Patient 1/20/2023   Which weight loss medications are you currently taking on a regular basis?  Wegovy   Are you having any side effects from the weight loss medication that we have prescribed you? Yes   If you are having side effects please describe: Not sure if I am or if it's GI problems            ASSESSMENT;  Morbid obesity in pt with wt gain over past few years, more since her TBI  Prediabetes     PLAN:   (continues)  Decrease portion sizes  No meals in front of TV screen  Purge house of food triggers  No meal skipping and avoid snacking  Decrease caloric beverages--avoid soda  Volumetrics low carb eating plan--structured plan and avoding snacking  Low Calorie/low fat diet  Meal planning/journaling           Additionally she has had goals for lifestyle including--  Steps/activity (not addressed specifically today)  Prior had noted--4000 steps per day; change to 5000 was a goal before--could be a future goal, same with tracking for 1400 calories daily. Continue to follow  With food (continuing)  1.  Eating out once per wk or less  2.  With meals  emphasizing lean protein with meals, lower carb, lots of nonstarchy veggies  2.  Being part of the grocery shopping ( to have healthier options at home)  3.  Avoiding high carb afternoon snacking      Also--  --RTC in about 3 mo; continue current wt vicente med (including Wegovy 2.4 mg weekly; discussed that should GI issues worsen we would want to consider lowering the dose of that to see if there is improviement; the side effects of that med overlap with her GI symptoms)        Time: about 21 min spent on evaluation, management, counseling, education, & motivational interviewing (video visit) combined  with previsit prep and post visit follow up care same day.        Sincerely,    Luis Slade MD

## 2023-01-21 ENCOUNTER — HEALTH MAINTENANCE LETTER (OUTPATIENT)
Age: 28
End: 2023-01-21

## 2023-01-24 ENCOUNTER — NURSE TRIAGE (OUTPATIENT)
Dept: NURSING | Facility: CLINIC | Age: 28
End: 2023-01-24
Payer: COMMERCIAL

## 2023-01-25 NOTE — TELEPHONE ENCOUNTER
Patient was seen in clinic and diagnosed with a gluten allergy.    Patient states she ate something this morning, afternoon and tonight and just realized it had gluten in it.  Patient states her throat feels tight and swollen.  She states she feels a lump in her throat it and feels like it is getting bigger.    Patient states she is able to swallow and is able to breath.  She took Benadryl about 20 min ago and nothing has changed.      Patient denies vomiting, no abdominal pain.    Care advise: need to be seen, patient states she has a [facility] near her house is it ok to go there.  I said yes, if they feel you need to go to ED they will let you know.  If breathing starts to become difficult, unable to swallow well or swelling in your mouth stop the car and call 911.    Alpa Soriano RN   01/24/23 8:00 PM  [facility] Nurse Advisor    Reason for Disposition    Life-threatening allergic reaction (anaphylaxis) suspected    [1] Face swelling AND [2] onset > 2 hours after exposure to high-risk allergen (e.g., sting, nuts, 1st dose of antibiotic)    [1] SEVERE swelling of entire face AND [2] < 2 hours since exposure to high-risk allergen (e.g., peanuts, tree nuts, fish, shellfish or 1st dose of drug) AND [3] no serious symptoms AND [4] no serious allergic reaction in the past    Additional Information    Negative: [1] Life-threatening reaction in the past to similar substance (e.g., food, insect bite/sting, medication, etc.) AND [2] < 2 hours since exposure    Negative: Wheezing, stridor, hoarseness, or difficulty breathing    Negative: [1] Tightness in the chest or throat AND [2] begins within 2 hours of exposure to allergic substance    Negative: Difficulty swallowing, drooling or slurred speech    Negative: Difficult to awaken or acting confused (e.g., disoriented, slurred speech)    Negative: Unresponsive, passed out or very weak    Negative: Other symptom of severe allergic reaction (Exception: Hives or  facial swelling alone)    Negative: Sounds like a life-threatening emergency to the triager    Negative: [1] Widespread hives AND [2] onset > 2 hours after exposure to high-risk allergen (e.g., sting, nuts, 1st dose of antibiotic)    Negative: [1] Widespread itching AND [2] onset > 2 hours after exposure to high-risk allergen (e.g., sting, nuts, 1st dose of antibiotic)    Negative: [1] Life-threatening reaction (anaphylaxis) in the past to similar substance (e.g., food, insect bite/sting, chemical, etc.) AND [2] < 2 hours since exposure    Negative: Unresponsive, passed out or very weak    Negative: Swollen tongue    Negative: Difficulty breathing or wheezing    Negative: Sounds like a life-threatening emergency to the triager    Negative: Followed a face injury    Negative: [1] Bee sting AND [2] within last 24 hours    Negative: Insect bite suspected    Negative: Swelling mainly of lip(s)    Negative: Swelling mainly around the eyes    Protocols used: ALLERGIC REACTIONS - GUIDELINE [id number], [id number], FACE SWELLING-A-AH

## 2023-01-27 ENCOUNTER — APPOINTMENT (OUTPATIENT)
Dept: LAB | Facility: CLINIC | Age: 28
End: 2023-01-27
Payer: COMMERCIAL

## 2023-01-27 ENCOUNTER — OFFICE VISIT (OUTPATIENT)
Dept: INTERNAL MEDICINE | Facility: CLINIC | Age: 28
End: 2023-01-27
Payer: COMMERCIAL

## 2023-01-27 VITALS
HEART RATE: 79 BPM | WEIGHT: 232 LBS | RESPIRATION RATE: 13 BRPM | SYSTOLIC BLOOD PRESSURE: 117 MMHG | HEIGHT: 66 IN | OXYGEN SATURATION: 100 % | TEMPERATURE: 97.9 F | DIASTOLIC BLOOD PRESSURE: 66 MMHG | BODY MASS INDEX: 37.28 KG/M2

## 2023-01-27 DIAGNOSIS — T78.3XXA ANGIOEDEMA, INITIAL ENCOUNTER: Primary | ICD-10-CM

## 2023-01-27 PROCEDURE — 83993 ASSAY FOR CALPROTECTIN FECAL: CPT | Performed by: INTERNAL MEDICINE

## 2023-01-27 PROCEDURE — 99213 OFFICE O/P EST LOW 20 MIN: CPT | Performed by: INTERNAL MEDICINE

## 2023-01-27 NOTE — PROGRESS NOTES
"  Assessment & Plan     Angioedema, initial encounter  Her gluten test have been negative.  Her upper endoscopies that were done for other digestive issues have not shown any signs of gluten enteropathy but she also has not had a small bowel biopsy done.  It is very unlikely that gluten would cause angioedema.  With a prior TTG antibodies negative.  To rule out other causes of acute allergy referral to an allergy specialist has been made.  I am concerned that this could represent an anxiety episode but want to err on the safe side EpiPen can be carried with her - Adult Allergy/Asthma Referral      Overall she is looking much better than she has been her studies are almost coming to an end.  That has been a big relief to her       MED REC REQUIRED  Post Medication Reconciliation Status: discharge medications reconciled, continue medications without change  BMI:   Estimated body mass index is 37.45 kg/m  as calculated from the following:    Height as of this encounter: 1.676 m (5' 6\").    Weight as of this encounter: 105.2 kg (232 lb).           Return for Routine preventive.    Alexandra Rodrigues MD  [facility]    Araceli Del Cid is a 27 year old accompanied by her self, presenting for the following health issues:  Hospital F/U ([facility] visit 1/24/2023. Allergic reaction to gluten? Throat started swelling up, headache and body shakes. Gastroenterologist thinks she has a gluten allergy. Pt ate food with gluten the day of [facility] visit. ) and Recheck Medication    She has a complex history of major depression, attention deficit, panic disorder, IBS digestive issues morbid obesity with treatment.  Chronic fatigue chronic arthralgias.  HPI           Review of Systems   Current Outpatient Medications   Medication     albuterol (ACCUNEB) 1.25 MG/3ML neb solution     ARIPiprazole (ABILIFY) 20 MG tablet     buPROPion (WELLBUTRIN XL) 300 MG 24 hr tablet     CONCERTA 36 MG CR " "tablet     drospirenone-ethinyl estradiol (JESSY) 3-0.02 MG tablet     EMGALITY 120 MG/ML injection     famotidine (PEPCID) 20 MG tablet     FLUoxetine (PROZAC) 40 MG capsule     hyoscyamine (LEVSIN) 0.125 MG tablet     ketoconazole (NIZORAL) 2 % external cream     LANsoprazole (PREVACID) 15 MG DR capsule     ondansetron (ZOFRAN ODT) 4 MG ODT tab     polyethylene glycol (MIRALAX) 17 GM/Dose powder     prochlorperazine (COMPAZINE) 10 MG tablet     Semaglutide-Weight Management (WEGOVY) 2.4 MG/0.75ML SOAJ     TAZORAC 0.1 % external cream     VENTOLIN  (90 Base) MCG/ACT inhaler     No current facility-administered medications for this visit.           Objective    /66 (BP Location: Left arm, Patient Position: Sitting, Cuff Size: Adult Large)   Pulse 79   Temp 97.9  F (36.6  C) (Tympanic)   Resp 13   Ht 1.676 m (5' 6\")   Wt 105.2 kg (232 lb)   LMP 12/21/2022 (Exact Date)   SpO2 100%   BMI 37.45 kg/m    Body mass index is 37.45 kg/m .  Physical Exam   GENERAL: healthy, alert and no distress  NECK: no adenopathy, no asymmetry, masses, or scars and thyroid normal to palpation  RESP: lungs clear to auscultation - no rales, rhonchi or wheezes  CV: regular rate and rhythm, normal S1 S2, no S3 or S4, no murmur, click or rub, no peripheral edema and peripheral pulses strong  ABDOMEN: soft, nontender, no hepatosplenomegaly, no masses and bowel sounds normal  MS: no gross musculoskeletal defects noted, no edema                    "

## 2023-01-30 LAB — CALPROTECTIN STL-MCNT: 67 MG/KG (ref 0–49.9)

## 2023-01-31 ENCOUNTER — OFFICE VISIT (OUTPATIENT)
Dept: ALLERGY | Facility: CLINIC | Age: 28
End: 2023-01-31
Attending: INTERNAL MEDICINE
Payer: COMMERCIAL

## 2023-01-31 VITALS
HEIGHT: 66 IN | BODY MASS INDEX: 36.64 KG/M2 | HEART RATE: 95 BPM | WEIGHT: 228 LBS | OXYGEN SATURATION: 100 % | RESPIRATION RATE: 16 BRPM

## 2023-01-31 DIAGNOSIS — T78.40XD ALLERGIC REACTION, SUBSEQUENT ENCOUNTER: Primary | ICD-10-CM

## 2023-01-31 DIAGNOSIS — T78.3XXA ANGIOEDEMA, INITIAL ENCOUNTER: ICD-10-CM

## 2023-01-31 LAB
BASOPHILS # BLD AUTO: 0 10E3/UL (ref 0–0.2)
BASOPHILS NFR BLD AUTO: 1 %
EOSINOPHIL # BLD AUTO: 0 10E3/UL (ref 0–0.7)
EOSINOPHIL NFR BLD AUTO: 1 %
ERYTHROCYTE [DISTWIDTH] IN BLOOD BY AUTOMATED COUNT: 13.1 % (ref 10–15)
HCT VFR BLD AUTO: 37.4 % (ref 35–47)
HGB BLD-MCNC: 12.1 G/DL (ref 11.7–15.7)
IMM GRANULOCYTES # BLD: 0 10E3/UL
IMM GRANULOCYTES NFR BLD: 0 %
LYMPHOCYTES # BLD AUTO: 1.8 10E3/UL (ref 0.8–5.3)
LYMPHOCYTES NFR BLD AUTO: 37 %
MCH RBC QN AUTO: 29 PG (ref 26.5–33)
MCHC RBC AUTO-ENTMCNC: 32.4 G/DL (ref 31.5–36.5)
MCV RBC AUTO: 90 FL (ref 78–100)
MONOCYTES # BLD AUTO: 0.4 10E3/UL (ref 0–1.3)
MONOCYTES NFR BLD AUTO: 8 %
NEUTROPHILS # BLD AUTO: 2.6 10E3/UL (ref 1.6–8.3)
NEUTROPHILS NFR BLD AUTO: 54 %
PLATELET # BLD AUTO: 269 10E3/UL (ref 150–450)
RBC # BLD AUTO: 4.17 10E6/UL (ref 3.8–5.2)
WBC # BLD AUTO: 4.8 10E3/UL (ref 4–11)

## 2023-01-31 PROCEDURE — 86003 ALLG SPEC IGE CRUDE XTRC EA: CPT | Performed by: ALLERGY & IMMUNOLOGY

## 2023-01-31 PROCEDURE — 99243 OFF/OP CNSLTJ NEW/EST LOW 30: CPT | Mod: 25 | Performed by: ALLERGY & IMMUNOLOGY

## 2023-01-31 PROCEDURE — 36415 COLL VENOUS BLD VENIPUNCTURE: CPT | Performed by: ALLERGY & IMMUNOLOGY

## 2023-01-31 PROCEDURE — 86003 ALLG SPEC IGE CRUDE XTRC EA: CPT | Mod: 90 | Performed by: ALLERGY & IMMUNOLOGY

## 2023-01-31 PROCEDURE — 99000 SPECIMEN HANDLING OFFICE-LAB: CPT | Performed by: ALLERGY & IMMUNOLOGY

## 2023-01-31 PROCEDURE — 85025 COMPLETE CBC W/AUTO DIFF WBC: CPT | Performed by: ALLERGY & IMMUNOLOGY

## 2023-01-31 PROCEDURE — 82785 ASSAY OF IGE: CPT | Performed by: ALLERGY & IMMUNOLOGY

## 2023-01-31 PROCEDURE — 95004 PERQ TESTS W/ALRGNC XTRCS: CPT | Performed by: ALLERGY & IMMUNOLOGY

## 2023-01-31 NOTE — LETTER
1/31/2023         RE: Nehemias Mascorro  [address]  [place] [address]        Dear Colleague,    Thank you for referring your patient, Nehemias Mascorro, to the [facility]. Please see a copy of my visit note below.          Araceli Del Cid is a 27 year old, presenting for the following health issues:  Allergy Consult (Angioedema)      HPI     Chief complaint: Allergic reaction    History of present illness: This is a pleasant 47-year-old woman that is asked to see for evaluation by Dr. Rodrigues in regards to an allergic reaction.  Patient states that on January 24, she was eating a cookie containing pecan, cereal that contained gluten and some goldfish crackers.  She states she has had all these foods before but this time she felt within 45 minutes and it was difficult to swallow.  She felt that her nasal passages were congested and was somewhat difficult to breathe.  No hives or swelling.  She went to the emergency room where she received epinephrine, steroids and antihistamines.  She states she had been on illumination diet for food intolerances for the last 2-1/2 weeks before this and had noted that she had eliminated gluten.  She states this happened previously when she ate gluten but not to this degree.  She states that happened a few days before the reaction.  She denies any over-the-counter medications prior to reaction was not sick that day and no other exposures that she can think about.  She states no changes in her environment.  She does have a history of environmental allergies that are seasonal but are well controlled with Xyzal.  She does have an intermittent asthma and uses an albuterol inhaler as needed and states that this is well controlled as well.  She is tested for celiac disease and negative.  She did have an endoscopy in 2018 that I was able to bring up the biopsy report.  It showed a normal esophagus and gastritis.  H. pylori biopsies  "were negative.    Past medical history: Migraines, history of concussion, reflux, ADHD history of anxiety, history of covid, IBS    Social history: She is a student, non-smoker, lives in a home with central basement, no changes at home    Family history: Positive for asthma and allergies    Review of Systems   Constitutional, HEENT, cardiovascular, pulmonary, gi and gu systems are negative, except as otherwise noted.     Objective    Pulse 95   Resp 16   Ht 1.676 m (5' 6\")   Wt 103.4 kg (228 lb)   LMP 12/21/2022 (Exact Date)   SpO2 100%   BMI 36.80 kg/m    Body mass index is 36.8 kg/m .  Physical Exam     Gen: Pleasant female not in acute distress  HEENT: Eyes no erythema of the bulbar or palpebral conjunctiva, no edema. Ears: No deformities or lesions. Nose: No congestion,  Mouth: Throat clear,   Neck: No masses lesions or swelling  Respiratory: No coughing with breathing, no retractions  Lymph: No visible supraclavicular or cervical lymphadenopathy  Skin: No rashes or lesions  Psych: Alert and appropriate for age        At today s visit the patient/parent and I engaged in an informed consent discussion about allergy testing.  We discussed skin testing, blood testing,  and the alternative of not undergoing any testing. The patient has a preference for skin testing. We then discussed the risks and benefits of skin testing.  The patient understands skin testing risks can include, but are not limited to, urticaria, angioedema, shortness of breath, and severe anaphylaxis.  The benefits include, but are not limited, to evaluation for allergens causing symptoms.  After answering the patients/parents questions they have agreed to proceed with skin testing.    8 percutaneous test were placed to pollens implicated in oral allergy syndrome, pecan and wheat.  Histamine control was negative.  Please see scanned photograph.    Impression report and plan:  1.  Possible allergic reaction    This could be oral allergy " syndrome as relates to pecan.  Recommend checking specific IgE to pecan and the pollens indicating oral allergy syndrome given her negative histamine control today.  Continued avoidance of pecan and wheat for now.  Check specific IgE to wheat.  I am not sure that this was a wheat reaction.  She may need to reconnect with GI given her swallowing difficulty as wheat is a food that is implicated in eosinophilic esophagitis.  If wheat testing is negative, consider in office challenge to wheat.  Check CBC with differential as well.        Again, thank you for allowing me to participate in the care of your patient.        Sincerely,        Nettie GAMINO MD

## 2023-01-31 NOTE — PATIENT INSTRUCTIONS
Pending lab test, see GI    Then, challenge in office if testing negative    Oral allergy syndrome?

## 2023-02-01 ENCOUNTER — VIRTUAL VISIT (OUTPATIENT)
Dept: GASTROENTEROLOGY | Facility: CLINIC | Age: 28
End: 2023-02-01
Payer: COMMERCIAL

## 2023-02-01 VITALS — HEIGHT: 66 IN | WEIGHT: 228 LBS | BODY MASS INDEX: 36.64 KG/M2

## 2023-02-01 DIAGNOSIS — K58.1 IRRITABLE BOWEL SYNDROME WITH CONSTIPATION: ICD-10-CM

## 2023-02-01 DIAGNOSIS — R13.10 DYSPHAGIA, UNSPECIFIED TYPE: ICD-10-CM

## 2023-02-01 DIAGNOSIS — R10.84 ABDOMINAL PAIN, GENERALIZED: Primary | ICD-10-CM

## 2023-02-01 LAB — IGE SERPL-ACNC: 7 KU/L (ref 0–114)

## 2023-02-01 PROCEDURE — 99214 OFFICE O/P EST MOD 30 MIN: CPT | Mod: 95 | Performed by: PHYSICIAN ASSISTANT

## 2023-02-01 ASSESSMENT — PATIENT HEALTH QUESTIONNAIRE - PHQ9: SUM OF ALL RESPONSES TO PHQ QUESTIONS 1-9: 11

## 2023-02-01 ASSESSMENT — PAIN SCALES - GENERAL: PAINLEVEL: SEVERE PAIN (6)

## 2023-02-01 NOTE — PROGRESS NOTES
Nehemias is a 27 year old who is being evaluated via a billable video visit.      How would you like to obtain your AVS? MyChart  If the video visit is dropped, the invitation should be resent by: Send to e-mail at: [email or web address]  Will anyone else be joining your video visit? No

## 2023-02-01 NOTE — PROGRESS NOTES
GASTROENTEROLOGY Follow-up VIDEO VISIT    CC/REFERRING MD:    Alexandra Rodrigues  Referred Self    REASON FOR CONSULTATION:   Referred Self for   Chief Complaint   Patient presents with     Video Visit       HISTORY OF PRESENT ILLNESS:    Nehemias Mascorro is a 27 year old female who is being evaluated via a billable video visit for follow-up of abdominal pain/bloating and altered bowel habits.  To review, patient first saw me in September 2022, described having years of chronic abdominal pain and constipation, but more recently having some episodic diarrhea.  She had already undergone colonoscopy in April 2022 with negative biopsies for microscopic colitis.  I had checked CRP and fecal calprotectin in addition to celiac serologies at this time.  These were notable for a CRP of 16 and fecal calprotectin level of 132.  I proceeded with MR enterography to evaluate for IBD.  This was reassuringly normal.  I rechecked her CRP level and it had increased up to 23, so I added some additional rheumatologic labs on, which were subsequently normal.  At follow-up in November 2022, we had discussed that her symptoms were more likely representative of IBS.  By that point, she was no longer having any diarrhea and was back to having chronic constipation.  I had recommended continued use of MiraLAX and seeing nutrition to start an elimination diet.  In January, she did have an urgent care visit where she had some upper abdominal pain, had laboratory testing that was notable for a CRP that decreased to 19, normal LFTs, and upper abdominal ultrasound that was normal.    She follows up today after recent ER visit where she had sensation of throat swelling and possible anaphylaxis.  This occurred after consumption of a cookie containing pecan, cereal that contain gluten, and Goldfish crackers.  She followed up with an allergist just yesterday and has undergone some laboratory testing to evaluate for possible allergies.  They had  recommended following up with me to discuss the swallowing difficulty and determine if EGD might be warranted to rule out EOE.    Today, patient states that she is no longer having any issues with swallowing.  She is continuing to feel blood abdominal bloating and pain.  Currently, her constipation is pretty severe, she is going maybe once per week.  She is using MiraLAX intermittently.  She previously had been prescribed both lansoprazole and famotidine, has not been taking them.  Does feel she is getting some reflux occasionally.      I have reviewed and updated the patient's Past Medical History, Social History, Family History and Medication List.    Exam:    General appearance:  Healthy appearing adult, in no acute distress  Eyes:  Sclera anicteric, Pupils round and reactive to light  Ears, nose, mouth and throat:  No obvious external lesions of ears and nose.  Hearing intact  Neck:  Symmetric, No obvious external lesions  Respiratory:  Normal respiration, no use of accessory muscles   MSK:  No visual upper extremity, neck or facial muscle atrophy  ABD:  No visual abdominal distention, no audible borborygmi  Skin:  No rashes or jaundice   Psychiatric:  Oriented to person, place and time, Appropriate mood and affect.   Neurologic:  Peripheral muscle function and dexterity appear to be intact      PERTINENT STUDIES have been reviewed.    ASSESSMENT/PLAN:    Humbleroyal Mascorro is a 27 year old female who presents for follow up of chronic abdominal pain, constipation, and more recently, an episode of dysphagia potentially concerning for anaphylaxis.  We spent some time discussing her symptoms.  She had several labs checked yesterday with allergist.  Has been advised to continue avoidance of pecan and wheat for now.  She is no longer having any dysphagia or difficulty swallowing.  Her abdominal pain and bloating continues.  I recommended that she restart both the lansoprazole and famotidine.  We will get her  scheduled for EGD and get biopsies to rule out EOE.  I do not think she needs video swallow or esophagram at this time, given that her episode of dysphagia was brief and potentially related to allergic reaction.  We discussed her abdominal pain, bloating, and constipation.  She is having quite severe constipation and so I recommended she take the MiraLAX daily.  We reviewed her work-up for IBD thus far.  She does have elevated CRP, but fecal calprotectin has trended down to near normal, has had normal MR enterography, and normal colonoscopy within the last year.  I recommended continue focusing on IBS treatment.  Plan for follow-up after EGD.    1. Abdominal pain, generalized  - Adult GI  Referral - Procedure Only; Future    2. Irritable bowel syndrome with constipation  - Adult GI  Referral - Procedure Only; Future    3. Dysphagia, unspecified type  - Adult GI  Referral - Procedure Only; Future      Video-Visit Details    Video Visit Time: 12 minutes    Type of service:  Video Visit    Originating Location (pt. Location): Home    Distant Location (provider location):  Off-site    Platform used for Video Visit: Peace    A total of 20 minutes was spent with reviewing the chart, discussing with the patient, documentation and coordination of care.    Darren Castorena PA-C  Division of Gastroenterology, Hepatology, and Nutrition  [facility]  [phone]    RTC after EGD

## 2023-02-01 NOTE — RESULT ENCOUNTER NOTE
Results discussed directly with patient while patient was present. Any further details documented in the note.   Darren Castorena PA-C

## 2023-02-02 LAB
ALPHA-GAL IGE QN: <0.1 KU/L
COMMON RAGWEED IGE QN: <0.1 KU(A)/L
DEPRECATED MISC ALLERGEN IGE RAST QL: NORMAL
MUGWORT IGE QN: <0.1 KU(A)/L
PECAN/HICK NUT IGE QN: <0.1 KU(A)/L
SILVER BIRCH IGE QN: <0.1 KU(A)/L
TIMOTHY IGE QN: <0.1 KU(A)/L
WHEAT IGE QN: <0.1 KU(A)/L

## 2023-02-10 ENCOUNTER — VIRTUAL VISIT (OUTPATIENT)
Dept: NUTRITION | Facility: CLINIC | Age: 28
End: 2023-02-10
Payer: COMMERCIAL

## 2023-02-10 DIAGNOSIS — R10.84 ABDOMINAL PAIN, GENERALIZED: ICD-10-CM

## 2023-02-10 DIAGNOSIS — K58.1 IRRITABLE BOWEL SYNDROME WITH CONSTIPATION: Primary | ICD-10-CM

## 2023-02-10 PROCEDURE — 97803 MED NUTRITION INDIV SUBSEQ: CPT | Mod: VID | Performed by: DIETITIAN, REGISTERED

## 2023-02-10 NOTE — PATIENT INSTRUCTIONS
NUTRITION INTERVENTION:  Elimination and Reintroduction diet - FODMAPs    Long Term Goals:   Goal: 1-2 bowl movement daily [place] Stool Type 4 soft and formed  Goal: Decrease digestive symptoms -constipation but more diarrhea  Goal: Decrease elevated CRP.      Short Term Goals:  Goal 1: Continue to cut back on wheat/gluten and dairy. Focus on reviewing high FODMAP foods (info below and handout provided) specifically, onion, garlic, legums/beans, nuts - almonds, coconut milk, cashews to see if they trigger symptoms. - we are specifically looking at bloating.     Sub quinoa or a starchy vegetable instead of rice.     Sub cod, shrimp, tuna, scallops to see if they trigger symptoms instead of salmon.     Add in an egg or two to see if they cause symptoms.     Discussed gluten free challenge for endoscopy  - [email or web address]    Send over your mysymptoms journal by kareen to review.   .      Goal 2: Focus on starting a low fodmap smoothie for breakfast with focus on adding in adequate fiber. See recipes provided and try to add in some collagen protein to the smoothies that you already have on hand.      Also, check out some of the other breakfast ideas provided such as oatmeal (naturally gluten free) with dairy free alternative milk, flax seed, nuts such as walnuts, fruit such as berries and protein such as chicken/turkey sausage.     Try an 1/8 avocado or 1 tbsp of nut butter ex peanut butter on slice of gluten or sourdough bread - peanuts are low in fodmaps but a legume so monitor if trigger symptoms. Can also try some other nut butters such as almond or sunflower these are higher fodmap foods but important for you to identify if they trigger symptoms.         Goal 3:  Try to add in a snack at least 1-2x per day with focus on adding in extra fiber. Meal and snack planning can be challenging at the best of times, particularly for those of us following a low FODMAP diet.  Snacks are an important part of meal planning, as they can help to fill the gaps nutritionally i.e. snacking on yoghurt/cheese and biscuits to top up calcium intake for the day. However, for some people and at certain times of day (e.g. in between work and dinner), snacking can become problematic not just from a FODMAP point of view, but also in regards to calorie intake and general healthy eating. Here are some ohealthy low FODMAP snack ideas. It is important that also monitor how you handle dairy from cheese for example it could trigger symptoms even if a low fodmap.     Tips and tricks:   Mix and match your food groups - adding a source of protein to your snacks will keep you feeling olivier for longer. E.g. peanut butter on rice cakes or gluten free toast, yoghurt & fruit, cheese & crackers.- try [employer] Chips - monitor if the almond flour triggers symptoms as almonds are higher fodmap sometimes you can consume small portions and still be ok and or try Siete Chips as they are gluten/wheat free.     Get organised at the start of the week - although meal prepping can be laborious, you will thank yourself for putting some extra time aside for snacks. See our snacks that require preparation below.      Have food handy for when you are on the move - you might have pre-packaged snacks ready in your bag or drawer at work (e.g. pack of mixed nuts, fruit or even jar of brazil nuts), or in the fridge at home (e.g. chopped up vegetables and dips).     Go for dairy - a low FODMAP diet does not mean a low dairy diet! Dairy is a great source of protein, calcium, and other vitamins/minerals with plenty of suitable lactose-free options available for those following a lactose-free diet. On-the-go pouch yoghurts (look for low sugar and monitor symptoms), as well as cheese & crackers are excellent choices for those who are busy and might find themselves snacking on the move.     Boost your fibre intake: eating fibre containing  foods helps us to feel olivier for longer. Keep the peels on the low FODMAP fruits and vegetables, sprinkle extra seeds (i.e. titus or virgil) on your yoghurt, and consider wholegrain varieties where possible when choosing low FODMAP breads.     Consider your reusable food containers - gone are the days of simply taking a piece of fruit as a snack. These days, food storage containers come in a variety of shapes and sizes for all sorts of tasty snacks. This can be particularly helpful when preparing meals and snacks the night before a busy work or school day e.g. squeeze pouches for yoghurt if tolerated (now there is almond and coconut dairy free varieties) or containers with separate compartments for dip and vegetables.     Snacks that require preparation:   Smoothies: green, summer berry, davy peanut butter   Sweet: peanut butter bars, muesli bars, mixed berry & yoghurt granola bar  Savoury: savoury slice, vegetable muffins, rice paper roll, zucchini and rice slice  Energy balls: Oat & davy chip, peanut butter protein balls, double davy bliss balls    Snacks with minimal preparation:  Protein: Boiled eggs, tuna can, 20g mixed nuts or 10 almonds  Fruit: kiwi fruit, pineapple, mandarin, orange, firm banana, rockmelon/cantaloupe  Dairy & alternatives: yoghurt (lactose-free), hot chocolate made with drinking chocolate or latte made with lactose-free if required or soy milk (made from soy protein), cheese & crackers  Vegetables: vegetables with dip, pre-made sweet potato chips (baked with olive oil and a sprinkle of spices)  Gluten free toast or rice crackers with toppings: peanut butter, cheese, marmalade     Goal 4: Continue tracking what you eat. Writing down or tracking through an robby what you eat as well as how you feel can help you identify patterns in your symptoms. This can help you become more aware and create a diet that is right for you without over restricting.     mysymptoms robby, you can track symptoms, bowl  movements, medications, stress, exercise, sleep and foods as well as beverages to become more mindful. [email or web address]    Goal 5: Work on just meal planning lunch and dinner - see meal plan and recipes provided      What are FODMAPs?  FODMAP stands for fermentable oligo-, di-, monosaccharides and polyols    These short-chain carbs are resistant to digestion. Instead of being absorbed into your bloodstream, they reach the far end of your intestine, where most of your gut bacteria reside.    Your gut bacteria then use these carbs for fuel, producing hydrogen gas and causing digestive symptoms in sensitive individuals. FODMAPs also draw liquid into your intestine, which may cause diarrhea.    Although not everyone has a sensitivity to FODMAPs, it is very common among people with irritable bowel syndrome (IBS)     Common FODMAPs include:    Fructose: a simple sugar found in many fruits and vegetables that also makes up the structure of table sugar and most added sugars  Lactose: a carbohydrate found in dairy products like milk  Fructans: found in many foods, including grains like wheat, spelt, rye and barley  Galactans: found in large amounts in legumes  Polyols: sugar alcohols like xylitol, sorbitol, maltitol, and mannitol. They are found in some fruits and vegetables and often used as sweetener      What is the purpose of a FODMAP diet?  A FODMAP diet is a 3 step diet used to help manage digestive symptoms.  Common gut problem with symptoms including abdominal (tummy) pain, bloating, wind (farting) and changes in bowel habit (diarrhea, constipation or both).     The aims of the diet are to:    Learn which foods and FODMAPs you tolerate, and which trigger your digestive symptoms. Understanding this will help you to follow a less restrictive, more nutritionally balanced diet for the long term that only restricts foods that trigger your digestive symptoms.    Assess whether your digestive symptoms are  sensitive to FODMAPs. If you don't experience symptom improvement on the diet, than you may need to consider other digestive therapies.      How to follow a FODMAP diet      Stage 1: Restriction/Elimination   This stage involves strict avoidance of all high-FODMAP foods. It's important to note you should NOT avoid all FODMAPs long-term, and this stage should only last about 4-12 weeks. This is because it's important to include FODMAPs in the diet for gut health.      First restrict alcohol, caffeine, spicy foods and other common food triggers (wheat and dairy). Focus should be on eating whole, unprocessed real foods in their unprocessed forms such as fruits, vegetables, whole grains (prefer wheat/gluten free), nuts, extra virgin olive oil, herbs, moderate amounts of plant based proteins, and healthy fats. Monitor how you handle nightshades, corn, soy as these can be common triggers for digestive issues.     A low-fodmap diet is complex and should be tailored to your individual symptoms. Moving through the following stages over 4-12 weeks to provide more awareness as to what foods could be triggering symptoms.   Some people notice an improvement in symptoms in the first week, while others take the full eight - twelve weeks. Once you have adequate relief of your digestive symptoms, you can progress to the second stage.  If by eight to twelve weeks your gut symptoms have not resolved you should check for hidden sources of fodmaps, try probiotic/digestive enzymes and consider life stressors as stress is a major contributor.     The diet can be difficult to follow if you are not prepared. Here are some tips:  Try avoiding just wheat by going gluten free or taking out dairy first while you prepare to make other dietary changes to follow a low-FODMAP diet. Eliminating these two foods first and finding alternatives can make you feel less restricted before you begin to take out other foods.   Find out what to buy: Ensure you  "have access to credible low-FODMAP food lists. See handouts of where to find these.  Get rid of high-FODMAP foods: Clear your fridge and pantry of these foods.  Make a shopping list: Create a low-FODMAP shopping list before heading to the grocery store, so you know which foods to purchase or avoid.  Read menus in advance: Familiarize yourself with low-FODMAP menu options so you'll be prepared when dining out.    Stage 2: Reintroduction    This stage involves systematically reintroducing high-FODMAP foods. The purpose of this is twofold:   To identify which types of FODMAPs you tolerate. Few people are sensitive to all of them.  To establish the amount of FODMAPs you can tolerate. This is known as your \"threshold level.\"    In this step, you test specific foods one by one for three days each.  It is recommended that you undertake this step with a trained dietitian who can guide you through the appropriate foods. Alternatively, this robby can help you identify which foods to reintroduce. https://[email or web address]/get-robby-help/    It is worth noting that you need to continue a low-FODMAP diet throughout this stage. This means even if you can tolerate a certain high-FODMAP food, you must continue to restrict it until stage 3.  It is also important to remember that, unlike people with most food allergies, people with IBS can tolerate small amounts of FODMAPs.     Stage 3: Personalization  This stage is also known as the \"modified low-FODMAP diet.\" In other words, you still restrict some FODMAPs. However, the amount and type are tailored to your personal tolerance, identified in stage 2.  It is important to progress to this final stage in order to address nutrient deficiencies and increase diet variety and flexibility.    This step is aimed to relax dietary restrictions as much as possible, expand the variety of foods included in your diet and establish a  personalized FODMAP diet  for the long-term. In this step well " tolerated foods and FODMAPs are reintroduced to your diet, while poorly tolerated foods and FODMAPs are restricted, but only to a level that provides symptom relief. We recommend that you repeat challenges of poorly tolerated foods and FODMAPs over time to see whether your tolerance changes. You can also use the Filter function in the [employer] FODMAP Junaid to personalize your junaid experience during Step 3 of the diet.      Foods high in FODMAPs  Here is a list of some common foods and ingredients that are high in FODMAPs:    Fruits: apples, applesauce, apricots, blackberries, boysenberries, canned fruit, cherries, dates, figs, peaches, pears, watermelon  Sweeteners: fructose, high fructose corn syrup, honey, maltitol, mannitol, sorbitol, xylitol  Dairy products: ice cream, milk (from cows, goats, and sheep), most yogurts, soft and fresh cheeses (cottage cheese, ricotta, etc.), sour cream, whey protein supplements  Vegetables: artichokes, asparagus, beetroot, broccoli, [place] sprouts, cabbage, cauliflower, fennel, garlic, leeks, mushrooms, okra, onions, peas, shallots  Legumes: beans, baked beans, chickpeas, lentils, red kidney beans, soybeans  Wheat: biscuits, bread, most breakfast cereals, crackers, pancakes, pasta, tortillas, waffles  Other grains: barley, rye  Beverages: beer, fortified mariah, fruit juices, milk, soft drinks with high fructose corn syrup, soy milk      Foods you can eat on a low FODMAP diet  Keep in mind that the purpose of this diet is not to completely eliminate FODMAPs, which is extremely difficult. Simply minimizing these types of carbs is considered sufficient to reduce digestive symptoms.    There is a wide variety of healthy and nutritious foods that you can eat on a low FODMAP diet, including    Meats, fish, and eggs (well tolerated unless they have added high FODMAP ingredients, like wheat or high fructose corn syrup)  All fats and oils  Most herbs and spices  Nuts and seeds (including  almonds, peanuts, macadamia nuts, pine nuts, and sesame seeds but not pistachios or cashews, which are high in FODMAPs)    Fruits, such as:  unripe bananas  blueberries  cantaloupe  grapefruit  grapes  kiwi  arnoldo  lime  mandarins  melons (except watermelon)  oranges  passionfruit  raspberries  strawberries  sweeteners (maple syrup, molasses, and stevia)  dairy products if they are lactose-free as well as hard cheeses and aged softer varieties (like Brie and Camembert)    Vegetables, such as:  alfalfa  bell peppers  bok pau  carrots  celery  chives  cucumbers  eggplant  ashtyn  green beans  kale  lettuce  olives  parsnips  potatoes  radishes  spinach  spring onion (only green)  squash  sweet potatoes  tomatoes  turnips  water chestnuts  yams  zucchini    Grains, such as:  corn  oats  quinoa  rice  sorghum  tapioca  beverages (water, coffee, tea, etc)    However, keep in mind that these lists are neither definitive nor exhaustive. Naturally, there are foods not listed here that are either high or low in FODMAPs.     In addition, everyone is different. You may tolerate some foods on the list of foods to avoid while noticing digestive symptoms from foods low in FODMAPs for other reasons.  How much of a food you eat will affect how likely you are to experience symptoms if you have IBS. The individual tolerance to FODMAPs varies.      NUTRITION RESOURCES:  1. IFM Elimination Packet - Gluten and FODMAP Guide, Microbiome   [email or web address] [email or web address]   2.  [place] FODMAP Website, Junaid and Handout- 3 step FODMAP diet guide & food list  3. Low FODMAP Smoothie Recipes         21 Day Low FODMAP Smoothie Challenge (21 Day Low FODMAP Challenges Book 1) Karen Edition by Sole Noguera        Low FODMAP Smoothies Recipe Book: A Beginners Guide to Low FODMAP Smoothies for Gut Health  4. The Low-FODMAP Diet for Beginners: A 7-Day Plan to Beat Bloat and Soothe Your Gut with Recipes for Fast IBS Relief by Laura Rivers  (Author), Justine  Candelario RAMIREZ-AP LD CNSC  (Author), Michelle Willoughby MD PhD

## 2023-02-10 NOTE — PROGRESS NOTES
Medical Nutrition Therapy  Visit Type: Reassessment and intervention    Visit Details    How would you like to obtain your AVS? MyChart  If the correspondence for visit is dropped, how would you like your dietitian to reconnect with you:   call back by phone? Yes    Text to cell phone: [phone]  Will anyone else be joining your video visit or telephone call? No  {If patient encounters technical issues they should call [phone] :16    Type of service:  Video Visit   Start Time: 10:05 am     End Time: 11:21 am     Originating Location (pt. Location): Home    Distant Location (provider location):  [facility] WORKING VIRTUAL FROM HOME     Platform used for Video Visit: Peace      Referring Provider: Darren Castorena  [facility]     REASON FOR REFERRAL:   Nereydaadalbertobibi Mascorro is a 27 year old female who is interested in Medical Nutrition Therapy (MNT) and education related to   Abdominal pain, generalized   Irritable bowel syndrome with constipation   And diarrhea   Interested in what foods are triggers and what are ok worried about having early stages of chron's diease.   She is accompanied by self.     Changes since previous consult YES     Behavior Status: YES Barriers Include: digestive issues and understanding which foods trigger symptoms suspect some fodmaps now that has taken out majority both inflammatory and fodmaps from elimination diet     Goals: What would you like to work on that will help you most over the next several weeks? Meal planning especially dinner and identifying trigger foods - specifically monitoring high fodmap during reintroduction phase      She has been following the elimination/reintroduction plan provided at initial emphasis on gluten and dairy for a month. She was feeling that gluten does hurt her stomach but not always. It was hard to follow and some stuff she didn't realize had gluten and her stomach would hurt. It is hard though to  determine if it was the fructan from wheat or gluten. She has found also that oranges hurt her stomach and that whenever she eats she still feels bloated. She thinks things have improved a little bit but not a lot. She has been using the symptoms tracker and still gets nausea, bloating, stomach spasms but not as often. She has found the tracker to be really helpful. She hasn't noticed that stress and sleep impact her symptoms but more the foods. Overall her symptoms have been better not as bad. She was having more BMs before and than once she started to add foods back in like gluten and dairy she noticed the constipation would come back.     She did follow up with an allergist and didn't have a wheat allergy and she is going to GI for endoscopy to rule out celiac diseases. Discussed importance of adding gluten back in if doing the procedure in order to detect an issue. She did get the probiotic discussed but not taking it consistently daily. She has taken it in the am but not sure if she notices anything different about how she feels or symptoms. Recommended that she track how she feels and if her BMs are more formed and soft and less bloating on the days/weeks she takes it.     She did try the smoothies and felt ok. She got a plant based protein powder. Started adding the unsweetened almond milk with fruit - strawberries, banana, raspberries, blackberries and sometimes mangoes. When trying the mekhi she thinks she had some bloating. In the am after doing smoothies she tends to feel better. She doesn't always do smoothies sometimes she does oatmeal with pecans, honey and almond milk. The bloating isn't that bad after oatmeal either but still there.     She still wants to find out what foods are triggering the gas and bloating as some days she feels horrible. She notices that these are the day she probably didn't take a probiotic. She feels though that foods are bigger triggers of these symptoms for example had more  rice one day and that was really terrible. She had white rice that time instead of brown and noticed it was way worse. She feels she has breakfast down now and needs more help with lunch and dinner.     She found that apples, mangos, salmon, rice crackers, white rice. She has been avoiding eggs, dairy, butter, wheat/gluten, corn, pork, soy    Does ok with chocolate but not eating a lot, not sure about legumes and thinks that they trigger symptoms.She uses garlic and onion in her black beans so could be contributing to symptoms.       INITIAL NUTRITION ASSESSMENT:     Nutritional Goals 11/29/2022   Nutritional Goal Create a plan to lose weight;Work on meal planning/recipes;Overcome emotional eating;Manage Digestive Issues;Eliminate cravings for sugar and refined carbohydrates;Increase energy;What to eat for my specific health concerns        Neurological 11/29/2022   Migraine Headaches Current   Tension Headache Current       No flowsheet data found.   Immune/Blood 11/29/2022   Anemia Past      Gastrointestinal 11/29/2022   Constipation Current   Nausea or Vomiting Current   Gas/bloating Current   Heartburn/Reflux/GERD Current   Irritable Bowel Syndrome (IBS) Current   Abdominal Pain Current   Diverticulosis/Diverticulitis Current       Food Sensitivities 11/29/2022   Lactose intolerance Current      Endocrine 11/29/2022   Overweight/obesity Current      Skin 11/29/2022   Acne Current      No flowsheet data found.   No flowsheet data found.   Psychological 11/29/2022   ADD/ADHD/Asperger's Current   Depression/Anxiety Current      No flowsheet data found.   Womens Health Assessment 11/29/2022   Hysterectomy No   I am pregnant.  No   I am breastfeeding. No   I want to become pregnant within the next year . No   What do you use for contraception?  not needed/not sexually active   Any problems with current birth control method?   Yes   Date of last menstrual period [id number]   Are your periods irregular? No   Days from the  start of one period to the next. 28   Days of Menstral Flow 5   Associated with menstral periods. Painful/cramping;Heavy bleeding;Headaches;Bloating;Diarrhea/constipation   Are you officially in menopause? (no period for one year or longer)  No        Past Medical History:  Past Medical History:   Diagnosis Date     ADHD (attention deficit hyperactivity disorder)      Depressive disorder      Ovarian cyst      Uncomplicated asthma        Previous Surgeries:   Past Surgical History:   Procedure Laterality Date     COLONOSCOPY N/A 4/27/2022    Procedure: COLONOSCOPY, WITH POLYPECTOMY AND BIOPSY;  Surgeon: Alyse Leija MD;  Location:  GI     ENT SURGERY      tonsilectomy age 8     ORTHOPEDIC SURGERY      Hip, emelia surgery in 2013     WRIST SURGERY          Family History:  Family History   Problem Relation Age of Onset     Depression Maternal Grandmother      Schizophrenia Maternal Uncle      Substance Abuse Maternal Uncle         Lifestyle History:  Lifestyle 11/29/2022   Do you feel your life is stressful right now?  Yes   What is the cause(s) of stress in your life?  Finances;Work;Health Concerns;Emotional problems;Multi-tasking and multiple deadlines to meet   Are you currently implementing any strategies to help manage stress? No        Exercise History:  Exercise 11/29/2022   Does your occupation require extended periods of sitting?  Yes   Does your occupation require extended periods of repetitive movements (ex: walking or lifting)?  Yes   Do you currently participate in any forms of exercise? No   Rate your level of motivation for including exercise in your routine (0=none, 10=high): 2   Do you have any medical conditions, pain, injuries, surgeries etc. restricting you from exercise? Yes        Sleep History:  Sleep 11/29/2022   How many hours (on average) do you sleep per night? 9-11   What time do you turn off the lights? 10 PM   How long does it take for you to fall asleep? 15-20 mins   What time  do you stop using electronic devices? 11 PM   What time do you wake up? 8 AM   When do you eat your first meal?  9 AM   Do you feel well-rested during the day?  No   Do you take naps?  Yes   Do you have a comfortable bedroom environment (cool, quiet, dark, etc)? Yes   Do you have a sleep routine/ ritual that you do before bed?  No   How many hours do you spend per day looking at a screen (TV, computer, tablet and phone)? 5 to 6   Select all factors that apply to your current sleep habits: Have difficulty waking up in the morning;Wake up in the middle of the night;Have nightmares;Eat large meals within 3 hours of going to bed;Night sweats;Snack during the night;Feel tired/sluggish/fatigued during the day;Have tried supplements (ie: melatonin) for sleep        Nutrition History:  Nutrition 11/29/2022   Have you ever had a nutrition consultation? No   Do you currently follow a special diet or nutritional program? No   What do you feel are the biggest barriers getting in the way of achieving you nutritional goals? Motivation/Readiness to change;Financial concerns;Lack of time;Work (such as lack of time to eat, unhealthy choices, etc.);Lack of control over emotions/behaviors;Stress   Do you have any food allergies, sensitivities or intolerances?  Yes   Specific food(s) causing adverse reactions Dairy       Digestion 11/29/2022   Do you experience stomach pains/cramping? 2-3 times per week   Do you experience bloating?  Daily   Do you experience gas?  Daily   Do you experience heartburn/acid reflux/indigestion? Rarely   How often do you have a bowel movement? Once per week or less   What is a typical bowel movement like for you? Select all that apply: Varies a lot;Formed and soft;Mucous, greasy      Food Access:  11/29/2022   Who does the grocery shopping? Self;Mother;Father   How often is grocery shopping done? Weekly   Where do you usually receive your groceries from? Select all that apply: [employer];Delivery (Amazon, [employer], [employer]ac[employer], etc.)   Do you read food labels? No   Who does the cooking? Select all that apply: Self;Mother;Father   How many meals do you eat out per week?  1 to 3   What restaurants do you typically choose? Fast Food (Taco Bell, Paredes's, Subway, etc.)      Daily Patterns: 11/29/2022   How many days per week do you have breakfast? 5   How many days per week do you have lunch? 6   How many days per week do you have dinner? 7   How many days per week do you have snacks? 7      Protein Intake: 11/29/2022   How many times per day do you typically consume a protein source(s)? 3   What types of protein do you currently eat?  Ground Beef;Steak;Beef Roast;Pork Chops;Pork Ribs;Roast Ham;Deli Ham;[place];Cod;Tuna;Shrimp;Other Fish;Chicken Breast;Chicken Thighs/Legs;Other Chicken;Turkey Breast;Ground Turkey;Beans       Fat Intake:  11/29/2022   How many times per day do you typically consume healthy fat(s)? 2   What types of health fats do you currently eat? Select all that apply:  Almonds;Walnuts;Pecans;Cashews;[place] butter;Simms;Butter;Vegetable oil;Salad dressings;Coconut oil;Olive oil;Avocado       Fruit Intake:  11/29/2022   How many times per day do you typically consume fruits? 1   What types of fruit do currently eat? Apple;Banana;Blackberries;Blueberries;Grapes;Ricardo;Peaches;Pineapple;Raspberries;Other       Vegetable Intake:  11/29/2022   How many times per day do you typically consume vegetables? 1   What types of vegetables do you currently eat? Asparagus;Beans;Broccoli;[place] sprouts;Cabbage;Carrots;Cauliflower;Corn;Eggplant;Greens (rashaun, kale etc);Onions;Plantain;Potato (baked, boiled, mashed, French fries);Spinach;Tomato;Yam, sweet potato      Grain Intake:  11/29/2022   How many times per day do you typically consume grains? 4   What types of grains do currently eat? Select all that apply:  Oats (gluten free);Pancakes/waffles  (gluten free);White rice (gluten free);Bagel (non-gluten free);Breads (non-gluten free);Cereals (non-gluten free);Chips (non-gluten free);Crackers (non-gluten free);Muffins (non-gluten free);Pancakes/waffles (non-gluten free);Pasta (non-gluten free);Other (non-gluten free)       Dairy Intake:  11/29/2022   How many times per day do you typically consume dairy? 2   What types of dairy do currently eat? Select all that apply:  Milk;Cheese;Yogurt;Ice cream       Non-Dairy Alternative Intake:  11/29/2022   How many times per day do you typically consume non-dairy alternatives? 2   What types of non-dairy alternatives do currently eat? Select all that apply:  [place] milk       Sweets Intake:  11/29/2022   How many times per day do you typically consume sweets? 2      Beverage Intake:  11/29/2022   How many 8 oz cups of water do you typically consume per day?  2 to 3   How many 8 oz cups of caffeine do you typically consume per day?  0   How many drinks of alcohol do you typically consume per week (1 drink = 5 oz wine, 12 oz beer, 1.5 oz spirits)?   0       Lifestyle Recall:  11/29/2022   What time did you wake up? 8 AM   What time did you go to sleep? 10 PM   What time did you have breakfast? 9-10 AM   Where did you have breakfast?  Home   What time did you have a morning snack? 10-11 AM   Where did you have your morning snack? Home   What time did you have lunch? 1-2 PM   Where did you have lunch?  Home   What time did you have an afternoon snack? 5-6 PM   Where did you have your afternoon snack? Home   What time did you have dinner? 7-8 PM   Where did you have dinner?  Home   What time did you have an evening snack? 10-11PM   Where did you have your evening snack? Home   What time of day did you exercise? No Exercise          Additional concerns:   Breakfast: Oatmeal with brown sugar with nuts/pecans or cereal with lactate or almond milk   Lunch: sandwiches wheat or white black turkey roast beef/cheese and lettuce    Dinner: fish with chicken baked sometimes red meat doesn't eat a lot of pork more ground beef or steak   Snacks. Chips, apples, popcorn, carrots       MEDICATIONS:  Current Outpatient Medications   Medication Sig Dispense Refill     albuterol (ACCUNEB) 1.25 MG/3ML neb solution Take 1 vial (1.25 mg) by nebulization every 6 hours as needed for shortness of breath / dyspnea or wheezing 90 mL 0     ARIPiprazole (ABILIFY) 20 MG tablet Take 20 mg by mouth daily       buPROPion (WELLBUTRIN XL) 300 MG 24 hr tablet Take 300 mg by mouth every morning       CONCERTA 36 MG CR tablet TAKE 1 TABLET BY MOUTH DAILY IN THE MORNING FOR ADHD. START 11/18/22       drospirenone-ethinyl estradiol (JESSY) 3-0.02 MG tablet Take 1 tablet by mouth daily       EMGALITY 120 MG/ML injection Inject 120 mg Subcutaneous every 28 days       famotidine (PEPCID) 20 MG tablet Take 1 tablet (20 mg) by mouth 2 times daily 180 tablet 1     FLUoxetine (PROZAC) 40 MG capsule Take 40 mg by mouth daily 20 mg + 40 mg = 60 mg       hyoscyamine (LEVSIN) 0.125 MG tablet Take 1 tablet (125 mcg) by mouth every 6 hours as needed for cramping 30 tablet 0     ketoconazole (NIZORAL) 2 % external cream        LANsoprazole (PREVACID) 15 MG DR capsule Take 1 capsule (15 mg) by mouth daily 90 capsule 3     ondansetron (ZOFRAN ODT) 4 MG ODT tab Take 1 tablet (4 mg) by mouth every 8 hours as needed for nausea 30 tablet 0     polyethylene glycol (MIRALAX) 17 GM/Dose powder Take 1 capful by mouth daily as needed for constipation       prochlorperazine (COMPAZINE) 10 MG tablet Take 1 tablet (10 mg) by mouth every 6 hours as needed for nausea or vomiting 30 tablet 0     Semaglutide-Weight Management (WEGOVY) 2.4 MG/0.75ML SOAJ Inject 2.4 mg Subcutaneous once a week 3 mL 5     TAZORAC 0.1 % external cream APPLY TOPICALLY TO THE AFFECTED AREA AT BEDTIME       VENTOLIN  (90 Base) MCG/ACT inhaler INHALE 2 PUFFS INTO THE LUNGS FOUR TIMES DAILY 18 g 3       Dietary Supplements  11/29/2022   Individual Vitamin Supplements D;Other   Herbal Complimentary products Laxative;Probiotic         ALLERGIES:   Allergies   Allergen Reactions     Azithromycin      Erythromycin Nausea and Vomiting     Sulfa Drugs Nausea        .na  LABS:  Last Basic Metabolic Panel:  Lab Results   Component Value Date     11/10/2021     06/24/2021     08/10/2020     06/22/2018     08/07/2015      Lab Results   Component Value Date    POTASSIUM 4.1 11/10/2021    POTASSIUM 4.3 06/24/2021    POTASSIUM 4.4 08/10/2020    POTASSIUM 4.0 06/22/2018    POTASSIUM 4.5 08/07/2015     Lab Results   Component Value Date    CHLORIDE 106 11/10/2021    CHLORIDE 106 06/24/2021    CHLORIDE 106 08/10/2020    CHLORIDE 110 06/22/2018    CHLORIDE 108 08/07/2015     Lab Results   Component Value Date    ELIU 9.7 11/10/2021    ELIU 9.0 06/24/2021    ELIU 9.0 08/10/2020    ELIU 8.5 06/22/2018    ELIU 9.0 08/07/2015     Lab Results   Component Value Date    CO2 24 11/10/2021    CO2 20 06/24/2021    CO2 25 08/10/2020    CO2 24 06/22/2018    CO2 28 08/07/2015     Lab Results   Component Value Date    BUN 11 11/10/2021    BUN 12 06/24/2021    BUN 11 08/10/2020    BUN 15 06/22/2018    BUN 10 08/07/2015     Lab Results   Component Value Date    CR 0.90 11/10/2021    CR 0.93 06/24/2021    CR 0.83 08/10/2020    CR 0.77 06/22/2018    CR 0.72 08/07/2015     Lab Results   Component Value Date    GLC 80 11/10/2021     06/24/2021     08/10/2020    GLC 84 06/22/2018    GLC 86 08/07/2015       Last Glucose Profile:   Hemoglobin A1C   Date Value Ref Range Status   07/21/2022 5.2 0.0 - 5.6 % Final     Comment:     Normal <5.7%   Prediabetes 5.7-6.4%    Diabetes 6.5% or higher     Note: Adopted from ADA consensus guidelines.   06/02/2022 5.3 0.0 - 5.6 % Final     Comment:     Normal <5.7%   Prediabetes 5.7-6.4%    Diabetes 6.5% or higher     Note: Adopted from ADA consensus guidelines.   11/10/2021 5.7 (H) 0.0 - 5.6 % Final      Comment:     Normal <5.7%   Prediabetes 5.7-6.4%    Diabetes 6.5% or higher     Note: Adopted from ADA consensus guidelines.       Last Lipid Profile:   Cholesterol   Date Value Ref Range Status   10/11/2019 142 <=199 mg/dL Final   12/18/2017 118 <=199 mg/dL Final     Direct Measure HDL   Date Value Ref Range Status   10/11/2019 58 >=50 mg/dL Final   12/18/2017 41 (L) >=50 mg/dL Final     LDL Cholesterol Calculated   Date Value Ref Range Status   10/11/2019 70 <=129 mg/dL Final   12/18/2017 70 <=129 mg/dL Final     Triglycerides   Date Value Ref Range Status   10/11/2019 71 <=149 mg/dL Final   12/18/2017 34 <=149 mg/dL Final     No results found for: CHOLHDLRATIO    Most recent CBC:  Recent Labs   Lab Test 01/31/23  1120 01/11/23  1346 07/21/22  1358   WBC 4.8 5.4 5.8   HGB 12.1 12.9 13.4   HCT 37.4 39.5 41.3    275 284     Most recent hepatic panel:  Recent Labs   Lab Test 08/10/20  1100 06/22/18  1302   ALT 33 18   AST 19 14     Most recent creatinine:  Recent Labs   Lab Test 11/10/21  1623 06/24/21  0925   CR 0.90 0.93       No components found for: GFRESETIMATEDLASTLAB(gfrestblack:1@  Lab Results   Component Value Date    ALBUMIN 3.7 08/10/2020       Last Thyroid Profile:   TSH   Date Value Ref Range Status   03/10/2022 0.89 0.30 - 5.00 uIU/mL Final   11/10/2021 0.94 0.30 - 5.00 uIU/mL Final   10/11/2019 0.74 0.30 - 5.00 uIU/mL Final   06/22/2018 0.70 0.40 - 4.00 mU/L Final     T4 Free   Date Value Ref Range Status   06/22/2018 0.79 0.76 - 1.46 ng/dL Final       Last Mineral Profile:   Ferritin   Date Value Ref Range Status   03/10/2022 18 10 - 130 ng/mL Final   06/22/2018 3 (L) 12 - 150 ng/mL Final     Iron   Date Value Ref Range Status   06/02/2022 60 42 - 175 ug/dL Final   06/22/2018 37 35 - 180 ug/dL Final     Iron Binding Cap   Date Value Ref Range Status   06/22/2018 451 (H) 240 - 430 ug/dL Final     Iron Binding Capacity   Date Value Ref Range Status   03/10/2022 421 240 - 430 ug/dL Final  "      Autoimmune & Inflammatory   CRP Inflammation   Date Value Ref Range Status   10/13/2022 23.4 (H) 0.0 - 8.0 mg/L Final         Last Vitamin Profile:   No results found for: PXL780, RLAM754, [id number], VITD3, D2VIT, D3VIT, DTOT, [id number], [id number], [id number], [id number], [id number], [id number]    ANTHROPOMETRICS:  Vitals:   BP Readings from Last 1 Encounters:   01/27/23 117/66     Pulse Readings from Last 1 Encounters:   01/31/23 95     Estimated body mass index is 36.8 kg/m  as calculated from the following:    Height as of 2/1/23: 1.676 m (5' 6\").    Weight as of 2/1/23: 103.4 kg (228 lb).    Wt Readings from Last 5 Encounters:   02/01/23 103.4 kg (228 lb)   01/31/23 103.4 kg (228 lb)   01/27/23 105.2 kg (232 lb)   01/20/23 94.3 kg (208 lb)   01/11/23 104.3 kg (230 lb)       NUTRITION DIAGNOSIS:    1. Altered GI function related to higher intake of coffee and carbs (fodmap) wheat as evidenced by food recall lower intake of healthy fats, and protein.     2. Excessive intake of high FODMAP foods related to food and nutrition knowledge deficit regarding effect of FODMAPs on IBS, as evidenced by symptoms of diarrhea, abdominal pain, constipation and elevated CRP.     3. Inadequate fiber intake related to food and nutrition knowledge deficit regarding desirable quantities of fiber, as evidenced by abdominal pain, diarrhea and constipation.       NUTRITION INTERVENTION:  Elimination and Reintroduction diet - FODMAPs    Long Term Goals:   Goal: 1-2 bowl movement daily [place] Stool Type 4 soft and formed  Goal: Decrease digestive symptoms -constipation but more diarrhea  Goal: Decrease elevated CRP.      Short Term Goals:  Goal 1: Continue to cut back on wheat/gluten and dairy. Focus on reviewing high FODMAP foods (info below and handout provided) specifically, onion, garlic, legums/beans, nuts - almonds, coconut milk, cashews to see if they trigger symptoms. - we are specifically looking at bloating.     Sub " quinoa or a starchy vegetable instead of rice.     Sub cod, shrimp, tuna, scallops to see if they trigger symptoms instead of salmon.     Add in an egg or two to see if they cause symptoms.     Discussed gluten free challenge for endoscopy  - [email or web address]    Send over your mysymptoms journal by kareen to review.   .      Goal 2: Focus on starting a low fodmap smoothie for breakfast with focus on adding in adequate fiber. See recipes provided and try to add in some collagen protein to the smoothies that you already have on hand.      Also, check out some of the other breakfast ideas provided such as oatmeal (naturally gluten free) with dairy free alternative milk, flax seed, nuts such as walnuts, fruit such as berries and protein such as chicken/turkey sausage.     Try an 1/8 avocado or 1 tbsp of nut butter ex peanut butter on slice of gluten or sourdough bread - peanuts are low in fodmaps but a legume so monitor if trigger symptoms. Can also try some other nut butters such as almond or sunflower these are higher fodmap foods but important for you to identify if they trigger symptoms.         Goal 3:  Try to add in a snack at least 1-2x per day with focus on adding in extra fiber. Meal and snack planning can be challenging at the best of times, particularly for those of us following a low FODMAP diet. Snacks are an important part of meal planning, as they can help to fill the gaps nutritionally i.e. snacking on yoghurt/cheese and biscuits to top up calcium intake for the day. However, for some people and at certain times of day (e.g. in between work and dinner), snacking can become problematic not just from a FODMAP point of view, but also in regards to calorie intake and general healthy eating. Here are some ohealthy low FODMAP snack ideas. It is important that also monitor how you handle dairy from cheese for example it could trigger symptoms even if a low  fodmap.     Tips and tricks:     Mix and match your food groups - adding a source of protein to your snacks will keep you feeling olivier for longer. E.g. peanut butter on rice cakes or gluten free toast, yoghurt & fruit, cheese & crackers.- try [employer] Chips - monitor if the almond flour triggers symptoms as almonds are higher fodmap sometimes you can consume small portions and still be ok and or try Siete Chips as they are gluten/wheat free.       Get organised at the start of the week - although meal prepping can be laborious, you will thank yourself for putting some extra time aside for snacks. See our snacks that require preparation below.        Have food handy for when you are on the move - you might have pre-packaged snacks ready in your bag or drawer at work (e.g. pack of mixed nuts, fruit or even jar of brazil nuts), or in the fridge at home (e.g. chopped up vegetables and dips).       Go for dairy - a low FODMAP diet does not mean a low dairy diet! Dairy is a great source of protein, calcium, and other vitamins/minerals with plenty of suitable lactose-free options available for those following a lactose-free diet. On-the-go pouch yoghurts (look for low sugar and monitor symptoms), as well as cheese & crackers are excellent choices for those who are busy and might find themselves snacking on the move.       Boost your fibre intake: eating fibre containing foods helps us to feel olivier for longer. Keep the peels on the low FODMAP fruits and vegetables, sprinkle extra seeds (i.e. titus or virgil) on your yoghurt, and consider wholegrain varieties where possible when choosing low FODMAP breads.       Consider your reusable food containers - gone are the days of simply taking a piece of fruit as a snack. These days, food storage containers come in a variety of shapes and sizes for all sorts of tasty snacks. This can be particularly helpful when preparing meals and snacks the night before a busy work or  school day e.g. squeeze pouches for yoghurt if tolerated (now there is almond and coconut dairy free varieties) or containers with separate compartments for dip and vegetables.     Snacks that require preparation:     Smoothies: green, summer berry, davy peanut butter     Sweet: peanut butter bars, muesli bars, mixed berry & yoghurt granola bar    Savoury: savoury slice, vegetable muffins, rice paper roll, zucchini and rice slice    Energy balls: Oat & davy chip, peanut butter protein balls, double davy bliss balls    Snacks with minimal preparation:    Protein: Boiled eggs, tuna can, 20g mixed nuts or 10 almonds    Fruit: kiwi fruit, pineapple, mandarin, orange, firm banana, rockmelon/cantaloupe    Dairy & alternatives: yoghurt (lactose-free), hot chocolate made with drinking chocolate or latte made with lactose-free if required or soy milk (made from soy protein), cheese & crackers    Vegetables: vegetables with dip, pre-made sweet potato chips (baked with olive oil and a sprinkle of spices)    Gluten free toast or rice crackers with toppings: peanut butter, cheese, marmalade     Goal 4: Continue tracking what you eat. Writing down or tracking through an robby what you eat as well as how you feel can help you identify patterns in your symptoms. This can help you become more aware and create a diet that is right for you without over restricting.     mysymptoms robby, you can track symptoms, bowl movements, medications, stress, exercise, sleep and foods as well as beverages to become more mindful. [email or web address]    Goal 5: Work on just meal planning lunch and dinner - see meal plan and recipes provided      What are FODMAPs?  FODMAP stands for fermentable oligo-, di-, monosaccharides and polyols    These short-chain carbs are resistant to digestion. Instead of being absorbed into your bloodstream, they reach the far end of your intestine, where most of your gut bacteria reside.    Your gut bacteria then  use these carbs for fuel, producing hydrogen gas and causing digestive symptoms in sensitive individuals. FODMAPs also draw liquid into your intestine, which may cause diarrhea.    Although not everyone has a sensitivity to FODMAPs, it is very common among people with irritable bowel syndrome (IBS)     Common FODMAPs include:    Fructose: a simple sugar found in many fruits and vegetables that also makes up the structure of table sugar and most added sugars  Lactose: a carbohydrate found in dairy products like milk  Fructans: found in many foods, including grains like wheat, spelt, rye and barley  Galactans: found in large amounts in legumes  Polyols: sugar alcohols like xylitol, sorbitol, maltitol, and mannitol. They are found in some fruits and vegetables and often used as sweetener      What is the purpose of a FODMAP diet?  A FODMAP diet is a 3 step diet used to help manage digestive symptoms.  Common gut problem with symptoms including abdominal (tummy) pain, bloating, wind (farting) and changes in bowel habit (diarrhea, constipation or both).     The aims of the diet are to:    Learn which foods and FODMAPs you tolerate, and which trigger your digestive symptoms. Understanding this will help you to follow a less restrictive, more nutritionally balanced diet for the long term that only restricts foods that trigger your digestive symptoms.    Assess whether your digestive symptoms are sensitive to FODMAPs. If you don't experience symptom improvement on the diet, than you may need to consider other digestive therapies.      How to follow a FODMAP diet      Stage 1: Restriction/Elimination   This stage involves strict avoidance of all high-FODMAP foods. It's important to note you should NOT avoid all FODMAPs long-term, and this stage should only last about 4-12 weeks. This is because it's important to include FODMAPs in the diet for gut health.      First restrict alcohol, caffeine, spicy foods and other common  food triggers (wheat and dairy). Focus should be on eating whole, unprocessed real foods in their unprocessed forms such as fruits, vegetables, whole grains (prefer wheat/gluten free), nuts, extra virgin olive oil, herbs, moderate amounts of plant based proteins, and healthy fats. Monitor how you handle nightshades, corn, soy as these can be common triggers for digestive issues.     A low-fodmap diet is complex and should be tailored to your individual symptoms. Moving through the following stages over 4-12 weeks to provide more awareness as to what foods could be triggering symptoms.   Some people notice an improvement in symptoms in the first week, while others take the full eight - twelve weeks. Once you have adequate relief of your digestive symptoms, you can progress to the second stage.  If by eight to twelve weeks your gut symptoms have not resolved you should check for hidden sources of fodmaps, try probiotic/digestive enzymes and consider life stressors as stress is a major contributor.     The diet can be difficult to follow if you are not prepared. Here are some tips:    Try avoiding just wheat by going gluten free or taking out dairy first while you prepare to make other dietary changes to follow a low-FODMAP diet. Eliminating these two foods first and finding alternatives can make you feel less restricted before you begin to take out other foods.     Find out what to buy: Ensure you have access to credible low-FODMAP food lists. See handouts of where to find these.    Get rid of high-FODMAP foods: Clear your fridge and pantry of these foods.    Make a shopping list: Create a low-FODMAP shopping list before heading to the grocery store, so you know which foods to purchase or avoid.    Read menus in advance: Familiarize yourself with low-FODMAP menu options so you'll be prepared when dining out.    Stage 2: Reintroduction    This stage involves systematically reintroducing high-FODMAP foods. The purpose  "of this is twofold:   1. To identify which types of FODMAPs you tolerate. Few people are sensitive to all of them.  2. To establish the amount of FODMAPs you can tolerate. This is known as your \"threshold level.\"    In this step, you test specific foods one by one for three days each.  It is recommended that you undertake this step with a trained dietitian who can guide you through the appropriate foods. Alternatively, this junaid can help you identify which foods to reintroduce. https://www.piSociety/get-junaid-help/    It is worth noting that you need to continue a low-FODMAP diet throughout this stage. This means even if you can tolerate a certain high-FODMAP food, you must continue to restrict it until stage 3.  It is also important to remember that, unlike people with most food allergies, people with IBS can tolerate small amounts of FODMAPs.     Stage 3: Personalization  This stage is also known as the \"modified low-FODMAP diet.\" In other words, you still restrict some FODMAPs. However, the amount and type are tailored to your personal tolerance, identified in stage 2.  It is important to progress to this final stage in order to address nutrient deficiencies and increase diet variety and flexibility.    This step is aimed to relax dietary restrictions as much as possible, expand the variety of foods included in your diet and establish a  personalized FODMAP diet  for the long-term. In this step well tolerated foods and FODMAPs are reintroduced to your diet, while poorly tolerated foods and FODMAPs are restricted, but only to a level that provides symptom relief. We recommend that you repeat challenges of poorly tolerated foods and FODMAPs over time to see whether your tolerance changes. You can also use the Filter function in the [employer] FODMAP Junaid to personalize your junaid experience during Step 3 of the diet.      Foods high in FODMAPs  Here is a list of some common foods and ingredients that are high in " FODMAPs:    Fruits: apples, applesauce, apricots, blackberries, boysenberries, canned fruit, cherries, dates, figs, peaches, pears, watermelon  Sweeteners: fructose, high fructose corn syrup, honey, maltitol, mannitol, sorbitol, xylitol  Dairy products: ice cream, milk (from cows, goats, and sheep), most yogurts, soft and fresh cheeses (cottage cheese, ricotta, etc.), sour cream, whey protein supplements  Vegetables: artichokes, asparagus, beetroot, broccoli, Apollo sprouts, cabbage, cauliflower, fennel, garlic, leeks, mushrooms, okra, onions, peas, shallots  Legumes: beans, baked beans, chickpeas, lentils, red kidney beans, soybeans  Wheat: biscuits, bread, most breakfast cereals, crackers, pancakes, pasta, tortillas, waffles  Other grains: barley, rye  Beverages: beer, fortified mariah, fruit juices, milk, soft drinks with high fructose corn syrup, soy milk      Foods you can eat on a low FODMAP diet  Keep in mind that the purpose of this diet is not to completely eliminate FODMAPs, which is extremely difficult. Simply minimizing these types of carbs is considered sufficient to reduce digestive symptoms.    There is a wide variety of healthy and nutritious foods that you can eat on a low FODMAP diet, including    Meats, fish, and eggs (well tolerated unless they have added high FODMAP ingredients, like wheat or high fructose corn syrup)  All fats and oils  Most herbs and spices  Nuts and seeds (including almonds, peanuts, macadamia nuts, pine nuts, and sesame seeds but not pistachios or cashews, which are high in FODMAPs)    Fruits, such as:    unripe bananas    blueberries    cantaloupe    grapefruit    grapes    kiwi    arnoldo    lime    mandarins    melons (except watermelon)    oranges    passionfruit    raspberries    strawberries    sweeteners (maple syrup, molasses, and stevia)    dairy products if they are lactose-free as well as hard cheeses and aged softer varieties (like Brie and  Camembert)    Vegetables, such as:    alfalfa    bell peppers    bok pau    carrots    celery    chives    cucumbers    eggplant    ashtyn    green beans    kale    lettuce    olives    parsnips    potatoes    radishes    spinach    spring onion (only green)    squash    sweet potatoes    tomatoes    turnips    water chestnuts    yams    zucchini    Grains, such as:    corn    oats    quinoa    rice    sorghum    tapioca    beverages (water, coffee, tea, etc)    However, keep in mind that these lists are neither definitive nor exhaustive. Naturally, there are foods not listed here that are either high or low in FODMAPs.     In addition, everyone is different. You may tolerate some foods on the list of foods to avoid while noticing digestive symptoms from foods low in FODMAPs for other reasons.  How much of a food you eat will affect how likely you are to experience symptoms if you have IBS. The individual tolerance to FODMAPs varies.      NUTRITION RESOURCES:  1. IFM Elimination Packet - Gluten and FODMAP Guide, Microbiome   ? mysymptoms tracker.com   2.  [place] FODMAP Website, Junaid and Handout- 3 step FODMAP diet guide & food list  3. Low FODMAP Smoothie Recipes          21 Day Low FODMAP Smoothie Challenge (21 Day Low FODMAP Challenges Book 1) Karen Edition by Sole Noguera         Low FODMAP Smoothies Recipe Book: A Beginners Guide to Low FODMAP Smoothies for Gut Health  4. The Low-FODMAP Diet for Beginners: A 7-Day Plan to Beat Bloat and Soothe Your Gut with Recipes for Fast IBS Relief by Laura Rivers  (Author), Justine SIERRA [facility]  (Author), Michelle Willoughby MD PhD      PATIENT'S BEHAVIOR CHANGE GOALS:   See nutrition intervention for patient stated behavior change goals.     MONITOR / EVALUATE:  Registered Dietitian will monitor/evaluate the following:     Beliefs and attitudes related to food    Food and nutrition knowledge / skills    Food / Beverage / Nutrient intake     Pertinent  Labs    Progress toward meeting stated nutrition-related goals    Readiness to change nutrition-related behaviors    Weight change    Digestion     COORDINATION OF CARE:  Follow up referring provider       FOLLOW-UP:  Follow-up appointment scheduled on April 7th at 3pm     Time spent in minutes: 75 minutes 5 units   Encounter: Individual    Mirna Lopez RD, CLT, LD  Integrative Registered Dietitian

## 2023-02-11 ENCOUNTER — E-VISIT (OUTPATIENT)
Dept: URGENT CARE | Facility: CLINIC | Age: 28
End: 2023-02-11
Payer: COMMERCIAL

## 2023-02-11 DIAGNOSIS — B37.31 CANDIDAL VULVOVAGINITIS: Primary | ICD-10-CM

## 2023-02-11 PROCEDURE — 99421 OL DIG E/M SVC 5-10 MIN: CPT | Performed by: PHYSICIAN ASSISTANT

## 2023-02-11 RX ORDER — FLUCONAZOLE 150 MG/1
150 TABLET ORAL ONCE
Qty: 1 TABLET | Refills: 0 | Status: SHIPPED | OUTPATIENT
Start: 2023-02-11 | End: 2023-02-11

## 2023-02-11 NOTE — PATIENT INSTRUCTIONS
Thank you for choosing us for your care. I have placed an order for a prescription so that you can start treatment. View your full visit summary for details by clicking on the link below. Your pharmacist will able to address any questions you may have about the medication.     If you re not feeling better within 2-3 days, please schedule an appointment.  You can schedule an appointment right here in [place], or call [phone]  If the visit is for the same symptoms as your eVisit, we ll refund the cost of your eVisit if seen within seven days.      Yeast Infection (Candida Vaginal Infection)    You have a Candida vaginal infection. This is also known as a yeast infection. It's most often caused by a type of yeast (fungus) called Candida. Candida are normally found in the vagina. But if they increase in number, this can lead to infection and cause symptoms.   Symptoms of a yeast infection can include:     Clumpy or thin, white discharge, which may look like cottage cheese    Itching or burning    Burning with urination  Certain factors can make a yeast infection more likely. These can include:     Taking certain medicines, such as antibiotics or birth control pills    Pregnancy    Diabetes    Weak immune system  A yeast infection is most often treated with antifungal medicine. This may be given as a vaginal cream or pills you take by mouth. Treatment may last for about 1 to 7 days. Women with severe or recurrent infections may need longer courses of treatment.   Home care    If you re prescribed medicine, be sure to use it as directed. Finish all of the medicine, even if your symptoms go away. Don t try to treat yourself using over-the-counter products without talking with your provider first. They will let you know if this is a good option for you.    Ask your provider what steps you can take to help reduce your risk of having a yeast infection in the future.    Follow-up care  Follow up with your healthcare  provider, or as directed.   When to seek medical advice  Call your healthcare provider right away if:     You have a fever of 100.4 F (38 C) or higher, or as directed by your provider.    Your symptoms worsen, or they don t go away within a few days of starting treatment.    You have new pain in the lower belly or pelvic region.    You have side effects that bother you or a reaction to the cream or pills you re prescribed.    You or any partners you have sex with have new symptoms, such as a rash, joint pain, or sores.  [employer] last reviewed this educational content on 7/1/2020 2000-2021 [employer]. All rights reserved. This information is not intended as a substitute for professional medical care. Always follow your healthcare professional's instructions.

## 2023-02-17 ENCOUNTER — TELEPHONE (OUTPATIENT)
Dept: GASTROENTEROLOGY | Facility: CLINIC | Age: 28
End: 2023-02-17
Payer: COMMERCIAL

## 2023-02-17 NOTE — TELEPHONE ENCOUNTER
Screening Questions  BLUE  KIND OF PREP RED  LOCATION [review exclusion criteria] GREEN  SEDATION TYPE        Y Are you active on mychart?       Darren Castorena PA-C   Ordering/Referring Provider?        [employer] What type of coverage do you have?      N Have you had a positive covid test in the last 14 days?     36.8 1. BMI  [BMI 40+ - review exclusion criteria]    Y  2. Are you able to give consent for your medical care? [IF NO,RN REVIEW]          N  3. Are you taking any prescription pain medications on a routine schedule   (ex narcotics: oxycodone, roxicodone, oxycontin,  and percocet)? [RN Review]          3a. EXTENDED PREP What kind of prescription?     N 4. Do you have any chemical dependencies such as alcohol, street drugs, or methadone?        **If yes 3- 5 , please schedule with MAC sedation.**          IF YES TO ANY 6 - 10 - HOSPITAL SETTING ONLY.     N 6.   Do you need assistance transferring?     N 7.   Have you had a heart or lung transplant?    N 8.   Are you currently on dialysis?   N 9.   Do you use daily home oxygen?   N 10. Do you take nitroglycerin?   10a.  If yes, how often?     11. [FEMALES]  N Are you currently pregnant?    11a.  If yes, how many weeks? [ Greater than 12 weeks, OR NEEDED]    N 12. Do you have Pulmonary Hypertension? *NEED PAC APPT AT [facility] w/ [facility]*     N 13. [review exclusion criteria]  Do you have any implantable devices in your body (pacemaker, defib, LVAD)?    N 14. In the past 6 months, have you had any heart related issues including cardiomyopathy or heart attack?     14a.  If yes, did it require cardiac stenting if so when?     N 15. Have you had a stroke or Transient ischemic attack (TIA - aka  mini stroke ) within 6 months?      N 16. Do you have mod to severe Obstructive Sleep Apnea?  [Hospital only]    N 17. Do you have SEVERE AND UNCONTROLLED asthma? *NEED PAC APPT AT [facility] w/[facility]*     N 18. Are you currently taking any blood thinners?     18a. If yes, inform patient  "to \"follow up w/ ordering provider for bridging instructions.\"    N 19. Do you take the medication Phentermine?    19a. If yes, \"Hold for 7 days before procedure.  Please consult your prescribing provider if you have questions about holding this medication.\"     N  20. Do you have chronic kidney disease?      N  21. Do you have a diagnosis of diabetes?     N  22. On a regular basis do you go 3-5 days between bowel movements?      23. Preferred LOCAL Pharmacy for Pre Prescription    [ LIST ONLY ONE PHARMACY]        [id number] - [place], [place] - [address] [place], [place] [address] AT [facility]      - CLOSING REMINDERS -    Informed patient they will need an adult    Cannot take any type of public or medical transportation alone    Conscious Sedation- Needs  for 6 hours after the procedure       MAC/General-Needs  for 24 hours after procedure    Pre-Procedure Covid test to be completed [[facility] PCR Testing Required]    Confirmed Nurse will call to complete assessment       - SCHEDULING DETAILS -  NO Hospital Setting Required? If yes, what is the exclusion?:      Surgeon    3/15  Date of Procedure  Upper Endoscopy [EGD]  Type of Procedure Scheduled  [facility]   Which Colonoscopy Prep was Sent?     MODERATE Sedation Type     N PAC / Pre-op Required                 "

## 2023-03-02 ENCOUNTER — TELEPHONE (OUTPATIENT)
Dept: GASTROENTEROLOGY | Facility: CLINIC | Age: 28
End: 2023-03-02
Payer: COMMERCIAL

## 2023-03-02 NOTE — TELEPHONE ENCOUNTER
Pre assessment questions completed for upcoming Upper endoscopy (EGD) procedure scheduled on 3.15.2023    COVID policy reviewed.     Pre-op exam? N/A    Reviewed procedural arrival time 0630, procedure time 0730 and facility location [facility]; [address] [place] [address], [address] Floor, [place] [address]    Designated  policy reviewed. Instructed to have someone stay 6 hours post procedure.     Anticoagulation/blood thinners? No    Electronic implanted devices? No    Diabetic? No    Procedure indication: abdominal bloating, episode of dysphagia, concern for EOE    Reviewed procedure prep instructions.     Prep instructions sent via [employer].      Patient verbalized understanding and had no questions or concerns at this time.    Terri Zuluaga RN  Endoscopy Procedure Pre Assessment RN

## 2023-03-14 NOTE — H&P
Nehemias Mascorro  [id number]  female  27 year old      Reason for procedure/surgery: dysphagia in setting of recent anaphylaxis, chronic abd pain, biopsies to r/o EoE    Patient Active Problem List   Diagnosis     Hip pain     Obesity     Anxiety     Major depressive disorder     Morbid obesity (H)     Special screening examination for human papillomavirus (HPV)     Gastroesophageal reflux disease     Asymptomatic COVID-19 virus infection     Acne     Asthma     Chronic migraine without aura     Cyst of ovary     Slow transit constipation     Constipation     Iron deficiency anemia     Headache     Gastric ulcer without hemorrhage or perforation     Occipital neuralgia     Neuralgia and neuritis, unspecified     Other reactions to severe stress     Somatization disorder     Post-COVID chronic fatigue     Dizziness     Dysphagia     Encephalopathy, unspecified     Excessive and frequent menstruation     Indigestion     Migraine headache     Muscle spasm     Nausea     Otalgia     Paresthesia of skin     Post concussion syndrome     Primary focal hyperhidrosis     Hemorrhage of rectum and anus     Right upper quadrant pain     Snoring     Strain of muscle, fascia and tendon at neck level, sequela     Tension-type headache, unspecified, not intractable     Wrist pain, right       Past Surgical History:    Past Surgical History:   Procedure Laterality Date     COLONOSCOPY N/A 4/27/2022    Procedure: COLONOSCOPY, WITH POLYPECTOMY AND BIOPSY;  Surgeon: Alyse Leija MD;  Location:  GI     ENT SURGERY      tonsilectomy age 8     ORTHOPEDIC SURGERY      Hip, emelia surgery in 2013     WRIST SURGERY         Past Medical History:   Past Medical History:   Diagnosis Date     ADHD (attention deficit hyperactivity disorder)      Depressive disorder      Ovarian cyst      Uncomplicated asthma        Social History:   Social History     Tobacco Use     Smoking status: Never     Smokeless tobacco: Never   Substance Use  Topics     Alcohol use: Yes     Comment: rare       Family History:   Family History   Problem Relation Age of Onset     Depression Maternal Grandmother      Schizophrenia Maternal Uncle      Substance Abuse Maternal Uncle        Allergies:   Allergies   Allergen Reactions     Azithromycin      Erythromycin Nausea and Vomiting     Sulfa Drugs Nausea       Active Medications:   Current Outpatient Medications   Medication Sig Dispense Refill     albuterol (ACCUNEB) 1.25 MG/3ML neb solution Take 1 vial (1.25 mg) by nebulization every 6 hours as needed for shortness of breath / dyspnea or wheezing 90 mL 0     ARIPiprazole (ABILIFY) 20 MG tablet Take 20 mg by mouth daily       buPROPion (WELLBUTRIN XL) 300 MG 24 hr tablet Take 300 mg by mouth every morning       CONCERTA 36 MG CR tablet TAKE 1 TABLET BY MOUTH DAILY IN THE MORNING FOR ADHD. START 11/18/22       drospirenone-ethinyl estradiol (JESSY) 3-0.02 MG tablet Take 1 tablet by mouth daily       EMGALITY 120 MG/ML injection Inject 120 mg Subcutaneous every 28 days       famotidine (PEPCID) 20 MG tablet Take 1 tablet (20 mg) by mouth 2 times daily 180 tablet 1     FLUoxetine (PROZAC) 40 MG capsule Take 40 mg by mouth daily 20 mg + 40 mg = 60 mg       hyoscyamine (LEVSIN) 0.125 MG tablet Take 1 tablet (125 mcg) by mouth every 6 hours as needed for cramping 30 tablet 0     ketoconazole (NIZORAL) 2 % external cream        LANsoprazole (PREVACID) 15 MG DR capsule Take 1 capsule (15 mg) by mouth daily 90 capsule 3     ondansetron (ZOFRAN ODT) 4 MG ODT tab Take 1 tablet (4 mg) by mouth every 8 hours as needed for nausea 30 tablet 0     polyethylene glycol (MIRALAX) 17 GM/Dose powder Take 1 capful by mouth daily as needed for constipation       prochlorperazine (COMPAZINE) 10 MG tablet Take 1 tablet (10 mg) by mouth every 6 hours as needed for nausea or vomiting 30 tablet 0     Semaglutide-Weight Management (WEGOVY) 2.4 MG/0.75ML SOAJ Inject 2.4 mg Subcutaneous once a week 3  mL 5     TAZORAC 0.1 % external cream APPLY TOPICALLY TO THE AFFECTED AREA AT BEDTIME       VENTOLIN  (90 Base) MCG/ACT inhaler INHALE 2 PUFFS INTO THE LUNGS FOUR TIMES DAILY 18 g 3       Systemic Review:   CONSTITUTIONAL: NEGATIVE for fever, chills, change in weight  ENT/MOUTH: NEGATIVE for ear, mouth and throat problems  RESP: NEGATIVE for significant cough or SOB  CV: NEGATIVE for chest pain, palpitations or peripheral edema    Physical Examination:   Vital Signs: There were no vitals taken for this visit.  GENERAL: healthy, alert and no distress  NECK: no adenopathy, no asymmetry, masses, or scars  RESP: lungs clear to auscultation - no rales, rhonchi or wheezes  CV: regular rate and rhythm, normal S1 S2, no S3 or S4, no murmur, click or rub, no peripheral edema and peripheral pulses strong  ABDOMEN: soft, nontender, no hepatosplenomegaly, no masses and bowel sounds normal  MS: no gross musculoskeletal defects noted, no edema    Plan: Appropriate to proceed as scheduled.      Cyn Colon MD  3/14/2023    PCP:  Alexandra Rodrigues

## 2023-03-15 ENCOUNTER — HOSPITAL ENCOUNTER (OUTPATIENT)
Facility: AMBULATORY SURGERY CENTER | Age: 28
Discharge: HOME | End: 2023-03-15
Attending: INTERNAL MEDICINE | Admitting: INTERNAL MEDICINE
Payer: COMMERCIAL

## 2023-03-15 VITALS
DIASTOLIC BLOOD PRESSURE: 63 MMHG | TEMPERATURE: 97.2 F | RESPIRATION RATE: 14 BRPM | HEART RATE: 76 BPM | OXYGEN SATURATION: 100 % | SYSTOLIC BLOOD PRESSURE: 110 MMHG

## 2023-03-15 LAB
HCG UR QL: NEGATIVE
INTERNAL QC OK POCT: NORMAL
POCT KIT EXPIRATION DATE: NORMAL
POCT KIT LOT NUMBER: NORMAL
UPPER GI ENDOSCOPY: NORMAL

## 2023-03-15 PROCEDURE — 81025 URINE PREGNANCY TEST: CPT | Performed by: PATHOLOGY

## 2023-03-15 PROCEDURE — 88305 TISSUE EXAM BY PATHOLOGIST: CPT | Mod: 26 | Performed by: PATHOLOGY

## 2023-03-15 PROCEDURE — 88305 TISSUE EXAM BY PATHOLOGIST: CPT | Mod: TC | Performed by: INTERNAL MEDICINE

## 2023-03-15 PROCEDURE — 43239 EGD BIOPSY SINGLE/MULTIPLE: CPT

## 2023-03-15 RX ORDER — NALOXONE HYDROCHLORIDE 0.4 MG/ML
0.4 INJECTION, SOLUTION INTRAMUSCULAR; INTRAVENOUS; SUBCUTANEOUS
Status: DISCONTINUED | OUTPATIENT
Start: 2023-03-15 | End: 2023-03-16 | Stop reason: HOSPADM

## 2023-03-15 RX ORDER — PROCHLORPERAZINE MALEATE 10 MG
10 TABLET ORAL EVERY 6 HOURS PRN
Status: DISCONTINUED | OUTPATIENT
Start: 2023-03-15 | End: 2023-03-16 | Stop reason: HOSPADM

## 2023-03-15 RX ORDER — LIDOCAINE 40 MG/G
CREAM TOPICAL
Status: DISCONTINUED | OUTPATIENT
Start: 2023-03-15 | End: 2023-03-15 | Stop reason: HOSPADM

## 2023-03-15 RX ORDER — ONDANSETRON 4 MG/1
4 TABLET, ORALLY DISINTEGRATING ORAL EVERY 6 HOURS PRN
Status: DISCONTINUED | OUTPATIENT
Start: 2023-03-15 | End: 2023-03-16 | Stop reason: HOSPADM

## 2023-03-15 RX ORDER — ONDANSETRON 2 MG/ML
4 INJECTION INTRAMUSCULAR; INTRAVENOUS
Status: DISCONTINUED | OUTPATIENT
Start: 2023-03-15 | End: 2023-03-15 | Stop reason: HOSPADM

## 2023-03-15 RX ORDER — NALOXONE HYDROCHLORIDE 0.4 MG/ML
0.2 INJECTION, SOLUTION INTRAMUSCULAR; INTRAVENOUS; SUBCUTANEOUS
Status: DISCONTINUED | OUTPATIENT
Start: 2023-03-15 | End: 2023-03-16 | Stop reason: HOSPADM

## 2023-03-15 RX ORDER — FLUMAZENIL 0.1 MG/ML
0.2 INJECTION, SOLUTION INTRAVENOUS
Status: ACTIVE | OUTPATIENT
Start: 2023-03-15 | End: 2023-03-15

## 2023-03-15 RX ORDER — FENTANYL CITRATE 50 UG/ML
INJECTION, SOLUTION INTRAMUSCULAR; INTRAVENOUS DAILY PRN
Status: DISCONTINUED | OUTPATIENT
Start: 2023-03-15 | End: 2023-03-15 | Stop reason: HOSPADM

## 2023-03-15 RX ORDER — ONDANSETRON 2 MG/ML
4 INJECTION INTRAMUSCULAR; INTRAVENOUS EVERY 6 HOURS PRN
Status: DISCONTINUED | OUTPATIENT
Start: 2023-03-15 | End: 2023-03-16 | Stop reason: HOSPADM

## 2023-03-16 NOTE — RESULT ENCOUNTER NOTE
Grace Del Cid,    The report from your recent upper endoscopy is available for you to review.  Overall, everything looked pretty normal -no major issues seen in the esophagus, stomach, or small intestine.  Biopsies did identify some mild irritation in your stomach, likely related to reflux, but otherwise nothing concerning.  I would recommend you continue taking your antacid medication, lansoprazole.    Please let me know if you have any questions.    Sincerely,  Darren Castorena PA-C

## 2023-03-29 NOTE — PROGRESS NOTES
[facility]          OUTPATIENT OCCUPATIONAL THERAPY  EVALUATION  PLAN OF TREATMENT FOR OUTPATIENT REHABILITATION  (COMPLETE FOR INITIAL CLAIMS ONLY)  Patient's Last Name, First Name, M.I.  YOB: 1995  Nehemias Mascorro                        Provider's Name  [facility] Medical Record No.  [id number]                               Onset Date:     05/26/22 (order date)   Start of Care Date:     09/29/22   Type:     ___PT   _X_OT   ___SLP Medical Diagnosis:     post covid chronic fatigue                          OT Diagnosis:     impaired ADL/IADL Visits from SOC:  1   _________________________________________________________________________________  Plan of Treatment/Functional Goals:  ADL training, IADL training, Self care/Home management, Therapeutic activities     Pt benefits from skilled outpatient occupational therapy for education regarding sleep hygiene, fatigue management, strategies to aide in memory and attention/concentration, energy conservation/work simplificaiton and activity modification as needed, strategies to improve self-management of physical/cognitive faitigue, and maximize independence in ADL/IADL, and work/school              Goals  Goal Identifier: Sleep Hygiene  Goal Description: Pt will verbalize understanding and implement strategies for imrpoved sleep hygiene to enable maximal paticipaiton in daily activities  Target Date: 09/29/22     Goal Identifier: Fatigue Management  Goal Description: Patient will identify at least 3 methods to manage her fatigue/energy to demonstrate at least a 5 point improvement in her perceived level of fatigue as measured by the FACIT  Target Date: 10/13/22     Goal Identifier: Energy Consevation  Goal Description: Goal Description: The patient will identify 2-3 energy conservation/work simplification/activity  modification strategies during daily  routine to increase participation in ADL/IADL  Target Date: 10/20/22     Goal Identifier: Attention/concentration  Goal Description: Pt will verbalize understanding about strategies to help with improving attention (concentration) to maximize performance in school/work and daily activities.        Goal Identifier: Memory  Goal Description: Pt will verbalize and/or demonstration understanding of internal and external strategies to help with memory and recall including and initiate into daily routine for improved ADL/IADL performance.  Target Date: 10/20/22                                              Therapy Frequency: 1x/week     Predicted Duration of Therapy Intervention (days/wks): up to 8 weeks  Tori Reynoso OTR/L         I CERTIFY THE NEED FOR THESE SERVICES FURNISHED UNDER        THIS PLAN OF TREATMENT AND WHILE UNDER MY CARE     (Physician co-signature of this document indicates review and certification of the therapy plan).                 ,     ,                 Referring Physician: Shaunna Leyva PA-C     Initial Assessment        See Epic Evaluation      Start Of Care Date: 09/29/22

## 2023-04-05 ENCOUNTER — HOSPITAL ENCOUNTER (OUTPATIENT)
Dept: GENERAL RADIOLOGY | Facility: HOSPITAL | Age: 28
Discharge: HOME | End: 2023-04-05
Attending: INTERNAL MEDICINE
Payer: COMMERCIAL

## 2023-04-05 ENCOUNTER — LAB (OUTPATIENT)
Dept: LAB | Facility: CLINIC | Age: 28
End: 2023-04-05
Payer: COMMERCIAL

## 2023-04-05 ENCOUNTER — OFFICE VISIT (OUTPATIENT)
Dept: RHEUMATOLOGY | Facility: CLINIC | Age: 28
End: 2023-04-05
Attending: PHYSICIAN ASSISTANT
Payer: COMMERCIAL

## 2023-04-05 ENCOUNTER — TRANSFERRED RECORDS (OUTPATIENT)
Dept: HEALTH INFORMATION MANAGEMENT | Facility: CLINIC | Age: 28
End: 2023-04-05

## 2023-04-05 VITALS
SYSTOLIC BLOOD PRESSURE: 102 MMHG | DIASTOLIC BLOOD PRESSURE: 68 MMHG | HEIGHT: 66 IN | BODY MASS INDEX: 36.29 KG/M2 | WEIGHT: 225.8 LBS | HEART RATE: 88 BPM

## 2023-04-05 DIAGNOSIS — R79.82 ELEVATED C-REACTIVE PROTEIN (CRP): ICD-10-CM

## 2023-04-05 DIAGNOSIS — M25.50 POLYARTHRALGIA: ICD-10-CM

## 2023-04-05 DIAGNOSIS — E66.01 MORBID OBESITY (H): ICD-10-CM

## 2023-04-05 DIAGNOSIS — M25.50 POLYARTHRALGIA: Primary | ICD-10-CM

## 2023-04-05 LAB
ALBUMIN SERPL BCG-MCNC: 4 G/DL (ref 3.5–5.2)
ALT SERPL W P-5'-P-CCNC: 33 U/L (ref 10–35)
BASOPHILS # BLD AUTO: 0 10E3/UL (ref 0–0.2)
BASOPHILS NFR BLD AUTO: 1 %
CREAT SERPL-MCNC: 0.99 MG/DL (ref 0.51–0.95)
CRP SERPL-MCNC: 15.8 MG/L
EOSINOPHIL # BLD AUTO: 0 10E3/UL (ref 0–0.7)
EOSINOPHIL NFR BLD AUTO: 0 %
ERYTHROCYTE [DISTWIDTH] IN BLOOD BY AUTOMATED COUNT: 13.2 % (ref 10–15)
ERYTHROCYTE [SEDIMENTATION RATE] IN BLOOD BY WESTERGREN METHOD: 17 MM/HR (ref 0–20)
GFR SERPL CREATININE-BSD FRML MDRD: 80 ML/MIN/1.73M2
HCT VFR BLD AUTO: 37.4 % (ref 35–47)
HCV AB SERPL QL IA: NONREACTIVE
HGB BLD-MCNC: 12.4 G/DL (ref 11.7–15.7)
IMM GRANULOCYTES # BLD: 0 10E3/UL
IMM GRANULOCYTES NFR BLD: 0 %
LYMPHOCYTES # BLD AUTO: 1.6 10E3/UL (ref 0.8–5.3)
LYMPHOCYTES NFR BLD AUTO: 36 %
MCH RBC QN AUTO: 29.5 PG (ref 26.5–33)
MCHC RBC AUTO-ENTMCNC: 33.2 G/DL (ref 31.5–36.5)
MCV RBC AUTO: 89 FL (ref 78–100)
MONOCYTES # BLD AUTO: 0.3 10E3/UL (ref 0–1.3)
MONOCYTES NFR BLD AUTO: 7 %
NEUTROPHILS # BLD AUTO: 2.5 10E3/UL (ref 1.6–8.3)
NEUTROPHILS NFR BLD AUTO: 56 %
PLATELET # BLD AUTO: 292 10E3/UL (ref 150–450)
RBC # BLD AUTO: 4.21 10E6/UL (ref 3.8–5.2)
WBC # BLD AUTO: 4.4 10E3/UL (ref 4–11)

## 2023-04-05 PROCEDURE — 99204 OFFICE O/P NEW MOD 45 MIN: CPT | Performed by: INTERNAL MEDICINE

## 2023-04-05 PROCEDURE — 82565 ASSAY OF CREATININE: CPT

## 2023-04-05 PROCEDURE — 86803 HEPATITIS C AB TEST: CPT

## 2023-04-05 PROCEDURE — 82040 ASSAY OF SERUM ALBUMIN: CPT

## 2023-04-05 PROCEDURE — 85652 RBC SED RATE AUTOMATED: CPT

## 2023-04-05 PROCEDURE — 36415 COLL VENOUS BLD VENIPUNCTURE: CPT

## 2023-04-05 PROCEDURE — 73522 X-RAY EXAM HIPS BI 3-4 VIEWS: CPT

## 2023-04-05 PROCEDURE — 85025 COMPLETE CBC W/AUTO DIFF WBC: CPT

## 2023-04-05 PROCEDURE — 73030 X-RAY EXAM OF SHOULDER: CPT | Mod: 50

## 2023-04-05 PROCEDURE — 84460 ALANINE AMINO (ALT) (SGPT): CPT

## 2023-04-05 PROCEDURE — 86140 C-REACTIVE PROTEIN: CPT

## 2023-04-05 NOTE — PROGRESS NOTES
This document was created using a software with less than 100% fidelity, at times resulting in unintended, even erroneous syntax and grammar.  The reader is advised to keep this under consideration while reviewing, interpreting this note.      Rheumatology Consult Note      Nehemias Mascorro     YOB: 1995 Age: 27 year old    Date of visit: 4/05/23    PCP: Alexandra Rodrigues    Chief Complaint   Patient presents with:  Consult: Multiple joint pain       Assessment and Plan     Nehemias was seen today for consult.    Diagnoses and all orders for this visit:    Polyarthralgia  -     Adult Rheumatology  Referral  -     MR Elbow Right w/o Contrast; Future  -     XR Shoulder Bilateral 3 Views; Future  -     XR Pelvis and Hip Bilateral 2 Views; Future  -     Erythrocyte sedimentation rate auto; Future  -     CRP inflammation; Future  -     Hepatitis C antibody; Future  -     CBC with Platelets & Differential; Future  -     Albumin level; Future  -     ALT; Future  -     Creatinine; Future    Elevated C-reactive protein (CRP)  -     Adult Rheumatology  Referral  -     MR Elbow Right w/o Contrast; Future  -     XR Shoulder Bilateral 3 Views; Future  -     XR Pelvis and Hip Bilateral 2 Views; Future  -     Erythrocyte sedimentation rate auto; Future  -     CRP inflammation; Future  -     Hepatitis C antibody; Future  -     CBC with Platelets & Differential; Future  -     Albumin level; Future  -     ALT; Future  -     Creatinine; Future    Morbid obesity (H)    Other orders  -     Adult Rheumatology  Referral           This patient presents with polyarthralgias, elevated C-reactive protein and a question of if she might have inflammatory bowel disease, although she understands the colonoscopy was nondiagnostic, she has calprotectin in her feces.  Her mom is noted to have lupus.  There is no personal family history of psoriasis.  RONALD rheumatoid factor anti-CCP antibody sed rate were  normal.  CRP modestly elevated.  This patient has some signals at the require careful monitoring particularly pertaining to her elbow joints more so on the right than left.  Given what has already been done at orthopedics those data were reviewed today, including noted report of x-rays of the right elbow joint, I have suggested further work-up as noted.  Once these data are available we will meet in person further course to be charted accordingly.       CHRISTIANO Del Cid I Ludwin is a 27 year old female  is seen today for evaluation of polyarthralgias, elevated C-reactive protein accompanied by her mom.  She reports her joints have troubled her for the past at least 2 years of or longer.  She noted the worst of the pains are in her elbows, shoulders, hips especially the left side.  She noted that the pain in the elbows and the shoulder tends to be worse toward the latter part of the day while the left hip, groin area pain is worse first thing in the morning.  The pain is intermittent.  She is not sure why some days of pain is there and other days it is not.  She was seen in orthopedics.  She had x-rays of the right elbow taken.  Those were reportedly normal.  She had what she understands to be corticosteroid injection in the right elbow, it appears to be lateral epicondyles injection, that provided her some relief.  She was also given Medrol Dosepak she does not recall how that might have helped or not her joint symptoms.  She reports episode of traumatic brain injury if few years ago when she was at work when industrial sized pots and pans landed on her hand.  She has noted tremulousness.  She has had GI symptoms.  There is been a question of whether she has inflammatory bowel disease or not.  She has had colonoscopy which she understands was nondiagnostic.  She has had calprotectin in her feces.  Her work-up has been negative for RONALD rheumatoid factor anti-CCP antibody.  Her sedimentation rate was normal.  Her CRP  was modestly elevated.  She reports no personal or family history of psoriasis.  There is no family history of ulcerative colitis or Crohn's disease her mom noted that she has lupus..           Active Problem List     Patient Active Problem List   Diagnosis     Hip pain     Obesity     Anxiety     Major depressive disorder     Morbid obesity (H)     Special screening examination for human papillomavirus (HPV)     Gastroesophageal reflux disease     Asymptomatic COVID-19 virus infection     Acne     Asthma     Chronic migraine without aura     Cyst of ovary     Slow transit constipation     Constipation     Iron deficiency anemia     Headache     Gastric ulcer without hemorrhage or perforation     Occipital neuralgia     Neuralgia and neuritis, unspecified     Other reactions to severe stress     Somatization disorder     Post-COVID chronic fatigue     Dizziness     Dysphagia     Encephalopathy, unspecified     Excessive and frequent menstruation     Indigestion     Migraine headache     Muscle spasm     Nausea     Otalgia     Paresthesia of skin     Post concussion syndrome     Primary focal hyperhidrosis     Hemorrhage of rectum and anus     Right upper quadrant pain     Snoring     Strain of muscle, fascia and tendon at neck level, sequela     Tension-type headache, unspecified, not intractable     Wrist pain, right     Past Medical History     Past Medical History:   Diagnosis Date     ADHD (attention deficit hyperactivity disorder)      Depressive disorder      Ovarian cyst      Uncomplicated asthma      Past Surgical History     Past Surgical History:   Procedure Laterality Date     COLONOSCOPY N/A 4/27/2022    Procedure: COLONOSCOPY, WITH POLYPECTOMY AND BIOPSY;  Surgeon: Alyse Leija MD;  Location:  GI     ENT SURGERY      tonsilectomy age 8     ESOPHAGOSCOPY, GASTROSCOPY, DUODENOSCOPY (EGD), COMBINED N/A 3/15/2023    Procedure: ESOPHAGOGASTRODUODENOSCOPY, WITH BIOPSY;  Surgeon: Cyn Colon,  MD;  Location: [facility] [place] OR     ORTHOPEDIC SURGERY      Hip, emelia surgery in 2013     WRIST SURGERY       Allergy     Allergies   Allergen Reactions     Azithromycin      Erythromycin Nausea and Vomiting     Sulfa Drugs Nausea     Current Medication List     Current Outpatient Medications   Medication Sig     albuterol (ACCUNEB) 1.25 MG/3ML neb solution Take 1 vial (1.25 mg) by nebulization every 6 hours as needed for shortness of breath / dyspnea or wheezing     ARIPiprazole (ABILIFY) 20 MG tablet Take 20 mg by mouth daily     buPROPion (WELLBUTRIN XL) 300 MG 24 hr tablet Take 300 mg by mouth every morning     CONCERTA 36 MG CR tablet TAKE 1 TABLET BY MOUTH DAILY IN THE MORNING FOR ADHD. START 11/18/22     drospirenone-ethinyl estradiol (JESSY) 3-0.02 MG tablet Take 1 tablet by mouth daily     EMGALITY 120 MG/ML injection Inject 120 mg Subcutaneous every 28 days     famotidine (PEPCID) 20 MG tablet Take 1 tablet (20 mg) by mouth 2 times daily     FLUoxetine (PROZAC) 40 MG capsule Take 40 mg by mouth daily 20 mg + 40 mg = 60 mg     hyoscyamine (LEVSIN) 0.125 MG tablet Take 1 tablet (125 mcg) by mouth every 6 hours as needed for cramping     ketoconazole (NIZORAL) 2 % external cream      LANsoprazole (PREVACID) 15 MG DR capsule Take 1 capsule (15 mg) by mouth daily     ondansetron (ZOFRAN ODT) 4 MG ODT tab Take 1 tablet (4 mg) by mouth every 8 hours as needed for nausea     polyethylene glycol (MIRALAX) 17 GM/Dose powder Take 1 capful by mouth daily as needed for constipation     prochlorperazine (COMPAZINE) 10 MG tablet Take 1 tablet (10 mg) by mouth every 6 hours as needed for nausea or vomiting     Semaglutide-Weight Management (WEGOVY) 2.4 MG/0.75ML SOAJ Inject 2.4 mg Subcutaneous once a week     TAZORAC 0.1 % external cream APPLY TOPICALLY TO THE AFFECTED AREA AT BEDTIME     VENTOLIN  (90 Base) MCG/ACT inhaler INHALE 2 PUFFS INTO THE LUNGS FOUR TIMES DAILY     No current facility-administered medications for  "this visit.            Family History     Family History   Problem Relation Age of Onset     Depression Maternal Grandmother      Schizophrenia Maternal Uncle      Substance Abuse Maternal Uncle          Physical Exam     COMPREHENSIVE EXAMINATION:  Vitals:    04/05/23 0908   BP: 102/68   BP Location: Right arm   Patient Position: Sitting   Cuff Size: Adult Large   Pulse: 88   Weight: 102.4 kg (225 lb 12.8 oz)   Height: 1.676 m (5' 6\")     A well appearing alert oriented female. Vital data as noted above. Her eyes examined for inflammation/scleromalacia. ENT examined for oral mucositis, moisture, thrush, nasal deformity, external ear redness, deformity. Her neck is examined for suppleness and lymphadenopathy.  Cardiopulmonary examination without dyspnea at rest, use of accessory muscles of breathing, wheezing, edema, peripheral or central cyanosis.  Abdomen examined for softness, tenderness, obvious organomegaly.  Skin examined for heliotrope, malar area eruption, lupus pernio, periungual erythema, sclerodactyly, papules, erythema nodosum, purpura, nail pitting, onycholysis, and obvious psoriasis lesion. Neurological examination done for alertness, speech, facial symmetry,  tone and power in upper and lower extremities, and gait. The joint examination is performed for swelling, tenderness, warmth, erythema, and range of motion in the following joints: DIPs, PIPs, MCPs, wrists, first CMC's, elbows, shoulders, hips, knees, ankles, feet; spine for range of motion and paraspinal muscles for tenderness. The salient normal / abnormal findings are appended.  She is tender on the lateral epicondyles bilaterally but also the joint line of the right side, she has lost the normal hyperextension of the right elbow, she is more comfortable with flexion on the left elbow, she has noted pain in her right shoulder on internal rotation, with the left hip internal/external rotation is associated with pain in the left groin.  She had " tenderness in the metatarsophalangeal joints bilaterally.  There are no other joints that are painful, or definite evidence of synovitis.  She does not have dactylitis of digits or toes.    Labs / Imaging (select studies)     Rheumatoid Factor   Date Value Ref Range Status   10/13/2022 <6 <12 IU/mL Final      Hemoglobin   Date Value Ref Range Status   01/31/2023 12.1 11.7 - 15.7 g/dL Final   01/11/2023 12.9 11.7 - 15.7 g/dL Final   07/21/2022 13.4 11.7 - 15.7 g/dL Final     Urea Nitrogen   Date Value Ref Range Status   11/10/2021 11 8 - 22 mg/dL Final   06/24/2021 12 8 - 22 mg/dL Final   08/10/2020 11 8 - 22 mg/dL Final   06/22/2018 15 7 - 30 mg/dL Final   08/07/2015 10 7 - 30 mg/dL Final     Erythrocyte Sedimentation Rate   Date Value Ref Range Status   10/13/2022 16 0 - 20 mm/hr Final     CRP Inflammation   Date Value Ref Range Status   10/13/2022 23.4 (H) 0.0 - 8.0 mg/L Final     AST   Date Value Ref Range Status   08/10/2020 19 0 - 40 U/L Final   06/22/2018 14 0 - 45 U/L Final     Albumin   Date Value Ref Range Status   08/10/2020 3.7 3.5 - 5.0 g/dL Final     Alkaline Phosphatase   Date Value Ref Range Status   08/10/2020 69 45 - 120 U/L Final     ALT   Date Value Ref Range Status   08/10/2020 33 0 - 45 U/L Final   06/22/2018 18 0 - 50 U/L Final     Rheumatoid Factor   Date Value Ref Range Status   10/13/2022 <6 <12 IU/mL Final          Immunization History     Immunization History   Administered Date(s) Administered     COVID-19 Vaccine 18+ (Moderna) 03/09/2021, 04/06/2021, 11/04/2021     COVID-19 Vaccine Bivalent Booster 18+ (Moderna) 10/12/2022     DTAP (<7y) 09/25/1996, 08/25/1999     DTaP / Hep B / IPV 1995, 01/05/1996     FLU 6-35 months 09/04/2009, 10/11/2010, 09/15/2011, 09/17/2012     Flu, Unspecified 10/06/2015, 09/05/2019     HEPATITIS A (PEDS 12M-18Y) 02/11/2013     HIB (PRP-T) [birth year], 01/05/1996, 09/25/1996     HPV Quadrivalent 07/06/2007, 11/01/2007, 04/18/2008     Hepatits B  (Peds <19Y) 1995     Influenza (H1N1) 12/10/2009     Influenza (IIV3) PF 11/01/2007     Influenza Vaccine >6 months (Alfuria,Fluzone) 09/16/2014, 10/01/2015, 10/29/2017, 09/05/2019     Influenza Vaccine, 6+MO IM (QUADRIVALENT W/PRESERVATIVES) 10/28/2018, 09/26/2020, 10/02/2021, 10/01/2022     Influenza vaccine ages 6-35 months 10/28/2018, 09/26/2020, 10/02/2021     Influenza,INJ,MDCK,PF,Quad >6mo(Flucelvax) 10/28/2018     MMR 07/03/1996, 08/25/1999     Meningococcal (Menomune ) 07/06/2007     Meningococcal ACWY (Menactra ) 07/06/2007, 02/11/2013, 08/22/2013     TDAP (Adacel,Boostrix) 07/06/2007, 08/22/2013, 09/05/2019     TDAP Vaccine (Boostrix) 07/06/2007, 08/22/2013     Varicella 07/03/1996, 08/09/2016

## 2023-04-08 ENCOUNTER — MYC MEDICAL ADVICE (OUTPATIENT)
Dept: ENDOCRINOLOGY | Facility: CLINIC | Age: 28
End: 2023-04-08
Payer: COMMERCIAL

## 2023-04-10 ASSESSMENT — ASTHMA QUESTIONNAIRES
QUESTION_2 LAST FOUR WEEKS HOW OFTEN HAVE YOU HAD SHORTNESS OF BREATH: NOT AT ALL
QUESTION_4 LAST FOUR WEEKS HOW OFTEN HAVE YOU USED YOUR RESCUE INHALER OR NEBULIZER MEDICATION (SUCH AS ALBUTEROL): NOT AT ALL
QUESTION_3 LAST FOUR WEEKS HOW OFTEN DID YOUR ASTHMA SYMPTOMS (WHEEZING, COUGHING, SHORTNESS OF BREATH, CHEST TIGHTNESS OR PAIN) WAKE YOU UP AT NIGHT OR EARLIER THAN USUAL IN THE MORNING: NOT AT ALL
QUESTION_1 LAST FOUR WEEKS HOW MUCH OF THE TIME DID YOUR ASTHMA KEEP YOU FROM GETTING AS MUCH DONE AT WORK, SCHOOL OR AT HOME: NONE OF THE TIME
ACT_TOTALSCORE: 25
QUESTION_5 LAST FOUR WEEKS HOW WOULD YOU RATE YOUR ASTHMA CONTROL: COMPLETELY CONTROLLED
ACT_TOTALSCORE: 25

## 2023-04-11 ENCOUNTER — OFFICE VISIT (OUTPATIENT)
Dept: INTERNAL MEDICINE | Facility: CLINIC | Age: 28
End: 2023-04-11
Payer: COMMERCIAL

## 2023-04-11 VITALS
BODY MASS INDEX: 36.26 KG/M2 | SYSTOLIC BLOOD PRESSURE: 112 MMHG | DIASTOLIC BLOOD PRESSURE: 68 MMHG | RESPIRATION RATE: 14 BRPM | WEIGHT: 225.6 LBS | HEART RATE: 80 BPM | OXYGEN SATURATION: 99 % | HEIGHT: 66 IN | TEMPERATURE: 97.8 F

## 2023-04-11 DIAGNOSIS — Z12.4 CERVICAL CANCER SCREENING: ICD-10-CM

## 2023-04-11 DIAGNOSIS — F32.1 CURRENT MODERATE EPISODE OF MAJOR DEPRESSIVE DISORDER, UNSPECIFIED WHETHER RECURRENT (H): Primary | ICD-10-CM

## 2023-04-11 DIAGNOSIS — I95.9 HYPOTENSION, UNSPECIFIED HYPOTENSION TYPE: ICD-10-CM

## 2023-04-11 DIAGNOSIS — E03.9 HYPOTHYROIDISM, UNSPECIFIED TYPE: ICD-10-CM

## 2023-04-11 DIAGNOSIS — M25.50 MULTIPLE JOINT PAIN: ICD-10-CM

## 2023-04-11 PROBLEM — G47.63 SLEEP RELATED BRUXISM: Status: ACTIVE | Noted: 2023-03-29

## 2023-04-11 PROBLEM — G47.00 INSOMNIA, UNSPECIFIED: Status: ACTIVE | Noted: 2023-03-29

## 2023-04-11 LAB
ALBUMIN SERPL BCG-MCNC: 3.8 G/DL (ref 3.5–5.2)
ALBUMIN UR-MCNC: ABNORMAL MG/DL
ALP SERPL-CCNC: 55 U/L (ref 35–104)
ALT SERPL W P-5'-P-CCNC: 32 U/L (ref 10–35)
ANION GAP SERPL CALCULATED.3IONS-SCNC: 13 MMOL/L (ref 7–15)
APPEARANCE UR: CLEAR
AST SERPL W P-5'-P-CCNC: 27 U/L (ref 10–35)
BACTERIA #/AREA URNS HPF: ABNORMAL /HPF
BILIRUB SERPL-MCNC: 0.3 MG/DL
BILIRUB UR QL STRIP: NEGATIVE
BUN SERPL-MCNC: 9.3 MG/DL (ref 6–20)
CALCIUM SERPL-MCNC: 9.1 MG/DL (ref 8.6–10)
CHLORIDE SERPL-SCNC: 109 MMOL/L (ref 98–107)
COLOR UR AUTO: YELLOW
CREAT SERPL-MCNC: 0.89 MG/DL (ref 0.51–0.95)
CRP SERPL-MCNC: 12.1 MG/L
DEPRECATED HCO3 PLAS-SCNC: 21 MMOL/L (ref 22–29)
ERYTHROCYTE [DISTWIDTH] IN BLOOD BY AUTOMATED COUNT: 13.2 % (ref 10–15)
ERYTHROCYTE [SEDIMENTATION RATE] IN BLOOD BY WESTERGREN METHOD: 18 MM/HR (ref 0–20)
GFR SERPL CREATININE-BSD FRML MDRD: >90 ML/MIN/1.73M2
GLUCOSE SERPL-MCNC: 76 MG/DL (ref 70–99)
GLUCOSE UR STRIP-MCNC: NEGATIVE MG/DL
HCT VFR BLD AUTO: 35.9 % (ref 35–47)
HGB BLD-MCNC: 11.4 G/DL (ref 11.7–15.7)
HGB UR QL STRIP: NEGATIVE
KETONES UR STRIP-MCNC: NEGATIVE MG/DL
LEUKOCYTE ESTERASE UR QL STRIP: NEGATIVE
MCH RBC QN AUTO: 28.7 PG (ref 26.5–33)
MCHC RBC AUTO-ENTMCNC: 31.8 G/DL (ref 31.5–36.5)
MCV RBC AUTO: 90 FL (ref 78–100)
MUCOUS THREADS #/AREA URNS LPF: PRESENT /LPF
NITRATE UR QL: NEGATIVE
PH UR STRIP: 7.5 [PH] (ref 5–8)
PLATELET # BLD AUTO: 262 10E3/UL (ref 150–450)
POTASSIUM SERPL-SCNC: 3.9 MMOL/L (ref 3.4–5.3)
PROT SERPL-MCNC: 6.5 G/DL (ref 6.4–8.3)
RBC # BLD AUTO: 3.97 10E6/UL (ref 3.8–5.2)
RBC #/AREA URNS AUTO: ABNORMAL /HPF
SODIUM SERPL-SCNC: 143 MMOL/L (ref 136–145)
SP GR UR STRIP: 1.02 (ref 1–1.03)
SQUAMOUS #/AREA URNS AUTO: ABNORMAL /LPF
TSH SERPL DL<=0.005 MIU/L-ACNC: 0.52 UIU/ML (ref 0.3–4.2)
UROBILINOGEN UR STRIP-ACNC: 1 E.U./DL
WBC # BLD AUTO: 3.8 10E3/UL (ref 4–11)
WBC #/AREA URNS AUTO: ABNORMAL /HPF

## 2023-04-11 PROCEDURE — 85027 COMPLETE CBC AUTOMATED: CPT | Performed by: INTERNAL MEDICINE

## 2023-04-11 PROCEDURE — 81001 URINALYSIS AUTO W/SCOPE: CPT | Performed by: INTERNAL MEDICINE

## 2023-04-11 PROCEDURE — 84443 ASSAY THYROID STIM HORMONE: CPT | Performed by: INTERNAL MEDICINE

## 2023-04-11 PROCEDURE — 80053 COMPREHEN METABOLIC PANEL: CPT | Performed by: INTERNAL MEDICINE

## 2023-04-11 PROCEDURE — 86140 C-REACTIVE PROTEIN: CPT | Performed by: INTERNAL MEDICINE

## 2023-04-11 PROCEDURE — 85652 RBC SED RATE AUTOMATED: CPT | Performed by: INTERNAL MEDICINE

## 2023-04-11 PROCEDURE — 99214 OFFICE O/P EST MOD 30 MIN: CPT | Performed by: INTERNAL MEDICINE

## 2023-04-11 PROCEDURE — 36415 COLL VENOUS BLD VENIPUNCTURE: CPT | Performed by: INTERNAL MEDICINE

## 2023-04-11 ASSESSMENT — PATIENT HEALTH QUESTIONNAIRE - PHQ9
SUM OF ALL RESPONSES TO PHQ QUESTIONS 1-9: 11
10. IF YOU CHECKED OFF ANY PROBLEMS, HOW DIFFICULT HAVE THESE PROBLEMS MADE IT FOR YOU TO DO YOUR WORK, TAKE CARE OF THINGS AT HOME, OR GET ALONG WITH OTHER PEOPLE: VERY DIFFICULT
SUM OF ALL RESPONSES TO PHQ QUESTIONS 1-9: 11

## 2023-04-11 ASSESSMENT — PAIN SCALES - GENERAL: PAINLEVEL: SEVERE PAIN (7)

## 2023-04-11 NOTE — PROGRESS NOTES
Assessment & Plan     Cervical cancer screening  He says she has gone to a woman[facility] health clinic and gotten the Pap smear done we will have to review.    Current moderate episode of major depressive disorder, unspecified whether recurrent (H)  I do believe her symptoms stem from depression.  She has been to multiple providers for her GI problems has had gastroparesis studies done upper and lower colonoscopy, MR enterography CT abdomen ultrasound abdomen with no abnormalities noted.  He is neurology for headaches and has extensive work-up done there as well.  Similarly her CRP being elevated has now been extensively worked up with no focal or symptomatic signs of lupus.  She could be dehydrated but most of all I think she just needs extension of her exam as the stress of the upcoming exam I think is making her physical symptoms flareup.  Hypotension, unspecified hypotension type    - UA Macro with Reflex to Micro and Culture - lab collect  - TSH  - Comprehensive metabolic panel  - CRP, inflammation  - ESR: Erythrocyte sedimentation rate  - CBC with platelets  - UA Macro with Reflex to Micro and Culture - lab collect  - TSH  - Comprehensive metabolic panel  - CRP, inflammation  - ESR: Erythrocyte sedimentation rate  - CBC with platelets  - UA Microscopic with Reflex to Culture    Multiple joint pain  - diclofenac (VOLTAREN) 50 MG EC tablet  Dispense: 20 tablet; Refill: 0  - acetaminophen-codeine (TYLENOL #3) 300-30 MG tablet  Dispense: 10 tablet; Refill: 0  Method amount of Tylenol with codeine and diclofenac for her joint pain  Hypothyroidism, unspecified type  - US Thyroid  Has slight soft tissue swelling over the thyroid not clear if it is the goiter we will get an ultrasound because she is on a GLP agonist.  Make sure there is no suspicious nodule        Given letter to delay her final exams.     BMI:   Estimated body mass index is 36.41 kg/m  as calculated from the following:    Height as of this encounter:  "1.676 m (5' 6\").    Weight as of this encounter: 102.3 kg (225 lb 9.6 oz).           Alexandra Rodrigues MD  [facility]    Araceli Del Cid is a 27 year old, presenting for the following health issues:  Joint Pain, Hypoglycemia (85 after eating), and Recheck Medication  Nonspecific symptoms of not feeling well.  These are just sort of smoldering on for the last few weeks.  Her mother has lupus and there was a concern that she had a high CRP that she may also have lupus so she was sent to rheumatologist who did a comprehensive work-up and she is scheduled for an MRI hip.  To look for erosive disease.          4/11/2023    11:17 AM   Additional Questions   Roomed by          4/11/2023    11:17 AM   Patient Reported Additional Medications   Patient reports taking the following new medications none     Hypoglycemia    History of Present Illness       Reason for visit:  Joint pain, low blood sugar, possible dehydration, generally don't feel good.  Symptom onset:  More than a month  Symptoms include:  Pain, nausea, headache, peeing less, tired, shaking, confusion, anxiety  Symptom intensity:  Moderate  Symptom progression:  Worsening  Had these symptoms before:  Yes  Has tried/received treatment for these symptoms:  Yes  Previous treatment was successful:  Yes  Prior treatment description:  Medicine  What makes it worse:  Weather  What makes it better:  Medicine sometimes    She eats 2-3 servings of fruits and vegetables daily.She consumes 0 sweetened beverage(s) daily.She exercises with enough effort to increase her heart rate 9 or less minutes per day.  She exercises with enough effort to increase her heart rate 3 or less days per week. She is missing 1 dose(s) of medications per week.  She is not taking prescribed medications regularly due to remembering to take and other.    Today's PHQ-9         PHQ-9 Total Score: 11    PHQ-9 Q9 Thoughts of better off dead/self-harm past 2 weeks :  " " Not at all    How difficult have these problems made it for you to do your work, take care of things at home, or get along with other people: Very difficult  Is feeling more depressed.med      Patient Active Problem List   Diagnosis     Hip pain     Obesity     Anxiety     Major depressive disorder     Morbid obesity (H)     Special screening examination for human papillomavirus (HPV)     Gastroesophageal reflux disease     Asymptomatic COVID-19 virus infection     Acne     Asthma     Chronic migraine without aura     Cyst of ovary     Slow transit constipation     Constipation     Iron deficiency anemia     Headache     Gastric ulcer without hemorrhage or perforation     Occipital neuralgia     Neuralgia and neuritis, unspecified     Other reactions to severe stress     Somatization disorder     Post-COVID chronic fatigue     Dizziness     Dysphagia     Encephalopathy, unspecified     Excessive and frequent menstruation     Indigestion     Migraine headache     Muscle spasm     Nausea     Otalgia     Paresthesia of skin     Post concussion syndrome     Primary focal hyperhidrosis     Hemorrhage of rectum and anus     Right upper quadrant pain     Snoring     Strain of muscle, fascia and tendon at neck level, sequela     Tension-type headache, unspecified, not intractable     Wrist pain, right     Cervicothoracic disc displacement     Insomnia, unspecified     Sleep related bruxism     Current moderate episode of major depressive disorder, unspecified whether recurrent (H)             Review of Systems   Constitutional, HEENT, cardiovascular, pulmonary, gi and gu systems are negative, except as otherwise noted.      Objective    /68 (BP Location: Left arm, Patient Position: Sitting, Cuff Size: Adult Large)   Pulse 80   Temp 97.8  F (36.6  C)   Resp 14   Ht 1.676 m (5' 6\")   Wt 102.3 kg (225 lb 9.6 oz)   LMP 03/15/2023 (Exact Date)   SpO2 99%   BMI 36.41 kg/m    Body mass index is 36.41 " kg/m .  Physical Exam no orthostatic drop in blood pressure  GENERAL: healthy, alert and no distress  NECK: no adenopathy, no asymmetry, masses, or scars and thyroid normal to palpation  RESP: lungs clear to auscultation - no rales, rhonchi or wheezes  CV: regular rate and rhythm, normal S1 S2, no S3 or S4, no murmur, click or rub, no peripheral edema and peripheral pulses strong  ABDOMEN: soft, nontender, no hepatosplenomegaly, no masses and bowel sounds normal  MS: no gross musculoskeletal defects noted, no edema                  Pap one year , is due for GYN in may     Creatinine   Date Value Ref Range Status   04/05/2023 0.99 (H) 0.51 - 0.95 mg/dL Final   06/22/2018 0.77 0.52 - 1.04 mg/dL Final

## 2023-04-11 NOTE — LETTER
April 11, 2023      Nehemias Mascorro  [address] [place] [address]        To Whom It May Concern:    Nehemias Mascorro  was seen on 4/11  . Due to ongoing medical problems her final exams should be delayed for another 4 weeks .       Sincerely,        Alexandra Rodrigues MD

## 2023-04-13 ENCOUNTER — HOSPITAL ENCOUNTER (OUTPATIENT)
Dept: MRI IMAGING | Facility: HOSPITAL | Age: 28
Discharge: HOME | End: 2023-04-13
Attending: INTERNAL MEDICINE | Admitting: INTERNAL MEDICINE
Payer: COMMERCIAL

## 2023-04-13 DIAGNOSIS — M25.50 POLYARTHRALGIA: ICD-10-CM

## 2023-04-13 DIAGNOSIS — R79.82 ELEVATED C-REACTIVE PROTEIN (CRP): ICD-10-CM

## 2023-04-13 PROCEDURE — 73221 MRI JOINT UPR EXTREM W/O DYE: CPT | Mod: RT

## 2023-04-20 NOTE — TELEPHONE ENCOUNTER
Phone call to assess patient blood sugars.    States she has not checked her blood sugar since she hasn't had another episode.  Patient states she had a 10 day  lapse in medication and had the episode after she restarted the 2.4 mg dose.  Has not had any additional episodes and feels good.    Provider notified.

## 2023-04-26 ENCOUNTER — VIRTUAL VISIT (OUTPATIENT)
Dept: INTERNAL MEDICINE | Facility: CLINIC | Age: 28
End: 2023-04-26
Payer: COMMERCIAL

## 2023-04-26 DIAGNOSIS — R53.83 OTHER FATIGUE: ICD-10-CM

## 2023-04-26 DIAGNOSIS — F32.1 CURRENT MODERATE EPISODE OF MAJOR DEPRESSIVE DISORDER, UNSPECIFIED WHETHER RECURRENT (H): ICD-10-CM

## 2023-04-26 DIAGNOSIS — B37.31 YEAST INFECTION OF THE VAGINA: Primary | ICD-10-CM

## 2023-04-26 PROCEDURE — 99213 OFFICE O/P EST LOW 20 MIN: CPT | Mod: VID | Performed by: INTERNAL MEDICINE

## 2023-04-26 RX ORDER — FLUCONAZOLE 150 MG/1
150 TABLET ORAL
Qty: 3 TABLET | Refills: 3 | Status: SHIPPED | OUTPATIENT
Start: 2023-04-26 | End: 2023-04-28

## 2023-04-26 NOTE — PROGRESS NOTES
"Nehemias is a 27 year old who is being evaluated via a billable video visit.      How would you like to obtain your AVS? MyChart  If the video visit is dropped, the invitation should be resent by: Send to e-mail at: [email or web address]  Will anyone else be joining your video visit? No          Assessment & Plan     Yeast infection of the vagina    - fluconazole (DIFLUCAN) 150 MG tablet  Dispense: 3 tablet; Refill: 3    Current moderate episode of major depressive disorder, unspecified whether recurrent (H)      Other fatigue  I do believe her mental health plays a huge role in her symptoms.  She is seeing rheumatology for polyarthralgias and is slightly high CRP at this time her white count and hemoglobin was slightly low which is relatively new for her but of course does not explain the degree of her symptoms.  She is set to see rheumatology in few weeks MRI elbow did not show inflammatory arthropathy.  She may be a candidate for a trial of Plaquenil.  I will defer that to rheumatology.  I have given her time off from school to delay her finals.               BMI:   Estimated body mass index is 36.41 kg/m  as calculated from the following:    Height as of 4/11/23: 1.676 m (5' 6\").    Weight as of 4/11/23: 102.3 kg (225 lb 9.6 oz).           Alexandra Rodrigues MD  [facility]    Subjective   Nehemias is a 27 year old, presenting for the following health issues:  Follow Up and Recheck Medication (Pt reports that she is having this visit for follow up for overall health concern, joint pain, fatigued which she is still experiencing.)        4/26/2023    11:59 AM   Additional Questions   Roomed by amber   Accompanied by alone         4/26/2023    11:59 AM   Patient Reported Additional Medications   Patient reports taking the following new medications no     History of Present Illness       Reason for visit:  General health    She eats 2-3 servings of fruits and vegetables daily.She " consumes 0 sweetened beverage(s) daily.She exercises with enough effort to increase her heart rate 9 or less minutes per day.  She exercises with enough effort to increase her heart rate 3 or less days per week. She is missing 1 dose(s) of medications per week.  She is not taking prescribed medications regularly due to remembering to take.       Pt reports that she is having this visit to talk about overall health concerns ,joint pain, and feeling fatigued currently.        Review of Systems         Objective           Vitals:  No vitals were obtained today due to virtual visit.    Physical Exam   GENERAL: Healthy, alert and no distress  EYES: Eyes grossly normal to inspection.  No discharge or erythema, or obvious scleral/conjunctival abnormalities.  RESP: No audible wheeze, cough, or visible cyanosis.  No visible retractions or increased work of breathing.    SKIN: Visible skin clear. No significant rash, abnormal pigmentation or lesions.  NEURO: Cranial nerves grossly intact.  Mentation and speech appropriate for age.  PSYCH: Mentation appears normal, affect normal/bright, judgement and insight intact, normal speech and appearance well-groomed.                Video-Visit Details    Type of service:  Video Visit   Distant Location (provider location):  On-site  Platform used for Video Visit: [email or web address]  Video time 15 min

## 2023-04-28 DIAGNOSIS — B37.31 YEAST INFECTION OF THE VAGINA: ICD-10-CM

## 2023-04-28 RX ORDER — FLUCONAZOLE 150 MG/1
150 TABLET ORAL
Qty: 3 TABLET | Refills: 3 | Status: SHIPPED | OUTPATIENT
Start: 2023-04-28 | End: 2023-07-27

## 2023-04-28 NOTE — TELEPHONE ENCOUNTER
General Call    Contacts       Type Contact Phone/Fax    04/28/2023 08:57 AM CDT Phone (Incoming) Nehemias Mascorro (Self) [phone] (M)        Reason for Call:  Pharmacy states they did not receive the below prescription on 4/26/23 for Fluconazole    What are your questions or concerns:  Patient is requesting that we resend the prescription    Date of last appointment with provider: n/a     would you prefer to receive a phone call?:   Patient would prefer a phone call     Okay to leave a detailed message?: Yes at Cell number on file:    Telephone Information:   Mobile [phone]         Disp Refills Start End GERALD   fluconazole (DIFLUCAN) 150 MG tablet 3 tablet 3 4/26/2023 5/3/2023 No   Sig - Route: Take 1 tablet (150 mg) by mouth every 3 days for 3 doses - Oral   Sent to pharmacy as: Fluconazole 150 MG Oral Tablet (DIFLUCAN)   Class: E-Prescribe   Order: [id number]   E-Prescribing Status: Receipt confirmed by pharmacy (4/26/2023 12:31 PM CDT)     Printout Tracking    External Result Report     Pharmacy    [id number] - [place], [place] - [address] [place] [address] AT [facility]

## 2023-05-09 ENCOUNTER — MYC MEDICAL ADVICE (OUTPATIENT)
Dept: ENDOCRINOLOGY | Facility: CLINIC | Age: 28
End: 2023-05-09
Payer: COMMERCIAL

## 2023-05-12 ENCOUNTER — VIRTUAL VISIT (OUTPATIENT)
Dept: ENDOCRINOLOGY | Facility: CLINIC | Age: 28
End: 2023-05-12
Payer: COMMERCIAL

## 2023-05-12 VITALS — WEIGHT: 222.3 LBS | HEIGHT: 66 IN | BODY MASS INDEX: 35.72 KG/M2

## 2023-05-12 DIAGNOSIS — E66.01 MORBID OBESITY DUE TO EXCESS CALORIES (H): Primary | ICD-10-CM

## 2023-05-12 DIAGNOSIS — R73.03 PREDIABETES: ICD-10-CM

## 2023-05-12 PROCEDURE — 99215 OFFICE O/P EST HI 40 MIN: CPT | Mod: VID | Performed by: INTERNAL MEDICINE

## 2023-05-12 RX ORDER — SEMAGLUTIDE 2.4 MG/.75ML
2.4 INJECTION, SOLUTION SUBCUTANEOUS WEEKLY
Qty: 9 ML | Refills: 3 | Status: SHIPPED | OUTPATIENT
Start: 2023-05-12 | End: 2023-11-13

## 2023-05-12 NOTE — PATIENT INSTRUCTIONS
Thank you for allowing us the privilege of caring for you. We hope we provided you with the excellent service you deserve.    Please let us know if there is anything else we can do for you so that we can be sure you are completely satisfied with your care experience.    Your visit was with Dr. Slade today.    Instructions per today's visit:  --we can continue 2.4 mg weekly semaglutide  --use this medication in support of healthy food/activity goals for weight loss  --we discussed that you have a goal to cut back on the desserts from 4 times per week to 1-2 times per week  --we discussed the shaky spells you have had, and that you have felt like you could pass out and they are associated with activities like lifting/moving things at work. Also, you have not had low blood sugars with these so at present we will hold off on doing a glucose tolerance/mixed meal tolerance test but I would recommend that you discuss these episodes with your primary care provider again who could consider cardiac and/or neurologic reasons for these symptoms. We should do a mixed meal tolerance test if you have any low sugar meter readings or these episodes seem to be linked with high-carb food loads. We may also want to do that testing if no other causes (such as cardiac or neurologic) are found. Please keep us posted on how you are doing  --we discussed that it would be good to follow up with your gastroenterologist again--if they are ok with it you can use miralax 2-3 times per week to help with bowel regularity; also getting good hydration (you could aim for around eight 8-oz glasses daily) and taking a fiber supplement such as Benefiber or Metamucil) could help also with better regularity  --we can plan on return with  again in about 3 mo    --Included here are some wt loss goals you had before which would be good to continue/re-establish:  Steps/activity  You have worked on getting steps in before. You could track where you  are currently and start building on that; with calorie tracking you have aimed for 1400 calories daily. You could resume that.  With food you have had these goals before--  1.  Eating out once per wk or less  2.  With meals  emphasizing lean protein with meals, lower carb, lots of nonstarchy veggies  2.  Being part of the grocery shopping ( to have healthier options at home)  3.  Avoiding high carb afternoon snacking    Please call our contact center at [phone] to schedule your next appointments.    Meal Replacement Products:    Here is the link to our new e-store where you can purchase our meal replacement products    [email or web address]    The one week starter kit is a great way to sample a variety of products and see what works for you.    If you want more information about the product go to: [employer]INKlife    Free Shipping for orders over $75    Benefits of meal replacements products:    Portion and calorie control  Improved nutrition  Structured eating  Simplified food choices  Avoid contact with trigger foods    Interested in working with a health ?  Health coaches work with you to improve your overall health and wellbeing.  They look at the whole person, and may involve discussion of different areas of life, including, but not limited to the four pillars of health (sleep, exercise, nutrition, and stress management). Discuss with your care team if you would like to start working a health .    Health Coaching-3 Pack: Schedule by calling [phone]    $99 for three health coaching visits    Visits may be done in person or via phone    Coaching is a partnership between the  and the client; Coaches do not prescribe or diagnose    Coaching helps inspire the client to reach his/her personal goals    Bluetooth Scale:    We hope to provide you with high quality telephone and virtual healthcare visits while social distancing for COVID-19 is  necessary, as well as in the future when virtual visits may be more convenient for you.    Our technology team made it possible for Bluetooth scales to send weight measurements to our electronic medical record. This allows weights from you weighing at home to securely flow into the medical record, which will improve telephone and virtual visits.  Additionally, studies have shown that adults actually lose more weight when their weights are automatically sent to someone else, and also that this process is not stressful for those adults.    Below is a link for purchasing the scale, with a discount code for our patients. You may call your insurance company to see if they will reimburse you for the cost of the scale, as a piece of durable medical equipment. The scales only go up to a weight of 400 pounds. This is an issue and we are working with the developer on increasing this. We found no scales that go over 400lb that have blue-tooth for connecting to [facility].    Scale to purchase: the [facility] Bubble  Body  Scale: [email or web address]=aw.ds    Discount Code: We have a discount code for our patients to bring the cost down to $50, the code is:  withmed     Steps to link the scale to [facility] via an Android Phone (you can always disconnect at any point in the future):  1. The order must be placed first before the patient can access Track My Health within [facility].  2. Download Google Fit robby from the Google Play Store  a. Log in or register using your Google account  3. Download the [facility] robby from Google Play Store  a. Select add organization  b. Search for [employer] and select it  c. Log into [facility]  d. Select Track My Health  e. Select the green connect my account button  f. When prompted log into your Google account  g. Select okay to confirm the account  4. Download the [employer] Health Mate robby from Google Play Store  a. [place] for  [employer]  b. Go to profile  c. Tap google fit under the Apps section  d. Select the option to activate Google Fit integration  e. Select the same Google account  f. Select okay to confirm the account  g.  Steps to link the scale to [employer] via an iPhone (you can always disconnect at any point in the future):  **Note [employer] is not available for download on an iPad**  1. The order must be placed first before the patient can access Track My Health within [employer].  2. Locate the Health junaid on your iPhone.  a. Set up your Apple Health account as prompted  b. The Sources page will show Apps that communicate with your Health junaid. Once all steps are completed, you should see [id number] and [employer] listed under the Apps section and your iPhone under the devices section.  i. Select Health Mate  1. Under 'ALLOW  Flimper  TO WRITE DATA ensure the toggle is on for Weight.  2. This will allow the scale to add your weight to the Apple Health  ii. Select [employer]  1. Under 'ALLOW  Bizen  TO READ DATA ensure the toggle is on for Weight.  2. This allows [employer] to grab the weight from [employer] so your provider can see your weights.  3. Download the [employer] junaid from the Junaid Store  a. Select add organization                                                  b. Search for [employer] and select it  c. Log into [employer]  d. Select Track My Health  e. Select the green connect my account button  f. Follow prompts to link your device to [employer].  4. Download the [employer] health mate junaid in the Junaid Store  a. [place] for [employer]  b. Go to profile  c. [employer] under the Apps section  d. If prompted to allow access with the Health Junaid, toggle weight on for read and write access.      For any questions/concerns contact Susan Balderas LPN at [phone]    To schedule appointments with our team, please call [phone]    Please call during clinic hours Monday through Friday 8:00a - 4:00p if you have questions or you can contact  us via Fromography at anytime.      Lab results will be communicated through My Chart or letter (if My Chart not used). Please call the clinic if you have not received communication after 1 week or if you have any questions.    Clinic Fax: [phone]    Thank you,  Medical Weight Management Team

## 2023-05-12 NOTE — LETTER
"2023       RE: Nehemias Mascorro  [address]  [place] [address]     Dear Colleague,    Thank you for referring your patient, Nehemias Mascorro, to the [facility] [place] [facility]. Please see a copy of my visit note below.    Nehemias Mascorro is a 27 year old who is being evaluated via a billable video visit.      How would you like to obtain your AVS? MyChart  If the video visit is dropped, the invitation should be resent by: Text to cell phone: [phone]  Will anyone else be joining your video visit? No      During this virtual visit the patient is located in [place], patient verifies this as the location during the entirety of this visit.     Video-Visit Details  Video Start Time: 10:46    Type of service:  Video Visit  Originating Location (pt. Location): Home  Distant Location (provider location):  off-site  Platform used for Video Visit: Peace Fuentes CMA  2023      10:01 AM    11:06 end/ 10:46 start approx    Return Medical Weight Management Note     Nehemias Mascorro  MRN:  [id number]  :  1995  JASMEET:  2023    Dear Alexandra Rodrigues MD,    I had the pleasure of seeing your patient Nehemias Mascorro. She is a 27 year old female who I am continuing to see for treatment of obesity related to:         View : No data to display.                INTERVAL HISTORY:    She was last seen about 4 mo ago.  Reviewed and relevant history as extracted from earlier visits is as follows--     --she previosuly did the 24-wk program and has tried multiple meds for wt loss (including topiramate, addition of  Naltrexone to bupropion she is on, and most recently Wegovy; other meds are potentially problematic (phentermine, given that she is on methylphenadate). Additionally of note is the following:  -------------------------------------------  \"Note[d] the program [wa]s helping with making " "changes with food and be accountable, things she focussed on--  1. Working on protein shake AM  2. Eat slower/mindful eating--working on this  3.  Walking several times per week with her mother--not happening yet with [email or web address] work going     Regarding the GI issue she has been working on with [facility]--doing PT to work on coordinating with BMs; pain and nausea and constipation.  That is improving some   --getting nausea still a couple of times per week; stomach cramping about 3 times per month, stomach pain a couple of times per week  --BMs 3 times per week;  In the past there were times that would be less than once per week        Goals before around structured eating--  1.  Have protein drink (Terra's Way)--110 calories; breakfast routine lately has been higher calorie (eggs/yogurt/oatmeal)  2.  Add protein to snack if having a snack  3.  Change from fruit cups to whole fruit (meeting)        Goals she had prior:  1.  1400 Calories (Myfitnesspal, measuring food with scales, eating more whole foods)  2.  Activity goals--3 days per wk walking (30 min)  3.  Not eating after dinner (eat dinner and then stay busy after it)\"     -----------------------------------        Last visit was still having GI issues--  --elimination diet with GI  --having nausea/constipation/crampy pain (was having BMs less than once per wk but last wk has had a couple of BMs);on ondestron for nausea  --pt asked to increase from 1.7 to 2.4 mg Wegovy weekly and wants to stay on this dose now, notes that her GI symptoms did not worsen with the increase dose and that her GI symptoms are a little better recently  --less hunger and cravings on the GLP1 agonist; very happy with her GLP agonist support for wt loss  Foods habits/patterns then--  --Br protein shake or oatmeal/fruit  --Petra sometimes (leftovers or tuna on GF bread)  --Sometimes no dinner or if having--protein (like chiicken or steak), healthy carb, nonstarchy veggie  (doing more " cooking and food prep)  --generally about 2 meals per day  --using Miralax sometimes--1/2 times per week        LAST WEIGHT:   208 lbs 0 oz   Body mass index is 33.95 kg/m .    Since last visit of note is the following--  --gets shaky/confused/ nausea feels like she is going to pass out and gets hungry sometimes. Can happen when at [employer] lifting/moving things. 1-2 times per month. Sometimes checks sugars and they have been into the 70s but not low.  Not more often than prior, but possibly worsening when she has them, over time. She notes that the symptoms began around last Sept.    --Dr. Rand is PCP--discussed with pt  that given the lack of evidence for low blood sugars in connection with these events (and she has been checking them) it would worth considering neurologic/cardiac possibilities (such as evaluating for POTS or other presyncope causes). She has checked blood sugars but does not have vitals in association with these symptoms. What are pulse/BP when these symptoms occur?    --discussed that if she has low blood sugars on meter readings and these symptoms we would plan MMTT in follow up; I will add that this could also be considered again in the future if other causes are not identified. Sugars have not been low when checked though in association with these symptoms; will hold off on scheduling an MMTT at this point. Will want to re-eval at next visit. Postprandial occurrence after meals with high carb load would prompt MMTT, especially if she has any low blood sugars. Currently she associates the symptoms with physical activity such as lifting/moving things at work    --she has been having severe joint pain.  had MRI--nl--will see rheum again soon to follow up on this    --constipation chronically--using miralax to help with that 1-2 times per wk,  Can do this 2-3 times per wk if GI concurs, adequate hydration ( 8 glasses of 8 oz), fiber supplement also could be of potential help--I asked her to follow  "up with GI further on this    --She set new food goals and will use the 2.4 mg Wegovy dose which she has been getting regularly to support these changes--goal drop down to dessert 1-2 per wk (from current 4), more leafy greens       CURRENT WEIGHT:   222 lbs 4.8 oz   Body mass index is 35.88 kg/m .    Initial Weight (lbs): 248 lbs  Last Visits Weight: 94.3 kg (208 lb)  Cumulative weight loss (lbs): 25.7  Weight Loss Percentage: 10.36%        5/12/2023    10:01 AM   Changes and Difficulties   I have made the following changes to my diet since my last visit: None   With regards to my diet, I am still struggling with: nothing   I have made the following changes to my activity/exercise since my last visit: none   With regards to my activity/exercise, I am still struggling with: nothing       VITALS:  Ht 1.676 m (5' 6\")   Wt 100.8 kg (222 lb 4.8 oz)   LMP 04/12/2023 (Exact Date)   BMI 35.88 kg/m      MEDICATIONS:   Current Outpatient Medications   Medication Sig Dispense Refill    albuterol (ACCUNEB) 1.25 MG/3ML neb solution Take 1 vial (1.25 mg) by nebulization every 6 hours as needed for shortness of breath / dyspnea or wheezing 90 mL 0    ARIPiprazole (ABILIFY) 20 MG tablet Take 20 mg by mouth daily      buPROPion (WELLBUTRIN XL) 300 MG 24 hr tablet Take 300 mg by mouth every morning      CONCERTA 36 MG CR tablet TAKE 1 TABLET BY MOUTH DAILY IN THE MORNING FOR ADHD. START 11/18/22      diclofenac (VOLTAREN) 50 MG EC tablet Take 1 tablet (50 mg) by mouth 2 times daily 20 tablet 0    drospirenone-ethinyl estradiol (JESSY) 3-0.02 MG tablet Take 1 tablet by mouth daily      EMGALITY 120 MG/ML injection Inject 120 mg Subcutaneous every 28 days      famotidine (PEPCID) 20 MG tablet Take 1 tablet (20 mg) by mouth 2 times daily 180 tablet 1    fluconazole (DIFLUCAN) 150 MG tablet Take 1 tablet (150 mg) by mouth every 3 days 3 tablet 3    FLUoxetine (PROZAC) 40 MG capsule Take 40 mg by mouth daily 20 mg + 40 mg = 60 mg      " hyoscyamine (LEVSIN) 0.125 MG tablet Take 1 tablet (125 mcg) by mouth every 6 hours as needed for cramping 30 tablet 0    ketoconazole (NIZORAL) 2 % external cream       LANsoprazole (PREVACID) 15 MG DR capsule Take 1 capsule (15 mg) by mouth daily 90 capsule 3    ondansetron (ZOFRAN ODT) 4 MG ODT tab Take 1 tablet (4 mg) by mouth every 8 hours as needed for nausea 30 tablet 0    polyethylene glycol (MIRALAX) 17 GM/Dose powder Take 1 capful by mouth daily as needed for constipation      Semaglutide-Weight Management (WEGOVY) 2.4 MG/0.75ML SOAJ Inject 2.4 mg Subcutaneous once a week 3 mL 5    TAZORAC 0.1 % external cream APPLY TOPICALLY TO THE AFFECTED AREA AT BEDTIME      VENTOLIN  (90 Base) MCG/ACT inhaler INHALE 2 PUFFS INTO THE LUNGS FOUR TIMES DAILY 18 g 3           5/12/2023    10:01 AM   Weight Loss Medication History Reviewed With Patient   Which weight loss medications are you currently taking on a regular basis? Wegovy     Most recent HbA1c that is visible to me wnl (last yr); had CMP 4/23 with nl glucose, nl TSH     ASSESSMENT;  Morbid obesity in pt with wt gain over past few years, more since her TBI  Prediabetes     PLAN:   (continues)  Decrease portion sizes  No meals in front of TV screen  Purge house of food triggers  No meal skipping and avoid snacking  Decrease caloric beverages--avoid soda  Volumetrics low carb eating plan--structured plan and avoding snacking  Low Calorie/low fat diet  Meal planning/journaling           Additionally she has had goals for lifestyle prior including (and these would be good goals to continue/re-establish)--  Steps/activity (not addressed specifically today)  Prior had noted--4000 steps per day; change to 5000 was a goal before--could be a future goal, same with tracking for 1400 calories daily. Continue to follow  With food (continuing)  1.  Eating out once per wk or less  2.  With meals  emphasizing lean protein with meals, lower carb, lots of nonstarchy  veggies  2.  Being part of the grocery shopping ( to have healthier options at home)  3.  Avoiding high carb afternoon snacking     additionally--  --RTC 3 mo  --continue 2.4 mg weekly semaglutide        Time: about 40 min spent on evaluation, management, counseling, education, & motivational interviewing (video visit) combined with previsit prep and post visit follow up care same day.      Sincerely,    Luis Slade MD

## 2023-05-12 NOTE — PROGRESS NOTES
"Nehemias Mascorro is a 27 year old who is being evaluated via a billable video visit.      How would you like to obtain your AVS? MyChart  If the video visit is dropped, the invitation should be resent by: Text to cell phone: [phone]  Will anyone else be joining your video visit? No      During this virtual visit the patient is located in [place], patient verifies this as the location during the entirety of this visit.     Video-Visit Details  Video Start Time: 10:46    Type of service:  Video Visit  Originating Location (pt. Location): Home  Distant Location (provider location):  off-site  Platform used for Video Visit: Peace Fuentes CMA  2023      10:01 AM    11:06 end 10:46 start approx    Return Medical Weight Management Note     Nehemias Mascorro  MRN:  [id number]  :  1995  JASMEET:  2023    Dear Alexandra Rodrigues MD,    I had the pleasure of seeing your patient Nehemias Mascorro. She is a 27 year old female who I am continuing to see for treatment of obesity related to:         View : No data to display.                INTERVAL HISTORY:    She was last seen about 4 mo ago.  Reviewed and relevant history as extracted from earlier visits is as follows--     --she previosuly did the 24-wk program and has tried multiple meds for wt loss (including topiramate, addition of  Naltrexone to bupropion she is on, and most recently Wegovy; other meds are potentially problematic (phentermine, given that she is on methylphenadate). Additionally of note is the following:  -------------------------------------------  \"Note[d] the program [wa]s helping with making changes with food and be accountable, things she focussed on--  1. Working on protein shake AM  2. Eat slower/mindful eating--working on this  3.  Walking several times per week with her mother--not happening yet with [employer] work going     Regarding the GI issue she has been working on with [facility]--doing PT to work on " "coordinating with BMs; pain and nausea and constipation.  That is improving some   --getting nausea still a couple of times per week; stomach cramping about 3 times per month, stomach pain a couple of times per week  --BMs 3 times per week;  In the past there were times that would be less than once per week        Goals before around structured eating--  1.  Have protein drink (Terra's Way)--110 calories; breakfast routine lately has been higher calorie (eggs/yogurt/oatmeal)  2.  Add protein to snack if having a snack  3.  Change from fruit cups to whole fruit (meeting)        Goals she had prior:  1.  1400 Calories (Myfitnesspal, measuring food with scales, eating more whole foods)  2.  Activity goals--3 days per wk walking (30 min)  3.  Not eating after dinner (eat dinner and then stay busy after it)\"     -----------------------------------        Last visit was still having GI issues--  --elimination diet with GI  --having nausea/constipation/crampy pain (was having BMs less than once per wk but last wk has had a couple of BMs);on ondestron for nausea  --pt asked to increase from 1.7 to 2.4 mg Wegovy weekly and wants to stay on this dose now, notes that her GI symptoms did not worsen with the increase dose and that her GI symptoms are a little better recently  --less hunger and cravings on the GLP1 agonist; very happy with her GLP agonist support for wt loss  Foods habits/patterns then--  --Br protein shake or oatmeal/fruit  --Petra sometimes (leftovers or tuna on GF bread)  --Sometimes no dinner or if having--protein (like chiicken or steak), healthy carb, nonstarchy veggie  (doing more cooking and food prep)  --generally about 2 meals per day  --using Miralax sometimes--1/2 times per week        LAST WEIGHT:   208 lbs 0 oz   Body mass index is 33.95 kg/m .    Since last visit of note is the following--  --gets shaky/confused/ nausea feels like she is going to pass out and gets hungry sometimes. Can happen when at " [facility] lifting/moving things. 1-2 times per month. Sometimes checks sugars and they have been into the 70s but not low.  Not more often than prior, but possibly worsening when she has them, over time. She notes that the symptoms began around last Sept.    --Dr. Rand is PCP--discussed with pt  that given the lack of evidence for low blood sugars in connection with these events (and she has been checking them) it would worth considering neurologic/cardiac possibilities (such as evaluating for POTS or other presyncope causes). She has checked blood sugars but does not have vitals in association with these symptoms. What are pulse/BP when these symptoms occur?    --discussed that if she has low blood sugars on meter readings and these symptoms we would plan MMTT in follow up; I will add that this could also be considered again in the future if other causes are not identified. Sugars have not been low when checked though in association with these symptoms; will hold off on scheduling an MMTT at this point. Will want to re-eval at next visit. Postprandial occurrence after meals with high carb load would prompt MMTT, especially if she has any low blood sugars. Currently she associates the symptoms with physical activity such as lifting/moving things at work    --she has been having severe joint pain.  had MRI--nl--will see rheum again soon to follow up on this    --constipation chronically--using miralax to help with that 1-2 times per wk,  Can do this 2-3 times per wk if GI concurs, adequate hydration ( 8 glasses of 8 oz), fiber supplement also could be of potential help--I asked her to follow up with GI further on this    --She set new food goals and will use the 2.4 mg Wegovy dose which she has been getting regularly to support these changes--goal drop down to dessert 1-2 per wk (from current 4), more leafy greens       CURRENT WEIGHT:   222 lbs 4.8 oz   Body mass index is 35.88 kg/m .    Initial Weight (lbs): 248  "lbs  Last Visits Weight: 94.3 kg (208 lb)  Cumulative weight loss (lbs): 25.7  Weight Loss Percentage: 10.36%        5/12/2023    10:01 AM   Changes and Difficulties   I have made the following changes to my diet since my last visit: None   With regards to my diet, I am still struggling with: nothing   I have made the following changes to my activity/exercise since my last visit: none   With regards to my activity/exercise, I am still struggling with: nothing       VITALS:  Ht 1.676 m (5' 6\")   Wt 100.8 kg (222 lb 4.8 oz)   LMP 04/12/2023 (Exact Date)   BMI 35.88 kg/m      MEDICATIONS:   Current Outpatient Medications   Medication Sig Dispense Refill     albuterol (ACCUNEB) 1.25 MG/3ML neb solution Take 1 vial (1.25 mg) by nebulization every 6 hours as needed for shortness of breath / dyspnea or wheezing 90 mL 0     ARIPiprazole (ABILIFY) 20 MG tablet Take 20 mg by mouth daily       buPROPion (WELLBUTRIN XL) 300 MG 24 hr tablet Take 300 mg by mouth every morning       CONCERTA 36 MG CR tablet TAKE 1 TABLET BY MOUTH DAILY IN THE MORNING FOR ADHD. START 11/18/22       diclofenac (VOLTAREN) 50 MG EC tablet Take 1 tablet (50 mg) by mouth 2 times daily 20 tablet 0     drospirenone-ethinyl estradiol (JESSY) 3-0.02 MG tablet Take 1 tablet by mouth daily       EMGALITY 120 MG/ML injection Inject 120 mg Subcutaneous every 28 days       famotidine (PEPCID) 20 MG tablet Take 1 tablet (20 mg) by mouth 2 times daily 180 tablet 1     fluconazole (DIFLUCAN) 150 MG tablet Take 1 tablet (150 mg) by mouth every 3 days 3 tablet 3     FLUoxetine (PROZAC) 40 MG capsule Take 40 mg by mouth daily 20 mg + 40 mg = 60 mg       hyoscyamine (LEVSIN) 0.125 MG tablet Take 1 tablet (125 mcg) by mouth every 6 hours as needed for cramping 30 tablet 0     ketoconazole (NIZORAL) 2 % external cream        LANsoprazole (PREVACID) 15 MG DR capsule Take 1 capsule (15 mg) by mouth daily 90 capsule 3     ondansetron (ZOFRAN ODT) 4 MG ODT tab Take 1 tablet " (4 mg) by mouth every 8 hours as needed for nausea 30 tablet 0     polyethylene glycol (MIRALAX) 17 GM/Dose powder Take 1 capful by mouth daily as needed for constipation       Semaglutide-Weight Management (WEGOVY) 2.4 MG/0.75ML SOAJ Inject 2.4 mg Subcutaneous once a week 3 mL 5     TAZORAC 0.1 % external cream APPLY TOPICALLY TO THE AFFECTED AREA AT BEDTIME       VENTOLIN  (90 Base) MCG/ACT inhaler INHALE 2 PUFFS INTO THE LUNGS FOUR TIMES DAILY 18 g 3           5/12/2023    10:01 AM   Weight Loss Medication History Reviewed With Patient   Which weight loss medications are you currently taking on a regular basis? Wegovy     Most recent HbA1c that is visible to me wnl (last yr); had CMP 4/23 with nl glucose, nl TSH     ASSESSMENT;  Morbid obesity in pt with wt gain over past few years, more since her TBI  Prediabetes     PLAN:   (continues)  Decrease portion sizes  No meals in front of TV screen  Purge house of food triggers  No meal skipping and avoid snacking  Decrease caloric beverages--avoid soda  Volumetrics low carb eating plan--structured plan and avoding snacking  Low Calorie/low fat diet  Meal planning/journaling           Additionally she has had goals for lifestyle prior including (and these would be good goals to continue/re-establish)--  Steps/activity (not addressed specifically today)  Prior had noted--4000 steps per day; change to 5000 was a goal before--could be a future goal, same with tracking for 1400 calories daily. Continue to follow  With food (continuing)  1.  Eating out once per wk or less  2.  With meals  emphasizing lean protein with meals, lower carb, lots of nonstarchy veggies  2.  Being part of the grocery shopping ( to have healthier options at home)  3.  Avoiding high carb afternoon snacking     additionally--  --RTC 3 mo  --continue 2.4 mg weekly semaglutide        Time: about 40 min spent on evaluation, management, counseling, education, & motivational interviewing (video  visit) combined with previsit prep and post visit follow up care same day.      Sincerely,    Luis Slade MD

## 2023-05-12 NOTE — NURSING NOTE
"(   Chief Complaint   Patient presents with     Follow Up    )    ( Weight: 222 lb 4.8 oz )  ( Height: 5' 6\" )  ( BMI (Calculated): 35.88 )  (   )  (   )  (   )  (   )  (   )  (   )    (   )  (   )  (   )  (   )  (   )  (   )  (   )    (   Patient Active Problem List   Diagnosis     Hip pain     Obesity     Anxiety     Major depressive disorder     Morbid obesity (H)     Special screening examination for human papillomavirus (HPV)     Gastroesophageal reflux disease     Asymptomatic COVID-19 virus infection     Acne     Asthma     Chronic migraine without aura     Cyst of ovary     Slow transit constipation     Constipation     Iron deficiency anemia     Headache     Gastric ulcer without hemorrhage or perforation     Occipital neuralgia     Neuralgia and neuritis, unspecified     Other reactions to severe stress     Somatization disorder     Post-COVID chronic fatigue     Dizziness     Dysphagia     Encephalopathy, unspecified     Excessive and frequent menstruation     Indigestion     Migraine headache     Muscle spasm     Nausea     Otalgia     Paresthesia of skin     Post concussion syndrome     Primary focal hyperhidrosis     Hemorrhage of rectum and anus     Right upper quadrant pain     Snoring     Strain of muscle, fascia and tendon at neck level, sequela     Tension-type headache, unspecified, not intractable     Wrist pain, right     Cervicothoracic disc displacement     Insomnia, unspecified     Sleep related bruxism     Current moderate episode of major depressive disorder, unspecified whether recurrent (H)    )  (   Current Outpatient Medications   Medication Sig Dispense Refill     albuterol (ACCUNEB) 1.25 MG/3ML neb solution Take 1 vial (1.25 mg) by nebulization every 6 hours as needed for shortness of breath / dyspnea or wheezing 90 mL 0     ARIPiprazole (ABILIFY) 20 MG tablet Take 20 mg by mouth daily       buPROPion (WELLBUTRIN XL) 300 MG 24 hr tablet Take 300 mg by mouth every morning       " CONCERTA 36 MG CR tablet TAKE 1 TABLET BY MOUTH DAILY IN THE MORNING FOR ADHD. START 11/18/22       diclofenac (VOLTAREN) 50 MG EC tablet Take 1 tablet (50 mg) by mouth 2 times daily 20 tablet 0     drospirenone-ethinyl estradiol (JESSY) 3-0.02 MG tablet Take 1 tablet by mouth daily       EMGALITY 120 MG/ML injection Inject 120 mg Subcutaneous every 28 days       famotidine (PEPCID) 20 MG tablet Take 1 tablet (20 mg) by mouth 2 times daily 180 tablet 1     fluconazole (DIFLUCAN) 150 MG tablet Take 1 tablet (150 mg) by mouth every 3 days 3 tablet 3     FLUoxetine (PROZAC) 40 MG capsule Take 40 mg by mouth daily 20 mg + 40 mg = 60 mg       hyoscyamine (LEVSIN) 0.125 MG tablet Take 1 tablet (125 mcg) by mouth every 6 hours as needed for cramping 30 tablet 0     ketoconazole (NIZORAL) 2 % external cream        LANsoprazole (PREVACID) 15 MG DR capsule Take 1 capsule (15 mg) by mouth daily 90 capsule 3     ondansetron (ZOFRAN ODT) 4 MG ODT tab Take 1 tablet (4 mg) by mouth every 8 hours as needed for nausea 30 tablet 0     polyethylene glycol (MIRALAX) 17 GM/Dose powder Take 1 capful by mouth daily as needed for constipation       Semaglutide-Weight Management (WEGOVY) 2.4 MG/0.75ML SOAJ Inject 2.4 mg Subcutaneous once a week 3 mL 5     TAZORAC 0.1 % external cream APPLY TOPICALLY TO THE AFFECTED AREA AT BEDTIME       VENTOLIN  (90 Base) MCG/ACT inhaler INHALE 2 PUFFS INTO THE LUNGS FOUR TIMES DAILY 18 g 3    )  ( Diabetes Eval:    )    ( Pain Eval:  Data Unavailable )    ( Wound Eval:       )    (   History   Smoking Status     Never   Smokeless Tobacco     Never    )    ( Signed By:  Orin Fuentes; May 12, 2023; 10:01 AM )

## 2023-05-15 DIAGNOSIS — H81.10 BENIGN PAROXYSMAL POSITIONAL VERTIGO, UNSPECIFIED LATERALITY: Primary | ICD-10-CM

## 2023-05-15 DIAGNOSIS — R55 SYNCOPE, UNSPECIFIED SYNCOPE TYPE: ICD-10-CM

## 2023-05-16 ENCOUNTER — OFFICE VISIT (OUTPATIENT)
Dept: RHEUMATOLOGY | Facility: CLINIC | Age: 28
End: 2023-05-16
Payer: COMMERCIAL

## 2023-05-16 VITALS
HEART RATE: 84 BPM | BODY MASS INDEX: 35.83 KG/M2 | SYSTOLIC BLOOD PRESSURE: 120 MMHG | WEIGHT: 222 LBS | DIASTOLIC BLOOD PRESSURE: 70 MMHG

## 2023-05-16 DIAGNOSIS — M25.50 POLYARTHRALGIA: Primary | ICD-10-CM

## 2023-05-16 DIAGNOSIS — E66.01 MORBID OBESITY (H): ICD-10-CM

## 2023-05-16 DIAGNOSIS — R79.82 ELEVATED C-REACTIVE PROTEIN (CRP): ICD-10-CM

## 2023-05-16 PROCEDURE — 99214 OFFICE O/P EST MOD 30 MIN: CPT | Performed by: INTERNAL MEDICINE

## 2023-05-16 NOTE — PROGRESS NOTES
Rheumatology follow-up visit note     Nehemias is a 27 year old female presents today for follow-up.    Nehemias was seen today for recheck.    Diagnoses and all orders for this visit:    Polyarthralgia    Morbid obesity (H)    Elevated C-reactive protein (CRP)        This patient with polyarthralgias polymyalgias, negative serologic evidence for autoimmunity including RONALD and rheumatoid factor anti-CCP, has no features to suggest connective tissue disease, inflammatory joint disease, enthesopathy, and dactylitis.  This is reassuring.  I have discussed this with her.  Given the combination of fatigue and nonrestorative sleep generalized achiness, and other features as noted one of the possibilities to keep under consideration would be myofascial syndrome.  She has several features 1 would see in fibromyalgia given her literature to review on that.  She is going to check with her psychiatrist if instead of fluoxetine she might be a candidate for duloxetine which could conceivably help her on both fronts.  Elevation of C-reactive protein is not part of fibromyalgia-like syndromes.  Whether that has a contribution to by her obesity would be possibility.  The loss of 40 pounds of weight may be reason to consider for drop in her CRP.  I have asked her to continue to follow-up with her primary physician.    CHRISTIANO    Nehemias Mascorro is a 27 year old female is here for follow-up of polyarthralgias, elevated C-reactive protein accompanied by her mom.  She reports her joints have troubled her for the past at least 2 years of or longer.  She noted the worst of the pains are in her elbows, shoulders, hips especially the left side.  She noted that the pain in the elbows and the shoulder tends to be worse toward the latter part of the day while the left hip, groin area pain is worse first thing in the morning.  The pain is intermittent.  She is not sure why some days of pain is there and other days it is not.  She was  seen in orthopedics.  She had x-rays of the right elbow taken.  Those were reportedly normal.  She had what she understands to be corticosteroid injection in the right elbow, it appears to be lateral epicondyles injection, that provided her some relief.  She was also given Medrol Dosepak she does not recall how that might have helped or not her joint symptoms.  She reports episode of traumatic brain injury if few years ago when she was at work when industrial sized pots and pans landed on her hand.  Subsequent to her recent visit she has undergone further work-up including MRI of her affected elbow which returns normal.  X-rays of the other joint areas as ordered were reviewed and within normal range.  Her C-reactive protein remains elevated although a bit better since. She has noted tremulousness.  She has had GI symptoms.  There is been a question of whether she has inflammatory bowel disease or not.  She has had colonoscopy which she understands was nondiagnostic.  She has had calprotectin in her feces.  Her work-up has been negative for RONALD rheumatoid factor anti-CCP antibody.  Her sedimentation rate was normal.  Her CRP was modestly elevated.  She reports no personal or family history of psoriasis.  There is no family history of ulcerative colitis or Crohn's disease her mom noted that she has lupus..            DETAILED EXAMINATION  05/16/23  :    Vitals:    05/16/23 1308   Weight: 100.7 kg (222 lb)     Alert oriented. Head including the face is examined for malar rash, heliotropes, scarring, lupus pernio. Eyes examined for redness such as in episcleritis/scleritis, periorbital lesions.   Neck/ Face examined for parotid gland swelling, range of motion of neck.  Left upper and lower and right upper and lower extremities examined for tenderness, swelling, warmth of the appendicular joints, range of motion, edema, rash.  Some of the important findings included: she does not have evidence of synovitis in the  palpable joints of the upper extremities.  No significant deformities of the digits.  She is tender across trapezius paraspinal region, she is tender in the proximal and distal lower extremities.  She has no dactylitis of digits.  Patient Active Problem List    Diagnosis Date Noted     Current moderate episode of major depressive disorder, unspecified whether recurrent (H) 04/11/2023     Priority: Medium     Insomnia, unspecified 03/29/2023     Priority: Medium     Sleep related bruxism 03/29/2023     Priority: Medium     Primary focal hyperhidrosis 07/28/2022     Priority: Medium     Formatting of this note might be different from the original.  Created by Conversion       Post-COVID chronic fatigue 06/03/2022     Priority: Medium     Acne 05/06/2022     Priority: Medium     Formatting of this note might be different from the original.  Created by Conversion       Asthma 05/06/2022     Priority: Medium     Cyst of ovary 05/06/2022     Priority: Medium     Slow transit constipation 05/06/2022     Priority: Medium     Constipation 05/06/2022     Priority: Medium     Chronic migraine without aura 04/12/2022     Priority: Medium     Asymptomatic COVID-19 virus infection 01/21/2022     Priority: Medium     Gastroesophageal reflux disease 11/29/2021     Priority: Medium     Special screening examination for human papillomavirus (HPV) 11/11/2021     Priority: Medium     Pap 2021 in outside facility        Dizziness 08/12/2021     Priority: Medium     Headache 08/04/2021     Priority: Medium     Morbid obesity (H) 02/26/2021     Priority: Medium     Snoring 10/19/2020     Priority: Medium     Migraine headache 03/27/2020     Priority: Medium     Occipital neuralgia 01/31/2020     Priority: Medium     Neuralgia and neuritis, unspecified 01/31/2020     Priority: Medium     Iron deficiency anemia 05/17/2019     Priority: Medium     Excessive and frequent menstruation 05/17/2019     Priority: Medium     Otalgia 05/17/2019      Priority: Medium     Formatting of this note might be different from the original.  Was given prednisone for 5 days .       Hemorrhage of rectum and anus 12/17/2018     Priority: Medium     Paresthesia of skin 04/10/2018     Priority: Medium     Wrist pain, right 03/08/2018     Priority: Medium     Gastric ulcer without hemorrhage or perforation 02/20/2018     Priority: Medium     Muscle spasm 02/09/2018     Priority: Medium     Dysphagia 01/31/2018     Priority: Medium     Somatization disorder 08/30/2017     Priority: Medium     Major depressive disorder 05/03/2017     Priority: Medium     Other reactions to severe stress 11/01/2016     Priority: Medium     Encephalopathy, unspecified 10/25/2016     Priority: Medium     Strain of muscle, fascia and tendon at neck level, sequela 10/25/2016     Priority: Medium     Tension-type headache, unspecified, not intractable 10/25/2016     Priority: Medium     Cervicothoracic disc displacement 10/25/2016     Priority: Medium     Post concussion syndrome 09/24/2016     Priority: Medium     Nausea 11/30/2015     Priority: Medium     Right upper quadrant pain 11/30/2015     Priority: Medium     Obesity 04/24/2015     Priority: Medium     Anxiety 04/24/2015     Priority: Medium     Indigestion 02/03/2015     Priority: Medium     Hip pain 05/30/2014     Priority: Medium     Past Surgical History:   Procedure Laterality Date     COLONOSCOPY N/A 4/27/2022    Procedure: COLONOSCOPY, WITH POLYPECTOMY AND BIOPSY;  Surgeon: Alyse Leija MD;  Location:  GI     ENT SURGERY      tonsilectomy age 8     ESOPHAGOSCOPY, GASTROSCOPY, DUODENOSCOPY (EGD), COMBINED N/A 3/15/2023    Procedure: ESOPHAGOGASTRODUODENOSCOPY, WITH BIOPSY;  Surgeon: Cyn Colon MD;  [place] [facility]THOPEDIC SURGERY      Hip, emelia surgery in 2013     WRIST SURGERY        Past Medical History:   Diagnosis Date     ADHD (attention deficit hyperactivity disorder)      Depressive disorder      Ovarian  cyst      Uncomplicated asthma      Allergies   Allergen Reactions     Azithromycin      Erythromycin Nausea and Vomiting     Sulfa Antibiotics Nausea     Current Outpatient Medications   Medication Sig Dispense Refill     albuterol (ACCUNEB) 1.25 MG/3ML neb solution Take 1 vial (1.25 mg) by nebulization every 6 hours as needed for shortness of breath / dyspnea or wheezing 90 mL 0     ARIPiprazole (ABILIFY) 20 MG tablet Take 20 mg by mouth daily       buPROPion (WELLBUTRIN XL) 300 MG 24 hr tablet Take 300 mg by mouth every morning       CONCERTA 36 MG CR tablet TAKE 1 TABLET BY MOUTH DAILY IN THE MORNING FOR ADHD. START 11/18/22       diclofenac (VOLTAREN) 50 MG EC tablet Take 1 tablet (50 mg) by mouth 2 times daily 20 tablet 0     drospirenone-ethinyl estradiol (JESSY) 3-0.02 MG tablet Take 1 tablet by mouth daily       EMGALITY 120 MG/ML injection Inject 120 mg Subcutaneous every 28 days       famotidine (PEPCID) 20 MG tablet Take 1 tablet (20 mg) by mouth 2 times daily 180 tablet 1     fluconazole (DIFLUCAN) 150 MG tablet Take 1 tablet (150 mg) by mouth every 3 days 3 tablet 3     FLUoxetine (PROZAC) 40 MG capsule Take 40 mg by mouth daily 20 mg + 40 mg = 60 mg       hyoscyamine (LEVSIN) 0.125 MG tablet Take 1 tablet (125 mcg) by mouth every 6 hours as needed for cramping 30 tablet 0     ketoconazole (NIZORAL) 2 % external cream        LANsoprazole (PREVACID) 15 MG DR capsule Take 1 capsule (15 mg) by mouth daily 90 capsule 3     ondansetron (ZOFRAN ODT) 4 MG ODT tab Take 1 tablet (4 mg) by mouth every 8 hours as needed for nausea 30 tablet 0     polyethylene glycol (MIRALAX) 17 GM/Dose powder Take 1 capful by mouth daily as needed for constipation       Semaglutide-Weight Management (WEGOVY) 2.4 MG/0.75ML pen Inject 2.4 mg Subcutaneous once a week 9 mL 3     TAZORAC 0.1 % external cream APPLY TOPICALLY TO THE AFFECTED AREA AT BEDTIME       VENTOLIN  (90 Base) MCG/ACT inhaler INHALE 2 PUFFS INTO THE LUNGS  FOUR TIMES DAILY 18 g 3     family history includes Depression in her maternal grandmother; Schizophrenia in her maternal uncle; Substance Abuse in her maternal uncle.  Social Connections: Not on file          WBC Count   Date Value Ref Range Status   04/11/2023 3.8 (L) 4.0 - 11.0 10e3/uL Final     RBC Count   Date Value Ref Range Status   04/11/2023 3.97 3.80 - 5.20 10e6/uL Final     Hemoglobin   Date Value Ref Range Status   04/11/2023 11.4 (L) 11.7 - 15.7 g/dL Final     Hematocrit   Date Value Ref Range Status   04/11/2023 35.9 35.0 - 47.0 % Final     MCV   Date Value Ref Range Status   04/11/2023 90 78 - 100 fL Final     MCH   Date Value Ref Range Status   04/11/2023 28.7 26.5 - 33.0 pg Final     Platelet Count   Date Value Ref Range Status   04/11/2023 262 150 - 450 10e3/uL Final     % Lymphocytes   Date Value Ref Range Status   04/05/2023 36 % Final     AST   Date Value Ref Range Status   04/11/2023 27 10 - 35 U/L Final   06/22/2018 14 0 - 45 U/L Final     ALT   Date Value Ref Range Status   04/11/2023 32 10 - 35 U/L Final   06/22/2018 18 0 - 50 U/L Final     Albumin   Date Value Ref Range Status   04/11/2023 3.8 3.5 - 5.2 g/dL Final   08/10/2020 3.7 3.5 - 5.0 g/dL Final     Alkaline Phosphatase   Date Value Ref Range Status   04/11/2023 55 35 - 104 U/L Final     Creatinine   Date Value Ref Range Status   04/11/2023 0.89 0.51 - 0.95 mg/dL Final   06/22/2018 0.77 0.52 - 1.04 mg/dL Final     GFR Estimate   Date Value Ref Range Status   04/11/2023 >90 >60 mL/min/1.73m2 Final     Comment:     eGFR calculated using 2021 CKD-EPI equation.   06/24/2021 >60 >60 mL/min/1.73m2 Final   06/22/2018 >90 >60 mL/min/1.7m2 Final     Comment:     Non  GFR Calc     GFR Estimate If Black   Date Value Ref Range Status   06/24/2021 >60 >60 mL/min/1.73m2 Final   06/22/2018 >90 >60 mL/min/1.7m2 Final     Comment:      GFR Calc     Erythrocyte Sedimentation Rate   Date Value Ref Range Status    04/11/2023 18 0 - 20 mm/hr Final     CRP Inflammation   Date Value Ref Range Status   10/13/2022 23.4 (H) 0.0 - 8.0 mg/L Final

## 2023-05-31 ENCOUNTER — TRANSFERRED RECORDS (OUTPATIENT)
Dept: HEALTH INFORMATION MANAGEMENT | Facility: CLINIC | Age: 28
End: 2023-05-31
Payer: COMMERCIAL

## 2023-06-01 ENCOUNTER — THERAPY VISIT (OUTPATIENT)
Dept: PHYSICAL THERAPY | Facility: CLINIC | Age: 28
End: 2023-06-01
Attending: INTERNAL MEDICINE
Payer: COMMERCIAL

## 2023-06-01 DIAGNOSIS — R42 DIZZINESS: Primary | ICD-10-CM

## 2023-06-01 PROCEDURE — 95992 CANALITH REPOSITIONING PROC: CPT | Mod: GP | Performed by: PHYSICAL THERAPIST

## 2023-06-01 PROCEDURE — 97112 NEUROMUSCULAR REEDUCATION: CPT | Mod: GP,59 | Performed by: PHYSICAL THERAPIST

## 2023-06-01 NOTE — PROGRESS NOTES
PHYSICAL THERAPY EVALUATION  Type of Visit: Evaluation    See electronic medical record for Abuse and Falls Screening details.    Subjective      Presenting condition or subjective complaint:    Date of onset:      Relevant medical history:     Dates & types of surgery:      Prior diagnostic imaging/testing results:       Prior therapy history for the same diagnosis, illness or injury:        Prior Level of Function   Transfers: Independent  Ambulation: Independent but slow and limited distances  ADL: Independent  IADL:gets rides, uber, parents.    Living Environment  Social support:   family  Type of home:     Stairs to enter the home:         Ramp:     Stairs inside the home:         Help at home:    Equipment owned:       Employment:    works at [employer], dad is   Hobbies/Interests:      Patient goals for therapy:      Pain assessment: Pain present some headaches     Objective   Cognitive Status Examination  Orientation: Oriented to person, place and time   Level of Consciousness: Alert  Follows Commands and Answers Questions: 100% of the time  Personal Safety and Judgement: Intact  Memory: Intact    OBSERVATION: obese, pleasant, motivated  INTEGUMENTARY: Intact  POSTURE: Standing Posture: Forward head  PALPATION: na  RANGE OF MOTION: UE ROM WFL  STRENGTH: LE Strength WNL    BED MOBILITY: Independent    TRANSFERS: WNL    WHEELCHAIR MOBILITY: na    GAIT:   Level of [place]: slow, wide based.  Assistive Device(s): None  Gait Deviations: Shannen decreased  Gait Distance: 200ft  Stairs: not tested    BALANCE: not tested, no LOB    SENSATION: wfls    MUSCLE TONE: WNL       VESTIBULAR EVALUATION  ADDITIONAL HISTORY:  Description of symptoms:    Dizzy attacks:   Start:     Last attack:     Frequency of occurrences:     Length of attack:    Difficulty hearing:    Noise in ears?      Alleviates symptoms:    Worsens symptoms:    Activities that bring on symptoms:         Pertinent visual history:  glasses.  No readers; gets new glasses today.    Pertinent history of current vestibular problem: concussion 2016, pots pans fell on head.    DHI:      Cervicogenic Screen    Neck ROM Normal   Vertebral Artery Test    Alar Ligament Test    Transverse Ligament Test    Distraction    Neck Torsion Test (head still, body rotating)    Neck Torsion Test (head and body rotating)         Oculomotor Screen    Ocular ROM Normal   Smooth Pursuit Normal   Saccades symptoms   VOR Normal   VOR Cancellation    Head Impulse Test Normal   Convergence Testing Abnormal  21 cm        Infrared Goggle Exam Vestibular Suppressant in Last 24 Hours? No  Exam Completed With: Infrared goggles   Spontaneous Nystagmus Negative   Gaze Evoked Nystagmus Negative   Head Shake Horizontal Nystagmus    Positional Testing    Supine Head-Hanging Test     Left Right   Ashley-Hallpike Negative Upbeating   Sidelying Test Negative Negative   HSCC Supine Roll Test Negative Negative   HSCC Forward Roll Test     [place] and Lean Test -  Sitting Erect    [place] and Lean Test - Seated, Head Bent 60 Degrees Forward    [place] and Lean Test - Seated, Head Bent Backwards       BPPV Canal(s): R Posterior  BPPV Type: Canalithasis    Dynamic Visual Acuity (DVA)    Static Acuity (LogMar)    Horizontal Head Movement at 1 Hz (LogMar)    Horizontal Head Movement at 2 Hz (LogMar)           Assessment & Plan   CLINICAL IMPRESSIONS   Medical Diagnosis:      Treatment Diagnosis:     Impression/Assessment: pt is a 28 yo female w/ long standing dizzy sxs. Possible R pc bppv. Probable anxiety component, also impaired convergence. Has had past concussion. Clinical Decision Making (Complexity):   Clinical Presentation: Stable/Uncomplicated  Clinical Presentation Rationale: based on medical and personal factors listed in PT evaluation  Clinical Decision Making (Complexity): Low complexity    PLAN OF CARE  Treatment Interventions:  Interventions: Gait Training, there exer activity / NM and  CRMs    Long Term Goals            Frequency of Treatment:  up to 8x in 90 days  Duration of Treatment:      Recommended Referrals to Other Professionals: none  Education Assessment: demo/cues       Risks and benefits of evaluation/treatment have been explained.   Patient/Family/caregiver agrees with Plan of Care.     Evaluation Time:   21 min    Signing Clinician: Halle Lowry, PT        Halle Lowry PT                           Referring Provider:  Alexandra Rodrigues      Initial Assessment  See Epic Evaluation-

## 2023-06-01 NOTE — PATIENT INSTRUCTIONS
6/1/23    Pencil pushups.  Do 2x a day.  Sit/ used a pen tip.  Bring pen tip to and away til you feel it - do 5 to 10 reps.  Repeat a second time.  Do twice a day.      When in busy environments. USE Stable visual targets.     Walk around block daily take your phone to track steps.  If that is easy - do 2 blocks.  And ease up on distance.      Good to meet you see you soon  [email or web address]  [address], [address]  [place], [place]  [phone]

## 2023-06-03 NOTE — PROGRESS NOTES
"   06/01/23 1500   Appointment Info   Signing clinician's name / credentials Halle Lowry PT   Visits Used 1   Medical Diagnosis dizzy   Other pertinent information ucare pmap, no cert   Quick Adds Certification   Progress Note/Certification   Start of Care Date 06/01/23   Therapy Frequency weekly   Predicted Duration 90 days   GOALS   PT Goals 2;3   PT Goal 1   Goal Identifier sxs   Goal Description pt to show improved sxs by scoring 30 or less on DHI   Target Date 08/22/23   PT Goal 2   Goal Identifier HEP   Goal Description pt to be indep w/ a Hep for dizziness, convergence and gait activities for overall wellness   Target Date 08/22/23   PT Goal 3   Goal Identifier gait   Goal Description pt to be able to walk w/ head turns as at a store or outside x 1000ft indep   Target Date 08/22/23   Subjective Report   Subjective Report pt here alone. does not drive. took Uber.  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  problem started 9/2016, again 9/22.  goal turn head /body w/out feel funny   Objective Measures   Objective Measures Objective Measure 1;Objective Measure 2;Objective Measure 3   Objective Measure 1   Objective Measure DHI   Details 54   Objective Measure 2   Objective Measure BP's   Details supine 119/78, sit 113/77. stand, 111/76   Objective Measure 3   Objective Measure 10M walk time   Details 8.7 sec   Treatment Interventions (PT)   Interventions Therapeutic Procedure/Exercise;Neuromuscular Re-education;Therapeutic Activity;Gait Training;Infrared Goggle Exam or Frenzel Lense Exam;Standard Canalith Repositioning   Neuromuscular Re-education   Neuromuscular re-ed of mvmt, balance, coord, kinesthetic sense, posture, proprioception minutes ([id number]) 12   Skilled Intervention instructed in convergence penci pushup exer.  able w/ cues/demo and rec pt do this daily. asked pt to try simple short walks daily agrees.  brief practice of using \"stable targets\" when walking in [place] to keep vision stable - " able.  rec she take phone w/ her on walks so we can track steps.   Patient Response/Progress chato well   Infrared Goggle Exam or Frenzel Lense Exam   Vestibular Suppressant in Last 24 Hours? No   Exam completed with Infrared Goggles   Spontaneous Nystagmus Negative   Gaze Evoked Nystagmus Negative   [place]-Hallpike (right) Upbeating   Ashley-Hallpike (right) comments brief, mild sxs   Ashley-Hallpike (Left) Negative   [facility] Sup[facility] Roll Test (Right) Negative   [facility] Supine Roll Test (Left) Negative   Standard Canalith Repositioning   Standard canalith repositioning procedure minutes (13786) 12   Skilled Intervention above   Patient Response/Progress chato well; educ on inner ear function   Canalith Repositioning - Details one R epley- chato well   Education   Learner/Method Patient   Education Comments pt instructions   Plan   Home program Pencil pushups.  Do 2x a day.  Sit/ used a pen tip.  Bring pen tip to and away til you feel it - do 5 to 10 reps.  Repeat a second time.  Do twice a day.       When in busy environments. USE Stable visual targets.      Walk around block daily take your phone to track steps.  If that is easy - do 2 blocks.  And ease up on distance.   Plan for next session convergence/measure. practice.  gait train w/ head turns.  DGI or FGA.   sxs changing?   check number of steps on phone for average.   Comments   Comments lives w/ family/ does not drive. works at [employer].   Total Session Time   Timed Code Treatment Minutes 12   Total Treatment Time (sum of timed and untimed services) 24

## 2023-06-05 ENCOUNTER — MYC MEDICAL ADVICE (OUTPATIENT)
Dept: INTERNAL MEDICINE | Facility: CLINIC | Age: 28
End: 2023-06-05
Payer: COMMERCIAL

## 2023-06-05 DIAGNOSIS — M54.9 CHRONIC BACK PAIN, UNSPECIFIED BACK LOCATION, UNSPECIFIED BACK PAIN LATERALITY: Primary | ICD-10-CM

## 2023-06-05 DIAGNOSIS — M25.552 HIP PAIN, LEFT: ICD-10-CM

## 2023-06-05 DIAGNOSIS — G89.29 CHRONIC BACK PAIN, UNSPECIFIED BACK LOCATION, UNSPECIFIED BACK PAIN LATERALITY: Primary | ICD-10-CM

## 2023-06-05 DIAGNOSIS — M54.2 NECK PAIN: ICD-10-CM

## 2023-06-05 DIAGNOSIS — M79.7 FIBROMYALGIA: ICD-10-CM

## 2023-06-14 ENCOUNTER — MYC MEDICAL ADVICE (OUTPATIENT)
Dept: INTERNAL MEDICINE | Facility: CLINIC | Age: 28
End: 2023-06-14
Payer: COMMERCIAL

## 2023-06-15 NOTE — TELEPHONE ENCOUNTER
Spoke with patient to gather more information. Patient states she does have some suicidal ideation but does not have a plan. States she is in a safe environment at her aunt's house right now. [phone] crisis number provided and writer recommended evaluation for suicidal ideation in the ED but patient declines. States she would prefer to talk with Dr Rodrigues. Writer informed patient that Dr Rodrigues is out of the office for the next week and the patient verbalized understanding.    Patient is also complaining of a fever, nausea and abdominal pain and requests a letter for school. Writer informed patient that she will be unable to obtain a letter without a visit to evaluate her symptoms. Patient is agreeable to this and scheduled a visit. She will discuss the need for the letter at this time as well.

## 2023-06-16 ENCOUNTER — OFFICE VISIT (OUTPATIENT)
Dept: FAMILY MEDICINE | Facility: CLINIC | Age: 28
End: 2023-06-16
Payer: COMMERCIAL

## 2023-06-16 VITALS
SYSTOLIC BLOOD PRESSURE: 120 MMHG | HEIGHT: 66 IN | WEIGHT: 223.2 LBS | DIASTOLIC BLOOD PRESSURE: 70 MMHG | TEMPERATURE: 97.3 F | BODY MASS INDEX: 35.87 KG/M2 | OXYGEN SATURATION: 99 % | RESPIRATION RATE: 14 BRPM | HEART RATE: 87 BPM

## 2023-06-16 DIAGNOSIS — M25.50 ARTHRALGIA, UNSPECIFIED JOINT: ICD-10-CM

## 2023-06-16 DIAGNOSIS — R50.9 FEVER, UNSPECIFIED FEVER CAUSE: Primary | ICD-10-CM

## 2023-06-16 DIAGNOSIS — R11.0 NAUSEA: ICD-10-CM

## 2023-06-16 DIAGNOSIS — R10.13 ABDOMINAL PAIN, EPIGASTRIC: ICD-10-CM

## 2023-06-16 DIAGNOSIS — J02.9 ACUTE PHARYNGITIS, UNSPECIFIED ETIOLOGY: ICD-10-CM

## 2023-06-16 LAB
ALBUMIN UR-MCNC: ABNORMAL MG/DL
APPEARANCE UR: CLEAR
BACTERIA #/AREA URNS HPF: ABNORMAL /HPF
BILIRUB UR QL STRIP: ABNORMAL
COLOR UR AUTO: YELLOW
ERYTHROCYTE [DISTWIDTH] IN BLOOD BY AUTOMATED COUNT: 13.7 % (ref 10–15)
GLUCOSE UR STRIP-MCNC: NEGATIVE MG/DL
HCT VFR BLD AUTO: 38.1 % (ref 35–47)
HGB BLD-MCNC: 11.9 G/DL (ref 11.7–15.7)
HGB UR QL STRIP: NEGATIVE
KETONES UR STRIP-MCNC: NEGATIVE MG/DL
LEUKOCYTE ESTERASE UR QL STRIP: NEGATIVE
MCH RBC QN AUTO: 28.2 PG (ref 26.5–33)
MCHC RBC AUTO-ENTMCNC: 31.2 G/DL (ref 31.5–36.5)
MCV RBC AUTO: 90 FL (ref 78–100)
MUCOUS THREADS #/AREA URNS LPF: PRESENT /LPF
NITRATE UR QL: NEGATIVE
PH UR STRIP: 6.5 [PH] (ref 5–8)
PLATELET # BLD AUTO: 248 10E3/UL (ref 150–450)
RBC # BLD AUTO: 4.22 10E6/UL (ref 3.8–5.2)
RBC #/AREA URNS AUTO: ABNORMAL /HPF
SP GR UR STRIP: 1.02 (ref 1–1.03)
SQUAMOUS #/AREA URNS AUTO: ABNORMAL /LPF
UROBILINOGEN UR STRIP-ACNC: 2 E.U./DL
WBC # BLD AUTO: 3.4 10E3/UL (ref 4–11)
WBC #/AREA URNS AUTO: ABNORMAL /HPF

## 2023-06-16 PROCEDURE — 99214 OFFICE O/P EST MOD 30 MIN: CPT | Performed by: FAMILY MEDICINE

## 2023-06-16 PROCEDURE — 36415 COLL VENOUS BLD VENIPUNCTURE: CPT | Performed by: FAMILY MEDICINE

## 2023-06-16 PROCEDURE — 86618 LYME DISEASE ANTIBODY: CPT | Performed by: FAMILY MEDICINE

## 2023-06-16 PROCEDURE — 87635 SARS-COV-2 COVID-19 AMP PRB: CPT | Performed by: FAMILY MEDICINE

## 2023-06-16 PROCEDURE — 85027 COMPLETE CBC AUTOMATED: CPT | Performed by: FAMILY MEDICINE

## 2023-06-16 PROCEDURE — 81001 URINALYSIS AUTO W/SCOPE: CPT | Performed by: FAMILY MEDICINE

## 2023-06-16 ASSESSMENT — ENCOUNTER SYMPTOMS: FEVER: 1

## 2023-06-16 ASSESSMENT — PATIENT HEALTH QUESTIONNAIRE - PHQ9
10. IF YOU CHECKED OFF ANY PROBLEMS, HOW DIFFICULT HAVE THESE PROBLEMS MADE IT FOR YOU TO DO YOUR WORK, TAKE CARE OF THINGS AT HOME, OR GET ALONG WITH OTHER PEOPLE: EXTREMELY DIFFICULT
SUM OF ALL RESPONSES TO PHQ QUESTIONS 1-9: 16
SUM OF ALL RESPONSES TO PHQ QUESTIONS 1-9: 16

## 2023-06-16 NOTE — PROGRESS NOTES
Assessment & Plan    Nehemias has a constellation of symptoms that do not a particular illness, but could go along with several infectious diseases.  Doubt strep throat with history of tonsillectomy and normal throat exam and no adenopathy.  It is late for influenza and HMPV virus.  Arthralgias are a notable exception to the upper respiratory constellation of symptoms.  COVID is always a possibility.   Parvovirus would be another possible explanation for joint pain though her other symptoms do not go along with that infection.  I will check for Lyme disease as this is prevalent in the [facility]      Fever, unspecified fever cause  Arthralgia, unspecified joint  Abdominal pain, epigastric  Nausea  Acute pharyngitis, unspecified etiology    - CBC with platelets - normal  - UA Macroscopic with reflex to Microscopic and Culture - normal  - Lyme Disease Total Abs Bld with Reflex to Confirm CLIA  - Symptomatic COVID-19 Virus (Coronavirus) by PCR Nose    Return if symptoms worsen or fail to improve.      Lizeth Munroe MD  [facility]    Subjective   Nehemias is a 28 year old, presenting for the following health issues:  Fever (X 3 days, runny nose, sore throat, chest hurts a little, headache, nausea) and Arthritis (X 1 year)        6/16/2023     1:26 PM   Additional Questions   Roomed by Luz Elena   Accompanied by N/A         6/16/2023     1:26 PM   Patient Reported Additional Medications   Patient reports taking the following new medications Duloxetine     Fever  Associated symptoms include a fever.   Arthritis  Associated symptoms include a fever.   History of Present Illness       Reason for visit:  Cold and joint pain  Symptom onset:  3-7 days ago  Symptom intensity:  Moderate  Symptom progression:  Worsening  Had these symptoms before:  Yes    She eats 0-1 servings of fruits and vegetables daily.She consumes 0 sweetened beverage(s) daily.She exercises with enough effort to increase her  "heart rate 10 to 19 minutes per day.  She exercises with enough effort to increase her heart rate 3 or less days per week. She is missing 1 dose(s) of medications per week.  She is not taking prescribed medications regularly due to other.    Today's PHQ-9         PHQ-9 Total Score: 16    PHQ-9 Q9 Thoughts of better off dead/self-harm past 2 weeks :   Several days  Thoughts of suicide or self harm: (P) Yes  Self-harm Plan:   (P) No  Self-harm Action:     (P) No  Safety concerns for self or others: (P) No    How difficult have these problems made it for you to do your work, take care of things at home, or get along with other people: Extremely difficult     Runny nose, throat hurts, nausea, joint pain starting about 5 days ago   - No strep exposure; also she  does not have tonsils   - cough only due to post-nasal drip   - Throat and joints are bothering her the most: Elbow and wrist and knees   - Fever was 99.9   - Foggy feeling   - Super tired   - A little pain with urination   - No rashes   - walks outside,but otherwise does not spend a lot of time outside     She has never been sexually active    Social History: works for her [email or web address]              Objective    /70 (BP Location: Left arm, Patient Position: Sitting, Cuff Size: Adult Large)   Pulse 87   Temp 97.3  F (36.3  C) (Tympanic)   Resp 14   Ht 1.676 m (5' 6\")   Wt 101.2 kg (223 lb 3.2 oz)   LMP 05/31/2023 (Exact Date)   SpO2 99%   BMI 36.03 kg/m    Body mass index is 36.03 kg/m .  Physical Exam   General appearance - tired appearing but in no distress  Mental status - constricted mood adn affect; normal mentation  Eyes - conjunctiva clear  Mouth - mucous membranes moist, pharynx normal without lesions  Neck - supple, no significant adenopathy  Chest - clear to auscultation, no wheezes, rales or rhonchi, symmetric air entry  Heart - normal rate, regular rhythm, normal S1, S2, no murmurs, rubs, clicks or gallops  Abdomen - no organomegaly, mild " epigastric tenderness

## 2023-06-16 NOTE — LETTER
June 16, 2023      Nehemias Mascorro  [address] [place] [address]        To Whom It May Concern:    Nehemias Mascorro  was seen on 6/16/23 for presumed viral illness. Due to the nature of her symptoms , I had advised her to take 3-4 days to just rest, and therefore request she be allowed extra time to complete her assignments      Sincerely,        Lizeth Munroe MD     Monitor, plat stable 46 K   HIT was normal 11/03  Follow [facility] 13  Hematology following,   SCD for now  hapto was normal  HIT  Repeat w/u pending

## 2023-06-17 LAB — SARS-COV-2 RNA RESP QL NAA+PROBE: NEGATIVE

## 2023-06-19 ENCOUNTER — VIRTUAL VISIT (OUTPATIENT)
Dept: FAMILY MEDICINE | Facility: CLINIC | Age: 28
End: 2023-06-19
Payer: COMMERCIAL

## 2023-06-19 DIAGNOSIS — J02.9 SORE THROAT: Primary | ICD-10-CM

## 2023-06-19 LAB — B BURGDOR IGG+IGM SER QL: 0.28

## 2023-06-19 PROCEDURE — 99213 OFFICE O/P EST LOW 20 MIN: CPT | Mod: VID | Performed by: FAMILY MEDICINE

## 2023-06-19 RX ORDER — GABAPENTIN 100 MG/1
200 CAPSULE ORAL AT BEDTIME
COMMUNITY

## 2023-06-19 RX ORDER — DULOXETIN HYDROCHLORIDE 20 MG/1
60 CAPSULE, DELAYED RELEASE ORAL DAILY
COMMUNITY
Start: 2023-06-12 | End: 2024-05-08 | Stop reason: DRUGHIGH

## 2023-06-19 NOTE — PROGRESS NOTES
"Nehemias is a 28 year old who is being evaluated via a billable video visit.      How would you like to obtain your AVS? MyChart  If the video visit is dropped, the invitation should be resent by: Text to cell phone: [phone]  Will anyone else be joining your video visit? No        Assessment & Plan     (J02.9) Sore throat  (primary encounter diagnosis)  Comment:   Plan: Streptococcus A Rapid Screen w/Reflex to PCR -         Clinic Collect            Check labs. Cares and  treatment discussed.  follow. up if problem   Patient expressed understanding and agreement with treatment plan. All patient's questions were answered, will let me know if has more later.  Medications: Rx's: Reviewed the potential side effects/complications of medications prescribed.          BMI:   Estimated body mass index is 36.03 kg/m  as calculated from the following:    Height as of 6/16/23: 1.676 m (5' 6\").    Weight as of 6/16/23: 101.2 kg (223 lb 3.2 oz).       See Patient Instructions    Brigid Torres MD  [facility]    Subjective   Nehemias is a 28 year old, presenting for the following health issues:  No chief complaint on file.        6/19/2023    11:16 AM   Additional Questions   Roomed by James ALVARADO     Acute Illness  Acute illness concerns:  Sore throat   Onset/Duration: 1 week  Symptoms:  Fever: YES  Chills/Sweats: YES  Headache (location?): YES  Sinus Pressure: No  Conjunctivitis:  No  Ear Pain: no  Congestion: No  Sore Throat: YES  Cough: no  Wheeze: No  Decreased Appetite: YES  Nausea: YES  Vomiting: No  Diarrhea: No  Fatigue/Achiness: YES  Sick/Strep Exposure: No. But had negative coid test at home   Therapies tried and outcome: Tylenol   Need letter missing school today       Review of Systems   Constitutional, HEENT, cardiovascular, pulmonary, GI, , musculoskeletal, neuro, skin, endocrine and psych systems are negative, except as otherwise noted.      Objective     "       Vitals:  No vitals were obtained today due to virtual visit.    Physical Exam   GENERAL: Healthy, alert and no distress  EYES: Eyes grossly normal to inspection.  No discharge or erythema, or obvious scleral/conjunctival abnormalities.  RESP: No audible wheeze, cough, or visible cyanosis.  No visible retractions or increased work of breathing.    SKIN: Visible skin clear. No significant rash, abnormal pigmentation or lesions.  NEURO: Cranial nerves grossly intact.  Mentation and speech appropriate for age.  PSYCH: Mentation appears normal, affect normal/bright, judgement and insight intact, normal speech and appearance well-groomed.            Video-Visit Details    Type of service:  Video Visit       Originating Location (pt. Location): Home  Distant Location (provider location):  Off-site  Platform used for Video Visit: Dada Room

## 2023-06-20 ENCOUNTER — LAB (OUTPATIENT)
Dept: LAB | Facility: CLINIC | Age: 28
End: 2023-06-20
Attending: FAMILY MEDICINE
Payer: COMMERCIAL

## 2023-06-20 DIAGNOSIS — R50.9 FEVER, UNSPECIFIED FEVER CAUSE: ICD-10-CM

## 2023-06-20 DIAGNOSIS — J02.9 SORE THROAT: ICD-10-CM

## 2023-06-20 DIAGNOSIS — D72.819 LEUKOPENIA, UNSPECIFIED TYPE: Primary | ICD-10-CM

## 2023-06-20 LAB
DEPRECATED S PYO AG THROAT QL EIA: NEGATIVE
GROUP A STREP BY PCR: NOT DETECTED

## 2023-06-20 PROCEDURE — 87651 STREP A DNA AMP PROBE: CPT

## 2023-06-26 ENCOUNTER — THERAPY VISIT (OUTPATIENT)
Dept: PHYSICAL THERAPY | Facility: REHABILITATION | Age: 28
End: 2023-06-26
Attending: INTERNAL MEDICINE
Payer: COMMERCIAL

## 2023-06-26 DIAGNOSIS — M54.2 NECK PAIN: ICD-10-CM

## 2023-06-26 DIAGNOSIS — G89.29 CHRONIC MIDLINE LOW BACK PAIN WITHOUT SCIATICA: ICD-10-CM

## 2023-06-26 DIAGNOSIS — M79.7 FIBROMYALGIA: ICD-10-CM

## 2023-06-26 DIAGNOSIS — M54.50 CHRONIC MIDLINE LOW BACK PAIN WITHOUT SCIATICA: ICD-10-CM

## 2023-06-26 DIAGNOSIS — M25.552 HIP PAIN, LEFT: ICD-10-CM

## 2023-06-26 DIAGNOSIS — H81.10 BENIGN PAROXYSMAL POSITIONAL VERTIGO, UNSPECIFIED LATERALITY: ICD-10-CM

## 2023-06-26 DIAGNOSIS — M54.9 CHRONIC BACK PAIN, UNSPECIFIED BACK LOCATION, UNSPECIFIED BACK PAIN LATERALITY: ICD-10-CM

## 2023-06-26 DIAGNOSIS — G89.29 CHRONIC BACK PAIN, UNSPECIFIED BACK LOCATION, UNSPECIFIED BACK PAIN LATERALITY: ICD-10-CM

## 2023-06-26 PROCEDURE — 97110 THERAPEUTIC EXERCISES: CPT | Mod: GP | Performed by: PHYSICAL THERAPIST

## 2023-06-26 PROCEDURE — 97161 PT EVAL LOW COMPLEX 20 MIN: CPT | Mod: GP | Performed by: PHYSICAL THERAPIST
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
## 2023-06-29 ENCOUNTER — HOSPITAL ENCOUNTER (OUTPATIENT)
Dept: CARDIOLOGY | Facility: HOSPITAL | Age: 28
Discharge: HOME | End: 2023-06-29
Attending: INTERNAL MEDICINE | Admitting: INTERNAL MEDICINE
Payer: COMMERCIAL

## 2023-06-29 DIAGNOSIS — R55 SYNCOPE, UNSPECIFIED SYNCOPE TYPE: ICD-10-CM

## 2023-06-29 LAB — LVEF ECHO: NORMAL

## 2023-06-29 PROCEDURE — 93306 TTE W/DOPPLER COMPLETE: CPT

## 2023-06-29 PROCEDURE — 93306 TTE W/DOPPLER COMPLETE: CPT | Mod: 26 | Performed by: INTERNAL MEDICINE

## 2023-07-10 ENCOUNTER — TRANSFERRED RECORDS (OUTPATIENT)
Dept: HEALTH INFORMATION MANAGEMENT | Facility: CLINIC | Age: 28
End: 2023-07-10
Payer: COMMERCIAL

## 2023-07-11 ENCOUNTER — THERAPY VISIT (OUTPATIENT)
Dept: PHYSICAL THERAPY | Facility: CLINIC | Age: 28
End: 2023-07-11
Attending: INTERNAL MEDICINE
Payer: COMMERCIAL

## 2023-07-11 ENCOUNTER — NURSE TRIAGE (OUTPATIENT)
Dept: NURSING | Facility: CLINIC | Age: 28
End: 2023-07-11
Payer: COMMERCIAL

## 2023-07-11 DIAGNOSIS — R42 DIZZINESS: Primary | ICD-10-CM

## 2023-07-11 PROCEDURE — 97530 THERAPEUTIC ACTIVITIES: CPT | Mod: GP | Performed by: PHYSICAL THERAPIST

## 2023-07-11 PROCEDURE — 97110 THERAPEUTIC EXERCISES: CPT | Mod: GP | Performed by: PHYSICAL THERAPIST

## 2023-07-11 NOTE — PATIENT INSTRUCTIONS
7-11-23    Ask at Y re; scholarships?     And see about insurance reimbursement.  Call and see.   See if could afford?  POOL would be great on regular basis.      [facility].  $30/year??  POOL?    Call and ask about this.       Convergence is improving!    Palm forward when do shoulder out to side.  On right.      Walking at store or outside. Pick stable targets.      Try Laptop on a desk -OR pillow under it.   For better alignment of head/neck/body -to computer.     Halle  PT

## 2023-07-11 NOTE — TELEPHONE ENCOUNTER
Nurse Triage SBAR    Situation: Medication interactions     Background: Patient calling. Pt is on prednisone and took asprin. Pt took Asprin about 30 minutes ago and took her prednisone around non. Pt states she is on prednisone due to her neck and shoulder. [facility] orthopedics. PT ate some apple sauce with the Asprin.     Assessment: No upset stomach. No black or blood in her stools. No dizziness. No vomiting. No coughing. Pt took the BC powder (Asprin and caffine) due to a headache. Headache is a 7/10. Some pain in her right eye - not severe. PT declined to be triages for her headache.     Protocol Recommended Disposition: Home care/ Telephone Advise    Recommendation: According to the protocol, Patient should do home care. Home care reviewed. Advised Patient that the patient needs to do home care. Home care reviewed. Care advice given. Patient verbalizes understanding and agrees with plan of care. Informed the patient to watch for any bleeding. Reviewed concerning symptoms and when to call back.    Sade Lopez RN Nursing Advisor 7/11/2023 5:37 PM     Reason for Disposition    Caller has medicine question, adult has minor symptoms, caller declines triage, AND triager answers question    Additional Information    Negative: [1] Intentional drug overdose AND [2] suicidal thoughts or ideas    Negative: Drug overdose and triager unable to answer question    Negative: Caller requesting a renewal or refill of a medicine patient is currently taking    Negative: Caller requesting information unrelated to medicine    Negative: Caller requesting information about COVID-19 Vaccine    Negative: Caller requesting information about Emergency Contraception    Negative: Caller requesting information about Combined Birth Control Pills    Negative: Caller requesting information about Progestin Birth Control Pills    Negative: Caller requesting information about Post-Op pain or medicines    Negative: Caller requesting a  prescription antibiotic (such as Penicillin) for Strep throat and has a positive culture result    Negative: Caller requesting a prescription anti-viral med (such as Tamiflu) and has influenza (flu) symptoms    Negative: Immunization reaction suspected    Negative: Rash while taking a medicine or within 3 days of stopping it    Negative: [1] Asthma and [2] having symptoms of asthma (cough, wheezing, etc.)    Negative: [1] Symptom of illness (e.g., headache, abdominal pain, earache, vomiting) AND [2] more than mild    Negative: Breastfeeding questions about mother's medicines and diet    Negative: MORE THAN A DOUBLE DOSE of a prescription or over-the-counter (OTC) drug    Negative: [1] DOUBLE DOSE (an extra dose or lesser amount) of prescription drug AND [2] any symptoms (e.g., dizziness, nausea, pain, sleepiness)    Negative: [1] DOUBLE DOSE (an extra dose or lesser amount) of over-the-counter (OTC) drug AND [2] any symptoms (e.g., dizziness, nausea, pain, sleepiness)    Negative: Took another person's prescription drug    Negative: [1] DOUBLE DOSE (an extra dose or lesser amount) of prescription drug AND [2] NO symptoms (Exception: a double dose of antibiotics)    Negative: Diabetes drug error or overdose (e.g., took wrong type of insulin or took extra dose)    Negative: [1] Prescription not at pharmacy AND [2] was prescribed by PCP recently (Exception: triager has access to EMR and prescription is recorded there. Go to Home Care and confirm for pharmacy.)    Negative: [1] Pharmacy calling with prescription question AND [2] triager unable to answer question    Negative: [1] Caller has URGENT medicine question about med that PCP or specialist prescribed AND [2] triager unable to answer question    Negative: Medicine patch causing local rash or itching    Negative: [1] Caller has medicine question about med NOT prescribed by PCP AND [2] triager unable to answer question (e.g., compatibility with other med,  storage)    Negative: Prescription request for new medicine (not a refill)    Negative: [1] Caller has NON-URGENT medicine question about med that PCP prescribed AND [2] triager unable to answer question    Negative: Caller wants to use a complementary or alternative medicine    Negative: [1] Prescription prescribed recently is not at pharmacy AND [2] triager has access to patient's EMR AND [3] prescription is recorded in the EMR    Negative: [1] DOUBLE DOSE (an extra dose or lesser amount) of over-the-counter (OTC) drug AND [2] NO symptoms    Negative: [1] DOUBLE DOSE (an extra dose or lesser amount) of antibiotic drug AND [2] NO symptoms    Negative: Caller has medicine question only, adult not sick, AND triager answers question    Protocols used: MEDICATION QUESTION CALL-A-AH

## 2023-07-12 ENCOUNTER — THERAPY VISIT (OUTPATIENT)
Dept: PHYSICAL THERAPY | Facility: REHABILITATION | Age: 28
End: 2023-07-12
Payer: COMMERCIAL

## 2023-07-12 DIAGNOSIS — M54.2 NECK PAIN: Primary | ICD-10-CM

## 2023-07-12 DIAGNOSIS — M54.50 CHRONIC MIDLINE LOW BACK PAIN WITHOUT SCIATICA: ICD-10-CM

## 2023-07-12 DIAGNOSIS — G89.29 CHRONIC MIDLINE LOW BACK PAIN WITHOUT SCIATICA: ICD-10-CM

## 2023-07-12 DIAGNOSIS — M25.552 PAIN OF LEFT HIP: ICD-10-CM

## 2023-07-12 PROCEDURE — 97140 MANUAL THERAPY 1/> REGIONS: CPT | Mod: GP | Performed by: PHYSICAL THERAPIST

## 2023-07-12 PROCEDURE — 97110 THERAPEUTIC EXERCISES: CPT | Mod: GP | Performed by: PHYSICAL THERAPIST

## 2023-07-26 ENCOUNTER — THERAPY VISIT (OUTPATIENT)
Dept: PHYSICAL THERAPY | Facility: REHABILITATION | Age: 28
End: 2023-07-26
Payer: COMMERCIAL

## 2023-07-26 DIAGNOSIS — M25.552 PAIN OF LEFT HIP: ICD-10-CM

## 2023-07-26 DIAGNOSIS — G89.29 CHRONIC MIDLINE LOW BACK PAIN WITHOUT SCIATICA: ICD-10-CM

## 2023-07-26 DIAGNOSIS — M54.2 NECK PAIN: Primary | ICD-10-CM

## 2023-07-26 DIAGNOSIS — M54.50 CHRONIC MIDLINE LOW BACK PAIN WITHOUT SCIATICA: ICD-10-CM

## 2023-07-26 PROCEDURE — 97110 THERAPEUTIC EXERCISES: CPT | Mod: GP | Performed by: PHYSICAL THERAPIST

## 2023-07-27 ENCOUNTER — VIRTUAL VISIT (OUTPATIENT)
Dept: FAMILY MEDICINE | Facility: CLINIC | Age: 28
End: 2023-07-27
Payer: COMMERCIAL

## 2023-07-27 DIAGNOSIS — M25.50 POLYARTHRALGIA: Primary | ICD-10-CM

## 2023-07-27 DIAGNOSIS — R79.89 ELEVATED SERUM CREATININE: ICD-10-CM

## 2023-07-27 PROCEDURE — 99215 OFFICE O/P EST HI 40 MIN: CPT | Mod: 95 | Performed by: NURSE PRACTITIONER

## 2023-07-27 RX ORDER — PREDNISONE 10 MG/1
10 TABLET ORAL DAILY
Qty: 5 TABLET | Refills: 0 | Status: SHIPPED | OUTPATIENT
Start: 2023-07-27 | End: 2023-09-18

## 2023-07-27 ASSESSMENT — PATIENT HEALTH QUESTIONNAIRE - PHQ9
10. IF YOU CHECKED OFF ANY PROBLEMS, HOW DIFFICULT HAVE THESE PROBLEMS MADE IT FOR YOU TO DO YOUR WORK, TAKE CARE OF THINGS AT HOME, OR GET ALONG WITH OTHER PEOPLE: VERY DIFFICULT
SUM OF ALL RESPONSES TO PHQ QUESTIONS 1-9: 11
SUM OF ALL RESPONSES TO PHQ QUESTIONS 1-9: 11

## 2023-07-27 NOTE — LETTER
July 27, 2023      Nehemias Mascorro  [address] [place] [address]        To Whom It May Concern:    Nehemias Mascorro  was seen on 7/27/23.  She has been sick and unable to complete her homework. Please consider extending the due date to 7/31/23.        Sincerely,        JERALD Arreguin CNP

## 2023-07-27 NOTE — PROGRESS NOTES
"Nehemias is a 28 year old who is being evaluated via a billable video visit.      How would you like to obtain your AVS? MY CHART  If the video visit is dropped, the invitation should be resent by: Text to cell phone: [phone]  Will anyone else be joining your video visit? No          Assessment & Plan     Polyarthralgia  - predniSONE (DELTASONE) 10 MG tablet  Dispense: 5 tablet; Refill: 0  She has a mildly elevated CRP but has had an elevation on CRP in the past. Also has an elevated Sed Rate-     She had a course of medrol pack which she finished about 2 weeks ago- will add 5 more days of prednisone to calm radha the flare of joint pain. Can also take tylenol and ibuprofen prn once she is done with 5 days of prednisone. Continue Duloxetine as prescribed. Follow up with rheumatology as planned. She was worked up for lupus and RA in the past and negative RONALD, RF, and  anti- CCP IgG. Discuss trial of Meloxicam but will hold off for now given recent elevation on serum creatinine. Will repeat BMP and based on findings, may need to stop ibuprofen as well.     Elevated serum creatinine  Recent elevation in creatinine and decreased eGFR -will re-check today.  - Basic metabolic panel  (Ca, Cl, CO2, Creat, Gluc, K, Na, BUN)  - protein/creatinine ration random urine       BMI:   Estimated body mass index is 36.03 kg/m  as calculated from the following:    Height as of 6/16/23: 1.676 m (5' 6\").    Weight as of 6/16/23: 101.2 kg (223 lb 3.2 oz).   Weight management plan: Discussed healthy diet and exercise guidelines        JERALD Arreguin CNP  [facility]    Subjective   Nehemias is a 28 year old, presenting for the following health issues:  Office Visit (Discuss issues with Fibromyalgia. Pt says she is at 7 on pain scale.) and Recheck Medication    Presents with pain on multiple body areas: intermittent pain on finger knuckles, elbows, knees, back. Symptoms ongoing for the past 2 " weeks. No fever, or recent illness. The pain is making it difficult to complete work and school homework. It is difficult to get out of bed in the mornings.   Has been taking tylenol and ibuprofen but not helpful  Rates pain 7/10   Nothing seems to alleviate pain  Movement makes the pain worse. Pain is worse in the mornings.     Was seen by a provider at [facility] on 7/13/23 and had extensive blood work.   Per care everywhere access:  CMP - normal except CR 1.04, eGFR 75  B12 >2000 - was on B 12 supplements but stopped taking int about a month ago.  Sed rate 35, CRP 0.6  Vit D, TSH, Uric acid, lipid panel WNL  Hep C negative    She was recommended to f/up with rheumatology which she has an appointment scheduled in August.   She has been previously evaluated by rheumatology for her symptoms- was told negative lupus or RA- was recommended to consult with psychiatry and consider duloxetine for fibromyalgia symptoms. She was switched to duloxetine and she is taking 60 mg daily but has not felt any improvement.     Has had persistent elevated CRP levels- there was a question as to whether maybe her elevated CRP was caused by GI etiology- today, patient denies any GI symptoms except sometimes gets constipated.      Additionally, states that she was also seen at [facility] for shoulder pain and joint pain- she was on methylprednisolone pack and finished it about 2 weeks ago. She had a MR of cervical area and is waiting to hear back from results. I am unable to access records.           7/27/2023     2:56 PM   Additional Questions   Roomed by chris clarke   Accompanied by alone         7/27/2023     2:56 PM   Patient Reported Additional Medications   Patient reports taking the following new medications none       History of Present Illness       Reason for visit:  Fibromyalgia pain    She eats 2-3 servings of fruits and vegetables daily.She consumes 1 sweetened beverage(s) daily.She exercises with enough effort to increase her heart rate 9  or less minutes per day.  She exercises with enough effort to increase her heart rate 3 or less days per week. She is missing 2 dose(s) of medications per week.  She is not taking prescribed medications regularly due to remembering to take and other.       Fibromyalgia        Review of Systems   See HPI, all other systems on review are negative.        Objective    Vitals - Patient Reported  Pain Score: Severe Pain (7)  Pain Loc: Finger (fingers and elbows)      Vitals:  No vitals were obtained today due to virtual visit.    Physical Exam   GENERAL: Healthy, alert and no distress  EYES: Eyes grossly normal to inspection.  No discharge or erythema, or obvious scleral/conjunctival abnormalities.  RESP: No audible wheeze, cough, or visible cyanosis.  No visible retractions or increased work of breathing.    SKIN: Visible skin clear. No significant rash, abnormal pigmentation or lesions.  NEURO: Cranial nerves grossly intact.  Mentation and speech appropriate for age.  PSYCH: Mentation appears normal, affect normal/bright, judgement and insight intact, normal speech and appearance well-groomed.                Video-Visit Details    Type of service:  Video Visit   Video Start Time:  4:32pm  Video End Time:5:16 PM    Originating Location (pt. Location): Home    Distant Location (provider location):  On-site  Platform used for Video Visit: Peace

## 2023-07-28 ENCOUNTER — LAB (OUTPATIENT)
Dept: LAB | Facility: CLINIC | Age: 28
End: 2023-07-28
Payer: COMMERCIAL

## 2023-07-28 DIAGNOSIS — R79.89 ELEVATED SERUM CREATININE: ICD-10-CM

## 2023-07-28 LAB
ALBUMIN MFR UR ELPH: 8.8 MG/DL
ANION GAP SERPL CALCULATED.3IONS-SCNC: 9 MMOL/L (ref 7–15)
BUN SERPL-MCNC: 12.8 MG/DL (ref 6–20)
CALCIUM SERPL-MCNC: 9 MG/DL (ref 8.6–10)
CHLORIDE SERPL-SCNC: 107 MMOL/L (ref 98–107)
CREAT SERPL-MCNC: 0.84 MG/DL (ref 0.51–0.95)
CREAT UR-MCNC: 158 MG/DL
DEPRECATED HCO3 PLAS-SCNC: 23 MMOL/L (ref 22–29)
GFR SERPL CREATININE-BSD FRML MDRD: >90 ML/MIN/1.73M2
GLUCOSE SERPL-MCNC: 93 MG/DL (ref 70–99)
POTASSIUM SERPL-SCNC: 4.5 MMOL/L (ref 3.4–5.3)
PROT/CREAT 24H UR: 0.06 MG/MG CR (ref 0–0.2)
SODIUM SERPL-SCNC: 139 MMOL/L (ref 136–145)

## 2023-07-28 PROCEDURE — 80048 BASIC METABOLIC PNL TOTAL CA: CPT

## 2023-07-28 PROCEDURE — 36415 COLL VENOUS BLD VENIPUNCTURE: CPT

## 2023-07-28 PROCEDURE — 84156 ASSAY OF PROTEIN URINE: CPT

## 2023-08-09 ENCOUNTER — THERAPY VISIT (OUTPATIENT)
Dept: PHYSICAL THERAPY | Facility: REHABILITATION | Age: 28
End: 2023-08-09
Attending: INTERNAL MEDICINE
Payer: COMMERCIAL

## 2023-08-09 DIAGNOSIS — M25.552 PAIN OF LEFT HIP: ICD-10-CM

## 2023-08-09 DIAGNOSIS — M54.2 NECK PAIN: Primary | ICD-10-CM

## 2023-08-09 DIAGNOSIS — G89.29 CHRONIC MIDLINE LOW BACK PAIN WITHOUT SCIATICA: ICD-10-CM

## 2023-08-09 DIAGNOSIS — M54.50 CHRONIC MIDLINE LOW BACK PAIN WITHOUT SCIATICA: ICD-10-CM

## 2023-08-09 PROCEDURE — 97110 THERAPEUTIC EXERCISES: CPT | Mod: GP | Performed by: PHYSICAL THERAPIST

## 2023-08-09 PROCEDURE — 97140 MANUAL THERAPY 1/> REGIONS: CPT | Mod: GP | Performed by: PHYSICAL THERAPIST

## 2023-08-09 PROCEDURE — 97112 NEUROMUSCULAR REEDUCATION: CPT | Mod: GP | Performed by: PHYSICAL THERAPIST

## 2023-08-15 ENCOUNTER — THERAPY VISIT (OUTPATIENT)
Dept: PHYSICAL THERAPY | Facility: REHABILITATION | Age: 28
End: 2023-08-15
Payer: COMMERCIAL

## 2023-08-15 DIAGNOSIS — M25.552 PAIN OF LEFT HIP: ICD-10-CM

## 2023-08-15 DIAGNOSIS — M54.2 NECK PAIN: Primary | ICD-10-CM

## 2023-08-15 DIAGNOSIS — G89.29 CHRONIC MIDLINE LOW BACK PAIN WITHOUT SCIATICA: ICD-10-CM

## 2023-08-15 DIAGNOSIS — M54.50 CHRONIC MIDLINE LOW BACK PAIN WITHOUT SCIATICA: ICD-10-CM

## 2023-08-15 PROCEDURE — 97140 MANUAL THERAPY 1/> REGIONS: CPT | Mod: GP | Performed by: PHYSICAL THERAPIST

## 2023-08-15 PROCEDURE — 97110 THERAPEUTIC EXERCISES: CPT | Mod: GP | Performed by: PHYSICAL THERAPIST

## 2023-08-15 PROCEDURE — 97112 NEUROMUSCULAR REEDUCATION: CPT | Mod: GP | Performed by: PHYSICAL THERAPIST

## 2023-08-17 ENCOUNTER — E-VISIT (OUTPATIENT)
Dept: URGENT CARE | Facility: CLINIC | Age: 28
End: 2023-08-17
Payer: COMMERCIAL

## 2023-08-17 DIAGNOSIS — N94.9 VAGINAL DISCOMFORT: Primary | ICD-10-CM

## 2023-08-17 PROCEDURE — 99207 PR NON-BILLABLE SERV PER CHARTING: CPT | Performed by: NURSE PRACTITIONER

## 2023-08-18 ENCOUNTER — LAB (OUTPATIENT)
Dept: LAB | Facility: CLINIC | Age: 28
End: 2023-08-18
Payer: COMMERCIAL

## 2023-08-18 DIAGNOSIS — N76.0 BACTERIAL VAGINOSIS: Primary | ICD-10-CM

## 2023-08-18 DIAGNOSIS — N94.9 VAGINAL DISCOMFORT: ICD-10-CM

## 2023-08-18 DIAGNOSIS — B96.89 BACTERIAL VAGINOSIS: Primary | ICD-10-CM

## 2023-08-18 LAB
ALBUMIN UR-MCNC: NEGATIVE MG/DL
APPEARANCE UR: CLEAR
BILIRUB UR QL STRIP: NEGATIVE
CLUE CELLS: PRESENT
COLOR UR AUTO: YELLOW
GLUCOSE UR STRIP-MCNC: NEGATIVE MG/DL
HGB UR QL STRIP: NEGATIVE
KETONES UR STRIP-MCNC: NEGATIVE MG/DL
LEUKOCYTE ESTERASE UR QL STRIP: NEGATIVE
NITRATE UR QL: NEGATIVE
PH UR STRIP: 6.5 [PH] (ref 5–8)
SP GR UR STRIP: 1.02 (ref 1–1.03)
TRICHOMONAS, WET PREP: ABNORMAL
UROBILINOGEN UR STRIP-ACNC: 0.2 E.U./DL
WBC'S/HIGH POWER FIELD, WET PREP: ABNORMAL
YEAST, WET PREP: ABNORMAL

## 2023-08-18 PROCEDURE — 87210 SMEAR WET MOUNT SALINE/INK: CPT

## 2023-08-18 PROCEDURE — 81003 URINALYSIS AUTO W/O SCOPE: CPT

## 2023-08-18 RX ORDER — METRONIDAZOLE 500 MG/1
500 TABLET ORAL 2 TIMES DAILY
Qty: 14 TABLET | Refills: 0 | Status: SHIPPED | OUTPATIENT
Start: 2023-08-18 | End: 2023-08-25

## 2023-08-18 NOTE — PATIENT INSTRUCTIONS
Thank you for choosing us for your care. Given your symptoms, I would like you to do a lab-only visit to determine what is causing them.  I have placed the orders.  Please schedule an appointment with the lab right here in [facility], or call [phone].  I will let you know when the results are back and next steps to take.

## 2023-08-22 ENCOUNTER — E-VISIT (OUTPATIENT)
Dept: URGENT CARE | Facility: CLINIC | Age: 28
End: 2023-08-22
Payer: COMMERCIAL

## 2023-08-22 DIAGNOSIS — N94.9 VAGINAL DISCOMFORT: ICD-10-CM

## 2023-08-22 DIAGNOSIS — N89.8 VAGINAL DISCHARGE: Primary | ICD-10-CM

## 2023-08-22 PROCEDURE — 99207 PR NON-BILLABLE SERV PER CHARTING: CPT | Performed by: NURSE PRACTITIONER

## 2023-08-23 NOTE — PATIENT INSTRUCTIONS
Mahesh Mascorro,    We are sorry you are not feeling well. Based on the responses you provided, it is recommended that you be seen in-person in urgent care so we can better evaluate your symptoms. Please click here to find the nearest urgent care location to you.   You will not be charged for this Visit. Thank you for trusting us with your care.    Chelsie Chou NP

## 2023-08-29 ENCOUNTER — TRANSFERRED RECORDS (OUTPATIENT)
Dept: HEALTH INFORMATION MANAGEMENT | Facility: CLINIC | Age: 28
End: 2023-08-29
Payer: COMMERCIAL

## 2023-09-12 ENCOUNTER — TELEPHONE (OUTPATIENT)
Dept: INTERNAL MEDICINE | Facility: CLINIC | Age: 28
End: 2023-09-12

## 2023-09-12 NOTE — TELEPHONE ENCOUNTER
Reason for call:  Other     Patient called regarding (reason for call): blood order      Additional comments: Patient is calling and stating that she needs a order for a blood draw TSH,AST,AIT,BUN per Rheumatologist. Please advise and call patient back if needed please and thank you.     Phone number to reach patient:  Cell number on file:    Telephone Information:   Mobile [phone]       Best Time:  any    Can we leave a detailed message on this number?  YES

## 2023-09-13 DIAGNOSIS — M25.50 ARTHRALGIA: Primary | ICD-10-CM

## 2023-09-14 ENCOUNTER — HOSPITAL ENCOUNTER (EMERGENCY)
Facility: HOSPITAL | Age: 28
Discharge: HOME | End: 2023-09-15
Attending: EMERGENCY MEDICINE | Admitting: EMERGENCY MEDICINE
Payer: COMMERCIAL

## 2023-09-14 ENCOUNTER — LAB (OUTPATIENT)
Dept: LAB | Facility: CLINIC | Age: 28
End: 2023-09-14
Payer: COMMERCIAL

## 2023-09-14 DIAGNOSIS — M54.2 MUSCULOSKELETAL NECK PAIN: ICD-10-CM

## 2023-09-14 DIAGNOSIS — M25.50 ARTHRALGIA: ICD-10-CM

## 2023-09-14 DIAGNOSIS — R42 LIGHTHEADEDNESS: ICD-10-CM

## 2023-09-14 LAB
ALT SERPL W P-5'-P-CCNC: 26 U/L (ref 0–50)
HOLD SPECIMEN: NORMAL
T4 FREE SERPL-MCNC: 0.88 NG/DL (ref 0.9–1.7)
TSH SERPL DL<=0.005 MIU/L-ACNC: 1.1 UIU/ML (ref 0.3–4.2)

## 2023-09-14 PROCEDURE — 36415 COLL VENOUS BLD VENIPUNCTURE: CPT | Performed by: EMERGENCY MEDICINE

## 2023-09-14 PROCEDURE — 96375 TX/PRO/DX INJ NEW DRUG ADDON: CPT

## 2023-09-14 PROCEDURE — 85014 HEMATOCRIT: CPT | Performed by: EMERGENCY MEDICINE

## 2023-09-14 PROCEDURE — 84443 ASSAY THYROID STIM HORMONE: CPT

## 2023-09-14 PROCEDURE — 84484 ASSAY OF TROPONIN QUANT: CPT | Performed by: EMERGENCY MEDICINE

## 2023-09-14 PROCEDURE — 96374 THER/PROPH/DIAG INJ IV PUSH: CPT | Mod: 59

## 2023-09-14 PROCEDURE — 80053 COMPREHEN METABOLIC PANEL: CPT

## 2023-09-14 PROCEDURE — 84460 ALANINE AMINO (ALT) (SGPT): CPT | Performed by: EMERGENCY MEDICINE

## 2023-09-14 PROCEDURE — 93005 ELECTROCARDIOGRAM TRACING: CPT | Performed by: STUDENT IN AN ORGANIZED HEALTH CARE EDUCATION/TRAINING PROGRAM

## 2023-09-14 PROCEDURE — 36415 COLL VENOUS BLD VENIPUNCTURE: CPT

## 2023-09-14 PROCEDURE — 99285 EMERGENCY DEPT VISIT HI MDM: CPT | Mod: 25

## 2023-09-14 PROCEDURE — 86225 DNA ANTIBODY NATIVE: CPT

## 2023-09-14 PROCEDURE — 84443 ASSAY THYROID STIM HORMONE: CPT | Performed by: EMERGENCY MEDICINE

## 2023-09-14 PROCEDURE — 93005 ELECTROCARDIOGRAM TRACING: CPT | Performed by: EMERGENCY MEDICINE

## 2023-09-14 PROCEDURE — 84439 ASSAY OF FREE THYROXINE: CPT

## 2023-09-14 PROCEDURE — 83735 ASSAY OF MAGNESIUM: CPT | Performed by: EMERGENCY MEDICINE

## 2023-09-14 ASSESSMENT — ACTIVITIES OF DAILY LIVING (ADL): ADLS_ACUITY_SCORE: 35

## 2023-09-15 ENCOUNTER — APPOINTMENT (OUTPATIENT)
Dept: MRI IMAGING | Facility: HOSPITAL | Age: 28
End: 2023-09-15
Attending: EMERGENCY MEDICINE
Payer: COMMERCIAL

## 2023-09-15 ENCOUNTER — MYC MEDICAL ADVICE (OUTPATIENT)
Dept: INTERNAL MEDICINE | Facility: CLINIC | Age: 28
End: 2023-09-15

## 2023-09-15 VITALS
WEIGHT: 225 LBS | HEIGHT: 66 IN | RESPIRATION RATE: 16 BRPM | BODY MASS INDEX: 36.16 KG/M2 | HEART RATE: 83 BPM | TEMPERATURE: 97.5 F | DIASTOLIC BLOOD PRESSURE: 80 MMHG | SYSTOLIC BLOOD PRESSURE: 125 MMHG | OXYGEN SATURATION: 100 %

## 2023-09-15 LAB
ALBUMIN SERPL BCG-MCNC: 4.3 G/DL (ref 3.5–5.2)
ALP SERPL-CCNC: 63 U/L (ref 35–104)
ALT SERPL W P-5'-P-CCNC: 27 U/L (ref 0–50)
ANION GAP SERPL CALCULATED.3IONS-SCNC: 10 MMOL/L (ref 7–15)
AST SERPL W P-5'-P-CCNC: 27 U/L (ref 0–45)
BASOPHILS # BLD AUTO: 0.1 10E3/UL (ref 0–0.2)
BASOPHILS NFR BLD AUTO: 1 %
BILIRUB SERPL-MCNC: 0.2 MG/DL
BUN SERPL-MCNC: 11.5 MG/DL (ref 6–20)
CALCIUM SERPL-MCNC: 9.3 MG/DL (ref 8.6–10)
CHLORIDE SERPL-SCNC: 104 MMOL/L (ref 98–107)
CREAT SERPL-MCNC: 0.98 MG/DL (ref 0.51–0.95)
DEPRECATED HCO3 PLAS-SCNC: 25 MMOL/L (ref 22–29)
EGFRCR SERPLBLD CKD-EPI 2021: 80 ML/MIN/1.73M2
EOSINOPHIL # BLD AUTO: 0 10E3/UL (ref 0–0.7)
EOSINOPHIL NFR BLD AUTO: 0 %
ERYTHROCYTE [DISTWIDTH] IN BLOOD BY AUTOMATED COUNT: 13.4 % (ref 10–15)
ERYTHROCYTE [SEDIMENTATION RATE] IN BLOOD BY WESTERGREN METHOD: 18 MM/HR (ref 0–20)
GLUCOSE SERPL-MCNC: 79 MG/DL (ref 70–99)
HCG SERPL QL: NEGATIVE
HCT VFR BLD AUTO: 39 % (ref 35–47)
HGB BLD-MCNC: 12.4 G/DL (ref 11.7–15.7)
IMM GRANULOCYTES # BLD: 0 10E3/UL
IMM GRANULOCYTES NFR BLD: 0 %
LYMPHOCYTES # BLD AUTO: 2.8 10E3/UL (ref 0.8–5.3)
LYMPHOCYTES NFR BLD AUTO: 47 %
MAGNESIUM SERPL-MCNC: 1.9 MG/DL (ref 1.7–2.3)
MCH RBC QN AUTO: 28.6 PG (ref 26.5–33)
MCHC RBC AUTO-ENTMCNC: 31.8 G/DL (ref 31.5–36.5)
MCV RBC AUTO: 90 FL (ref 78–100)
MONOCYTES # BLD AUTO: 0.4 10E3/UL (ref 0–1.3)
MONOCYTES NFR BLD AUTO: 7 %
NEUTROPHILS # BLD AUTO: 2.7 10E3/UL (ref 1.6–8.3)
NEUTROPHILS NFR BLD AUTO: 45 %
NRBC # BLD AUTO: 0 10E3/UL
NRBC BLD AUTO-RTO: 0 /100
PLATELET # BLD AUTO: 286 10E3/UL (ref 150–450)
POTASSIUM SERPL-SCNC: 3.8 MMOL/L (ref 3.4–5.3)
PROT SERPL-MCNC: 7.2 G/DL (ref 6.4–8.3)
RBC # BLD AUTO: 4.33 10E6/UL (ref 3.8–5.2)
SODIUM SERPL-SCNC: 139 MMOL/L (ref 136–145)
TROPONIN T SERPL HS-MCNC: <6 NG/L
TSH SERPL DL<=0.005 MIU/L-ACNC: 3.32 UIU/ML (ref 0.3–4.2)
WBC # BLD AUTO: 6 10E3/UL (ref 4–11)

## 2023-09-15 PROCEDURE — 70549 MR ANGIOGRAPH NECK W/O&W/DYE: CPT

## 2023-09-15 PROCEDURE — 36415 COLL VENOUS BLD VENIPUNCTURE: CPT | Performed by: EMERGENCY MEDICINE

## 2023-09-15 PROCEDURE — 250N000013 HC RX MED GY IP 250 OP 250 PS 637: Performed by: EMERGENCY MEDICINE

## 2023-09-15 PROCEDURE — 70544 MR ANGIOGRAPHY HEAD W/O DYE: CPT | Mod: XU

## 2023-09-15 PROCEDURE — 255N000002 HC RX 255 OP 636: Mod: JZ | Performed by: EMERGENCY MEDICINE

## 2023-09-15 PROCEDURE — 250N000011 HC RX IP 250 OP 636: Performed by: EMERGENCY MEDICINE

## 2023-09-15 PROCEDURE — 70553 MRI BRAIN STEM W/O & W/DYE: CPT

## 2023-09-15 PROCEDURE — 84703 CHORIONIC GONADOTROPIN ASSAY: CPT | Performed by: EMERGENCY MEDICINE

## 2023-09-15 PROCEDURE — 85652 RBC SED RATE AUTOMATED: CPT | Performed by: EMERGENCY MEDICINE

## 2023-09-15 PROCEDURE — A9585 GADOBUTROL INJECTION: HCPCS | Mod: JZ | Performed by: EMERGENCY MEDICINE

## 2023-09-15 RX ORDER — NAPROXEN 500 MG/1
500 TABLET ORAL 2 TIMES DAILY WITH MEALS
Qty: 24 TABLET | Refills: 0 | Status: SHIPPED | OUTPATIENT
Start: 2023-09-15 | End: 2023-09-27

## 2023-09-15 RX ORDER — METHOCARBAMOL 500 MG/1
500 TABLET, FILM COATED ORAL 4 TIMES DAILY PRN
Qty: 20 TABLET | Refills: 0 | Status: SHIPPED | OUTPATIENT
Start: 2023-09-15

## 2023-09-15 RX ORDER — KETOROLAC TROMETHAMINE 15 MG/ML
15 INJECTION, SOLUTION INTRAMUSCULAR; INTRAVENOUS ONCE
Status: COMPLETED | OUTPATIENT
Start: 2023-09-15 | End: 2023-09-15

## 2023-09-15 RX ORDER — METHOCARBAMOL 500 MG/1
500 TABLET, FILM COATED ORAL ONCE
Status: COMPLETED | OUTPATIENT
Start: 2023-09-15 | End: 2023-09-15

## 2023-09-15 RX ORDER — GADOBUTROL 604.72 MG/ML
10 INJECTION INTRAVENOUS ONCE
Status: COMPLETED | OUTPATIENT
Start: 2023-09-15 | End: 2023-09-15

## 2023-09-15 RX ORDER — ONDANSETRON 2 MG/ML
4 INJECTION INTRAMUSCULAR; INTRAVENOUS EVERY 30 MIN PRN
Status: DISCONTINUED | OUTPATIENT
Start: 2023-09-15 | End: 2023-09-15 | Stop reason: HOSPADM

## 2023-09-15 RX ADMIN — ONDANSETRON 4 MG: 2 INJECTION INTRAMUSCULAR; INTRAVENOUS at 00:41

## 2023-09-15 RX ADMIN — METHOCARBAMOL 500 MG: 500 TABLET, FILM COATED ORAL at 02:43

## 2023-09-15 RX ADMIN — GADOBUTROL 10 ML: 604.72 INJECTION INTRAVENOUS at 01:58

## 2023-09-15 RX ADMIN — KETOROLAC TROMETHAMINE 15 MG: 15 INJECTION INTRAMUSCULAR; INTRAVENOUS at 02:43

## 2023-09-15 ASSESSMENT — ENCOUNTER SYMPTOMS
LIGHT-HEADEDNESS: 1
FEVER: 0
DIZZINESS: 1
VOMITING: 0
NAUSEA: 1
NECK PAIN: 1
CHILLS: 0

## 2023-09-15 ASSESSMENT — ACTIVITIES OF DAILY LIVING (ADL)
ADLS_ACUITY_SCORE: 35
ADLS_ACUITY_SCORE: 35

## 2023-09-15 NOTE — ED NOTES
Pt discharged from ED to home ambulatory. Patient verbalized understanding of discharge instructions and recommended follow up care as noted on discharge instructions.  Written discharge instructions given, denies any further questions. Pt agreeable to discharge plan.

## 2023-09-15 NOTE — ED TRIAGE NOTES
Pt here d/t dizziness when she turns her head. States she was tested earlier today for Lupus and thyroid issues. Also had an injection in her neck today to help with pain. Nausea w/o vomiting. States she feels shaky.      Triage Assessment       Row Name 09/14/23 2112       Triage Assessment (Adult)    Airway WDL WDL

## 2023-09-15 NOTE — ED PROVIDER NOTES
NAME: Nehemias Mascorro  AGE: 28 year old female  YOB: 1995  MRN: [id number]  EVALUATION DATE & TIME: 2023  9:34 PM    PCP: Alexandra Rodrigues    ED PROVIDER: Will Julian M.D.      Chief Complaint   Patient presents with    Dizziness         FINAL IMPRESSION:  1. Musculoskeletal neck pain    2. Lightheadedness        MEDICAL DECISION MAKIN:12 PM I met with the patient, obtained history, performed an initial exam, and discussed options and plan for diagnostics and treatment here in the ED.   3:06 AM I updated the patient about labs and imaging results. We discussed plans for discharge and she is agreeable with the plan.   Patient was clinically assessed and consented to treatment. After assessment, medical decision making and workup were discussed with the patient. The patient was agreeable to plan for testing, workup, and treatment.  Pertinent Labs & Imaging studies reviewed. (See chart for details)       Medical Decision Making    History:  Supplemental history from: Documented in chart, if applicable  External Record(s) reviewed: Documented in chart, if applicable.    Work Up:  Chart documentation includes differential considered and any EKGs or imaging independently interpreted by provider, where specified.  In additional to work up documented, I considered the following work up: Documented in chart, if applicable.    External consultation:  Discussion of management with another provider: Documented in chart, if applicable    Complicating factors:  Care impacted by chronic illness: N/A  Care affected by social determinants of health: N/A    Disposition considerations: Discharge. I prescribed additional prescription strength medication(s) as charted. N/A.        Nehemias Mascorro is a 28 year old female who presents with dizziness and left neck pain.   Differential diagnosis includes but not limited to lightheadedness, near-syncope, vertigo, vertebral dissection, vertebral  artery injury, discitis.  Patient with neck and upper shoulder pain that is been worked up for several weeks now with her primary doctor and orthopedics.  They felt this is likely myofascial syndrome and patient just saw orthopedics today with injection of cervical spine at 2 points.  Patient reports minimal relief and still having pain on the left side of the injection however also reported some lightheadedness.  This is happened in the past but seems to be worse today after the injections.  She does report more of a lightheaded feeling rather than actual vertigo or dizziness on my clarification.  Patient appears otherwise well here with normal vital signs and no history of neurologic disorder review of her history from chart and from patient.  After discussion with patient given the change in sensation especially when she moves her head the left I did review her chart and found multiple images of her head and neck on CT but no MRIs.  Given that she just had injection at the joints there my concern would be for possible infection or complication from the injection versus a vascular anomaly or congenital defect.  As well patient is being worked up for lupus and does report some foggy feeling in her head with the lightheadedness.  Some of this could be lupus cerebritis though unlikely and will check labs and MRI.  Labs reveal no leukocytosis, stable hemoglobin, no elevated ESR signs of inflammatory elevation.  Metabolic panel was unremarkable and patient was given IV fluids with some relief.  She was also given Benadryl and Reglan which helped with headache and some muscle relaxation of the neck.  MRI was done and was unremarkable except for pituitary cyst.  I did discuss this with her and I do not feel this is likely contributing to symptoms that she has no visual symptoms.  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  I did recommend she follow-up with her primary doctor for monitoring of this  and after improvement in the neck pain with Toradol and muscle relaxer patient will be discharged home with similar and follow-up to her primary doctor and orthopedist for continued care.    0 minutes of critical care time    MEDICATIONS GIVEN IN THE EMERGENCY:  Medications   gadobutrol (GADAVIST) injection 10 mL (10 mLs Intravenous $Given 9/15/23 0158)   ketorolac (TORADOL) injection 15 mg (15 mg Intravenous $Given 9/15/23 0243)   methocarbamol (ROBAXIN) tablet 500 mg (500 mg Oral $Given 9/15/23 0243)       NEW PRESCRIPTIONS STARTED AT TODAY'S ER VISIT:  Discharge Medication List as of 9/15/2023  3:40 AM        START taking these medications    Details   methocarbamol (ROBAXIN) 500 MG tablet Take 1 tablet (500 mg) by mouth 4 times daily as needed for muscle spasms, Disp-20 tablet, R-0, Local Print      naproxen (NAPROSYN) 500 MG tablet Take 1 tablet (500 mg) by mouth 2 times daily (with meals) for 12 days, Disp-24 tablet, R-0, Local Print                =================================================================    HPI    Patient information was obtained from: Patient     Use of : N/A         Nehemias REID Ludwin is a 28 year old female with a past medical history of chronic migraine with aura, anxiety, obesity, somatization disorder, who presents for evaluation of dizziness.     The patient presents with dizziness for a few days that progressively worsen today. She describe the dizziness as a lightheadedness. She reports that she received neck injection for neck pain today. The injection was not steroids but a numbing medication. She currently endorses ongoing neck pain. When she turns her head to the left the dizziness worsens. The patient was tested for lupus 3 weeks ago and redraw blood for lupus and thyroid issues today. Currently, she is nausea, no vomiting episodes. She denies any new medications. She denies any other medical concerns at the moment.     REVIEW OF SYSTEMS   Review of Systems    Constitutional:  Negative for chills and fever.   Gastrointestinal:  Positive for nausea. Negative for vomiting.   Musculoskeletal:  Positive for neck pain.   Neurological:  Positive for dizziness and light-headedness.   All other systems reviewed and are negative.       PAST MEDICAL HISTORY:  Past Medical History:   Diagnosis Date    ADHD (attention deficit hyperactivity disorder)     Depressive disorder     Ovarian cyst     Uncomplicated asthma        PAST SURGICAL HISTORY:  Past Surgical History:   Procedure Laterality Date    COLONOSCOPY N/A 4/27/2022    Procedure: COLONOSCOPY, WITH POLYPECTOMY AND BIOPSY;  Surgeon: Alyse Leija MD;  Location:  GI    ENT SURGERY      tonsilectomy age 8    ESOPHAGOSCOPY, GASTROSCOPY, DUODENOSCOPY (EGD), COMBINED N/A 3/15/2023    Procedure: ESOPHAGOGASTRODUODENOSCOPY, WITH BIOPSY;  Surgeon: Cyn Colon MD;  Location: [facility] OR    ORTHOPEDIC SURGERY      Hip, emelia surgery in 2013    WRIST SURGERY         CURRENT MEDICATIONS:    No current facility-administered medications for this encounter.    Current Outpatient Medications:     methocarbamol (ROBAXIN) 500 MG tablet, Take 1 tablet (500 mg) by mouth 4 times daily as needed for muscle spasms, Disp: 20 tablet, Rfl: 0    naproxen (NAPROSYN) 500 MG tablet, Take 1 tablet (500 mg) by mouth 2 times daily (with meals) for 12 days, Disp: 24 tablet, Rfl: 0    albuterol (ACCUNEB) 1.25 MG/3ML neb solution, Take 1 vial (1.25 mg) by nebulization every 6 hours as needed for shortness of breath / dyspnea or wheezing, Disp: 90 mL, Rfl: 0    ARIPiprazole (ABILIFY) 20 MG tablet, Take 20 mg by mouth daily, Disp: , Rfl:     buPROPion (WELLBUTRIN XL) 300 MG 24 hr tablet, Take 300 mg by mouth every morning, Disp: , Rfl:     CONCERTA 36 MG CR tablet, TAKE 1 TABLET BY MOUTH DAILY IN THE MORNING FOR ADHD. START 11/18/22, Disp: , Rfl:     drospirenone-ethinyl estradiol (JESSY) 3-0.02 MG tablet, Take 1 tablet by mouth daily, Disp: , Rfl:      DULoxetine (CYMBALTA) 20 MG capsule, Take 60 mg by mouth daily, Disp: , Rfl:     EMGALITY 120 MG/ML injection, Inject 120 mg Subcutaneous every 28 days, Disp: , Rfl:     famotidine (PEPCID) 20 MG tablet, Take 1 tablet (20 mg) by mouth 2 times daily, Disp: 180 tablet, Rfl: 1    gabapentin (NEURONTIN) 100 MG capsule, Take 200 mg by mouth At Bedtime, Disp: , Rfl:     hyoscyamine (LEVSIN) 0.125 MG tablet, Take 1 tablet (125 mcg) by mouth every 6 hours as needed for cramping, Disp: 30 tablet, Rfl: 0    ketoconazole (NIZORAL) 2 % external cream, , Disp: , Rfl:     LANsoprazole (PREVACID) 15 MG DR capsule, Take 1 capsule (15 mg) by mouth daily, Disp: 90 capsule, Rfl: 3    polyethylene glycol (MIRALAX) 17 GM/Dose powder, Take 1 capful by mouth daily as needed for constipation, Disp: , Rfl:     predniSONE (DELTASONE) 10 MG tablet, Take 1 tablet (10 mg) by mouth daily, Disp: 5 tablet, Rfl: 0    Semaglutide-Weight Management (WEGOVY) 2.4 MG/0.75ML pen, Inject 2.4 mg Subcutaneous once a week, Disp: 9 mL, Rfl: 3    TAZORAC 0.1 % external cream, APPLY TOPICALLY TO THE AFFECTED AREA AT BEDTIME, Disp: , Rfl:     VENTOLIN  (90 Base) MCG/ACT inhaler, INHALE 2 PUFFS INTO THE LUNGS FOUR TIMES DAILY, Disp: 18 g, Rfl: 3    ALLERGIES:  Allergies   Allergen Reactions    Azithromycin     Erythromycin Nausea and Vomiting    Sulfa Antibiotics Nausea       FAMILY HISTORY:  Family History   Problem Relation Age of Onset    Depression Maternal Grandmother     Schizophrenia Maternal Uncle     Substance Abuse Maternal Uncle        SOCIAL HISTORY:   Social History     Socioeconomic History    Marital status: Single   Tobacco Use    Smoking status: Never    Smokeless tobacco: Never   Vaping Use    Vaping Use: Never used   Substance and Sexual Activity    Alcohol use: Yes     Comment: rare    Drug use: Never    Sexual activity: Not Currently   Social History Narrative    Lives with her parents working part time . Had to leave college   "      PHYSICAL EXAM:    Vitals: /80   Pulse 83   Temp 97.5  F (36.4  C) (Temporal)   Resp 16   Ht 1.676 m (5' 6\")   Wt 102.1 kg (225 lb)   LMP 07/27/2023 (Exact Date)   SpO2 100%   BMI 36.32 kg/m     Physical Exam  Vitals and nursing note reviewed.   Constitutional:       General: She is not in acute distress.     Appearance: Normal appearance. She is not ill-appearing or toxic-appearing.   HENT:      Head: Normocephalic and atraumatic.   Eyes:      General: No scleral icterus.     Extraocular Movements: Extraocular movements intact.      Pupils: Pupils are equal, round, and reactive to light.   Neck:     Cardiovascular:      Rate and Rhythm: Normal rate and regular rhythm.      Heart sounds: Normal heart sounds.   Pulmonary:      Effort: Pulmonary effort is normal. No respiratory distress.      Breath sounds: Normal breath sounds.   Abdominal:      Palpations: Abdomen is soft.      Tenderness: There is no abdominal tenderness.   Musculoskeletal:      Cervical back: Normal range of motion and neck supple. Tenderness present. No rigidity. Pain with movement and muscular tenderness present. No spinous process tenderness.   Lymphadenopathy:      Cervical: No cervical adenopathy.   Skin:     General: Skin is warm and dry.   Neurological:      General: No focal deficit present.      Mental Status: She is alert and oriented to person, place, and time.      Cranial Nerves: No cranial nerve deficit.      Sensory: No sensory deficit.      Motor: No weakness.      Coordination: Coordination normal.      Gait: Gait normal.   Psychiatric:         Behavior: Behavior normal.           LAB:  All pertinent labs reviewed and interpreted.  Labs Ordered and Resulted from Time of ED Arrival to Time of ED Departure   COMPREHENSIVE METABOLIC PANEL - Abnormal       Result Value    Sodium 139      Potassium 3.8      Chloride 104      Carbon Dioxide (CO2) 25      Anion Gap 10      Urea Nitrogen 11.5      Creatinine 0.98 (*)  "    Calcium 9.3      Glucose 79      Alkaline Phosphatase 63      AST 27      ALT 27      Protein Total 7.2      Albumin 4.3      Bilirubin Total 0.2      GFR Estimate 80     ERYTHROCYTE SEDIMENTATION RATE AUTO - Normal    Erythrocyte Sedimentation Rate 18     MAGNESIUM - Normal    Magnesium 1.9     TROPONIN T, HIGH SENSITIVITY - Normal    Troponin T, High Sensitivity <6     TSH WITH FREE T4 REFLEX - Normal    TSH 3.32     HCG QUALITATIVE PREGNANCY - Normal    hCG Serum Qualitative Negative     CBC WITH PLATELETS AND DIFFERENTIAL    WBC Count 6.0      RBC Count 4.33      Hemoglobin 12.4      Hematocrit 39.0      MCV 90      MCH 28.6      MCHC 31.8      RDW 13.4      Platelet Count 286      % Neutrophils 45      % Lymphocytes 47      % Monocytes 7      % Eosinophils 0      % Basophils 1      % Immature Granulocytes 0      NRBCs per 100 WBC 0      Absolute Neutrophils 2.7      Absolute Lymphocytes 2.8      Absolute Monocytes 0.4      Absolute Eosinophils 0.0      Absolute Basophils 0.1      Absolute Immature Granulocytes 0.0      Absolute NRBCs 0.0         RADIOLOGY:  MR Brain w/o & w Contrast   Final Result   IMPRESSION:   HEAD MRI:    1.  No intracranial mass, abnormal enhancement or recent infarct or evidence of intracranial hemorrhage.   2.  7 mm nonspecific lesion within the posterior aspect of the pituitary gland. 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       HEAD MRA:    1.  Normal MRA [place].      NECK MRA:   1.  Normal neck MRA.      MRA Angiogram Head w/o Contrast   Final Result   IMPRESSION:   HEAD MRI:    1.  No intracranial mass, abnormal enhancement or recent infarct or evidence of intracranial hemorrhage.   2.  7 mm nonspecific lesion within the posterior aspect of the pituitary gland. 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       HEAD MRA:    1.  Normal MRA [place].      NECK MRA:   1.  Normal neck  MRA.      MRA Angiogram Neck w/o & w Contrast   Final Result   IMPRESSION:   HEAD MRI:    1.  No intracranial mass, abnormal enhancement or recent infarct or evidence of intracranial hemorrhage.   2.  7 mm nonspecific lesion within the posterior aspect of the pituitary gland. 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       HEAD MRA:    1.  Normal MRA [place] of [place].      NECK MRA:   1.  Normal neck MRA.        EKG:   Performed at: 9/14/23 at 21:21  Impression: Sinus rhythm with sinus arrhythmia, normal EKG and no other acute findings.  Rate: 76 BPM  Rhythm: Normal sinus  QRS Interval: 92 ms  QTc Interval: 438 ms  Comparison: When compared with ECG of 12/5/18 at 13:39, No significant change was found.   I have independently reviewed and interpreted the EKG(s) documented above.     PROCEDURES:   Procedures       I, Kamila Romero, am serving as a scribe to document services personally performed by Dr. Will Julian  based on my observation and the provider's statements to me. I, Will Julian MD attest that Kamila Romero is acting in a scribe capacity, has observed my performance of the services and has documented them in accordance with my direction.      Will Julian M.D.  Emergency Medicine  [facility] Emergency Department       Will Julian MD  09/15/23 9331

## 2023-09-16 LAB
ATRIAL RATE - MUSE: 76 BPM
DIASTOLIC BLOOD PRESSURE - MUSE: NORMAL MMHG
DSDNA AB SER-ACNC: 0.7 IU/ML
INTERPRETATION ECG - MUSE: NORMAL
P AXIS - MUSE: 42 DEGREES
PR INTERVAL - MUSE: 154 MS
QRS DURATION - MUSE: 92 MS
QT - MUSE: 390 MS
QTC - MUSE: 438 MS
R AXIS - MUSE: 56 DEGREES
SYSTOLIC BLOOD PRESSURE - MUSE: NORMAL MMHG
T AXIS - MUSE: 46 DEGREES
VENTRICULAR RATE- MUSE: 76 BPM

## 2023-09-18 ENCOUNTER — OFFICE VISIT (OUTPATIENT)
Dept: INTERNAL MEDICINE | Facility: CLINIC | Age: 28
End: 2023-09-18
Payer: COMMERCIAL

## 2023-09-18 ENCOUNTER — MYC MEDICAL ADVICE (OUTPATIENT)
Dept: INTERNAL MEDICINE | Facility: CLINIC | Age: 28
End: 2023-09-18

## 2023-09-18 VITALS
RESPIRATION RATE: 16 BRPM | HEART RATE: 100 BPM | DIASTOLIC BLOOD PRESSURE: 82 MMHG | SYSTOLIC BLOOD PRESSURE: 125 MMHG | TEMPERATURE: 98.5 F | OXYGEN SATURATION: 99 %

## 2023-09-18 DIAGNOSIS — R79.89 ABNORMAL TSH: Primary | ICD-10-CM

## 2023-09-18 DIAGNOSIS — B96.89 BV (BACTERIAL VAGINOSIS): Primary | ICD-10-CM

## 2023-09-18 DIAGNOSIS — N76.0 VAGINITIS AND VULVOVAGINITIS: ICD-10-CM

## 2023-09-18 DIAGNOSIS — R68.89 FLU-LIKE SYMPTOMS: ICD-10-CM

## 2023-09-18 DIAGNOSIS — N76.0 BV (BACTERIAL VAGINOSIS): Primary | ICD-10-CM

## 2023-09-18 LAB
CLUE CELLS: PRESENT
CREAT UR-MCNC: 205 MG/DL
MICROALBUMIN UR-MCNC: <12 MG/L
MICROALBUMIN/CREAT UR: NORMAL MG/G{CREAT}
SARS-COV-2 RNA RESP QL NAA+PROBE: NEGATIVE
TRICHOMONAS, WET PREP: ABNORMAL
TSH SERPL DL<=0.005 MIU/L-ACNC: 0.42 UIU/ML (ref 0.3–4.2)
WBC'S/HIGH POWER FIELD, WET PREP: ABNORMAL
YEAST, WET PREP: ABNORMAL

## 2023-09-18 PROCEDURE — 82043 UR ALBUMIN QUANTITATIVE: CPT | Performed by: INTERNAL MEDICINE

## 2023-09-18 PROCEDURE — 87210 SMEAR WET MOUNT SALINE/INK: CPT | Performed by: INTERNAL MEDICINE

## 2023-09-18 PROCEDURE — 99214 OFFICE O/P EST MOD 30 MIN: CPT | Performed by: INTERNAL MEDICINE

## 2023-09-18 PROCEDURE — 87635 SARS-COV-2 COVID-19 AMP PRB: CPT | Performed by: INTERNAL MEDICINE

## 2023-09-18 PROCEDURE — 36415 COLL VENOUS BLD VENIPUNCTURE: CPT | Performed by: INTERNAL MEDICINE

## 2023-09-18 PROCEDURE — 84443 ASSAY THYROID STIM HORMONE: CPT | Performed by: INTERNAL MEDICINE

## 2023-09-18 PROCEDURE — 82570 ASSAY OF URINE CREATININE: CPT | Performed by: INTERNAL MEDICINE

## 2023-09-18 NOTE — PROGRESS NOTES
Assessment & Plan     Abnormal TSH  This was done by an outside lab.  We will recheck  - TSH with free T4 reflex; Future  - Albumin Random Urine Quantitative with Creat Ratio; Future  - Physical Therapy Referral; Future  - TSH with free T4 reflex  - Albumin Random Urine Quantitative with Creat Ratio    Creatinine elevation  Explained her kidney function tests can fluctuate she can check urine albumin BUN and GFR been consistently normal.  - Albumin Random Urine Quantitative with Creat Ratio; Future  - Physical Therapy Referral; Future  - Albumin Random Urine Quantitative with Creat Ratio    Flu-like symptoms    - Symptomatic COVID-19 Virus (Coronavirus) by PCR; Future  - Symptomatic COVID-19 Virus (Coronavirus) by PCR Nose    Vaginitis and vulvovaginitis  Recurrent BV  - Wet prep - Clinic Collect                 Alexandra Rodrigues MD  [facility]    Araceli Del Cid is a 28 year old, presenting for the following health issues:  Very pleasant patient with a history of major depression has multiple some somatic complaints.  With fatigue and muscle point pain none of the tests have been conclusive for any his disease process has seen multiple providers for this.  Including rheumatology and endocrinology.  One of her TSH tests were abnormal and she has questions about that  office visit  and Recheck Medication (Pt is here due to runny nose , fever )      9/18/2023    10:37 AM   Additional Questions   Roomed by jason         9/18/2023    10:37 AM   Patient Reported Additional Medications   Patient reports taking the following new medications no       History of Present Illness       Reason for visit:  Follow up    She eats 0-1 servings of fruits and vegetables daily.She consumes 0 sweetened beverage(s) daily.She exercises with enough effort to increase her heart rate 10 to 19 minutes per day.  She exercises with enough effort to increase her heart rate 3 or less days per week. She  is missing 2 dose(s) of medications per week.  She is not taking prescribed medications regularly due to remembering to take and other.               Review of Systems         Objective    /82 (BP Location: Left arm, Patient Position: Sitting, Cuff Size: Adult Large)   Pulse 100   Temp 98.5  F (36.9  C) (Oral)   Resp 16   LMP 08/23/2023 (Exact Date)   SpO2 99%   There is no height or weight on file to calculate BMI.  Physical Exam   GENERAL: healthy, alert and no distress  NECK: no adenopathy, no asymmetry, masses, or scars and thyroid normal to palpation  RESP: lungs clear to auscultation - no rales, rhonchi or wheezes  CV: regular rate and rhythm, normal S1 S2, no S3 or S4, no murmur, click or rub, no peripheral edema and peripheral pulses strong  MS: no gross musculoskeletal defects noted, no edema

## 2023-09-18 NOTE — TELEPHONE ENCOUNTER
Per chart review,    Response to 09/18 wet prep provider message:     Hi immanuelle , the clue cells are seen in bacterial vaginosis  if you had side effects with the last antibiotic , I can give you a topical vaginal antibiotic instead . Let me know

## 2023-09-19 RX ORDER — METRONIDAZOLE 500 MG/1
500 TABLET ORAL 2 TIMES DAILY
Qty: 14 TABLET | Refills: 0 | Status: SHIPPED | OUTPATIENT
Start: 2023-09-19 | End: 2023-09-26

## 2023-09-19 RX ORDER — METRONIDAZOLE 7.5 MG/G
1 GEL VAGINAL DAILY
Qty: 70 G | Refills: 3 | Status: SHIPPED | OUTPATIENT
Start: 2023-09-19 | End: 2023-11-13

## 2023-09-19 NOTE — TELEPHONE ENCOUNTER
Pt calling to check status and is going to leaving for out of town, so need something sent to pharmacy asap.  Prefers stronger pill than a topical.   Please call once sent

## 2023-09-22 NOTE — TELEPHONE ENCOUNTER
Nurse Triage SBAR    Is this a 2nd Level Triage? YES, LICENSED PRACTITIONER REVIEW IS REQUIRED    Situation: Patient calling with several concerns. Initially called to say her blood pressure is elevated (118/80) and that she has been having right calf pain for the last 15 min. Is also worried about dehydration.    Background: Patient was seen in clinic on 7/18/2022 for a sore throat and vaginal discharge. Patient stated that her blood pressure was elevated at that time as well (122/92).    Assessment: Patient states she walked to the fire department two blocks away to seek help, but no one was there    Right calf hurts - rates pain 4/10 for the last 15 min  No redness, swelling or warmth    B/P- 118/80 - informed patient that this is within normal range, but she said it was high for her.    Headache and neck ache today - rates pain 5/10   Able to touch chin to chest   No lightheadedness or dizziness    Nausea, but no vomiting or diarrhea  Dry mouth  Urinating, but urine is dark  States she feels dehydrated and wants to go to an IV re-hydration clinic.      Protocol Recommended Disposition:   See today or tomorrow (or sooner if calf pain persists)    Recommendation: Advised rest, hydration, Tylenol for headache and neck pain. Patient wants to be seen for IV hydration. Does seem to be anxious.    Routed to provider to advise.    Rochelle Quiroz RN  07/20/22 1:56 PM  [facility] Nurse Advisor    Does the patient meet one of the following criteria for ADS visit consideration? 16+ years old, with an [facility] PCP     TIP  Providers, please consider if this condition is appropriate for management at one of our Acute and Diagnostic Services sites.     If patient is a good candidate, please use dotphrase <dot>triageresponse and select Refer to ADS to document.    Reason for Disposition    Patient wants to be seen    Additional Information    Negative: Looks like a broken bone or dislocated joint (e.g., crooked or  Discharge instructions reviewed with pt, verbalizes understanding. deformed)    Negative: Sounds like a life-threatening emergency to the triager    Negative: Followed a hip injury    Negative: Followed a knee injury    Negative: Followed an ankle or foot injury    Negative: Back pain radiating (shooting) into leg(s)    Negative: Foot pain is the main symptom    Negative: Ankle pain is the main symptom    Negative: Knee pain is the main symptom    Negative: Leg swelling is the main symptom    Negative: Chest pain    Negative: Difficulty breathing    Negative: Entire foot is cool or blue in comparison to other side    Negative: Unable to walk    Negative: Fever and red area (or area very tender to touch)    Negative: Fever and swollen joint    Negative: Thigh or calf pain in only one leg and present > 1 hour    Negative: Thigh, calf, or ankle swelling in only one leg    Negative: Thigh, calf, or ankle swelling in both legs, but one side is definitely more swollen    Negative: History of prior 'blood clot' in leg or lungs (i.e., deep vein thrombosis, pulmonary embolism)    Negative: History of inherited increased risk of blood clots (e.g., factor 5 Leiden, antithrombin 3, protein C or protein S deficiency, prothrombin mutation)    Negative: Major surgery in the past month    Negative: Hip or leg fracture (broken bone) in past month (or had cast on leg or ankle in past month)    Negative: Illness requiring prolonged bedrest in past month (e.g., immobilization, long hospital stay)    Negative: Long-distance travel in past month (e.g., car, bus, train, plane; with trip lasting 6 or more hours)    Negative: Cancer treatment in the past two months (or has cancer now)    Negative: Patient sounds very sick or weak to the triager    Negative: SEVERE pain (e.g., excruciating, unable to do any normal activities)    Negative: Cast on leg or ankle and now has increasing pain    Negative: Red area or streak and large (> 2 in. or 5 cm)    Negative: Painful rash with multiple small blisters grouped  together (i.e., dermatomal distribution or 'band' or 'stripe')    Negative: Looks like a boil, infected sore, deep ulcer, or other infected rash (spreading redness, pus)    Negative: Localized rash is very painful (no fever)    Negative: Localized pain, redness or hard lump along vein    Negative: Numbness in a leg or foot (i.e., loss of sensation)    Protocols used: LEG PAIN-A-OH

## 2023-09-25 ENCOUNTER — TRANSFERRED RECORDS (OUTPATIENT)
Dept: HEALTH INFORMATION MANAGEMENT | Facility: CLINIC | Age: 28
End: 2023-09-25
Payer: COMMERCIAL

## 2023-10-16 ENCOUNTER — ANCILLARY PROCEDURE (OUTPATIENT)
Dept: GENERAL RADIOLOGY | Facility: CLINIC | Age: 28
End: 2023-10-16
Attending: PHYSICIAN ASSISTANT
Payer: COMMERCIAL

## 2023-10-16 ENCOUNTER — VIRTUAL VISIT (OUTPATIENT)
Dept: FAMILY MEDICINE | Facility: CLINIC | Age: 28
End: 2023-10-16
Payer: COMMERCIAL

## 2023-10-16 DIAGNOSIS — R05.1 ACUTE COUGH: ICD-10-CM

## 2023-10-16 DIAGNOSIS — J45.20 MILD INTERMITTENT ASTHMA WITHOUT COMPLICATION: Primary | ICD-10-CM

## 2023-10-16 DIAGNOSIS — G43.709 CHRONIC MIGRAINE WITHOUT AURA WITHOUT STATUS MIGRAINOSUS, NOT INTRACTABLE: ICD-10-CM

## 2023-10-16 PROCEDURE — 71046 X-RAY EXAM CHEST 2 VIEWS: CPT | Mod: TC | Performed by: RADIOLOGY

## 2023-10-16 PROCEDURE — 99214 OFFICE O/P EST MOD 30 MIN: CPT | Mod: VID | Performed by: PHYSICIAN ASSISTANT

## 2023-10-16 RX ORDER — ALBUTEROL SULFATE 1.25 MG/3ML
1.25 SOLUTION RESPIRATORY (INHALATION) EVERY 6 HOURS PRN
Qty: 90 ML | Refills: 0 | Status: SHIPPED | OUTPATIENT
Start: 2023-10-16

## 2023-10-16 RX ORDER — SUMATRIPTAN 50 MG/1
50 TABLET, FILM COATED ORAL
Qty: 9 TABLET | Refills: 1 | Status: SHIPPED | OUTPATIENT
Start: 2023-10-16

## 2023-10-16 RX ORDER — ALBUTEROL SULFATE 90 UG/1
AEROSOL, METERED RESPIRATORY (INHALATION)
Qty: 18 G | Refills: 3 | Status: SHIPPED | OUTPATIENT
Start: 2023-10-16

## 2023-10-16 ASSESSMENT — ENCOUNTER SYMPTOMS: COUGH: 1

## 2023-10-16 ASSESSMENT — ASTHMA QUESTIONNAIRES
ACT_TOTALSCORE: 17
QUESTION_1 LAST FOUR WEEKS HOW MUCH OF THE TIME DID YOUR ASTHMA KEEP YOU FROM GETTING AS MUCH DONE AT WORK, SCHOOL OR AT HOME: A LITTLE OF THE TIME
QUESTION_5 LAST FOUR WEEKS HOW WOULD YOU RATE YOUR ASTHMA CONTROL: WELL CONTROLLED
QUESTION_4 LAST FOUR WEEKS HOW OFTEN HAVE YOU USED YOUR RESCUE INHALER OR NEBULIZER MEDICATION (SUCH AS ALBUTEROL): TWO OR THREE TIMES PER WEEK
ACT_TOTALSCORE: 17
QUESTION_3 LAST FOUR WEEKS HOW OFTEN DID YOUR ASTHMA SYMPTOMS (WHEEZING, COUGHING, SHORTNESS OF BREATH, CHEST TIGHTNESS OR PAIN) WAKE YOU UP AT NIGHT OR EARLIER THAN USUAL IN THE MORNING: ONCE A WEEK
QUESTION_2 LAST FOUR WEEKS HOW OFTEN HAVE YOU HAD SHORTNESS OF BREATH: THREE TO SIX TIMES A WEEK

## 2023-10-16 NOTE — RESULT ENCOUNTER NOTE
"Hello Immanuelle  Your attached chest xray is reassuringly within normal limits.  Please contact the office with any questions or concerns.    Radha Vivas \"Og\" LISBETH Martinez    "

## 2023-10-16 NOTE — PATIENT INSTRUCTIONS
Encouraged mucinex/guafenisin, warm salt water gargles, chloraseptic spray, soothers/lozenges, sinus rinses (neilmed), flonase (2 sprays per nostril daily x 2 weeks), vitamin c, fluids and rest.  May alternate tylenol and NSAIDS (ibuprofen, advil, aleve type products) every 4-6 hours for the next few days as needed.    Return to clinic with any worsening or changes in symptoms.
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
## 2023-10-27 ENCOUNTER — TRANSFERRED RECORDS (OUTPATIENT)
Dept: HEALTH INFORMATION MANAGEMENT | Facility: CLINIC | Age: 28
End: 2023-10-27
Payer: COMMERCIAL
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
## 2023-11-08 ENCOUNTER — TRANSFERRED RECORDS (OUTPATIENT)
Dept: HEALTH INFORMATION MANAGEMENT | Facility: CLINIC | Age: 28
End: 2023-11-08
Payer: COMMERCIAL

## 2023-11-13 ENCOUNTER — VIRTUAL VISIT (OUTPATIENT)
Dept: ENDOCRINOLOGY | Facility: CLINIC | Age: 28
End: 2023-11-13
Payer: COMMERCIAL

## 2023-11-13 VITALS — WEIGHT: 227 LBS | HEIGHT: 66 IN | BODY MASS INDEX: 36.48 KG/M2

## 2023-11-13 DIAGNOSIS — E66.812 CLASS 2 SEVERE OBESITY WITH SERIOUS COMORBIDITY AND BODY MASS INDEX (BMI) OF 36.0 TO 36.9 IN ADULT, UNSPECIFIED OBESITY TYPE (H): Primary | ICD-10-CM

## 2023-11-13 DIAGNOSIS — E66.01 CLASS 2 SEVERE OBESITY WITH SERIOUS COMORBIDITY AND BODY MASS INDEX (BMI) OF 36.0 TO 36.9 IN ADULT, UNSPECIFIED OBESITY TYPE (H): Primary | ICD-10-CM

## 2023-11-13 PROCEDURE — 99203 OFFICE O/P NEW LOW 30 MIN: CPT | Mod: 95

## 2023-11-13 RX ORDER — AMOXICILLIN 500 MG/1
500 CAPSULE ORAL 3 TIMES DAILY
COMMUNITY
Start: 2023-10-17 | End: 2023-12-11

## 2023-11-13 RX ORDER — SEMAGLUTIDE 2.4 MG/.75ML
2.4 INJECTION, SOLUTION SUBCUTANEOUS WEEKLY
Qty: 9 ML | Refills: 3 | Status: SHIPPED | OUTPATIENT
Start: 2023-11-13 | End: 2024-04-15

## 2023-11-13 RX ORDER — LEVOCETIRIZINE DIHYDROCHLORIDE 5 MG/1
5 TABLET, FILM COATED ORAL EVERY 24 HOURS
COMMUNITY

## 2023-11-13 RX ORDER — CLOTRIMAZOLE AND BETAMETHASONE DIPROPIONATE 10; .64 MG/G; MG/G
CREAM TOPICAL 2 TIMES DAILY
COMMUNITY
Start: 2023-10-26 | End: 2024-03-27

## 2023-11-13 ASSESSMENT — PATIENT HEALTH QUESTIONNAIRE - PHQ9: SUM OF ALL RESPONSES TO PHQ QUESTIONS 1-9: 14

## 2023-11-13 ASSESSMENT — PAIN SCALES - GENERAL: PAINLEVEL: NO PAIN (0)

## 2023-11-13 NOTE — PATIENT INSTRUCTIONS
"Paulo Del Cid, it was nice to meet you today!  Thank you for allowing us the privilege of caring for you. We hope we provided you with the excellent service you deserve.   Please let us know if there is anything else we can do for you so that we can be sure you are completely satisfied with your care experience.    To ensure the quality of our services you may be receiving a patient satisfaction survey from an independent patient satisfaction monitoring company.    The greatest compliment you can give is a \"Likely to Recommend\"    Your visit was with LENNOX DUMONT PA-C today.    Instructions per today's visit:     Continue Wegovy 2.4mg once weekly. Refills sent   Consider Zepbound when available   Follow up with Dr. Slade in 3-4 months     ___________________________________________________________________________  Important contact and scheduling information:  Please call our contact center at [phone] to schedule your next appointments.  For any nursing questions or concerns call Susan Hunter LPN at [phone] or Alba Mathew RN at [phone]  Please call during clinic hours Monday through Friday 8:00a - 4:00p if you have questions or you can contact us via [employer] at anytime and we will reply during clinic hours.    Lab results will be communicated through My Chart or letter (if My Chart not used). Please call the clinic if you have not received communication after 1 week or if you have any questions.?  Clinic Fax: [phone]  __________________________________________________________________________    If labs were ordered today:    Please make an appointment to have them drawn at your convenience.     To schedule the Lab Appointment using [employer]:  Select \"Schedule an Appointment\"  Select \"Lab Only\"  For \"A couple of questions\", select \"Other\"  For \"Which locations work for you?, select the location and set up the appointment    To schedule by phone call [phone] to schedule a lab only " appointment at any [facility] lab.  ___________________________________________________________________________  Work with [facility] !  Virtual Sessions are Available through [facility] Weight Management Clinics    To learn more, call to schedule a free, Health  Q&A appointment: [phone]     What is Health Coaching?  Do you know what you are supposed to do, but you just aren't doing it?  Then, HEALTH COACHING may help you!   Get unstuck and move forward with the support of a professionally trained NBC-HWC (National Board-Certified Health and ) who uses evidence-based approaches to help you move forward with healthy lifestyle changes in the areas of weight loss, stress management and overall well-being.    Health Coaches help you identify goals that will work best for you. Health Coaches provide support and encouragement with overcoming barriers and help you to find inspiration and motivation to lead a healthy lifestyle.    Option one:  Health Coaching 3-Pack; Three, 30-minute Health Coaching Visits, for $99  Visits are done virtually (phone or video)  This is a self pay service; we do not accept insurance for savita coaching.    Option two:   The 24 week Plan; 11 Health Coaching Visits, and a 7 months subscription to [employer]-- on-demand fitness, nutrition and mindfulness classes, for $499 (employee discounts may be available). Participants will also meet regularly with a weight management Medical Provider and a Registered/Licensed Dietician.  This is a self-pay service; we do not accept insurance for health coaching.    To Schedule a free Health  Q&A appointment to learn more,  call [phone].  ____________________________________________________________________  [facility]  Healthy Lifestyle Group    Healthy Lifestyle Group  This is a 60 minute virtual coaching group for those who want to lead a healthier lifestyle. Come  "together to set goals and overcome barriers in a supportive group environment. We will address the four pillars of health--nutrition, exercise, sleep and emotional well-being.  This group is highly recommended for those who are participating in the 24 week Healthy Lifestyle Plan and our Health Coaching sessions.    WHEN: This group meets the first Friday of the month, 12:30 PM - 1:30 PM online, via a zoom meeting.      FACILITATOR: Led by National Board Certified Health and , Angela Jiménez [facility].    TO REGISTER: Please call the Call Center at [phone] to register. You will get an appointment to attend in [employer]. Fifteen minutes prior to the meeting, complete the e-check in and you will get the link to join the meeting.  There is no charge to attend this group and space is limited.      2023 and 2024 Meeting Topics and Dates:    November 3: Introduction to Mindfulness (Learn simple and effective mindfulness practices and how it can benefit you)    December 8: Let's Talk (guided discussion on our wins and challenges)    January 5: New Years Vision: Manifest your Best 2024! (Guided imagery,  journaling and discussion)    February 2: Let's Talk    March 1: 10 Percent Happier by Shaun Richter (Book Bites; a guided discussion on the nuggets of wisdom from favorite wellness books; no need to read the book but highly encouraged)    April 5: Let's Talk    May 3: \"Essentialism; The Disciplined Pursuit of Less by Luke Howard (book bites discussion)    June 7: Let's Talk    July 5: NO MEETING, off for the 4th of July Holiday    August 2: The Blue Zones, Secrets for Living a Longer Life by Shaun Ho (book bites discussion)      If you would like bariatric surgery specific support group info please let your care team know.         Thank you,   [facility] Comprehensive Weight Management Team                          "

## 2023-11-13 NOTE — PROGRESS NOTES
Virtual Visit Details    Type of service:  Video Visit   Video Start Time: 9:35AM  Video End Time:9:57 AM    Originating Location (pt. Location): Home    Distant Location (provider location):  Off-site  Platform used for Video Visit: [facility] Weight Management Note     Nehemias Mascorro  MRN:  [id number]  :  1995  JASMEET:  2023    Dear Alexandra Rodrigues MD,    I had the pleasure of seeing your patient Nehemias Mascorro. She is a 28 year old female who I am continuing to see for treatment of obesity related to:         No data to display                Assessment & Plan   Problem List Items Addressed This Visit       Class 2 severe obesity with serious comorbidity and body mass index (BMI) of 36.0 to 36.9 in adult (H) - Primary     Previously seen by Dr. Slade. Currently taking Wegovy 2.4mg once weekly. Has had a hard time consistently taking over the summer - would be off for around 2 weeks due to supplies. Has been taking it consistently for the past 2 months. Continues to find it helpful. However, would like to either add on or try a different AOM.     Reviewed AOMs today. Previously trialed Naltrexone and was not helpful with weight loss. Previously trialed Topiramate and had intolerable side effects of fatigue and mood changes. Phentermine contraindicated due to currently being on a stimulant. Previously was on Metformine, does not believe it was helpful with weight loss. Discussed new approval of Zepbound, but not yet available. She will continue Wegovy 2.4mg today, and consider retrialing Metformin or transitioning to Zepbound in the future once available.          Relevant Medications    Semaglutide-Weight Management (WEGOVY) 2.4 MG/0.75ML pen      Continue Wegovy 2.4mg once weekly. Refills sent   Consider Zepbound when available   Follow up with Dr. Slade in 3-4 months     INTERVAL HISTORY:  Seen by Dr. Slade   Has been on Naltrexone and Topiramate   Currently on Wegovy  "      Anti-obesity medication history    Current:   Wegovy 2.4mg - constipation is well controlled with miralax. Has been helpful hunger. At first was having a hard time getting the injection due to pharmacy availability. Was off of it for around 2 weeks in between doses through the summer. Has been more consistent for the past 2 months.     Past/Failed/contraindicated:   Naltrexone - was not helpful with weight loss   Topiramate - side effects of fatigue, mood changes that was not tolerated   Phentermine - contraindicated due to being on stimulant     Recent diet changes: Eating 3 meals a day, with 1 -2 snacks. Portion sizes have increased. Feels like \"she has fell off the wagon\" due to it being hard to get the Wegovy at times. Has had a decrease in carbs. Some increase hunger between meals.     Recent exercise/activity changes: 2xweek will get activity at work. During the summer was walking more outside.     Recent stressors: Increase stress with finishing school and working. Going to school for psychology.     Recent sleep changes: No concerns       CURRENT WEIGHT:   227 lbs 0 oz    Initial Weight (lbs): 248 lbs  Last Visits Weight: 100.8 kg (222 lb 4.8 oz)  Cumulative weight loss (lbs): 21  Weight Loss Percentage: 8.47%        11/13/2023     9:19 AM   Changes and Difficulties   I have made the following changes to my diet since my last visit: no   With regards to my diet, I am still struggling with: cravings   I have made the following changes to my activity/exercise since my last visit: no   With regards to my activity/exercise, I am still struggling with: motivation         MEDICATIONS:   Current Outpatient Medications   Medication Sig Dispense Refill    albuterol (ACCUNEB) 1.25 MG/3ML neb solution Take 1 vial (1.25 mg) by nebulization every 6 hours as needed for shortness of breath or wheezing 90 mL 0    albuterol (VENTOLIN HFA) 108 (90 Base) MCG/ACT inhaler INHALE 2 PUFFS INTO THE LUNGS FOUR TIMES DAILY 18 g " 3    amoxicillin (AMOXIL) 500 MG capsule Take 500 mg by mouth 3 times daily      ARIPiprazole (ABILIFY) 20 MG tablet Take 20 mg by mouth daily      buPROPion (WELLBUTRIN XL) 300 MG 24 hr tablet Take 300 mg by mouth every morning      clotrimazole-betamethasone (LOTRISONE) 1-0.05 % external cream Apply topically 2 times daily      CONCERTA 36 MG CR tablet TAKE 1 TABLET BY MOUTH DAILY IN THE MORNING FOR ADHD. START 11/18/22      drospirenone-ethinyl estradiol (JESSY) 3-0.02 MG tablet Take 1 tablet by mouth daily      DULoxetine (CYMBALTA) 20 MG capsule Take 60 mg by mouth daily      EMGALITY 120 MG/ML injection Inject 120 mg Subcutaneous every 28 days      famotidine (PEPCID) 20 MG tablet Take 1 tablet (20 mg) by mouth 2 times daily 180 tablet 1    fluticasone (FLOVENT DISKUS) 100 MCG/ACT inhaler Inhale 2 puffs into the lungs every 12 hours 28 each 1    gabapentin (NEURONTIN) 100 MG capsule Take 200 mg by mouth At Bedtime      hyoscyamine (LEVSIN) 0.125 MG tablet Take 1 tablet (125 mcg) by mouth every 6 hours as needed for cramping 30 tablet 0    LANsoprazole (PREVACID) 15 MG DR capsule Take 1 capsule (15 mg) by mouth daily 90 capsule 3    methocarbamol (ROBAXIN) 500 MG tablet Take 1 tablet (500 mg) by mouth 4 times daily as needed for muscle spasms 20 tablet 0    omeprazole (PRILOSEC) 20 MG DR capsule Take 20 mg by mouth daily      polyethylene glycol (MIRALAX) 17 GM/Dose powder Take 1 capful by mouth daily as needed for constipation      Semaglutide-Weight Management (WEGOVY) 2.4 MG/0.75ML pen Inject 2.4 mg Subcutaneous once a week 9 mL 3    SUMAtriptan (IMITREX) 50 MG tablet Take 1 tablet (50 mg) by mouth at onset of headache for migraine May repeat in 2 hours. Max 4 tablets/24 hours. 9 tablet 1    TAZORAC 0.1 % external cream APPLY TOPICALLY TO THE AFFECTED AREA AT BEDTIME      ketoconazole (NIZORAL) 2 % external cream       levocetirizine (XYZAL) 5 MG tablet Take 5 mg by mouth every 24 hours      mometasone  "(ASMANEX TWISTHALER) 220 MCG/ACT inhaler Inhale 2 puffs into the lungs every evening (Patient not taking: Reported on 11/13/2023) 1 each 3           11/13/2023     9:19 AM   Weight Loss Medication History Reviewed With Patient   Which weight loss medications are you currently taking on a regular basis? Wegovy   Are you having any side effects from the weight loss medication that we have prescribed you? No     Anti-obesity medication ROS:    HEENT  Hx of glaucoma: No    Cardiovascular  CAD:No  HTN:No    Gastrointestinal  GERD:Yes  Constipation:Yes  Liver Dz:No  H/O Pancreatitis:No    Psychiatric  Bipolar: No  Anxiety:Yes  Depression:Yes  History of alcohol/drug abuse: No  Hx of eating disorder:No    Endocrine  Personal or family hx of MTC or MEN2:No  Diabetes/prediabetes: Yes    Neurologic:  Hx of seizures: No  Hx of migraines: Yes  Memory Impairment: No      History of kidney stones: No  Kidney disease: No  Current birth control: Yes    Taking Opioid/Narcotic: No        Objective    Ht 1.676 m (5' 5.98\")   Wt 103 kg (227 lb)   LMP 09/20/2023 (Exact Date)   BMI 36.66 kg/m      Vitals - Patient Reported  Pain Score: No Pain (0)    PHYSICAL EXAM:    GENERAL: Healthy, alert and no distress  EYES: Eyes grossly normal to inspection.  No discharge or erythema, or obvious scleral/conjunctival abnormalities.  RESP: No audible wheeze, cough, or visible cyanosis.  No visible retractions or increased work of breathing.    SKIN: Visible skin clear. No significant rash, abnormal pigmentation or lesions.  NEURO: Cranial nerves grossly intact.  Mentation and speech appropriate for age.  PSYCH: Mentation appears normal, affect normal/bright, judgement and insight intact, normal speech and appearance well-groomed.        Sincerely,    LENNOX DUMONT PA-C      35 minutes spent by me on the date of the encounter doing chart review, history and exam, documentation and further activities per the note        Depression Screening " Follow-up        11/13/2023     9:16 AM   PHQ   PHQ-9 Total Score 14   Q9: Thoughts of better off dead/self-harm past 2 weeks Not at all       Does the patient currently have a mental health provider?  Yes, patient was referred back to current mental health provider.    LENNOX DUMONT PA-C

## 2023-11-13 NOTE — NURSING NOTE
Is the patient currently in the state of [place]? YES    Visit mode:VIDEO    If the visit is dropped, the patient can be reconnected by: VIDEO VISIT: Send to e-mail at: [email or web address]    Will anyone else be joining the visit? NO  (If patient encounters technical issues they should call [phone]    How would you like to obtain your AVS? MyChart    Are changes needed to the allergy or medication list? No    Reason for visit: Follow Up    Desiree MADERA

## 2023-11-13 NOTE — ASSESSMENT & PLAN NOTE
Previously seen by Dr. Slade. Currently taking Wegovy 2.4mg once weekly. Has had a hard time consistently taking over the summer - would be off for around 2 weeks due to supplies. Has been taking it consistently for the past 2 months. Continues to find it helpful. However, would like to either add on or try a different AOM.     Reviewed AOMs today. Previously trialed Naltrexone and was not helpful with weight loss. Previously trialed Topiramate and had intolerable side effects of fatigue and mood changes. Phentermine contraindicated due to currently being on a stimulant. Previously was on Metformine, does not believe it was helpful with weight loss. Discussed new approval of Zepbound, but not yet available. She will continue Wegovy 2.4mg today, and consider retrialing Metformin or transitioning to Zepbound in the future once available.

## 2023-11-13 NOTE — LETTER
2023       RE: Nehemias Mascorro  [address]  [place] [address]     Dear Colleague,    Thank you for referring your patient, Nehemias Mascorro, to the [facility]NA[facility] [place] [facility]. Please see a copy of my visit note below.    Virtual Visit Details    Type of service:  Video Visit   Video Start Time: 9:35AM  Video End Time:9:57 AM    Originating Location (pt. Location): Home    Distant Location (provider location):  Off-site  Platform used for Video Visit: [facility] Medical Weight Management Note     Nehemias Mascorro  MRN:  [id number]  :  1995  JASMEET:  2023    Dear Alexandra Rodrigues MD,    I had the pleasure of seeing your patient Nehemias Mascorro. She is a 28 year old female who I am continuing to see for treatment of obesity related to:         No data to display                Assessment & Plan  Problem List Items Addressed This Visit       Class 2 severe obesity with serious comorbidity and body mass index (BMI) of 36.0 to 36.9 in adult (H) - Primary     Previously seen by Dr. Slade. Currently taking Wegovy 2.4mg once weekly. Has had a hard time consistently taking over the summer - would be off for around 2 weeks due to supplies. Has been taking it consistently for the past 2 months. Continues to find it helpful. However, would like to either add on or try a different AOM.     Reviewed AOMs today. Previously trialed Naltrexone and was not helpful with weight loss. Previously trialed Topiramate and had intolerable side effects of fatigue and mood changes. Phentermine contraindicated due to currently being on a stimulant. Previously was on Metformine, does not believe it was helpful with weight loss. Discussed new approval of Zepbound, but not yet available. She will continue Wegovy 2.4mg today, and consider retrialing Metformin or transitioning to Zepbound in the future once  "available.          Relevant Medications    Semaglutide-Weight Management (WEGOVY) 2.4 MG/0.75ML pen      Continue Wegovy 2.4mg once weekly. Refills sent   Consider Zepbound when available   Follow up with Dr. Slade in 3-4 months     INTERVAL HISTORY:  Seen by Dr. Slade   Has been on Naltrexone and Topiramate   Currently on Wegovy       Anti-obesity medication history    Current:   Wegovy 2.4mg - constipation is well controlled with miralax. Has been helpful hunger. At first was having a hard time getting the injection due to pharmacy availability. Was off of it for around 2 weeks in between doses through the summer. Has been more consistent for the past 2 months.     Past/Failed/contraindicated:   Naltrexone - was not helpful with weight loss   Topiramate - side effects of fatigue, mood changes that was not tolerated   Phentermine - contraindicated due to being on stimulant     Recent diet changes: Eating 3 meals a day, with 1 -2 snacks. Portion sizes have increased. Feels like \"she has fell off the wagon\" due to it being hard to get the Wegovy at times. Has had a decrease in carbs. Some increase hunger between meals.     Recent exercise/activity changes: 2xweek will get activity at work. During the summer was walking more outside.     Recent stressors: Increase stress with finishing school and working. Going to school for psychology.     Recent sleep changes: No concerns       CURRENT WEIGHT:   227 lbs 0 oz    Initial Weight (lbs): 248 lbs  Last Visits Weight: 100.8 kg (222 lb 4.8 oz)  Cumulative weight loss (lbs): 21  Weight Loss Percentage: 8.47%        11/13/2023     9:19 AM   Changes and Difficulties   I have made the following changes to my diet since my last visit: no   With regards to my diet, I am still struggling with: cravings   I have made the following changes to my activity/exercise since my last visit: no   With regards to my activity/exercise, I am still struggling with: motivation "         MEDICATIONS:   Current Outpatient Medications   Medication Sig Dispense Refill    albuterol (ACCUNEB) 1.25 MG/3ML neb solution Take 1 vial (1.25 mg) by nebulization every 6 hours as needed for shortness of breath or wheezing 90 mL 0    albuterol (VENTOLIN HFA) 108 (90 Base) MCG/ACT inhaler INHALE 2 PUFFS INTO THE LUNGS FOUR TIMES DAILY 18 g 3    amoxicillin (AMOXIL) 500 MG capsule Take 500 mg by mouth 3 times daily      ARIPiprazole (ABILIFY) 20 MG tablet Take 20 mg by mouth daily      buPROPion (WELLBUTRIN XL) 300 MG 24 hr tablet Take 300 mg by mouth every morning      clotrimazole-betamethasone (LOTRISONE) 1-0.05 % external cream Apply topically 2 times daily      CONCERTA 36 MG CR tablet TAKE 1 TABLET BY MOUTH DAILY IN THE MORNING FOR ADHD. START 11/18/22      drospirenone-ethinyl estradiol (JESSY) 3-0.02 MG tablet Take 1 tablet by mouth daily      DULoxetine (CYMBALTA) 20 MG capsule Take 60 mg by mouth daily      EMGALITY 120 MG/ML injection Inject 120 mg Subcutaneous every 28 days      famotidine (PEPCID) 20 MG tablet Take 1 tablet (20 mg) by mouth 2 times daily 180 tablet 1    fluticasone (FLOVENT DISKUS) 100 MCG/ACT inhaler Inhale 2 puffs into the lungs every 12 hours 28 each 1    gabapentin (NEURONTIN) 100 MG capsule Take 200 mg by mouth At Bedtime      hyoscyamine (LEVSIN) 0.125 MG tablet Take 1 tablet (125 mcg) by mouth every 6 hours as needed for cramping 30 tablet 0    LANsoprazole (PREVACID) 15 MG DR capsule Take 1 capsule (15 mg) by mouth daily 90 capsule 3    methocarbamol (ROBAXIN) 500 MG tablet Take 1 tablet (500 mg) by mouth 4 times daily as needed for muscle spasms 20 tablet 0    omeprazole (PRILOSEC) 20 MG DR capsule Take 20 mg by mouth daily      polyethylene glycol (MIRALAX) 17 GM/Dose powder Take 1 capful by mouth daily as needed for constipation      Semaglutide-Weight Management (WEGOVY) 2.4 MG/0.75ML pen Inject 2.4 mg Subcutaneous once a week 9 mL 3    SUMAtriptan (IMITREX) 50 MG  "tablet Take 1 tablet (50 mg) by mouth at onset of headache for migraine May repeat in 2 hours. Max 4 tablets/24 hours. 9 tablet 1    TAZORAC 0.1 % external cream APPLY TOPICALLY TO THE AFFECTED AREA AT BEDTIME      ketoconazole (NIZORAL) 2 % external cream       levocetirizine (XYZAL) 5 MG tablet Take 5 mg by mouth every 24 hours      mometasone (ASMANEX TWISTHALER) 220 MCG/ACT inhaler Inhale 2 puffs into the lungs every evening (Patient not taking: Reported on 11/13/2023) 1 each 3           11/13/2023     9:19 AM   Weight Loss Medication History Reviewed With Patient   Which weight loss medications are you currently taking on a regular basis? Wegovy   Are you having any side effects from the weight loss medication that we have prescribed you? No     Anti-obesity medication ROS:    HEENT  Hx of glaucoma: No    Cardiovascular  CAD:No  HTN:No    Gastrointestinal  GERD:Yes  Constipation:Yes  Liver Dz:No  H/O Pancreatitis:No    Psychiatric  Bipolar: No  Anxiety:Yes  Depression:Yes  History of alcohol/drug abuse: No  Hx of eating disorder:No    Endocrine  Personal or family hx of MTC or MEN2:No  Diabetes/prediabetes: Yes    Neurologic:  Hx of seizures: No  Hx of migraines: Yes  Memory Impairment: No      History of kidney stones: No  Kidney disease: No  Current birth control: Yes    Taking Opioid/Narcotic: No        Objective   Ht 1.676 m (5' 5.98\")   Wt 103 kg (227 lb)   LMP 09/20/2023 (Exact Date)   BMI 36.66 kg/m      Vitals - Patient Reported  Pain Score: No Pain (0)    PHYSICAL EXAM:    GENERAL: Healthy, alert and no distress  EYES: Eyes grossly normal to inspection.  No discharge or erythema, or obvious scleral/conjunctival abnormalities.  RESP: No audible wheeze, cough, or visible cyanosis.  No visible retractions or increased work of breathing.    SKIN: Visible skin clear. No significant rash, abnormal pigmentation or lesions.  NEURO: Cranial nerves grossly intact.  Mentation and speech appropriate for " age.  PSYCH: Mentation appears normal, affect normal/bright, judgement and insight intact, normal speech and appearance well-groomed.        Sincerely,    LENNOX DUMONT PA-C      35 minutes spent by me on the date of the encounter doing chart review, history and exam, documentation and further activities per the note        Depression Screening Follow-up        11/13/2023     9:16 AM   PHQ   PHQ-9 Total Score 14   Q9: Thoughts of better off dead/self-harm past 2 weeks Not at all       Does the patient currently have a mental health provider?  Yes, patient was referred back to current mental health provider.

## 2023-11-13 NOTE — PROGRESS NOTES
"Virtual Visit Details    Type of service:  Video Visit   Video Start Time: {video visit start/end time for provider to select:895742}  Video End Time:{video visit start/end time for provider to select:026151}    Originating Location (pt. Location): {video visit patient location:862814::\"Home\"}  {PROVIDER LOCATION On-site should be selected for visits conducted from your clinic location or adjoining [facility] hospital, academic office, or other nearby [facility] building. Off-site should be selected for all other provider locations, including home:750547}  Distant Location (provider location):  {virtual location provider:631043}  Platform used for Video Visit: {Virtual Visit Platforms:516261::\"R[employer] J[employer]\"}    "

## 2023-11-27 ENCOUNTER — TRANSFERRED RECORDS (OUTPATIENT)
Dept: HEALTH INFORMATION MANAGEMENT | Facility: CLINIC | Age: 28
End: 2023-11-27
Payer: COMMERCIAL

## 2023-12-06 ENCOUNTER — OFFICE VISIT (OUTPATIENT)
Dept: FAMILY MEDICINE | Facility: CLINIC | Age: 28
End: 2023-12-06
Payer: COMMERCIAL

## 2023-12-06 VITALS
RESPIRATION RATE: 13 BRPM | OXYGEN SATURATION: 100 % | SYSTOLIC BLOOD PRESSURE: 125 MMHG | HEIGHT: 66 IN | DIASTOLIC BLOOD PRESSURE: 84 MMHG | BODY MASS INDEX: 35.44 KG/M2 | WEIGHT: 220.5 LBS | TEMPERATURE: 97 F | HEART RATE: 104 BPM

## 2023-12-06 DIAGNOSIS — J01.01 ACUTE RECURRENT MAXILLARY SINUSITIS: Primary | ICD-10-CM

## 2023-12-06 PROCEDURE — 99213 OFFICE O/P EST LOW 20 MIN: CPT

## 2023-12-06 RX ORDER — DOXYCYCLINE HYCLATE 100 MG
100 TABLET ORAL 2 TIMES DAILY
Qty: 14 TABLET | Refills: 0 | Status: SHIPPED | OUTPATIENT
Start: 2023-12-06 | End: 2023-12-13

## 2023-12-06 RX ORDER — BUPROPION HYDROCHLORIDE 150 MG/1
TABLET ORAL
COMMUNITY

## 2023-12-06 ASSESSMENT — PAIN SCALES - GENERAL: PAINLEVEL: MODERATE PAIN (4)

## 2023-12-06 ASSESSMENT — PATIENT HEALTH QUESTIONNAIRE - PHQ9
SUM OF ALL RESPONSES TO PHQ QUESTIONS 1-9: 12
SUM OF ALL RESPONSES TO PHQ QUESTIONS 1-9: 12
10. IF YOU CHECKED OFF ANY PROBLEMS, HOW DIFFICULT HAVE THESE PROBLEMS MADE IT FOR YOU TO DO YOUR WORK, TAKE CARE OF THINGS AT HOME, OR GET ALONG WITH OTHER PEOPLE: VERY DIFFICULT

## 2023-12-06 ASSESSMENT — ASTHMA QUESTIONNAIRES: ACT_TOTALSCORE: 22

## 2023-12-06 ASSESSMENT — ENCOUNTER SYMPTOMS: SHORTNESS OF BREATH: 1

## 2023-12-06 NOTE — COMMUNITY RESOURCES LIST (ENGLISH)
12/06/2023   [facility]  N/A  For questions about this resource list or additional care needs, please contact your primary care clinic or care manager.  Phone: [phone]   Email: N/A   Address: [address] [place] [address]   Hours: N/A        Transportation       Free or low-cost transportation  1  Small [employer] Distance: 3.27 miles      In-Person   [address] [place] [address]  Language: English, Anguillan  Hours: Mon 9:00 AM - 5:00 PM , Tue 9:30 AM - 7:00 PM , Wed 9:00 AM - 5:00 PM , Thu 9:30 AM - 7:00 PM , Fri 9:00 AM - 5:00 PM  Fees: Free   Phone: (887) 465-5754 Email: [email or web address]@[employer] Website: http://www.[employer]     2  [employer] [facility] Circulator Bus Distance: 4.45 miles      In-Person   [address] [place] [address]  Language: English  Hours: Tue 9:00 AM - 2:00 PM  Fees: Self Pay   Phone: (205) [phone] Email: info@[employer] Website: http://www.[employer].org/     Transportation to medical appointments  3  [facility] - Non-Emergency Medical Transportation Distance: 1.41 miles      In-Person   [address] [place] [address]  Language: English  Hours: Mon - Fri 8:00 AM - 4:00 PM Appt. Only  Fees: Self Pay   Phone: (812) 260-5592 Website: http://www.[facility].org/Medical-Services/Emergency-medical-services/Non-emergency-transportation/     4  [facility] Transportation Programs - Non-Emergency Medical Transportation Distance: 4.36 miles      In-Person   [address] [facility] [address] [place] [address]  Language: English, Pakistani, Anguillan  Hours: Mon - Fri 7:00 AM - 6:00 PM  Fees: Insurance   Phone: ([phone] Ext.[phone] Email: [email or web address] Website: http://[email or web address]/[email or web address]/          Important Numbers & Websites       Emergency Services   911  City Services   311  Poison Control   (465) 639-7418  Suicide Prevention Lifeline   (670) 875-3846 (TALK)  Child Abuse  Hotline   (717) 778-8362 (4-A-Child)  Sexual Assault Hotline   (141) 969-2361 (HOPE)  National Runaway Safeline   (813) 579-9651 (RUNAWAY)  All-Options Talkline   (128) 208-7197  Substance Abuse Referral   (513) 479-9677 (HELP)

## 2023-12-06 NOTE — COMMUNITY RESOURCES LIST (ENGLISH)
12/06/2023   [facility]  N/A  For questions about this resource list or additional care needs, please contact your primary care clinic or care manager.  Phone: [phone]   Email: N/A   Address: [address] [place] [address]   Hours: N/A        Transportation       Free or low-cost transportation  1  [employer] Distance: 3.27 miles      In-Person   [address] [place] [address]  Language: English, Burkinan  Hours: Mon 9:00 AM - 5:00 PM , Tue 9:30 AM - 7:00 PM , Wed 9:00 AM - 5:00 PM , Thu 9:30 AM - 7:00 PM , Fri 9:00 AM - 5:00 PM  Fees: Free   Phone: (353) 478-4744 Email: contactus@[employer] Website: http://www.[employer]     2  [employer] [facility] Circulator Bus Distance: 4.45 miles      In-Person   [address] [place] [address]  Language: English  Hours: Tue 9:00 AM - 2:00 PM  Fees: Self Pay   Phone: (810) [phone] Email: info@[employer] Website: http://www.[employer].org/     Transportation to medical appointments  3  [facility] - Non-Emergency Medical Transportation Distance: 1.41 miles      In-Person   [address] [place] [address]  Language: English  Hours: Mon - Fri 8:00 AM - 4:00 PM Appt. Only  Fees: Self Pay   Phone: (120) 156-2443 Website: http://www.[facility].org/Medical-Services/Emergency-medical-services/Non-emergency-transportation/     4  [facility] Transportation Programs - Non-Emergency Medical Transportation Distance: 4.36 miles      In-Person   [address] [facility] [address] [place] [address]  Language: English, Palestinian, Burkinan  Hours: Mon - Fri 7:00 AM - 6:00 PM  Fees: Insurance   Phone: (767) [phone] Ext.[phone] Email: [email or web address] Website: http://[email or web address].net/[email or web address]/          Important Numbers & Websites       Emergency Services   911  City Services   311  Poison Control   (914) 479-2042  Suicide Prevention Lifeline   (626) 128-9771 (TALK)  Child Abuse  Hotline   (234) 506-1473 (4-A-Child)  Sexual Assault Hotline   (641) 729-7426 (HOPE)  National Runaway Safeline   (204) 261-6957 (RUNAWAY)  All-Options Talkline   (922) 999-7441  Substance Abuse Referral   (573) 945-3305 (HELP)

## 2023-12-06 NOTE — PROGRESS NOTES
Assessment & Plan     Acute recurrent maxillary sinusitis  - doxycycline hyclate (VIBRA-TABS) 100 MG tablet; Take 1 tablet (100 mg) by mouth 2 times daily for 7 days  - CT Sinus w/o Contrast; Future  Will treat for resistant sinus infection. Advised patient to also try Sudafed and continue sinus rinses.  Seems unlikely to be asthma or pnejumonia given absence of wheezing or crackles on exam.   Recommend CT sinus if symptoms are not improving with ENT follow-up    Iván Razo NP  [facility]id is a 28 year old, presenting for the following health issues:  SINUS INFECTION, Shortness of Breath, and Back Pain  Sinus infection with 2 rounds of antibiotics, shortness of breath, back pain. Frontal maxillary pain and drainage, and coughing up thick mucous and sometimes sticky, frontal headache. Sinus symptoms have been occurring for about 1 month. Mucous is white color, and sometimes greenish/yellow. No sore throat. Losing voice symptoms.  No fever.    Had taken Augmentin for about 10 days, and amoxicillin prior to that.  Shortness of breath: winded and trouble fully exhaling.        12/6/2023     9:49 AM   Additional Questions   Roomed by Sumanth Escamilla       Shortness of Breath    History of Present Illness       Reason for visit:  Breathing issues. Lingering sinus infection.  Symptom onset:  1-3 days ago  Symptom intensity:  Moderate  Symptom progression:  Staying the same  Had these symptoms before:  No  What makes it worse:  Movement    She eats 0-1 servings of fruits and vegetables daily.She consumes 0 sweetened beverage(s) daily.She exercises with enough effort to increase her heart rate 9 or less minutes per day.  She exercises with enough effort to increase her heart rate 3 or less days per week. She is missing 1 dose(s) of medications per week.  She is not taking prescribed medications regularly due to other.       Review of Systems   Respiratory:  Positive for  "shortness of breath.       Constitutional, HEENT, cardiovascular, pulmonary, gi and gu systems are negative, except as otherwise noted.      Objective    /84 (BP Location: Right arm, Patient Position: Sitting, Cuff Size: Adult Regular)   Pulse 104   Temp 97  F (36.1  C) (Temporal)   Resp 13   Ht 1.667 m (5' 5.63\")   Wt 100 kg (220 lb 8 oz)   LMP 11/16/2023 (Exact Date)   SpO2 100%   BMI 35.99 kg/m    Body mass index is 35.99 kg/m .  Physical Exam   GENERAL: healthy, alert and no distress  EYES: Eyes grossly normal to inspection, PERRL and conjunctivae and sclerae normal  HENT: ear canals and TM's normal, nose and mouth without ulcers or lesions. Maxillary sinus tenderness to palpation;  NECK: Posterior and anterior cervical tenderness without enlargement. no adenopathy, no asymmetry, masses, or scars and thyroid normal to palpation  RESP: lungs clear to auscultation - no rales, rhonchi or wheezes  PSYCH: mentation appears normal, affect normal/bright            "

## 2023-12-10 NOTE — PROGRESS NOTES
Assessment & Plan     Acute sinusitis with symptoms > 10 days  Shortness of breath  Nasal congestion  Viral illness  Sinus symptoms starting about a month ago, but cough, runny nose, sensation of shortness of breath (more so through her nose), intermittent nausea, fatigue started about a week ago. She was started on doxycycline 100mg twice daily for 7 days on 12/6 and will continue that until completed.   Discussed most recent symptoms sound likely viral. If worsening symptoms, recommend follow up. Lungs are clear on exam today.   She has not started the flonase recommended on 11/8 by ENT. Rx sent to the pharmacy   - fluticasone (FLONASE) 50 MCG/ACT nasal spray; Spray 1 spray into both nostrils daily  She continues nasal saline rinses  She has follow up scheduled with ENT early next month.     Patient Instructions   Work to hydrate well. Aim for about 64 ounces per day.   Start flonase 2 sprays each nostril daily. Continue the salt water rinses (do the rinse about 15-20 minutes before the flonase)   Finish the doxycycline.   I recommend going to the urgent care in [place] if your symptoms are getting wors.   You can also use afrin nasal spray once or twice daily for up to 3 days.         36 minutes spent on the date of the encounter doing chart review, history and exam, documentation and further activities per the note   Jayne Nelson MD   [facility]    Araceli Del Cid is a 28 year old, presenting for the following health issues:  Sinus Problem and Shortness of Breath       HPI      Acute Illness  Acute illness concerns: Sinus/Shortness of breath   Onset/Duration: Been 1-3 weeks   Symptoms:  Fever: No (99.1)  Chills/Sweats: YES- Sweating   Headache (location?): YES  Sinus Pressure: YES  Conjunctivitis:  YES  Ear Pain: YES: left  Rhinorrhea: No  Congestion: No  Sore Throat: No  Cough: YES-productive of clear sputum, productive of yellow sputum  Wheeze: No  Decreased Appetite:  YES  Nausea: YES  Vomiting: YES  Diarrhea: No  Dysuria/Freq.: No  Dysuria or Hematuria: No  Fatigue/Achiness: YES  Sick/Strep Exposure: No  Therapies tried and outcome: Over the counter medications        Today she says she is going out of town next week and does not want to get there and be horribly sick. Having an issue where she cannot get a full exhale it feels like. Hard to breath through her nose more than her mouth. Also has headache, nausea and fatigue. She had one episode of vomiting Friday, but has not had any vomiting since and feels better in that respect. Has struggled with headaches for awhile, since her concussion in 2016.     She did a home COVID test a couple of days ago and it was negative.   Feels like she has a cold. Sometimes when she turns her head it feels like the world is spinning. Has dry cough, runny nose  for a couple of weeks.     The sensation that she cannot exhale fully has gone on for about a week. She tried her nebulizer and that did not help. Her psychiatrist gave her two lorazepam to see if it was anxiety and that id not help. Her albuterol inhaler does not seem to help for the sensation either. Does not know if she is wheezy, does not know what it sounds like. She is still taking the doxycycline that was given to her on 12/6.     Notes she has taken prednisone in the past. Thinks the last time was a few months ago. Was given it for joint pain, she reports.     She was seen on 11/8/23 by ENT for 6 weks of sore throat. She had noticed frequent throat clearning and productive cough with clear secretions at that time. Noted she has asthma, but negative allergy eval. Exam revealed moderate nasal edema and 3+ turbinate hypertrophy, nasopharyngeal drainage, moderate posterior glottic edema. She was diagnosed with acute sinus infection and esophageal reflux. She was told to take augmentin and saline irrigations and flonase daily for a month and then stop. If symptoms persist, she was  "told to follow up with ENT in two months. She was also prescribed omeprazole 20mg roldan.     She was then seen in clinic for recurrent maxillary sinusitis and was started on doxycycline 100mg twice daily for 7 days on 12/6/23. Sudafed and sinus rinses were recommended.  She had taken the augmentin prior to that.     Review of Systems        Objective    /81 (BP Location: Right arm, Patient Position: Sitting, Cuff Size: Adult Large)   Pulse 96   Temp 99.1  F (37.3  C) (Oral)   Resp 16   Ht 1.666 m (5' 5.6\")   Wt 101.2 kg (223 lb 3.2 oz)   LMP 11/16/2023 (Exact Date)   SpO2 100%   Breastfeeding No   BMI 36.47 kg/m    Body mass index is 36.47 kg/m .  Physical Exam   Exam:  GENERAL APPEARANCE:  alert and no acute distress  EYES:anicteric, no conjunctival redness  HENT: ear canals and TM's normal and nose and mouth without ulcers or lesions; OP mildly erythematous without swelling or exudates  NECK: supple, nontender  RESP: lungs clear to auscultation - no rales, rhonchi or wheezes  CV: regular rates and rhythm, normal S1 S2, no S3 or S4 and no murmur, click or rub -                     Answers submitted by the patient for this visit:  Patient Health Questionnaire (Submitted on 12/11/2023)  If you checked off any problems, how difficult have these problems made it for you to do your work, take care of things at home, or get along with other people?: Very difficult  PHQ9 TOTAL SCORE: 14    "

## 2023-12-11 ENCOUNTER — OFFICE VISIT (OUTPATIENT)
Dept: FAMILY MEDICINE | Facility: CLINIC | Age: 28
End: 2023-12-11
Payer: COMMERCIAL

## 2023-12-11 VITALS
TEMPERATURE: 99.1 F | OXYGEN SATURATION: 100 % | HEART RATE: 96 BPM | BODY MASS INDEX: 35.87 KG/M2 | RESPIRATION RATE: 16 BRPM | HEIGHT: 66 IN | SYSTOLIC BLOOD PRESSURE: 129 MMHG | DIASTOLIC BLOOD PRESSURE: 81 MMHG | WEIGHT: 223.2 LBS

## 2023-12-11 DIAGNOSIS — J01.90 ACUTE SINUSITIS WITH SYMPTOMS > 10 DAYS: Primary | ICD-10-CM

## 2023-12-11 DIAGNOSIS — R09.81 NASAL CONGESTION: ICD-10-CM

## 2023-12-11 DIAGNOSIS — B34.9 VIRAL ILLNESS: ICD-10-CM

## 2023-12-11 DIAGNOSIS — R06.02 SHORTNESS OF BREATH: ICD-10-CM

## 2023-12-11 PROCEDURE — 99214 OFFICE O/P EST MOD 30 MIN: CPT | Performed by: FAMILY MEDICINE

## 2023-12-11 PROCEDURE — 90480 ADMN SARSCOV2 VAC 1/ONLY CMP: CPT | Performed by: FAMILY MEDICINE

## 2023-12-11 PROCEDURE — 91320 SARSCV2 VAC 30MCG TRS-SUC IM: CPT | Performed by: FAMILY MEDICINE

## 2023-12-11 RX ORDER — FLUTICASONE PROPIONATE 50 MCG
1 SPRAY, SUSPENSION (ML) NASAL DAILY
Qty: 16 G | Refills: 1 | Status: SHIPPED | OUTPATIENT
Start: 2023-12-11 | End: 2024-07-09

## 2023-12-11 ASSESSMENT — PATIENT HEALTH QUESTIONNAIRE - PHQ9
10. IF YOU CHECKED OFF ANY PROBLEMS, HOW DIFFICULT HAVE THESE PROBLEMS MADE IT FOR YOU TO DO YOUR WORK, TAKE CARE OF THINGS AT HOME, OR GET ALONG WITH OTHER PEOPLE: VERY DIFFICULT
SUM OF ALL RESPONSES TO PHQ QUESTIONS 1-9: 14
SUM OF ALL RESPONSES TO PHQ QUESTIONS 1-9: 14

## 2023-12-11 ASSESSMENT — PAIN SCALES - GENERAL: PAINLEVEL: SEVERE PAIN (6)

## 2023-12-11 NOTE — PATIENT INSTRUCTIONS
Work to hydrate well. Aim for about 64 ounces per day.   Start flonase 2 sprays each nostril daily. Continue the salt water rinses (do the rinse about 15-20 minutes before the flonase)   Finish the doxycycline.   I recommend going to the urgent care in [place] if your symptoms are getting wors.   You can also use afrin nasal spray once or twice daily for up to 3 days.

## 2023-12-14 ENCOUNTER — E-VISIT (OUTPATIENT)
Dept: FAMILY MEDICINE | Facility: CLINIC | Age: 28
End: 2023-12-14
Payer: COMMERCIAL

## 2023-12-14 DIAGNOSIS — N89.8 VAGINAL DISCHARGE: Primary | ICD-10-CM

## 2023-12-14 PROCEDURE — 99421 OL DIG E/M SVC 5-10 MIN: CPT | Performed by: PHYSICIAN ASSISTANT

## 2023-12-15 NOTE — PATIENT INSTRUCTIONS
Thank you for choosing us for your care. Given your symptoms, I would like you to do a lab-only visit to determine what is causing them.  I have placed the orders.  Please schedule an appointment with the lab right here in [facility], or call [phone].  I will let you know when the results are back and next steps to take.

## 2023-12-18 ENCOUNTER — LAB (OUTPATIENT)
Dept: LAB | Facility: CLINIC | Age: 28
End: 2023-12-18
Payer: COMMERCIAL

## 2023-12-18 DIAGNOSIS — N89.8 VAGINAL DISCHARGE: ICD-10-CM

## 2023-12-18 PROCEDURE — 87210 SMEAR WET MOUNT SALINE/INK: CPT

## 2024-01-04 ENCOUNTER — TELEPHONE (OUTPATIENT)
Dept: INTERNAL MEDICINE | Facility: CLINIC | Age: 29
End: 2024-01-04
Payer: COMMERCIAL

## 2024-01-04 DIAGNOSIS — E66.812 CLASS 2 SEVERE OBESITY WITH SERIOUS COMORBIDITY AND BODY MASS INDEX (BMI) OF 36.0 TO 36.9 IN ADULT, UNSPECIFIED OBESITY TYPE (H): Primary | ICD-10-CM

## 2024-01-04 DIAGNOSIS — E66.01 CLASS 2 SEVERE OBESITY WITH SERIOUS COMORBIDITY AND BODY MASS INDEX (BMI) OF 36.0 TO 36.9 IN ADULT, UNSPECIFIED OBESITY TYPE (H): Primary | ICD-10-CM

## 2024-01-04 NOTE — TELEPHONE ENCOUNTER
Dr. Rodrigues,    Patient is requesting to meet with an MTM pharmacist to discuss her medications and check for any drug interactions. She was originally scheduled with me, but her rheumatology provider would prefer if patient saw a primary care MTM provider.    Can you sign the pended MTM referral? That will set it up the schedulers call patient and schedule her with a primary care MTM pharmacist.    Thank you,    Ross Clayton, PharmD  Medication Therapy Management Pharmacist  [facility]  Phone: [phone]    Emergency Department Nursing Discharge Note:    Discharge instructions provided by Dr. Derik Roman. Patient verbalized understanding. IV catheter removed with tip intact. Patient ambulatory out of ED with steady gait. Family transportation home.

## 2024-01-07 DIAGNOSIS — G43.E09 CHRONIC MIGRAINE WITH AURA WITHOUT STATUS MIGRAINOSUS, NOT INTRACTABLE: Primary | ICD-10-CM

## 2024-01-08 RX ORDER — GALCANEZUMAB 120 MG/ML
INJECTION, SOLUTION SUBCUTANEOUS
Qty: 10 ML | Refills: 2 | Status: SHIPPED | OUTPATIENT
Start: 2024-01-08 | End: 2024-01-11

## 2024-01-10 ENCOUNTER — VIRTUAL VISIT (OUTPATIENT)
Dept: INTERNAL MEDICINE | Facility: CLINIC | Age: 29
End: 2024-01-10
Payer: COMMERCIAL

## 2024-01-10 DIAGNOSIS — H81.10 BENIGN PAROXYSMAL POSITIONAL VERTIGO, UNSPECIFIED LATERALITY: Primary | ICD-10-CM

## 2024-01-10 PROCEDURE — 99213 OFFICE O/P EST LOW 20 MIN: CPT | Mod: 95 | Performed by: INTERNAL MEDICINE

## 2024-01-10 ASSESSMENT — ENCOUNTER SYMPTOMS: DIZZINESS: 1

## 2024-01-10 NOTE — COMMUNITY RESOURCES LIST (ENGLISH)
01/10/2024   [facility]  N/A  For questions about this resource list or additional care needs, please contact your primary care clinic or care manager.  Phone: [phone]   Email: N/A   Address: [address] [place] [address]   Hours: N/A        Transportation       Free or low-cost transportation  1  [employer] Distance: 3.27 miles      In-Person   [address] [place] [address]  Language: English, Chinese  Hours: Mon 9:00 AM - 5:00 PM , Tue 9:30 AM - 7:00 PM , Wed 9:00 AM - 5:00 PM , Thu 9:30 AM - 7:00 PM , Fri 9:00 AM - 5:00 PM  Fees: Free   Phone: ([phone]) [phone] Email: contactus@[employer] Website: http://www.[employer]     2  [employer] [facility] Circulator Bus Distance: 4.45 miles      In-Person   [address] [place] [address]  Language: English  Hours: Tue 9:00 AM - 2:00 PM  Fees: Self Pay   Phone: (980) [phone] Email: info@[employer] Website: http://www.[employer].org/     Transportation to medical appointments  3  [facility] - Non-Emergency Medical Transportation Distance: 1.41 miles      In-Person   [address] [place] [address]  Language: English  Hours: Mon - Fri 8:00 AM - 4:00 PM Appt. Only  Fees: Self Pay   Phone: (860) [phone] Website: http://www.[facility].org/Medical-Services/Emergency-medical-services/Non-emergency-transportation/     4  [facility] Transportation Programs - Non-Emergency Medical Transportation Distance: 4.36 miles      In-Person   [address] [facility] [address] [place] [address]  Language: English, Egyptian, Chinese  Hours: Mon - Fri 7:00 AM - 6:00 PM  Fees: Insurance   Phone: (499) [phone] Ext.[phone] Email: [email or web address] Website: http://[email or web address]/[email or web address]          Important Numbers & Websites       Emergency Services   911  City Services   311  Poison Control   (693) 661-8067  Suicide Prevention Lifeline   (677) 489-8444 (TALK)  Child Abuse  Hotline   (832) 917-4280 (4-A-Child)  Sexual Assault Hotline   (290) 616-3722 (HOPE)  National Runaway Safeline   (757) 300-4307 (RUNAWAY)  All-Options Talkline   (364) 911-7016  Substance Abuse Referral   (839) 866-2836 (HELP)

## 2024-01-10 NOTE — PROGRESS NOTES
Nehemias is a 28 year old who is being evaluated via a billable video visit.      How would you like to obtain your AVS? MyChart  If the video visit is dropped, the invitation should be resent by: Send to e-mail at: [email or web address]  Will anyone else be joining your video visit? No          Assessment & Plan     Benign paroxysmal positional vertigo, unspecified laterality  Symptoms suggestive of BPV , purely episodic and positional   Recommend vestibular PT   - Physical Therapy Referral; Future                 Alexandra Rodrigues MD  [facility]   Nehemias is a 28 year old, presenting for the following health issues:  Dizziness (The patient report dizziness for the last week and a half. She feels like she is on a boat. When she turn her head to the left the dizziness become worse.)      Dizziness    History of Present Illness       Reason for visit:  Dizziness  Symptom onset:  1-2 weeks ago  Symptom intensity:  Moderate  Symptom progression:  Improving  Had these symptoms before:  Yes  Has tried/received treatment for these symptoms:  Yes  Previous treatment was successful:  No  What makes it worse:  Turning head to the left    She eats 0-1 servings of fruits and vegetables daily.She consumes 0 sweetened beverage(s) daily.She exercises with enough effort to increase her heart rate 9 or less minutes per day.  She exercises with enough effort to increase her heart rate 3 or less days per week. She is missing 1 dose(s) of medications per week.  She is not taking prescribed medications regularly due to remembering to take.         Current Outpatient Medications   Medication    albuterol (ACCUNEB) 1.25 MG/3ML neb solution    albuterol (VENTOLIN HFA) 108 (90 Base) MCG/ACT inhaler    ARIPiprazole (ABILIFY) 20 MG tablet    buPROPion (WELLBUTRIN XL) 150 MG 24 hr tablet    buPROPion (WELLBUTRIN XL) 300 MG 24 hr tablet    clotrimazole-betamethasone (LOTRISONE) 1-0.05 % external  cream    CONCERTA 36 MG CR tablet    drospirenone-ethinyl estradiol (JESSY) 3-0.02 MG tablet    DULoxetine (CYMBALTA) 20 MG capsule    fluticasone (FLONASE) 50 MCG/ACT nasal spray    fluticasone (FLOVENT DISKUS) 100 MCG/ACT inhaler    gabapentin (NEURONTIN) 100 MG capsule    hyoscyamine (LEVSIN) 0.125 MG tablet    ketoconazole (NIZORAL) 2 % external cream    levocetirizine (XYZAL) 5 MG tablet    methocarbamol (ROBAXIN) 500 MG tablet    polyethylene glycol (MIRALAX) 17 GM/Dose powder    Semaglutide-Weight Management (WEGOVY) 2.4 MG/0.75ML pen    SUMAtriptan (IMITREX) 50 MG tablet    TAZORAC 0.1 % external cream    Fremanezumab-vfrm (AJOVY) 225 MG/1.5ML SOAJ    hydrocortisone 2.5 % cream    ibuprofen (ADVIL/MOTRIN) 800 MG tablet    omeprazole (PRILOSEC) 20 MG DR capsule     No current facility-administered medications for this visit.           Review of Systems   Neurological:  Positive for dizziness.            Objective           Vitals:  No vitals were obtained today due to virtual visit.    Physical Exam   GENERAL: Healthy, alert and no distress  EYES: Eyes grossly normal to inspection.  No discharge or erythema, or obvious scleral/conjunctival abnormalities.  RESP: No audible wheeze, cough, or visible cyanosis.  No visible retractions or increased work of breathing.    SKIN: Visible skin clear. No significant rash, abnormal pigmentation or lesions.  NEURO: Cranial nerves grossly intact.  Mentation and speech appropriate for age.  PSYCH: Mentation appears normal, affect normal/bright, judgement and insight intact, normal speech and appearance well-groomed.                Video-Visit Details    Type of service:  Video Visit   Video Start Time:   Video End Time:  Duration 12 min    Originating Location (pt. Location): Home    Distant Location (provider location):  On-site  Platform used for Video Visit: [employer]

## 2024-01-11 ENCOUNTER — VIRTUAL VISIT (OUTPATIENT)
Dept: PHARMACY | Facility: CLINIC | Age: 29
End: 2024-01-11
Attending: INTERNAL MEDICINE
Payer: COMMERCIAL

## 2024-01-11 DIAGNOSIS — T78.40XD ALLERGIC REACTION, SUBSEQUENT ENCOUNTER: ICD-10-CM

## 2024-01-11 DIAGNOSIS — G43.709 CHRONIC MIGRAINE WITHOUT AURA WITHOUT STATUS MIGRAINOSUS, NOT INTRACTABLE: ICD-10-CM

## 2024-01-11 DIAGNOSIS — J45.20 MILD INTERMITTENT ASTHMA WITHOUT COMPLICATION: ICD-10-CM

## 2024-01-11 DIAGNOSIS — B37.31 YEAST INFECTION OF THE VAGINA: ICD-10-CM

## 2024-01-11 DIAGNOSIS — Z78.9 USES BIRTH CONTROL: ICD-10-CM

## 2024-01-11 DIAGNOSIS — K59.00 CONSTIPATION, UNSPECIFIED CONSTIPATION TYPE: ICD-10-CM

## 2024-01-11 DIAGNOSIS — R10.9 STOMACH CRAMPS: ICD-10-CM

## 2024-01-11 DIAGNOSIS — M62.838 MUSCLE SPASM: ICD-10-CM

## 2024-01-11 DIAGNOSIS — K21.9 GASTROESOPHAGEAL REFLUX DISEASE, UNSPECIFIED WHETHER ESOPHAGITIS PRESENT: ICD-10-CM

## 2024-01-11 DIAGNOSIS — F90.9 ATTENTION DEFICIT HYPERACTIVITY DISORDER (ADHD), UNSPECIFIED ADHD TYPE: ICD-10-CM

## 2024-01-11 DIAGNOSIS — E66.01 MORBID OBESITY DUE TO EXCESS CALORIES (H): Primary | ICD-10-CM

## 2024-01-11 DIAGNOSIS — F32.1 CURRENT MODERATE EPISODE OF MAJOR DEPRESSIVE DISORDER, UNSPECIFIED WHETHER RECURRENT (H): ICD-10-CM

## 2024-01-11 PROCEDURE — 99607 MTMS BY PHARM ADDL 15 MIN: CPT | Mod: 95

## 2024-01-11 PROCEDURE — 99605 MTMS BY PHARM NP 15 MIN: CPT | Mod: 95

## 2024-01-11 RX ORDER — IBUPROFEN 800 MG/1
TABLET, FILM COATED ORAL
COMMUNITY

## 2024-01-11 RX ORDER — HYDROCORTISONE 2.5 %
CREAM (GRAM) TOPICAL
COMMUNITY

## 2024-01-11 RX ORDER — FREMANEZUMAB-VFRM 225 MG/1.5ML
225 INJECTION SUBCUTANEOUS
COMMUNITY
Start: 2024-01-09 | End: 2024-07-03

## 2024-01-11 NOTE — PROGRESS NOTES
Medication Therapy Management (MTM) Encounter    ASSESSMENT:                            Medication Adherence/Access: No issues identified    Obesity: Stable on Wegovy 2.4 mg weekly, but less momentum in losing weight now. Appropriate to switch to Zepbound, but it is not covered by insurance. Zepbound showed better efficacy in losing weight than wegovy in clinic studies.    Asthma: Stable at the current medication; advised patient to keep taking Flovent. Medications such as gabapentin- Xyzal- methocarbamol interact to cause CNS depression.    Allergy: Stable on the current medications    ADHD: With concerta focuse has improved and still patient struggles with concentration. Previously on Adderall. Omeprazole might affect Concerta's controlled release behavior. Advised patient to take omeprazole at night.     Depression: Stable on the current medication, but duloxetine might increase Abilify concentration, and increase the side effect of Abilify. Abilify and fluoxetine might increase Qtc prolongation, but recent QTC levels was 438 below 500. There are a number of medication patient taking that further lower seizures threshold with bupropion (Fluoxetine, Hyoscyamine, and methylphenidate).    Birth Control: Stable on Jennifer     Chronic Migraine: Previous only Emgality, but due to rash switched to Ajovy. Still have headache and migraine occasionally. Used Sumatriptan recently, but not being effective after an hour. Advised patient to use another tablet of sumatriptan in 2 hours after the first,  and is allowed to take up to 4 hours in a day.     Constipation: Stable on Miralax       Muscle Spasm/Jaw clenching: Stable on gabapentin. Check the drug-drug interaction of gabapentin and methocarbamol on the above conditions. Gabapentin is helping.     GERD: Stable on omeprazole 20 mg daily.  Omeprazole might affect the controlled release nature of concerta.     Cramping: Stable on hyoscyamine 0.0125 mg    Vaginal yeast infection:  Stable on current medications      PLAN:                            Will consider using zepbound for better weight loss. Medication is not covered by insurance.    Please take your Flovent as prescribed for better asthma control   Consider taking omeprazole 20 mg 2 tablets at bedtime     Follow-up: When needed by patient or provider     SUBJECTIVE/OBJECTIVE:                          Nehemias Mascorro is a 28 year old female contacted via secure video for an initial visit. She was referred to me from Dr. Rodrigues.      Reason for visit: Medication Review and Medication Interaction    Allergies/ADRs: Reviewed in chart  Past Medical History: Reviewed in chart  Tobacco: She reports that she has never smoked. She has never used smokeless tobacco.    Medication Adherence/Access: no issues reported    Obesity:   Wegovy 2.4 mg weekly     Wt Readings from Last 2 Encounters:   12/11/23 223 lb 3.2 oz (101.2 kg)   12/06/23 220 lb 8 oz (100 kg)     On 05/06/22 patient's weight was 245 pounds. Patient lost about 22 pounds in the last one year. She is not losing any weight at this point, but Zepbound is not covered.     Asthma:   Albuterol 1.25 mg/3 ml neb solution as needed. She used albuterol rarely like  less than once a month  albuterol 108/mcg/ACT inhaler as needed,  Fluticasone 100 mcg/act inhaler 2 puff twice daily   Patient said right now asthma is controlled. The last time she has to use the inhaler was last  month with sinus infection.    - Patient has not been taking Flovent. Will consider using it        10/16/2023     9:15 AM 10/16/2023     9:16 AM 12/6/2023     9:41 AM   ACT Total Scores   ACT TOTAL SCORE (Goal Greater than or Equal to 20) 20 17 22   In the past 12 months, how many times did you visit the emergency room for your asthma without being admitted to the hospital? 0 0 0   In the past 12 months, how many times were you hospitalized overnight because of your asthma? 0 0 0     Allergy:    Xyzal 5 mg  daily  Flonase 50 mcg/ACT as needed daily, and sometimes she uses deep sea nasal spray  Per patient allergy is controlled    ADHD:    Concerta 36 mg CR tablet daily one tablet in the AM   Work - Patient said she does not concentrate that great, but this medication keeps her wake.               School - she is almost done with school. Patient said Concerta is working better than the Adderral she was taking     Depression:    Aripiprazole 20 mg daily  Bupropion  mg daily + 150  = 450 mg daily   Fluoxetine 40 mg + 20 mg = 60 mg daily    Duloxetine 20 mg and takes 60 mg daily    , which is less than 500  Patient said bupropion is helping since the dose was increased. Denies any side effects such as seizures considering patient is taking number of medication that lower seizures threshold.      Birth Control:   Jennifer (drospirenone - ethinyl estradiol) 3 - 0.02 mg tablet daily. Denies any side effects     Chronic Migraine:  Preventative   Ajovy (Fremanezumab) every 28 days. Previously using Emgality.  She is not using this medication anymore because she developed a rash, She is going to [facility].       Abortive    Sumatriptan 50 mg tablet as needed. She used it a couple days ago and it only works for one hour. She used ibuprofen and rested down. Today she has a headache.   Patient's triggers include: strong smells, brightness; and associated symptoms include: Photophobia, nausea. History of concussion so migraines can come on randomly as well from this she believes. Patient reports having 1-2 headache days per month.      Constipation:    Miralax 17 mg as needed. Patient said she uses it sometimes. It controls constipation when used      Muscle Spasm/Jaw clenching:     Methocarbamol 500 mg 1 tablet 4 times daily- she does not use it very much. It seems the gabapentin is enough   Duloxetine 60 mg daily    Gabapentin 100 mg  3 tablets at bedtime   Patient said she was clenching her Jaw while sleeping and gabapentin  was added to help.     GERD:   Omeprazole 20 mg daily.  Patient said this medication is working.        Cramping:    Hyoscyamine 0.0125 mg every 6 hours as needed. She have it, but she did need to use it that much.    Vaginal yeast infection:    Ketoconazole 2 % cream as needed   Clotrimazole - betamethasone external cream 2 times daily    Patient said it helps control the yeast infection she have.      Today's Vitals: LMP  (LMP Unknown)   ----------------      I spent 55 minutes with this patient today. All changes were made via collaborative practice agreement with Alexandra Rodrigues MD. A copy of the visit note was provided to the patient's provider(s).    A summary of these recommendations was sent via [employer].    Telemedicine Visit Details  Type of service:  Video Conference via [employer]  Start Time:  12:30 PM  End Time: 1:25 PM     Medication Therapy Recommendations  No medication therapy recommendations to display

## 2024-01-11 NOTE — PATIENT INSTRUCTIONS
"Recommendations from today's MTM visit:                                                      MTM (medication therapy management) is a service provided by a clinical pharmacist designed to help you get the most of out of your medicines.   Today we reviewed what your medicines are for, how to know if they are working, that your medicines are safe and how to make your medicine regimen as easy as possible.      Will consider using zepbound for better weight loss. Medication is not covered by insurance.    Please take your Flovent as prescribed for better asthma control   Consider taking omeprazole 20 mg 2 tablets at bedtime     Follow-up: When needed by patient or provider     It was great speaking with you today.  I value your experience and would be very thankful for your time in providing feedback in our clinic survey. In the next few days, you may receive an email or text message from [employer] with a link to a survey related to your  clinical pharmacist.\"     To schedule another MTM appointment, please call the clinic directly or you may call the MTM scheduling line at [phone].    My Clinical Pharmacist's contact information:                                                      Please feel free to contact me with any questions or concerns you have.        Emma Shelley, PharmD     Medication Therapy Management (MTM) Pharmacist     [phone]     [email or web address]     [facility]    "

## 2024-01-19 ENCOUNTER — TRANSFERRED RECORDS (OUTPATIENT)
Dept: HEALTH INFORMATION MANAGEMENT | Facility: CLINIC | Age: 29
End: 2024-01-19
Payer: COMMERCIAL

## 2024-01-30 ENCOUNTER — ANCILLARY PROCEDURE (OUTPATIENT)
Dept: CT IMAGING | Facility: CLINIC | Age: 29
End: 2024-01-30
Payer: COMMERCIAL

## 2024-01-30 DIAGNOSIS — J01.01 ACUTE RECURRENT MAXILLARY SINUSITIS: ICD-10-CM

## 2024-01-30 PROCEDURE — 70486 CT MAXILLOFACIAL W/O DYE: CPT | Performed by: RADIOLOGY

## 2024-02-13 ENCOUNTER — E-VISIT (OUTPATIENT)
Dept: INTERNAL MEDICINE | Facility: CLINIC | Age: 29
End: 2024-02-13
Payer: COMMERCIAL

## 2024-02-13 DIAGNOSIS — D35.2 BENIGN NEOPLASM OF PITUITARY GLAND (H): Primary | ICD-10-CM

## 2024-02-13 PROCEDURE — 99207 PR NO BILLABLE SERVICE THIS VISIT: CPT | Performed by: INTERNAL MEDICINE

## 2024-02-14 ENCOUNTER — LAB (OUTPATIENT)
Dept: LAB | Facility: CLINIC | Age: 29
End: 2024-02-14
Payer: COMMERCIAL

## 2024-02-14 DIAGNOSIS — D35.2 BENIGN NEOPLASM OF PITUITARY GLAND (H): ICD-10-CM

## 2024-02-14 LAB
ALBUMIN UR-MCNC: NEGATIVE MG/DL
APPEARANCE UR: CLEAR
BILIRUB UR QL STRIP: NEGATIVE
COLOR UR AUTO: YELLOW
GLUCOSE UR STRIP-MCNC: NEGATIVE MG/DL
HGB UR QL STRIP: NEGATIVE
KETONES UR STRIP-MCNC: NEGATIVE MG/DL
LEUKOCYTE ESTERASE UR QL STRIP: NEGATIVE
NITRATE UR QL: NEGATIVE
PH UR STRIP: 6.5 [PH] (ref 5–8)
SP GR UR STRIP: 1.02 (ref 1–1.03)
UROBILINOGEN UR STRIP-ACNC: 1 E.U./DL

## 2024-02-14 PROCEDURE — 81003 URINALYSIS AUTO W/O SCOPE: CPT

## 2024-02-14 PROCEDURE — 80053 COMPREHEN METABOLIC PANEL: CPT

## 2024-02-14 PROCEDURE — 36415 COLL VENOUS BLD VENIPUNCTURE: CPT

## 2024-02-14 PROCEDURE — 84146 ASSAY OF PROLACTIN: CPT

## 2024-02-14 PROCEDURE — 82024 ASSAY OF ACTH: CPT

## 2024-02-14 PROCEDURE — 84305 ASSAY OF SOMATOMEDIN: CPT

## 2024-02-14 RX ORDER — ONDANSETRON 4 MG/1
4 TABLET, FILM COATED ORAL EVERY 8 HOURS PRN
Qty: 30 TABLET | Refills: 1 | Status: SHIPPED | OUTPATIENT
Start: 2024-02-14

## 2024-02-15 LAB
ACTH PLAS-MCNC: <10 PG/ML
ALBUMIN SERPL BCG-MCNC: 4 G/DL (ref 3.5–5.2)
ALP SERPL-CCNC: 60 U/L (ref 40–150)
ALT SERPL W P-5'-P-CCNC: 27 U/L (ref 0–50)
ANION GAP SERPL CALCULATED.3IONS-SCNC: 11 MMOL/L (ref 7–15)
AST SERPL W P-5'-P-CCNC: 18 U/L (ref 0–45)
BILIRUB SERPL-MCNC: 0.2 MG/DL
BUN SERPL-MCNC: 18.5 MG/DL (ref 6–20)
CALCIUM SERPL-MCNC: 9.1 MG/DL (ref 8.6–10)
CHLORIDE SERPL-SCNC: 103 MMOL/L (ref 98–107)
CREAT SERPL-MCNC: 1.01 MG/DL (ref 0.51–0.95)
DEPRECATED HCO3 PLAS-SCNC: 26 MMOL/L (ref 22–29)
EGFRCR SERPLBLD CKD-EPI 2021: 77 ML/MIN/1.73M2
GLUCOSE SERPL-MCNC: 84 MG/DL (ref 70–99)
POTASSIUM SERPL-SCNC: 4 MMOL/L (ref 3.4–5.3)
PROLACTIN SERPL 3RD IS-MCNC: 1 NG/ML (ref 5–23)
PROT SERPL-MCNC: 6.8 G/DL (ref 6.4–8.3)
SODIUM SERPL-SCNC: 140 MMOL/L (ref 135–145)

## 2024-02-16 LAB — IGF-I BLD-MCNC: 183 NG/ML (ref 93–297)

## 2024-02-18 ENCOUNTER — HEALTH MAINTENANCE LETTER (OUTPATIENT)
Age: 29
End: 2024-02-18

## 2024-02-19 ENCOUNTER — TRANSFERRED RECORDS (OUTPATIENT)
Dept: HEALTH INFORMATION MANAGEMENT | Facility: CLINIC | Age: 29
End: 2024-02-19
Payer: COMMERCIAL

## 2024-02-19 PROCEDURE — 99000 SPECIMEN HANDLING OFFICE-LAB: CPT

## 2024-02-19 PROCEDURE — 82530 CORTISOL FREE: CPT | Mod: 90

## 2024-02-19 PROCEDURE — 82542 COL CHROMOTOGRAPHY QUAL/QUAN: CPT | Mod: 90

## 2024-02-20 ENCOUNTER — APPOINTMENT (OUTPATIENT)
Dept: LAB | Facility: CLINIC | Age: 29
End: 2024-02-20
Payer: COMMERCIAL

## 2024-02-23 ENCOUNTER — MYC MEDICAL ADVICE (OUTPATIENT)
Dept: INTERNAL MEDICINE | Facility: CLINIC | Age: 29
End: 2024-02-23
Payer: COMMERCIAL

## 2024-02-26 DIAGNOSIS — K12.0 CANKER SORES ORAL: Primary | ICD-10-CM

## 2024-02-26 RX ORDER — TRIAMCINOLONE ACETONIDE 0.1 %
PASTE (GRAM) DENTAL 2 TIMES DAILY
Qty: 5 G | Refills: 1 | Status: SHIPPED | OUTPATIENT
Start: 2024-02-26 | End: 2024-03-18

## 2024-02-27 LAB
COLLECT DURATION TIME UR: 24 H
CORTIS 24H UR-MRATE: 26 MCG/24 H (ref 3.5–45)
CORTISONE 24H UR-MRATE: 83 MCG/24 H (ref 17–129)
SPECIMEN VOL 24H UR: 1200 ML

## 2024-02-28 ENCOUNTER — OFFICE VISIT (OUTPATIENT)
Dept: INTERNAL MEDICINE | Facility: CLINIC | Age: 29
End: 2024-02-28
Payer: COMMERCIAL

## 2024-02-28 VITALS
HEIGHT: 67 IN | DIASTOLIC BLOOD PRESSURE: 78 MMHG | RESPIRATION RATE: 11 BRPM | BODY MASS INDEX: 36.96 KG/M2 | OXYGEN SATURATION: 99 % | TEMPERATURE: 97.5 F | HEART RATE: 89 BPM | SYSTOLIC BLOOD PRESSURE: 124 MMHG | WEIGHT: 235.5 LBS

## 2024-02-28 DIAGNOSIS — F45.0 SOMATIZATION DISORDER: ICD-10-CM

## 2024-02-28 DIAGNOSIS — E66.812 CLASS 2 SEVERE OBESITY WITH SERIOUS COMORBIDITY AND BODY MASS INDEX (BMI) OF 36.0 TO 36.9 IN ADULT, UNSPECIFIED OBESITY TYPE (H): ICD-10-CM

## 2024-02-28 DIAGNOSIS — F32.1 CURRENT MODERATE EPISODE OF MAJOR DEPRESSIVE DISORDER, UNSPECIFIED WHETHER RECURRENT (H): ICD-10-CM

## 2024-02-28 DIAGNOSIS — R59.0 CERVICAL LYMPHADENOPATHY: Primary | ICD-10-CM

## 2024-02-28 DIAGNOSIS — E66.01 CLASS 2 SEVERE OBESITY WITH SERIOUS COMORBIDITY AND BODY MASS INDEX (BMI) OF 36.0 TO 36.9 IN ADULT, UNSPECIFIED OBESITY TYPE (H): ICD-10-CM

## 2024-02-28 DIAGNOSIS — E23.7 PITUITARY LESION (H): ICD-10-CM

## 2024-02-28 PROCEDURE — 99214 OFFICE O/P EST MOD 30 MIN: CPT | Performed by: INTERNAL MEDICINE

## 2024-02-28 ASSESSMENT — PATIENT HEALTH QUESTIONNAIRE - PHQ9
SUM OF ALL RESPONSES TO PHQ QUESTIONS 1-9: 13
10. IF YOU CHECKED OFF ANY PROBLEMS, HOW DIFFICULT HAVE THESE PROBLEMS MADE IT FOR YOU TO DO YOUR WORK, TAKE CARE OF THINGS AT HOME, OR GET ALONG WITH OTHER PEOPLE: EXTREMELY DIFFICULT

## 2024-02-28 NOTE — PROGRESS NOTES
"  Assessment & Plan     Cervical lymphadenopathy  I am not sure if I am feeling just her muscle and fatty tissue but her there are some prominent lymph nodes on palpation.  Recommend CT soft tissue parotids also looks slightly swollen although isolated parotid mass was not noted.  - CT Soft Tissue Neck w Contrast; Future    Pituitary lesion (H24)  In the workup of her dizzy spell MR brain and MR a were done showing hyperintense lesion in the pituitary gland.  Hormonal testing is been on not significant \"and prolactin levels are low.  Will send to endocrinology to check if any further surveillance is needed  - Adult Endocrinology  Referral; Future    Somatization disorder  Tremor symptoms have unclear calf etiology and are related to her underlying mental health fibromyalgia diagnosis.  She does understand that is a bit frustrated that she is not getting better.  Also frustrated that she is not losing weight on maximum dose of semaglutide.  She could consider switching to Mounjaro.    Class 2 severe obesity with serious comorbidity and body mass index (BMI) of 36.0 to 36.9 in adult, unspecified obesity type (H)      Current moderate episode of major depressive disorder, unspecified whether recurrent (H)  Counseled and reassured I am concerned if she will be able to pursue her masters under the current stress load she is on.            BMI  Estimated body mass index is 37.44 kg/m  as calculated from the following:    Height as of this encounter: 1.689 m (5' 6.5\").    Weight as of this encounter: 106.8 kg (235 lb 8 oz).             Subjective   Immanuelle is a 28 year old, presenting for the following health issues:  Facial Swelling (Pt c/o swelling in bilateral cheeks for the last week.) and Possible viral illness (Pt c/o runny nose, nausea for last week.)      2/28/2024     3:48 PM   Additional Questions   Roomed by Slim BENITEZ RN     History of Present Illness       Reason for visit:  Facial and foot " "swelling  Symptom onset:  1-2 weeks ago  Symptom intensity:  Mild  Symptom progression:  Staying the same  Had these symptoms before:  No  What makes it worse:  No  What makes it better:  No    She eats 0-1 servings of fruits and vegetables daily.She consumes 0 sweetened beverage(s) daily.She exercises with enough effort to increase her heart rate 9 or less minutes per day.  She exercises with enough effort to increase her heart rate 3 or less days per week. She is missing 2 dose(s) of medications per week.  She is not taking prescribed medications regularly due to other.                     Objective    /78 (BP Location: Left arm, Patient Position: Sitting, Cuff Size: Adult Regular)   Pulse 89   Temp 97.5  F (36.4  C) (Tympanic)   Resp 11   Ht 1.689 m (5' 6.5\")   Wt 106.8 kg (235 lb 8 oz)   LMP 02/07/2024 (Exact Date)   SpO2 99%   BMI 37.44 kg/m    Body mass index is 37.44 kg/m .  Physical Exam   GENERAL: alert and no distress  NECK: no adenopathy, no asymmetry, masses, or scars  RESP: lungs clear to auscultation - no rales, rhonchi or wheezes  CV: regular rate and rhythm, normal S1 S2, no S3 or S4, no murmur, click or rub, no peripheral edema  Face there is some mild symmetrical swelling of her jowl area without isolated parotid mass.  Sternomastoid muscles are prominent thyroid gland does not appear enlarged cervical lymph nodes are palpable I am not sure if they are increased or just more apparent today.          Signed Electronically by: Alexandra Rodrigues MD    "

## 2024-02-29 ENCOUNTER — TELEPHONE (OUTPATIENT)
Dept: ENDOCRINOLOGY | Facility: CLINIC | Age: 29
End: 2024-02-29
Payer: COMMERCIAL

## 2024-02-29 NOTE — TELEPHONE ENCOUNTER
Patient call:     Appointment type: New Pituitary   Provider: Christina   Return date: 3/27   Speciality phone number: [phone]    Additional appointment(s) needed:   Additional notes: Spoke to pt and scheduled next avail ARUNA I could find to fit timeline recommended below   Cs notified to Schedule Non-On Call ARUNA within 3 months with any Endo MD per protocol.   Jess Harris, RN on 2/29/24 at 2:40 PM     Jeanine Rosales on 2/29/2024 at 3:20 PM

## 2024-03-06 PROBLEM — M54.2 NECK PAIN: Status: RESOLVED | Noted: 2023-06-26 | Resolved: 2024-03-06

## 2024-03-06 PROBLEM — M54.50 MIDLINE LOW BACK PAIN WITHOUT SCIATICA: Status: RESOLVED | Noted: 2023-06-26 | Resolved: 2024-03-06

## 2024-03-07 NOTE — CONFIDENTIAL NOTE
RECORDS RECEIVED FROM: internal    DATE RECEIVED: 3.27.24   NOTES (FOR ALL VISITS) STATUS DETAILS   OFFICE NOTES from referring provider internal    Alexandra Rodrigues MD      MEDICATION LIST internal     IMAGING      MRI (BRAIN) internal  9.15.23   XR (Chest) internal  10.16.23   CT (HEAD/NECK/CHEST/ABDOMEN) internal  10.17.22   LABS     DIABETES: HBGA1C, CREATININE, FASTING LIPIDS, MICROALBUMIN URINE, POTASSIUM, TSH, T4    THYROID: TSH, T4, CBC, THYRODLONULIN, TOTAL T3, FREE T4, CALCITONIN, CEA internal  Cortisol- 2.19.24  Prolactin- 2.14.24   Cmp- 2.14.24  CREATININE- 1/17/24  Cbc- 1.17.24  TSH/T4- 9.18.23  Bmp- 7/28/23  Lipid- 7.13.23

## 2024-03-17 DIAGNOSIS — K12.0 CANKER SORES ORAL: ICD-10-CM

## 2024-03-18 RX ORDER — TRIAMCINOLONE ACETONIDE 0.1 %
PASTE (GRAM) DENTAL
Qty: 5 G | Refills: 1 | Status: SHIPPED | OUTPATIENT
Start: 2024-03-18 | End: 2024-03-19

## 2024-03-19 RX ORDER — TRIAMCINOLONE ACETONIDE 0.1 %
PASTE (GRAM) DENTAL
Qty: 5 G | Refills: 1 | Status: SHIPPED | OUTPATIENT
Start: 2024-03-19 | End: 2024-03-27

## 2024-03-25 ENCOUNTER — MYC MEDICAL ADVICE (OUTPATIENT)
Dept: INTERNAL MEDICINE | Facility: CLINIC | Age: 29
End: 2024-03-25
Payer: COMMERCIAL

## 2024-03-25 DIAGNOSIS — M79.7 FIBROMYALGIA: Primary | ICD-10-CM

## 2024-03-25 DIAGNOSIS — M25.50 POLYARTHRALGIA: ICD-10-CM

## 2024-03-26 DIAGNOSIS — M25.50 POLYARTHRALGIA: Primary | ICD-10-CM

## 2024-03-26 NOTE — TELEPHONE ENCOUNTER
MD please review message.     I have pended a NEW referral with Dr Aamir Perez listed on referral order.

## 2024-03-27 ENCOUNTER — PRE VISIT (OUTPATIENT)
Dept: ENDOCRINOLOGY | Facility: CLINIC | Age: 29
End: 2024-03-27

## 2024-03-27 ENCOUNTER — LAB (OUTPATIENT)
Dept: LAB | Facility: CLINIC | Age: 29
End: 2024-03-27
Payer: COMMERCIAL

## 2024-03-27 ENCOUNTER — OFFICE VISIT (OUTPATIENT)
Dept: ENDOCRINOLOGY | Facility: CLINIC | Age: 29
End: 2024-03-27
Attending: INTERNAL MEDICINE
Payer: COMMERCIAL

## 2024-03-27 VITALS
SYSTOLIC BLOOD PRESSURE: 122 MMHG | OXYGEN SATURATION: 99 % | BODY MASS INDEX: 38.41 KG/M2 | DIASTOLIC BLOOD PRESSURE: 85 MMHG | HEIGHT: 66 IN | WEIGHT: 239 LBS | HEART RATE: 103 BPM

## 2024-03-27 DIAGNOSIS — E23.0 HYPOPITUITARISM (H): ICD-10-CM

## 2024-03-27 DIAGNOSIS — E23.7 PITUITARY LESION (H): Primary | ICD-10-CM

## 2024-03-27 DIAGNOSIS — E23.7 PITUITARY LESION (H): ICD-10-CM

## 2024-03-27 LAB
HBA1C MFR BLD: 5.4 %
T4 FREE SERPL-MCNC: 0.89 NG/DL (ref 0.9–1.7)
TSH SERPL DL<=0.005 MIU/L-ACNC: 1.17 UIU/ML (ref 0.3–4.2)

## 2024-03-27 PROCEDURE — 83036 HEMOGLOBIN GLYCOSYLATED A1C: CPT | Performed by: INTERNAL MEDICINE

## 2024-03-27 PROCEDURE — 99000 SPECIMEN HANDLING OFFICE-LAB: CPT | Performed by: PATHOLOGY

## 2024-03-27 PROCEDURE — 82164 ANGIOTENSIN I ENZYME TEST: CPT | Mod: 90 | Performed by: PATHOLOGY

## 2024-03-27 PROCEDURE — 84443 ASSAY THYROID STIM HORMONE: CPT | Performed by: PATHOLOGY

## 2024-03-27 PROCEDURE — 99417 PROLNG OP E/M EACH 15 MIN: CPT | Performed by: INTERNAL MEDICINE

## 2024-03-27 PROCEDURE — 99215 OFFICE O/P EST HI 40 MIN: CPT | Performed by: INTERNAL MEDICINE

## 2024-03-27 PROCEDURE — 36415 COLL VENOUS BLD VENIPUNCTURE: CPT | Performed by: PATHOLOGY

## 2024-03-27 PROCEDURE — 84439 ASSAY OF FREE THYROXINE: CPT | Performed by: PATHOLOGY

## 2024-03-27 RX ORDER — LEVOTHYROXINE SODIUM 75 UG/1
75 TABLET ORAL DAILY
Qty: 90 TABLET | Refills: 3 | Status: SHIPPED | OUTPATIENT
Start: 2024-03-27

## 2024-03-27 ASSESSMENT — PAIN SCALES - GENERAL: PAINLEVEL: SEVERE PAIN (6)

## 2024-03-27 ASSESSMENT — PATIENT HEALTH QUESTIONNAIRE - PHQ9: SUM OF ALL RESPONSES TO PHQ QUESTIONS 1-9: 16

## 2024-03-27 NOTE — LETTER
3/27/2024       RE: Nehemias Mascorro  [address]  [place] [address]     Dear Colleague,    Thank you for referring your patient, Nehemias Mascorro, to the [facility] [place] [facility]. Please see a copy of my visit note below.    Depression Response    Patient completed the PHQ-9 assessment for depression and scored >9? Yes  Question 9 on the PHQ-9 was positive for suicidality? No  Does patient have current mental health provider? Yes    Is this a virtual visit? No    I personally notified the following: clinic nurse                                                                                        - Endocrinology Initial Consultation -    Reason for visit/consult:  Pituitary lesion (H24)    Primary care provider: Alexandra Rodrigues    HPI: A 27 yo female here for the evaluation for her pituitary lesion. She came with her mother today. Pituitary lesion was found when she underwent brain MRI in 9/2023 for the symptoms of dizziness. It is 7mm hyper-enhanced lesion in the posterior part of the pituitary. Her menstrual cycles are 28 days, she had PCOS at her age 19 and has been on BCPs since then.  She also has blurry vision, and she went to ophthalmology office in 2/2024 in [place], (for which I do not have access record today).  She mentioned she has been gaining weight and was on wegovy, she mentioned she lost 40 lb in 6 month, and currently stopped using. She is now want to try oral semaglutide.   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 She also has been seen by neurologist (small fiber neuropathy) and rheumatologist (fibromyalgia).   Other medical history includes: depression, and currently seen by psychologist.     Past Medical/Surgical History:  Past Medical History:   Diagnosis Date    ADHD (attention deficit hyperactivity disorder)     Depressive disorder      Ovarian cyst     Uncomplicated asthma      Past Surgical History:   Procedure Laterality Date    COLONOSCOPY N/A 4/27/2022    Procedure: COLONOSCOPY, WITH POLYPECTOMY AND BIOPSY;  Surgeon: Alyse Leija MD;  Location:  GI    ENT SURGERY      tonsilectomy age 8    ESOPHAGOSCOPY, GASTROSCOPY, DUODENOSCOPY (EGD), COMBINED N/A 3/15/2023    Procedure: ESOPHAGOGASTRODUODENOSCOPY, WITH BIOPSY;  Surgeon: Cyn Colon MD;  Location: [facility] OR    ORTHOPEDIC SURGERY      Hip, emelia surgery in 2013    WRIST SURGERY         Allergies:  Allergies   Allergen Reactions    Tizanidine Other (See Comments)    Azithromycin     Erythromycin Nausea and Vomiting    Sulfa Antibiotics Nausea       Current Medications   Current Outpatient Medications   Medication    albuterol (ACCUNEB) 1.25 MG/3ML neb solution    albuterol (VENTOLIN HFA) 108 (90 Base) MCG/ACT inhaler    ARIPiprazole (ABILIFY) 20 MG tablet    buPROPion (WELLBUTRIN XL) 150 MG 24 hr tablet    buPROPion (WELLBUTRIN XL) 300 MG 24 hr tablet    CONCERTA 36 MG CR tablet    drospirenone-ethinyl estradiol (JESSY) 3-0.02 MG tablet    DULoxetine (CYMBALTA) 20 MG capsule    fluticasone (FLONASE) 50 MCG/ACT nasal spray    Fremanezumab-vfrm (AJOVY) 225 MG/1.5ML SOAJ    gabapentin (NEURONTIN) 100 MG capsule    hydrocortisone 2.5 % cream    hyoscyamine (LEVSIN) 0.125 MG tablet    ibuprofen (ADVIL/MOTRIN) 800 MG tablet    levocetirizine (XYZAL) 5 MG tablet    methocarbamol (ROBAXIN) 500 MG tablet    omeprazole (PRILOSEC) 20 MG DR capsule    ondansetron (ZOFRAN) 4 MG tablet    polyethylene glycol (MIRALAX) 17 GM/Dose powder    Semaglutide-Weight Management (WEGOVY) 2.4 MG/0.75ML pen    SUMAtriptan (IMITREX) 50 MG tablet    TAZORAC 0.1 % external cream    fluticasone (FLOVENT DISKUS) 100 MCG/ACT inhaler     No current facility-administered medications for this visit.       Family History:  Family History   Problem Relation Age of Onset    Depression Maternal Grandmother      "Schizophrenia Maternal Uncle     Substance Abuse Maternal Uncle        Social History:  Social History     Tobacco Use    Smoking status: Never    Smokeless tobacco: Never   Substance Use Topics    Alcohol use: Yes     Comment: rare       ROS:  Full review of systems taken with the help of the intake sheet. Otherwise a complete 14 point review of systems was taken and is negative unless stated in the history above.      Physical Exam:   Vitals: /85   Pulse 103   Ht 1.676 m (5' 6\")   Wt 108.4 kg (239 lb)   LMP 02/07/2024 (Exact Date)   SpO2 99%   BMI 38.58 kg/m    BMI= Body mass index is 38.58 kg/m .   General: well appearing, no acute distress, pleasant and conversant,   Mental Status/neuro: alert and oriented  Face: symmetrical, normal facial color  Eyes: anicteric, no proptosis or lid lag  Resp: normally breathing      Labs : I reviewed data from epic and extract and summarize the pertinent data here.      Latest Reference Range & Units 04/11/23 12:09 09/14/23 13:46 09/14/23 21:29 09/18/23 12:09 02/14/24 16:08   Prolactin 5 - 23 ng/mL     1 (L)   T4 Free 0.90 - 1.70 ng/dL  0.88 (L)      TSH 0.30 - 4.20 uIU/mL 0.52 1.10 3.32 0.42    Ins Growth Factor 1 93 - 297 ng/mL     183   (L): Data is abnormally low  Lab Results   Component Value Date     02/14/2024     06/22/2018      Lab Results   Component Value Date    POTASSIUM 4.0 02/14/2024    POTASSIUM 4.1 11/10/2021    POTASSIUM 4.0 06/22/2018     Lab Results   Component Value Date    CHLORIDE 103 02/14/2024    CHLORIDE 106 11/10/2021    CHLORIDE 110 06/22/2018     Lab Results   Component Value Date    ELIU 9.1 02/14/2024    ELIU 8.5 06/22/2018     Lab Results   Component Value Date    CO2 26 02/14/2024    CO2 24 11/10/2021    CO2 24 06/22/2018     Lab Results   Component Value Date    BUN 18.5 02/14/2024    BUN 11 11/10/2021    BUN 15 06/22/2018     Lab Results   Component Value Date    CR 1.01 02/14/2024    CR 0.77 06/22/2018     Lab Results " "  Component Value Date    GLC 84 02/14/2024    GLC 80 11/10/2021    GLC 84 06/22/2018     Lab Results   Component Value Date    TSH 0.42 09/18/2023    TSH 0.89 03/10/2022    TSH 0.70 06/22/2018     Lab Results   Component Value Date    T4 0.88 09/14/2023    T4 0.79 06/22/2018     Lab Results   Component Value Date    A1C 5.2 07/21/2022       No results found for: \"IGF1\"  No results found for: \"LH\"  No results found for: \"FSH\"  No results found for: \"ESTROGEN\"  No results found for: \"PROLACTIN\"        MRI Brain: I personally reviewed the original images and agree with the below reports.   Results for orders placed during the hospital encounter of 09/14/23    MR Brain w/o & w Contrast    Narrative  EXAM: MR BRAIN W/O and W CONTRAST, MRA NECK (CAROTIDS) W/O and W CONTRAST, MRA BRAIN ([place]) W/O CONTRAST  LOCATION: [facility]  DATE: 9/15/2023    INDICATION: dizziness with L neck pain, just had CS injection today and worse after.  COMPARISON: Head CT 07/30/2021  CONTRAST: 10ml gadavist  TECHNIQUE:  1) Routine multiplanar multisequence head MRI without and with intravenous contrast.  2) 3D time-of-flight head MRA without intravenous contrast.  3) Neck MRA without and with IV contrast. Stenosis measurements made according to NASCET criteria unless otherwise specified.    FINDINGS:  HEAD MRI:  INTRACRANIAL CONTENTS: No acute or subacute infarct. No mass, acute hemorrhage, or extra-axial fluid collections. Normal brain parenchymal signal. Normal ventricles and sulci. Normal position of the cerebellar tonsils. No pathologic contrast enhancement.    SELLA: 7 mm T1 hyperintense lesion within the posterior aspect of the pituitary gland.    OSSEOUS STRUCTURES/SOFT TISSUES: Normal marrow signal. The major intracranial vascular flow voids are maintained.    ORBITS: No abnormality accounting for technique.    SINUSES/MASTOIDS: No paranasal sinus mucosal disease. No middle ear or mastoid " effusion.    HEAD MRA:  ANTERIOR CIRCULATION: No stenosis/occlusion, aneurysm, or high flow vascular malformation. Standard [place] anatomy.    POSTERIOR CIRCULATION: No stenosis/occlusion, aneurysm, or high flow vascular malformation. Balanced vertebral arteries supply a normal basilar artery.    NECK MRA:  RIGHT CAROTID: No measurable stenosis or dissection.    LEFT CAROTID: No measurable stenosis or dissection.    VERTEBRAL ARTERIES: No focal stenosis or dissection. Balanced vertebral arteries.    AORTIC ARCH: Vascular variant aortic arch origin left vertebral artery.  No significant stenosis at the origin of the great vessels.    Impression  IMPRESSION:  HEAD MRI:  1.  No intracranial mass, abnormal enhancement or recent infarct or evidence of intracranial hemorrhage.  2.  7 mm nonspecific lesion within the posterior aspect of the pituitary gland. 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    HEAD MRA:  1.  Normal MRA [place].    NECK MRA:  1.  Normal neck MRA.          Assessment and Plan  28 year old female with pituitary 7 mm lesion, suppressed PRL and mild secondary hypothyroidism    - recheck TSH, free T4, and may consider for the small dose of LT4    - A1c to check    - oral semaglutide ordered, she is aware since her BMI is 38.5 (<40), approval can be challenging.     - ACE per her neurologist request Dr. Natalio Reinoso (care team pager: [phone])    - repeat pituitary MRI in 1 year     - suppressed PRL and mild secondary hypothyroidism may due to pituitary lesion vs remote history of concussion?     RTC with me in 1 year       Total 60 minutes spent on the date of the encounter doing chart review, history and exam, reviewed MRI, reviewed outside record , documentation and further activities as noted above.        Kelly Vasquez MD  Staff Physician  Endocrinology and Metabolism  License: [id number]

## 2024-03-27 NOTE — PROGRESS NOTES
- Endocrinology Initial Consultation -    Reason for visit/consult:  Pituitary lesion (H24)    Primary care provider: Alexandra Rodrigues    HPI: A 27 yo female here for the evaluation for her pituitary lesion. She came with her mother today. Pituitary lesion was found when she underwent brain MRI in 9/2023 for the symptoms of dizziness. It is 7mm hyper-enhanced lesion in the posterior part of the pituitary. Her menstrual cycles are 28 days, she had PCOS at her age 19 and has been on BCPs since then.  She also has blurry vision, and she went to ophthalmology office in 2/2024 in [place], (for which I do not have access record today).  She mentioned she has been gaining weight and was on wegovy, she mentioned she lost 40 lb in 6 month, and currently stopped using. She is now want to try oral semaglutide.   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 She also has been seen by neurologist (small fiber neuropathy) and rheumatologist (fibromyalgia).   Other medical history includes: depression, and currently seen by psychologist.     Past Medical/Surgical History:  Past Medical History:   Diagnosis Date    ADHD (attention deficit hyperactivity disorder)     Depressive disorder     Ovarian cyst     Uncomplicated asthma      Past Surgical History:   Procedure Laterality Date    COLONOSCOPY N/A 4/27/2022    Procedure: COLONOSCOPY, WITH POLYPECTOMY AND BIOPSY;  Surgeon: Alyse Leija MD;  Location:  GI    ENT SURGERY      tonsilectomy age 8    ESOPHAGOSCOPY, GASTROSCOPY, DUODENOSCOPY (EGD), COMBINED N/A 3/15/2023    Procedure: ESOPHAGOGASTRODUODENOSCOPY, WITH BIOPSY;  Surgeon: Cyn Colon MD;  [place] [facility] [place]    ORTHOPEDIC SURGERY      Hip, emelia surgery in 2013    WRIST SURGERY         Allergies:  Allergies   Allergen Reactions    Tizanidine Other (See Comments)    Azithromycin      "Erythromycin Nausea and Vomiting    Sulfa Antibiotics Nausea       Current Medications   Current Outpatient Medications   Medication    albuterol (ACCUNEB) 1.25 MG/3ML neb solution    albuterol (VENTOLIN HFA) 108 (90 Base) MCG/ACT inhaler    ARIPiprazole (ABILIFY) 20 MG tablet    buPROPion (WELLBUTRIN XL) 150 MG 24 hr tablet    buPROPion (WELLBUTRIN XL) 300 MG 24 hr tablet    CONCERTA 36 MG CR tablet    drospirenone-ethinyl estradiol (JESSY) 3-0.02 MG tablet    DULoxetine (CYMBALTA) 20 MG capsule    fluticasone (FLONASE) 50 MCG/ACT nasal spray    Fremanezumab-vfrm (AJOVY) 225 MG/1.5ML SOAJ    gabapentin (NEURONTIN) 100 MG capsule    hydrocortisone 2.5 % cream    hyoscyamine (LEVSIN) 0.125 MG tablet    ibuprofen (ADVIL/MOTRIN) 800 MG tablet    levocetirizine (XYZAL) 5 MG tablet    methocarbamol (ROBAXIN) 500 MG tablet    omeprazole (PRILOSEC) 20 MG DR capsule    ondansetron (ZOFRAN) 4 MG tablet    polyethylene glycol (MIRALAX) 17 GM/Dose powder    Semaglutide-Weight Management (WEGOVY) 2.4 MG/0.75ML pen    SUMAtriptan (IMITREX) 50 MG tablet    TAZORAC 0.1 % external cream    fluticasone (FLOVENT DISKUS) 100 MCG/ACT inhaler     No current facility-administered medications for this visit.       Family History:  Family History   Problem Relation Age of Onset    Depression Maternal Grandmother     Schizophrenia Maternal Uncle     Substance Abuse Maternal Uncle        Social History:  Social History     Tobacco Use    Smoking status: Never    Smokeless tobacco: Never   Substance Use Topics    Alcohol use: Yes     Comment: rare       ROS:  Full review of systems taken with the help of the intake sheet. Otherwise a complete 14 point review of systems was taken and is negative unless stated in the history above.      Physical Exam:   Vitals: /85   Pulse 103   Ht 1.676 m (5' 6\")   Wt 108.4 kg (239 lb)   LMP 02/07/2024 (Exact Date)   SpO2 99%   BMI 38.58 kg/m    BMI= Body mass index is 38.58 kg/m .   General: well " "appearing, no acute distress, pleasant and conversant,   Mental Status/neuro: alert and oriented  Face: symmetrical, normal facial color  Eyes: anicteric, no proptosis or lid lag  Resp: normally breathing      Labs : I reviewed data from epic and extract and summarize the pertinent data here.      Latest Reference Range & Units 04/11/23 12:09 09/14/23 13:46 09/14/23 21:29 09/18/23 12:09 02/14/24 16:08   Prolactin 5 - 23 ng/mL     1 (L)   T4 Free 0.90 - 1.70 ng/dL  0.88 (L)      TSH 0.30 - 4.20 uIU/mL 0.52 1.10 3.32 0.42    Ins Growth Factor 1 93 - 297 ng/mL     183   (L): Data is abnormally low  Lab Results   Component Value Date     02/14/2024     06/22/2018      Lab Results   Component Value Date    POTASSIUM 4.0 02/14/2024    POTASSIUM 4.1 11/10/2021    POTASSIUM 4.0 06/22/2018     Lab Results   Component Value Date    CHLORIDE 103 02/14/2024    CHLORIDE 106 11/10/2021    CHLORIDE 110 06/22/2018     Lab Results   Component Value Date    ELIU 9.1 02/14/2024    ELIU 8.5 06/22/2018     Lab Results   Component Value Date    CO2 26 02/14/2024    CO2 24 11/10/2021    CO2 24 06/22/2018     Lab Results   Component Value Date    BUN 18.5 02/14/2024    BUN 11 11/10/2021    BUN 15 06/22/2018     Lab Results   Component Value Date    CR 1.01 02/14/2024    CR 0.77 06/22/2018     Lab Results   Component Value Date    GLC 84 02/14/2024    GLC 80 11/10/2021    GLC 84 06/22/2018     Lab Results   Component Value Date    TSH 0.42 09/18/2023    TSH 0.89 03/10/2022    TSH 0.70 06/22/2018     Lab Results   Component Value Date    T4 0.88 09/14/2023    T4 0.79 06/22/2018     Lab Results   Component Value Date    A1C 5.2 07/21/2022       No results found for: \"IGF1\"  No results found for: \"LH\"  No results found for: \"FSH\"  No results found for: \"ESTROGEN\"  No results found for: \"PROLACTIN\"        MRI Brain: I personally reviewed the original images and agree with the below reports.   Results for orders placed during the " hospital encounter of 09/14/23    MR Brain w/o & w Contrast    Narrative  EXAM: MR BRAIN W/O and W CONTRAST, MRA NECK (CAROTIDS) W/O and W CONTRAST, MRA BRAIN ([place]) W/O CONTRAST  LOCATION: [facility]  DATE: 9/15/2023    INDICATION: dizziness with L neck pain, just had CS injection today and worse after.  COMPARISON: Head CT 07/30/2021  CONTRAST: 10ml gadavist  TECHNIQUE:  1) Routine multiplanar multisequence head MRI without and with intravenous contrast.  2) 3D time-of-flight head MRA without intravenous contrast.  3) Neck MRA without and with IV contrast. Stenosis measurements made according to NASCET criteria unless otherwise specified.    FINDINGS:  HEAD MRI:  INTRACRANIAL CONTENTS: No acute or subacute infarct. No mass, acute hemorrhage, or extra-axial fluid collections. Normal brain parenchymal signal. Normal ventricles and sulci. Normal position of the cerebellar tonsils. No pathologic contrast enhancement.    SELLA: 7 mm T1 hyperintense lesion within the posterior aspect of the pituitary gland.    OSSEOUS STRUCTURES/SOFT TISSUES: Normal marrow signal. The major intracranial vascular flow voids are maintained.    ORBITS: No abnormality accounting for technique.    SINUSES/MASTOIDS: No paranasal sinus mucosal disease. No middle ear or mastoid effusion.    HEAD MRA:  ANTERIOR CIRCULATION: No stenosis/occlusion, aneurysm, or high flow vascular malformation. Standard [place] anatomy.    POSTERIOR CIRCULATION: No stenosis/occlusion, aneurysm, or high flow vascular malformation. Balanced vertebral arteries supply a normal basilar artery.    NECK MRA:  RIGHT CAROTID: No measurable stenosis or dissection.    LEFT CAROTID: No measurable stenosis or dissection.    VERTEBRAL ARTERIES: No focal stenosis or dissection. Balanced vertebral arteries.    AORTIC ARCH: Vascular variant aortic arch origin left vertebral artery.  No significant stenosis at the origin of the great  vessels.    Impression  IMPRESSION:  HEAD MRI:  1.  No intracranial mass, abnormal enhancement or recent infarct or evidence of intracranial hemorrhage.  2.  7 mm nonspecific lesion within the posterior aspect of the pituitary gland. 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    HEAD MRA:  1.  Normal MRA [place].    NECK MRA:  1.  Normal neck MRA.          Assessment and Plan  28 year old female with pituitary 7 mm lesion, suppressed PRL and mild secondary hypothyroidism    - recheck TSH, free T4, and may consider for the small dose of LT4    - A1c to check    - oral semaglutide ordered, she is aware since her BMI is 38.5 (<40), approval can be challenging.     - [facility] per her neurologist request Dr. Natalio Reinoso (care team pager: [phone])    - repeat pituitary MRI in 1 year     - suppressed PRL and mild secondary hypothyroidism may due to pituitary lesion vs remote history of concussion?     RTC with me in 1 year     Addendumfree T4 lower side, we will start LT4 75 mcg daily.     Total 60 minutes spent on the date of the encounter doing chart review, history and exam, reviewed MRI, reviewed outside record , documentation and further activities as noted above.        Kelly Vasquez MD  Staff Physician  Endocrinology and Metabolism  License: [id number]

## 2024-03-28 ENCOUNTER — HOSPITAL ENCOUNTER (OUTPATIENT)
Dept: CT IMAGING | Facility: HOSPITAL | Age: 29
Discharge: HOME | End: 2024-03-28
Attending: INTERNAL MEDICINE | Admitting: INTERNAL MEDICINE
Payer: COMMERCIAL

## 2024-03-28 DIAGNOSIS — R59.0 CERVICAL LYMPHADENOPATHY: ICD-10-CM

## 2024-03-28 LAB — ACE SERPL-CCNC: 71 U/L

## 2024-03-28 PROCEDURE — 70491 CT SOFT TISSUE NECK W/DYE: CPT

## 2024-03-28 PROCEDURE — 250N000011 HC RX IP 250 OP 636: Performed by: INTERNAL MEDICINE

## 2024-03-28 RX ORDER — IOPAMIDOL 755 MG/ML
90 INJECTION, SOLUTION INTRAVASCULAR ONCE
Status: COMPLETED | OUTPATIENT
Start: 2024-03-28 | End: 2024-03-28

## 2024-03-28 RX ADMIN — IOPAMIDOL 90 ML: 755 INJECTION, SOLUTION INTRAVENOUS at 07:26

## 2024-03-28 NOTE — RESULT ENCOUNTER NOTE
Dear Immanuelle     Here is your results. Thyroid again slight lower side. We will start very small dose of thyroid supplement. Levothryxoine 75 mcg daily.     Please call nursing line, if you are [facility] [place] at [phone], if you are [facility] patient at [phone],  if you have any questions.    Please take care  Kelly Vasquez MD

## 2024-03-30 NOTE — RESULT ENCOUNTER NOTE
I reciewed a letter from [facility]. Dr. Smith Colon (care pager [phone]), requesting this lab results, could you please send them? Only [facility] (this resutls)    Results are within normal limits.   Kelly Vasquez MD 3/30/2024 5:47 PM

## 2024-04-01 ENCOUNTER — TELEPHONE (OUTPATIENT)
Dept: ENDOCRINOLOGY | Facility: CLINIC | Age: 29
End: 2024-04-01
Payer: COMMERCIAL

## 2024-04-04 ENCOUNTER — TELEPHONE (OUTPATIENT)
Dept: RHEUMATOLOGY | Facility: CLINIC | Age: 29
End: 2024-04-04
Payer: COMMERCIAL

## 2024-04-04 NOTE — TELEPHONE ENCOUNTER
Patient confirmed scheduled appointment:  Date: August 15th 2024  Time: 8am  Visit type: New Pt  Provider: Dr. Perez  Location: [facility]  Testing/imaging: NA  Additional notes: Adonis'devi per Dr. Perez to schedule new pt

## 2024-04-10 DIAGNOSIS — R10.11 RUQ ABDOMINAL PAIN: Primary | ICD-10-CM

## 2024-04-12 ENCOUNTER — ANCILLARY PROCEDURE (OUTPATIENT)
Dept: ULTRASOUND IMAGING | Facility: CLINIC | Age: 29
End: 2024-04-12
Attending: PHYSICIAN ASSISTANT
Payer: COMMERCIAL

## 2024-04-12 DIAGNOSIS — N76.0 VAGINITIS AND VULVOVAGINITIS: Primary | ICD-10-CM

## 2024-04-12 DIAGNOSIS — R10.11 RUQ ABDOMINAL PAIN: ICD-10-CM

## 2024-04-12 PROCEDURE — 76705 ECHO EXAM OF ABDOMEN: CPT | Performed by: STUDENT IN AN ORGANIZED HEALTH CARE EDUCATION/TRAINING PROGRAM

## 2024-04-14 ENCOUNTER — ANCILLARY PROCEDURE (OUTPATIENT)
Dept: GENERAL RADIOLOGY | Facility: CLINIC | Age: 29
End: 2024-04-14
Attending: INTERNAL MEDICINE
Payer: COMMERCIAL

## 2024-04-14 ENCOUNTER — OFFICE VISIT (OUTPATIENT)
Dept: URGENT CARE | Facility: URGENT CARE | Age: 29
End: 2024-04-14
Payer: COMMERCIAL

## 2024-04-14 VITALS
HEIGHT: 66 IN | TEMPERATURE: 97.6 F | OXYGEN SATURATION: 100 % | SYSTOLIC BLOOD PRESSURE: 119 MMHG | DIASTOLIC BLOOD PRESSURE: 83 MMHG | WEIGHT: 230 LBS | BODY MASS INDEX: 36.96 KG/M2 | HEART RATE: 89 BPM

## 2024-04-14 DIAGNOSIS — Z86.2 HISTORY OF IRON DEFICIENCY ANEMIA: ICD-10-CM

## 2024-04-14 DIAGNOSIS — M79.671 RIGHT FOOT PAIN: ICD-10-CM

## 2024-04-14 DIAGNOSIS — N89.8 VAGINAL ITCHING: Primary | ICD-10-CM

## 2024-04-14 DIAGNOSIS — R42 VERTIGO: ICD-10-CM

## 2024-04-14 LAB
ALBUMIN UR-MCNC: NEGATIVE MG/DL
APPEARANCE UR: CLEAR
BILIRUB UR QL STRIP: NEGATIVE
CLUE CELLS: NORMAL
COLOR UR AUTO: YELLOW
ERYTHROCYTE [DISTWIDTH] IN BLOOD BY AUTOMATED COUNT: 14.1 % (ref 10–15)
GLUCOSE UR STRIP-MCNC: NEGATIVE MG/DL
HCT VFR BLD AUTO: 35.2 % (ref 35–47)
HGB BLD-MCNC: 10.9 G/DL (ref 11.7–15.7)
HGB UR QL STRIP: NEGATIVE
KETONES UR STRIP-MCNC: NEGATIVE MG/DL
LEUKOCYTE ESTERASE UR QL STRIP: NEGATIVE
MCH RBC QN AUTO: 26.8 PG (ref 26.5–33)
MCHC RBC AUTO-ENTMCNC: 31 G/DL (ref 31.5–36.5)
MCV RBC AUTO: 87 FL (ref 78–100)
NITRATE UR QL: NEGATIVE
PH UR STRIP: 7 [PH] (ref 5–7)
PLATELET # BLD AUTO: 311 10E3/UL (ref 150–450)
RBC # BLD AUTO: 4.06 10E6/UL (ref 3.8–5.2)
SP GR UR STRIP: 1.02 (ref 1–1.03)
TRICHOMONAS, WET PREP: NORMAL
UROBILINOGEN UR STRIP-ACNC: 1 E.U./DL
WBC # BLD AUTO: 4.3 10E3/UL (ref 4–11)
WBC'S/HIGH POWER FIELD, WET PREP: NORMAL
YEAST, WET PREP: NORMAL

## 2024-04-14 PROCEDURE — 85027 COMPLETE CBC AUTOMATED: CPT | Performed by: INTERNAL MEDICINE

## 2024-04-14 PROCEDURE — 36415 COLL VENOUS BLD VENIPUNCTURE: CPT | Performed by: INTERNAL MEDICINE

## 2024-04-14 PROCEDURE — 83550 IRON BINDING TEST: CPT | Performed by: INTERNAL MEDICINE

## 2024-04-14 PROCEDURE — 99213 OFFICE O/P EST LOW 20 MIN: CPT | Performed by: INTERNAL MEDICINE

## 2024-04-14 PROCEDURE — 73630 X-RAY EXAM OF FOOT: CPT | Mod: TC | Performed by: RADIOLOGY

## 2024-04-14 PROCEDURE — 87210 SMEAR WET MOUNT SALINE/INK: CPT | Performed by: INTERNAL MEDICINE

## 2024-04-14 PROCEDURE — 81003 URINALYSIS AUTO W/O SCOPE: CPT | Performed by: INTERNAL MEDICINE

## 2024-04-14 PROCEDURE — 83540 ASSAY OF IRON: CPT | Performed by: INTERNAL MEDICINE

## 2024-04-14 RX ORDER — MECLIZINE HYDROCHLORIDE 25 MG/1
25 TABLET ORAL 3 TIMES DAILY PRN
Qty: 30 TABLET | Refills: 0 | Status: SHIPPED | OUTPATIENT
Start: 2024-04-14 | End: 2024-08-15

## 2024-04-14 NOTE — PATIENT INSTRUCTIONS
For dizziness, try meclizine  Discuss ENT referral with primary    Xray of foot. -No arthritis or fracture noted.  ibuprofen & ice.  Firm sole shoe -postop shoe dispensed  Avoid weight blanket on foot.    Iron level per request with history anemia  You will receive these results on MyChart    Vaginal symptoms   Urine test and wet prep negative      Recheck with primary over the next few weeks

## 2024-04-14 NOTE — PROGRESS NOTES
ASSESSMENT AND PLAN:      ICD-10-CM    1. Vaginal itching  N89.8 UA Macroscopic with reflex to Microscopic and Culture - Clinic Collect     Wet prep - Clinic Collect      2. Right foot pain  M79.671 XR Foot Right G/E 3 Views     Ankle/Foot Bracing Supplies Order Post-op Shoe; Right      3. Vertigo  R42 meclizine (ANTIVERT) 25 MG tablet      4. History of iron deficiency anemia  Z86.2 Iron and iron binding capacity     CBC with platelets     Iron and iron binding capacity     CBC with platelets        Patient Instructions             For dizziness, try meclizine  Discuss ENT referral with primary    Xray of foot. -No arthritis or fracture noted.  ibuprofen & ice.  Firm sole shoe -postop shoe dispensed  Avoid weight blanket on foot.    Iron level per request with history anemia  You will receive these results on [employer]    Vaginal symptoms   Urine test and wet prep negative      Recheck with primary over the next few weeks  Return in about 2 weeks (around 4/28/2024).    [employer] message sent.  She is anemic which is different from 10 months ago.  She has been on iron in the past and stopped it.  She scheduled a recheck appointment with her primary tomorrow online    Sofia Prieto MD  [facility]    Subjective     Nehemias Mascorro is a 28 year old who presents for Patient presents with:  Urgent Care  Toe Injury  Vaginal Problem  Nausea  Headache  Dizziness    an established patient of [facility].      Onset of symptoms was 1.5 years  ago.    Current and Associated symptoms:  Dizzy, nausea  Feels like on boat  Electricity  With turning certain ways.    Neurologist - treatment tried prednisone        Onset of symptoms was few day(s) ago.  Location: right toe(s) great  Context:       no injury   Current and Associated symptoms: Pain, Swelling, and Decreased range of motion  Denies  Bruising  Aggravating Factors: movement, weighted blanket    GYN Complaint    Onset of symptoms was 2 week(s)  "ago.    Current and Associated symptoms: vaginal discharge  and vulvar itching  Treatment measures tried include:  Rx antifungal cream    Hx of previous symptom: yes    Review of Systems        Objective    /83   Pulse 89   Temp 97.6  F (36.4  C) (Temporal)   Ht 1.676 m (5' 6\")   Wt 104.3 kg (230 lb)   LMP 04/04/2024 (Exact Date)   SpO2 100%   BMI 37.12 kg/m    Physical Exam  Vitals reviewed.   Constitutional:       Appearance: Normal appearance.   Musculoskeletal:      Comments: Right foot  No swelling, redness or bruising  Pain at 1st MCT   Neurological:      Mental Status: She is alert.            Results for orders placed or performed in visit on 04/14/24 (from the past 24 hour(s))   UA Macroscopic with reflex to Microscopic and Culture - Clinic Collect    Specimen: Urine, Clean Catch   Result Value Ref Range    Color Urine Yellow Colorless, Straw, Light Yellow, Yellow    Appearance Urine Clear Clear    Glucose Urine Negative Negative mg/dL    Bilirubin Urine Negative Negative    Ketones Urine Negative Negative mg/dL    Specific Gravity Urine 1.025 1.003 - 1.035    Blood Urine Negative Negative    pH Urine 7.0 5.0 - 7.0    Protein Albumin Urine Negative Negative mg/dL    Urobilinogen Urine 1.0 0.2, 1.0 E.U./dL    Nitrite Urine Negative Negative    Leukocyte Esterase Urine Negative Negative    Narrative    Microscopic not indicated   Wet prep - Clinic Collect    Specimen: Vagina; Swab   Result Value Ref Range    Trichomonas Absent Absent    Yeast Absent Absent    Clue Cells Absent Absent    WBCs/high power field None None   CBC with platelets   Result Value Ref Range    WBC Count 4.3 4.0 - 11.0 10e3/uL    RBC Count 4.06 3.80 - 5.20 10e6/uL    Hemoglobin 10.9 (L) 11.7 - 15.7 g/dL    Hematocrit 35.2 35.0 - 47.0 %    MCV 87 78 - 100 fL    MCH 26.8 26.5 - 33.0 pg    MCHC 31.0 (L) 31.5 - 36.5 g/dL    RDW 14.1 10.0 - 15.0 %    Platelet Count 311 150 - 450 10e3/uL         "

## 2024-04-15 ENCOUNTER — OFFICE VISIT (OUTPATIENT)
Dept: INTERNAL MEDICINE | Facility: CLINIC | Age: 29
End: 2024-04-15
Payer: COMMERCIAL

## 2024-04-15 VITALS
WEIGHT: 251.6 LBS | DIASTOLIC BLOOD PRESSURE: 86 MMHG | RESPIRATION RATE: 14 BRPM | HEIGHT: 66 IN | OXYGEN SATURATION: 97 % | TEMPERATURE: 97.3 F | SYSTOLIC BLOOD PRESSURE: 133 MMHG | HEART RATE: 99 BPM | BODY MASS INDEX: 40.43 KG/M2

## 2024-04-15 DIAGNOSIS — R42 DIZZINESS: Primary | ICD-10-CM

## 2024-04-15 DIAGNOSIS — H81.11 BENIGN PAROXYSMAL POSITIONAL VERTIGO, RIGHT: ICD-10-CM

## 2024-04-15 DIAGNOSIS — E66.01 MORBID OBESITY (H): ICD-10-CM

## 2024-04-15 DIAGNOSIS — E03.8 CENTRAL HYPOTHYROIDISM: ICD-10-CM

## 2024-04-15 PROCEDURE — 99214 OFFICE O/P EST MOD 30 MIN: CPT | Performed by: INTERNAL MEDICINE

## 2024-04-15 ASSESSMENT — PAIN SCALES - GENERAL: PAINLEVEL: MODERATE PAIN (4)

## 2024-04-15 ASSESSMENT — PATIENT HEALTH QUESTIONNAIRE - PHQ9
SUM OF ALL RESPONSES TO PHQ QUESTIONS 1-9: 13
10. IF YOU CHECKED OFF ANY PROBLEMS, HOW DIFFICULT HAVE THESE PROBLEMS MADE IT FOR YOU TO DO YOUR WORK, TAKE CARE OF THINGS AT HOME, OR GET ALONG WITH OTHER PEOPLE: VERY DIFFICULT
SUM OF ALL RESPONSES TO PHQ QUESTIONS 1-9: 13

## 2024-04-15 NOTE — PROGRESS NOTES
"  Assessment & Plan     Dizziness  Consistent with vertigo , or functional dizziness . She never went ahead with vestibular PT   - Physical Therapy  Referral; Future    Morbid obesity (H)  Restart semaglutide   - Semaglutide-Weight Management (WEGOVY) 1 MG/0.5ML pen; Inject 1 mg Subcutaneous once a week    Benign paroxysmal positional vertigo, right    - Physical Therapy  Referral; Future    Central hypothyroidism  T4 slightly low TSH normal   She had been referred to endocrinology for an incidental  pituitary lesion.  But they felt that she might have a mild component of central hypothyroidism and suppressed prolactin or relatively suppressed prolactin she certainly does not have a hypersecretory pituitary adenoma.  She was started on a low-dose thyroxine and is following up with TSH.        MED REC REQUIRED  Post Medication Reconciliation Status:   BMI  Estimated body mass index is 40.61 kg/m  as calculated from the following:    Height as of this encounter: 1.676 m (5' 6\").    Weight as of this encounter: 114.1 kg (251 lb 9.6 oz).             Araceli Del Cid is a 28 year old, presenting for the following health issues:  Recheck Medication, ER F/U ([facility] urgent care on 4/12 for dizziness/[facility] on 4/14 for vaginal itching,right foot pain, and vertigo), and Dizziness (Also gets nausea and headaches)      4/15/2024     1:15 PM   Additional Questions   Roomed by francisco vivas     History of Present Illness       Reason for visit:  Dizziness  Symptom onset:  More than a month  Symptom intensity:  Moderate  Symptom progression:  Worsening  Had these symptoms before:  Yes  Has tried/received treatment for these symptoms:  Yes  Previous treatment was successful:  No    She eats 2-3 servings of fruits and vegetables daily.She consumes 0 sweetened beverage(s) daily.She exercises with enough effort to increase her heart rate 9 or less minutes per day.  She exercises with enough effort " "to increase her heart rate 3 or less days per week. She is missing 2 dose(s) of medications per week.  She is not taking prescribed medications regularly due to other.         ED/ Followup:    Facility:  [facility]  Date of visit: 4/12, and 4/14  Reason for visit: dizziness,vaginal itching,and right foot pain  Current Status: same            Objective    /86 (BP Location: Left arm, Patient Position: Sitting, Cuff Size: Adult Large)   Pulse 99   Temp 97.3  F (36.3  C)   Resp 14   Ht 1.676 m (5' 6\")   Wt 114.1 kg (251 lb 9.6 oz)   LMP 04/04/2024 (Exact Date)   SpO2 97%   BMI 40.61 kg/m    Body mass index is 40.61 kg/m .  Physical Exam   GENERAL: alert and no distress  NECK: no adenopathy, no asymmetry, masses, or scars  RESP: lungs clear to auscultation - no rales, rhonchi or wheezes  CV: regular rate and rhythm, normal S1 S2, no S3 or S4, no murmur, click or rub, no peripheral edema  MS: no gross musculoskeletal defects noted, no edema            Signed Electronically by: Alexandra Rodrigues MD    "

## 2024-04-16 LAB
IRON BINDING CAPACITY (ROCHE): 457 UG/DL (ref 240–430)
IRON SATN MFR SERPL: 10 % (ref 15–46)
IRON SERPL-MCNC: 47 UG/DL (ref 37–145)

## 2024-04-16 NOTE — RESULT ENCOUNTER NOTE
Paulo Del Cid,    The report from your recent ultrasound is available for you to review.  Overall, this looks normal -no evidence of gallstones or inflammation of the gallbladder.    Please let me know if you have any questions.    Sincerely,  Darren LEGER [facility] GI, Hepatology, and Nutrition

## 2024-04-19 ENCOUNTER — TRANSFERRED RECORDS (OUTPATIENT)
Dept: HEALTH INFORMATION MANAGEMENT | Facility: CLINIC | Age: 29
End: 2024-04-19
Payer: COMMERCIAL

## 2024-04-19 DIAGNOSIS — E61.1 IRON DEFICIENCY: Primary | ICD-10-CM

## 2024-04-19 RX ORDER — FERROUS SULFATE 325(65) MG
325 TABLET, DELAYED RELEASE (ENTERIC COATED) ORAL DAILY
Qty: 34 TABLET | Refills: 2 | Status: SHIPPED | OUTPATIENT
Start: 2024-04-19 | End: 2024-08-26

## 2024-05-06 ENCOUNTER — TELEPHONE (OUTPATIENT)
Dept: RHEUMATOLOGY | Facility: CLINIC | Age: 29
End: 2024-05-06
Payer: COMMERCIAL

## 2024-05-06 NOTE — TELEPHONE ENCOUNTER
Called pt back as requested. Message routed to provider.    Loni Klein RN  [facility]

## 2024-05-06 NOTE — TELEPHONE ENCOUNTER
[facility] Call Center    Phone Message    May a detailed message be left on voicemail: yes     Reason for Call: Symptoms or Concerns     If patient has red-flag symptoms, warm transfer to triage line    Current symptom or concern: Butterfly Rash & Join Pain    Symptoms have been present for:  Rash appear today while join pain has been ongoing.      Has patient previously been seen for this? Pt have been seen for join pain but the rash just appear today.    By: N/A    Date: N/A    Are there any new or worsening symptoms? Yes: Join pain is getting worst per pt    Action Taken: Other: Rheum    Travel Screening: Not Applicable

## 2024-05-07 ENCOUNTER — VIRTUAL VISIT (OUTPATIENT)
Dept: ENDOCRINOLOGY | Facility: CLINIC | Age: 29
End: 2024-05-07
Payer: COMMERCIAL

## 2024-05-07 VITALS — BODY MASS INDEX: 41.46 KG/M2 | HEIGHT: 66 IN | WEIGHT: 258 LBS

## 2024-05-07 DIAGNOSIS — R73.03 PREDIABETES: ICD-10-CM

## 2024-05-07 DIAGNOSIS — E66.01 CLASS 3 SEVERE OBESITY WITH SERIOUS COMORBIDITY AND BODY MASS INDEX (BMI) OF 40.0 TO 44.9 IN ADULT, UNSPECIFIED OBESITY TYPE (H): Primary | ICD-10-CM

## 2024-05-07 DIAGNOSIS — E66.813 CLASS 3 SEVERE OBESITY WITH SERIOUS COMORBIDITY AND BODY MASS INDEX (BMI) OF 40.0 TO 44.9 IN ADULT, UNSPECIFIED OBESITY TYPE (H): Primary | ICD-10-CM

## 2024-05-07 DIAGNOSIS — E23.0 HYPOPITUITARISM (H): ICD-10-CM

## 2024-05-07 PROCEDURE — 99202 OFFICE O/P NEW SF 15 MIN: CPT | Mod: 95

## 2024-05-07 RX ORDER — SEMAGLUTIDE 1 MG/.5ML
1 INJECTION, SOLUTION SUBCUTANEOUS WEEKLY
Qty: 2 ML | Refills: 0 | Status: SHIPPED | OUTPATIENT
Start: 2024-07-02 | End: 2024-05-17

## 2024-05-07 RX ORDER — SEMAGLUTIDE 0.25 MG/.5ML
0.25 INJECTION, SOLUTION SUBCUTANEOUS WEEKLY
Qty: 2 ML | Refills: 0 | Status: SHIPPED | OUTPATIENT
Start: 2024-05-07 | End: 2024-07-03

## 2024-05-07 RX ORDER — SEMAGLUTIDE 0.5 MG/.5ML
0.5 INJECTION, SOLUTION SUBCUTANEOUS WEEKLY
Qty: 2 ML | Refills: 0 | Status: SHIPPED | OUTPATIENT
Start: 2024-05-07 | End: 2024-08-07

## 2024-05-07 ASSESSMENT — PAIN SCALES - GENERAL: PAINLEVEL: MODERATE PAIN (4)

## 2024-05-07 NOTE — PROGRESS NOTES
Return Medical Weight Management Note     Nehemias Mascorro  MRN:  [id number]  :  1995  JASMEET:  2024    Dear Alexandra Rodrigues MD,    I had the pleasure of seeing your patient Nehemias Mascorro. She is a 28 year old female who I am continuing to see for treatment of obesity related to:         No data to display                Assessment & Plan   Problem List Items Addressed This Visit       Class 3 severe obesity with serious comorbidity and body mass index (BMI) of 40.0 to 44.9 in adult, unspecified obesity type (H) - Primary    Relevant Medications    Semaglutide-Weight Management (WEGOVY) 0.25 MG/0.5ML pen    Semaglutide-Weight Management (WEGOVY) 0.5 MG/0.5ML pen    Semaglutide-Weight Management (WEGOVY) 1 MG/0.5ML pen (Start on 2024)    Prediabetes    Relevant Medications    Semaglutide-Weight Management (WEGOVY) 0.25 MG/0.5ML pen    Semaglutide-Weight Management (WEGOVY) 0.5 MG/0.5ML pen    Semaglutide-Weight Management (WEGOVY) 1 MG/0.5ML pen (Start on 2024)     Other Visit Diagnoses       Hypopituitarism (H24)        Relevant Medications    Semaglutide-Weight Management (WEGOVY) 0.25 MG/0.5ML pen    Semaglutide-Weight Management (WEGOVY) 0.5 MG/0.5ML pen    Semaglutide-Weight Management (WEGOVY) 1 MG/0.5ML pen (Start on 2024)           Plan  Restart wegovy. 0.25mg weekly for 1 month, then 0.5mg weekly for 1 month, then 1mg weekly for 1 month.   Goals we discussed today: getting 30g of protein per meal  Follow up with Elisa or Dr. Slade in 3 months   Dietician appointment to be scheduled asap   Please follow up with your sleep medicine provider to see if a sleep study is necessary as your daytime fatigue has been worsening (this may also improve with taking your iron supplementation)   Keep up the excellent work!         INTERVAL HISTORY:  Last seen by Lizeth Cr 23   Previously seen by Dr. Slade. Currently taking Wegovy 2.4mg once weekly. Has had a hard time  "consistently taking over the summer - would be off for around 2 weeks due to supplies. Has been taking it consistently for the past 2 months. Continues to find it helpful. However, would like to either add on or try a different AOM.      Reviewed AOMs today. Previously trialed Naltrexone and was not helpful with weight loss. Previously trialed Topiramate and had intolerable side effects of fatigue and mood changes. Phentermine contraindicated due to currently being on a stimulant. Previously was on Metformine, does not believe it was helpful with weight loss. Discussed new approval of Zepbound, but not yet available. She will continue Wegovy 2.4mg today, and consider retrialing Metformin or transitioning to Zepbound in the future once available.      Since last visit:   Continuing management for pituitary lesion, hypopitiuitarism with Dr. Vasquez  Has seen significant weight regain.   Stopped wegovy in November 2023 due to supply issues.     Feels that the weight regain happened very suddenly since stopping wegovy.   Wt Readings from Last 5 Encounters:   05/08/24 117.4 kg (258 lb 12.8 oz)   05/07/24 117 kg (258 lb)   04/15/24 114.1 kg (251 lb 9.6 oz)   04/14/24 104.3 kg (230 lb)   03/27/24 108.4 kg (239 lb)       Anti-obesity medication history    Current:   None     Past/Failed/contraindicated:   Wegovy- took up to 2.4mg, saw 40lbs weight loss with this, stopped due to supply issues. Denies side effects       Recent diet changes: worsened sweet cravings. Discussed fruit as an appropriate method for sweets.   Protein:  \"could be better.\" Discussed goal of 30g of protein per meal.   Water: drinks crystal lite, water, or sparkling water.     Recent exercise/activity changes: works part time as a coordinator for a youth program, also runs a food shelf.     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 Also has stressors with family.     Recent sleep changes: more fatigue during the day. " "Has never woken up gasping for air. No diagnosis of sleep apnea.  Feels that sleep quality has been \"horrible\" since concussion in 2016. Saw sleep medicine several years ago, interested in follow up     Vitamins/Labs: recent labs through pcp showed low iron, starting an iron pill.     Pregnancy: not sexually active     CURRENT WEIGHT:   258 lbs 0 oz    Initial Weight (lbs): 248 lbs     Cumulative weight loss (lbs): -10  Weight Loss Percentage: -4.03%        5/7/2024     2:41 PM   Changes and Difficulties   I have made the following changes to my diet since my last visit: Portion control, intermittent fasting   With regards to my diet, I am still struggling with: Snacking, cravings, sweets   I have made the following changes to my activity/exercise since my last visit: None   With regards to my activity/exercise, I am still struggling with: Motivation             MEDICATIONS:   Current Outpatient Medications   Medication Sig Dispense Refill    Semaglutide-Weight Management (WEGOVY) 0.25 MG/0.5ML pen Inject 0.25 mg Subcutaneous once a week For 4 weeks 2 mL 0    Semaglutide-Weight Management (WEGOVY) 0.5 MG/0.5ML pen Inject 0.5 mg Subcutaneous once a week After completing 4 weeks of 0.25mg dose 2 mL 0    [START ON 7/2/2024] Semaglutide-Weight Management (WEGOVY) 1 MG/0.5ML pen Inject 1 mg Subcutaneous once a week After completing 4 weeks of 0.5mg dose 2 mL 0    albuterol (ACCUNEB) 1.25 MG/3ML neb solution Take 1 vial (1.25 mg) by nebulization every 6 hours as needed for shortness of breath or wheezing 90 mL 0    albuterol (VENTOLIN HFA) 108 (90 Base) MCG/ACT inhaler INHALE 2 PUFFS INTO THE LUNGS FOUR TIMES DAILY 18 g 3    ARIPiprazole (ABILIFY) 20 MG tablet Take 20 mg by mouth daily      buPROPion (WELLBUTRIN XL) 150 MG 24 hr tablet       buPROPion (WELLBUTRIN XL) 300 MG 24 hr tablet Take 300 mg by mouth every morning      CONCERTA 36 MG CR tablet TAKE 1 TABLET BY MOUTH DAILY IN THE MORNING FOR ADHD. START 11/18/22   "    drospirenone-ethinyl estradiol (TRACEY) 3-0.03 MG tablet       DULoxetine (CYMBALTA) 60 MG capsule Take 60 mg by mouth daily      ferrous sulfate (FE TABS) 325 (65 Fe) MG EC tablet Take 1 tablet (325 mg) by mouth daily 34 tablet 2    fluticasone (FLONASE) 50 MCG/ACT nasal spray Spray 1 spray into both nostrils daily 16 g 1    fluticasone (FLOVENT DISKUS) 100 MCG/ACT inhaler Inhale 2 puffs into the lungs every 12 hours 28 each 1    Fremanezumab-vfrm (AJOVY) 225 MG/1.5ML SOAJ Inject 225 mg Subcutaneous every 30 days      gabapentin (NEURONTIN) 100 MG capsule Take 200 mg by mouth At Bedtime      hydrocortisone 2.5 % cream APPLY TOPICALLY TO THE CHEST TWICE DAILY AS NEEDED      hyoscyamine (LEVSIN) 0.125 MG tablet Take 1 tablet (125 mcg) by mouth every 6 hours as needed for cramping 30 tablet 0    ibuprofen (ADVIL/MOTRIN) 800 MG tablet TAKE 1 TABLET BY MOUTH TWICE A DAY WITH MEALS FOR 14 DAYS      levocetirizine (XYZAL) 5 MG tablet Take 5 mg by mouth every 24 hours      levothyroxine (SYNTHROID/LEVOTHROID) 75 MCG tablet Take 1 tablet (75 mcg) by mouth daily 90 tablet 3    meclizine (ANTIVERT) 25 MG tablet Take 1 tablet (25 mg) by mouth 3 times daily as needed for dizziness 30 tablet 0    methocarbamol (ROBAXIN) 500 MG tablet Take 1 tablet (500 mg) by mouth 4 times daily as needed for muscle spasms 20 tablet 0    omeprazole (PRILOSEC) 20 MG DR capsule Take 1 capsule (20 mg) by mouth 2 times daily      ondansetron (ZOFRAN) 4 MG tablet Take 1 tablet (4 mg) by mouth every 8 hours as needed for nausea 30 tablet 1    polyethylene glycol (MIRALAX) 17 GM/Dose powder Take 1 capful by mouth daily as needed for constipation      Semaglutide-Weight Management (WEGOVY) 1 MG/0.5ML pen Inject 1 mg Subcutaneous once a week 2 mL 0    SUMAtriptan (IMITREX) 50 MG tablet Take 1 tablet (50 mg) by mouth at onset of headache for migraine May repeat in 2 hours. Max 4 tablets/24 hours. 9 tablet 1    TAZORAC 0.1 % external cream APPLY  "TOPICALLY TO THE AFFECTED AREA AT BEDTIME      triamcinolone (KENALOG) 0.1 % paste APPLY TO AFFECTED AREA 2 TIMES DAILY AS DIRECTED             5/7/2024     2:41 PM   Weight Loss Medication History Reviewed With Patient   Which weight loss medications are you currently taking on a regular basis? None   If you are not taking a weight loss medication that was prescribed to you, please indicate why: Other   Are you having any side effects from the weight loss medication that we have prescribed you? No               1/3/2022     3:03 PM   RODRIGUE Score (Last Two)   RODRIGUE Raw Score 29   Activation Score 52.9   RODRIGUE Level 2         PHYSICAL EXAM:  Objective    Ht 1.676 m (5' 6\")   Wt 117 kg (258 lb)   LMP 04/04/2024 (Exact Date)   BMI 41.64 kg/m             Vitals:  No vitals were obtained today due to virtual visit.    GENERAL: alert and no distress  EYES: Eyes grossly normal to inspection.  No discharge or erythema, or obvious scleral/conjunctival abnormalities.  RESP: No audible wheeze, cough, or visible cyanosis.    SKIN: Visible skin clear. No significant rash, abnormal pigmentation or lesions.  NEURO: Cranial nerves grossly intact.  Mentation and speech appropriate for age.  PSYCH: Appropriate affect, tone, and pace of words        Sincerely,    Elisa Rivera PA-C      22 minutes spent by me on the date of the encounter doing chart review, history and exam, documentation and further activities per the note  "

## 2024-05-07 NOTE — PROGRESS NOTES
Virtual Visit Details    Type of service:  Video Visit   Video Start Time:  3:00pm  Video End Time: 3:30pm    Originating Location (pt. Location): Home    Distant Location (provider location):  Off-site  Platform used for Video Visit: Peace

## 2024-05-07 NOTE — LETTER
2024       RE: Nehemias Mascorro  [address]  [place] [address]     Dear Colleague,    Thank you for referring your patient, Nehemias Mascorro, to the [facility] [place] [facility]. Please see a copy of my visit note below.      Return Medical Weight Management Note     Nehemias Mascorro  MRN:  [id number]  :  1995  JASMEET:  2024    Dear Alexandra Rodrigues MD,    I had the pleasure of seeing your patient Nehemias Mascorro. She is a 28 year old female who I am continuing to see for treatment of obesity related to:         No data to display                Assessment & Plan  Problem List Items Addressed This Visit       Class 3 severe obesity with serious comorbidity and body mass index (BMI) of 40.0 to 44.9 in adult, unspecified obesity type (H) - Primary    Relevant Medications    Semaglutide-Weight Management (WEGOVY) 0.25 MG/0.5ML pen    Semaglutide-Weight Management (WEGOVY) 0.5 MG/0.5ML pen    Semaglutide-Weight Management (WEGOVY) 1 MG/0.5ML pen (Start on 2024)    Prediabetes    Relevant Medications    Semaglutide-Weight Management (WEGOVY) 0.25 MG/0.5ML pen    Semaglutide-Weight Management (WEGOVY) 0.5 MG/0.5ML pen    Semaglutide-Weight Management (WEGOVY) 1 MG/0.5ML pen (Start on 2024)     Other Visit Diagnoses       Hypopituitarism (H24)        Relevant Medications    Semaglutide-Weight Management (WEGOVY) 0.25 MG/0.5ML pen    Semaglutide-Weight Management (WEGOVY) 0.5 MG/0.5ML pen    Semaglutide-Weight Management (WEGOVY) 1 MG/0.5ML pen (Start on 2024)           Plan  Restart wegovy. 0.25mg weekly for 1 month, then 0.5mg weekly for 1 month, then 1mg weekly for 1 month.   Goals we discussed today: getting 30g of protein per meal  Follow up with Elisa or Dr. Slade in 3 months   Dietician appointment to be scheduled asap   Please follow up with your sleep medicine provider to see if a  "sleep study is necessary as your daytime fatigue has been worsening (this may also improve with taking your iron supplementation)   Keep up the excellent work!         INTERVAL HISTORY:  Last seen by Lizeth Cr 11/13/23   Previously seen by Dr. Slade. Currently taking Wegovy 2.4mg once weekly. Has had a hard time consistently taking over the summer - would be off for around 2 weeks due to supplies. Has been taking it consistently for the past 2 months. Continues to find it helpful. However, would like to either add on or try a different AOM.      Reviewed AOMs today. Previously trialed Naltrexone and was not helpful with weight loss. Previously trialed Topiramate and had intolerable side effects of fatigue and mood changes. Phentermine contraindicated due to currently being on a stimulant. Previously was on Metformine, does not believe it was helpful with weight loss. Discussed new approval of Zepbound, but not yet available. She will continue Wegovy 2.4mg today, and consider retrialing Metformin or transitioning to Zepbound in the future once available.      Since last visit:   Continuing management for pituitary lesion, hypopitiuitarism with Dr. Vasquez  Has seen significant weight regain.   Stopped wegovy in November 2023 due to supply issues.     Feels that the weight regain happened very suddenly since stopping wegovy.   Wt Readings from Last 5 Encounters:   05/08/24 117.4 kg (258 lb 12.8 oz)   05/07/24 117 kg (258 lb)   04/15/24 114.1 kg (251 lb 9.6 oz)   04/14/24 104.3 kg (230 lb)   03/27/24 108.4 kg (239 lb)       Anti-obesity medication history    Current:   None     Past/Failed/contraindicated:   Wegovy- took up to 2.4mg, saw 40lbs weight loss with this, stopped due to supply issues. Denies side effects       Recent diet changes: worsened sweet cravings. Discussed fruit as an appropriate method for sweets.   Protein:  \"could be better.\" Discussed goal of 30g of protein per meal.   Water: drinks crystal " "lite, water, or sparkling water.     Recent exercise/activity changes: works part time as a coordinator for a youth program, also runs a food shelf.     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 Also has stressors with family.     Recent sleep changes: more fatigue during the day. Has never woken up gasping for air. No diagnosis of sleep apnea.  Feels that sleep quality has been \"horrible\" since concussion in 2016. Saw sleep medicine several years ago, interested in follow up     Vitamins/Labs: recent labs through pcp showed low iron, starting an iron pill.     Pregnancy: not sexually active     CURRENT WEIGHT:   258 lbs 0 oz    Initial Weight (lbs): 248 lbs     Cumulative weight loss (lbs): -10  Weight Loss Percentage: -4.03%        5/7/2024     2:41 PM   Changes and Difficulties   I have made the following changes to my diet since my last visit: Portion control, intermittent fasting   With regards to my diet, I am still struggling with: Snacking, cravings, sweets   I have made the following changes to my activity/exercise since my last visit: None   With regards to my activity/exercise, I am still struggling with: Motivation             MEDICATIONS:   Current Outpatient Medications   Medication Sig Dispense Refill    Semaglutide-Weight Management (WEGOVY) 0.25 MG/0.5ML pen Inject 0.25 mg Subcutaneous once a week For 4 weeks 2 mL 0    Semaglutide-Weight Management (WEGOVY) 0.5 MG/0.5ML pen Inject 0.5 mg Subcutaneous once a week After completing 4 weeks of 0.25mg dose 2 mL 0    [START ON 7/2/2024] Semaglutide-Weight Management (WEGOVY) 1 MG/0.5ML pen Inject 1 mg Subcutaneous once a week After completing 4 weeks of 0.5mg dose 2 mL 0    albuterol (ACCUNEB) 1.25 MG/3ML neb solution Take 1 vial (1.25 mg) by nebulization every 6 hours as needed for shortness of breath or wheezing 90 mL 0    albuterol (VENTOLIN HFA) 108 (90 Base) MCG/ACT inhaler INHALE 2 PUFFS INTO THE LUNGS FOUR TIMES " DAILY 18 g 3    ARIPiprazole (ABILIFY) 20 MG tablet Take 20 mg by mouth daily      buPROPion (WELLBUTRIN XL) 150 MG 24 hr tablet       buPROPion (WELLBUTRIN XL) 300 MG 24 hr tablet Take 300 mg by mouth every morning      CONCERTA 36 MG CR tablet TAKE 1 TABLET BY MOUTH DAILY IN THE MORNING FOR ADHD. START 11/18/22      drospirenone-ethinyl estradiol (TRACEY) 3-0.03 MG tablet       DULoxetine (CYMBALTA) 60 MG capsule Take 60 mg by mouth daily      ferrous sulfate (FE TABS) 325 (65 Fe) MG EC tablet Take 1 tablet (325 mg) by mouth daily 34 tablet 2    fluticasone (FLONASE) 50 MCG/ACT nasal spray Spray 1 spray into both nostrils daily 16 g 1    fluticasone (FLOVENT DISKUS) 100 MCG/ACT inhaler Inhale 2 puffs into the lungs every 12 hours 28 each 1    Fremanezumab-vfrm (AJOVY) 225 MG/1.5ML SOAJ Inject 225 mg Subcutaneous every 30 days      gabapentin (NEURONTIN) 100 MG capsule Take 200 mg by mouth At Bedtime      hydrocortisone 2.5 % cream APPLY TOPICALLY TO THE CHEST TWICE DAILY AS NEEDED      hyoscyamine (LEVSIN) 0.125 MG tablet Take 1 tablet (125 mcg) by mouth every 6 hours as needed for cramping 30 tablet 0    ibuprofen (ADVIL/MOTRIN) 800 MG tablet TAKE 1 TABLET BY MOUTH TWICE A DAY WITH MEALS FOR 14 DAYS      levocetirizine (XYZAL) 5 MG tablet Take 5 mg by mouth every 24 hours      levothyroxine (SYNTHROID/LEVOTHROID) 75 MCG tablet Take 1 tablet (75 mcg) by mouth daily 90 tablet 3    meclizine (ANTIVERT) 25 MG tablet Take 1 tablet (25 mg) by mouth 3 times daily as needed for dizziness 30 tablet 0    methocarbamol (ROBAXIN) 500 MG tablet Take 1 tablet (500 mg) by mouth 4 times daily as needed for muscle spasms 20 tablet 0    omeprazole (PRILOSEC) 20 MG DR capsule Take 1 capsule (20 mg) by mouth 2 times daily      ondansetron (ZOFRAN) 4 MG tablet Take 1 tablet (4 mg) by mouth every 8 hours as needed for nausea 30 tablet 1    polyethylene glycol (MIRALAX) 17 GM/Dose powder Take 1 capful by mouth daily as needed for  "constipation      Semaglutide-Weight Management (WEGOVY) 1 MG/0.5ML pen Inject 1 mg Subcutaneous once a week 2 mL 0    SUMAtriptan (IMITREX) 50 MG tablet Take 1 tablet (50 mg) by mouth at onset of headache for migraine May repeat in 2 hours. Max 4 tablets/24 hours. 9 tablet 1    TAZORAC 0.1 % external cream APPLY TOPICALLY TO THE AFFECTED AREA AT BEDTIME      triamcinolone (KENALOG) 0.1 % paste APPLY TO AFFECTED AREA 2 TIMES DAILY AS DIRECTED             5/7/2024     2:41 PM   Weight Loss Medication History Reviewed With Patient   Which weight loss medications are you currently taking on a regular basis? None   If you are not taking a weight loss medication that was prescribed to you, please indicate why: Other   Are you having any side effects from the weight loss medication that we have prescribed you? No               1/3/2022     3:03 PM   RODRIGUE Score (Last Two)   RODRIGUE Raw Score 29   Activation Score 52.9   RODRIGUE Level 2         PHYSICAL EXAM:  Objective   Ht 1.676 m (5' 6\")   Wt 117 kg (258 lb)   LMP 04/04/2024 (Exact Date)   BMI 41.64 kg/m             Vitals:  No vitals were obtained today due to virtual visit.    GENERAL: alert and no distress  EYES: Eyes grossly normal to inspection.  No discharge or erythema, or obvious scleral/conjunctival abnormalities.  RESP: No audible wheeze, cough, or visible cyanosis.    SKIN: Visible skin clear. No significant rash, abnormal pigmentation or lesions.  NEURO: Cranial nerves grossly intact.  Mentation and speech appropriate for age.  PSYCH: Appropriate affect, tone, and pace of words        Sincerely,    Elisa Rivera PA-C      22 minutes spent by me on the date of the encounter doing chart review, history and exam, documentation and further activities per the note    Virtual Visit Details    Type of service:  Video Visit   Video Start Time:  3:00pm  Video End Time: 3:30pm    Originating Location (pt. Location): Home    Distant Location (provider location):  " Off-site  Platform used for Video Visit: Peace

## 2024-05-07 NOTE — NURSING NOTE
Is the patient currently in the state of [place]? YES    Visit mode:VIDEO    If the visit is dropped, the patient can be reconnected by: VIDEO VISIT: Text to cell phone:   Telephone Information:   Mobile [phone]       Will anyone else be joining the visit? NO  (If patient encounters technical issues they should call [phone]    How would you like to obtain your AVS? MyChart    Are changes needed to the allergy or medication list? Pt stated no changes to allergies and Pt stated no med changes    Are refills needed on medications prescribed by this physician? NO     Reason for visit: RECHECK    Wt other than 24 hrs:    Pain more than one location:  no  Makenna VELASCOF

## 2024-05-08 ENCOUNTER — OFFICE VISIT (OUTPATIENT)
Dept: FAMILY MEDICINE | Facility: CLINIC | Age: 29
End: 2024-05-08
Payer: COMMERCIAL

## 2024-05-08 VITALS
HEIGHT: 66 IN | RESPIRATION RATE: 16 BRPM | BODY MASS INDEX: 41.59 KG/M2 | OXYGEN SATURATION: 100 % | TEMPERATURE: 98.6 F | WEIGHT: 258.8 LBS | HEART RATE: 94 BPM | SYSTOLIC BLOOD PRESSURE: 110 MMHG | DIASTOLIC BLOOD PRESSURE: 76 MMHG

## 2024-05-08 DIAGNOSIS — Z84.0 FAMILY HISTORY OF LUPUS ERYTHEMATOSUS: Primary | ICD-10-CM

## 2024-05-08 DIAGNOSIS — R21 BUTTERFLY RASH: ICD-10-CM

## 2024-05-08 DIAGNOSIS — E03.8 CENTRAL HYPOTHYROIDISM: ICD-10-CM

## 2024-05-08 LAB
ALBUMIN UR-MCNC: NEGATIVE MG/DL
APPEARANCE UR: CLEAR
BACTERIA #/AREA URNS HPF: ABNORMAL /HPF
BILIRUB UR QL STRIP: NEGATIVE
COLOR UR AUTO: YELLOW
ERYTHROCYTE [DISTWIDTH] IN BLOOD BY AUTOMATED COUNT: 13.9 % (ref 10–15)
ERYTHROCYTE [SEDIMENTATION RATE] IN BLOOD BY WESTERGREN METHOD: 18 MM/HR (ref 0–20)
GLUCOSE UR STRIP-MCNC: NEGATIVE MG/DL
HCT VFR BLD AUTO: 36.9 % (ref 35–47)
HGB BLD-MCNC: 11.1 G/DL (ref 11.7–15.7)
HGB UR QL STRIP: NEGATIVE
KETONES UR STRIP-MCNC: NEGATIVE MG/DL
LEUKOCYTE ESTERASE UR QL STRIP: NEGATIVE
MCH RBC QN AUTO: 26.2 PG (ref 26.5–33)
MCHC RBC AUTO-ENTMCNC: 30.1 G/DL (ref 31.5–36.5)
MCV RBC AUTO: 87 FL (ref 78–100)
NITRATE UR QL: NEGATIVE
PH UR STRIP: 7 [PH] (ref 5–8)
PLATELET # BLD AUTO: 308 10E3/UL (ref 150–450)
RBC # BLD AUTO: 4.23 10E6/UL (ref 3.8–5.2)
RBC #/AREA URNS AUTO: ABNORMAL /HPF
SP GR UR STRIP: 1.02 (ref 1–1.03)
SQUAMOUS #/AREA URNS AUTO: ABNORMAL /LPF
UROBILINOGEN UR STRIP-ACNC: 1 E.U./DL
WBC # BLD AUTO: 5 10E3/UL (ref 4–11)
WBC #/AREA URNS AUTO: ABNORMAL /HPF

## 2024-05-08 PROCEDURE — 99000 SPECIMEN HANDLING OFFICE-LAB: CPT | Performed by: NURSE PRACTITIONER

## 2024-05-08 PROCEDURE — 86038 ANTINUCLEAR ANTIBODIES: CPT | Performed by: NURSE PRACTITIONER

## 2024-05-08 PROCEDURE — 85390 FIBRINOLYSINS SCREEN I&R: CPT | Performed by: PATHOLOGY

## 2024-05-08 PROCEDURE — 84443 ASSAY THYROID STIM HORMONE: CPT | Performed by: NURSE PRACTITIONER

## 2024-05-08 PROCEDURE — 86162 COMPLEMENT TOTAL (CH50): CPT | Mod: 90 | Performed by: NURSE PRACTITIONER

## 2024-05-08 PROCEDURE — 86140 C-REACTIVE PROTEIN: CPT | Performed by: NURSE PRACTITIONER

## 2024-05-08 PROCEDURE — 81001 URINALYSIS AUTO W/SCOPE: CPT | Performed by: NURSE PRACTITIONER

## 2024-05-08 PROCEDURE — 85027 COMPLETE CBC AUTOMATED: CPT | Performed by: NURSE PRACTITIONER

## 2024-05-08 PROCEDURE — 86039 ANTINUCLEAR ANTIBODIES (ANA): CPT | Performed by: NURSE PRACTITIONER

## 2024-05-08 PROCEDURE — 86160 COMPLEMENT ANTIGEN: CPT | Performed by: NURSE PRACTITIONER

## 2024-05-08 PROCEDURE — 99214 OFFICE O/P EST MOD 30 MIN: CPT | Performed by: NURSE PRACTITIONER

## 2024-05-08 PROCEDURE — 85613 RUSSELL VIPER VENOM DILUTED: CPT | Performed by: NURSE PRACTITIONER

## 2024-05-08 PROCEDURE — 36415 COLL VENOUS BLD VENIPUNCTURE: CPT | Performed by: NURSE PRACTITIONER

## 2024-05-08 PROCEDURE — 86146 BETA-2 GLYCOPROTEIN ANTIBODY: CPT | Performed by: NURSE PRACTITIONER

## 2024-05-08 PROCEDURE — 85652 RBC SED RATE AUTOMATED: CPT | Performed by: NURSE PRACTITIONER

## 2024-05-08 PROCEDURE — 80053 COMPREHEN METABOLIC PANEL: CPT | Performed by: NURSE PRACTITIONER

## 2024-05-08 PROCEDURE — 85730 THROMBOPLASTIN TIME PARTIAL: CPT | Performed by: NURSE PRACTITIONER

## 2024-05-08 RX ORDER — TRIAMCINOLONE ACETONIDE 0.1 %
PASTE (GRAM) DENTAL
COMMUNITY

## 2024-05-08 RX ORDER — DULOXETIN HYDROCHLORIDE 60 MG/1
60 CAPSULE, DELAYED RELEASE ORAL DAILY
COMMUNITY
Start: 2024-04-12

## 2024-05-08 RX ORDER — DROSPIRENONE AND ETHINYL ESTRADIOL 0.03MG-3MG
KIT ORAL
COMMUNITY
End: 2024-07-08

## 2024-05-08 ASSESSMENT — PAIN SCALES - GENERAL: PAINLEVEL: SEVERE PAIN (7)

## 2024-05-08 NOTE — PROGRESS NOTES
"  Assessment & Plan     Family history of lupus erythematosus  Butterfly rash  The patient has family history of maternal lupus and given presentation of current symptoms, will r/o lupus.  She also has hx of anemia and central hypothyroidism that could also be a contributing factor to fatigue. Will re-repeat CBC and TSH.  - CBC with platelets  - Comprehensive metabolic panel  - Anti Nuclear Marichuy IgG by IFA with Reflex  - CRP, inflammation  - ESR: Erythrocyte sedimentation rate  - UA with Microscopic reflex to Culture - lab collect  - Complement C3  - Complement C4  - Complement Activity Total (CH50)  - Lupus Anticoagulant Panel  - Beta 2 Glycoprotein Antibodies IGG IGM  - CBC with platelets  - Comprehensive metabolic panel  - Anti Nuclear Marichuy IgG by IFA with Reflex  - CRP, inflammation  - ESR: Erythrocyte sedimentation rate  - UA with Microscopic reflex to Culture - lab collect  - Complement C3  - Complement C4  - Complement Activity Total (CH50)  - Lupus Anticoagulant Panel  - Beta 2 Glycoprotein Antibodies IGG IGM    Central hypothyroidism  - TSH with free T4 reflex  - TSH with free T4 reflex  On levothyroxine    Will update with lab results- further recommendation per findings.          BMI  Estimated body mass index is 41.79 kg/m  as calculated from the following:    Height as of this encounter: 1.676 m (5' 5.98\").    Weight as of this encounter: 117.4 kg (258 lb 12.8 oz).             Subjective   Immanuelle is a 28 year old, presenting for the following health issues:  Pain (Pt reports: \"Possible butterfly rash, swollen fingers, joint pain, fatigue\" /\"Extreme/debilitating sit down in the shower fatigue - worse in past week\" /Headaches  - takes ibuprofen/ tylenol - some relief. /SOB - upon exertion pt reports she notices this epecially when going up stairs.  /Dry eyes - pt reports using eye drop (Systane) - just got eye drops from eye dr. - pt reports dry eyes and new rx needed. /Denies chest pain.  )        " "5/8/2024     1:59 PM   Additional Questions   Roomed by Niurka ALVARADO  Joint pain in hands, wrists , and elbows, swelling on fingers ongoing for about one week.  No injury. This symptoms have been intermittent for about a year. Rash on face started a few days ago- the rash on face also comes and goes. No new facial product use  Has upcoming appointment with rheumatology in August.  Mother has lupus and rheumatologist suggested mom to have her daughter seen.  She denies any recent illness, or fever.      History of Present Illness       Reason for visit:  Extreme fatigue, joint pain, swelling, rash,  Symptom onset:  3-7 days ago  Symptoms include:  Extreme fatigue, joint pain, swelling, rash, nausea, headache  Symptom intensity:  Moderate  Symptom progression:  Staying the same  Had these symptoms before:  Yes  Has tried/received treatment for these symptoms:  Yes  Previous treatment was successful:  Yes  Prior treatment description:  Diclofenac  What makes it worse:  Walking, waking up in the morning (swollen joints)  What makes it better:  Resting    She eats 0-1 servings of fruits and vegetables daily.She consumes 0 sweetened beverage(s) daily.She exercises with enough effort to increase her heart rate 9 or less minutes per day.  She exercises with enough effort to increase her heart rate 3 or less days per week. She is missing 2 dose(s) of medications per week.  She is not taking prescribed medications regularly due to other.                     Objective    /76 (BP Location: Left arm, Patient Position: Sitting, Cuff Size: Adult Large)   Pulse 94   Temp 98.6  F (37  C) (Tympanic)   Resp 16   Ht 1.676 m (5' 5.98\")   Wt 117.4 kg (258 lb 12.8 oz)   LMP 05/02/2024 (Exact Date)   SpO2 100%   BMI 41.79 kg/m    Body mass index is 41.79 kg/m .  Physical Exam   GENERAL: alert and no distress  EYES: Eyes grossly normal to inspection, PERRL and conjunctivae and sclerae normal  NECK: no adenopathy, no " asymmetry, masses, or scars  RESP: lungs clear to auscultation - no rales, rhonchi or wheezes  CV: regular rate and rhythm, normal S1 S2, no S3 or S4, no murmur, click or rub, no peripheral edema  ABDOMEN: soft, nontender, no hepatosplenomegaly, no masses and bowel sounds normal  MS: no gross musculoskeletal defects noted. Right 2nd finger: area between MCP and PIP joint is mildly swollen  SKIN: no suspicious lesions- mildly erythematous patch of papular rash on cheeks            Signed Electronically by: JERALD Arreguin CNP

## 2024-05-08 NOTE — LETTER
May 8, 2024      Nehemias Mascorro  [address] [place] [address]        To Whom It May Concern:    Nehemias Mascorro  was seen on 5/8/24 for medical appointment.  Please excuse her  until 5/10/24 due to illness.        Sincerely,        JERALD Arreguin CNP

## 2024-05-08 NOTE — PATIENT INSTRUCTIONS
"Thank you for allowing us the privilege of caring for you. We hope we provided you with the excellent service you deserve.   Please let us know if there is anything else we can do for you so that we can be sure you are completely satisfied with your care experience.    To ensure the quality of our services you may be receiving a patient satisfaction survey from an independent patient satisfaction monitoring company.    The greatest compliment you can give is a \"Likely to Recommend\"    Your visit was with Elisa Rivera PA-C today.    Instructions per today's visit:     Paulo Mascorro, it was great to visit with you today.  Here is a review of our visit.  If our clinic scheduler is not able to reach you please call [phone] to schedule your next appointments.    Plan  Restart wegovy. 0.25mg weekly for 1 month, then 0.5mg weekly for 1 month, then 1mg weekly for 1 month.   Goals we discussed today: getting 30g of protein per meal  Follow up with Elisa or Dr. Slade in 3 months   Dietician appointment to be scheduled asap   Please follow up with your sleep medicine provider to see if a sleep study is necessary as your daytime fatigue has been worsening (this may also improve with taking your iron supplementation)   Keep up the excellent work!       Information about Video Visits with [facility]: video visit information  _________________________________________________________________________________________________________________________________________________________  If you are asked by your clinic team to have your blood pressure checked:  [facility] do offer several locations for blood pressure checks. Please follow the below link to schedule an appointment. Scheduling an appointment at the pharmacy for a blood pressure check is now preferred.    Appointment Plus " ([email or web address])  _________________________________________________________________________________________________________________________________________________________  Important contact and scheduling information:  Please call our contact center at [phone] to schedule your next appointments.  To find a lab location near you, please call [phone].  For any nursing questions or concerns call Susan Hunter LPN at [phone] or lAba Mathew RN at [phone]  Please call during clinic hours Monday through Friday 8:00a - 4:00p if you have questions or you can contact us via Buy buy teahart at anytime and we will reply during clinic hours.    Lab results will be communicated through My Chart or letter (if My Chart not used). Please call the clinic if you have not received communication after 1 week or if you have any questions.?  Clinic Fax: [phone]    _________________________________________________________________________________________________________________________________________________________  Meal Replacement Products:    Here is the link to our new e-store where you can purchase our meal replacement products    [facility] E-Store  [facility].STYLIGHT/store    The one week starter kit is a great way to sample a variety of products and see what works for you.    If you want more information about the product go to: Fresh Steps Meals  mSilica.Contentful    If you are an employee or [facility] or [facility] please contact your care team for a 10% estore discount    Free Shipping for orders over $75     Benefits of meal replacements products:    Portion and calorie control  Improved nutrition  Structured eating  Simplified food choices  Avoid contact with trigger foods  _________________________________________________________________________________________________________________________________________________________  Interested in working with a health  ?  Health coaches work with you to improve your overall health and wellbeing.  They look at the whole person, and may involve discussion of different areas of life, including, but not limited to the four pillars of health (sleep, exercise, nutrition, and stress management). Discuss with your care team if you would like to start working a health .  Health Coaching-3 Pack: Schedule by calling [phone]    $99 for three health coaching visits    Visits may be done in person or via phone    Coaching is a partnership between the  and the client; Coaches do not prescribe or diagnose    Coaching helps inspire the client to reach his/her personal goals   _________________________________________________________________________________________________________________________________________________________  24 Week Healthy Lifestyle Plan:    Our mission in the 24-week Healthy Lifestyle Plan is to provide you with individualized care by giving you the tools, education and support you need to lose weight and maintain a healthy lifestyle. In your 24-week journey, you ll be supported by a dedicated weight loss team that includes registered dietitians, medical weight management providers, health coaches, and nurses -- all with special expertise in weight loss -- to help you every step of the way.     Monthly meetings with your registered dietician or medical weight management provider help to review your progress, update your care plan, and make any adjustments needed to ensure success. Between these visits, weekly and bi-weekly health  visits will help you focus on the four pillars of weight loss -- stress, sleep, nutrition, and exercise -- and how you can best adapt each to achieve sustainable weight loss results.    In addition, you will be given exclusive access to online wellbeing classes through [employer].  Your initial visit will be with a medical weight management provider who will help to  understand your weight loss goals and ensure this program is the right fit for you. Please let our team know if you are interested in the 24 week plan by sending a message to your care team or calling [phone] to schedule.  _________________________________________________________________________________________________________________________________________________________  __________  [facility] Get Moving Program  Our team of physical therapists is trained to help you understand and take control of your condition. They will perform a thorough evaluation to determine your ability for activity and develop a customized plan to fit your goals and physical ability.  Scheduling: Unsure if the Get Moving program is right for you? Discuss the program with your medical provider or diabetes educator. You can also call us at [phone] to ask questions or schedule an appointment.   [facility] Get Moving Program  ____________________________________________________________________________________________________________________________________________________________________________  [facility] Diabetes Prevention Program (DPP)  If you have prediabetes and Medicare please contact us via [employer] to learn more about the Diabetes Prevention Program (DPP)  Program Details:   [facility] offers the year-long Diabetes Prevention Program (DPP). The program helps you to make lifestyle changes that prevent or delay type 2 diabetes by supporting healthy eating, increased physical activity, stress reduction and use of coping skills.   On average, previous [facility] DPP cohorts have lost and maintained at least 5% of their starting weight throughout the program and averaged more than 150 minutes of physical activity per week.  Participants meet weekly for one-hour group sessions over sixteen weeks, every other week for the next 8 weeks, and monthly for the last six months.   A year-long  maintenance program is also available for participants who complete the first year.   Location & Cost:   During the COVID-19 Public Health Emergency, the program is offered virtually. When in-person classes can resume, they will be held at [facility].  For people with Medicare, the program is covered in full. A self-pay option will also be available for those with non-Medicare insurance plans.   ______________________________________________________________________________________________________________________________________________________________________________________________________________________________    To work with a Behavioral Health Psychologist:    Call to schedule:    Ezekiel Travis - [phone]  Anila Cummings - [phone]  Jennie Antonio - [phone]  Radha Mascorro - [phone]   Leigh Rosario PhD (cannot accept Medicare) [phone]        Thank you,   [facility] Comprehensive Weight Management Team

## 2024-05-09 ENCOUNTER — TELEPHONE (OUTPATIENT)
Dept: ENDOCRINOLOGY | Facility: CLINIC | Age: 29
End: 2024-05-09
Payer: COMMERCIAL

## 2024-05-09 LAB
ALBUMIN SERPL BCG-MCNC: 4 G/DL (ref 3.5–5.2)
ALP SERPL-CCNC: 65 U/L (ref 40–150)
ALT SERPL W P-5'-P-CCNC: 27 U/L (ref 0–50)
ANA PAT SER IF-IMP: ABNORMAL
ANA SER QL IF: ABNORMAL
ANA TITR SER IF: ABNORMAL {TITER}
ANION GAP SERPL CALCULATED.3IONS-SCNC: 8 MMOL/L (ref 7–15)
AST SERPL W P-5'-P-CCNC: 24 U/L (ref 0–45)
B2 GLYCOPROT1 IGG SERPL IA-ACNC: 14 U/ML
B2 GLYCOPROT1 IGM SERPL IA-ACNC: <2.4 U/ML
BILIRUB SERPL-MCNC: 0.3 MG/DL
BUN SERPL-MCNC: 12.5 MG/DL (ref 6–20)
C3 SERPL-MCNC: 149 MG/DL (ref 81–157)
C4 SERPL-MCNC: 20 MG/DL (ref 13–39)
CALCIUM SERPL-MCNC: 9.2 MG/DL (ref 8.6–10)
CH50 SERPL-ACNC: 28.9 U/ML
CHLORIDE SERPL-SCNC: 105 MMOL/L (ref 98–107)
CREAT SERPL-MCNC: 0.83 MG/DL (ref 0.51–0.95)
CRP SERPL-MCNC: 12.9 MG/L
DEPRECATED HCO3 PLAS-SCNC: 26 MMOL/L (ref 22–29)
DRVVT SCREEN RATIO: 0.94
EGFRCR SERPLBLD CKD-EPI 2021: >90 ML/MIN/1.73M2
GLUCOSE SERPL-MCNC: 96 MG/DL (ref 70–99)
INR PPP: 0.95 (ref 0.85–1.15)
LA PPP-IMP: NEGATIVE
LUPUS INTERPRETATION: NORMAL
POTASSIUM SERPL-SCNC: 4.1 MMOL/L (ref 3.4–5.3)
PROT SERPL-MCNC: 6.6 G/DL (ref 6.4–8.3)
PTT RATIO: 0.92
SODIUM SERPL-SCNC: 139 MMOL/L (ref 135–145)
THROMBIN TIME: 17 SECONDS (ref 13–19)
TSH SERPL DL<=0.005 MIU/L-ACNC: 0.39 UIU/ML (ref 0.3–4.2)

## 2024-05-09 NOTE — TELEPHONE ENCOUNTER
Patient confirmed scheduled appointment:  Date: 05/16/2024  Time: 900 am   Visit type: JAMILAH MOSCOSO   Provider: Estela Mena RD   Location: Virtual  Testing/imaging: n/a  Additional notes:   RET MWM, 3 mo follow-up, Haylie Rivera PA-C, 08/07/2024

## 2024-05-10 NOTE — TELEPHONE ENCOUNTER
Callback received from pt, she was under the impression that a message was being sent to Dr. Perez to see if an earlier appt could be found for her (currently scheduled to establish care on 8/15/24). Pt calling back to see if a response had been received from Dr. Perez or not.

## 2024-05-13 ENCOUNTER — MYC MEDICAL ADVICE (OUTPATIENT)
Dept: FAMILY MEDICINE | Facility: CLINIC | Age: 29
End: 2024-05-13
Payer: COMMERCIAL

## 2024-05-13 SDOH — HEALTH STABILITY: PHYSICAL HEALTH: ON AVERAGE, HOW MANY DAYS PER WEEK DO YOU ENGAGE IN MODERATE TO STRENUOUS EXERCISE (LIKE A BRISK WALK)?: 2 DAYS

## 2024-05-13 SDOH — HEALTH STABILITY: PHYSICAL HEALTH: ON AVERAGE, HOW MANY MINUTES DO YOU ENGAGE IN EXERCISE AT THIS LEVEL?: 30 MIN

## 2024-05-13 ASSESSMENT — SOCIAL DETERMINANTS OF HEALTH (SDOH): HOW OFTEN DO YOU GET TOGETHER WITH FRIENDS OR RELATIVES?: ONCE A WEEK

## 2024-05-14 NOTE — CONFIDENTIAL NOTE
NOTES Status Details   OFFICE NOTE from referring provider Internal 03.26.2024 Ross Connor MD    OFFICE NOTE from other specialist     DISCHARGE SUMMARY from hospital     DISCHARGE REPORT from the ER     MEDICATION LIST Internal    LABS (Any and all labs)      Internal    Biopsy/pathology (Anything related to diagnoses I.e. fluid aspirations, lip biopsy, muscle biopsy)               Imaging (All imaging related to diagnoses)     Echo     HRCT     CXR     EMG                    Scleroderma/Dermatomyositis diagnoses     Previous Cardiology notes      Previous Pulmonary notes     Previous Dermatology notes     Previous GI notes     Lupus diagnoses     Previous Nephrology notes     Previous Dermatology notes     Previous Cardiology notes

## 2024-05-16 ENCOUNTER — TELEPHONE (OUTPATIENT)
Dept: ENDOCRINOLOGY | Facility: CLINIC | Age: 29
End: 2024-05-16

## 2024-05-16 NOTE — TELEPHONE ENCOUNTER
Patient confirmed scheduled appointment:  Date: 5/17/24  Time: 11:30 am  Visit type: New Med Wt Mgmt Nutrition (video)  Provider: Shira Nuñez  [place] [facility] [place]  Testing/imaging: n/a  Additional notes: rescheduled 5/16 appt with Estela Villegas due to the provider being out

## 2024-05-16 NOTE — PROGRESS NOTES
"Video-Visit Details    Type of service:  Video Visit    Video Start Time: 11:33 AM   Video End Time: 12:04 PM    Originating Location (pt. Location): Home    Distant Location (provider location):  Offsite (providers home) [facility] [place]     Platform used for Video Visit: TerraChirp Interactive      New Weight Management Nutrition Consultation    Nehemias Mascorro is a 28 year old female presents today for new weight management nutrition consultation.  Patient referred by Elisa Rivera PA-C on May 7, 2024. Previously seen by Dr. Slade.     Patient with Co-morbidities of obesity including:  H/o prediabetes, hypopitiuitarism     Anthropometrics:  Estimated body mass index is 41.97 kg/m  as calculated from the following:    Height as of this encounter: 1.676 m (5' 6\").    Weight as of this encounter: 117.9 kg (260 lb).    Medications for Weight Loss:  Wegovy    NUTRITION HISTORY  Food allergies: None  Food intolerances: Gluten, lactose    Vitamin/Mineral Supplements: Vitamin D, probiotic     Previous methods of diet modification for weight loss: Counting calories (My Fitness Pal), meal replacements (protein shakes), less sugar.  Took Wegovy last year and lost 40 lbs.     RD before: wt mgmt RD in 2020. GI RD in 2023 for IBS-C and abd pain.     Was following IF (waiting to eat until 11 am), until last few days started eating breakfast at 9 am.   Prepares her own meals.   Preferences: Rice, beans, fish - African American, English foods, Soul food, Mexican foods.   Cravings - sugar. Occ red meat  Pt goals - cut back on carbs, more fiber. Foods that help improve energy and iron intake as she has chronic fatigue and iron deficiency anemia.     Recent Diet Recall:  Breakfast: 9 am grits; Tunisian toast no syrup   Lunch: 1 pm Healthy Choice; Chicken, rice, black beans; burger   Snack: 3-4 pm apples with PB; chips; pretzles; raisins; carrots; popcorn      Dinner: 6 pm mac and cheese and broccoli; " "chicken/beans/rice/sweet potato; chicken quesadilla  After dinner Snacks: desserts (ice cream, cookie, pie, mini snickers) or snack   Beverages: Crystal lite, water, sparkling water - 40+ oz/day    Alcohol: Rarely   Dining Out/take-out: Twice this week, overall less than usual. Chipotle, Raymond johns , Nhan Robles.        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     Nutrition Prescription  Recommended energy/nutrient modification.    Nutrition Diagnosis  Obesity r/t long history of positive energy balance aeb BMI >30.    Nutrition Intervention  Materials/education provided on hypocaloric diet for weight loss. Discussed Volumetric eating to help satiety level on fewer calories; portion control and healthy food choices (Plate Method and Volumetrics handouts), lower calorie snack choices, meal and snack planning tips and resources. Patient demonstrates understanding.  Co-developed goals to work towards.   Provided pt with list of goals and resources below via [employer].     Expected Engagement: good  Follow-Up Plans: meal/snack planning     Nutrition Goals  1) Increase non-starchy veggie portions at lunch and dinner, aim for half your meal as veggies  2) Keep carb portions to 1/4 of meal or less  3) Increase hydration, aim to drink 64+ oz/day.    The Plate Method  [email or web address]    Protein Sources   [email or web address]     Carbohydrates  [email or web address]     Mindful Eating  [email or web address]     Summary of Volumetrics Eating Plan  [email or web address]       Healthy Recipe Resources:  Books:  \"The Volumetrics Eating Plan\" by Mehnaz Watson, Ph.D.  \"Cooking that Counts\" by editors of [employer] USALight  \"Calorie Smart Meals\" cookbook by Better Homes and [employer] (200-500 calorie " meals)    Websites:  www.Adzilla  https://www.[employer].CharityStars/  www.Guardity Technologies.Gaia Metrics  https://www.diabetesfoodhub.org/all-recipes.html  Https://www.choosemyplate.gov/myplatekitchen  Recipes by Season: https://snaped.[employer]s.usda.gov/recipes-menus   Video Meal Prep: Https://www.iCare Intelligenceube.com/c/opal   Meal Plans: EatthiMiyaobabei.com   DASH Diet: https://www.nhlbi.nih.gov/education/dash-eating-plan  Whole Grains [place]: https://Localyte.comcouncil.org/recipes      Cultural Cuisines:  Various Regions of African Cuisine: https://www.Dynamighty/recipes/57623/cuisines-regions//   Cuisine: https://www.[employer] [place].org/knowledge-center/recipes/  Mexican and Vegan Cuisine:   https://fundfindr/  https://EnergyUSA Propane/recipe-index/  Chinese Cuisine: https://www.Kurani Interactive/  South Asian Cuisine:  Sammie Cortes on social media- South  high-protein/low-calorie meal/food ideas  Kristyn Ni RD, [facility] Memorial HospitalDUC, plant-based South  Resources: https://www.EpicPledge/    Apps:  [employer] robby (or website, mealime.com)   SpendSmartEatSmart     High-iron foods:  100% iron-fortified ready-to-eat cereal   cup, 18 mg  Grits, instant   cup, 7.1   Cream of wheat   cup, 5.2   Oatmeal, instant   cup, 5 mg  Soybeans, cooked   cup, 4.4 mg  White beans, canned   cup, 3.9 mg   Lentils   cup, 3.3 mg  White rice 1/3 cup, 3 mg  Spinach   cup cooked, 1 cup raw, 3 mg   Beef tenderloin 3 oz, 3 mg  Baked beans 1/3 cup, 3 mg  Vegetable or soy burger 1 tremaine, 2.9 mg  Soy milk 1 cup (8 oz), 2.7 mg   Chickpeas   cup, 2.5 mg  Kidney beans   cup, 2.5 mg  Sardines 3 oz, 2.5 mg   Nuts: almonds or pistachios   cup, 1.3 mg   [place] sprouts, cooked   cup, 1 mg   Egg 1 whole, 1 mg       Follow-Up:  1 month, PRN    Time spent with patient: 30 minutes.  Shira Nuñez RD, LD

## 2024-05-17 ENCOUNTER — OFFICE VISIT (OUTPATIENT)
Dept: INTERNAL MEDICINE | Facility: CLINIC | Age: 29
End: 2024-05-17
Payer: COMMERCIAL

## 2024-05-17 ENCOUNTER — VIRTUAL VISIT (OUTPATIENT)
Dept: ENDOCRINOLOGY | Facility: CLINIC | Age: 29
End: 2024-05-17
Payer: COMMERCIAL

## 2024-05-17 VITALS
HEART RATE: 96 BPM | HEIGHT: 66 IN | SYSTOLIC BLOOD PRESSURE: 120 MMHG | DIASTOLIC BLOOD PRESSURE: 67 MMHG | TEMPERATURE: 98.6 F | BODY MASS INDEX: 41.8 KG/M2 | RESPIRATION RATE: 18 BRPM | OXYGEN SATURATION: 97 % | WEIGHT: 260.1 LBS

## 2024-05-17 VITALS — BODY MASS INDEX: 41.78 KG/M2 | HEIGHT: 66 IN | WEIGHT: 260 LBS

## 2024-05-17 DIAGNOSIS — E66.01 CLASS 3 SEVERE OBESITY WITH SERIOUS COMORBIDITY AND BODY MASS INDEX (BMI) OF 40.0 TO 44.9 IN ADULT, UNSPECIFIED OBESITY TYPE (H): Primary | ICD-10-CM

## 2024-05-17 DIAGNOSIS — M25.50 MULTIPLE JOINT PAIN: ICD-10-CM

## 2024-05-17 DIAGNOSIS — E66.813 CLASS 3 SEVERE OBESITY WITH SERIOUS COMORBIDITY AND BODY MASS INDEX (BMI) OF 40.0 TO 44.9 IN ADULT, UNSPECIFIED OBESITY TYPE (H): Primary | ICD-10-CM

## 2024-05-17 DIAGNOSIS — Z71.3 NUTRITIONAL COUNSELING: ICD-10-CM

## 2024-05-17 DIAGNOSIS — Z13.9 ENCOUNTER FOR SCREENING INVOLVING SOCIAL DETERMINANTS OF HEALTH (SDOH): Primary | ICD-10-CM

## 2024-05-17 PROCEDURE — 99213 OFFICE O/P EST LOW 20 MIN: CPT | Performed by: INTERNAL MEDICINE

## 2024-05-17 PROCEDURE — 97802 MEDICAL NUTRITION INDIV IN: CPT | Mod: 95 | Performed by: DIETITIAN, REGISTERED

## 2024-05-17 PROCEDURE — 99207 PR NO CHARGE LOS: CPT | Mod: 95 | Performed by: DIETITIAN, REGISTERED

## 2024-05-17 RX ORDER — MELOXICAM 15 MG/1
15 TABLET ORAL DAILY
Qty: 90 TABLET | Refills: 2 | Status: SHIPPED | OUTPATIENT
Start: 2024-05-17 | End: 2024-08-15

## 2024-05-17 ASSESSMENT — PATIENT HEALTH QUESTIONNAIRE - PHQ9: SUM OF ALL RESPONSES TO PHQ QUESTIONS 1-9: 15

## 2024-05-17 ASSESSMENT — PAIN SCALES - GENERAL
PAINLEVEL: SEVERE PAIN (6)
PAINLEVEL: SEVERE PAIN (6)

## 2024-05-17 NOTE — NURSING NOTE
Is the patient currently in the state of [place]? YES    Visit mode:VIDEO    If the visit is dropped, the patient can be reconnected by: VIDEO VISIT: Text to cell phone:   Telephone Information:   Mobile [phone]       Will anyone else be joining the visit? NO  (If patient encounters technical issues they should call [phone])    How would you like to obtain your AVS? MyChart    Are changes needed to the allergy or medication list? N/A    Are refills needed on medications prescribed by this physician? NO    Reason for visit: Consult    Pt states 6/10 joint pain today in hands chronic.    Depression Response    Patient completed the PHQ-9 assessment for depression and scored >9? Yes  Question 9 on the PHQ-9 was positive for suicidality? No  Does patient have current mental health provider? Yes    Is this a virtual visit? Yes   Does patient have suicidal ideation (positive question 9)? No - offer to place Mental Health Referral.  Patient declined referral/not needed    I personally notified the following: visit provider      Yury MADERA

## 2024-05-17 NOTE — LETTER
"5/17/2024       RE: Nehemias Mascorro  [address]  [place] [address]     Dear Colleague,    Thank you for referring your patient, Nehemias Mascorro, to the [facility]NAG[facility] [place] at [facility]. Please see a copy of my visit note below.    Video-Visit Details    Type of service:  Video Visit    Video Start Time: 11:33 AM   Video End Time: 12:04 PM    Originating Location (pt. Location): Home    Distant Location (provider location):  Offsite (providers home) [facility] [place]     Platform used for Video Visit: Transfluent      New Weight Management Nutrition Consultation    Nehemias Mascorro is a 28 year old female presents today for new weight management nutrition consultation.  Patient referred by Elisa Rivera PA-C on May 7, 2024. Previously seen by Dr. Slade.     Patient with Co-morbidities of obesity including:  H/o prediabetes, hypopitiuitarism     Anthropometrics:  Estimated body mass index is 41.97 kg/m  as calculated from the following:    Height as of this encounter: 1.676 m (5' 6\").    Weight as of this encounter: 117.9 kg (260 lb).    Medications for Weight Loss:  Wegovy    NUTRITION HISTORY  Food allergies: None  Food intolerances: Gluten, lactose    Vitamin/Mineral Supplements: Vitamin D, probiotic     Previous methods of diet modification for weight loss: Counting calories (My Fitness Pal), meal replacements (protein shakes), less sugar.  Took Wegovy last year and lost 40 lbs.     RD before: wt mgmt RD in 2020. GI RD in 2023 for IBS-C and abd pain.     Was following IF (waiting to eat until 11 am), until last few days started eating breakfast at 9 am.   Prepares her own meals.   Preferences: Rice, beans, fish - African American, [place]an foods, Soul food, Mexican foods.   Cravings - sugar. Occ red meat  Pt goals - cut back on carbs, more fiber. Foods that help improve energy " and iron intake as she has chronic fatigue and iron deficiency anemia.     Recent Diet Recall:  Breakfast: 9 am grits; Salvadorean toast no syrup   Lunch: 1 pm Healthy Choice; Chicken, rice, black beans; burger   Snack: 3-4 pm apples with PB; chips; pretzles; raisins; carrots; popcorn      Dinner: 6 pm mac and cheese and broccoli; chicken/beans/rice/sweet potato; chicken quesadilla  After dinner Snacks: desserts (ice cream, cookie, pie, mini snickers) or snack   Beverages: Crystal lite, water, sparkling water - 40+ oz/day    Alcohol: Rarely   Dining Out/take-out: Twice this week, overall less than usual. Chipotle, [employer]ohns , Nhan Robles.        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     Nutrition Prescription  Recommended energy/nutrient modification.    Nutrition Diagnosis  Obesity r/t long history of positive energy balance aeb BMI >30.    Nutrition Intervention  Materials/education provided on hypocaloric diet for weight loss. Discussed Volumetric eating to help satiety level on fewer calories; portion control and healthy food choices (Plate Method and Volumetrics handouts), lower calorie snack choices, meal and snack planning tips and resources. Patient demonstrates understanding.  Co-developed goals to work towards.   Provided pt with list of goals and resources below via VIS Researcht.     Expected Engagement: good  Follow-Up Plans: meal/snack planning     Nutrition Goals  1) Increase non-starchy veggie portions at lunch and dinner, aim for half your meal as veggies  2) Keep carb portions to 1/4 of meal or less  3) Increase hydration, aim to drink 64+ oz/day.    The Plate Method  [email or web address]    Protein Sources   http://Sports Shop [email or web address]     Carbohydrates  [email or web address]     Mindful Eating  http://Sports Shop [email or web address]     Summary of Volumetrics Eating Plan  [email or web address]  "      Healthy Recipe Resources:  Books:  \"The Volumetrics Eating Plan\" by Mehnaz Watson, Ph.D.  \"Cooking that Counts\" by editors of ProxToMe  \"Calorie Smart Meals\" cookbook by Better Homes and Your Style Unzippeds (200-500 calorie meals)    Websites:  www.PlayArt Labs  https://www.Janrain/  www.Neurelis.Nordic Technology Group  https://www.diabetesfoodhub.org/all-recipes.html  Https://www.Digital Message Displayplate.gov/myplatekitchen  Recipes by Season: https://snaped.Tipp24s.usda.gov/recipes-menus   Video Meal Prep: Https://www.Apostrophe Apps.com/c/opal   Meal Plans: EatthiEducation Elementsuch.com   DASH Diet: https://www.nhlbi.nih.gov/education/dash-eating-plan  Whole Grains [place]: https://Leeviacouncil.org/recipes      Cultural Cuisines:  Various Regions of African Cuisine: https://www.SOL ELIXIRS/recipes/79606/cuisines-regions//   Cuisine: https://www.ikeGPS.org/knowledge-center/recipes/  Mexican and Vegan Cuisine:   https://Scylab medic/  https://iSOCO/recipe-index/  Chinese Cuisine: https://www.Sribu/  South Asian Cuisine:  Sammie Cortes on social media- South  high-protein/low-calorie meal/food ideas  Kristyn Ni RD, [place], plant-based South  Resources: https://www.Fair value/    Apps:  MealJumpTime robby (or website, mealime.com)   SpendSmartEatSmart     High-iron foods:  100% iron-fortified ready-to-eat cereal   cup, 18 mg  Grits, instant   cup, 7.1   Cream of wheat   cup, 5.2   Oatmeal, instant   cup, 5 mg  Soybeans, cooked   cup, 4.4 mg  White beans, canned   cup, 3.9 mg   Lentils   cup, 3.3 mg  White rice 1/3 cup, 3 mg  Spinach   cup cooked, 1 cup raw, 3 mg   Beef tenderloin 3 oz, 3 mg  Baked beans 1/3 cup, 3 mg  Vegetable or soy burger 1 tremaine, 2.9 mg  Soy milk 1 cup (8 oz), 2.7 mg   Chickpeas   cup, 2.5 mg  Kidney beans   cup, 2.5 mg  Sardines 3 oz, 2.5 mg   Nuts: almonds or pistachios   cup, 1.3 mg   Annabella sprouts, cooked   cup, 1 mg   Egg 1 whole, 1 mg       Follow-Up:  1 " month, PRN    Time spent with patient: 30 minutes.  Shira Nuñez RD, LD

## 2024-05-17 NOTE — PROGRESS NOTES
"  Assessment & Plan     Encounter for screening involving social determinants of health (SDoH)  Is no longer butterfly rash prevalent I did see a photograph this could represent rosacea or photosensitivity dermatitis the very minor sort of erythema she had in the cheeks.    Multiple joint pain  Has seen two rheumatologists 1 at [facility] and 1 at [place] have said that she does not have lupus.  I did tell her RONALD is not sensitive sign for lupus.  And you need a constellation of positive serology especially the double-stranded DNA testing along with physical signs of synovitis or skin disease to suggest lupus.  At any rate it is a symptom based treatment and the symptoms have to be severe enough to merit medicines like Plaquenil.  Or disease modifying agents.  She could start with meloxicam.  The other cause for her joint pain is fibromyalgia.  Which we already know.  Lyrica was suggested for her and can still be given but there is a propensity for weight gain.  I would like her to get back on the semaglutide which had been stopped because of availability problems once she starts losing weight we could consider switching gabapentin to Lyrica.      - meloxicam (MOBIC) 15 MG tablet; Take 1 tablet (15 mg) by mouth daily            BMI  Estimated body mass index is 41.98 kg/m  as calculated from the following:    Height as of this encounter: 1.676 m (5' 6\").    Weight as of this encounter: 118 kg (260 lb 1.6 oz).       Counseling  Appropriate preventive services were discussed with this patient, including applicable screening as appropriate for fall prevention, nutrition, physical activity, Tobacco-use cessation, weight loss and cognition.  Checklist reviewing preventive services available has been given to the patient.  Reviewed patient's diet, addressing concerns and/or questions.   She is at risk for lack of exercise and has been provided with information to increase physical activity for the benefit of her well-being. " "  The patient was instructed to see the dentist every 6 months.           Araceli Del Cid is a 28 year old, presenting for the following health issues:  Physical and Recheck Medication (Pt reports that she is here for her physical.)    HPI                 Objective    /67 (BP Location: Right arm, Patient Position: Sitting, Cuff Size: Adult Large)   Pulse 96   Temp 98.6  F (37  C) (Tympanic)   Resp 18   Ht 1.676 m (5' 6\")   Wt 118 kg (260 lb 1.6 oz)   LMP 05/02/2024 (Exact Date)   SpO2 97%   Breastfeeding No   BMI 41.98 kg/m    Body mass index is 41.98 kg/m .  Physical Exam   GENERAL: alert and no distress  MS: no gross musculoskeletal defects noted, no edema    No synovitis , no rash seen on face         Signed Electronically by: Alexandra Rodrigues MD    "

## 2024-05-17 NOTE — PATIENT INSTRUCTIONS
"Paulo Del Cid,    Follow-up with RD in 6-8 weeks.     Thank you,    Shira Nuñez, KENA, LD  If you would like to schedule or reschedule an appointment with the RD, please call [phone]    Nutrition Goals  1) Increase non-starchy veggie portions at lunch and dinner, aim for half your meal as veggies  2) Keep carb portions to 1/4 of meal or less  3) Increase hydration, aim to drink 64+ oz/day.    The Plate Method  [email or web address]    Protein Sources   [email or web address]://[email or web address]     Carbohydrates  [email or web address]     Mindful Eating  [email or web address]://[email or web address]     Summary of Volumetrics Eating Plan  [email or web address]       Healthy Recipe Resources:  Books:  \"The Volumetrics Eating Plan\" by Mehnaz Wtason, Ph.D.  \"Cooking that Counts\" by editors of Feedsky  \"Calorie Smart Meals\" cookbook by Better Homes and NewDog Technologiess (200-500 calorie meals)    Websites:  www.Barnacle  https://www.RoboCent/  www.Company.com.Repairogen  https://www.diabetesfoodhub.org/all-recipes.html  Https://www.chooseLeadPointplate.gov/myplatekitchen  Recipes by Season: https://snaped.fns.usda.gov/recipes-menus   Video Meal Prep: Https://www.youtube.com/c/opal   Meal Plans: Eatthismuch.com   DASH Diet: https://www.nhlbi.nih.gov/education/dash-eating-plan  Whole Grains [place]: https://wholegrainscouncil.org/recipes      Cultural Cuisines:  Various Regions of African Cuisine: https://www.Beebrite.MiQ Corporation/recipes/65516/cuisines-regions//   Cuisine: https://www.Revolut.org/knowledge-center/recipes/  Mexican and Vegan Cuisine:   https://Twenty Recruitment Group.MiQ Corporation/  https://dorastable.com/recipe-index/  Chinese Cuisine: https://www.EcoStart.MiQ Corporation/  South Asian Cuisine:  Sammie Cortes on social media- South  high-protein/low-calorie meal/food ideas  Kristyn Ni RD, [facility] Hospital[facility], plant-based [facility]  Resources: https://www.CurbStand/    Apps:  Mealime robby (or website, " mealime.com)   SpendSmartEatSmart     High-iron foods:  100% iron-fortified ready-to-eat cereal   cup, 18 mg  Grits, instant   cup, 7.1   Cream of wheat   cup, 5.2   Oatmeal, instant   cup, 5 mg  Soybeans, cooked   cup, 4.4 mg  White beans, canned   cup, 3.9 mg   Lentils   cup, 3.3 mg  White rice 1/3 cup, 3 mg  Spinach   cup cooked, 1 cup raw, 3 mg   Beef tenderloin 3 oz, 3 mg  Baked beans 1/3 cup, 3 mg  Vegetable or soy burger 1 tremaine, 2.9 mg  Soy milk 1 cup (8 oz), 2.7 mg   Chickpeas   cup, 2.5 mg  Kidney beans   cup, 2.5 mg  Sardines 3 oz, 2.5 mg   Nuts: almonds or pistachios   cup, 1.3 mg   [place] sprouts, cooked   cup, 1 mg   Egg 1 whole, 1 mg       COMPREHENSIVE WEIGHT MANAGEMENT PROGRAM  VIRTUAL SUPPORT GROUPS    At [facility], our Comprehensive Weight Management program offers on-line support groups for patients who are working on weight loss and considering, preparing for, or have had weight loss surgery.     There is no cost for this opportunity.  You are invited to attend the?Virtual Support Groups?provided by any of the following locations:    [facility] via Microsoft Teams with Lilly Gifford RN  2.   [place] via LatamLeap with Nelson Root, PhD, LP  3.   [place] via LatamLeap with Angela Chaparro RN  4.   [facility] via a Zoom Meeting with WILNER Keene    The following Support Group information can also be found on our website:  https://www.[facility].org/treatments/weight-loss-and-weight-loss-surgery-support-groups      [facility] Weight Loss Surgery Support Group  The support group is a patient-lead forum that meets monthly to share experiences, encouragement and education. It is open to those who have had weight loss surgery, are scheduled for surgery, or are considering surgery.   WHEN: This group meets on the 3rd Wednesday of each month from 5:00PM - 6:00PM virtually using Microsoft Teams.   FACILITATOR: Led by Lilly Vickers RD,  "RAMBO, RN, the program's Clinical Coordinator.   TO REGISTER: Please contact the clinic via Virgin Mobile [place] or call the nurse line directly at [phone] to inform our staff that you would like an invite sent to you and to let us know the email you would like the invite sent to. Prior to the meeting, a link with directions on how to join the meeting will be sent to you.    2023 and 2024 Meetings   December 20  January 17  February 21  March 20  April 17  May 15  Kylee 19      [facility] Bariatric Care Support Group?  This is open to all pre- and post- operative bariatric surgery patients as well as their support system.   WHEN: This group meets the 3rd Tuesday of each month from 6:30 PM - 8:00 PM virtually using Microsoft Teams.   FACILITATOR: Led by Nelson Root, Ph.D who is a Licensed Psychologist with the [facility] Comprehensive Weight Management Program.   TO REGISTER: Please send an email to Nelson Root, Ph.D., LP at?[email or web address]?if you would like an invitation to the group. Prior to the meeting, a link with directions on how to join the meeting will be sent to you.    2023 and 2024 Meetings  December 19 January 16: \"Medication Management and Bariatric Surgery\", Marielena Simmons, PharmD, Pharmacy Resident at [facility]  February 20: \"A Bariatric Surgery Patient's Perspective\", IAN Elmore, [facility], Behavioral Health Clinician at [facility]  March 19  April 16  May 21  Kylee 18: \"Nutritional Labeling\", Dietitian speaker to be announced.  November 19: \"Holiday Eating\", Dietitian speaker to be announced.    [facility] Post-Operative Bariatric Surgery Support Group  This is a support group for [facility] bariatric patients (and those external to [facility]) who have had bariatric surgery and are at least 3 months " "post-surgery.  WHEN: This support group meets the 4th Wednesday of the month from 11:00 AM - 12:00 PM virtually using Microsoft Teams.   FACILITATOR: Led by Certified Bariatric Nurse, Angela Chaparro RN.   TO REGISTER: Please send an email to Angela at [email or web address] if you would like an invitation to the group.  Prior to the meeting, a link with directions on how to join the meeting will be sent to you.    2023 and 2024 Meetings  December 27  January 24  February 28  March 27  April 24  May 22  Kylee 26    [facility] Healthy Lifestyle Group?  This is a 60 minute virtual coaching group for those who want to lead a healthier lifestyle. Come together to set goals and overcome barriers in a supportive group environment. We will address the four pillars of health: nutrition, exercise, sleep and emotional well-being.  This group is highly recommended for those who are participating in the 24 week Healthy Lifestyle Plan and our Health Coaching sessions.  WHEN: This group meets the 1st Friday of the month, 12:30 PM - 1:30 PM online, via a zoom meeting.    FACILITATOR: Led by National Board Certified Health and , Angela Jiménez [facility].   TO REGISTER: Please call the Call Center at [phone] to register.  You will get an appointment to attend in [facility]. Fifteen minutes prior to the meeting, complete the e-check in and you will get the link to join the meeting.    There is no charge to attend this group and space is limited.     2023 and 2024 Meetings  December 1: \"Let's Talk\" (guided discussion on our wins and challenges)  January 5: \"New Years Vision: Manifest your Best 2024!\" (guided imagery,  journaling and discussion)  February 2: \"Let's Talk\"  March 1: \"10 Percent Happier\" by Shaun Richter (Book Bites - a guided discussion on the nuggets of wisdom from favorite wellness books, no need to read the book but highly encouraged)  April 5: \"Let's Talk\"  May 3: " "\"Essentialism: The Disciplined Pursuit of Less\" by Luke Peck (Book Bites discussion)  June 7: \"Let's Talk\"  July 5: NO MEETING, off for the 4th of July Holiday  August 2: \"The Blue Zones, Secrets for Living a Longer Life\" by Shaun Ho (Book Bites discussion)                    "

## 2024-05-23 ENCOUNTER — TELEPHONE (OUTPATIENT)
Dept: ENDOCRINOLOGY | Facility: CLINIC | Age: 29
End: 2024-05-23
Payer: COMMERCIAL

## 2024-05-23 NOTE — TELEPHONE ENCOUNTER
Patient confirmed scheduled appointment:  Date: 6/27/24  Time: 10 am  Visit type: RET [facility] Nutrition  Provider: Shira Nuñez  Location: virtual  Testing/imaging: n/a  Additional notes: Pt confirmed will be in [place] for appt

## 2024-06-25 ENCOUNTER — NURSE TRIAGE (OUTPATIENT)
Dept: INTERNAL MEDICINE | Facility: CLINIC | Age: 29
End: 2024-06-25
Payer: COMMERCIAL

## 2024-06-25 NOTE — TELEPHONE ENCOUNTER
Patient reporting constant, sharp chest pain that began Sunday.  Patient also reports popping/clicking sound with movement.  Patient states the pain radiates mildly to her back.  Patient denies radiation to arm and denies jaw pain.  Patient denies any other symptoms or SOB.  Patient is not sure what brought pain on.  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      Patient plans to follow Disposition and be seen in [facility].  Writer advised patient to go to ED/call 911 if she developed SOB, difficulty breathing, jaw pain or radiation of pain to arm.  Patient verbalized understanding.       Reason for Disposition   Chest pain lasting longer than 5 minutes and occurred in last 3 days (72 hours) (Exception: Feels exactly the same as previously diagnosed heartburn and has accompanying sour taste in mouth.)    Additional Information   Negative: SEVERE difficulty breathing (e.g., struggling for each breath, speaks in single words)   Negative: Difficult to awaken or acting confused (e.g., disoriented, slurred speech)   Negative: Shock suspected (e.g., cold/pale/clammy skin, too weak to stand, low BP, rapid pulse)   Negative: Passed out (i.e., lost consciousness, collapsed and was not responding)   Negative: Chest pain lasting longer than 5 minutes and ANY of the following:         Pain is crushing, pressure-like, or heavy         Over 44 years old          Over 30 years old and one cardiac risk factor (e.g diabetes, high blood pressure, high cholesterol, smoker, or family history of heart disease)         History of heart disease (e.g. angina, heart attack, heart failure, bypass surgery, takes nitroglycerin)   Negative: Heart beating < 50 beats per minute OR > 140 beats per minute   Negative: Visible sweat on face or sweat dripping down face   Negative: Sounds like a life-threatening emergency to the triager   Negative: Followed an injury to chest   Negative: SEVERE chest pain    "Negative: Pain also in shoulder(s) or arm(s) or jaw   Negative: Difficulty breathing   Negative: Cocaine use within last 3 days   Negative: Major surgery in the past month   Negative: Hip or leg fracture (broken bone) in past month (or had cast on leg or ankle in past month)   Negative: Illness requiring prolonged bedrest in past month (e.g., immobilization, long hospital stay)   Negative: Long-distance travel in past month (e.g., car, bus, train, plane; with trip lasting 6 or more hours)   Negative: History of prior 'blood clot' in leg or lungs (i.e., deep vein thrombosis, pulmonary embolism)   Negative: History of inherited increased risk of blood clots (e.g., Factor 5 Leiden, Anti-thrombin 3, Protein C or Protein S deficiency, Prothrombin mutation)   Negative: Cancer treatment in the past two months (or has cancer now)   Negative: Heart beating irregularly or very rapidly    Answer Assessment - Initial Assessment Questions  1. LOCATION: \"Where does it hurt?\"        [place], center  2. RADIATION: \"Does the pain go anywhere else?\" (e.g., into neck, jaw, arms, back)      Back slightly  3. ONSET: \"When did the chest pain begin?\" (Minutes, hours or days)       Sunday  4. PATTERN: \"Does the pain come and go, or has it been constant since it started?\"  \"Does it get worse with exertion?\"       Constant. Worse with exertion  5. DURATION: \"How long does it last\" (e.g., seconds, minutes, hours)      constant  6. SEVERITY: \"How bad is the pain?\"  (e.g., Scale 1-10; mild, moderate, or severe)     - MILD (1-3): doesn't interfere with normal activities      - MODERATE (4-7): interferes with normal activities or awakens from sleep     - SEVERE (8-10): excruciating pain, unable to do any normal activities        7.5  7. CARDIAC RISK FACTORS: \"Do you have any history of heart problems or risk factors for heart disease?\" (e.g., angina, prior heart attack; diabetes, high blood pressure, high cholesterol, smoker, or strong family " "history of heart disease)      denies  8. PULMONARY RISK FACTORS: \"Do you have any history of lung disease?\"  (e.g., blood clots in lung, asthma, emphysema, birth control pills)      asthma  9. CAUSE: \"What do you think is causing the chest pain?\"      Lifting something at a food shelf Sunday perhaps  10. OTHER SYMPTOMS: \"Do you have any other symptoms?\" (e.g., dizziness, nausea, vomiting, sweating, fever, difficulty breathing, cough)        Nausea- two days, intermittent   11. PREGNANCY: \"Is there any chance you are pregnant?\" \"When was your last menstrual period?\"        denies    Protocols used: Chest Pain-A-[place]    "

## 2024-07-03 ENCOUNTER — LAB (OUTPATIENT)
Dept: LAB | Facility: CLINIC | Age: 29
End: 2024-07-03
Attending: INTERNAL MEDICINE
Payer: COMMERCIAL

## 2024-07-03 ENCOUNTER — OFFICE VISIT (OUTPATIENT)
Dept: RHEUMATOLOGY | Facility: CLINIC | Age: 29
End: 2024-07-03
Attending: INTERNAL MEDICINE
Payer: COMMERCIAL

## 2024-07-03 ENCOUNTER — PRE VISIT (OUTPATIENT)
Dept: RHEUMATOLOGY | Facility: CLINIC | Age: 29
End: 2024-07-03
Payer: COMMERCIAL

## 2024-07-03 VITALS
SYSTOLIC BLOOD PRESSURE: 129 MMHG | BODY MASS INDEX: 42.97 KG/M2 | WEIGHT: 267.4 LBS | HEART RATE: 100 BPM | DIASTOLIC BLOOD PRESSURE: 87 MMHG | OXYGEN SATURATION: 100 % | HEIGHT: 66 IN

## 2024-07-03 DIAGNOSIS — E66.01 CLASS 3 SEVERE OBESITY WITH SERIOUS COMORBIDITY AND BODY MASS INDEX (BMI) OF 40.0 TO 44.9 IN ADULT, UNSPECIFIED OBESITY TYPE (H): Primary | ICD-10-CM

## 2024-07-03 DIAGNOSIS — R73.03 PREDIABETES: ICD-10-CM

## 2024-07-03 DIAGNOSIS — M32.19 OTHER SYSTEMIC LUPUS ERYTHEMATOSUS WITH OTHER ORGAN INVOLVEMENT (H): ICD-10-CM

## 2024-07-03 DIAGNOSIS — M32.19 OTHER SYSTEMIC LUPUS ERYTHEMATOSUS WITH OTHER ORGAN INVOLVEMENT (H): Primary | ICD-10-CM

## 2024-07-03 DIAGNOSIS — E66.813 CLASS 3 SEVERE OBESITY WITH SERIOUS COMORBIDITY AND BODY MASS INDEX (BMI) OF 40.0 TO 44.9 IN ADULT, UNSPECIFIED OBESITY TYPE (H): Primary | ICD-10-CM

## 2024-07-03 LAB
ALBUMIN SERPL BCG-MCNC: 4.2 G/DL (ref 3.5–5.2)
ALP SERPL-CCNC: 64 U/L (ref 40–150)
ALT SERPL W P-5'-P-CCNC: 29 U/L (ref 0–50)
ANION GAP SERPL CALCULATED.3IONS-SCNC: 10 MMOL/L (ref 7–15)
AST SERPL W P-5'-P-CCNC: 25 U/L (ref 0–45)
BASOPHILS # BLD AUTO: 0.1 10E3/UL (ref 0–0.2)
BASOPHILS NFR BLD AUTO: 1 %
BILIRUB SERPL-MCNC: 0.2 MG/DL
BUN SERPL-MCNC: 17.1 MG/DL (ref 6–20)
CALCIUM SERPL-MCNC: 9.2 MG/DL (ref 8.6–10)
CHLORIDE SERPL-SCNC: 105 MMOL/L (ref 98–107)
CREAT SERPL-MCNC: 0.78 MG/DL (ref 0.51–0.95)
CRP SERPL-MCNC: 8.04 MG/L
DEPRECATED HCO3 PLAS-SCNC: 26 MMOL/L (ref 22–29)
EGFRCR SERPLBLD CKD-EPI 2021: >90 ML/MIN/1.73M2
EOSINOPHIL # BLD AUTO: 0 10E3/UL (ref 0–0.7)
EOSINOPHIL NFR BLD AUTO: 1 %
ERYTHROCYTE [DISTWIDTH] IN BLOOD BY AUTOMATED COUNT: 15 % (ref 10–15)
ERYTHROCYTE [SEDIMENTATION RATE] IN BLOOD BY WESTERGREN METHOD: 13 MM/HR (ref 0–20)
GLUCOSE SERPL-MCNC: 71 MG/DL (ref 70–99)
HCT VFR BLD AUTO: 39.7 % (ref 35–47)
HGB BLD-MCNC: 12.6 G/DL (ref 11.7–15.7)
IMM GRANULOCYTES # BLD: 0 10E3/UL
IMM GRANULOCYTES NFR BLD: 0 %
LYMPHOCYTES # BLD AUTO: 2.2 10E3/UL (ref 0.8–5.3)
LYMPHOCYTES NFR BLD AUTO: 40 %
Lab: NORMAL
MCH RBC QN AUTO: 27.2 PG (ref 26.5–33)
MCHC RBC AUTO-ENTMCNC: 31.7 G/DL (ref 31.5–36.5)
MCV RBC AUTO: 86 FL (ref 78–100)
MONOCYTES # BLD AUTO: 0.4 10E3/UL (ref 0–1.3)
MONOCYTES NFR BLD AUTO: 8 %
NEUTROPHILS # BLD AUTO: 2.8 10E3/UL (ref 1.6–8.3)
NEUTROPHILS NFR BLD AUTO: 50 %
NRBC # BLD AUTO: 0 10E3/UL
NRBC BLD AUTO-RTO: 0 /100
PERFORMING LABORATORY: NORMAL
PLATELET # BLD AUTO: 283 10E3/UL (ref 150–450)
POTASSIUM SERPL-SCNC: 4 MMOL/L (ref 3.4–5.3)
PROT SERPL-MCNC: 7 G/DL (ref 6.4–8.3)
RBC # BLD AUTO: 4.63 10E6/UL (ref 3.8–5.2)
SODIUM SERPL-SCNC: 141 MMOL/L (ref 135–145)
SPECIMEN STATUS: NORMAL
T4 FREE SERPL-MCNC: 0.94 NG/DL (ref 0.9–1.7)
TEST NAME: NORMAL
TSH SERPL DL<=0.005 MIU/L-ACNC: 0.37 UIU/ML (ref 0.3–4.2)
WBC # BLD AUTO: 5.5 10E3/UL (ref 4–11)

## 2024-07-03 PROCEDURE — 86140 C-REACTIVE PROTEIN: CPT | Performed by: PATHOLOGY

## 2024-07-03 PROCEDURE — 86146 BETA-2 GLYCOPROTEIN ANTIBODY: CPT | Performed by: INTERNAL MEDICINE

## 2024-07-03 PROCEDURE — 86160 COMPLEMENT ANTIGEN: CPT | Performed by: INTERNAL MEDICINE

## 2024-07-03 PROCEDURE — 82306 VITAMIN D 25 HYDROXY: CPT | Performed by: INTERNAL MEDICINE

## 2024-07-03 PROCEDURE — 99000 SPECIMEN HANDLING OFFICE-LAB: CPT | Performed by: PATHOLOGY

## 2024-07-03 PROCEDURE — 86376 MICROSOMAL ANTIBODY EACH: CPT | Performed by: INTERNAL MEDICINE

## 2024-07-03 PROCEDURE — 86800 THYROGLOBULIN ANTIBODY: CPT | Performed by: INTERNAL MEDICINE

## 2024-07-03 PROCEDURE — 86225 DNA ANTIBODY NATIVE: CPT | Performed by: INTERNAL MEDICINE

## 2024-07-03 PROCEDURE — 80053 COMPREHEN METABOLIC PANEL: CPT | Performed by: PATHOLOGY

## 2024-07-03 PROCEDURE — 86147 CARDIOLIPIN ANTIBODY EA IG: CPT | Performed by: INTERNAL MEDICINE

## 2024-07-03 PROCEDURE — 85025 COMPLETE CBC W/AUTO DIFF WBC: CPT | Performed by: PATHOLOGY

## 2024-07-03 PROCEDURE — 36415 COLL VENOUS BLD VENIPUNCTURE: CPT | Performed by: PATHOLOGY

## 2024-07-03 PROCEDURE — 85652 RBC SED RATE AUTOMATED: CPT | Performed by: PATHOLOGY

## 2024-07-03 PROCEDURE — 84439 ASSAY OF FREE THYROXINE: CPT | Performed by: PATHOLOGY

## 2024-07-03 PROCEDURE — G0463 HOSPITAL OUTPT CLINIC VISIT: HCPCS | Performed by: INTERNAL MEDICINE

## 2024-07-03 PROCEDURE — 84443 ASSAY THYROID STIM HORMONE: CPT | Performed by: PATHOLOGY

## 2024-07-03 PROCEDURE — 86235 NUCLEAR ANTIGEN ANTIBODY: CPT | Performed by: INTERNAL MEDICINE

## 2024-07-03 PROCEDURE — 99205 OFFICE O/P NEW HI 60 MIN: CPT | Performed by: INTERNAL MEDICINE

## 2024-07-03 PROCEDURE — 83516 IMMUNOASSAY NONANTIBODY: CPT | Mod: 90 | Performed by: PATHOLOGY

## 2024-07-03 RX ORDER — HYDROXYCHLOROQUINE SULFATE 200 MG/1
200 TABLET, FILM COATED ORAL 2 TIMES DAILY WITH MEALS
Qty: 180 TABLET | Refills: 3 | Status: SHIPPED | OUTPATIENT
Start: 2024-07-03

## 2024-07-03 RX ORDER — SEMAGLUTIDE 1 MG/.5ML
1 INJECTION, SOLUTION SUBCUTANEOUS WEEKLY
Qty: 2 ML | Refills: 2 | Status: SHIPPED | OUTPATIENT
Start: 2024-07-03 | End: 2024-08-07

## 2024-07-03 ASSESSMENT — PAIN SCALES - GENERAL: PAINLEVEL: SEVERE PAIN (7)

## 2024-07-03 NOTE — NURSING NOTE
"Chief Complaint   Patient presents with    New Patient     /87   Pulse 100   Ht 1.676 m (5' 6\")   Wt 121.3 kg (267 lb 6.4 oz)   LMP 05/02/2024 (Exact Date)   SpO2 100%   BMI 43.16 kg/m    Louie Valera MA on 7/3/2024 at 2:15 PM    "

## 2024-07-03 NOTE — LETTER
7/3/2024       RE: Nehemias Mascorro  [address]  [place] [address]     Dear Colleague,    Thank you for referring your patient, Nehemias Mascorro, to the [facility]HEUMATOLOG[facility] [place] at [facility]. Please see a copy of my visit note below.    Rheumatology Consult Note    Reason for referral: Concern for SLE, seeking 2nd opinion    Referring physician: Alexandra Rodrigues MD    CC: Joint pain    HPI:    Nehemias Mascorro is a 29 year old female G0 with + fh/o SLE, who was referred to our clinic for evaluation and management of possible SLE.      -she was seen by several rheumatologists and was diagnosed with fibromyalgia, but her mother and herself are worried about having lupus. Her mom is under my care with lupus and asked me to see her daughter    -in 2016, had TBI, getting tx from neurologist, still has headache and neck pain    -her major complaint is joint pain    -she has this joint pain x 2 years    -gets pain over hands, elbows, knees and ankles    -hands swell up    -AM stiffness is between 1 hr-all day    -no triggers    -prednisone provided same benefit, made her hyper    -has raynaud's with turning color to blue, white x 2 yr    -gets periodic fevers., low grade x past 3 months,  T max 99.7-100.4, no infection    -no hair loss    -gets oral/nasal ulcers x past 2 years, kenalog paste helps with that    -PCP noticed cervical LAP     -has productive phlegm x 1 yr, clear, white in color, more in the AM    -no SOB, Cp    -has constipation, on miralax    -gets random nausea    -on wegoway, no benefit yet    -reports rapid weight gain, on thyroid med (new)    -dry eyes, on systane eyedrops, not helping    -on biotene for dry mouth    -gest butterfly rash x 3 months, not sure if it photosensitive    -no seizures    -mood is up and down    -has h/o asthma    -no pregnancies, no future pregnancy    -no tobacco, ETOH use    -sun burns her  eyes    -has brain fog    -gets muscle aches and spasms    -reports urgency with frequency    -working par time, runs food shelf, 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 [place]THOPEDIC SURGERY      Hip, emelia surgery in 2013    WRIST SURGERY       Family History   Problem Relation Age of Onset    Depression Maternal Grandmother     Schizophrenia Maternal Uncle     Substance Abuse Maternal Uncle    Mom has lupus      Social History     Socioeconomic History    Marital status: Single     Spouse name: Not on file    Number of children: Not on file    Years of education: Not on file    Highest education level: Not on file   Occupational History    Not on file   Tobacco Use    Smoking status: Never    Smokeless tobacco: Never    Tobacco comments:     Medical edible gummi  - has [place] medical marijuana card.    Vaping Use    Vaping status: Never Used   Substance and Sexual Activity    Alcohol use: Yes     Comment: rare    Drug use: Never    Sexual activity: Not Currently   Other Topics Concern    Parent/sibling w/ CABG, MI or angioplasty before 65F 55M? Not Asked   Social History Narrative    Lives with her parents working part time . Had to leave college      Social Determinants of Health     Financial Resource Strain: Low Risk  (5/13/2024)    Financial Resource Strain     Within the past 12 months, have you or your family members you live with been unable to  get utilities (heat, electricity) when it was really needed?: No   Food Insecurity: Low Risk  (5/13/2024)    Food Insecurity     Within the past 12 months, did you worry that your food would run out before you got money to buy more?: No     Within the past 12 months, did the food you bought just not last and you didn t have money to get more?: No   Transportation Needs: High Risk (5/13/2024)    Transportation Needs     Within the past 12 months, has lack of transportation kept you from medical appointments, getting your medicines, non-medical meetings or appointments, work, or from getting things that you need?: Yes   Physical Activity: Insufficiently Active (5/13/2024)    Exercise Vital Sign     Days of Exercise per Week: 2 days     Minutes of Exercise per Session: 30 min   Stress: Stress Concern Present (5/13/2024)    [employer] - Occupational Stress Questionnaire     Feeling of Stress : Rather much   Social Connections: Unknown (5/13/2024)    Social Connection and Isolation Panel [NHANES]     Frequency of Communication with Friends and Family: Not on file     Frequency of Social Gatherings with Friends and Family: Once a week     Attends [employer] Services: Not on file     Active Member of Clubs or Organizations: Not on file     Attends Club or Organization Meetings: Not on file     Marital Status: Not on file   Interpersonal Safety: Low Risk  (5/17/2024)    Interpersonal Safety     Do you feel physically and emotionally safe where you currently live?: Yes     Within the past 12 months, have you been hit, slapped, kicked or otherwise physically hurt by someone?: No     Within the past 12 months, have you been humiliated or emotionally abused in other ways by your partner or ex-partner?: No   Housing Stability: High Risk (5/13/2024)    Housing Stability     Do you have housing? : Yes     Are you worried about losing your housing?: Yes     Patient Active Problem List   Diagnosis     Anxiety    Major depressive disorder    Class 3 severe obesity with serious comorbidity and body mass index (BMI) of 40.0 to 44.9 in adult, unspecified obesity type (H)    Special screening examination for human papillomavirus (HPV)    Gastroesophageal reflux disease    Asymptomatic COVID-19 virus infection    Acne    Asthma    Chronic migraine without aura    Cyst of ovary    Slow transit constipation    Constipation    Iron deficiency anemia    Headache    Gastric ulcer without hemorrhage or perforation    Occipital neuralgia    Neuralgia and neuritis, unspecified    Other reactions to severe stress    Somatization disorder    Post-COVID chronic fatigue    Dizziness    Dysphagia    Encephalopathy, unspecified    Excessive and frequent menstruation    Indigestion    Migraine headache    Muscle spasm    Nausea    Otalgia    Paresthesia of skin    Post concussion syndrome    Primary focal hyperhidrosis    Hemorrhage of rectum and anus    Right upper quadrant pain    Snoring    Strain of muscle, fascia and tendon at neck level, sequela    Tension-type headache, unspecified, not intractable    Wrist pain, right    Cervicothoracic disc displacement    Insomnia, unspecified    Sleep related bruxism    Current moderate episode of major depressive disorder, unspecified whether recurrent (H)    Central hypothyroidism    Prediabetes     Allergies   Allergen Reactions    Tizanidine Other (See Comments)    Azithromycin     Erythromycin Nausea and Vomiting    Sulfa Antibiotics Nausea       Outpatient Encounter Medications as of 7/3/2024   Medication Sig Dispense Refill    albuterol (ACCUNEB) 1.25 MG/3ML neb solution Take 1 vial (1.25 mg) by nebulization every 6 hours as needed for shortness of breath or wheezing 90 mL 0    albuterol (VENTOLIN HFA) 108 (90 Base) MCG/ACT inhaler INHALE 2 PUFFS INTO THE LUNGS FOUR TIMES DAILY 18 g 3    ARIPiprazole (ABILIFY) 20 MG tablet Take 20 mg by mouth daily      buPROPion (WELLBUTRIN XL) 150  MG 24 hr tablet       buPROPion (WELLBUTRIN XL) 300 MG 24 hr tablet Take 300 mg by mouth every morning      CONCERTA 36 MG CR tablet TAKE 1 TABLET BY MOUTH DAILY IN THE MORNING FOR ADHD. START 11/18/22      drospirenone-ethinyl estradiol (TRACEY) 3-0.03 MG tablet       DULoxetine (CYMBALTA) 60 MG capsule Take 60 mg by mouth daily      ferrous sulfate (FE TABS) 325 (65 Fe) MG EC tablet Take 1 tablet (325 mg) by mouth daily 34 tablet 2    fluticasone (FLONASE) 50 MCG/ACT nasal spray Spray 1 spray into both nostrils daily 16 g 1    fluticasone (FLOVENT DISKUS) 100 MCG/ACT inhaler Inhale 2 puffs into the lungs every 12 hours 28 each 1    gabapentin (NEURONTIN) 100 MG capsule Take 200 mg by mouth At Bedtime      hydrocortisone 2.5 % cream APPLY TOPICALLY TO THE CHEST TWICE DAILY AS NEEDED      hyoscyamine (LEVSIN) 0.125 MG tablet Take 1 tablet (125 mcg) by mouth every 6 hours as needed for cramping 30 tablet 0    ibuprofen (ADVIL/MOTRIN) 800 MG tablet TAKE 1 TABLET BY MOUTH TWICE A DAY WITH MEALS FOR 14 DAYS      levocetirizine (XYZAL) 5 MG tablet Take 5 mg by mouth every 24 hours      levothyroxine (SYNTHROID/LEVOTHROID) 75 MCG tablet Take 1 tablet (75 mcg) by mouth daily 90 tablet 3    meclizine (ANTIVERT) 25 MG tablet Take 1 tablet (25 mg) by mouth 3 times daily as needed for dizziness 30 tablet 0    meloxicam (MOBIC) 15 MG tablet Take 1 tablet (15 mg) by mouth daily 90 tablet 2    methocarbamol (ROBAXIN) 500 MG tablet Take 1 tablet (500 mg) by mouth 4 times daily as needed for muscle spasms 20 tablet 0    omeprazole (PRILOSEC) 20 MG DR capsule Take 1 capsule (20 mg) by mouth 2 times daily      ondansetron (ZOFRAN) 4 MG tablet Take 1 tablet (4 mg) by mouth every 8 hours as needed for nausea 30 tablet 1    polyethylene glycol (MIRALAX) 17 GM/Dose powder Take 1 capful by mouth daily as needed for constipation      Semaglutide-Weight Management (WEGOVY) 0.5 MG/0.5ML pen Inject 0.5 mg Subcutaneous once a week After  completing 4 weeks of 0.25mg dose 2 mL 0    Semaglutide-Weight Management (WEGOVY) 1 MG/0.5ML pen Inject 1 mg Subcutaneous once a week 2 mL 2    SUMAtriptan (IMITREX) 50 MG tablet Take 1 tablet (50 mg) by mouth at onset of headache for migraine May repeat in 2 hours. Max 4 tablets/24 hours. 9 tablet 1    TAZORAC 0.1 % external cream APPLY TOPICALLY TO THE AFFECTED AREA AT BEDTIME      triamcinolone (KENALOG) 0.1 % paste APPLY TO AFFECTED AREA 2 TIMES DAILY AS DIRECTED      [DISCONTINUED] Fremanezumab-vfrm (AJOVY) 225 MG/1.5ML SOAJ Inject 225 mg Subcutaneous every 30 days      [DISCONTINUED] Semaglutide-Weight Management (WEGOVY) 0.25 MG/0.5ML pen Inject 0.25 mg Subcutaneous once a week For 4 weeks 2 mL 0     No facility-administered encounter medications on file as of 7/3/2024.         Her records were reviewed.    Component      Latest Ref Rng 5/8/2024  2:40 PM 5/8/2024  2:43 PM   Sodium      135 - 145 mmol/L 139     Potassium      3.4 - 5.3 mmol/L 4.1     Carbon Dioxide (CO2)      22 - 29 mmol/L 26     Anion Gap      7 - 15 mmol/L 8     Urea Nitrogen      6.0 - 20.0 mg/dL 12.5     Creatinine      0.51 - 0.95 mg/dL 0.83     GFR Estimate      >60 mL/min/1.73m2 >90     Calcium      8.6 - 10.0 mg/dL 9.2     Chloride      98 - 107 mmol/L 105     Glucose      70 - 99 mg/dL 96     Alkaline Phosphatase      40 - 150 U/L 65     AST      0 - 45 U/L 24     ALT      0 - 50 U/L 27     Protein Total      6.4 - 8.3 g/dL 6.6     Albumin      3.5 - 5.2 g/dL 4.0     Bilirubin Total      <=1.2 mg/dL 0.3     Color Urine      Colorless, Straw, Light Yellow, Yellow   Yellow    Appearance Urine      Clear   Clear    Glucose Urine      Negative mg/dL  Negative    Bilirubin Urine      Negative   Negative    Ketones Urine      Negative mg/dL  Negative    Specific Gravity Urine      1.005 - 1.030   1.020    Blood Urine      Negative   Negative    pH Urine      5.0 - 8.0   7.0    Protein Albumin Urine      Negative mg/dL  Negative   "  Urobilinogen Urine      0.2, 1.0 E.U./dL  1.0    Nitrite Urine      Negative   Negative    Leukocyte Esterase Urine      Negative   Negative    WBC      4.0 - 11.0 10e3/uL 5.0     RBC Count      3.80 - 5.20 10e6/uL 4.23     Hemoglobin      11.7 - 15.7 g/dL 11.1 (L)     Hematocrit      35.0 - 47.0 % 36.9     MCV      78 - 100 fL 87     MCH      26.5 - 33.0 pg 26.2 (L)     MCHC      31.5 - 36.5 g/dL 30.1 (L)     RDW      10.0 - 15.0 % 13.9     Platelet Count      150 - 450 10e3/uL 308     INR      0.85 - 1.15  0.95     Thrombin Time      13.0 - 19.0 Seconds 17.0     PTT Ratio      <1.21  0.92     DRVVT Screen Ratio      <1.08  0.94     Lupus Result      Negative  Negative     Lupus Interpretation The INR is normal.      Bacteria Urine      None Seen /HPF  None Seen    RBC Urine      0-2 /HPF /HPF  0-2    WBC Urine      0-5 /HPF /HPF  0-5    Squamous Epithelial /LPF Urine      None Seen /LPF  Few !    RONALD interpretation      Negative  Borderline Positive !     RONALD pattern 1 Speckled     RONALD titer 1 1:40     Beta 2 Glycoprotein 1 Antibody IgG      <7.0 U/mL 14.0 (H)     Beta 2 Glycoprotein 1 Antibody IgM      <7.0 U/mL <2.4     CRP Inflammation      <5.00 mg/L 12.90 (H)     Sed Rate      0 - 20 mm/hr 18     Complement C3      81 - 157 mg/dL 149     Complement C4      13 - 39 mg/dL 20     Complement, Total, S      38.7 - 89.9 U/mL 28.9 (L)     TSH      0.30 - 4.20 uIU/mL 0.39        Legend:  (L) Low  ! Abnormal  (H) High    Neg RF, anti-CCP, SSA/SSB      Ph.E:    /87   Pulse 100   Ht 1.676 m (5' 6\")   Wt 121.3 kg (267 lb 6.4 oz)   LMP 05/02/2024 (Exact Date)   SpO2 100%   BMI 43.16 kg/m        Constitutional: WD/WN. Pleasant. In no acute distress. Mother present  Eyes: EOM intact, sclera anicteric, conj not injected  HEENT: No oral ulcers or thrush. Poor salivary pool.  Neck: No cervical LAP   Chest: Clear to auscultation bilaterally  CV: RRR, no murmurs/ rubs or gallops. No edema, clubbing or cyanosis. "   GI: Abdomen is soft and non tender.   MS: No synovitis. Cool joints. No tenderness of the joints. No  joint deformities. Full ROM of the joints. No nodules. No Jaccoud's deformity.  Skin: No skin rash, malar rash, livedo, periungual erythema, alopecia  Neuro: A&O x 3. Grossly non focal  Psych: NL affect     Assessment/ plan:    SLE. Nehemias has signs and symptoms suggestive of mild early SLE. This is based on + RONALD 1:40, +beta 2 GP I IgG, low CH50, leukopenia, high CRP, arthritis, malar rash, oral/nasal ulcers, Raynaud's, low grade fevers, fatigue, brain fog, and sicca.      I reviewed and discussed lab results with her. Will finish the work up by checking the labs which are missing and checking follow up albs, plus thyroid antibodies to see if her hypothyroidism is autoimmune or not.    Beta 2 GP I IgG comes with increased risk of miscarriage and thrombosis, if repeat comes back positive in 3 months. Discussed significance of it, and recommendation to take ASA 81 mg every day during future pregnancies.    Discussed lifestyle changes, discussed sun protective measures (sun block Neutrogena  sheer mist for body, sport for face, sun protective clothing), healthy diet (more omega 3, less sugar), stretching exercises like Myron Chi, good sleep, avoiding stress and triggers for lupus (adonis adonis sprouts, echinacea, garlic, melatonin, sulfa drug).      She should avoid any estrogen and any birth control containing estrogen given + beta 2 GP I IgG.      Recommend to start HCQ; risks were discussed including rare risk of retinal toxicity. It takes 2-3 months to show benefit, peak benefit is 6 months.      Recommend tos witch mobic to ibuprofen as it is not helping.    Advised her to try Kiwi for constipation.      Plan:.    Labs     Start plaquenil 1 tab twice a day with food    Return in 4 months in person    TT 60 min was spent on date of the encounter doing chart review, history and exam, documentation and  further activities as noted above. Any prior notes, outside records, laboratory results, and imaging studies were reviewed if relevant.    Orders Placed This Encounter   Procedures    NAGI Panel (RNP, SMITH, Scleroderma, SSAB, SSBB); NAGI, Antibodies, Panel    Vitamin D Deficiency    TSH    T4 free    Anti thyroglobulin antibody    Thyroid peroxidase antibody    ALT    Albumin level    AST    Creatinine    Complement C4    Complement C3    CRP inflammation    DNA double stranded antibodies    Erythrocyte sedimentation rate auto    Cardiolipin Marichuy IgG and IgM    Cardiolipin Antibody IgA    Beta 2 Glycoprotein 1 Antibody IgA    Other Laboratory; [facility] (Laboratory Miscellaneous Order)    Comprehensive metabolic panel    CBC with Platelets & Differential            Aamir Perez MD

## 2024-07-03 NOTE — PROGRESS NOTES
Rheumatology Consult Note    Reason for referral: Concern for SLE, seeking 2nd opinion    Referring physician: Alexandra Rodrigues MD    CC: Joint pain    HPI:    Nehemias Mascorro is a 29 year old female G0 with + fh/o SLE, who was referred to our clinic for evaluation and management of possible SLE.      -she was seen by several rheumatologists and was diagnosed with fibromyalgia, but her mother and herself are worried about having lupus. Her mom is under my care with lupus and asked me to see her daughter    -in 2016, had TBI, getting tx from neurologist, still has headache and neck pain    -her major complaint is joint pain    -she has this joint pain x 2 years    -gets pain over hands, elbows, knees and ankles    -hands swell up    -AM stiffness is between 1 hr-all day    -no triggers    -prednisone provided same benefit, made her hyper    -has raynaud's with turning color to blue, white x 2 yr    -gets periodic fevers., low grade x past 3 months,  T max 99.7-100.4, no infection    -no hair loss    -gets oral/nasal ulcers x past 2 years, kenalog paste helps with that    -PCP noticed cervical LAP     -has productive phlegm x 1 yr, clear, white in color, more in the AM    -no SOB, Cp    -has constipation, on miralax    -gets random nausea    -on wegoway, no benefit yet    -reports rapid weight gain, on thyroid med (new)    -dry eyes, on systane eyedrops, not helping    -on biotene for dry mouth    -gest butterfly rash x 3 months, not sure if it photosensitive    -no seizures    -mood is up and down    -has h/o asthma    -no pregnancies, no future pregnancy    -no tobacco, ETOH use    -sun burns her eyes    -has brain fog    -gets muscle aches and spasms    -reports urgency with frequency    -working par time, 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[employer], 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    -has fatigue    Past Medical History:   Diagnosis Date    ADHD (attention deficit hyperactivity disorder)     Depressive disorder     Ovarian cyst     Uncomplicated  asthma      Past Surgical History:   Procedure Laterality Date    COLONOSCOPY N/A 4/27/2022    Procedure: COLONOSCOPY, WITH POLYPECTOMY AND BIOPSY;  Surgeon: Alyse Leija MD;  Location:  GI    ENT SURGERY      tonsilectomy age 8    ESOPHAGOSCOPY, GASTROSCOPY, DUODENOSCOPY (EGD), COMBINED N/A 3/15/2023    Procedure: ESOPHAGOGASTRODUODENOSCOPY, WITH BIOPSY;  Surgeon: Cyn Colon MD;  Location: [facility] OR    ORTHOPEDIC SURGERY      Hip, emelia surgery in 2013    WRIST SURGERY       Family History   Problem Relation Age of Onset    Depression Maternal Grandmother     Schizophrenia Maternal Uncle     Substance Abuse Maternal Uncle    Mom has lupus      Social History     Socioeconomic History    Marital status: Single     Spouse name: Not on file    Number of children: Not on file    Years of education: Not on file    Highest education level: Not on file   Occupational History    Not on file   Tobacco Use    Smoking status: Never    Smokeless tobacco: Never    Tobacco comments:     Medical edible gummi  - has [place] medical marijuana card.    Vaping Use    Vaping status: Never Used   Substance and Sexual Activity    Alcohol use: Yes     Comment: rare    Drug use: Never    Sexual activity: Not Currently   Other Topics Concern    Parent/sibling w/ CABG, MI or angioplasty before 65F 55M? Not Asked   Social History Narrative    Lives with her parents working part time . Had to leave college      Social Determinants of Health     Financial Resource Strain: Low Risk  (5/13/2024)    Financial Resource Strain     Within the past 12 months, have you or your family members you live with been unable to get utilities (heat, electricity) when it was really needed?: No   Food Insecurity: Low Risk  (5/13/2024)    Food Insecurity     Within the past 12 months, did you worry that your food would run out before you got money to buy more?: No     Within the past 12 months, did the food you bought just not last and you didn t have  money to get more?: No   Transportation Needs: High Risk (5/13/2024)    Transportation Needs     Within the past 12 months, has lack of transportation kept you from medical appointments, getting your medicines, non-medical meetings or appointments, work, or from getting things that you need?: Yes   Physical Activity: Insufficiently Active (5/13/2024)    Exercise Vital Sign     Days of Exercise per Week: 2 days     Minutes of Exercise per Session: 30 min   Stress: Stress Concern Present (5/13/2024)    [employer] [place] [employer] - Occupational Stress Questionnaire     Feeling of Stress : Rather much   Social Connections: Unknown (5/13/2024)    Social Connection and Isolation Panel [NHANES]     Frequency of Communication with Friends and Family: Not on file     Frequency of Social Gatherings with Friends and Family: Once a week     Attends Muslim Services: Not on file     Active Member of Clubs or Organizations: Not on file     Attends Club or Organization Meetings: Not on file     Marital Status: Not on file   Interpersonal Safety: Low Risk  (5/17/2024)    Interpersonal Safety     Do you feel physically and emotionally safe where you currently live?: Yes     Within the past 12 months, have you been hit, slapped, kicked or otherwise physically hurt by someone?: No     Within the past 12 months, have you been humiliated or emotionally abused in other ways by your partner or ex-partner?: No   Housing Stability: High Risk (5/13/2024)    Housing Stability     Do you have housing? : Yes     Are you worried about losing your housing?: Yes     Patient Active Problem List   Diagnosis    Anxiety    Major depressive disorder    Class 3 severe obesity with serious comorbidity and body mass index (BMI) of 40.0 to 44.9 in adult, unspecified obesity type (H)    Special screening examination for human papillomavirus (HPV)    Gastroesophageal reflux disease    Asymptomatic COVID-19 virus infection    Acne    Asthma     Chronic migraine without aura    Cyst of ovary    Slow transit constipation    Constipation    Iron deficiency anemia    Headache    Gastric ulcer without hemorrhage or perforation    Occipital neuralgia    Neuralgia and neuritis, unspecified    Other reactions to severe stress    Somatization disorder    Post-COVID chronic fatigue    Dizziness    Dysphagia    Encephalopathy, unspecified    Excessive and frequent menstruation    Indigestion    Migraine headache    Muscle spasm    Nausea    Otalgia    Paresthesia of skin    Post concussion syndrome    Primary focal hyperhidrosis    Hemorrhage of rectum and anus    Right upper quadrant pain    Snoring    Strain of muscle, fascia and tendon at neck level, sequela    Tension-type headache, unspecified, not intractable    Wrist pain, right    Cervicothoracic disc displacement    Insomnia, unspecified    Sleep related bruxism    Current moderate episode of major depressive disorder, unspecified whether recurrent (H)    Central hypothyroidism    Prediabetes     Allergies   Allergen Reactions    Tizanidine Other (See Comments)    Azithromycin     Erythromycin Nausea and Vomiting    Sulfa Antibiotics Nausea       Outpatient Encounter Medications as of 7/3/2024   Medication Sig Dispense Refill    albuterol (ACCUNEB) 1.25 MG/3ML neb solution Take 1 vial (1.25 mg) by nebulization every 6 hours as needed for shortness of breath or wheezing 90 mL 0    albuterol (VENTOLIN HFA) 108 (90 Base) MCG/ACT inhaler INHALE 2 PUFFS INTO THE LUNGS FOUR TIMES DAILY 18 g 3    ARIPiprazole (ABILIFY) 20 MG tablet Take 20 mg by mouth daily      buPROPion (WELLBUTRIN XL) 150 MG 24 hr tablet       buPROPion (WELLBUTRIN XL) 300 MG 24 hr tablet Take 300 mg by mouth every morning      CONCERTA 36 MG CR tablet TAKE 1 TABLET BY MOUTH DAILY IN THE MORNING FOR ADHD. START 11/18/22      drospirenone-ethinyl estradiol (TRACEY) 3-0.03 MG tablet       DULoxetine (CYMBALTA) 60 MG capsule Take 60 mg by mouth  daily      ferrous sulfate (FE TABS) 325 (65 Fe) MG EC tablet Take 1 tablet (325 mg) by mouth daily 34 tablet 2    fluticasone (FLONASE) 50 MCG/ACT nasal spray Spray 1 spray into both nostrils daily 16 g 1    fluticasone (FLOVENT DISKUS) 100 MCG/ACT inhaler Inhale 2 puffs into the lungs every 12 hours 28 each 1    gabapentin (NEURONTIN) 100 MG capsule Take 200 mg by mouth At Bedtime      hydrocortisone 2.5 % cream APPLY TOPICALLY TO THE CHEST TWICE DAILY AS NEEDED      hyoscyamine (LEVSIN) 0.125 MG tablet Take 1 tablet (125 mcg) by mouth every 6 hours as needed for cramping 30 tablet 0    ibuprofen (ADVIL/MOTRIN) 800 MG tablet TAKE 1 TABLET BY MOUTH TWICE A DAY WITH MEALS FOR 14 DAYS      levocetirizine (XYZAL) 5 MG tablet Take 5 mg by mouth every 24 hours      levothyroxine (SYNTHROID/LEVOTHROID) 75 MCG tablet Take 1 tablet (75 mcg) by mouth daily 90 tablet 3    meclizine (ANTIVERT) 25 MG tablet Take 1 tablet (25 mg) by mouth 3 times daily as needed for dizziness 30 tablet 0    meloxicam (MOBIC) 15 MG tablet Take 1 tablet (15 mg) by mouth daily 90 tablet 2    methocarbamol (ROBAXIN) 500 MG tablet Take 1 tablet (500 mg) by mouth 4 times daily as needed for muscle spasms 20 tablet 0    omeprazole (PRILOSEC) 20 MG DR capsule Take 1 capsule (20 mg) by mouth 2 times daily      ondansetron (ZOFRAN) 4 MG tablet Take 1 tablet (4 mg) by mouth every 8 hours as needed for nausea 30 tablet 1    polyethylene glycol (MIRALAX) 17 GM/Dose powder Take 1 capful by mouth daily as needed for constipation      Semaglutide-Weight Management (WEGOVY) 0.5 MG/0.5ML pen Inject 0.5 mg Subcutaneous once a week After completing 4 weeks of 0.25mg dose 2 mL 0    Semaglutide-Weight Management (WEGOVY) 1 MG/0.5ML pen Inject 1 mg Subcutaneous once a week 2 mL 2    SUMAtriptan (IMITREX) 50 MG tablet Take 1 tablet (50 mg) by mouth at onset of headache for migraine May repeat in 2 hours. Max 4 tablets/24 hours. 9 tablet 1    TAZORAC 0.1 % external  cream APPLY TOPICALLY TO THE AFFECTED AREA AT BEDTIME      triamcinolone (KENALOG) 0.1 % paste APPLY TO AFFECTED AREA 2 TIMES DAILY AS DIRECTED      [DISCONTINUED] Fremanezumab-vfrm (AJOVY) 225 MG/1.5ML SOAJ Inject 225 mg Subcutaneous every 30 days      [DISCONTINUED] Semaglutide-Weight Management (WEGOVY) 0.25 MG/0.5ML pen Inject 0.25 mg Subcutaneous once a week For 4 weeks 2 mL 0     No facility-administered encounter medications on file as of 7/3/2024.         Her records were reviewed.    Component      Latest Ref Rng 5/8/2024  2:40 PM 5/8/2024  2:43 PM   Sodium      135 - 145 mmol/L 139     Potassium      3.4 - 5.3 mmol/L 4.1     Carbon Dioxide (CO2)      22 - 29 mmol/L 26     Anion Gap      7 - 15 mmol/L 8     Urea Nitrogen      6.0 - 20.0 mg/dL 12.5     Creatinine      0.51 - 0.95 mg/dL 0.83     GFR Estimate      >60 mL/min/1.73m2 >90     Calcium      8.6 - 10.0 mg/dL 9.2     Chloride      98 - 107 mmol/L 105     Glucose      70 - 99 mg/dL 96     Alkaline Phosphatase      40 - 150 U/L 65     AST      0 - 45 U/L 24     ALT      0 - 50 U/L 27     Protein Total      6.4 - 8.3 g/dL 6.6     Albumin      3.5 - 5.2 g/dL 4.0     Bilirubin Total      <=1.2 mg/dL 0.3     Color Urine      Colorless, Straw, Light Yellow, Yellow   Yellow    Appearance Urine      Clear   Clear    Glucose Urine      Negative mg/dL  Negative    Bilirubin Urine      Negative   Negative    Ketones Urine      Negative mg/dL  Negative    Specific Gravity Urine      1.005 - 1.030   1.020    Blood Urine      Negative   Negative    pH Urine      5.0 - 8.0   7.0    Protein Albumin Urine      Negative mg/dL  Negative    Urobilinogen Urine      0.2, 1.0 E.U./dL  1.0    Nitrite Urine      Negative   Negative    Leukocyte Esterase Urine      Negative   Negative    WBC      4.0 - 11.0 10e3/uL 5.0     RBC Count      3.80 - 5.20 10e6/uL 4.23     Hemoglobin      11.7 - 15.7 g/dL 11.1 (L)     Hematocrit      35.0 - 47.0 % 36.9     MCV      78 - 100 fL 87    "  MCH      26.5 - 33.0 pg 26.2 (L)     MCHC      31.5 - 36.5 g/dL 30.1 (L)     RDW      10.0 - 15.0 % 13.9     Platelet Count      150 - 450 10e3/uL 308     INR      0.85 - 1.15  0.95     Thrombin Time      13.0 - 19.0 Seconds 17.0     PTT Ratio      <1.21  0.92     DRVVT Screen Ratio      <1.08  0.94     Lupus Result      Negative  Negative     Lupus Interpretation The INR is normal.      Bacteria Urine      None Seen /HPF  None Seen    RBC Urine      0-2 /HPF /HPF  0-2    WBC Urine      0-5 /HPF /HPF  0-5    Squamous Epithelial /LPF Urine      None Seen /LPF  Few !    RONALD interpretation      Negative  Borderline Positive !     RONALD pattern 1 Speckled     RONALD titer 1 1:40     Beta 2 Glycoprotein 1 Antibody IgG      <7.0 U/mL 14.0 (H)     Beta 2 Glycoprotein 1 Antibody IgM      <7.0 U/mL <2.4     CRP Inflammation      <5.00 mg/L 12.90 (H)     Sed Rate      0 - 20 mm/hr 18     Complement C3      81 - 157 mg/dL 149     Complement C4      13 - 39 mg/dL 20     Complement, Total, S      38.7 - 89.9 U/mL 28.9 (L)     TSH      0.30 - 4.20 uIU/mL 0.39        Legend:  (L) Low  ! Abnormal  (H) High    Neg RF, anti-CCP, SSA/SSB      Ph.E:    /87   Pulse 100   Ht 1.676 m (5' 6\")   Wt 121.3 kg (267 lb 6.4 oz)   LMP 05/02/2024 (Exact Date)   SpO2 100%   BMI 43.16 kg/m        Constitutional: WD/WN. Pleasant. In no acute distress. Mother present  Eyes: EOM intact, sclera anicteric, conj not injected  HEENT: No oral ulcers or thrush. Poor salivary pool.  Neck: No cervical LAP   Chest: Clear to auscultation bilaterally  CV: RRR, no murmurs/ rubs or gallops. No edema, clubbing or cyanosis.   GI: Abdomen is soft and non tender.   MS: No synovitis. Cool joints. No tenderness of the joints. No  joint deformities. Full ROM of the joints. No nodules. No Jaccoud's deformity.  Skin: No skin rash, malar rash, livedo, periungual erythema, alopecia  Neuro: A&O x 3. Grossly non focal  Psych: NL affect     Assessment/ plan:    SLE. " Nehemias has signs and symptoms suggestive of mild early SLE. This is based on + RONALD 1:40, +beta 2 GP I IgG, low CH50, leukopenia, high CRP, arthritis, malar rash, oral/nasal ulcers, Raynaud's, low grade fevers, fatigue, brain fog, and sicca.      I reviewed and discussed lab results with her. Will finish the work up by checking the labs which are missing and checking follow up albs, plus thyroid antibodies to see if her hypothyroidism is autoimmune or not.    Beta 2 GP I IgG comes with increased risk of miscarriage and thrombosis, if repeat comes back positive in 3 months. Discussed significance of it, and recommendation to take ASA 81 mg every day during future pregnancies.    Discussed lifestyle changes, discussed sun protective measures (sun block Neutrogena  sheer mist for body, sport for face, sun protective clothing), healthy diet (more omega 3, less sugar), stretching exercises like Myron Chi, good sleep, avoiding stress and triggers for lupus (adonis adonis sprouts, echinacea, garlic, melatonin, sulfa drug).      She should avoid any estrogen and any birth control containing estrogen given + beta 2 GP I IgG.      Recommend to start HCQ; risks were discussed including rare risk of retinal toxicity. It takes 2-3 months to show benefit, peak benefit is 6 months.      Recommend tos witch mobic to ibuprofen as it is not helping.    Advised her to try Kiwi for constipation.      Plan:.    Labs     Start plaquenil 1 tab twice a day with food    Return in 4 months in person    TT 60 min was spent on date of the encounter doing chart review, history and exam, documentation and further activities as noted above. Any prior notes, outside records, laboratory results, and imaging studies were reviewed if relevant.    Orders Placed This Encounter   Procedures    NAGI Panel (RNP, SMITH, Scleroderma, SSAB, SSBB); NAGI, Antibodies, Panel    Vitamin D Deficiency    TSH    T4 free    Anti thyroglobulin antibody    Thyroid  peroxidase antibody    ALT    Albumin level    AST    Creatinine    Complement C4    Complement C3    CRP inflammation    DNA double stranded antibodies    Erythrocyte sedimentation rate auto    Cardiolipin Marichuy IgG and IgM    Cardiolipin Antibody IgA    Beta 2 Glycoprotein 1 Antibody IgA    Other Laboratory; [facility] (Laboratory Miscellaneous Order)    Comprehensive metabolic panel    CBC with Platelets & Differential            Aamir Perez MD

## 2024-07-04 ENCOUNTER — MYC MEDICAL ADVICE (OUTPATIENT)
Dept: RHEUMATOLOGY | Facility: CLINIC | Age: 29
End: 2024-07-04
Payer: COMMERCIAL

## 2024-07-04 DIAGNOSIS — Z30.011 OCP (ORAL CONTRACEPTIVE PILLS) INITIATION: Primary | ICD-10-CM

## 2024-07-04 LAB — VIT D+METAB SERPL-MCNC: 45 NG/ML (ref 20–50)

## 2024-07-05 LAB
C3 SERPL-MCNC: 154 MG/DL (ref 81–157)
C4 SERPL-MCNC: 18 MG/DL (ref 13–39)
THYROGLOB AB SERPL IA-ACNC: <20 IU/ML
THYROPEROXIDASE AB SERPL-ACNC: <10 IU/ML

## 2024-07-06 LAB
B2 GLYCOPROT1 IGA SER-ACNC: 4.1 U/ML
CARDIOLIPIN IGA SER IA-ACNC: 2.1 APL-U/ML
CARDIOLIPIN IGA SER IA-ACNC: NEGATIVE
RNAP III IGG SERPL IA-ACNC: 11 UNITS

## 2024-07-08 ENCOUNTER — OFFICE VISIT (OUTPATIENT)
Dept: INTERNAL MEDICINE | Facility: CLINIC | Age: 29
End: 2024-07-08
Payer: COMMERCIAL

## 2024-07-08 ENCOUNTER — MYC MEDICAL ADVICE (OUTPATIENT)
Dept: INTERNAL MEDICINE | Facility: CLINIC | Age: 29
End: 2024-07-08

## 2024-07-08 ENCOUNTER — HOSPITAL ENCOUNTER (OUTPATIENT)
Dept: CT IMAGING | Facility: HOSPITAL | Age: 29
Discharge: HOME | End: 2024-07-08
Attending: INTERNAL MEDICINE | Admitting: INTERNAL MEDICINE
Payer: COMMERCIAL

## 2024-07-08 VITALS
HEIGHT: 66 IN | WEIGHT: 267.4 LBS | TEMPERATURE: 97.8 F | SYSTOLIC BLOOD PRESSURE: 125 MMHG | RESPIRATION RATE: 17 BRPM | OXYGEN SATURATION: 100 % | HEART RATE: 95 BPM | BODY MASS INDEX: 42.97 KG/M2 | DIASTOLIC BLOOD PRESSURE: 78 MMHG

## 2024-07-08 DIAGNOSIS — R07.89 CHEST WALL PAIN: ICD-10-CM

## 2024-07-08 DIAGNOSIS — M94.0 COSTOCHONDRITIS: Primary | ICD-10-CM

## 2024-07-08 DIAGNOSIS — M94.0 COSTOCHONDRITIS: ICD-10-CM

## 2024-07-08 LAB
ATRIAL RATE - MUSE: 88 BPM
CARDIOLIPIN IGG SER IA-ACNC: 6.5 GPL-U/ML
CARDIOLIPIN IGG SER IA-ACNC: NEGATIVE
CARDIOLIPIN IGM SER IA-ACNC: 2.5 MPL-U/ML
CARDIOLIPIN IGM SER IA-ACNC: NEGATIVE
DIASTOLIC BLOOD PRESSURE - MUSE: NORMAL MMHG
DSDNA AB SER-ACNC: 0.9 IU/ML
ENA SM IGG SER IA-ACNC: <0.7 U/ML
ENA SM IGG SER IA-ACNC: NEGATIVE
ENA SS-A AB SER IA-ACNC: <0.5 U/ML
ENA SS-A AB SER IA-ACNC: NEGATIVE
ENA SS-B IGG SER IA-ACNC: <0.6 U/ML
ENA SS-B IGG SER IA-ACNC: NEGATIVE
INTERPRETATION ECG - MUSE: NORMAL
P AXIS - MUSE: 39 DEGREES
PR INTERVAL - MUSE: 146 MS
QRS DURATION - MUSE: 94 MS
QT - MUSE: 386 MS
QTC - MUSE: 467 MS
R AXIS - MUSE: 50 DEGREES
SYSTOLIC BLOOD PRESSURE - MUSE: NORMAL MMHG
T AXIS - MUSE: 38 DEGREES
U1 SNRNP IGG SER IA-ACNC: <1.1 U/ML
U1 SNRNP IGG SER IA-ACNC: NEGATIVE
VENTRICULAR RATE- MUSE: 88 BPM

## 2024-07-08 PROCEDURE — 250N000011 HC RX IP 250 OP 636: Performed by: INTERNAL MEDICINE

## 2024-07-08 PROCEDURE — 93010 ELECTROCARDIOGRAM REPORT: CPT | Performed by: STUDENT IN AN ORGANIZED HEALTH CARE EDUCATION/TRAINING PROGRAM

## 2024-07-08 PROCEDURE — 99213 OFFICE O/P EST LOW 20 MIN: CPT | Performed by: INTERNAL MEDICINE

## 2024-07-08 PROCEDURE — 71275 CT ANGIOGRAPHY CHEST: CPT

## 2024-07-08 PROCEDURE — 93005 ELECTROCARDIOGRAM TRACING: CPT | Performed by: INTERNAL MEDICINE

## 2024-07-08 RX ORDER — TIZANIDINE HYDROCHLORIDE 2 MG/1
CAPSULE, GELATIN COATED ORAL
COMMUNITY
Start: 2024-05-20

## 2024-07-08 RX ORDER — TERCONAZOLE 0.4 %
CREAM WITH APPLICATOR VAGINAL
COMMUNITY
Start: 2024-07-02

## 2024-07-08 RX ORDER — LIDOCAINE 50 MG/G
1 PATCH TOPICAL EVERY 24 HOURS
Qty: 15 PATCH | Refills: 1 | Status: SHIPPED | OUTPATIENT
Start: 2024-07-08

## 2024-07-08 RX ORDER — ACETAMINOPHEN AND CODEINE PHOSPHATE 120; 12 MG/5ML; MG/5ML
0.35 SOLUTION ORAL DAILY
Qty: 90 TABLET | Refills: 4 | Status: SHIPPED | OUTPATIENT
Start: 2024-07-08

## 2024-07-08 RX ORDER — IOPAMIDOL 755 MG/ML
80 INJECTION, SOLUTION INTRAVASCULAR ONCE
Status: COMPLETED | OUTPATIENT
Start: 2024-07-08 | End: 2024-07-08

## 2024-07-08 RX ADMIN — IOPAMIDOL 80 ML: 755 INJECTION, SOLUTION INTRAVENOUS at 19:21

## 2024-07-08 ASSESSMENT — PATIENT HEALTH QUESTIONNAIRE - PHQ9
10. IF YOU CHECKED OFF ANY PROBLEMS, HOW DIFFICULT HAVE THESE PROBLEMS MADE IT FOR YOU TO DO YOUR WORK, TAKE CARE OF THINGS AT HOME, OR GET ALONG WITH OTHER PEOPLE: VERY DIFFICULT
SUM OF ALL RESPONSES TO PHQ QUESTIONS 1-9: 15
SUM OF ALL RESPONSES TO PHQ QUESTIONS 1-9: 15

## 2024-07-08 ASSESSMENT — ASTHMA QUESTIONNAIRES
ACT_TOTALSCORE: 23
ACT_TOTALSCORE: 23
QUESTION_3 LAST FOUR WEEKS HOW OFTEN DID YOUR ASTHMA SYMPTOMS (WHEEZING, COUGHING, SHORTNESS OF BREATH, CHEST TIGHTNESS OR PAIN) WAKE YOU UP AT NIGHT OR EARLIER THAN USUAL IN THE MORNING: NOT AT ALL
QUESTION_1 LAST FOUR WEEKS HOW MUCH OF THE TIME DID YOUR ASTHMA KEEP YOU FROM GETTING AS MUCH DONE AT WORK, SCHOOL OR AT HOME: NONE OF THE TIME
QUESTION_4 LAST FOUR WEEKS HOW OFTEN HAVE YOU USED YOUR RESCUE INHALER OR NEBULIZER MEDICATION (SUCH AS ALBUTEROL): NOT AT ALL
QUESTION_5 LAST FOUR WEEKS HOW WOULD YOU RATE YOUR ASTHMA CONTROL: COMPLETELY CONTROLLED
QUESTION_2 LAST FOUR WEEKS HOW OFTEN HAVE YOU HAD SHORTNESS OF BREATH: THREE TO SIX TIMES A WEEK

## 2024-07-08 ASSESSMENT — PAIN SCALES - GENERAL: PAINLEVEL: EXTREME PAIN (8)

## 2024-07-08 NOTE — PROGRESS NOTES
"  Assessment & Plan     Costochondritis  Tenderness over the sternal costal joints suggests costochondritis.  She had been lifting things.  However she is at has the risk factors for pulmonary embolism.  Commend chest CT to rule out rib fractures or PE.  - CT Chest Pulmonary Embolism w Contrast; Future    Chest wall pain  To positional nature EKG was done to look for pericarditis changes none noted.  Recommend conservative care.  She has tried ibuprofen with no effect trial of topical lidocaine and stronger ketorolac.  Reassured  - EKG 12-lead, tracing only  - CT Chest Pulmonary Embolism w Contrast; Future  - lidocaine (LIDODERM) 5 % patch; Place 1 patch onto the skin every 24 hours To prevent lidocaine toxicity, patient should be patch free for 12 hrs daily.          BMI  Estimated body mass index is 43.16 kg/m  as calculated from the following:    Height as of this encounter: 1.676 m (5' 6\").    Weight as of this encounter: 121.3 kg (267 lb 6.4 oz).             Subjective   Immanuelle is a 29 year old, presenting for the following health issues:  Lifted up a crate of milk . And a sack of potatoes , movements , crunching   Chest Pain (Consistent pain along whole length of sternum. Pain since 6/23. Patient states there was no injury to the area. No pain with deep breathing. Movement is very painful, especially when she tilts her head upwards. Sleeping is very difficult and painful. /Patient states she was just diagnosed with Lupus about a week and a half ago, and is wondering if it could be related. )        7/8/2024    12:55 PM   Additional Questions   Roomed by ELIER Schneider     History of Present Illness       Reason for visit:  Sternum pain  Symptom onset:  1-2 weeks ago  Symptoms include:  Pain, popping, cracking, crunching  Symptom intensity:  Moderate  Symptom progression:  Staying the same  Had these symptoms before:  No  What makes it worse:  Laying on my side, lifting, bending over or up  What makes it better: " " No    She eats 0-1 servings of fruits and vegetables daily.She consumes 0 sweetened beverage(s) daily.She exercises with enough effort to increase her heart rate 9 or less minutes per day.  She exercises with enough effort to increase her heart rate 3 or less days per week. She is missing 2 dose(s) of medications per week.  She is not taking prescribed medications regularly due to other.                     Objective    /78 (BP Location: Left arm, Patient Position: Sitting, Cuff Size: Adult Large)   Pulse 95   Temp 97.8  F (36.6  C) (Tympanic)   Resp 17   Ht 1.676 m (5' 6\")   Wt 121.3 kg (267 lb 6.4 oz)   LMP 06/27/2024 (Exact Date)   SpO2 100%   BMI 43.16 kg/m    Body mass index is 43.16 kg/m .  Physical Exam   GENERAL: alert and no distress  NECK: no adenopathy, no asymmetry, masses, or scars  RESP: lungs clear to auscultation - no rales, rhonchi or wheezes  CV: regular rate and rhythm, normal S1 S2, no S3 or S4, no murmur, click or rub, no peripheral edema  ABDOMEN: soft, nontender, no hepatosplenomegaly, no masses and bowel sounds normal  MS: no gross musculoskeletal defects noted, no edema  Mild tenderness over both left and right mid sternocostal joints.  When she lies down or sits up she has to hold her chest because it throbs with pain.  Deep breaths does not seem to affect the pain          Signed Electronically by: Alexandra Rodrigues MD    "

## 2024-07-09 ENCOUNTER — MYC MEDICAL ADVICE (OUTPATIENT)
Dept: RHEUMATOLOGY | Facility: CLINIC | Age: 29
End: 2024-07-09
Payer: COMMERCIAL

## 2024-07-09 RX ORDER — KETOROLAC TROMETHAMINE 10 MG/1
10 TABLET, FILM COATED ORAL EVERY 6 HOURS PRN
Qty: 20 TABLET | Refills: 0 | Status: SHIPPED | OUTPATIENT
Start: 2024-07-09

## 2024-07-10 NOTE — TELEPHONE ENCOUNTER
Talked to pt and let her know that I spoke to pharmacy and they verified that Toradol is now being processed. I asked pt to call back Dr. Rodrigues and update him. I also let her know that her message will be sent to Dr. Perez.    Pt verbalized understanding and agreed to follow up with PCP.    Loni CARDOSO RN  [facility]

## 2024-07-30 ENCOUNTER — NURSE TRIAGE (OUTPATIENT)
Dept: INTERNAL MEDICINE | Facility: CLINIC | Age: 29
End: 2024-07-30
Payer: COMMERCIAL

## 2024-07-30 ENCOUNTER — MYC MEDICAL ADVICE (OUTPATIENT)
Dept: INTERNAL MEDICINE | Facility: CLINIC | Age: 29
End: 2024-07-30
Payer: COMMERCIAL

## 2024-07-30 NOTE — TELEPHONE ENCOUNTER
Nurse Triage SBAR    Is this a 2nd Level Triage? NO    Situation: Patient states she started having swelling on bilateral arms, legs, feet, hands and face.     Background:  States that rheumatology said it may be a lupus flare up and she is contacting them but would like to schedule a visit with a pcp in the mean time.     Assessment: Joints are tender. Has moderate swelling.     Protocol Recommended Disposition:   See PCP Within 24 Hours    Recommendation: scheduled appt for tomorrow.         Reason for Disposition   MODERATE arm swelling (e.g., puffiness or swollen feeling of entire arm)    Additional Information   Negative: Chest pain   Negative: Followed an arm injury   Negative: [1] Followed an insect bite AND [2] localized swelling (e.g., small area of puffy or swollen skin)   Negative: Swelling of wrist is main symptom   Negative: Swelling of elbow is main symptom   Negative: Cast problems or questions   Negative: Pain, redness, or swelling intravenous (IV) site or along course of vein   Negative: SEVERE arm swelling (e.g., all of arm looks swollen)   Negative: [1] MODERATE arm swelling (e.g., puffiness or swollen feeling of entire arm) and [2] PICC Line   Negative: [1] MODERATE arm swelling and [2] central venous catheter (central line, internal jugular line)   Negative: Difficulty breathing at rest   Negative: Looks like a broken bone or dislocated joint (e.g., crooked or deformed)   Negative: Entire hand is cool or blue in comparison to other side   Negative: [1] Can't use arm or can barely use arm AND [2] new-onset   Negative: [1] Difficulty breathing with exertion (e.g., walking) AND [2] new-onset or worsening   Negative: [1] Red area or streak AND [2] fever   Negative: [1] Swelling is painful to touch AND [2] fever   Negative: [1] Cast on arm AND [2] now increased pain   Negative: Patient sounds very sick or weak to the triager   Negative: [1] Pregnant 20 or more weeks AND [2] face swelling   Negative:  "[1] Pregnant 20 or more weeks AND [2] new blurred vision or vision changes   Negative: [1] Postpartum (from 0 to 6 weeks after delivery) AND [2] new blurred vision or vision changes   Negative: [1] Postpartum (from 0 to 6 weeks after delivery) AND [2] face swelling   Negative: [1] Red area or streak [2] large (> 2 in. or 5 cm)    Answer Assessment - Initial Assessment Questions  1. ONSET: \"When did the swelling start?\" (e.g., minutes, hours, days)      Last wednesday  2. LOCATION: \"What part of the arm is swollen?\"  \"Are both arms swollen or just one arm?\"      Bilateral arms. Elbow to hand area.   3. SEVERITY: \"How bad is the swelling?\" (e.g., localized; mild, moderate, severe)    - LOCALIZED: Small area of puffiness or swelling on just one arm    - JOINT SWELLING: Swelling of one joint    - MILD: Puffiness or swelling of hand    - MODERATE: Puffiness or swollen feeling of entire arm     - SEVERE: All of arm looks swollen; pitting edema      moderate  4. REDNESS: \"Does the swelling look red or infected?\"      no  5. PAIN: \"Is the swelling painful to touch?\" If Yes, ask: \"How painful is it?\"   (Scale 1-10; mild, moderate or severe)      no  6. FEVER: \"Do you have a fever?\" If Yes, ask: \"What is it, how was it measured, and when did it start?\"       no  7. CAUSE: \"What do you think is causing the arm swelling?\"      Maybe salty food or maybe lupus flare up.   8. MEDICAL HISTORY: \"Do you have a history of heart failure, kidney disease, liver failure, or cancer?\"      no  9. RECURRENT SYMPTOM: \"Have you had arm swelling before?\" If Yes, ask: \"When was the last time?\" \"What happened that time?\"      I've had swelling in my hands and feet before but not like this all over  10. OTHER SYMPTOMS: \"Do you have any other symptoms?\" (e.g., chest pain, difficulty breathing)        Joints are hurting   11. PREGNANCY: \"Is there any chance you are pregnant?\" \"When was your last menstrual period?\"        No    Protocols used: Arm " Swelling and Edema-A-AH

## 2024-07-31 ENCOUNTER — OFFICE VISIT (OUTPATIENT)
Dept: INTERNAL MEDICINE | Facility: CLINIC | Age: 29
End: 2024-07-31
Payer: COMMERCIAL

## 2024-07-31 VITALS
WEIGHT: 270 LBS | OXYGEN SATURATION: 100 % | RESPIRATION RATE: 17 BRPM | BODY MASS INDEX: 43.39 KG/M2 | HEART RATE: 91 BPM | SYSTOLIC BLOOD PRESSURE: 123 MMHG | HEIGHT: 66 IN | TEMPERATURE: 97.9 F | DIASTOLIC BLOOD PRESSURE: 82 MMHG

## 2024-07-31 DIAGNOSIS — R60.9 FLUID RETENTION: Primary | ICD-10-CM

## 2024-07-31 DIAGNOSIS — E66.813 CLASS 3 SEVERE OBESITY WITH SERIOUS COMORBIDITY AND BODY MASS INDEX (BMI) OF 40.0 TO 44.9 IN ADULT, UNSPECIFIED OBESITY TYPE (H): Primary | ICD-10-CM

## 2024-07-31 DIAGNOSIS — R73.03 PREDIABETES: ICD-10-CM

## 2024-07-31 DIAGNOSIS — E66.01 CLASS 3 SEVERE OBESITY WITH SERIOUS COMORBIDITY AND BODY MASS INDEX (BMI) OF 40.0 TO 44.9 IN ADULT, UNSPECIFIED OBESITY TYPE (H): Primary | ICD-10-CM

## 2024-07-31 PROBLEM — R51.9 HEADACHE: Status: RESOLVED | Noted: 2021-08-04 | Resolved: 2024-07-31

## 2024-07-31 PROBLEM — M26.633 ARTICULAR DISC DISORDER OF BOTH TEMPOROMANDIBULAR JOINTS: Status: ACTIVE | Noted: 2023-10-26

## 2024-07-31 PROBLEM — M62.838 MUSCLE SPASM: Status: RESOLVED | Noted: 2018-02-09 | Resolved: 2024-07-31

## 2024-07-31 PROBLEM — G47.00 INSOMNIA, UNSPECIFIED: Status: RESOLVED | Noted: 2023-03-29 | Resolved: 2024-07-31

## 2024-07-31 PROBLEM — L70.9 ACNE: Status: RESOLVED | Noted: 2022-05-06 | Resolved: 2024-07-31

## 2024-07-31 PROBLEM — R13.10 DYSPHAGIA: Status: RESOLVED | Noted: 2018-01-31 | Resolved: 2024-07-31

## 2024-07-31 PROBLEM — K62.5 HEMORRHAGE OF RECTUM AND ANUS: Status: RESOLVED | Noted: 2018-12-17 | Resolved: 2024-07-31

## 2024-07-31 PROBLEM — R42 DIZZINESS: Status: RESOLVED | Noted: 2021-08-12 | Resolved: 2024-07-31

## 2024-07-31 PROBLEM — H92.09 OTALGIA: Status: RESOLVED | Noted: 2019-05-17 | Resolved: 2024-07-31

## 2024-07-31 PROBLEM — Z11.51 SPECIAL SCREENING EXAMINATION FOR HUMAN PAPILLOMAVIRUS (HPV): Status: RESOLVED | Noted: 2021-11-11 | Resolved: 2024-07-31

## 2024-07-31 PROBLEM — G43.909 MIGRAINE HEADACHE: Status: RESOLVED | Noted: 2020-03-27 | Resolved: 2024-07-31

## 2024-07-31 PROBLEM — M25.531 WRIST PAIN, RIGHT: Status: RESOLVED | Noted: 2018-03-08 | Resolved: 2024-07-31

## 2024-07-31 PROCEDURE — 99214 OFFICE O/P EST MOD 30 MIN: CPT | Performed by: INTERNAL MEDICINE

## 2024-07-31 RX ORDER — SPIRONOLACTONE AND HYDROCHLOROTHIAZIDE 25; 25 MG/1; MG/1
1 TABLET ORAL
Qty: 36 TABLET | Refills: 5 | Status: SHIPPED | OUTPATIENT
Start: 2024-07-31

## 2024-07-31 RX ORDER — SEMAGLUTIDE 1.7 MG/.75ML
1.7 INJECTION, SOLUTION SUBCUTANEOUS WEEKLY
Qty: 3 ML | Refills: 0 | Status: SHIPPED | OUTPATIENT
Start: 2024-07-31 | End: 2024-08-07

## 2024-07-31 NOTE — PROGRESS NOTES
ASSESSMENT AND PLAN:    1. Fluid retention  Her examination is negative today, and recent lab testing has been evaluated.  She has used her mother's diuretic with some benefit, intermittently and is watching her salt.  Her weight is maximum high as she came off wegovy and is now back on it.  I suspect the hot weather and weight contribute to her sense of swelling.  I am not opposed to her using a diuretic now and then - prn, but no more than 2 times per week and we discussed potential side effects. She has follow up with her rheumatologist as well.    - spironolactone-HCTZ (ALDACTAZIDE) 25-25 MG tablet; Take 1 tablet by mouth every 72 hours as needed  Dispense: 36 tablet; Refill: 5     Follow up as previously scheduled.     CHIEF COMPLAINT:  Swelling    HISTORY OF PRESENT ILLNESS:  Nehemias JEANETTE Mascorro is a 29 year old female here with a sense of diffuse swelling with myalgia and arthralgia. She is on hydroxychloroquine for lupus and sees rheumatology.  This has just been the past week or so. She took her mother's water pill and lost 4 pounds and felt better.  No overt joint swelling.  No orthopnea or PND, weight gain is max now as she went off wegovy.  She was down to 220 and is now 270.  Mild MENDOZA she feels is likely related to her weight.  Periods are normal for her, and swelling is not worsened by them.  No increase in migraine or asthma.     REVIEW OF SYSTEMS:   See HPI, all other systems on review are negative.    Past Medical History:   Diagnosis Date    ADHD (attention deficit hyperactivity disorder)     Depressive disorder     Ovarian cyst     Uncomplicated asthma      History   Smoking Status    Never   Smokeless Tobacco    Never     Family History   Problem Relation Age of Onset    Depression Maternal Grandmother     Schizophrenia Maternal Uncle     Substance Abuse Maternal Uncle      Past Surgical History:   Procedure Laterality Date    COLONOSCOPY N/A 4/27/2022    Procedure: COLONOSCOPY, WITH POLYPECTOMY  "AND BIOPSY;  Surgeon: Alyse Leija MD;  Location:  GI    ENT SURGERY      tonsilectomy age 8    ESOPHAGOSCOPY, GASTROSCOPY, DUODENOSCOPY (EGD), COMBINED N/A 3/15/2023    Procedure: ESOPHAGOGASTRODUODENOSCOPY, WITH BIOPSY;  Surgeon: Cyn Colon MD;  Location: [facility] OR    ORTHOPEDIC SURGERY      Hip, emelia surgery in 2013    WRIST SURGERY       Allergies   Allergen Reactions    Tizanidine Other (See Comments)    Azithromycin     Erythromycin Nausea and Vomiting    Sulfa Antibiotics Nausea     VITALS:  Vitals:    07/31/24 1220   BP: 123/82   BP Location: Left arm   Patient Position: Sitting   Cuff Size: Adult Large   Pulse: 91   Resp: 17   Temp: 97.9  F (36.6  C)   TempSrc: Tympanic   SpO2: 100%   Weight: 122.5 kg (270 lb)   Height: 1.676 m (5' 6\")     Estimated body mass index is 43.58 kg/m  as calculated from the following:    Height as of this encounter: 1.676 m (5' 6\").    Weight as of this encounter: 122.5 kg (270 lb).  Wt Readings from Last 3 Encounters:   07/31/24 122.5 kg (270 lb)   07/08/24 121.3 kg (267 lb 6.4 oz)   07/03/24 121.3 kg (267 lb 6.4 oz)     PHYSICAL EXAM:  Constitutional:  In NAD, alert and oriented  Neck: no cervical or axillary adenopathy  Cardiac:  S1 S2   Lungs: Clear   Abdomen:   Soft, flat and non-tender    Extremities: no significant edema, no synovitis of small joints    DECISION TO OBTAIN OLD RECORDS AND/OR OBTAIN HISTORY FROM SOMEONE OTHER THAN PATIENT, AND/OR ACCESSING CARE EVERYWHERE):  1  0     REVIEW AND SUMMARIZATION OF OLD RECORDS, AND/OR OBTAINING HISTORY FROM SOMEONE OTHER THAN PATIENT, AND/OR DISCUSSION OF CASE WITH ANOTHER HEALTH CARE PROVIDER:  2 reviewed rheumatology notes    REVIEW AND/OR ORDER OF OF CLINICAL LAB TESTS: 1  reviewed lab testing.    REVIEW AND/OR ORDER OF RADIOLOGY TESTS: 1 0.    REVIEW AND/OR ORDER OF MEDICAL TESTS (EKG/ECHO/COLONOSCOPY/EGD): 1  0    INDEPENDENT  VISUALIZATION OF IMAGE, TRACING, OR SPECIMEN ITSELF (2 EACH):  0     TOTAL: " 3    Current Outpatient Medications   Medication Sig Dispense Refill    albuterol (ACCUNEB) 1.25 MG/3ML neb solution Take 1 vial (1.25 mg) by nebulization every 6 hours as needed for shortness of breath or wheezing 90 mL 0    albuterol (VENTOLIN HFA) 108 (90 Base) MCG/ACT inhaler INHALE 2 PUFFS INTO THE LUNGS FOUR TIMES DAILY 18 g 3    ARIPiprazole (ABILIFY) 20 MG tablet Take 20 mg by mouth daily      buPROPion (WELLBUTRIN XL) 150 MG 24 hr tablet       buPROPion (WELLBUTRIN XL) 300 MG 24 hr tablet Take 300 mg by mouth every morning      CONCERTA 36 MG CR tablet TAKE 1 TABLET BY MOUTH DAILY IN THE MORNING FOR ADHD. START 11/18/22      DULoxetine (CYMBALTA) 60 MG capsule Take 60 mg by mouth daily      ferrous sulfate (FE TABS) 325 (65 Fe) MG EC tablet Take 1 tablet (325 mg) by mouth daily 34 tablet 2    fluticasone (FLOVENT DISKUS) 100 MCG/ACT inhaler Inhale 2 puffs into the lungs every 12 hours 28 each 1    gabapentin (NEURONTIN) 100 MG capsule Take 200 mg by mouth At Bedtime      hydrocortisone 2.5 % cream APPLY TOPICALLY TO THE CHEST TWICE DAILY AS NEEDED      hydroxychloroquine (PLAQUENIL) 200 MG tablet Take 1 tablet (200 mg) by mouth 2 times daily (with meals) Annual Plaquenil toxicity eye screening required. 180 tablet 3    hyoscyamine (LEVSIN) 0.125 MG tablet Take 1 tablet (125 mcg) by mouth every 6 hours as needed for cramping 30 tablet 0    ibuprofen (ADVIL/MOTRIN) 800 MG tablet TAKE 1 TABLET BY MOUTH TWICE A DAY WITH MEALS FOR 14 DAYS      ketorolac (TORADOL) 10 MG tablet Take 1 tablet (10 mg) by mouth every 6 hours as needed for moderate pain 20 tablet 0    levocetirizine (XYZAL) 5 MG tablet Take 5 mg by mouth every 24 hours      levothyroxine (SYNTHROID/LEVOTHROID) 75 MCG tablet Take 1 tablet (75 mcg) by mouth daily 90 tablet 3    lidocaine (LIDODERM) 5 % patch Place 1 patch onto the skin every 24 hours To prevent lidocaine toxicity, patient should be patch free for 12 hrs daily. 15 patch 1    meclizine  (ANTIVERT) 25 MG tablet Take 1 tablet (25 mg) by mouth 3 times daily as needed for dizziness 30 tablet 0    meloxicam (MOBIC) 15 MG tablet Take 1 tablet (15 mg) by mouth daily 90 tablet 2    methocarbamol (ROBAXIN) 500 MG tablet Take 1 tablet (500 mg) by mouth 4 times daily as needed for muscle spasms 20 tablet 0    norethindrone (MICRONOR) 0.35 MG tablet Take 1 tablet (0.35 mg) by mouth daily 90 tablet 4    omeprazole (PRILOSEC) 20 MG DR capsule Take 1 capsule (20 mg) by mouth 2 times daily      ondansetron (ZOFRAN) 4 MG tablet Take 1 tablet (4 mg) by mouth every 8 hours as needed for nausea 30 tablet 1    polyethylene glycol (MIRALAX) 17 GM/Dose powder Take 1 capful by mouth daily as needed for constipation      Semaglutide-Weight Management (WEGOVY) 0.5 MG/0.5ML pen Inject 0.5 mg Subcutaneous once a week After completing 4 weeks of 0.25mg dose 2 mL 0    Semaglutide-Weight Management (WEGOVY) 1 MG/0.5ML pen Inject 1 mg Subcutaneous once a week 2 mL 2    spironolactone-HCTZ (ALDACTAZIDE) 25-25 MG tablet Take 1 tablet by mouth every 72 hours as needed 36 tablet 5    SUMAtriptan (IMITREX) 50 MG tablet Take 1 tablet (50 mg) by mouth at onset of headache for migraine May repeat in 2 hours. Max 4 tablets/24 hours. 9 tablet 1    TAZORAC 0.1 % external cream APPLY TOPICALLY TO THE AFFECTED AREA AT BEDTIME      terconazole (TERAZOL 7) 0.4 % vaginal cream INSERT 1 APPLICATORFUL INTRAVAGINALLY AT BEDTIME X7 NIGHTS      tiZANidine (ZANAFLEX) 2 MG capsule PLEASE SEE ATTACHED FOR DETAILED DIRECTIONS      triamcinolone (KENALOG) 0.1 % paste APPLY TO AFFECTED AREA 2 TIMES DAILY AS DIRECTED       Ross Connor MD  Internal Medicine  [facility]

## 2024-08-06 NOTE — PROGRESS NOTES
Virtual Visit Details    Type of service:  Video Visit   Video Start Time: 10:07 AM  Video End Time: 10:26am    Originating Location (pt. Location): Home    Distant Location (provider location):  Off-site  Platform used for Video Visit: [facility] Medical Weight Management Note     Nehemias Mascorro  MRN:  [id number]  :  1995  JASMEET:  2024    Dear Alexandra Rodrigues MD,    I had the pleasure of seeing your patient Nehemias Mascorro. She is a 29 year old female who I am continuing to see for treatment of obesity related to:         No data to display                Assessment & Plan   Problem List Items Addressed This Visit       Class 3 severe obesity with serious comorbidity and body mass index (BMI) of 40.0 to 44.9 in adult, unspecified obesity type (H) - Primary    Relevant Medications    Semaglutide-Weight Management (WEGOVY) 1.7 MG/0.75ML pen    naltrexone (DEPADE/REVIA) 50 MG tablet    Prediabetes    Relevant Medications    Semaglutide-Weight Management (WEGOVY) 1.7 MG/0.75ML pen    naltrexone (DEPADE/REVIA) 50 MG tablet      Plan  Okay to increase wegovy to 1.7mg, prescription and refills already available   Start Naltrexone to help with management of cravings at night: 50mg - take 1/2 tablet daily for 7 days, then increase to 1 tablet daily.   Goals we discussed today:   Meal planning at least 2 meals for the week   Not eating 2 hours before bed   Setting reminders on phone to sip water throughout the day  Follow up with Elisa in 3 months   Dietician appointment with Shira Nuñez scheduled 24  Keep up the excellent work!     Anti-obesity medication started today for this patient   NALTREXONE  Will likely have synnergistic effect with wellbutrin, should be helpful for cravings   Has taken in the past and did not see major weight loss, cannot recall side effects. Will retrial in conjunction with wegovy   No history of liver disease  No chronic pain   No current opioid use   .      Potential anti-obesity medications for this patient the future if there are issues with cost or side effects  Zepbound- can consider switching to this if seeing ongoing weight gain or plateau         INTERVAL HISTORY:  Last seen by Lizeth Cr 11/13/23   Previously seen by Dr. Slade. Currently taking Wegovy 2.4mg once weekly. Has had a hard time consistently taking over the summer - would be off for around 2 weeks due to supplies. Has been taking it consistently for the past 2 months. Continues to find it helpful. However, would like to either add on or try a different AOM.      Reviewed AOMs today. Previously trialed Naltrexone and was not helpful with weight loss. Previously trialed Topiramate and had intolerable side effects of fatigue and mood changes. Phentermine contraindicated due to currently being on a stimulant. Previously was on Metformine, does not believe it was helpful with weight loss. Discussed new approval of Zepbound, but not yet available. She will continue Wegovy 2.4mg today, and consider retrialing Metformin or transitioning to Zepbound in the future once available.       New to me 5/7/24:   Continuing management for pituitary lesion, hypopitiuitarism with Dr. Vasquez  Has seen significant weight regain.   Stopped wegovy in November 2023 due to supply issues.      Feels that the weight regain happened very suddenly since stopping wegovy.   -restarted wegovy     Today in visit (8/7/24):  Feeling good with wegovy, but seeing persistent weight gain (10lbs since last visit). Very frustrated with this weight gain, wonders if it's related to higher sweets intake recently, higher stress levels with work.     Worsened sweet cravings in the evening. Will retrial naltrexone to see if this helps.     Struggling with planning out meals, in the past has benefited from sitting down and planning foods for the week. Discussed goal of gently reintroducing this habit.  Nehemias adds that her mom might be  "helpful accountability partner in making this change.    Wt Readings from Last 5 Encounters:   08/07/24 122.5 kg (270 lb)   07/31/24 122.5 kg (270 lb)   07/08/24 121.3 kg (267 lb 6.4 oz)   07/03/24 121.3 kg (267 lb 6.4 oz)   05/17/24 117.9 kg (260 lb)       Anti-obesity medication history    Current:   Wegovy 1mg- doing well, no side effects. Some nausea lately, wonders if this is related to starting hydroxychloroquine. Feels it is helpful with getting olivier faster, reducing food noise.     Past/Failed/contraindicated:   Topiramate- fatigue, mood changes   Naltrexone- not helpful for weight loss, cannot recall side effects. Will trial today to see if it's helpful for sweet cravings in the evenings.       GERD symptoms: none     Constipation/Diarrhea: constipation at baseline, well managed with miralax     Recent diet changes: struggling with eating a lot of sweets, but has been able to increase veggies and protein each day. Eating beans, peanut butter, meat. Eggs in the past, but not liking these as much lately (notices she sweats more when she eats eggs), recommended trying egg whites.   Water - \"I could do better.\" Less thirst lately, some stomach discomfort with drinking too much.     Recent exercise/activity changes: work is going well, food shelf is stressful lately. Walking more.     Recent sleep changes: nightmares more frequently, wonders if it's related to stress. Sleep is \"just okay\" right now. Has been eating close to bedtime.     Vitamins/Labs: A1c normal march 2024. vitamin d wnl per labs July 2024.     Pregnancy: taking oral contraceptives     CURRENT WEIGHT:   270 lbs 0 oz    Initial Weight (lbs): 248 lbs  Last Visits Weight: 122.5 kg (270 lb)  Cumulative weight loss (lbs): -22  Weight Loss Percentage: -8.87%        8/7/2024     9:56 AM   Changes and Difficulties   I have made the following changes to my diet since my last visit: Eating more veggies   With regards to my diet, I am still struggling " with: Eating too many sweets   I have made the following changes to my activity/exercise since my last visit: Walking more   With regards to my activity/exercise, I am still struggling with: Motivation             MEDICATIONS:   Current Outpatient Medications   Medication Sig Dispense Refill    albuterol (ACCUNEB) 1.25 MG/3ML neb solution Take 1 vial (1.25 mg) by nebulization every 6 hours as needed for shortness of breath or wheezing 90 mL 0    albuterol (VENTOLIN HFA) 108 (90 Base) MCG/ACT inhaler INHALE 2 PUFFS INTO THE LUNGS FOUR TIMES DAILY 18 g 3    ARIPiprazole (ABILIFY) 20 MG tablet Take 20 mg by mouth daily      buPROPion (WELLBUTRIN XL) 150 MG 24 hr tablet       buPROPion (WELLBUTRIN XL) 300 MG 24 hr tablet Take 300 mg by mouth every morning      CONCERTA 36 MG CR tablet TAKE 1 TABLET BY MOUTH DAILY IN THE MORNING FOR ADHD. START 11/18/22      DULoxetine (CYMBALTA) 60 MG capsule Take 60 mg by mouth daily      ferrous sulfate (FE TABS) 325 (65 Fe) MG EC tablet Take 1 tablet (325 mg) by mouth daily 34 tablet 2    gabapentin (NEURONTIN) 100 MG capsule Take 200 mg by mouth At Bedtime      hydrocortisone 2.5 % cream APPLY TOPICALLY TO THE CHEST TWICE DAILY AS NEEDED      hydroxychloroquine (PLAQUENIL) 200 MG tablet Take 1 tablet (200 mg) by mouth 2 times daily (with meals) Annual Plaquenil toxicity eye screening required. 180 tablet 3    ibuprofen (ADVIL/MOTRIN) 800 MG tablet TAKE 1 TABLET BY MOUTH TWICE A DAY WITH MEALS FOR 14 DAYS      ketorolac (TORADOL) 10 MG tablet Take 1 tablet (10 mg) by mouth every 6 hours as needed for moderate pain 20 tablet 0    levocetirizine (XYZAL) 5 MG tablet Take 5 mg by mouth every 24 hours      levothyroxine (SYNTHROID/LEVOTHROID) 75 MCG tablet Take 1 tablet (75 mcg) by mouth daily 90 tablet 3    lidocaine (LIDODERM) 5 % patch Place 1 patch onto the skin every 24 hours To prevent lidocaine toxicity, patient should be patch free for 12 hrs daily. 15 patch 1    meclizine  (ANTIVERT) 25 MG tablet Take 1 tablet (25 mg) by mouth 3 times daily as needed for dizziness 30 tablet 0    naltrexone (DEPADE/REVIA) 50 MG tablet Take 1/2 tablet once daily 1-2 hours prior to worst cravings for 1 week, then increase to 1 tablet daily as directed if tolerating 30 tablet 2    norethindrone (MICRONOR) 0.35 MG tablet Take 1 tablet (0.35 mg) by mouth daily 90 tablet 4    omeprazole (PRILOSEC) 20 MG DR capsule Take 1 capsule (20 mg) by mouth 2 times daily      ondansetron (ZOFRAN) 4 MG tablet Take 1 tablet (4 mg) by mouth every 8 hours as needed for nausea 30 tablet 1    polyethylene glycol (MIRALAX) 17 GM/Dose powder Take 1 capful by mouth daily as needed for constipation      Semaglutide-Weight Management (WEGOVY) 1.7 MG/0.75ML pen Inject 1.7 mg subcutaneously once a week After completing 4 weeks of 1mg dose 3 mL 3    spironolactone-HCTZ (ALDACTAZIDE) 25-25 MG tablet Take 1 tablet by mouth every 72 hours as needed 36 tablet 5    SUMAtriptan (IMITREX) 50 MG tablet Take 1 tablet (50 mg) by mouth at onset of headache for migraine May repeat in 2 hours. Max 4 tablets/24 hours. 9 tablet 1    TAZORAC 0.1 % external cream APPLY TOPICALLY TO THE AFFECTED AREA AT BEDTIME      terconazole (TERAZOL 7) 0.4 % vaginal cream INSERT 1 APPLICATORFUL INTRAVAGINALLY AT BEDTIME X7 NIGHTS      tiZANidine (ZANAFLEX) 2 MG capsule PLEASE SEE ATTACHED FOR DETAILED DIRECTIONS      triamcinolone (KENALOG) 0.1 % paste APPLY TO AFFECTED AREA 2 TIMES DAILY AS DIRECTED      fluticasone (FLOVENT DISKUS) 100 MCG/ACT inhaler Inhale 2 puffs into the lungs every 12 hours 28 each 1    hyoscyamine (LEVSIN) 0.125 MG tablet Take 1 tablet (125 mcg) by mouth every 6 hours as needed for cramping 30 tablet 0    meloxicam (MOBIC) 15 MG tablet Take 1 tablet (15 mg) by mouth daily 90 tablet 2    methocarbamol (ROBAXIN) 500 MG tablet Take 1 tablet (500 mg) by mouth 4 times daily as needed for muscle spasms 20 tablet 0           8/7/2024     9:56 AM  "  Weight Loss Medication History Reviewed With Patient   Which weight loss medications are you currently taking on a regular basis? Wegovy   Are you having any side effects from the weight loss medication that we have prescribed you? No               1/3/2022     3:03 PM   RODRIGUE Score (Last Two)   RODRIGUE Raw Score 29   Activation Score 52.9   RODRIGUE Level 2         PHYSICAL EXAM:  Objective    Ht 1.676 m (5' 6\")   Wt 122.5 kg (270 lb)   LMP 07/31/2024 (Exact Date)   BMI 43.58 kg/m      Vitals - Patient Reported  Pain Score: Mild Pain (3)  Pain Loc: Other - see comment (cramps)      Vitals:  No vitals were obtained today due to virtual visit.    GENERAL: alert and no distress  EYES: Eyes grossly normal to inspection.  No discharge or erythema, or obvious scleral/conjunctival abnormalities.  RESP: No audible wheeze, cough, or visible cyanosis.    SKIN: Visible skin clear. No significant rash, abnormal pigmentation or lesions.  NEURO: Cranial nerves grossly intact.  Mentation and speech appropriate for age.  PSYCH: Appropriate affect, tone, and pace of words        Sincerely,    Elisa Rivera PA-C      26 minutes spent by me on the date of the encounter doing chart review, history and exam, documentation and further activities per the note    The longitudinal plan of care for the diagnosis(es)/condition(s) as documented were addressed during this visit. Due to the added complexity in care, I will continue to support Immanuelle in the subsequent management and with ongoing continuity of care.   "

## 2024-08-07 ENCOUNTER — VIRTUAL VISIT (OUTPATIENT)
Dept: ENDOCRINOLOGY | Facility: CLINIC | Age: 29
End: 2024-08-07
Payer: COMMERCIAL

## 2024-08-07 VITALS — HEIGHT: 66 IN | BODY MASS INDEX: 43.39 KG/M2 | WEIGHT: 270 LBS

## 2024-08-07 DIAGNOSIS — E66.813 CLASS 3 SEVERE OBESITY WITH SERIOUS COMORBIDITY AND BODY MASS INDEX (BMI) OF 40.0 TO 44.9 IN ADULT, UNSPECIFIED OBESITY TYPE (H): Primary | ICD-10-CM

## 2024-08-07 DIAGNOSIS — E66.01 CLASS 3 SEVERE OBESITY WITH SERIOUS COMORBIDITY AND BODY MASS INDEX (BMI) OF 40.0 TO 44.9 IN ADULT, UNSPECIFIED OBESITY TYPE (H): Primary | ICD-10-CM

## 2024-08-07 DIAGNOSIS — R73.03 PREDIABETES: ICD-10-CM

## 2024-08-07 PROCEDURE — G2211 COMPLEX E/M VISIT ADD ON: HCPCS | Mod: 95

## 2024-08-07 PROCEDURE — 99213 OFFICE O/P EST LOW 20 MIN: CPT | Mod: 95

## 2024-08-07 RX ORDER — NALTREXONE HYDROCHLORIDE 50 MG/1
TABLET, FILM COATED ORAL
Qty: 30 TABLET | Refills: 3 | Status: SHIPPED | OUTPATIENT
Start: 2024-08-07 | End: 2024-08-07

## 2024-08-07 RX ORDER — SEMAGLUTIDE 1.7 MG/.75ML
1.7 INJECTION, SOLUTION SUBCUTANEOUS WEEKLY
Qty: 3 ML | Refills: 3 | Status: SHIPPED | OUTPATIENT
Start: 2024-08-07

## 2024-08-07 RX ORDER — NALTREXONE HYDROCHLORIDE 50 MG/1
TABLET, FILM COATED ORAL
Qty: 30 TABLET | Refills: 2 | Status: SHIPPED | OUTPATIENT
Start: 2024-08-07

## 2024-08-07 ASSESSMENT — PAIN SCALES - GENERAL: PAINLEVEL: MILD PAIN (3)

## 2024-08-07 NOTE — PATIENT INSTRUCTIONS
"Thank you for allowing us the privilege of caring for you. We hope we provided you with the excellent service you deserve.   Please let us know if there is anything else we can do for you so that we can be sure you are completely satisfied with your care experience.    To ensure the quality of our services you may be receiving a patient satisfaction survey from an independent patient satisfaction monitoring company.    The greatest compliment you can give is a \"Likely to Recommend\"    Your visit was with Elisa Rivera PA-C today.    Instructions per today's visit:     Paulo Mascorro, it was great to visit with you today.  Here is a review of our visit.  If our clinic scheduler is not able to reach you please call [phone] to schedule your next appointments.    Plan  Okay to increase wegovy to 1.7mg, prescription and refills already available   Start Naltrexone to help with management of cravings at night: 50mg - take 1/2 tablet daily for 7 days, then increase to 1 tablet daily.   Goals we discussed today:   Meal planning at least 2 meals for the week   Not eating 2 hours before bed   Setting reminders on phone to sip water throughout the day  Follow up with Elisa in 3 months   Dietician appointment with Shira Nuñez scheduled 9/17/24  Keep up the excellent work!       Information about Video Visits with [facility]: video visit information  _________________________________________________________________________________________________________________________________________________________  If you are asked by your clinic team to have your blood pressure checked:  [facility] do offer several locations for blood pressure checks. Please follow the below link to schedule an appointment. Scheduling an appointment at the pharmacy for a blood pressure check is now preferred.    Appointment Plus " ([email or web address])  _________________________________________________________________________________________________________________________________________________________  Important contact and scheduling information:  Please call our contact center at [phone] to schedule your next appointments.  To find a lab location near you, please call [phone].  For any nursing questions or concerns call Susan Hunter LPN at [phone] or Alba Mathew RN at [phone]  Please call during clinic hours Monday through Friday 8:00a - 4:00p if you have questions or you can contact us via [employer] at anytime and we will reply during clinic hours.    Lab results will be communicated through My Chart or letter (if My Chart not used). Please call the clinic if you have not received communication after 1 week or if you have any questions.?  Clinic Fax: [phone]    _________________________________________________________________________________________________________________________________________________________  Meal Replacement Products:    Here is the link to our new e-store where you can purchase our meal replacement products    [facility] E-Store  [email or web address]    The one week starter kit is a great way to sample a variety of products and see what works for you.    If you want more information about the product go to: [employer] Meals  [email or web address].Health News    If you are an employee or [facility] or [facility] please contact your care team for a 10% estore discount    Free Shipping for orders over $75     Benefits of meal replacements products:    Portion and calorie control  Improved nutrition  Structured eating  Simplified food choices  Avoid contact with trigger foods  _________________________________________________________________________________________________________________________________________________________  Interested in working with a health  ?  Health coaches work with you to improve your overall health and wellbeing.  They look at the whole person, and may involve discussion of different areas of life, including, but not limited to the four pillars of health (sleep, exercise, nutrition, and stress management). Discuss with your care team if you would like to start working a health .  Health Coaching-3 Pack: Schedule by calling [phone]    $99 for three health coaching visits    Visits may be done in person or via phone    Coaching is a partnership between the  and the client; Coaches do not prescribe or diagnose    Coaching helps inspire the client to reach his/her personal goals   _________________________________________________________________________________________________________________________________________________________  24 Week Healthy Lifestyle Plan:    Our mission in the 24-week Healthy Lifestyle Plan is to provide you with individualized care by giving you the tools, education and support you need to lose weight and maintain a healthy lifestyle. In your 24-week journey, you ll be supported by a dedicated weight loss team that includes registered dietitians, medical weight management providers, health coaches, and nurses -- all with special expertise in weight loss -- to help you every step of the way.     Monthly meetings with your registered dietician or medical weight management provider help to review your progress, update your care plan, and make any adjustments needed to ensure success. Between these visits, weekly and bi-weekly health  visits will help you focus on the four pillars of weight loss -- stress, sleep, nutrition, and exercise -- and how you can best adapt each to achieve sustainable weight loss results.    In addition, you will be given exclusive access to online wellbeing classes through [employer].  Your initial visit will be with a medical weight management provider who will help to  understand your weight loss goals and ensure this program is the right fit for you. Please let our team know if you are interested in the 24 week plan by sending a message to your care team or calling [phone] to schedule.  _________________________________________________________________________________________________________________________________________________________  __________  [facility] Get Moving Program  Our team of physical therapists is trained to help you understand and take control of your condition. They will perform a thorough evaluation to determine your ability for activity and develop a customized plan to fit your goals and physical ability.  Scheduling: Unsure if the Get Moving program is right for you? Discuss the program with your medical provider or diabetes educator. You can also call us at [phone] to ask questions or schedule an appointment.   [facility] Get Moving Program  ____________________________________________________________________________________________________________________________________________________________________________  [facility] Diabetes Prevention Program (DPP)  If you have prediabetes and Medicare please contact us via [facility] to learn more about the Diabetes Prevention Program (DPP)  Program Details:   [facility] offers the year-long Diabetes Prevention Program (DPP). The program helps you to make lifestyle changes that prevent or delay type 2 diabetes by supporting healthy eating, increased physical activity, stress reduction and use of coping skills.   On average, previous [facility] DPP cohorts have lost and maintained at least 5% of their starting weight throughout the program and averaged more than 150 minutes of physical activity per week.  Participants meet weekly for one-hour group sessions over sixteen weeks, every other week for the next 8 weeks, and monthly for the last six months.   A year-long  maintenance program is also available for participants who complete the first year.   Location & Cost:   During the COVID-19 Public Health Emergency, the program is offered virtually. When in-person classes can resume, they will be held at [facility].  For people with Medicare, the program is covered in full. A self-pay option will also be available for those with non-Medicare insurance plans.   ______________________________________________________________________________________________________________________________________________________________________________________________________________________________    To work with a Behavioral Health Psychologist:    Call to schedule:    Ezekiel Travis - [phone]  Anila Cummings - [phone]  Jennie Antonio - [phone]  Radha Mascorro - [phone]   Leigh Rosario PhD (cannot accept Medicare) [phone]        Thank you,   [facility] Comprehensive Weight Management Team

## 2024-08-07 NOTE — LETTER
2024       RE: Nehemias Mascorro  [address]  [place] [address]     Dear Colleague,    Thank you for referring your patient, Nehemias Mascorro, to the [facility]IGHT MANAGEMENT [facility] [place] at [facility]. Please see a copy of my visit note below.    Virtual Visit Details    Type of service:  Video Visit   Video Start Time: 10:07 AM  Video End Time: 10:26am    Originating Location (pt. Location): Home    Distant Location (provider location):  Off-site  Platform used for Video Visit: [facility] Medical Weight Management Note     Nehemias Mascorro  MRN:  [id number]  :  1995  JASMEET:  2024    Dear Alexandra Rodrigues MD,    I had the pleasure of seeing your patient Nehemias Mascorro. She is a 29 year old female who I am continuing to see for treatment of obesity related to:         No data to display                Assessment & Plan  Problem List Items Addressed This Visit       Class 3 severe obesity with serious comorbidity and body mass index (BMI) of 40.0 to 44.9 in adult, unspecified obesity type (H) - Primary    Relevant Medications    Semaglutide-Weight Management (WEGOVY) 1.7 MG/0.75ML pen    naltrexone (DEPADE/REVIA) 50 MG tablet    Prediabetes    Relevant Medications    Semaglutide-Weight Management (WEGOVY) 1.7 MG/0.75ML pen    naltrexone (DEPADE/REVIA) 50 MG tablet      Plan  Okay to increase wegovy to 1.7mg, prescription and refills already available   Start Naltrexone to help with management of cravings at night: 50mg - take 1/2 tablet daily for 7 days, then increase to 1 tablet daily.   Goals we discussed today:   Meal planning at least 2 meals for the week   Not eating 2 hours before bed   Setting reminders on phone to sip water throughout the day  Follow up with Elisa in 3 months   Dietician appointment with Shira Nuñez scheduled 24  Keep up the excellent work!     Anti-obesity medication started  today for this patient   NALTREXONE  Will likely have synnergistic effect with wellbutrin, should be helpful for cravings   Has taken in the past and did not see major weight loss, cannot recall side effects. Will retrial in conjunction with wegovy   No history of liver disease  No chronic pain   No current opioid use   .     Potential anti-obesity medications for this patient the future if there are issues with cost or side effects  Zepbound- can consider switching to this if seeing ongoing weight gain or plateau         INTERVAL HISTORY:  Last seen by Lizeth Cr 11/13/23   Previously seen by Dr. Slade. Currently taking Wegovy 2.4mg once weekly. Has had a hard time consistently taking over the summer - would be off for around 2 weeks due to supplies. Has been taking it consistently for the past 2 months. Continues to find it helpful. However, would like to either add on or try a different AOM.      Reviewed AOMs today. Previously trialed Naltrexone and was not helpful with weight loss. Previously trialed Topiramate and had intolerable side effects of fatigue and mood changes. Phentermine contraindicated due to currently being on a stimulant. Previously was on Metformine, does not believe it was helpful with weight loss. Discussed new approval of Zepbound, but not yet available. She will continue Wegovy 2.4mg today, and consider retrialing Metformin or transitioning to Zepbound in the future once available.       New to me 5/7/24:   Continuing management for pituitary lesion, hypopitiuitarism with Dr. Vasquez  Has seen significant weight regain.   Stopped wegovy in November 2023 due to supply issues.      Feels that the weight regain happened very suddenly since stopping wegovy.   -restarted wegovy     Today in visit (8/7/24):  Feeling good with wegovy, but seeing persistent weight gain (10lbs since last visit). Very frustrated with this weight gain, wonders if it's related to higher sweets intake recently,  "higher stress levels with work.     Worsened sweet cravings in the evening. Will retrial naltrexone to see if this helps.     Struggling with planning out meals, in the past has benefited from sitting down and planning foods for the week. Discussed goal of gently reintroducing this habit.  Nehemias adds that her mom might be helpful accountability partner in making this change.    Wt Readings from Last 5 Encounters:   08/07/24 122.5 kg (270 lb)   07/31/24 122.5 kg (270 lb)   07/08/24 121.3 kg (267 lb 6.4 oz)   07/03/24 121.3 kg (267 lb 6.4 oz)   05/17/24 117.9 kg (260 lb)       Anti-obesity medication history    Current:   Wegovy 1mg- doing well, no side effects. Some nausea lately, wonders if this is related to starting hydroxychloroquine. Feels it is helpful with getting olivier faster, reducing food noise.     Past/Failed/contraindicated:   Topiramate- fatigue, mood changes   Naltrexone- not helpful for weight loss, cannot recall side effects. Will trial today to see if it's helpful for sweet cravings in the evenings.       GERD symptoms: none     Constipation/Diarrhea: constipation at baseline, well managed with miralax     Recent diet changes: struggling with eating a lot of sweets, but has been able to increase veggies and protein each day. Eating beans, peanut butter, meat. Eggs in the past, but not liking these as much lately (notices she sweats more when she eats eggs), recommended trying egg whites.   Water - \"I could do better.\" Less thirst lately, some stomach discomfort with drinking too much.     Recent exercise/activity changes: work is going well, food shelf is stressful lately. Walking more.     Recent sleep changes: nightmares more frequently, wonders if it's related to stress. Sleep is \"just okay\" right now. Has been eating close to bedtime.     Vitamins/Labs: A1c normal march 2024. vitamin d wnl per labs July 2024.     Pregnancy: taking oral contraceptives     CURRENT WEIGHT:   270 lbs 0 " oz    Initial Weight (lbs): 248 lbs  Last Visits Weight: 122.5 kg (270 lb)  Cumulative weight loss (lbs): -22  Weight Loss Percentage: -8.87%        8/7/2024     9:56 AM   Changes and Difficulties   I have made the following changes to my diet since my last visit: Eating more veggies   With regards to my diet, I am still struggling with: Eating too many sweets   I have made the following changes to my activity/exercise since my last visit: Walking more   With regards to my activity/exercise, I am still struggling with: Motivation             MEDICATIONS:   Current Outpatient Medications   Medication Sig Dispense Refill     albuterol (ACCUNEB) 1.25 MG/3ML neb solution Take 1 vial (1.25 mg) by nebulization every 6 hours as needed for shortness of breath or wheezing 90 mL 0     albuterol (VENTOLIN HFA) 108 (90 Base) MCG/ACT inhaler INHALE 2 PUFFS INTO THE LUNGS FOUR TIMES DAILY 18 g 3     ARIPiprazole (ABILIFY) 20 MG tablet Take 20 mg by mouth daily       buPROPion (WELLBUTRIN XL) 150 MG 24 hr tablet        buPROPion (WELLBUTRIN XL) 300 MG 24 hr tablet Take 300 mg by mouth every morning       CONCERTA 36 MG CR tablet TAKE 1 TABLET BY MOUTH DAILY IN THE MORNING FOR ADHD. START 11/18/22       DULoxetine (CYMBALTA) 60 MG capsule Take 60 mg by mouth daily       ferrous sulfate (FE TABS) 325 (65 Fe) MG EC tablet Take 1 tablet (325 mg) by mouth daily 34 tablet 2     gabapentin (NEURONTIN) 100 MG capsule Take 200 mg by mouth At Bedtime       hydrocortisone 2.5 % cream APPLY TOPICALLY TO THE CHEST TWICE DAILY AS NEEDED       hydroxychloroquine (PLAQUENIL) 200 MG tablet Take 1 tablet (200 mg) by mouth 2 times daily (with meals) Annual Plaquenil toxicity eye screening required. 180 tablet 3     ibuprofen (ADVIL/MOTRIN) 800 MG tablet TAKE 1 TABLET BY MOUTH TWICE A DAY WITH MEALS FOR 14 DAYS       ketorolac (TORADOL) 10 MG tablet Take 1 tablet (10 mg) by mouth every 6 hours as needed for moderate pain 20 tablet 0      levocetirizine (XYZAL) 5 MG tablet Take 5 mg by mouth every 24 hours       levothyroxine (SYNTHROID/LEVOTHROID) 75 MCG tablet Take 1 tablet (75 mcg) by mouth daily 90 tablet 3     lidocaine (LIDODERM) 5 % patch Place 1 patch onto the skin every 24 hours To prevent lidocaine toxicity, patient should be patch free for 12 hrs daily. 15 patch 1     meclizine (ANTIVERT) 25 MG tablet Take 1 tablet (25 mg) by mouth 3 times daily as needed for dizziness 30 tablet 0     naltrexone (DEPADE/REVIA) 50 MG tablet Take 1/2 tablet once daily 1-2 hours prior to worst cravings for 1 week, then increase to 1 tablet daily as directed if tolerating 30 tablet 2     norethindrone (MICRONOR) 0.35 MG tablet Take 1 tablet (0.35 mg) by mouth daily 90 tablet 4     omeprazole (PRILOSEC) 20 MG DR capsule Take 1 capsule (20 mg) by mouth 2 times daily       ondansetron (ZOFRAN) 4 MG tablet Take 1 tablet (4 mg) by mouth every 8 hours as needed for nausea 30 tablet 1     polyethylene glycol (MIRALAX) 17 GM/Dose powder Take 1 capful by mouth daily as needed for constipation       Semaglutide-Weight Management (WEGOVY) 1.7 MG/0.75ML pen Inject 1.7 mg subcutaneously once a week After completing 4 weeks of 1mg dose 3 mL 3     spironolactone-HCTZ (ALDACTAZIDE) 25-25 MG tablet Take 1 tablet by mouth every 72 hours as needed 36 tablet 5     SUMAtriptan (IMITREX) 50 MG tablet Take 1 tablet (50 mg) by mouth at onset of headache for migraine May repeat in 2 hours. Max 4 tablets/24 hours. 9 tablet 1     TAZORAC 0.1 % external cream APPLY TOPICALLY TO THE AFFECTED AREA AT BEDTIME       terconazole (TERAZOL 7) 0.4 % vaginal cream INSERT 1 APPLICATORFUL INTRAVAGINALLY AT BEDTIME X7 NIGHTS       tiZANidine (ZANAFLEX) 2 MG capsule PLEASE SEE ATTACHED FOR DETAILED DIRECTIONS       triamcinolone (KENALOG) 0.1 % paste APPLY TO AFFECTED AREA 2 TIMES DAILY AS DIRECTED       fluticasone (FLOVENT DISKUS) 100 MCG/ACT inhaler Inhale 2 puffs into the lungs every 12 hours 28  "each 1     hyoscyamine (LEVSIN) 0.125 MG tablet Take 1 tablet (125 mcg) by mouth every 6 hours as needed for cramping 30 tablet 0     meloxicam (MOBIC) 15 MG tablet Take 1 tablet (15 mg) by mouth daily 90 tablet 2     methocarbamol (ROBAXIN) 500 MG tablet Take 1 tablet (500 mg) by mouth 4 times daily as needed for muscle spasms 20 tablet 0           8/7/2024     9:56 AM   Weight Loss Medication History Reviewed With Patient   Which weight loss medications are you currently taking on a regular basis? Wegovy   Are you having any side effects from the weight loss medication that we have prescribed you? No               1/3/2022     3:03 PM   RODRIGUE Score (Last Two)   RODRIGUE Raw Score 29   Activation Score 52.9   RODRIGUE Level 2         PHYSICAL EXAM:  Objective   Ht 1.676 m (5' 6\")   Wt 122.5 kg (270 lb)   LMP 07/31/2024 (Exact Date)   BMI 43.58 kg/m      Vitals - Patient Reported  Pain Score: Mild Pain (3)  Pain Loc: Other - see comment (cramps)      Vitals:  No vitals were obtained today due to virtual visit.    GENERAL: alert and no distress  EYES: Eyes grossly normal to inspection.  No discharge or erythema, or obvious scleral/conjunctival abnormalities.  RESP: No audible wheeze, cough, or visible cyanosis.    SKIN: Visible skin clear. No significant rash, abnormal pigmentation or lesions.  NEURO: Cranial nerves grossly intact.  Mentation and speech appropriate for age.  PSYCH: Appropriate affect, tone, and pace of words        Sincerely,    Elisa Rivera PA-C      26 minutes spent by me on the date of the encounter doing chart review, history and exam, documentation and further activities per the note    The longitudinal plan of care for the diagnosis(es)/condition(s) as documented were addressed during this visit. Due to the added complexity in care, I will continue to support Immanuelle in the subsequent management and with ongoing continuity of care.       Again, thank you for allowing me to participate in the " care of your patient.      Sincerely,    Elisa Rivera PA-C

## 2024-08-07 NOTE — NURSING NOTE
Current patient location: [address] [place], [place] [address]  [place] [address]    Is the patient currently in the state of [place]? YES    Visit mode:VIDEO    If the visit is dropped, the patient can be reconnected by: VIDEO VISIT: Text to cell phone:   Telephone Information:   Mobile [phone]    and VIDEO VISIT: Send to e-mail at: [email or web address]    Will anyone else be joining the visit? NO  (If patient encounters technical issues they should call [phone])    How would you like to obtain your AVS? MyChart    Are changes needed to the allergy or medication list? No    Are refills needed on medications prescribed by this physician? NO    Rooming Documentation:  Assigned questionnaire(s) completed      Reason for visit: RECHJORGE MADERA

## 2024-08-09 ENCOUNTER — TELEPHONE (OUTPATIENT)
Dept: RHEUMATOLOGY | Facility: CLINIC | Age: 29
End: 2024-08-09
Payer: COMMERCIAL

## 2024-08-09 NOTE — TELEPHONE ENCOUNTER
Called to inform pt that appt on 8/15 had been adjusted from new pt to return pt as had been previously scheduled from waitlist appt and 8/15 appt had not been canceled

## 2024-08-15 ENCOUNTER — LAB (OUTPATIENT)
Dept: LAB | Facility: CLINIC | Age: 29
End: 2024-08-15
Payer: COMMERCIAL

## 2024-08-15 ENCOUNTER — OFFICE VISIT (OUTPATIENT)
Dept: RHEUMATOLOGY | Facility: CLINIC | Age: 29
End: 2024-08-15
Attending: INTERNAL MEDICINE
Payer: COMMERCIAL

## 2024-08-15 VITALS
DIASTOLIC BLOOD PRESSURE: 84 MMHG | HEIGHT: 66 IN | WEIGHT: 269 LBS | BODY MASS INDEX: 43.23 KG/M2 | SYSTOLIC BLOOD PRESSURE: 121 MMHG | HEART RATE: 96 BPM

## 2024-08-15 DIAGNOSIS — Z51.81 MEDICATION MONITORING ENCOUNTER: ICD-10-CM

## 2024-08-15 DIAGNOSIS — R19.5 DARK STOOLS: ICD-10-CM

## 2024-08-15 DIAGNOSIS — M79.7 FIBROMYALGIA: ICD-10-CM

## 2024-08-15 DIAGNOSIS — G47.11 HYPERSOMNIA, IDIOPATHIC: Primary | ICD-10-CM

## 2024-08-15 DIAGNOSIS — M32.19 OTHER SYSTEMIC LUPUS ERYTHEMATOSUS WITH OTHER ORGAN INVOLVEMENT (H): ICD-10-CM

## 2024-08-15 DIAGNOSIS — M25.50 POLYARTHRALGIA: ICD-10-CM

## 2024-08-15 DIAGNOSIS — R42 VERTIGO: ICD-10-CM

## 2024-08-15 DIAGNOSIS — G47.11 HYPERSOMNIA, IDIOPATHIC: ICD-10-CM

## 2024-08-15 DIAGNOSIS — R19.5 DARK STOOLS: Primary | ICD-10-CM

## 2024-08-15 LAB
ALBUMIN MFR UR ELPH: 10 MG/DL
ALBUMIN UR-MCNC: 10 MG/DL
APPEARANCE UR: CLEAR
BACTERIA #/AREA URNS HPF: ABNORMAL /HPF
BASOPHILS # BLD AUTO: 0.1 10E3/UL (ref 0–0.2)
BASOPHILS NFR BLD AUTO: 1 %
BILIRUB UR QL STRIP: NEGATIVE
COLOR UR AUTO: ABNORMAL
CREAT UR-MCNC: 177 MG/DL
EOSINOPHIL # BLD AUTO: 0 10E3/UL (ref 0–0.7)
EOSINOPHIL NFR BLD AUTO: 1 %
ERYTHROCYTE [DISTWIDTH] IN BLOOD BY AUTOMATED COUNT: 15.3 % (ref 10–15)
GLUCOSE UR STRIP-MCNC: NEGATIVE MG/DL
HCT VFR BLD AUTO: 41.1 % (ref 35–47)
HGB BLD-MCNC: 13 G/DL (ref 11.7–15.7)
HGB UR QL STRIP: NEGATIVE
IMM GRANULOCYTES # BLD: 0 10E3/UL
IMM GRANULOCYTES NFR BLD: 0 %
KETONES UR STRIP-MCNC: NEGATIVE MG/DL
LEUKOCYTE ESTERASE UR QL STRIP: NEGATIVE
LYMPHOCYTES # BLD AUTO: 1.8 10E3/UL (ref 0.8–5.3)
LYMPHOCYTES NFR BLD AUTO: 40 %
MCH RBC QN AUTO: 27.8 PG (ref 26.5–33)
MCHC RBC AUTO-ENTMCNC: 31.6 G/DL (ref 31.5–36.5)
MCV RBC AUTO: 88 FL (ref 78–100)
MONOCYTES # BLD AUTO: 0.2 10E3/UL (ref 0–1.3)
MONOCYTES NFR BLD AUTO: 5 %
MUCOUS THREADS #/AREA URNS LPF: PRESENT /LPF
NEUTROPHILS # BLD AUTO: 2.3 10E3/UL (ref 1.6–8.3)
NEUTROPHILS NFR BLD AUTO: 53 %
NITRATE UR QL: NEGATIVE
NRBC # BLD AUTO: 0 10E3/UL
NRBC BLD AUTO-RTO: 0 /100
PH UR STRIP: 7.5 [PH] (ref 5–7)
PLATELET # BLD AUTO: 280 10E3/UL (ref 150–450)
PROT/CREAT 24H UR: 0.06 MG/MG CR (ref 0–0.2)
RBC # BLD AUTO: 4.68 10E6/UL (ref 3.8–5.2)
RBC URINE: <1 /HPF
SP GR UR STRIP: 1.02 (ref 1–1.03)
SQUAMOUS EPITHELIAL: 1 /HPF
UROBILINOGEN UR STRIP-MCNC: NORMAL MG/DL
WBC # BLD AUTO: 4.5 10E3/UL (ref 4–11)
WBC URINE: 1 /HPF

## 2024-08-15 PROCEDURE — 86146 BETA-2 GLYCOPROTEIN ANTIBODY: CPT | Performed by: INTERNAL MEDICINE

## 2024-08-15 PROCEDURE — 85025 COMPLETE CBC W/AUTO DIFF WBC: CPT | Performed by: PATHOLOGY

## 2024-08-15 PROCEDURE — G2211 COMPLEX E/M VISIT ADD ON: HCPCS | Performed by: INTERNAL MEDICINE

## 2024-08-15 PROCEDURE — 99000 SPECIMEN HANDLING OFFICE-LAB: CPT | Performed by: PATHOLOGY

## 2024-08-15 PROCEDURE — G0463 HOSPITAL OUTPT CLINIC VISIT: HCPCS | Performed by: INTERNAL MEDICINE

## 2024-08-15 PROCEDURE — 84156 ASSAY OF PROTEIN URINE: CPT | Performed by: PATHOLOGY

## 2024-08-15 PROCEDURE — 81001 URINALYSIS AUTO W/SCOPE: CPT | Performed by: PATHOLOGY

## 2024-08-15 PROCEDURE — 99215 OFFICE O/P EST HI 40 MIN: CPT | Performed by: INTERNAL MEDICINE

## 2024-08-15 PROCEDURE — 36415 COLL VENOUS BLD VENIPUNCTURE: CPT | Performed by: PATHOLOGY

## 2024-08-15 RX ORDER — MODAFINIL 100 MG/1
100 TABLET ORAL DAILY
Qty: 30 TABLET | Refills: 5 | Status: SHIPPED | OUTPATIENT
Start: 2024-08-15

## 2024-08-15 RX ORDER — MECLIZINE HYDROCHLORIDE 25 MG/1
25 TABLET ORAL 3 TIMES DAILY PRN
Qty: 60 TABLET | Refills: 3 | Status: SHIPPED | OUTPATIENT
Start: 2024-08-15

## 2024-08-15 ASSESSMENT — PAIN SCALES - GENERAL: PAINLEVEL: MODERATE PAIN (4)

## 2024-08-15 NOTE — PATIENT INSTRUCTIONS
Meclizine got refilled    Stay on plaquenil and ibuprofen    Provigil for fatigue 1 tab every morning    Labs at the end of Oct    Urine test today, stool test for blood, CBC diff+beta 2 GP IgG today    Keep 11/6 appointment  
am meds with sips water

## 2024-08-15 NOTE — LETTER
8/15/2024       RE: Nehemias Mascorro  [address]  [place] [address]     Dear Colleague,    Thank you for referring your patient, Nehemias Mascorro, to the [facility] RHEUMATOLOGY [facility]INIC [place] at [facility]. Please see a copy of my visit note below.    Rheumatology Urgent Visit Note    Reason for visit: Ongoing joint pain/fatigue, new Dx of SLE 7/2024    Initial visit date: 7/3/2024    DOS: 8/15/2024    HPI from initial visit:    Nehemias Mascorro is a 29 year old female G0 with + fh/o SLE, who was referred to our clinic for evaluation and management of possible SLE.      -she was seen by several rheumatologists and was diagnosed with fibromyalgia, but her mother and herself are worried about having lupus. Her mom is under my care with lupus and asked me to see her daughter    -in 2016, had TBI, getting tx from neurologist, still has headache and neck pain    -her major complaint is joint pain    -she has this joint pain x 2 years    -gets pain over hands, elbows, knees and ankles    -hands swell up    -AM stiffness is between 1 hr-all day    -no triggers    -prednisone provided same benefit, made her hyper    -has raynaud's with turning color to blue, white x 2 yr    -gets periodic fevers., low grade x past 3 months,  T max 99.7-100.4, no infection    -no hair loss    -gets oral/nasal ulcers x past 2 years, kenalog paste helps with that    -PCP noticed cervical LAP     -has productive phlegm x 1 yr, clear, white in color, more in the AM    -no SOB, Cp    -has constipation, on miralax    -gets random nausea    -on wegoway, no benefit yet    -reports rapid weight gain, on thyroid med (new)    -dry eyes, on systane eyedrops, not helping    -on biotene for dry mouth    -gest butterfly rash x 3 months, not sure if it photosensitive    -no seizures    -mood is up and down    -has h/o asthma    -no pregnancies, no future pregnancy    -no tobacco,  ETOH use    -sun burns her eyes    -has brain fog    -gets muscle aches and spasms    -reports urgency with frequency    -working par time, 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[email or web address], 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    -has fatigue      Today 8/15/2024:    -urgent visit, has called several times since initial visit with pain/fatigue/low grade fevers flare ups    -no benefit from HCQ, no SEs    -has nausea, meclizine helps    -hands are better today    -hands/feet swell up    -flares are every 2 months, ibuprofen helps with pain    -AM stiffness is > 30 min    -gets malar rash    -reports brain fog    -BM has changed, yesterday had dark stool    -today has dark urine with burning    -gets headcahes    Past Medical History:   Diagnosis Date     ADHD (attention deficit hyperactivity disorder)      Depressive disorder      Ovarian cyst      Uncomplicated asthma      Past Surgical History:   Procedure Laterality Date     COLONOSCOPY N/A 4/27/2022    Procedure: COLONOSCOPY, WITH POLYPECTOMY AND BIOPSY;  Surgeon: Alyse Leija MD;  Location:  GI     ENT SURGERY      tonsilectomy age 8     ESOPHAGOSCOPY, GASTROSCOPY, DUODENOSCOPY (EGD), COMBINED N/A 3/15/2023    Procedure: ESOPHAGOGASTRODUODENOSCOPY, WITH BIOPSY;  Surgeon: Cyn Colon MD;  [place] [facility] [place]     ORTHOPEDIC SURGERY      Hip, emelia surgery in 2013     WRIST SURGERY       Family History   Problem Relation Age of Onset     Depression Maternal Grandmother      Schizophrenia Maternal Uncle      Substance Abuse Maternal Uncle    Mom has lupus      Social History     Socioeconomic History     Marital status: Single     Spouse name: Not on file     Number of children: Not on file     Years of education: Not on file     Highest education level: Not on file   Occupational History     Not on file   Tobacco Use     Smoking status: Never     Smokeless tobacco: Never     Tobacco comments:     Medical edible gummi  - has [place] medical marijuana card.    Vaping Use     Vaping status: Never Used    Substance and Sexual Activity     Alcohol use: Yes     Comment: rare     Drug use: Never     Sexual activity: Not Currently   Other Topics Concern     Parent/sibling w/ CABG, MI or angioplasty before 65F 55M? Not Asked   Social History Narrative    Lives with her parents working part time . Had to leave college      Social Determinants of Health     Financial Resource Strain: Low Risk  (5/13/2024)    Financial Resource Strain      Within the past 12 months, have you or your family members you live with been unable to get utilities (heat, electricity) when it was really needed?: No   Food Insecurity: Low Risk  (5/13/2024)    Food Insecurity      Within the past 12 months, did you worry that your food would run out before you got money to buy more?: No      Within the past 12 months, did the food you bought just not last and you didn t have money to get more?: No   Transportation Needs: High Risk (5/13/2024)    Transportation Needs      Within the past 12 months, has lack of transportation kept you from medical appointments, getting your medicines, non-medical meetings or appointments, work, or from getting things that you need?: Yes   Physical Activity: Insufficiently Active (5/13/2024)    Exercise Vital Sign      Days of Exercise per Week: 2 days      Minutes of Exercise per Session: 30 min   Stress: Stress Concern Present (5/13/2024)    [employer] - Occupational Stress Questionnaire      Feeling of Stress : Rather much   Social Connections: Unknown (7/13/2024)    Received from [facility]    Social Connections      Frequency of Communication with Friends and Family: Not on file   Interpersonal Safety: Low Risk  (5/17/2024)    Interpersonal Safety      Do you feel physically and emotionally safe where you currently live?: Yes      Within the past 12 months, have you been hit, slapped, kicked or otherwise physically hurt by someone?: No      Within the  past 12 months, have you been humiliated or emotionally abused in other ways by your partner or ex-partner?: No   Housing Stability: High Risk (5/13/2024)    Housing Stability      Do you have housing? : Yes      Are you worried about losing your housing?: Yes     Patient Active Problem List   Diagnosis     Anxiety     Major depressive disorder     Class 3 severe obesity with serious comorbidity and body mass index (BMI) of 40.0 to 44.9 in adult, unspecified obesity type (H)     Gastroesophageal reflux disease     Asymptomatic COVID-19 virus infection     Asthma     Chronic migraine without aura     Cyst of ovary     Slow transit constipation     Constipation     Iron deficiency anemia     Gastric ulcer without hemorrhage or perforation     Occipital neuralgia     Neuralgia and neuritis, unspecified     Other reactions to severe stress     Somatization disorder     Post-COVID chronic fatigue     Encephalopathy, unspecified     Excessive and frequent menstruation     Paresthesia of skin     Post concussion syndrome     Primary focal hyperhidrosis     Snoring     Tension-type headache, unspecified, not intractable     Cervicothoracic disc displacement     Sleep related bruxism     Current moderate episode of major depressive disorder, unspecified whether recurrent (H)     Central hypothyroidism     Prediabetes     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     Allergies   Allergen Reactions     Tizanidine Other (See Comments)     Azithromycin      Erythromycin Nausea and Vomiting     Sulfa Antibiotics Nausea       Outpatient Encounter Medications as of 8/15/2024   Medication Sig Dispense Refill     albuterol (ACCUNEB) 1.25 MG/3ML neb solution Take 1 vial (1.25 mg) by nebulization every 6 hours as needed for shortness of breath or wheezing 90 mL 0     albuterol (VENTOLIN HFA) 108 (90 Base) MCG/ACT inhaler INHALE 2 PUFFS INTO THE LUNGS FOUR TIMES DAILY 18 g 3     ARIPiprazole (ABILIFY) 20 MG tablet Take 20  mg by mouth daily       buPROPion (WELLBUTRIN XL) 150 MG 24 hr tablet        buPROPion (WELLBUTRIN XL) 300 MG 24 hr tablet Take 300 mg by mouth every morning       CONCERTA 36 MG CR tablet TAKE 1 TABLET BY MOUTH DAILY IN THE MORNING FOR ADHD. START 11/18/22       DULoxetine (CYMBALTA) 60 MG capsule Take 60 mg by mouth daily       ferrous sulfate (FE TABS) 325 (65 Fe) MG EC tablet Take 1 tablet (325 mg) by mouth daily 34 tablet 2     fluticasone (FLOVENT DISKUS) 100 MCG/ACT inhaler Inhale 2 puffs into the lungs every 12 hours 28 each 1     gabapentin (NEURONTIN) 100 MG capsule Take 200 mg by mouth At Bedtime       hydrocortisone 2.5 % cream APPLY TOPICALLY TO THE CHEST TWICE DAILY AS NEEDED       hydroxychloroquine (PLAQUENIL) 200 MG tablet Take 1 tablet (200 mg) by mouth 2 times daily (with meals) Annual Plaquenil toxicity eye screening required. 180 tablet 3     hyoscyamine (LEVSIN) 0.125 MG tablet Take 1 tablet (125 mcg) by mouth every 6 hours as needed for cramping 30 tablet 0     ibuprofen (ADVIL/MOTRIN) 800 MG tablet TAKE 1 TABLET BY MOUTH TWICE A DAY WITH MEALS FOR 14 DAYS       ketorolac (TORADOL) 10 MG tablet Take 1 tablet (10 mg) by mouth every 6 hours as needed for moderate pain 20 tablet 0     levocetirizine (XYZAL) 5 MG tablet Take 5 mg by mouth every 24 hours       levothyroxine (SYNTHROID/LEVOTHROID) 75 MCG tablet Take 1 tablet (75 mcg) by mouth daily 90 tablet 3     lidocaine (LIDODERM) 5 % patch Place 1 patch onto the skin every 24 hours To prevent lidocaine toxicity, patient should be patch free for 12 hrs daily. 15 patch 1     meclizine (ANTIVERT) 25 MG tablet Take 1 tablet (25 mg) by mouth 3 times daily as needed for dizziness 30 tablet 0     meloxicam (MOBIC) 15 MG tablet Take 1 tablet (15 mg) by mouth daily 90 tablet 2     methocarbamol (ROBAXIN) 500 MG tablet Take 1 tablet (500 mg) by mouth 4 times daily as needed for muscle spasms 20 tablet 0     naltrexone (DEPADE/REVIA) 50 MG tablet Take 1/2  tablet once daily 1-2 hours prior to worst cravings for 1 week, then increase to 1 tablet daily as directed if tolerating 30 tablet 2     norethindrone (MICRONOR) 0.35 MG tablet Take 1 tablet (0.35 mg) by mouth daily 90 tablet 4     omeprazole (PRILOSEC) 20 MG DR capsule Take 1 capsule (20 mg) by mouth 2 times daily       ondansetron (ZOFRAN) 4 MG tablet Take 1 tablet (4 mg) by mouth every 8 hours as needed for nausea 30 tablet 1     polyethylene glycol (MIRALAX) 17 GM/Dose powder Take 1 capful by mouth daily as needed for constipation       Semaglutide-Weight Management (WEGOVY) 1.7 MG/0.75ML pen Inject 1.7 mg subcutaneously once a week After completing 4 weeks of 1mg dose 3 mL 3     spironolactone-HCTZ (ALDACTAZIDE) 25-25 MG tablet Take 1 tablet by mouth every 72 hours as needed 36 tablet 5     SUMAtriptan (IMITREX) 50 MG tablet Take 1 tablet (50 mg) by mouth at onset of headache for migraine May repeat in 2 hours. Max 4 tablets/24 hours. 9 tablet 1     TAZORAC 0.1 % external cream APPLY TOPICALLY TO THE AFFECTED AREA AT BEDTIME       terconazole (TERAZOL 7) 0.4 % vaginal cream INSERT 1 APPLICATORFUL INTRAVAGINALLY AT BEDTIME X7 NIGHTS       tiZANidine (ZANAFLEX) 2 MG capsule PLEASE SEE ATTACHED FOR DETAILED DIRECTIONS       triamcinolone (KENALOG) 0.1 % paste APPLY TO AFFECTED AREA 2 TIMES DAILY AS DIRECTED       No facility-administered encounter medications on file as of 8/15/2024.         Her records were reviewed.    Component      Latest Ref Rng 5/8/2024  2:40 PM 5/8/2024  2:43 PM   Sodium      135 - 145 mmol/L 139     Potassium      3.4 - 5.3 mmol/L 4.1     Carbon Dioxide (CO2)      22 - 29 mmol/L 26     Anion Gap      7 - 15 mmol/L 8     Urea Nitrogen      6.0 - 20.0 mg/dL 12.5     Creatinine      0.51 - 0.95 mg/dL 0.83     GFR Estimate      >60 mL/min/1.73m2 >90     Calcium      8.6 - 10.0 mg/dL 9.2     Chloride      98 - 107 mmol/L 105     Glucose      70 - 99 mg/dL 96     Alkaline Phosphatase      40 -  150 U/L 65     AST      0 - 45 U/L 24     ALT      0 - 50 U/L 27     Protein Total      6.4 - 8.3 g/dL 6.6     Albumin      3.5 - 5.2 g/dL 4.0     Bilirubin Total      <=1.2 mg/dL 0.3     Color Urine      Colorless, Straw, Light Yellow, Yellow   Yellow    Appearance Urine      Clear   Clear    Glucose Urine      Negative mg/dL  Negative    Bilirubin Urine      Negative   Negative    Ketones Urine      Negative mg/dL  Negative    Specific Gravity Urine      1.005 - 1.030   1.020    Blood Urine      Negative   Negative    pH Urine      5.0 - 8.0   7.0    Protein Albumin Urine      Negative mg/dL  Negative    Urobilinogen Urine      0.2, 1.0 E.U./dL  1.0    Nitrite Urine      Negative   Negative    Leukocyte Esterase Urine      Negative   Negative    WBC      4.0 - 11.0 10e3/uL 5.0     RBC Count      3.80 - 5.20 10e6/uL 4.23     Hemoglobin      11.7 - 15.7 g/dL 11.1 (L)     Hematocrit      35.0 - 47.0 % 36.9     MCV      78 - 100 fL 87     MCH      26.5 - 33.0 pg 26.2 (L)     MCHC      31.5 - 36.5 g/dL 30.1 (L)     RDW      10.0 - 15.0 % 13.9     Platelet Count      150 - 450 10e3/uL 308     INR      0.85 - 1.15  0.95     Thrombin Time      13.0 - 19.0 Seconds 17.0     PTT Ratio      <1.21  0.92     DRVVT Screen Ratio      <1.08  0.94     Lupus Result      Negative  Negative     Lupus Interpretation The INR is normal.      Bacteria Urine      None Seen /HPF  None Seen    RBC Urine      0-2 /HPF /HPF  0-2    WBC Urine      0-5 /HPF /HPF  0-5    Squamous Epithelial /LPF Urine      None Seen /LPF  Few !    RONALD interpretation      Negative  Borderline Positive !     RONALD pattern 1 Speckled     RONALD titer 1 1:40     Beta 2 Glycoprotein 1 Antibody IgG      <7.0 U/mL 14.0 (H)     Beta 2 Glycoprotein 1 Antibody IgM      <7.0 U/mL <2.4     CRP Inflammation      <5.00 mg/L 12.90 (H)     Sed Rate      0 - 20 mm/hr 18     Complement C3      81 - 157 mg/dL 149     Complement C4      13 - 39 mg/dL 20     Complement, Total, S      38.7  "- 89.9 U/mL 28.9 (L)     TSH      0.30 - 4.20 uIU/mL 0.39        Legend:  (L) Low  ! Abnormal  (H) High    Neg RF, anti-CCP, SSA/SSB      Ph.E:    /84   Pulse 96   Ht 1.676 m (5' 6\")   Wt 122 kg (269 lb)   LMP 07/31/2024 (Exact Date)   BMI 43.42 kg/m        Constitutional: WD/WN. Pleasant. In no acute distress. Mother present  Eyes: EOM intact, sclera anicteric, conj not injected  HEENT: No oral ulcers or thrush. Poor salivary pool.  Neck: No cervical LAP   Chest: Clear to auscultation bilaterally  CV: RRR, no murmurs/ rubs or gallops. No edema, clubbing or cyanosis.   GI: Abdomen is soft and non tender.   MS: No synovitis. Cool joints. No tenderness of the joints. No  joint deformities.   Skin: No skin rash  Neuro: A&O x 3. Grossly non focal  Psych: NL affect         Assessment/ plan:    7/2024:    SLERoxanne Del Cid has signs and symptoms suggestive of mild early SLE. This is based on + RONALD 1:40, +beta 2 GP I IgG, low CH50, leukopenia, high CRP, arthritis, malar rash, oral/nasal ulcers, Raynaud's, low grade fevers, fatigue, brain fog, and sicca.      I reviewed and discussed lab results with her. Will finish the work up by checking the labs which are missing and checking follow up albs, plus thyroid antibodies to see if her hypothyroidism is autoimmune or not.    Beta 2 GP I IgG comes with increased risk of miscarriage and thrombosis, if repeat comes back positive in 3 months. Discussed significance of it, and recommendation to take ASA 81 mg every day during future pregnancies.    Discussed lifestyle changes, discussed sun protective measures (sun block Neutrogena  sheer mist for body, sport for face, sun protective clothing), healthy diet (more omega 3, less sugar), stretching exercises like Myron Chi, good sleep, avoiding stress and triggers for lupus (adonis adonis sprouts, echinacea, garlic, melatonin, sulfa drug).      She should avoid any estrogen and any birth control containing estrogen given + " beta 2 GP I IgG.      Recommend to start HCQ; risks were discussed including rare risk of retinal toxicity. It takes 2-3 months to show benefit, peak benefit is 6 months.      Recommend to switch mobic to ibuprofen as it is not helping.    Advised her to try Kiwi for constipation.      Plan:.    Labs     Start plaquenil 1 tab twice a day with food    Return in 4 months in person      Today 8/15/2024:    SLE.      Continues to have joint pain/fatigue despite starting HCQ in 7/2024, she tolerates it. It takes 2-3 months to show benefit, peak benefit 6 months, we have to give it a chance to work.    Plan:    Meclizine got refilled, it helps with nausea    Stay on plaquenil and ibuprofen     Yearly eye exam on plaquenil    Provigil for fatigue 1 tab every morning; risks were discussed    Lupus labs at the end of Oct    Urine test today (r/o UTI given sx), stool test for blood (given dark stool), CBC diff (given dark stool)+beta 2 GP IgG (repeat to see if true positive) today    Keep 11/6 appointment     Orders Placed This Encounter   Procedures     Beta 2 Glycoprotein Antibodies IGG IGM     ALT     Albumin level     AST     Creatinine     Complement C4     Complement C3     CRP inflammation     DNA double stranded antibodies     Erythrocyte sedimentation rate auto     UA with Microscopic reflex to Culture     Protein  random urine     Creatinine random urine     Thiopurine Methyltransferase RBC     Occult blood stool     Beta 2 Glycoprotein Antibodies IGG IGM     Routine UA with Micro Reflex to Culture     Protein  random urine     Creatinine random urine     CBC with Platelets & Differential     CBC with Platelets & Differential      The longitudinal plan of care for the diagnosis(es)/condition(s) as documented were addressed during this visit. Due to the added complexity in care, I will continue to support Immanuelle in the subsequent management and with ongoing continuity of care.      TT 40 min was spent on date of  the encounter doing chart review, history and exam, documentation and further activities as noted above. Any prior notes, outside records, laboratory results, and imaging studies were reviewed if relevant.    Aamir Perez MD              Again, thank you for allowing me to participate in the care of your patient.      Sincerely,    Aamir Perez MD

## 2024-08-15 NOTE — NURSING NOTE
"Chief Complaint   Patient presents with    Follow Up     /84   Pulse 96   Ht 1.676 m (5' 6\")   Wt 122 kg (269 lb)   LMP 07/31/2024 (Exact Date)   BMI 43.42 kg/m    Kyara Peterson MA on 8/15/2024 at 8:11 AM    "

## 2024-08-15 NOTE — PROGRESS NOTES
Rheumatology Urgent Visit Note    Reason for visit: Ongoing joint pain/fatigue, new Dx of SLE 7/2024    Initial visit date: 7/3/2024    DOS: 8/15/2024    HPI from initial visit:    Nehemias JEANETTE Mascorro is a 29 year old female G0 with + fh/o SLE, who was referred to our clinic for evaluation and management of possible SLE.      -she was seen by several rheumatologists and was diagnosed with fibromyalgia, but her mother and herself are worried about having lupus. Her mom is under my care with lupus and asked me to see her daughter    -in 2016, had TBI, getting tx from neurologist, still has headache and neck pain    -her major complaint is joint pain    -she has this joint pain x 2 years    -gets pain over hands, elbows, knees and ankles    -hands swell up    -AM stiffness is between 1 hr-all day    -no triggers    -prednisone provided same benefit, made her hyper    -has raynaud's with turning color to blue, white x 2 yr    -gets periodic fevers., low grade x past 3 months,  T max 99.7-100.4, no infection    -no hair loss    -gets oral/nasal ulcers x past 2 years, kenalog paste helps with that    -PCP noticed cervical LAP     -has productive phlegm x 1 yr, clear, white in color, more in the AM    -no SOB, Cp    -has constipation, on miralax    -gets random nausea    -on wegoway, no benefit yet    -reports rapid weight gain, on thyroid med (new)    -dry eyes, on systane eyedrops, not helping    -on biotene for dry mouth    -gest butterfly rash x 3 months, not sure if it photosensitive    -no seizures    -mood is up and down    -has h/o asthma    -no pregnancies, no future pregnancy    -no tobacco, ETOH use    -sun burns her eyes    -has brain fog    -gets muscle aches and spasms    -reports urgency with frequency    -working par time, 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[employer], 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    -has fatigue      Today 8/15/2024:    -urgent visit, has called several times since initial visit with pain/fatigue/low grade fevers flare  ups    -no benefit from HCQ, no SEs    -has nausea, meclizine helps    -hands are better today    -hands/feet swell up    -flares are every 2 months, ibuprofen helps with pain    -AM stiffness is > 30 min    -gets malar rash    -reports brain fog    -BM has changed, yesterday had dark stool    -today has dark urine with burning    -gets headcahes    Past Medical History:   Diagnosis Date    ADHD (attention deficit hyperactivity disorder)     Depressive disorder     Ovarian cyst     Uncomplicated asthma      Past Surgical History:   Procedure Laterality Date    COLONOSCOPY N/A 4/27/2022    Procedure: COLONOSCOPY, WITH POLYPECTOMY AND BIOPSY;  Surgeon: Alyse Leija MD;  Location:  GI    ENT SURGERY      tonsilectomy age 8    ESOPHAGOSCOPY, GASTROSCOPY, DUODENOSCOPY (EGD), COMBINED N/A 3/15/2023    Procedure: ESOPHAGOGASTRODUODENOSCOPY, WITH BIOPSY;  Surgeon: Cyn Colon MD;  Location: [facility] [place]    ORTHOPEDIC SURGERY      Hip, emelia surgery in 2013    WRIST SURGERY       Family History   Problem Relation Age of Onset    Depression Maternal Grandmother     Schizophrenia Maternal Uncle     Substance Abuse Maternal Uncle    Mom has lupus      Social History     Socioeconomic History    Marital status: Single     Spouse name: Not on file    Number of children: Not on file    Years of education: Not on file    Highest education level: Not on file   Occupational History    Not on file   Tobacco Use    Smoking status: Never    Smokeless tobacco: Never    Tobacco comments:     Medical edible gummi  - has [place] medical marijuana card.    Vaping Use    Vaping status: Never Used   Substance and Sexual Activity    Alcohol use: Yes     Comment: rare    Drug use: Never    Sexual activity: Not Currently   Other Topics Concern    Parent/sibling w/ CABG, MI or angioplasty before 65F 55M? Not Asked   Social History Narrative    Lives with her parents working part time . Had to leave college      Social Determinants of Health      Financial Resource Strain: Low Risk  (5/13/2024)    Financial Resource Strain     Within the past 12 months, have you or your family members you live with been unable to get utilities (heat, electricity) when it was really needed?: No   Food Insecurity: Low Risk  (5/13/2024)    Food Insecurity     Within the past 12 months, did you worry that your food would run out before you got money to buy more?: No     Within the past 12 months, did the food you bought just not last and you didn t have money to get more?: No   Transportation Needs: High Risk (5/13/2024)    Transportation Needs     Within the past 12 months, has lack of transportation kept you from medical appointments, getting your medicines, non-medical meetings or appointments, work, or from getting things that you need?: Yes   Physical Activity: Insufficiently Active (5/13/2024)    Exercise Vital Sign     Days of Exercise per Week: 2 days     Minutes of Exercise per Session: 30 min   Stress: Stress Concern Present (5/13/2024)    [employer] [place] [employer] - Occupational Stress Questionnaire     Feeling of Stress : Rather much   Social Connections: Unknown (7/13/2024)    Received from [facility]    Social Connections     Frequency of Communication with Friends and Family: Not on file   Interpersonal Safety: Low Risk  (5/17/2024)    Interpersonal Safety     Do you feel physically and emotionally safe where you currently live?: Yes     Within the past 12 months, have you been hit, slapped, kicked or otherwise physically hurt by someone?: No     Within the past 12 months, have you been humiliated or emotionally abused in other ways by your partner or ex-partner?: No   Housing Stability: High Risk (5/13/2024)    Housing Stability     Do you have housing? : Yes     Are you worried about losing your housing?: Yes     Patient Active Problem List   Diagnosis    Anxiety    Major depressive disorder    Class 3 severe  obesity with serious comorbidity and body mass index (BMI) of 40.0 to 44.9 in adult, unspecified obesity type (H)    Gastroesophageal reflux disease    Asymptomatic COVID-19 virus infection    Asthma    Chronic migraine without aura    Cyst of ovary    Slow transit constipation    Constipation    Iron deficiency anemia    Gastric ulcer without hemorrhage or perforation    Occipital neuralgia    Neuralgia and neuritis, unspecified    Other reactions to severe stress    Somatization disorder    Post-COVID chronic fatigue    Encephalopathy, unspecified    Excessive and frequent menstruation    Paresthesia of skin    Post concussion syndrome    Primary focal hyperhidrosis    Snoring    Tension-type headache, unspecified, not intractable    Cervicothoracic disc displacement    Sleep related bruxism    Current moderate episode of major depressive disorder, unspecified whether recurrent (H)    Central hypothyroidism    Prediabetes    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     Allergies   Allergen Reactions    Tizanidine Other (See Comments)    Azithromycin     Erythromycin Nausea and Vomiting    Sulfa Antibiotics Nausea       Outpatient Encounter Medications as of 8/15/2024   Medication Sig Dispense Refill    albuterol (ACCUNEB) 1.25 MG/3ML neb solution Take 1 vial (1.25 mg) by nebulization every 6 hours as needed for shortness of breath or wheezing 90 mL 0    albuterol (VENTOLIN HFA) 108 (90 Base) MCG/ACT inhaler INHALE 2 PUFFS INTO THE LUNGS FOUR TIMES DAILY 18 g 3    ARIPiprazole (ABILIFY) 20 MG tablet Take 20 mg by mouth daily      buPROPion (WELLBUTRIN XL) 150 MG 24 hr tablet       buPROPion (WELLBUTRIN XL) 300 MG 24 hr tablet Take 300 mg by mouth every morning      CONCERTA 36 MG CR tablet TAKE 1 TABLET BY MOUTH DAILY IN THE MORNING FOR ADHD. START 11/18/22      DULoxetine (CYMBALTA) 60 MG capsule Take 60 mg by mouth daily      ferrous sulfate (FE TABS) 325 (65 Fe) MG EC tablet Take 1 tablet (325  mg) by mouth daily 34 tablet 2    fluticasone (FLOVENT DISKUS) 100 MCG/ACT inhaler Inhale 2 puffs into the lungs every 12 hours 28 each 1    gabapentin (NEURONTIN) 100 MG capsule Take 200 mg by mouth At Bedtime      hydrocortisone 2.5 % cream APPLY TOPICALLY TO THE CHEST TWICE DAILY AS NEEDED      hydroxychloroquine (PLAQUENIL) 200 MG tablet Take 1 tablet (200 mg) by mouth 2 times daily (with meals) Annual Plaquenil toxicity eye screening required. 180 tablet 3    hyoscyamine (LEVSIN) 0.125 MG tablet Take 1 tablet (125 mcg) by mouth every 6 hours as needed for cramping 30 tablet 0    ibuprofen (ADVIL/MOTRIN) 800 MG tablet TAKE 1 TABLET BY MOUTH TWICE A DAY WITH MEALS FOR 14 DAYS      ketorolac (TORADOL) 10 MG tablet Take 1 tablet (10 mg) by mouth every 6 hours as needed for moderate pain 20 tablet 0    levocetirizine (XYZAL) 5 MG tablet Take 5 mg by mouth every 24 hours      levothyroxine (SYNTHROID/LEVOTHROID) 75 MCG tablet Take 1 tablet (75 mcg) by mouth daily 90 tablet 3    lidocaine (LIDODERM) 5 % patch Place 1 patch onto the skin every 24 hours To prevent lidocaine toxicity, patient should be patch free for 12 hrs daily. 15 patch 1    meclizine (ANTIVERT) 25 MG tablet Take 1 tablet (25 mg) by mouth 3 times daily as needed for dizziness 30 tablet 0    meloxicam (MOBIC) 15 MG tablet Take 1 tablet (15 mg) by mouth daily 90 tablet 2    methocarbamol (ROBAXIN) 500 MG tablet Take 1 tablet (500 mg) by mouth 4 times daily as needed for muscle spasms 20 tablet 0    naltrexone (DEPADE/REVIA) 50 MG tablet Take 1/2 tablet once daily 1-2 hours prior to worst cravings for 1 week, then increase to 1 tablet daily as directed if tolerating 30 tablet 2    norethindrone (MICRONOR) 0.35 MG tablet Take 1 tablet (0.35 mg) by mouth daily 90 tablet 4    omeprazole (PRILOSEC) 20 MG DR capsule Take 1 capsule (20 mg) by mouth 2 times daily      ondansetron (ZOFRAN) 4 MG tablet Take 1 tablet (4 mg) by mouth every 8 hours as needed for  nausea 30 tablet 1    polyethylene glycol (MIRALAX) 17 GM/Dose powder Take 1 capful by mouth daily as needed for constipation      Semaglutide-Weight Management (WEGOVY) 1.7 MG/0.75ML pen Inject 1.7 mg subcutaneously once a week After completing 4 weeks of 1mg dose 3 mL 3    spironolactone-HCTZ (ALDACTAZIDE) 25-25 MG tablet Take 1 tablet by mouth every 72 hours as needed 36 tablet 5    SUMAtriptan (IMITREX) 50 MG tablet Take 1 tablet (50 mg) by mouth at onset of headache for migraine May repeat in 2 hours. Max 4 tablets/24 hours. 9 tablet 1    TAZORAC 0.1 % external cream APPLY TOPICALLY TO THE AFFECTED AREA AT BEDTIME      terconazole (TERAZOL 7) 0.4 % vaginal cream INSERT 1 APPLICATORFUL INTRAVAGINALLY AT BEDTIME X7 NIGHTS      tiZANidine (ZANAFLEX) 2 MG capsule PLEASE SEE ATTACHED FOR DETAILED DIRECTIONS      triamcinolone (KENALOG) 0.1 % paste APPLY TO AFFECTED AREA 2 TIMES DAILY AS DIRECTED       No facility-administered encounter medications on file as of 8/15/2024.         Her records were reviewed.    Component      Latest Ref Rng 5/8/2024  2:40 PM 5/8/2024  2:43 PM   Sodium      135 - 145 mmol/L 139     Potassium      3.4 - 5.3 mmol/L 4.1     Carbon Dioxide (CO2)      22 - 29 mmol/L 26     Anion Gap      7 - 15 mmol/L 8     Urea Nitrogen      6.0 - 20.0 mg/dL 12.5     Creatinine      0.51 - 0.95 mg/dL 0.83     GFR Estimate      >60 mL/min/1.73m2 >90     Calcium      8.6 - 10.0 mg/dL 9.2     Chloride      98 - 107 mmol/L 105     Glucose      70 - 99 mg/dL 96     Alkaline Phosphatase      40 - 150 U/L 65     AST      0 - 45 U/L 24     ALT      0 - 50 U/L 27     Protein Total      6.4 - 8.3 g/dL 6.6     Albumin      3.5 - 5.2 g/dL 4.0     Bilirubin Total      <=1.2 mg/dL 0.3     Color Urine      Colorless, Straw, Light Yellow, Yellow   Yellow    Appearance Urine      Clear   Clear    Glucose Urine      Negative mg/dL  Negative    Bilirubin Urine      Negative   Negative    Ketones Urine      Negative mg/dL   "Negative    Specific Gravity Urine      1.005 - 1.030   1.020    Blood Urine      Negative   Negative    pH Urine      5.0 - 8.0   7.0    Protein Albumin Urine      Negative mg/dL  Negative    Urobilinogen Urine      0.2, 1.0 E.U./dL  1.0    Nitrite Urine      Negative   Negative    Leukocyte Esterase Urine      Negative   Negative    WBC      4.0 - 11.0 10e3/uL 5.0     RBC Count      3.80 - 5.20 10e6/uL 4.23     Hemoglobin      11.7 - 15.7 g/dL 11.1 (L)     Hematocrit      35.0 - 47.0 % 36.9     MCV      78 - 100 fL 87     MCH      26.5 - 33.0 pg 26.2 (L)     MCHC      31.5 - 36.5 g/dL 30.1 (L)     RDW      10.0 - 15.0 % 13.9     Platelet Count      150 - 450 10e3/uL 308     INR      0.85 - 1.15  0.95     Thrombin Time      13.0 - 19.0 Seconds 17.0     PTT Ratio      <1.21  0.92     DRVVT Screen Ratio      <1.08  0.94     Lupus Result      Negative  Negative     Lupus Interpretation The INR is normal.      Bacteria Urine      None Seen /HPF  None Seen    RBC Urine      0-2 /HPF /HPF  0-2    WBC Urine      0-5 /HPF /HPF  0-5    Squamous Epithelial /LPF Urine      None Seen /LPF  Few !    RONALD interpretation      Negative  Borderline Positive !     RONALD pattern 1 Speckled     RONALD titer 1 1:40     Beta 2 Glycoprotein 1 Antibody IgG      <7.0 U/mL 14.0 (H)     Beta 2 Glycoprotein 1 Antibody IgM      <7.0 U/mL <2.4     CRP Inflammation      <5.00 mg/L 12.90 (H)     Sed Rate      0 - 20 mm/hr 18     Complement C3      81 - 157 mg/dL 149     Complement C4      13 - 39 mg/dL 20     Complement, Total, S      38.7 - 89.9 U/mL 28.9 (L)     TSH      0.30 - 4.20 uIU/mL 0.39        Legend:  (L) Low  ! Abnormal  (H) High    Neg RF, anti-CCP, SSA/SSB      Ph.E:    /84   Pulse 96   Ht 1.676 m (5' 6\")   Wt 122 kg (269 lb)   LMP 07/31/2024 (Exact Date)   BMI 43.42 kg/m        Constitutional: WD/WN. Pleasant. In no acute distress. Mother present  Eyes: EOM intact, sclera anicteric, conj not injected  HEENT: No oral ulcers or " thrush. Poor salivary pool.  Neck: No cervical LAP   Chest: Clear to auscultation bilaterally  CV: RRR, no murmurs/ rubs or gallops. No edema, clubbing or cyanosis.   GI: Abdomen is soft and non tender.   MS: No synovitis. Cool joints. No tenderness of the joints. No  joint deformities.   Skin: No skin rash  Neuro: A&O x 3. Grossly non focal  Psych: NL affect         Assessment/ plan:    7/2024:    SLERoxanne Del Cid has signs and symptoms suggestive of mild early SLE. This is based on + RONALD 1:40, +beta 2 GP I IgG, low CH50, leukopenia, high CRP, arthritis, malar rash, oral/nasal ulcers, Raynaud's, low grade fevers, fatigue, brain fog, and sicca.      I reviewed and discussed lab results with her. Will finish the work up by checking the labs which are missing and checking follow up albs, plus thyroid antibodies to see if her hypothyroidism is autoimmune or not.    Beta 2 GP I IgG comes with increased risk of miscarriage and thrombosis, if repeat comes back positive in 3 months. Discussed significance of it, and recommendation to take ASA 81 mg every day during future pregnancies.    Discussed lifestyle changes, discussed sun protective measures (sun block Neutrogena  sheer mist for body, sport for face, sun protective clothing), healthy diet (more omega 3, less sugar), stretching exercises like Myron Chi, good sleep, avoiding stress and triggers for lupus (adonis adonis sprouts, echinacea, garlic, melatonin, sulfa drug).      She should avoid any estrogen and any birth control containing estrogen given + beta 2 GP I IgG.      Recommend to start HCQ; risks were discussed including rare risk of retinal toxicity. It takes 2-3 months to show benefit, peak benefit is 6 months.      Recommend to switch mobic to ibuprofen as it is not helping.    Advised her to try Kiwi for constipation.      Plan:.    Labs     Start plaquenil 1 tab twice a day with food    Return in 4 months in person      Today  8/15/2024:    SLE.      Continues to have joint pain/fatigue despite starting HCQ in 7/2024, she tolerates it. It takes 2-3 months to show benefit, peak benefit 6 months, we have to give it a chance to work.    Plan:    Meclizine got refilled, it helps with nausea    Stay on plaquenil and ibuprofen     Yearly eye exam on plaquenil    Provigil for fatigue 1 tab every morning; risks were discussed    Lupus labs at the end of Oct    Urine test today (r/o UTI given sx), stool test for blood (given dark stool), CBC diff (given dark stool)+beta 2 GP IgG (repeat to see if true positive) today    Keep 11/6 appointment     Orders Placed This Encounter   Procedures    Beta 2 Glycoprotein Antibodies IGG IGM    ALT    Albumin level    AST    Creatinine    Complement C4    Complement C3    CRP inflammation    DNA double stranded antibodies    Erythrocyte sedimentation rate auto    UA with Microscopic reflex to Culture    Protein  random urine    Creatinine random urine    Thiopurine Methyltransferase RBC    Occult blood stool    Beta 2 Glycoprotein Antibodies IGG IGM    Routine UA with Micro Reflex to Culture    Protein  random urine    Creatinine random urine    CBC with Platelets & Differential    CBC with Platelets & Differential      The longitudinal plan of care for the diagnosis(es)/condition(s) as documented were addressed during this visit. Due to the added complexity in care, I will continue to support Immanuelle in the subsequent management and with ongoing continuity of care.      TT 40 min was spent on date of the encounter doing chart review, history and exam, documentation and further activities as noted above. Any prior notes, outside records, laboratory results, and imaging studies were reviewed if relevant.    Aamir Perez MD

## 2024-08-18 LAB
B2 GLYCOPROT1 IGG SERPL IA-ACNC: 14 U/ML
B2 GLYCOPROT1 IGM SERPL IA-ACNC: <2.4 U/ML

## 2024-08-24 DIAGNOSIS — E61.1 IRON DEFICIENCY: ICD-10-CM

## 2024-08-26 RX ORDER — FERROUS SULFATE 325(65) MG
325 TABLET, DELAYED RELEASE (ENTERIC COATED) ORAL DAILY
Qty: 90 TABLET | Refills: 1 | Status: SHIPPED | OUTPATIENT
Start: 2024-08-26

## 2024-09-09 ENCOUNTER — TRANSFERRED RECORDS (OUTPATIENT)
Dept: HEALTH INFORMATION MANAGEMENT | Facility: CLINIC | Age: 29
End: 2024-09-09
Payer: COMMERCIAL

## 2024-10-11 ENCOUNTER — OFFICE VISIT (OUTPATIENT)
Dept: INTERNAL MEDICINE | Facility: CLINIC | Age: 29
End: 2024-10-11
Payer: COMMERCIAL

## 2024-10-11 VITALS
OXYGEN SATURATION: 100 % | BODY MASS INDEX: 43.2 KG/M2 | TEMPERATURE: 99.2 F | DIASTOLIC BLOOD PRESSURE: 82 MMHG | SYSTOLIC BLOOD PRESSURE: 116 MMHG | RESPIRATION RATE: 19 BRPM | HEIGHT: 66 IN | HEART RATE: 94 BPM | WEIGHT: 268.8 LBS

## 2024-10-11 DIAGNOSIS — M79.7 FIBROMYALGIA: ICD-10-CM

## 2024-10-11 DIAGNOSIS — Z79.899 MEDICAL CANNABIS USE: Primary | ICD-10-CM

## 2024-10-11 DIAGNOSIS — M25.519 ACUTE SHOULDER PAIN, UNSPECIFIED LATERALITY: ICD-10-CM

## 2024-10-11 PROCEDURE — 99213 OFFICE O/P EST LOW 20 MIN: CPT | Mod: 25 | Performed by: INTERNAL MEDICINE

## 2024-10-11 PROCEDURE — 91320 SARSCV2 VAC 30MCG TRS-SUC IM: CPT | Performed by: INTERNAL MEDICINE

## 2024-10-11 PROCEDURE — 90480 ADMN SARSCOV2 VAC 1/ONLY CMP: CPT | Performed by: INTERNAL MEDICINE

## 2024-10-11 PROCEDURE — 90471 IMMUNIZATION ADMIN: CPT | Performed by: INTERNAL MEDICINE

## 2024-10-11 PROCEDURE — 90656 IIV3 VACC NO PRSV 0.5 ML IM: CPT | Performed by: INTERNAL MEDICINE

## 2024-10-11 RX ORDER — ELETRIPTAN HYDROBROMIDE 40 MG/1
TABLET ORAL
COMMUNITY
Start: 2024-08-12

## 2024-10-11 RX ORDER — METHYLPHENIDATE HYDROCHLORIDE 10 MG/1
TABLET ORAL
COMMUNITY
Start: 2024-09-20

## 2024-10-11 RX ORDER — GABAPENTIN 600 MG/1
600 TABLET ORAL AT BEDTIME
Qty: 90 TABLET | Refills: 3 | Status: SHIPPED | OUTPATIENT
Start: 2024-10-11

## 2024-10-11 RX ORDER — ARIPIPRAZOLE 15 MG/1
1 TABLET ORAL
COMMUNITY
Start: 2024-09-30

## 2024-10-11 RX ORDER — SENNOSIDES 8.6 MG
CAPSULE ORAL
COMMUNITY
Start: 2024-08-09

## 2024-10-11 ASSESSMENT — PATIENT HEALTH QUESTIONNAIRE - PHQ9
SUM OF ALL RESPONSES TO PHQ QUESTIONS 1-9: 10
SUM OF ALL RESPONSES TO PHQ QUESTIONS 1-9: 10
10. IF YOU CHECKED OFF ANY PROBLEMS, HOW DIFFICULT HAVE THESE PROBLEMS MADE IT FOR YOU TO DO YOUR WORK, TAKE CARE OF THINGS AT HOME, OR GET ALONG WITH OTHER PEOPLE: SOMEWHAT DIFFICULT

## 2024-10-11 ASSESSMENT — PAIN SCALES - GENERAL: PAINLEVEL: SEVERE PAIN (6)

## 2024-10-11 NOTE — PROGRESS NOTES
"  Assessment & Plan     Medical cannabis use  Do not believe she is in my current patient registry and not checking I did not see it so I did put in a request for medical cannabis.  She has tried and conservative care.  Has suboptimal response to traditional pain medicines.    Fibromyalgia  Pain complicated by multiple health problems and is complicated with some somatization.  acute shoulder pain, unspecified laterality    - Physical Therapy  Referral; Future  - gabapentin (NEURONTIN) 600 MG tablet; Take 1 tablet (600 mg) by mouth at bedtime.      Has lupus family history Chu treatment with Plaquenil with minimal affect    BMI  Estimated body mass index is 43.41 kg/m  as calculated from the following:    Height as of this encounter: 1.676 m (5' 5.98\").    Weight as of this encounter: 121.9 kg (268 lb 12.8 oz).             Araceli Del Cid is a 29 year old, presenting for the following health issues:  Systemic Lupus Erythematous (Pt would like to discuss lupus flare up. Pt reports this was in May and has not had any since this time period. ), Arm Pain (Left arm - unable to raise with pain. Pt states that this started 1 month ago. ), and Medication Question (Pt would like to discuss past medical cannabis registry. )        10/11/2024     1:28 PM   Additional Questions   Roomed by Niurka     History of Present Illness       Reason for visit:  Discuss medical cannabis and arm pain.    She eats 2-3 servings of fruits and vegetables daily.She consumes 0 sweetened beverage(s) daily.She exercises with enough effort to increase her heart rate 9 or less minutes per day.  She exercises with enough effort to increase her heart rate 3 or less days per week. She is missing 2 dose(s) of medications per week.  She is not taking prescribed medications regularly due to other.                     Objective    /82 (BP Location: Left arm, Patient Position: Sitting, Cuff Size: Adult Regular)   Pulse 94   Temp " "99.2  F (37.3  C) (Tympanic)   Resp 19   Ht 1.676 m (5' 5.98\")   Wt 121.9 kg (268 lb 12.8 oz)   LMP  (LMP Unknown)   SpO2 100%   BMI 43.41 kg/m    Body mass index is 43.41 kg/m .  Physical Exam               Signed Electronically by: Alexandra Rodrigues MD    "

## 2024-10-12 ENCOUNTER — TRANSFERRED RECORDS (OUTPATIENT)
Dept: HEALTH INFORMATION MANAGEMENT | Facility: CLINIC | Age: 29
End: 2024-10-12
Payer: COMMERCIAL

## 2024-10-23 ENCOUNTER — VIRTUAL VISIT (OUTPATIENT)
Dept: FAMILY MEDICINE | Facility: CLINIC | Age: 29
End: 2024-10-23
Payer: COMMERCIAL

## 2024-10-23 DIAGNOSIS — N76.0 VAGINITIS AND VULVOVAGINITIS: ICD-10-CM

## 2024-10-23 DIAGNOSIS — J45.21 MILD INTERMITTENT ASTHMA WITH ACUTE EXACERBATION: Primary | ICD-10-CM

## 2024-10-23 PROCEDURE — 99214 OFFICE O/P EST MOD 30 MIN: CPT | Mod: 95

## 2024-10-23 RX ORDER — FLUCONAZOLE 150 MG/1
150 TABLET ORAL ONCE
Qty: 1 TABLET | Refills: 0 | Status: SHIPPED | OUTPATIENT
Start: 2024-10-23 | End: 2024-10-23

## 2024-10-23 RX ORDER — BENZONATATE 100 MG/1
100 CAPSULE ORAL 3 TIMES DAILY PRN
Qty: 30 CAPSULE | Refills: 0 | Status: SHIPPED | OUTPATIENT
Start: 2024-10-23

## 2024-10-23 NOTE — PROGRESS NOTES
Nehemias is a 29 year old who is being evaluated via a billable video visit.    What phone number would you like to be contacted at? [phone]   How would you like to obtain your AVS? MyChart      Assessment & Plan     Mild intermittent asthma with acute exacerbation  Most likely viral URI now with exacerbation of her asthma. She declined steroids as she completed a course for neck pain a few weeks ago. Will have her start inhaled steroid and continue albuterol as needed. Can use tessalon for cough as needed. Counseled on mediations.  - fluticasone (FLOVENT DISKUS) 100 MCG/ACT inhaler  Dispense: 28 each; Refill: 1  - benzonatate (TESSALON) 100 MG capsule  Dispense: 30 capsule; Refill: 0    Vaginitis and vulvovaginitis  Similar to previous episodes of candidiasis. Follow up if no improvement with fluconazole. Verbalizes understanding.   - fluconazole (DIFLUCAN) 150 MG tablet  Dispense: 1 tablet; Refill: 0      Plan to follow up if symptoms do not improve or worsen.          Subjective   Nehemias is a 29 year old, presenting for the following health issues:  Consult (Constantly hungry, extremely tired, out of breath with simple things like going up stairs. She has been having headaches.)      10/23/2024    12:42 PM   Additional Questions   Roomed by Mariana LEGER     Video Start Time:  1352    HPI     Diarrhea last week for 2 days.   Developed low grade fever 3 days ago 100.3  Fatigue, headaches, and shortness of breath started approximately 5 days ago.   Some chest tightness going up the stairs, with occasional productive cough. Sputum white/clear.   Has used her albuterol inhaler and theraflu. Inhaler has helped a little.  Test of COVID last week and was negative.    Symptoms are not waking her up at night. Sister also had cough recently. 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         Review of  Systems  Constitutional, HEENT, cardiovascular, pulmonary, gi and gu systems are negative, except as otherwise noted.      Objective    Vitals - Patient Reported  Systolic (Patient Reported):  (does not monitor)      Vitals:  No vitals were obtained today due to virtual visit.    Physical Exam   GENERAL: alert and no distress  EYES: Eyes grossly normal to inspection.  No discharge or erythema, or obvious scleral/conjunctival abnormalities.  RESP: No audible wheeze, cough, or visible cyanosis.    SKIN: Visible skin clear. No significant rash, abnormal pigmentation or lesions.  NEURO: Cranial nerves grossly intact.  Mentation and speech appropriate for age.  PSYCH: Appropriate affect, tone, and pace of words          Video-Visit Details    Type of service:  Video Visit   Video End Time: 1411  Originating Location (pt. Location): Home  Distant Location (provider location):  On-site  Platform used for Video Visit: Peace    Signed Electronically by: JERALD Olivarez CNP

## 2024-10-24 ENCOUNTER — MYC MEDICAL ADVICE (OUTPATIENT)
Dept: FAMILY MEDICINE | Facility: CLINIC | Age: 29
End: 2024-10-24
Payer: COMMERCIAL

## 2024-10-24 DIAGNOSIS — J45.20 MILD INTERMITTENT ASTHMA WITHOUT COMPLICATION: ICD-10-CM

## 2024-10-25 RX ORDER — ALBUTEROL SULFATE 90 UG/1
INHALANT RESPIRATORY (INHALATION)
Qty: 18 G | Refills: 2 | Status: SHIPPED | OUTPATIENT
Start: 2024-10-25

## 2024-10-26 ENCOUNTER — NURSE TRIAGE (OUTPATIENT)
Dept: NURSING | Facility: CLINIC | Age: 29
End: 2024-10-26
Payer: COMMERCIAL

## 2024-10-26 ENCOUNTER — MYC MEDICAL ADVICE (OUTPATIENT)
Dept: RHEUMATOLOGY | Facility: CLINIC | Age: 29
End: 2024-10-26
Payer: COMMERCIAL

## 2024-10-26 NOTE — TELEPHONE ENCOUNTER
Trouble breathing. How long after using the inhaler can she use the nebulizer?  Pain between shoulder blades started Thursday, every day since then, when she tries to take a deep breath, random happening.  Shortness of breath at rest.  She said she won't go to the ER but will try her nebulizer first, to see if she responds. I reviewed the frequency and use of her inhaler and nebulizer instructions so she would have  a better understanding of when to use them. I told her to feel free to call back.  Roz Nayak RN  [facility]    Reason for Disposition   [1] MODERATE difficulty breathing (e.g., speaks in phrases, SOB even at rest, pulse 100-120) AND [2] NEW-onset or WORSE than normal    Additional Information   Negative: SEVERE difficulty breathing (e.g., struggling for each breath, speaks in single words)   Negative: [1] Breathing stopped AND [2] hasn't returned   Negative: Choking on something   Negative: Bluish (or gray) lips or face now   Negative: Difficult to awaken or acting confused (e.g., disoriented, slurred speech)   Negative: Passed out (i.e., lost consciousness, collapsed and was not responding)   Negative: Wheezing started suddenly after medicine, an allergic food or bee sting   Negative: Stridor (harsh sound while breathing in)   Negative: Slow, shallow and weak breathing   Negative: Sounds like a life-threatening emergency to the triager    Protocols used: Breathing Difficulty-A-AH

## 2024-10-28 NOTE — TELEPHONE ENCOUNTER
10/28 I called and talked to pt and offered her appt with Dr. Rodrigues, she declined wants to see another provider, I offered her tomorrow Tuesday with Nancy she declined has appt elsewhere told her then to either go to urgent care or call the next day to see if any provider has a same day appt available

## 2024-10-28 NOTE — TELEPHONE ENCOUNTER
Aamir Perez MD  [facility] (10:26 AM)       Pleurisy does cause chest pain on deep breathing, but no SOB and no cough. Agree with your plan.         Called patient back to update her on Dr. Perez's response. In addition to previous note, she was advised to update her PCP clinic on how she is feeling.       Lindsey Torrez RN  [facility]

## 2024-10-28 NOTE — TELEPHONE ENCOUNTER
"S-(situation): Shortness of breath and \"rib pain\"    B-(background): Seen by Niurka Bae NP via virtual visit 10/23/24. Note states \"Most likely viral URI now with exacerbation of her asthma. She declined steroids as she completed a course for neck pain a few weeks ago. Will have her start inhaled steroid and continue albuterol as needed. Can use tessalon for cough as needed.\"    10/26/24 patient sent [facility] reporting \"pain in my ribs and upper back\", cough, \"a little trouble with my asthma\".     A-(assessment): Spoke with patient. She reports improvement in shortness of breath with using medications. States she is using albuterol inhaler every four hours, albuterol nebulizer at night, and Flovent inhaler twice daily. Patient has not had transportation to  [place], but states her cough is not too bad.     Patient developed \"rib pain\" on 10/26/24. Describes pain is worse with taking a deep breath, coughing, and sitting down. Ribs are tender to the touch. Also notes her joints, jaw, and neck are sore. Patient took 800 mg of ibuprofen last night that helped with the pain.     Oxygen saturation at time of call 96-98%.     R-(recommendations): Informed patient that Niurka Bae would be updated and patient would be contacted with recommendations.    "

## 2024-10-28 NOTE — TELEPHONE ENCOUNTER
"Telephone encounter   Called and spoke with patient.     Diagnosis:Lupus  Provider: Dr. Perez  Current Rheum Medications:   plaquenil    Description of problem:   Patient has had a week + of SOB and \"rib pain\". She was seen in 10/23 by PCP: \"Most likely viral URI now with exacerbation of her asthma\"    Patient reports her SOB is better with the nebulizer/inhalers, but is still bothersome. Her rib pain is bilateral and under each breast. She reports this is worse when sitting and is painful with palpation. She still has a cough, has not yet picked up tessalon. She denies fevers.     We discussed her typical lupus symptoms:   She denies malar rash. Does endorse worsened joint pain in the last week (fingers, wrists, and knees), denies joint swelling/redness/warmth. She does have a new mouth sore in the last week. Fatigue is worse. Neck and shoulders are stiff and sore. She also endorses jaw pain under her chin, worse with chewing and talking.       Follow up plan:  -Routing to provider to review and provide recommendation  -she is going to  tessalon today  -she has lab appointment for tomorrow  -advised her to present to ER/UC if her SOB/chest pain worsens   -visit with Dr. Perez 11/6.   "

## 2024-10-29 ENCOUNTER — LAB (OUTPATIENT)
Dept: LAB | Facility: CLINIC | Age: 29
End: 2024-10-29
Payer: COMMERCIAL

## 2024-10-29 DIAGNOSIS — Z51.81 MEDICATION MONITORING ENCOUNTER: ICD-10-CM

## 2024-10-29 DIAGNOSIS — M79.7 FIBROMYALGIA: ICD-10-CM

## 2024-10-29 DIAGNOSIS — G47.11 HYPERSOMNIA, IDIOPATHIC: ICD-10-CM

## 2024-10-29 DIAGNOSIS — M32.19 OTHER SYSTEMIC LUPUS ERYTHEMATOSUS WITH OTHER ORGAN INVOLVEMENT (H): ICD-10-CM

## 2024-10-29 DIAGNOSIS — M25.50 POLYARTHRALGIA: ICD-10-CM

## 2024-10-29 DIAGNOSIS — R42 VERTIGO: ICD-10-CM

## 2024-10-29 LAB
ALBUMIN UR-MCNC: NEGATIVE MG/DL
APPEARANCE UR: CLEAR
BACTERIA #/AREA URNS HPF: ABNORMAL /HPF
BASOPHILS # BLD AUTO: 0 10E3/UL (ref 0–0.2)
BASOPHILS NFR BLD AUTO: 1 %
BILIRUB UR QL STRIP: NEGATIVE
COLOR UR AUTO: YELLOW
EOSINOPHIL # BLD AUTO: 0 10E3/UL (ref 0–0.7)
EOSINOPHIL NFR BLD AUTO: 0 %
ERYTHROCYTE [DISTWIDTH] IN BLOOD BY AUTOMATED COUNT: 13.6 % (ref 10–15)
ERYTHROCYTE [SEDIMENTATION RATE] IN BLOOD BY WESTERGREN METHOD: 12 MM/HR (ref 0–20)
GLUCOSE UR STRIP-MCNC: NEGATIVE MG/DL
HCT VFR BLD AUTO: 39.4 % (ref 35–47)
HGB BLD-MCNC: 12.6 G/DL (ref 11.7–15.7)
HGB UR QL STRIP: NEGATIVE
IMM GRANULOCYTES # BLD: 0 10E3/UL
IMM GRANULOCYTES NFR BLD: 0 %
KETONES UR STRIP-MCNC: NEGATIVE MG/DL
LEUKOCYTE ESTERASE UR QL STRIP: NEGATIVE
LYMPHOCYTES # BLD AUTO: 1.9 10E3/UL (ref 0.8–5.3)
LYMPHOCYTES NFR BLD AUTO: 44 %
MCH RBC QN AUTO: 28.8 PG (ref 26.5–33)
MCHC RBC AUTO-ENTMCNC: 32 G/DL (ref 31.5–36.5)
MCV RBC AUTO: 90 FL (ref 78–100)
MONOCYTES # BLD AUTO: 0.4 10E3/UL (ref 0–1.3)
MONOCYTES NFR BLD AUTO: 8 %
MUCOUS THREADS #/AREA URNS LPF: PRESENT /LPF
NEUTROPHILS # BLD AUTO: 2 10E3/UL (ref 1.6–8.3)
NEUTROPHILS NFR BLD AUTO: 47 %
NITRATE UR QL: NEGATIVE
PH UR STRIP: 7 [PH] (ref 5–8)
PLATELET # BLD AUTO: 233 10E3/UL (ref 150–450)
RBC # BLD AUTO: 4.38 10E6/UL (ref 3.8–5.2)
RBC #/AREA URNS AUTO: ABNORMAL /HPF
SP GR UR STRIP: >=1.03 (ref 1–1.03)
SQUAMOUS #/AREA URNS AUTO: ABNORMAL /LPF
UROBILINOGEN UR STRIP-ACNC: 1 E.U./DL
WBC # BLD AUTO: 4.4 10E3/UL (ref 4–11)
WBC #/AREA URNS AUTO: ABNORMAL /HPF

## 2024-10-29 PROCEDURE — 99000 SPECIMEN HANDLING OFFICE-LAB: CPT

## 2024-10-29 PROCEDURE — 84156 ASSAY OF PROTEIN URINE: CPT

## 2024-10-29 PROCEDURE — 81001 URINALYSIS AUTO W/SCOPE: CPT

## 2024-10-29 PROCEDURE — 84450 TRANSFERASE (AST) (SGOT): CPT

## 2024-10-29 PROCEDURE — 86140 C-REACTIVE PROTEIN: CPT

## 2024-10-29 PROCEDURE — 86146 BETA-2 GLYCOPROTEIN ANTIBODY: CPT

## 2024-10-29 PROCEDURE — 86225 DNA ANTIBODY NATIVE: CPT

## 2024-10-29 PROCEDURE — 85025 COMPLETE CBC W/AUTO DIFF WBC: CPT

## 2024-10-29 PROCEDURE — 82565 ASSAY OF CREATININE: CPT

## 2024-10-29 PROCEDURE — 82040 ASSAY OF SERUM ALBUMIN: CPT

## 2024-10-29 PROCEDURE — 84433 ASY THIOPURIN S-MTHYLTRNSFRS: CPT | Mod: 90

## 2024-10-29 PROCEDURE — 86160 COMPLEMENT ANTIGEN: CPT

## 2024-10-29 PROCEDURE — 36415 COLL VENOUS BLD VENIPUNCTURE: CPT

## 2024-10-29 PROCEDURE — 85652 RBC SED RATE AUTOMATED: CPT

## 2024-10-29 PROCEDURE — 84460 ALANINE AMINO (ALT) (SGPT): CPT

## 2024-10-29 NOTE — PROGRESS NOTES
"Video-Visit Details    Type of service:  Video Visit    Video Start Time: 7:00 AM   Video End Time: 7:22 AM    Originating Location (pt. Location): Home    Distant Location (provider location):  Offsite (providers home) [facility] [place]     Platform used for Video Visit: BrainBot      Weight Management Nutrition Consultation    Nehemias Mascorro is a 28 year old female presents today for return weight management nutrition consultation.  Patient referred by Elisa Rivera PA-C on May 7, 2024. Previously seen by Dr. Slade.     Patient with Co-morbidities of obesity including:  H/o prediabetes, hypopitiuitarism     Anthropometrics:  Initial Consult:  Estimated body mass index is 41.97 kg/m  as calculated from the following:    Height as of 5/17/24: 1.676 m (5' 6\").    Weight as of 5/17/24: 117.9 kg (260 lb).    Current:  Estimated body mass index is 43.41 kg/m  as calculated from the following:    Height as of 10/11/24: 1.676 m (5' 5.98\").    Weight as of 10/11/24: 121.9 kg (268 lb 12.8 oz).      Medications for Weight Loss:  Wegovy, naltrexone    NUTRITION HISTORY  Food allergies: None  Food intolerances: Gluten, lactose    Vitamin/Mineral Supplements: Vitamin D, probiotic     Previous methods of diet modification for weight loss: Counting calories (My Fitness Pal), meal replacements (protein shakes), less sugar.  Took Wegovy last year and lost 40 lbs.     RD before: wt mgmt RD in 2020. GI RD in 2023 for IBS-C and abd pain.     Was following IF (waiting to eat until 11 am), until last few days started eating breakfast at 9 am.   Prepares her own meals.   Preferences: Rice, beans, fish - African American, Vincentian foods, Soul food, Mexican foods.   Cravings - sugar. Occ red meat  Pt goals - cut back on carbs, more fiber. Foods that help improve energy and iron intake as she has chronic fatigue and iron deficiency anemia.     Today - Weight now stable. Found addition of " "naltrexone helpful. Recently threw out all her snacks and restocked healthier choices. Doing well with balance meal intake. She would like to have dinner a little sooner in the evening. Is experiencing some reflux at night. She is getting bored of some of her regular meal choices, looking to add variety.   She is concerned about the winter and mood. Discussed strategies to help like taking a walk when the sun is up, using her \"happy\" light.     Recent Diet Recall:  Breakfast: 9 am bagel with cheese and sausage; frozen waffle  Lunch: 1 pm Healthy Choice; sandwich; left-overs  Snack: 3-4 pm apples, sliced veggies, cheese sticks, meat sticks   Dinner: 6 pm salmon/chicken and veggies   After dinner Snacks: desserts (ice cream, cookie, pie, mini snickers) or snack   Beverages: Water 80 oz/d  Alcohol: Rarely   Dining Out/take-out: Twice this week, overall less than usual. Chipotle, Raymond johns , Nhan Robles.      Progress Towards Previous Goals:  1) Increase non-starchy veggie portions at lunch and dinner, aim for half your meal as veggies - Met, continues   2) Keep carb portions to 1/4 of meal or less - Improving  3) Increase hydration, aim to drink 64+ oz/day - Met, continues     Physical Activity:  Per PA-C note: works part time as a coordinator for a youth program, also runs a food shelf.   Does well with steps on days she is at the food shelf.     Nutrition Prescription  Recommended energy/nutrient modification.    Nutrition Diagnosis  Obesity r/t long history of positive energy balance aeb BMI >30.    Nutrition Intervention  Materials/education provided on hypocaloric diet for weight loss.   Reviewed progress towards previous goals.   Praised pt on the progress she has made.  Discussed strategies for moving dinner sooner in the evening.  Discussed strategies for keeping activity and mood up into the winter.   Reviewed healthy food choices.   Co-developed goals to work towards.   Provided pt with list of goals " "and resources below via SixDoors.     Expected Engagement: good  Follow-Up Plans: meal/snack planning     Nutrition Goals  1) Try one new recipe each week to add variety  2) Eat dinner by 6 pm    The Plate Method  [email or web address]    Protein Sources   [email or web address]     Carbohydrates  [email or web address]     Mindful Eating  [email or web address]     Summary of Volumetrics Eating Plan  [email or web address]       Healthy Recipe Resources:  Books:  \"The Volumetrics Eating Plan\" by Mehnaz Watson, Ph.D.  \"Cooking that Counts\" by editors of [employer]  \"Calorie Smart Meals\" cookbook by Better Homes and Gardens (200-500 calorie meals)    Websites:  www.Lupatech  https://www.Payfone/  www.Skuid.Televerde  https://www.diabetesfoodhub.org/all-recipes.html  Https://www.Onepagerplate.gov/myplatekitchen  Recipes by Season: https://snaped.[place] From Home.com Entertainments.usda.gov/recipes-menus   Video Meal Prep: Https://www.youtube.com/c/opal   Meal Plans: Eatthismuch.com   DASH Diet: https://www.nhlbi.nih.gov/education/dash-eating-plan  Whole Grains [place]: https://wholegrainscouncil.org/recipes      Cultural Cuisines:  Various Regions of African Cuisine: https://www.Breakthrough Behavioral.com/recipes/66915/cuisines-regions//   Cuisine: https://www.firstnatSimalaya.org/knowledge-center/recipes/  Mexican and Vegan Cuisine:   https://Occasion.Redfin/  https://Eclipse Market Solutions.Redfin/recipe-index/  Chinese Cuisine: https://www.Sensorly.Redfin/  South Asian Cuisine:  Sammie Cortes on social media- South  high-protein/low-calorie meal/food ideas  Kristyn Ni RD, CDCES, plant-based South  Resources: https://www.Consilium Software/    Apps:  Boats.com robby (or website, mealime.com)   MeghanSmartEatSmart     High-iron foods:  100% iron-fortified ready-to-eat cereal   cup, 18 mg  Grits, instant   cup, 7.1   Cream of wheat   cup, 5.2   Oatmeal, instant   cup, 5 mg  Soybeans, cooked   " cup, 4.4 mg  White beans, canned   cup, 3.9 mg   Lentils   cup, 3.3 mg  White rice 1/3 cup, 3 mg  Spinach   cup cooked, 1 cup raw, 3 mg   Beef tenderloin 3 oz, 3 mg  Baked beans 1/3 cup, 3 mg  Vegetable or soy burger 1 tremaine, 2.9 mg  Soy milk 1 cup (8 oz), 2.7 mg   Chickpeas   cup, 2.5 mg  Kidney beans   cup, 2.5 mg  Sardines 3 oz, 2.5 mg   Nuts: almonds or pistachios   cup, 1.3 mg   [place] sprouts, cooked   cup, 1 mg   Egg 1 whole, 1 mg       Follow-Up:  2-3 month, PRN    Time spent with patient: 22 minutes.  Shira Nuñez RD, LD

## 2024-10-30 LAB
ALBUMIN MFR UR ELPH: 8.5 MG/DL
ALBUMIN SERPL BCG-MCNC: 4 G/DL (ref 3.5–5.2)
ALT SERPL W P-5'-P-CCNC: 39 U/L (ref 0–50)
AST SERPL W P-5'-P-CCNC: 25 U/L (ref 0–45)
B2 GLYCOPROT1 IGG SERPL IA-ACNC: 11 U/ML
B2 GLYCOPROT1 IGM SERPL IA-ACNC: <2.4 U/ML
C3 SERPL-MCNC: 130 MG/DL (ref 81–157)
C4 SERPL-MCNC: 23 MG/DL (ref 13–39)
CREAT SERPL-MCNC: 0.97 MG/DL (ref 0.51–0.95)
CREAT UR-MCNC: 215.2 MG/DL
CRP SERPL-MCNC: 6.4 MG/L
DSDNA AB SER-ACNC: <0.6 IU/ML
EGFRCR SERPLBLD CKD-EPI 2021: 81 ML/MIN/1.73M2
PROT/CREAT 24H UR: 0.04 MG/MG CR (ref 0–0.2)

## 2024-10-31 ENCOUNTER — VIRTUAL VISIT (OUTPATIENT)
Dept: ENDOCRINOLOGY | Facility: CLINIC | Age: 29
End: 2024-10-31
Payer: COMMERCIAL

## 2024-10-31 DIAGNOSIS — Z71.3 NUTRITIONAL COUNSELING: ICD-10-CM

## 2024-10-31 DIAGNOSIS — E66.01 CLASS 3 SEVERE OBESITY WITH SERIOUS COMORBIDITY AND BODY MASS INDEX (BMI) OF 40.0 TO 44.9 IN ADULT, UNSPECIFIED OBESITY TYPE (H): Primary | ICD-10-CM

## 2024-10-31 DIAGNOSIS — E66.813 CLASS 3 SEVERE OBESITY WITH SERIOUS COMORBIDITY AND BODY MASS INDEX (BMI) OF 40.0 TO 44.9 IN ADULT, UNSPECIFIED OBESITY TYPE (H): Primary | ICD-10-CM

## 2024-10-31 PROCEDURE — 99207 PR NO CHARGE LOS: CPT | Mod: 95 | Performed by: DIETITIAN, REGISTERED

## 2024-10-31 PROCEDURE — 97803 MED NUTRITION INDIV SUBSEQ: CPT | Mod: 95 | Performed by: DIETITIAN, REGISTERED

## 2024-10-31 ASSESSMENT — PAIN SCALES - GENERAL: PAINLEVEL_OUTOF10: NO PAIN (0)

## 2024-10-31 NOTE — LETTER
"10/31/2024       RE: Nehemias Mascorro  [address]  [place] [address]     Dear Colleague,    Thank you for referring your patient, Nehemias Mascorro, to the [facility] [place] at [facility]. Please see a copy of my visit note below.    Video-Visit Details    Type of service:  Video Visit    Video Start Time: 7:00 AM   Video End Time: 7:22 AM    Originating Location (pt. Location): Home    Distant Location (provider location):  Offsite (providers home) [facility] [place]     Platform used for Video Visit: [employer]Health      Weight Management Nutrition Consultation    Nehemias Mascorro is a 28 year old female presents today for return weight management nutrition consultation.  Patient referred by Elisa Rivera PA-C on May 7, 2024. Previously seen by Dr. Slade.     Patient with Co-morbidities of obesity including:  H/o prediabetes, hypopitiuitarism     Anthropometrics:  Initial Consult:  Estimated body mass index is 41.97 kg/m  as calculated from the following:    Height as of 5/17/24: 1.676 m (5' 6\").    Weight as of 5/17/24: 117.9 kg (260 lb).    Current:  Estimated body mass index is 43.41 kg/m  as calculated from the following:    Height as of 10/11/24: 1.676 m (5' 5.98\").    Weight as of 10/11/24: 121.9 kg (268 lb 12.8 oz).      Medications for Weight Loss:  Wegovy, naltrexone    NUTRITION HISTORY  Food allergies: None  Food intolerances: Gluten, lactose    Vitamin/Mineral Supplements: Vitamin D, probiotic     Previous methods of diet modification for weight loss: Counting calories (My Fitness Pal), meal replacements (protein shakes), less sugar.  Took Wegovy last year and lost 40 lbs.     RD before: wt mgmt RD in 2020. GI RD in 2023 for IBS-C and abd pain.     Was following IF (waiting to eat until 11 am), until last few days started eating breakfast at 9 am.   Prepares her own " "meals.   Preferences: Rice, beans, fish - African American, [place] foods, Soul food, Mexican foods.   Cravings - sugar. Occ red meat  Pt goals - cut back on carbs, more fiber. Foods that help improve energy and iron intake as she has chronic fatigue and iron deficiency anemia.     Today - Weight now stable. Found addition of naltrexone helpful. Recently threw out all her snacks and restocked healthier choices. Doing well with balance meal intake. She would like to have dinner a little sooner in the evening. Is experiencing some reflux at night. She is getting bored of some of her regular meal choices, looking to add variety.   She is concerned about the winter and mood. Discussed strategies to help like taking a walk when the sun is up, using her \"happy\" light.     Recent Diet Recall:  Breakfast: 9 am bagel with cheese and sausage; frozen waffle  Lunch: 1 pm Healthy Choice; sandwich; left-overs  Snack: 3-4 pm apples, sliced veggies, cheese sticks, meat sticks   Dinner: 6 pm salmon/chicken and veggies   After dinner Snacks: desserts (ice cream, cookie, pie, mini snickers) or snack   Beverages: Water 80 oz/d  Alcohol: Rarely   Dining Out/take-out: Twice this week, overall less than usual. Chipotle, Raymond johns , Nhan Robles.      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   Does well with steps on days she is at the food shelf.     Nutrition Prescription  Recommended energy/nutrient modification.    Nutrition Diagnosis  Obesity r/t long history of positive energy balance aeb BMI >30.    Nutrition Intervention  Materials/education provided on hypocaloric diet for weight " "loss.   Reviewed progress towards previous goals.   Praised pt on the progress she has made.  Discussed strategies for moving dinner sooner in the evening.  Discussed strategies for keeping activity and mood up into the winter.   Reviewed healthy food choices.   Co-developed goals to work towards.   Provided pt with list of goals and resources below via Xtreme Powerhart.     Expected Engagement: good  Follow-Up Plans: meal/snack planning     Nutrition Goals  1) Try one new recipe each week to add variety  2) Eat dinner by 6 pm    The Plate Method  [email or web address]    Protein Sources   [email or web address]     Carbohydrates  [email or web address]     Mindful Eating  [email or web address]     Summary of Volumetrics Eating Plan  [email or web address]       Healthy Recipe Resources:  Books:  \"The Volumetrics Eating Plan\" by Mehnaz Watson, Ph.D.  \"Cooking that Counts\" by editors of State  \"Calorie Smart Meals\" cookbook by Better Homes and Gardens (200-500 calorie meals)    Websites:  www.Seeder  https://www.Iconicfuture.Pollfish/  www.NanoSteel.Ariane Systems  https://www.diabetesfoodhub.org/all-recipes.html  Https://www.choosemyplate.gov/myplatekitchen  Recipes by Season: https://snaped.fns.usda.gov/recipes-menus   Video Meal Prep: Https://www.youtube.com/c/opal   Meal Plans: Eatthismuch.com   DASH Diet: https://www.nhlbi.nih.gov/education/dash-eating-plan  Whole Grains [place]: https://wholegrainscouncil.org/recipes      Cultural Cuisines:  Various Regions of African Cuisine: https://www.TARDIS-BOX.com.Pollfish/recipes/43417/cuisines-regions//   Cuisine: https://www.LTG Federal.org/knowledge-center/recipes/  Mexican and Vegan Cuisine:   https://AT Internet.Pollfish/  https://Wyoos/recipe-index/  Chinese Cuisine: https://www.Shenandoah Studios.Pollfish/  South Asian Cuisine:  Sammie Cortes on social media- South  high-protein/low-calorie meal/food ideas  Kristyn " KENA Ni, [facility], plant-based South  Resources: https://www.PLAYSTUDIOS/    Apps:  Contur robby (or website, mealime.com)   Linda     High-iron foods:  100% iron-fortified ready-to-eat cereal   cup, 18 mg  Grits, instant   cup, 7.1   Cream of wheat   cup, 5.2   Oatmeal, instant   cup, 5 mg  Soybeans, cooked   cup, 4.4 mg  White beans, canned   cup, 3.9 mg   Lentils   cup, 3.3 mg  White rice 1/3 cup, 3 mg  Spinach   cup cooked, 1 cup raw, 3 mg   Beef tenderloin 3 oz, 3 mg  Baked beans 1/3 cup, 3 mg  Vegetable or soy burger 1 tremaine, 2.9 mg  Soy milk 1 cup (8 oz), 2.7 mg   Chickpeas   cup, 2.5 mg  Kidney beans   cup, 2.5 mg  Sardines 3 oz, 2.5 mg   Nuts: almonds or pistachios   cup, 1.3 mg   [place] sprouts, cooked   cup, 1 mg   Egg 1 whole, 1 mg       Follow-Up:  2-3 month, PRN    Time spent with patient: 22 minutes.  Shira Nuñez RD, LD        Again, thank you for allowing me to participate in the care of your patient.      Sincerely,    Shira Nuñez RD

## 2024-10-31 NOTE — NURSING NOTE
Is the patient currently in the state of [place]? YES    Location: home    Visit mode:VIDEO    If the visit is dropped, the patient can be reconnected by: VIDEO VISIT: Text to cell phone:   Telephone Information:   Mobile [phone]       Will anyone else be joining the visit? NO  (If patient encounters technical issues they should call [phone] :[id number])    How would you like to obtain your AVS? MyChart    Are changes needed to the allergy or medication list? No    Are refills needed on medications prescribed by this physician? NO    Reason for visit: ALEJANDRA MADERA    QNR Status: n/a

## 2024-10-31 NOTE — PATIENT INSTRUCTIONS
"Paulo Del Cid,    Keep up the hard work!    Follow-up with dietitian in 2-3 months.     Thank you,    Shira Nuñez, KENA, LD  If you would like to schedule or reschedule an appointment with the RD, please call [phone]    Nutrition Goals  1) Try one new recipe each week to add variety  2) Eat dinner by 6 pm    The Plate Method  [email or web address]    Protein Sources   [email or web address]     Carbohydrates  [email or web address]     Mindful Eating  [email or web address]     Summary of Volumetrics Eating Plan  [email or web address]       Healthy Recipe Resources:  Books:  \"The Volumetrics Eating Plan\" by Mehnaz Watson, Ph.D.  \"Cooking that Counts\" by editors of [employer]ac[employer]  \"Calorie Smart Meals\" cookbook by [employer] (200-500 calorie meals)    Websites:  www.[employer]  https://www.Aetel.inc (Droppy)/  www.Sportmeets  https://www.diabetesfoodhub.org/all-recipes.html  Https://www.choosemyplate.gov/myplatekitchen  Recipes by Season: https://snaped.fns.usda.gov/recipes-menus   Video Meal Prep: Https://www.youtube.com/c/opal   Meal Plans: EatthiSurfingbirduch.com   DASH Diet: https://www.nhlbi.nih.gov/education/dash-eating-plan  Whole Grains [place]: https://wholegrainscouncil.org/recipes      Cultural Cuisines:  Various Regions of African Cuisine: https://www.CardStar.com/recipes/13400/cuisines-regions//   Cuisine: https://www.Boomdizzle Networks.org/knowledge-center/recipes/  Mexican and Vegan Cuisine:   https://[facility]/  https://GetSet/recipe-index/  Chinese Cuisine: https://www.Celsense/  South Asian Cuisine:  Sammie Cortes on social media- South  high-protein/low-calorie meal/food ideas  Kristyn Ni RD, [facility], plant-based [facility]  Resources: https://www.Wayfair/    Apps:  [facility] robby (or website, mealime.com)   SpendSmartEatSmart     High-iron foods:  100% iron-fortified ready-to-eat cereal   " cup, 18 mg  Grits, instant   cup, 7.1   Cream of wheat   cup, 5.2   Oatmeal, instant   cup, 5 mg  Soybeans, cooked   cup, 4.4 mg  White beans, canned   cup, 3.9 mg   Lentils   cup, 3.3 mg  White rice 1/3 cup, 3 mg  Spinach   cup cooked, 1 cup raw, 3 mg   Beef tenderloin 3 oz, 3 mg  Baked beans 1/3 cup, 3 mg  Vegetable or soy burger 1 tremaine, 2.9 mg  Soy milk 1 cup (8 oz), 2.7 mg   Chickpeas   cup, 2.5 mg  Kidney beans   cup, 2.5 mg  Sardines 3 oz, 2.5 mg   Nuts: almonds or pistachios   cup, 1.3 mg   [place] sprouts, cooked   cup, 1 mg   Egg 1 whole, 1 mg               [facility]   Healthy Lifestyle Group    Healthy Lifestyle Coaching Group?  This is a 60 minute virtual coaching group for those who want to lead a healthier lifestyle. Come together to set goals and overcome barriers in a supportive group environment. We will address the four pillars of health--nutrition, exercise, sleep and emotional well-being. This group is highly recommended for those who are participating in the 24 week Healthy Lifestyle Plan and our Health Coaching sessions. All are welcome!    WHEN: This group meets the first Friday of the month, 12:30 PM - 1:30 PM online, via a zoom meeting.      FACILITATOR: Led by National Board Certified Health and , [facility].     TO REGISTER: Please call the Call Center at [phone] to register. You will get an appointment to attend in [place]. Fifteen minutes prior to the meeting, complete the e-check in and you will get the link to join the meeting.  There is no charge to attend this group and space is limited.   Please register for each month you wish to attend    PLEASE NOTE: There will be NO GROUP on March 7 and July 4, 2025 2024 and 2025 GROUP MEETING DATES:   October 4, 2024  November 1, 2024  December 6, 2024  January 3, 2025  February 7, 2025  No meeting March 7, 2025  April 4, 2025  May 2, 2025  June 6, 2025  No meeting  July 4, 2025 August 1, 2025      Work with A [facility] !  Virtual 1:1 Sessions are Available through [facility]    To learn more, call to schedule a free, Health  Q&A appointment: [phone]     What is Health Coaching?  Do you know what you are supposed to do, but you just aren't doing it?  Then, HEALTH COACHING may help you!   Get unstuck and move forward with the support of a professionally trained [facility]-HWC (National Board-Certified Health and ) who uses evidence-based approaches to help you move forward with healthy lifestyle changes in the areas of weight loss, stress management and overall well-being.    Health Coaches help you identify goals that will work best for you. Health Coaches provide support and encouragement with overcoming barriers and help you to find inspiration and motivation to lead a healthy lifestyle.    Option one:  Health Coaching 3-Pack; Three, 30-minute Health Coaching Visits, for $99  Visits are done virtually (phone or video)  This is a self pay service; we do not accept insurance for savita coaching.    Option two:   The 24 week Plan; 11 Health Coaching Visits, and a 7 months subscription to [employer]- on-demand fitness, nutrition and mindfulness classes, for $499 (employee discounts may be available). Participants will also meet regularly with a weight management Medical Provider and a Registered/Licensed Dietician.  This is a self-pay service; we do not accept insurance for health coaching.    To Schedule a free Health  Q&A appointment to learn more,  call [phone].

## 2024-11-01 LAB — TPMT BLD-CCNC: 24.3 U/ML

## 2024-11-05 ENCOUNTER — TELEPHONE (OUTPATIENT)
Dept: ENDOCRINOLOGY | Facility: CLINIC | Age: 29
End: 2024-11-05

## 2024-11-05 DIAGNOSIS — R73.03 PREDIABETES: ICD-10-CM

## 2024-11-05 DIAGNOSIS — E66.813 CLASS 3 SEVERE OBESITY WITH SERIOUS COMORBIDITY AND BODY MASS INDEX (BMI) OF 40.0 TO 44.9 IN ADULT, UNSPECIFIED OBESITY TYPE (H): Primary | ICD-10-CM

## 2024-11-05 DIAGNOSIS — E66.01 CLASS 3 SEVERE OBESITY WITH SERIOUS COMORBIDITY AND BODY MASS INDEX (BMI) OF 40.0 TO 44.9 IN ADULT, UNSPECIFIED OBESITY TYPE (H): Primary | ICD-10-CM

## 2024-11-05 RX ORDER — SEMAGLUTIDE 2.4 MG/.75ML
2.4 INJECTION, SOLUTION SUBCUTANEOUS
Qty: 3 ML | Refills: 3 | Status: SHIPPED | OUTPATIENT
Start: 2024-11-05

## 2024-11-05 NOTE — TELEPHONE ENCOUNTER
PA Initiation    Medication: WEGOVY 2.4 MG/0.75ML SC SOAJ  Insurance Company: [employer] - Phone [phone] Fax [phone]  Pharmacy Filling the Rx:    Filling Pharmacy Phone:    Filling Pharmacy Fax:    Start Date: 11/5/2024    [id number]

## 2024-11-06 ENCOUNTER — TELEPHONE (OUTPATIENT)
Dept: ENDOCRINOLOGY | Facility: CLINIC | Age: 29
End: 2024-11-06
Payer: COMMERCIAL

## 2024-11-06 ENCOUNTER — OFFICE VISIT (OUTPATIENT)
Dept: RHEUMATOLOGY | Facility: CLINIC | Age: 29
End: 2024-11-06
Attending: INTERNAL MEDICINE
Payer: COMMERCIAL

## 2024-11-06 VITALS
TEMPERATURE: 99.2 F | DIASTOLIC BLOOD PRESSURE: 97 MMHG | SYSTOLIC BLOOD PRESSURE: 127 MMHG | HEIGHT: 66 IN | HEART RATE: 99 BPM | WEIGHT: 268.8 LBS | BODY MASS INDEX: 43.2 KG/M2 | OXYGEN SATURATION: 100 %

## 2024-11-06 DIAGNOSIS — E66.01 CLASS 3 SEVERE OBESITY WITH SERIOUS COMORBIDITY AND BODY MASS INDEX (BMI) OF 40.0 TO 44.9 IN ADULT, UNSPECIFIED OBESITY TYPE (H): Primary | ICD-10-CM

## 2024-11-06 DIAGNOSIS — R09.1 PLEURISY: Primary | ICD-10-CM

## 2024-11-06 DIAGNOSIS — R73.03 PREDIABETES: ICD-10-CM

## 2024-11-06 DIAGNOSIS — G47.11 HYPERSOMNIA, IDIOPATHIC: ICD-10-CM

## 2024-11-06 DIAGNOSIS — E66.813 CLASS 3 SEVERE OBESITY WITH SERIOUS COMORBIDITY AND BODY MASS INDEX (BMI) OF 40.0 TO 44.9 IN ADULT, UNSPECIFIED OBESITY TYPE (H): Primary | ICD-10-CM

## 2024-11-06 PROCEDURE — G2211 COMPLEX E/M VISIT ADD ON: HCPCS | Performed by: INTERNAL MEDICINE

## 2024-11-06 PROCEDURE — 99214 OFFICE O/P EST MOD 30 MIN: CPT | Performed by: INTERNAL MEDICINE

## 2024-11-06 PROCEDURE — G0463 HOSPITAL OUTPT CLINIC VISIT: HCPCS | Performed by: INTERNAL MEDICINE

## 2024-11-06 RX ORDER — COLCHICINE 0.6 MG/1
0.6 TABLET ORAL 2 TIMES DAILY PRN
Qty: 60 TABLET | Refills: 3 | Status: SHIPPED | OUTPATIENT
Start: 2024-11-06

## 2024-11-06 RX ORDER — MODAFINIL 100 MG/1
200 TABLET ORAL DAILY
Qty: 60 TABLET | Refills: 5 | Status: SHIPPED | OUTPATIENT
Start: 2024-11-06

## 2024-11-06 ASSESSMENT — PAIN SCALES - GENERAL: PAINLEVEL_OUTOF10: MODERATE PAIN (4)

## 2024-11-06 NOTE — TELEPHONE ENCOUNTER
PRIOR AUTHORIZATION DENIED    Medication: WEGOVY 2.4 MG/0.75ML SC SOAJ  Insurance Company: [employer] - Phone [phone] Fax [phone]  Denial Date: 11/6/2024  Denial Reason(s):   Appeal Information:   Patient Notified:

## 2024-11-06 NOTE — PROGRESS NOTES
Rheumatology Follow up Visit Note    Reason for visit: SLE Dx 7/2024    Initial visit date: 7/3/2024    Last seen: 8/15/2024    DOS: 11/6/2024    HPI from initial visit:    Nehemias JEANETTE Mascorro is a 29 year old female G0 with + fh/o SLE, who was referred to our clinic for evaluation and management of possible SLE.      -she was seen by several rheumatologists and was diagnosed with fibromyalgia, but her mother and herself are worried about having lupus. Her mom is under my care with lupus and asked me to see her daughter    -in 2016, had TBI, getting tx from neurologist, still has headache and neck pain    -her major complaint is joint pain    -she has this joint pain x 2 years    -gets pain over hands, elbows, knees and ankles    -hands swell up    -AM stiffness is between 1 hr-all day    -no triggers    -prednisone provided same benefit, made her hyper    -has raynaud's with turning color to blue, white x 2 yr    -gets periodic fevers., low grade x past 3 months,  T max 99.7-100.4, no infection    -no hair loss    -gets oral/nasal ulcers x past 2 years, kenalog paste helps with that    -PCP noticed cervical LAP     -has productive phlegm x 1 yr, clear, white in color, more in the AM    -no SOB, Cp    -has constipation, on miralax    -gets random nausea    -on wegoway, no benefit yet    -reports rapid weight gain, on thyroid med (new)    -dry eyes, on systane eyedrops, not helping    -on biotene for dry mouth    -gest butterfly rash x 3 months, not sure if it photosensitive    -no seizures    -mood is up and down    -has h/o asthma    -no pregnancies, no future pregnancy    -no tobacco, ETOH use    -sun burns her eyes    -has brain fog    -gets muscle aches and spasms    -reports urgency with frequency    -working par time, 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[employer], 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    -has fatigue      8/15/2024:    -urgent visit, has called several times since initial visit with pain/fatigue/low grade fevers flare ups    -no  benefit from HCQ, no SEs    -has nausea, meclizine helps    -hands are better today    -hands/feet swell up    -flares are every 2 months, ibuprofen helps with pain    -AM stiffness is > 30 min    -gets malar rash    -reports brain fog    -BM has changed, yesterday had dark stool    -today has dark urine with burning    -gets headaches      Today 11/6/2024:    -sometimes gets pain over her back with taking deep breath  -joint pain is better  -still very fatigued  -complains of runny nose, clear discharge    Past Medical History:   Diagnosis Date    ADHD (attention deficit hyperactivity disorder)     Depressive disorder     Ovarian cyst     Uncomplicated asthma      Past Surgical History:   Procedure Laterality Date    COLONOSCOPY N/A 4/27/2022    Procedure: COLONOSCOPY, WITH POLYPECTOMY AND BIOPSY;  Surgeon: Alyse Leija MD;  Location:  GI    ENT SURGERY      tonsilectomy age 8    ESOPHAGOSCOPY, GASTROSCOPY, DUODENOSCOPY (EGD), COMBINED N/A 3/15/2023    Procedure: ESOPHAGOGASTRODUODENOSCOPY, WITH BIOPSY;  Surgeon: Cyn Colon MD;  [place] [facility] [place]    ORTHOPEDIC SURGERY      Hip, emelia surgery in 2013    WRIST SURGERY       Family History   Problem Relation Age of Onset    Depression Maternal Grandmother     Schizophrenia Maternal Uncle     Substance Abuse Maternal Uncle    Mom has lupus      Social History     Socioeconomic History    Marital status: Single     Spouse name: Not on file    Number of children: Not on file    Years of education: Not on file    Highest education level: Not on file   Occupational History    Not on file   Tobacco Use    Smoking status: Never    Smokeless tobacco: Never    Tobacco comments:     Medical edible gummi  - has [place] medical marijuana card.    Vaping Use    Vaping status: Never Used   Substance and Sexual Activity    Alcohol use: Yes     Comment: rare    Drug use: Never    Sexual activity: Not Currently   Other Topics Concern    Parent/sibling w/ CABG, MI or  angioplasty before 65F 55M? Not Asked   Social History Narrative    Lives with her parents working part time . Had to leave college      Social Drivers of Health     Financial Resource Strain: Low Risk  (5/13/2024)    Financial Resource Strain     Within the past 12 months, have you or your family members you live with been unable to get utilities (heat, electricity) when it was really needed?: No   Food Insecurity: Low Risk  (5/13/2024)    Food Insecurity     Within the past 12 months, did you worry that your food would run out before you got money to buy more?: No     Within the past 12 months, did the food you bought just not last and you didn t have money to get more?: No   Transportation Needs: High Risk (5/13/2024)    Transportation Needs     Within the past 12 months, has lack of transportation kept you from medical appointments, getting your medicines, non-medical meetings or appointments, work, or from getting things that you need?: Yes   Physical Activity: Insufficiently Active (5/13/2024)    Exercise Vital Sign     Days of Exercise per Week: 2 days     Minutes of Exercise per Session: 30 min   Stress: Stress Concern Present (5/13/2024)    [employer] - Occupational Stress Questionnaire     Feeling of Stress : Rather much   Social Connections: Unknown (7/13/2024)    Received from [employer] [facility]    Social Connections     Frequency of Communication with Friends and Family: Not on file   Interpersonal Safety: Low Risk  (5/17/2024)    Interpersonal Safety     Do you feel physically and emotionally safe where you currently live?: Yes     Within the past 12 months, have you been hit, slapped, kicked or otherwise physically hurt by someone?: No     Within the past 12 months, have you been humiliated or emotionally abused in other ways by your partner or ex-partner?: No   Housing Stability: High Risk (5/13/2024)    Housing Stability     Do you have  housing? : Yes     Are you worried about losing your housing?: Yes     Patient Active Problem List   Diagnosis    Anxiety    Major depressive disorder    Class 3 severe obesity with serious comorbidity and body mass index (BMI) of 40.0 to 44.9 in adult, unspecified obesity type (H)    Gastroesophageal reflux disease    Asymptomatic COVID-19 virus infection    Asthma    Chronic migraine without aura    Cyst of ovary    Slow transit constipation    Constipation    Iron deficiency anemia    Gastric ulcer without hemorrhage or perforation    Occipital neuralgia    Neuralgia and neuritis, unspecified    Other reactions to severe stress    Somatization disorder    Post-COVID chronic fatigue    Encephalopathy, unspecified    Excessive and frequent menstruation    Paresthesia of skin    Post concussion syndrome    Primary focal hyperhidrosis    Snoring    Tension-type headache, unspecified, not intractable    Cervicothoracic disc displacement    Sleep related bruxism    Current moderate episode of major depressive disorder, unspecified whether recurrent (H)    Central hypothyroidism    Prediabetes    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      albuterol (ACCUNEB) 1.25 MG/3ML neb solution Take 1 vial (1.25 mg) by nebulization every 6 hours as needed for shortness of breath or wheezing 90 mL 0    albuterol (VENTOLIN HFA) 108 (90 Base) MCG/ACT inhaler INHALE 2 PUFFS INTO THE LUNGS FOUR TIMES DAILY 18 g 2    ARIPiprazole (ABILIFY) 15 MG tablet Take 1 tablet by mouth daily at 2 pm.      benzonatate (TESSALON) 100 MG capsule Take 1 capsule (100 mg) by mouth 3 times daily as  needed for cough. 30 capsule 0    buPROPion (WELLBUTRIN XL) 150 MG 24 hr tablet       buPROPion (WELLBUTRIN XL) 300 MG 24 hr tablet Take 300 mg by mouth every morning      CONCERTA 36 MG CR tablet TAKE 1 TABLET BY MOUTH DAILY IN THE MORNING FOR ADHD. START 11/18/22      drospirenone (SLYND) 4 MG TABS tablet       DULoxetine (CYMBALTA) 60 MG capsule Take 60 mg by mouth daily      ferrous sulfate (FE TABS) 325 (65 Fe) MG EC tablet TAKE 1 TABLET BY MOUTH EVERY DAY 90 tablet 1    fluticasone (FLOVENT DISKUS) 100 MCG/ACT inhaler Inhale 2 puffs into the lungs every 12 hours. 28 each 1    gabapentin (NEURONTIN) 100 MG capsule Take 200 mg by mouth At Bedtime      gabapentin (NEURONTIN) 600 MG tablet Take 1 tablet (600 mg) by mouth at bedtime. 90 tablet 3    hydrocortisone 2.5 % cream APPLY TOPICALLY TO THE CHEST TWICE DAILY AS NEEDED      hydroxychloroquine (PLAQUENIL) 200 MG tablet Take 1 tablet (200 mg) by mouth 2 times daily (with meals) Annual Plaquenil toxicity eye screening required. 180 tablet 3    hyoscyamine (LEVSIN) 0.125 MG tablet Take 1 tablet (125 mcg) by mouth every 6 hours as needed for cramping 30 tablet 0    ibuprofen (ADVIL/MOTRIN) 800 MG tablet TAKE 1 TABLET BY MOUTH TWICE A DAY WITH MEALS FOR 14 DAYS      ketorolac (TORADOL) 10 MG tablet Take 1 tablet (10 mg) by mouth every 6 hours as needed for moderate pain (Patient not taking: Reported on 10/23/2024) 20 tablet 0    levocetirizine (XYZAL) 5 MG tablet Take 5 mg by mouth every 24 hours      levothyroxine (SYNTHROID/LEVOTHROID) 75 MCG tablet Take 1 tablet (75 mcg) by mouth daily 90 tablet 3    lidocaine (LIDODERM) 5 % patch Place 1 patch onto the skin every 24 hours To prevent lidocaine toxicity, patient should be patch free for 12 hrs daily. 15 patch 1    meclizine (ANTIVERT) 25 MG tablet Take 1 tablet (25 mg) by mouth 3 times daily as needed for dizziness 60 tablet 3    methocarbamol (ROBAXIN) 500 MG tablet Take 1 tablet (500 mg) by mouth 4 times daily  as needed for muscle spasms 20 tablet 0    methylphenidate (RITALIN) 10 MG tablet TAKE 1/2 - 1 TABLET ONCE DAILY AS NEEDED FOR ATTENTION AND FOCUS.      modafinil (PROVIGIL) 100 MG tablet Take 1 tablet (100 mg) by mouth daily 30 tablet 5    naltrexone (DEPADE/REVIA) 50 MG tablet Take 1/2 tablet once daily 1-2 hours prior to worst cravings for 1 week, then increase to 1 tablet daily as directed if tolerating 30 tablet 2    omeprazole (PRILOSEC) 20 MG DR capsule Take 1 capsule (20 mg) by mouth 2 times daily      ondansetron (ZOFRAN) 4 MG tablet Take 1 tablet (4 mg) by mouth every 8 hours as needed for nausea 30 tablet 1    polyethylene glycol (MIRALAX) 17 GM/Dose powder Take 1 capful by mouth daily as needed for constipation      RELPAX 40 MG tablet       Semaglutide-Weight Management (WEGOVY) 1.7 MG/0.75ML pen Inject 1.7 mg subcutaneously once a week After completing 4 weeks of 1mg dose 3 mL 3    Semaglutide-Weight Management (WEGOVY) 2.4 MG/0.75ML pen Inject 2.4 mg subcutaneously every 7 days. 3 mL 3    spironolactone-HCTZ (ALDACTAZIDE) 25-25 MG tablet Take 1 tablet by mouth every 72 hours as needed 36 tablet 5    SUMAtriptan (IMITREX) 50 MG tablet Take 1 tablet (50 mg) by mouth at onset of headache for migraine May repeat in 2 hours. Max 4 tablets/24 hours. 9 tablet 1    TAZORAC 0.1 % external cream APPLY TOPICALLY TO THE AFFECTED AREA AT BEDTIME      tiZANidine (ZANAFLEX) 2 MG capsule PLEASE SEE ATTACHED FOR DETAILED DIRECTIONS      triamcinolone (KENALOG) 0.1 % paste APPLY TO AFFECTED AREA 2 TIMES DAILY AS DIRECTED       No facility-administered encounter medications on file as of 11/6/2024.         Her records were reviewed.    Component      Latest Ref Rng 5/8/2024  2:40 PM 5/8/2024  2:43 PM   Sodium      135 - 145 mmol/L 139     Potassium      3.4 - 5.3 mmol/L 4.1     Carbon Dioxide (CO2)      22 - 29 mmol/L 26     Anion Gap      7 - 15 mmol/L 8     Urea Nitrogen      6.0 - 20.0 mg/dL 12.5     Creatinine       0.51 - 0.95 mg/dL 0.83     GFR Estimate      >60 mL/min/1.73m2 >90     Calcium      8.6 - 10.0 mg/dL 9.2     Chloride      98 - 107 mmol/L 105     Glucose      70 - 99 mg/dL 96     Alkaline Phosphatase      40 - 150 U/L 65     AST      0 - 45 U/L 24     ALT      0 - 50 U/L 27     Protein Total      6.4 - 8.3 g/dL 6.6     Albumin      3.5 - 5.2 g/dL 4.0     Bilirubin Total      <=1.2 mg/dL 0.3     Color Urine      Colorless, Straw, Light Yellow, Yellow   Yellow    Appearance Urine      Clear   Clear    Glucose Urine      Negative mg/dL  Negative    Bilirubin Urine      Negative   Negative    Ketones Urine      Negative mg/dL  Negative    Specific Gravity Urine      1.005 - 1.030   1.020    Blood Urine      Negative   Negative    pH Urine      5.0 - 8.0   7.0    Protein Albumin Urine      Negative mg/dL  Negative    Urobilinogen Urine      0.2, 1.0 E.U./dL  1.0    Nitrite Urine      Negative   Negative    Leukocyte Esterase Urine      Negative   Negative    WBC      4.0 - 11.0 10e3/uL 5.0     RBC Count      3.80 - 5.20 10e6/uL 4.23     Hemoglobin      11.7 - 15.7 g/dL 11.1 (L)     Hematocrit      35.0 - 47.0 % 36.9     MCV      78 - 100 fL 87     MCH      26.5 - 33.0 pg 26.2 (L)     MCHC      31.5 - 36.5 g/dL 30.1 (L)     RDW      10.0 - 15.0 % 13.9     Platelet Count      150 - 450 10e3/uL 308     INR      0.85 - 1.15  0.95     Thrombin Time      13.0 - 19.0 Seconds 17.0     PTT Ratio      <1.21  0.92     DRVVT Screen Ratio      <1.08  0.94     Lupus Result      Negative  Negative     Lupus Interpretation The INR is normal.      Bacteria Urine      None Seen /HPF  None Seen    RBC Urine      0-2 /HPF /HPF  0-2    WBC Urine      0-5 /HPF /HPF  0-5    Squamous Epithelial /LPF Urine      None Seen /LPF  Few !    RONALD interpretation      Negative  Borderline Positive !     RONALD pattern 1 Speckled     RONALD titer 1 1:40     Beta 2 Glycoprotein 1 Antibody IgG      <7.0 U/mL 14.0 (H)     Beta 2 Glycoprotein 1 Antibody IgM       "<7.0 U/mL <2.4     CRP Inflammation      <5.00 mg/L 12.90 (H)     Sed Rate      0 - 20 mm/hr 18     Complement C3      81 - 157 mg/dL 149     Complement C4      13 - 39 mg/dL 20     Complement, Total, S      38.7 - 89.9 U/mL 28.9 (L)     TSH      0.30 - 4.20 uIU/mL 0.39        Legend:  (L) Low  ! Abnormal  (H) High    Neg RF, anti-CCP, SSA/SSB      Ph.E:    BP (!) 127/97   Pulse 99   Temp 99.2  F (37.3  C) (Oral)   Ht 1.676 m (5' 6\")   Wt 121.9 kg (268 lb 12.8 oz)   LMP 09/29/2024 (Approximate)   SpO2 100%   BMI 43.39 kg/m        Constitutional: WD/WN. Pleasant. In no acute distress. Mother present  Eyes: EOM intact, sclera anicteric, conj not injected  HEENT: No oral ulcers or thrush. NL salivary pool.  Neck: No cervical LAP   Chest: Clear to auscultation bilaterally  CV: RRR, no murmurs/ rubs or gallops. No edema, clubbing or cyanosis.   GI: Abdomen is soft and non tender.   MS: No synovitis. Cool joints. No tenderness of the joints. No  joint deformities.   Skin: No skin rash  Neuro: A&O x 3. Grossly non focal  Psych: NL affect         Assessment/ plan:    7/2024:    SLE. Humblele has signs and symptoms suggestive of mild early SLE. This is based on + RONALD 1:40, +beta 2 GP I IgG, low CH50, leukopenia, high CRP, arthritis, malar rash, oral/nasal ulcers, Raynaud's, low grade fevers, fatigue, brain fog, and sicca.      I reviewed and discussed lab results with her. Will finish the work up by checking the labs which are missing and checking follow up albs, plus thyroid antibodies to see if her hypothyroidism is autoimmune or not.    Beta 2 GP I IgG comes with increased risk of miscarriage and thrombosis, if repeat comes back positive in 3 months. Discussed significance of it, and recommendation to take ASA 81 mg every day during future pregnancies.    Discussed lifestyle changes, discussed sun protective measures (sun block Neutrogena  sheer mist for body, sport for face, sun protective clothing), " healthy diet (more omega 3, less sugar), stretching exercises like Myron Chi, good sleep, avoiding stress and triggers for lupus (adonis adonis sprouts, echinacea, garlic, melatonin, sulfa drug).      She should avoid any estrogen and any birth control containing estrogen given + beta 2 GP I IgG.      Recommend to start HCQ; risks were discussed including rare risk of retinal toxicity. It takes 2-3 months to show benefit, peak benefit is 6 months.      Recommend to switch mobic to ibuprofen as it is not helping.    Advised her to try Kiwi for constipation.      Plan:.    Labs     Start plaquenil 1 tab twice a day with food    Return in 4 months in person      8/15/2024:    SLE.      Continues to have joint pain/fatigue despite starting HCQ in 7/2024, she tolerates it. It takes 2-3 months to show benefit, peak benefit 6 months, we have to give it a chance to work.    Plan:    Meclizine got refilled, it helps with nausea    Stay on plaquenil and ibuprofen     Yearly eye exam on plaquenil    Provigil for fatigue 1 tab every morning; risks were discussed    Lupus labs at the end of Oct    Urine test today (r/o UTI given sx), stool test for blood (given dark stool), CBC diff (given dark stool)+beta 2 GP IgG (repeat to see if true positive) today    Keep 11/6 appointment         Today 11/6/2024:    Plan:    SLE. Improved joint pain and CRP, but continues to have significant fatigue and pleurisy. Will add provigil for fatigue and colcrys for pleurisy; risks were discussed. Continue  mg bid.      HCQ monitoring. Needs yearly eye exam.    Plan:      Try the provigil at 1 tab a day, if no help, could try 2  day, for fatigue    Try Myron Chi exercises for fatigue    Colcrys 1 tab twice a day as needed for pleurisy    Try normal saline rinse    Return 2/19 9 am (ARUNA slot could be used)    TT 30 min was spent on date of the encounter doing chart review, history and exam, documentation and further activities as noted above. Any  prior notes, outside records, laboratory results, and imaging studies were reviewed if relevant.      The longitudinal plan of care for the diagnosis(es)/condition(s) as documented were addressed during this visit. Due to the added complexity in care, I will continue to support Immanuelle in the subsequent management and with ongoing continuity of care.      Aamir Perez MD

## 2024-11-06 NOTE — LETTER
11/6/2024       RE: Nehemias Mascorro  [address]  [place] [address]     Dear Colleague,    Thank you for referring your patient, Nehemias Mascorro, to the [facility]HE[facility] [place] at [facility]. Please see a copy of my visit note below.    Rheumatology Follow up Visit Note    Reason for visit: SLE Dx 7/2024    Initial visit date: 7/3/2024    Last seen: 8/15/2024    DOS: 11/6/2024    HPI from initial visit:    Nehemias Mascorro is a 29 year old female G0 with + fh/o SLE, who was referred to our clinic for evaluation and management of possible SLE.      -she was seen by several rheumatologists and was diagnosed with fibromyalgia, but her mother and herself are worried about having lupus. Her mom is under my care with lupus and asked me to see her daughter    -in 2016, had TBI, getting tx from neurologist, still has headache and neck pain    -her major complaint is joint pain    -she has this joint pain x 2 years    -gets pain over hands, elbows, knees and ankles    -hands swell up    -AM stiffness is between 1 hr-all day    -no triggers    -prednisone provided same benefit, made her hyper    -has raynaud's with turning color to blue, white x 2 yr    -gets periodic fevers., low grade x past 3 months,  T max 99.7-100.4, no infection    -no hair loss    -gets oral/nasal ulcers x past 2 years, kenalog paste helps with that    -PCP noticed cervical LAP     -has productive phlegm x 1 yr, clear, white in color, more in the AM    -no SOB, Cp    -has constipation, on miralax    -gets random nausea    -on wegoway, no benefit yet    -reports rapid weight gain, on thyroid med (new)    -dry eyes, on systane eyedrops, not helping    -on biotene for dry mouth    -gest butterfly rash x 3 months, not sure if it photosensitive    -no seizures    -mood is up and down    -has h/o asthma    -no pregnancies, no future pregnancy    -no tobacco, ETOH  use    -sun burns her eyes    -has brain fog    -gets muscle aches and spasms    -reports urgency with frequency    -working par time, runs food shelf, 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    -has fatigue      8/15/2024:    -urgent visit, has called several times since initial visit with pain/fatigue/low grade fevers flare ups    -no benefit from HCQ, no SEs    -has nausea, meclizine helps    -hands are better today    -hands/feet swell up    -flares are every 2 months, ibuprofen helps with pain    -AM stiffness is > 30 min    -gets malar rash    -reports brain fog    -BM has changed, yesterday had dark stool    -today has dark urine with burning    -gets headaches      Today 11/6/2024:    -sometimes gets pain over her back with taking deep breath  -joint pain is better  -still very fatigued  -complains of runny nose, clear discharge    Past Medical History:   Diagnosis Date     ADHD (attention deficit hyperactivity disorder)      Depressive disorder      Ovarian cyst      Uncomplicated asthma      Past Surgical History:   Procedure Laterality Date     COLONOSCOPY N/A 4/27/2022    Procedure: COLONOSCOPY, WITH POLYPECTOMY AND BIOPSY;  Surgeon: Alyse Leija MD;  Location:  GI     ENT SURGERY      tonsilectomy age 8     ESOPHAGOSCOPY, GASTROSCOPY, DUODENOSCOPY (EGD), COMBINED N/A 3/15/2023    Procedure: ESOPHAGOGASTRODUODENOSCOPY, WITH BIOPSY;  Surgeon: Cyn Colon MD;  Location: [facility] OR     ORTHOPEDIC SURGERY      Hip, emelia surgery in 2013     WRIST SURGERY       Family History   Problem Relation Age of Onset     Depression Maternal Grandmother      Schizophrenia Maternal Uncle      Substance Abuse Maternal Uncle    Mom has lupus      Social History     Socioeconomic History     Marital status: Single     Spouse name: Not on file     Number of children: Not on file     Years of education: Not on file     Highest education level: Not on file   Occupational History     Not on file   Tobacco Use     Smoking  status: Never     Smokeless tobacco: Never     Tobacco comments:     Medical edible gummi  - has [place] medical marijuana card.    Vaping Use     Vaping status: Never Used   Substance and Sexual Activity     Alcohol use: Yes     Comment: rare     Drug use: Never     Sexual activity: Not Currently   Other Topics Concern     Parent/sibling w/ CABG, MI or angioplasty before 65F 55M? Not Asked   Social History Narrative    Lives with her parents working part time . Had to leave college      Social Drivers of Health     Financial Resource Strain: Low Risk  (5/13/2024)    Financial Resource Strain      Within the past 12 months, have you or your family members you live with been unable to get utilities (heat, electricity) when it was really needed?: No   Food Insecurity: Low Risk  (5/13/2024)    Food Insecurity      Within the past 12 months, did you worry that your food would run out before you got money to buy more?: No      Within the past 12 months, did the food you bought just not last and you didn t have money to get more?: No   Transportation Needs: High Risk (5/13/2024)    Transportation Needs      Within the past 12 months, has lack of transportation kept you from medical appointments, getting your medicines, non-medical meetings or appointments, work, or from getting things that you need?: Yes   Physical Activity: Insufficiently Active (5/13/2024)    Exercise Vital Sign      Days of Exercise per Week: 2 days      Minutes of Exercise per Session: 30 min   Stress: Stress Concern Present (5/13/2024)    [employer] - Occupational Stress Questionnaire      Feeling of Stress : Rather much   Social Connections: Unknown (7/13/2024)    [facility]    Social Connections      Frequency of Communication with Friends and Family: Not on file   Interpersonal Safety: Low Risk  (5/17/2024)    Interpersonal Safety      Do you feel physically and emotionally  safe where you currently live?: Yes      Within the past 12 months, have you been hit, slapped, kicked or otherwise physically hurt by someone?: No      Within the past 12 months, have you been humiliated or emotionally abused in other ways by your partner or ex-partner?: No   Housing Stability: High Risk (5/13/2024)    Housing Stability      Do you have housing? : Yes      Are you worried about losing your housing?: Yes     Patient Active Problem List   Diagnosis     Anxiety     Major depressive disorder     Class 3 severe obesity with serious comorbidity and body mass index (BMI) of 40.0 to 44.9 in adult, unspecified obesity type (H)     Gastroesophageal reflux disease     Asymptomatic COVID-19 virus infection     Asthma     Chronic migraine without aura     Cyst of ovary     Slow transit constipation     Constipation     Iron deficiency anemia     Gastric ulcer without hemorrhage or perforation     Occipital neuralgia     Neuralgia and neuritis, unspecified     Other reactions to severe stress     Somatization disorder     Post-COVID chronic fatigue     Encephalopathy, unspecified     Excessive and frequent menstruation     Paresthesia of skin     Post concussion syndrome     Primary focal hyperhidrosis     Snoring     Tension-type headache, unspecified, not intractable     Cervicothoracic disc displacement     Sleep related bruxism     Current moderate episode of major depressive disorder, unspecified whether recurrent (H)     Central hypothyroidism     Prediabetes     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       albuterol (ACCUNEB) 1.25 MG/3ML neb solution  Take 1 vial (1.25 mg) by nebulization every 6 hours as needed for shortness of breath or wheezing 90 mL 0     albuterol (VENTOLIN HFA) 108 (90 Base) MCG/ACT inhaler INHALE 2 PUFFS INTO THE LUNGS FOUR TIMES DAILY 18 g 2     ARIPiprazole (ABILIFY) 15 MG tablet Take 1 tablet by mouth daily at 2 pm.       benzonatate (TESSALON) 100 MG capsule Take 1 capsule (100 mg) by mouth 3 times daily as needed for cough. 30 capsule 0     buPROPion (WELLBUTRIN XL) 150 MG 24 hr tablet        buPROPion (WELLBUTRIN XL) 300 MG 24 hr tablet Take 300 mg by mouth every morning       CONCERTA 36 MG CR tablet TAKE 1 TABLET BY MOUTH DAILY IN THE MORNING FOR ADHD. START 11/18/22       drospirenone (SLYND) 4 MG TABS tablet        DULoxetine (CYMBALTA) 60 MG capsule Take 60 mg by mouth daily       ferrous sulfate (FE TABS) 325 (65 Fe) MG EC tablet TAKE 1 TABLET BY MOUTH EVERY DAY 90 tablet 1     fluticasone (FLOVENT DISKUS) 100 MCG/ACT inhaler Inhale 2 puffs into the lungs every 12 hours. 28 each 1     gabapentin (NEURONTIN) 100 MG capsule Take 200 mg by mouth At Bedtime       gabapentin (NEURONTIN) 600 MG tablet Take 1 tablet (600 mg) by mouth at bedtime. 90 tablet 3     hydrocortisone 2.5 % cream APPLY TOPICALLY TO THE CHEST TWICE DAILY AS NEEDED       hydroxychloroquine (PLAQUENIL) 200 MG tablet Take 1 tablet (200 mg) by mouth 2 times daily (with meals) Annual Plaquenil toxicity eye screening required. 180 tablet 3     hyoscyamine (LEVSIN) 0.125 MG tablet Take 1 tablet (125 mcg) by mouth every 6 hours as needed for cramping 30 tablet 0     ibuprofen (ADVIL/MOTRIN) 800 MG tablet TAKE 1 TABLET BY MOUTH TWICE A DAY WITH MEALS FOR 14 DAYS       ketorolac (TORADOL) 10 MG tablet Take 1 tablet (10 mg) by mouth every 6 hours as needed for moderate pain (Patient not taking: Reported on 10/23/2024) 20 tablet 0     levocetirizine (XYZAL) 5 MG tablet Take 5 mg by mouth every 24 hours       levothyroxine (SYNTHROID/LEVOTHROID) 75 MCG tablet Take 1 tablet  (75 mcg) by mouth daily 90 tablet 3     lidocaine (LIDODERM) 5 % patch Place 1 patch onto the skin every 24 hours To prevent lidocaine toxicity, patient should be patch free for 12 hrs daily. 15 patch 1     meclizine (ANTIVERT) 25 MG tablet Take 1 tablet (25 mg) by mouth 3 times daily as needed for dizziness 60 tablet 3     methocarbamol (ROBAXIN) 500 MG tablet Take 1 tablet (500 mg) by mouth 4 times daily as needed for muscle spasms 20 tablet 0     methylphenidate (RITALIN) 10 MG tablet TAKE 1/2 - 1 TABLET ONCE DAILY AS NEEDED FOR ATTENTION AND FOCUS.       modafinil (PROVIGIL) 100 MG tablet Take 1 tablet (100 mg) by mouth daily 30 tablet 5     naltrexone (DEPADE/REVIA) 50 MG tablet Take 1/2 tablet once daily 1-2 hours prior to worst cravings for 1 week, then increase to 1 tablet daily as directed if tolerating 30 tablet 2     omeprazole (PRILOSEC) 20 MG DR capsule Take 1 capsule (20 mg) by mouth 2 times daily       ondansetron (ZOFRAN) 4 MG tablet Take 1 tablet (4 mg) by mouth every 8 hours as needed for nausea 30 tablet 1     polyethylene glycol (MIRALAX) 17 GM/Dose powder Take 1 capful by mouth daily as needed for constipation       RELPAX 40 MG tablet        Semaglutide-Weight Management (WEGOVY) 1.7 MG/0.75ML pen Inject 1.7 mg subcutaneously once a week After completing 4 weeks of 1mg dose 3 mL 3     Semaglutide-Weight Management (WEGOVY) 2.4 MG/0.75ML pen Inject 2.4 mg subcutaneously every 7 days. 3 mL 3     spironolactone-HCTZ (ALDACTAZIDE) 25-25 MG tablet Take 1 tablet by mouth every 72 hours as needed 36 tablet 5     SUMAtriptan (IMITREX) 50 MG tablet Take 1 tablet (50 mg) by mouth at onset of headache for migraine May repeat in 2 hours. Max 4 tablets/24 hours. 9 tablet 1     TAZORAC 0.1 % external cream APPLY TOPICALLY TO THE AFFECTED AREA AT BEDTIME       tiZANidine (ZANAFLEX) 2 MG capsule PLEASE SEE ATTACHED FOR DETAILED DIRECTIONS       triamcinolone (KENALOG) 0.1 % paste APPLY TO AFFECTED AREA 2 TIMES  DAILY AS DIRECTED       No facility-administered encounter medications on file as of 11/6/2024.         Her records were reviewed.    Component      Latest Ref Rng 5/8/2024  2:40 PM 5/8/2024  2:43 PM   Sodium      135 - 145 mmol/L 139     Potassium      3.4 - 5.3 mmol/L 4.1     Carbon Dioxide (CO2)      22 - 29 mmol/L 26     Anion Gap      7 - 15 mmol/L 8     Urea Nitrogen      6.0 - 20.0 mg/dL 12.5     Creatinine      0.51 - 0.95 mg/dL 0.83     GFR Estimate      >60 mL/min/1.73m2 >90     Calcium      8.6 - 10.0 mg/dL 9.2     Chloride      98 - 107 mmol/L 105     Glucose      70 - 99 mg/dL 96     Alkaline Phosphatase      40 - 150 U/L 65     AST      0 - 45 U/L 24     ALT      0 - 50 U/L 27     Protein Total      6.4 - 8.3 g/dL 6.6     Albumin      3.5 - 5.2 g/dL 4.0     Bilirubin Total      <=1.2 mg/dL 0.3     Color Urine      Colorless, Straw, Light Yellow, Yellow   Yellow    Appearance Urine      Clear   Clear    Glucose Urine      Negative mg/dL  Negative    Bilirubin Urine      Negative   Negative    Ketones Urine      Negative mg/dL  Negative    Specific Gravity Urine      1.005 - 1.030   1.020    Blood Urine      Negative   Negative    pH Urine      5.0 - 8.0   7.0    Protein Albumin Urine      Negative mg/dL  Negative    Urobilinogen Urine      0.2, 1.0 E.U./dL  1.0    Nitrite Urine      Negative   Negative    Leukocyte Esterase Urine      Negative   Negative    WBC      4.0 - 11.0 10e3/uL 5.0     RBC Count      3.80 - 5.20 10e6/uL 4.23     Hemoglobin      11.7 - 15.7 g/dL 11.1 (L)     Hematocrit      35.0 - 47.0 % 36.9     MCV      78 - 100 fL 87     MCH      26.5 - 33.0 pg 26.2 (L)     MCHC      31.5 - 36.5 g/dL 30.1 (L)     RDW      10.0 - 15.0 % 13.9     Platelet Count      150 - 450 10e3/uL 308     INR      0.85 - 1.15  0.95     Thrombin Time      13.0 - 19.0 Seconds 17.0     PTT Ratio      <1.21  0.92     DRVVT Screen Ratio      <1.08  0.94     Lupus Result      Negative  Negative     Lupus  "Interpretation The INR is normal.      Bacteria Urine      None Seen /HPF  None Seen    RBC Urine      0-2 /HPF /HPF  0-2    WBC Urine      0-5 /HPF /HPF  0-5    Squamous Epithelial /LPF Urine      None Seen /LPF  Few !    RONALD interpretation      Negative  Borderline Positive !     RONALD pattern 1 Speckled     RONALD titer 1 1:40     Beta 2 Glycoprotein 1 Antibody IgG      <7.0 U/mL 14.0 (H)     Beta 2 Glycoprotein 1 Antibody IgM      <7.0 U/mL <2.4     CRP Inflammation      <5.00 mg/L 12.90 (H)     Sed Rate      0 - 20 mm/hr 18     Complement C3      81 - 157 mg/dL 149     Complement C4      13 - 39 mg/dL 20     Complement, Total, S      38.7 - 89.9 U/mL 28.9 (L)     TSH      0.30 - 4.20 uIU/mL 0.39        Legend:  (L) Low  ! Abnormal  (H) High    Neg RF, anti-CCP, SSA/SSB      Ph.E:    BP (!) 127/97   Pulse 99   Temp 99.2  F (37.3  C) (Oral)   Ht 1.676 m (5' 6\")   Wt 121.9 kg (268 lb 12.8 oz)   LMP 09/29/2024 (Approximate)   SpO2 100%   BMI 43.39 kg/m        Constitutional: WD/WN. Pleasant. In no acute distress. Mother present  Eyes: EOM intact, sclera anicteric, conj not injected  HEENT: No oral ulcers or thrush. NL salivary pool.  Neck: No cervical LAP   Chest: Clear to auscultation bilaterally  CV: RRR, no murmurs/ rubs or gallops. No edema, clubbing or cyanosis.   GI: Abdomen is soft and non tender.   MS: No synovitis. Cool joints. No tenderness of the joints. No  joint deformities.   Skin: No skin rash  Neuro: A&O x 3. Grossly non focal  Psych: NL affect         Assessment/ plan:    7/2024:    SLERoxanne Del Cid has signs and symptoms suggestive of mild early SLE. This is based on + RONALD 1:40, +beta 2 GP I IgG, low CH50, leukopenia, high CRP, arthritis, malar rash, oral/nasal ulcers, Raynaud's, low grade fevers, fatigue, brain fog, and sicca.      I reviewed and discussed lab results with her. Will finish the work up by checking the labs which are missing and checking follow up albs, plus thyroid antibodies to " see if her hypothyroidism is autoimmune or not.    Beta 2 GP I IgG comes with increased risk of miscarriage and thrombosis, if repeat comes back positive in 3 months. Discussed significance of it, and recommendation to take ASA 81 mg every day during future pregnancies.    Discussed lifestyle changes, discussed sun protective measures (sun block Neutrogena  sheer mist for body, sport for face, sun protective clothing), healthy diet (more omega 3, less sugar), stretching exercises like Myron Chi, good sleep, avoiding stress and triggers for lupus (adonis adonis sprouts, echinacea, garlic, melatonin, sulfa drug).      She should avoid any estrogen and any birth control containing estrogen given + beta 2 GP I IgG.      Recommend to start HCQ; risks were discussed including rare risk of retinal toxicity. It takes 2-3 months to show benefit, peak benefit is 6 months.      Recommend to switch mobic to ibuprofen as it is not helping.    Advised her to try Kiwi for constipation.      Plan:.    Labs     Start plaquenil 1 tab twice a day with food    Return in 4 months in person      8/15/2024:    SLE.      Continues to have joint pain/fatigue despite starting HCQ in 7/2024, she tolerates it. It takes 2-3 months to show benefit, peak benefit 6 months, we have to give it a chance to work.    Plan:    Meclizine got refilled, it helps with nausea    Stay on plaquenil and ibuprofen     Yearly eye exam on plaquenil    Provigil for fatigue 1 tab every morning; risks were discussed    Lupus labs at the end of Oct    Urine test today (r/o UTI given sx), stool test for blood (given dark stool), CBC diff (given dark stool)+beta 2 GP IgG (repeat to see if true positive) today    Keep 11/6 appointment         Today 11/6/2024:    Plan:    SLE. Improved joint pain and CRP, but continues to have significant fatigue and pleurisy. Will add provigil for fatigue and colcrys for pleurisy; risks were discussed. Continue  mg bid.      HCQ  monitoring. Needs yearly eye exam.    Plan:      Try the provigil at 1 tab a day, if no help, could try 2  day, for fatigue    Try Myron Chi exercises for fatigue    Colcrys 1 tab twice a day as needed for pleurisy    Try normal saline rinse    Return 2/19 9 am (ARUNA slot could be used)    TT 30 min was spent on date of the encounter doing chart review, history and exam, documentation and further activities as noted above. Any prior notes, outside records, laboratory results, and imaging studies were reviewed if relevant.      The longitudinal plan of care for the diagnosis(es)/condition(s) as documented were addressed during this visit. Due to the added complexity in care, I will continue to support Immanuelle in the subsequent management and with ongoing continuity of care.      Aamir Perez MD              Again, thank you for allowing me to participate in the care of your patient.      Sincerely,    Aamir Perez MD

## 2024-11-06 NOTE — PATIENT INSTRUCTIONS
Try the provigil at 1 ab a day, if no help, could try 2  day, for fatigue    Try Myron Chi exercises for fatigue    Colcrys 1 tab twice a day as needed for pleurisy    Try normal saline rinse    Return 2/19 9 am (ARUNA slot could be used)

## 2024-11-06 NOTE — NURSING NOTE
"Chief Complaint   Patient presents with    RECHECK     Follow Up     BP (!) 127/97   Pulse 99   Temp 99.2  F (37.3  C) (Oral)   Ht 1.676 m (5' 6\")   Wt 121.9 kg (268 lb 12.8 oz)   LMP 09/29/2024 (Approximate)   SpO2 100%   BMI 43.39 kg/m    Noy Antonio on 11/6/2024 at 9:36 AM    "

## 2024-11-06 NOTE — TELEPHONE ENCOUNTER
PA Initiation    Medication: ZEPBOUND 7.5 MG/0.5ML SC SOAJ  Insurance Company: [employer] ([employer]) - Phone [phone] Fax [phone]  Pharmacy Filling the Rx:    Filling Pharmacy Phone:    Filling Pharmacy Fax:    Start Date: 11/6/2024    [id number]

## 2024-11-07 NOTE — TELEPHONE ENCOUNTER
Patient notified of Zepbound 7.5 mg approval.   Simple: Patient demonstrates quick and easy understanding

## 2024-11-20 ENCOUNTER — VIRTUAL VISIT (OUTPATIENT)
Dept: ENDOCRINOLOGY | Facility: CLINIC | Age: 29
End: 2024-11-20
Payer: COMMERCIAL

## 2024-11-20 VITALS — BODY MASS INDEX: 42.91 KG/M2 | HEIGHT: 66 IN | WEIGHT: 267 LBS

## 2024-11-20 DIAGNOSIS — E66.813 CLASS 3 SEVERE OBESITY WITH SERIOUS COMORBIDITY AND BODY MASS INDEX (BMI) OF 40.0 TO 44.9 IN ADULT, UNSPECIFIED OBESITY TYPE (H): Primary | ICD-10-CM

## 2024-11-20 DIAGNOSIS — R73.03 PREDIABETES: ICD-10-CM

## 2024-11-20 DIAGNOSIS — E66.01 CLASS 3 SEVERE OBESITY WITH SERIOUS COMORBIDITY AND BODY MASS INDEX (BMI) OF 40.0 TO 44.9 IN ADULT, UNSPECIFIED OBESITY TYPE (H): Primary | ICD-10-CM

## 2024-11-20 RX ORDER — NALTREXONE HYDROCHLORIDE 50 MG/1
TABLET, FILM COATED ORAL
Qty: 30 TABLET | Refills: 2 | Status: SHIPPED | OUTPATIENT
Start: 2024-11-20

## 2024-11-20 RX ORDER — ARIPIPRAZOLE 10 MG/1
1 TABLET ORAL
COMMUNITY
Start: 2024-10-24

## 2024-11-20 ASSESSMENT — PAIN SCALES - GENERAL: PAINLEVEL_OUTOF10: MODERATE PAIN (4)

## 2024-11-20 ASSESSMENT — PATIENT HEALTH QUESTIONNAIRE - PHQ9: SUM OF ALL RESPONSES TO PHQ QUESTIONS 1-9: 11

## 2024-11-20 NOTE — PROGRESS NOTES
Return Medical Weight Management Note     Nehemias Mascorro  MRN:  [id number]  :  1995  JASMEET:  2024    Dear Alexandra Rodrigues MD,    I had the pleasure of seeing your patient Nehemias Mascorro. She is a 29 year old female who I am continuing to see for treatment of obesity related to:         No data to display                Assessment & Plan   Problem List Items Addressed This Visit       Class 3 severe obesity with serious comorbidity and body mass index (BMI) of 40.0 to 44.9 in adult, unspecified obesity type (H) - Primary    Relevant Medications    tirzepatide-Weight Management (ZEPBOUND) 10 MG/0.5ML prefilled pen    naltrexone (DEPADE/REVIA) 50 MG tablet    Prediabetes    Relevant Medications    tirzepatide-Weight Management (ZEPBOUND) 10 MG/0.5ML prefilled pen    naltrexone (DEPADE/REVIA) 50 MG tablet      Plan  Increase zepbound to 10mg after completing remaining doses of 7.5mg   Try taking naltrexone 50mg every morning if easier to remember to take   Can consider adding on metformin in a month if still seeing weight plateau- provider will check in in 1 month   Goals we discussed today:   Increasing protein with protein shakes   Go on walks  or walk at the gym at least once a week.   Follow up with Elisa in 3 months   Dietician appointment scheduled 25   Keep up the excellent work!       INTERVAL HISTORY:  MWM mostly with Dr. Slade since   Seen by Lizeth Cr 23   Previously seen by Dr. Slade. Currently taking Wegovy 2.4mg once weekly. Has had a hard time consistently taking over the summer - would be off for around 2 weeks due to supplies. Has been taking it consistently for the past 2 months. Continues to find it helpful. However, would like to either add on or try a different AOM.      Reviewed AOMs today. Previously trialed Naltrexone and was not helpful with weight loss. Previously trialed Topiramate and had intolerable side effects of fatigue and mood changes.  Phentermine contraindicated due to currently being on a stimulant. Previously was on Metformine, does not believe it was helpful with weight loss. Discussed new approval of Zepbound, but not yet available. She will continue Wegovy 2.4mg today, and consider retrialing Metformin or transitioning to Zepbound in the future once available.       New to me 5/7/24:   Continuing management for pituitary lesion, hypopitiuitarism with Dr. Vasquez  Has seen significant weight regain.   Stopped wegovy in November 2023 due to supply issues.      Feels that the weight regain happened very suddenly since stopping wegovy.   -restarted wegovy      Last seen by me 8/7/24:  Feeling good with wegovy, but seeing persistent weight gain (10lbs since last visit). Very frustrated with this weight gain, wonders if it's related to higher sweets intake recently, higher stress levels with work.      Worsened sweet cravings in the evening. Will retrial naltrexone to see if this helps.      Struggling with planning out meals, in the past has benefited from sitting down and planning foods for the week. Discussed goal of gently reintroducing this habit.  Nehemias adds that her mom might be helpful accountability partner in making this change.  -increased wegovy to 1.7mg, started naltrexone   -further dose increases denied by insurance due to inadequate weight loss  -switched to zepbound 7.5mg     Today in visit 11/20/24   Frustrated with continued weight plateau   2 weeks into 7.5mg  Reducing portions lately   Not using naltrexone very frequently, will forget to take it.       Wt Readings from Last 5 Encounters:   11/20/24 121.1 kg (267 lb)   11/06/24 121.9 kg (268 lb 12.8 oz)   10/11/24 121.9 kg (268 lb 12.8 oz)   08/15/24 122 kg (269 lb)   08/07/24 122.5 kg (270 lb)       Anti-obesity medication history    Current:   Zepbound 7.5mg- 2 weeks into zepbound since switching from wegovy. Denies nausea, vomtiting.   Naltrexone- not using very  frequently as she forgets to take it- discussed it would be appropriate to take in the morning if more helpful with remembering to take it.       Past/Failed/contraindicated:   Metformin- some stomach cramping, no weight loss.     GERD symptoms: denies breakthrough symptoms, taking omeprazole 20mg for symptoms    Constipation/Diarrhea: denies, uses miralax prn if symptoms ever recur      Recent diet changes: working on increasing protein, adjusting snacks to be high in protein. Bought zero sugar meat sticks, likes these. Low fat cheese. Eating more fiber- peppers.     Water- drinks 60oz minimum. Also drinking crystal lite. Pop less than once a week. No juice.     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     Recent stressors: stress levels are high, dad is at the house moving his things out of the home and there's a tough relationship between them- he and mom are wrapping up divorce so dad is just getting the last of the things.   Working with a therapist every 3 weeks.     Recent sleep changes: sleeping more lately, estimates getting 10 hours of sleep lately, very tired. Wonders if she's seeing worsened depression. Sees therapist in a couple of weeks. Discussed trying a light box therapy for the darker winter months.     Vitamins/Labs: none needed today     Pregnancy: still taking oral contraceptives     CURRENT WEIGHT:   267 lbs 0 oz    Initial Weight (lbs): 248 lbs  Last Visits Weight: 121.9 kg (268 lb 12.8 oz)  Cumulative weight loss (lbs): -19  Weight Loss Percentage: -7.66%        11/20/2024     9:49 AM   Changes and Difficulties   I have made the following changes to my diet since my last visit: Increased protein, eating a lot less   With regards to my diet, I am still struggling with: Wanting sweets every once in a while   I have made the following changes to my activity/exercise since my last  visit: Added exercise on Sundays   With regards to my activity/exercise, I am still struggling with: Motivation             MEDICATIONS:   Current Outpatient Medications   Medication Sig Dispense Refill    acetaminophen (TYLENOL) 650 MG CR tablet TAKE 2 TABLETS EVERY 8 HOURS BY ORAL ROUTE FOR 14 DAYS.      albuterol (ACCUNEB) 1.25 MG/3ML neb solution Take 1 vial (1.25 mg) by nebulization every 6 hours as needed for shortness of breath or wheezing 90 mL 0    albuterol (VENTOLIN HFA) 108 (90 Base) MCG/ACT inhaler INHALE 2 PUFFS INTO THE LUNGS FOUR TIMES DAILY 18 g 2    ARIPiprazole (ABILIFY) 10 MG tablet Take 1 tablet by mouth daily at 2 pm.      ARIPiprazole (ABILIFY) 15 MG tablet Take 1 tablet by mouth daily at 2 pm.      benzonatate (TESSALON) 100 MG capsule Take 1 capsule (100 mg) by mouth 3 times daily as needed for cough. 30 capsule 0    buPROPion (WELLBUTRIN XL) 150 MG 24 hr tablet       buPROPion (WELLBUTRIN XL) 300 MG 24 hr tablet Take 300 mg by mouth every morning      colchicine (COLCRYS) 0.6 MG tablet Take 1 tablet (0.6 mg) by mouth 2 times daily as needed for other (pleurisy). 60 tablet 3    CONCERTA 36 MG CR tablet TAKE 1 TABLET BY MOUTH DAILY IN THE MORNING FOR ADHD. START 11/18/22      drospirenone (SLYND) 4 MG TABS tablet       DULoxetine (CYMBALTA) 60 MG capsule Take 60 mg by mouth daily      ferrous sulfate (FE TABS) 325 (65 Fe) MG EC tablet TAKE 1 TABLET BY MOUTH EVERY DAY 90 tablet 1    fluticasone (FLOVENT DISKUS) 100 MCG/ACT inhaler Inhale 2 puffs into the lungs every 12 hours. 28 each 1    gabapentin (NEURONTIN) 100 MG capsule Take 200 mg by mouth At Bedtime      hydrocortisone 2.5 % cream APPLY TOPICALLY TO THE CHEST TWICE DAILY AS NEEDED      hydroxychloroquine (PLAQUENIL) 200 MG tablet Take 1 tablet (200 mg) by mouth 2 times daily (with meals) Annual Plaquenil toxicity eye screening required. 180 tablet 3    hyoscyamine (LEVSIN) 0.125 MG tablet Take 1 tablet (125 mcg) by mouth every 6 hours  as needed for cramping 30 tablet 0    ibuprofen (ADVIL/MOTRIN) 800 MG tablet TAKE 1 TABLET BY MOUTH TWICE A DAY WITH MEALS FOR 14 DAYS      levocetirizine (XYZAL) 5 MG tablet Take 5 mg by mouth every 24 hours      levothyroxine (SYNTHROID/LEVOTHROID) 75 MCG tablet Take 1 tablet (75 mcg) by mouth daily 90 tablet 3    lidocaine (LIDODERM) 5 % patch Place 1 patch onto the skin every 24 hours To prevent lidocaine toxicity, patient should be patch free for 12 hrs daily. 15 patch 1    meclizine (ANTIVERT) 25 MG tablet Take 1 tablet (25 mg) by mouth 3 times daily as needed for dizziness 60 tablet 3    methocarbamol (ROBAXIN) 500 MG tablet Take 1 tablet (500 mg) by mouth 4 times daily as needed for muscle spasms 20 tablet 0    methylphenidate (RITALIN) 10 MG tablet TAKE 1/2 - 1 TABLET ONCE DAILY AS NEEDED FOR ATTENTION AND FOCUS.      modafinil (PROVIGIL) 100 MG tablet Take 2 tablets (200 mg) by mouth daily. 60 tablet 5    naltrexone (DEPADE/REVIA) 50 MG tablet Take 1/2 tablet once daily 1-2 hours prior to worst cravings for 1 week, then increase to 1 tablet daily as directed if tolerating 30 tablet 2    omeprazole (PRILOSEC) 20 MG DR capsule Take 1 capsule (20 mg) by mouth 2 times daily      polyethylene glycol (MIRALAX) 17 GM/Dose powder Take 1 capful by mouth daily as needed for constipation      RELPAX 40 MG tablet       spironolactone-HCTZ (ALDACTAZIDE) 25-25 MG tablet Take 1 tablet by mouth every 72 hours as needed 36 tablet 5    TAZORAC 0.1 % external cream APPLY TOPICALLY TO THE AFFECTED AREA AT BEDTIME      tirzepatide-Weight Management (ZEPBOUND) 10 MG/0.5ML prefilled pen Inject 0.5 mLs (10 mg) subcutaneously every 7 days. 2 mL 3    tirzepatide-Weight Management (ZEPBOUND) 7.5 MG/0.5ML prefilled pen Inject 0.5 mLs (7.5 mg) subcutaneously every 7 days. 2 mL 3    triamcinolone (KENALOG) 0.1 % paste APPLY TO AFFECTED AREA 2 TIMES DAILY AS DIRECTED      gabapentin (NEURONTIN) 600 MG tablet Take 1 tablet (600 mg) by  "mouth at bedtime. (Patient not taking: Reported on 11/20/2024) 90 tablet 3    ketorolac (TORADOL) 10 MG tablet Take 1 tablet (10 mg) by mouth every 6 hours as needed for moderate pain (Patient not taking: Reported on 11/20/2024) 20 tablet 0    ondansetron (ZOFRAN) 4 MG tablet Take 1 tablet (4 mg) by mouth every 8 hours as needed for nausea (Patient not taking: Reported on 11/20/2024) 30 tablet 1    SUMAtriptan (IMITREX) 50 MG tablet Take 1 tablet (50 mg) by mouth at onset of headache for migraine May repeat in 2 hours. Max 4 tablets/24 hours. (Patient not taking: Reported on 11/20/2024) 9 tablet 1    tiZANidine (ZANAFLEX) 2 MG capsule PLEASE SEE ATTACHED FOR DETAILED DIRECTIONS (Patient not taking: Reported on 11/20/2024)             11/20/2024     9:49 AM   Weight Loss Medication History Reviewed With Patient   Which weight loss medications are you currently taking on a regular basis? Naltrexone   Are you having any side effects from the weight loss medication that we have prescribed you? No               1/3/2022     3:03 PM   RODRIGUE Score (Last Two)   RODRIGUE Raw Score 29   Activation Score 52.9   RODRIGUE Level 2         PHYSICAL EXAM:  Objective    Ht 1.676 m (5' 6\")   Wt 121.1 kg (267 lb)   LMP 09/29/2024 (Approximate)   BMI 43.09 kg/m      Vitals - Patient Reported  Pain Score: Moderate Pain (4)  Pain Loc: Head      Vitals:  No vitals were obtained today due to virtual visit.    GENERAL: alert and no distress  EYES: Eyes grossly normal to inspection.  No discharge or erythema, or obvious scleral/conjunctival abnormalities.  RESP: No audible wheeze, cough, or visible cyanosis.    SKIN: Visible skin clear. No significant rash, abnormal pigmentation or lesions.  NEURO: Cranial nerves grossly intact.  Mentation and speech appropriate for age.  PSYCH: Appropriate affect, tone, and pace of words        Sincerely,    Elisa Rivera PA-C      18 minutes spent by me on the date of the encounter doing chart review, history " and exam, documentation and further activities per the note    The longitudinal plan of care for the diagnosis(es)/condition(s) as documented were addressed during this visit. Due to the added complexity in care, I will continue to support Immanuelle in the subsequent management and with ongoing continuity of care.

## 2024-11-20 NOTE — LETTER
2024       RE: Nehemias Mascorro  [address]  [place] [address]     Dear Colleague,    Thank you for referring your patient, Nehemias Mascorro, to the [facility] WEIGHT MANAGEMENT CLINIC [place] [facility]. Please see a copy of my visit note below.      Return Medical Weight Management Note     Nehemias Macsorro  MRN:  [id number]  :  1995  JASMEET:  2024    Dear Alexandra Rodrigues MD,    I had the pleasure of seeing your patient Nehemias Mascorro. She is a 29 year old female who I am continuing to see for treatment of obesity related to:         No data to display                Assessment & Plan  Problem List Items Addressed This Visit       Class 3 severe obesity with serious comorbidity and body mass index (BMI) of 40.0 to 44.9 in adult, unspecified obesity type (H) - Primary    Relevant Medications    tirzepatide-Weight Management (ZEPBOUND) 10 MG/0.5ML prefilled pen    naltrexone (DEPADE/REVIA) 50 MG tablet    Prediabetes    Relevant Medications    tirzepatide-Weight Management (ZEPBOUND) 10 MG/0.5ML prefilled pen    naltrexone (DEPADE/REVIA) 50 MG tablet      Plan  Increase zepbound to 10mg after completing remaining doses of 7.5mg   Try taking naltrexone 50mg every morning if easier to remember to take   Can consider adding on metformin in a month if still seeing weight plateau- provider will check in in 1 month   Goals we discussed today:   Increasing protein with protein shakes   Go on walks  or walk at the gym at least once a week.   Follow up with Elisa in 3 months   Dietician appointment scheduled 25   Keep up the excellent work!       INTERVAL HISTORY:  MWM mostly with Dr. Slade since   Seen by Lizeth Cr 23   Previously seen by Dr. Slade. Currently taking Wegovy 2.4mg once weekly. Has had a hard time consistently taking over the summer - would be off for around 2 weeks due to supplies.  Has been taking it consistently for the past 2 months. Continues to find it helpful. However, would like to either add on or try a different AOM.      Reviewed AOMs today. Previously trialed Naltrexone and was not helpful with weight loss. Previously trialed Topiramate and had intolerable side effects of fatigue and mood changes. Phentermine contraindicated due to currently being on a stimulant. Previously was on Metformine, does not believe it was helpful with weight loss. Discussed new approval of Zepbound, but not yet available. She will continue Wegovy 2.4mg today, and consider retrialing Metformin or transitioning to Zepbound in the future once available.       New to me 5/7/24:   Continuing management for pituitary lesion, hypopitiuitarism with Dr. Vasquez  Has seen significant weight regain.   Stopped wegovy in November 2023 due to supply issues.      Feels that the weight regain happened very suddenly since stopping wegovy.   -restarted wegovy      Last seen by me 8/7/24:  Feeling good with wegovy, but seeing persistent weight gain (10lbs since last visit). Very frustrated with this weight gain, wonders if it's related to higher sweets intake recently, higher stress levels with work.      Worsened sweet cravings in the evening. Will retrial naltrexone to see if this helps.      Struggling with planning out meals, in the past has benefited from sitting down and planning foods for the week. Discussed goal of gently reintroducing this habit.  Nehemias adds that her mom might be helpful accountability partner in making this change.  -increased wegovy to 1.7mg, started naltrexone   -further dose increases denied by insurance due to inadequate weight loss  -switched to zepbound 7.5mg     Today in visit 11/20/24   Frustrated with continued weight plateau   2 weeks into 7.5mg  Reducing portions lately   Not using naltrexone very frequently, will forget to take it.       Wt Readings from Last 5 Encounters:   11/20/24  121.1 kg (267 lb)   11/06/24 121.9 kg (268 lb 12.8 oz)   10/11/24 121.9 kg (268 lb 12.8 oz)   08/15/24 122 kg (269 lb)   08/07/24 122.5 kg (270 lb)       Anti-obesity medication history    Current:   Zepbound 7.5mg- 2 weeks into zepbound since switching from wegovy. Denies nausea, vomtiting.   Naltrexone- not using very frequently as she forgets to take it- discussed it would be appropriate to take in the morning if more helpful with remembering to take it.       Past/Failed/contraindicated:   Metformin- some stomach cramping, no weight loss.     GERD symptoms: denies breakthrough symptoms, taking omeprazole 20mg for symptoms    Constipation/Diarrhea: denies, uses miralax prn if symptoms ever recur      Recent diet changes: working on increasing protein, adjusting snacks to be high in protein. Bought zero sugar meat sticks, likes these. Low fat cheese. Eating more fiber- peppers.     Water- drinks 60oz minimum. Also drinking crystal lite. Pop less than once a week. No juice.     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     Recent stressors: stress levels are high, dad is at the house moving his things out of the home and there's a tough relationship between them- he and mom are wrapping up divorce so dad is just getting the last of the things.   Working with a therapist every 3 weeks.     Recent sleep changes: sleeping more lately, estimates getting 10 hours of sleep lately, very tired. Wonders if she's seeing worsened depression. Sees therapist in a couple of weeks. Discussed trying a light box therapy for the darker winter months.     Vitamins/Labs: none needed today     Pregnancy: still taking oral contraceptives     CURRENT WEIGHT:   267 lbs 0 oz    Initial Weight (lbs): 248 lbs  Last Visits Weight: 121.9 kg (268 lb 12.8 oz)  Cumulative weight loss (lbs): -19  Weight Loss Percentage: -7.66%         11/20/2024     9:49 AM   Changes and Difficulties   I have made the following changes to my diet since my last visit: Increased protein, eating a lot less   With regards to my diet, I am still struggling with: Wanting sweets every once in a while   I have made the following changes to my activity/exercise since my last visit: Added exercise on Sundays   With regards to my activity/exercise, I am still struggling with: Motivation             MEDICATIONS:   Current Outpatient Medications   Medication Sig Dispense Refill     acetaminophen (TYLENOL) 650 MG CR tablet TAKE 2 TABLETS EVERY 8 HOURS BY ORAL ROUTE FOR 14 DAYS.       albuterol (ACCUNEB) 1.25 MG/3ML neb solution Take 1 vial (1.25 mg) by nebulization every 6 hours as needed for shortness of breath or wheezing 90 mL 0     albuterol (VENTOLIN HFA) 108 (90 Base) MCG/ACT inhaler INHALE 2 PUFFS INTO THE LUNGS FOUR TIMES DAILY 18 g 2     ARIPiprazole (ABILIFY) 10 MG tablet Take 1 tablet by mouth daily at 2 pm.       ARIPiprazole (ABILIFY) 15 MG tablet Take 1 tablet by mouth daily at 2 pm.       benzonatate (TESSALON) 100 MG capsule Take 1 capsule (100 mg) by mouth 3 times daily as needed for cough. 30 capsule 0     buPROPion (WELLBUTRIN XL) 150 MG 24 hr tablet        buPROPion (WELLBUTRIN XL) 300 MG 24 hr tablet Take 300 mg by mouth every morning       colchicine (COLCRYS) 0.6 MG tablet Take 1 tablet (0.6 mg) by mouth 2 times daily as needed for other (pleurisy). 60 tablet 3     CONCERTA 36 MG CR tablet TAKE 1 TABLET BY MOUTH DAILY IN THE MORNING FOR ADHD. START 11/18/22       drospirenone (SLYND) 4 MG TABS tablet        DULoxetine (CYMBALTA) 60 MG capsule Take 60 mg by mouth daily       ferrous sulfate (FE TABS) 325 (65 Fe) MG EC tablet TAKE 1 TABLET BY MOUTH EVERY DAY 90 tablet 1     fluticasone (FLOVENT DISKUS) 100 MCG/ACT inhaler Inhale 2 puffs into the lungs every 12 hours. 28 each 1     gabapentin (NEURONTIN) 100 MG capsule Take 200 mg by mouth At Bedtime        hydrocortisone 2.5 % cream APPLY TOPICALLY TO THE CHEST TWICE DAILY AS NEEDED       hydroxychloroquine (PLAQUENIL) 200 MG tablet Take 1 tablet (200 mg) by mouth 2 times daily (with meals) Annual Plaquenil toxicity eye screening required. 180 tablet 3     hyoscyamine (LEVSIN) 0.125 MG tablet Take 1 tablet (125 mcg) by mouth every 6 hours as needed for cramping 30 tablet 0     ibuprofen (ADVIL/MOTRIN) 800 MG tablet TAKE 1 TABLET BY MOUTH TWICE A DAY WITH MEALS FOR 14 DAYS       levocetirizine (XYZAL) 5 MG tablet Take 5 mg by mouth every 24 hours       levothyroxine (SYNTHROID/LEVOTHROID) 75 MCG tablet Take 1 tablet (75 mcg) by mouth daily 90 tablet 3     lidocaine (LIDODERM) 5 % patch Place 1 patch onto the skin every 24 hours To prevent lidocaine toxicity, patient should be patch free for 12 hrs daily. 15 patch 1     meclizine (ANTIVERT) 25 MG tablet Take 1 tablet (25 mg) by mouth 3 times daily as needed for dizziness 60 tablet 3     methocarbamol (ROBAXIN) 500 MG tablet Take 1 tablet (500 mg) by mouth 4 times daily as needed for muscle spasms 20 tablet 0     methylphenidate (RITALIN) 10 MG tablet TAKE 1/2 - 1 TABLET ONCE DAILY AS NEEDED FOR ATTENTION AND FOCUS.       modafinil (PROVIGIL) 100 MG tablet Take 2 tablets (200 mg) by mouth daily. 60 tablet 5     naltrexone (DEPADE/REVIA) 50 MG tablet Take 1/2 tablet once daily 1-2 hours prior to worst cravings for 1 week, then increase to 1 tablet daily as directed if tolerating 30 tablet 2     omeprazole (PRILOSEC) 20 MG DR capsule Take 1 capsule (20 mg) by mouth 2 times daily       polyethylene glycol (MIRALAX) 17 GM/Dose powder Take 1 capful by mouth daily as needed for constipation       RELPAX 40 MG tablet        spironolactone-HCTZ (ALDACTAZIDE) 25-25 MG tablet Take 1 tablet by mouth every 72 hours as needed 36 tablet 5     TAZORAC 0.1 % external cream APPLY TOPICALLY TO THE AFFECTED AREA AT BEDTIME       tirzepatide-Weight Management (ZEPBOUND) 10 MG/0.5ML  "prefilled pen Inject 0.5 mLs (10 mg) subcutaneously every 7 days. 2 mL 3     tirzepatide-Weight Management (ZEPBOUND) 7.5 MG/0.5ML prefilled pen Inject 0.5 mLs (7.5 mg) subcutaneously every 7 days. 2 mL 3     triamcinolone (KENALOG) 0.1 % paste APPLY TO AFFECTED AREA 2 TIMES DAILY AS DIRECTED       gabapentin (NEURONTIN) 600 MG tablet Take 1 tablet (600 mg) by mouth at bedtime. (Patient not taking: Reported on 11/20/2024) 90 tablet 3     ketorolac (TORADOL) 10 MG tablet Take 1 tablet (10 mg) by mouth every 6 hours as needed for moderate pain (Patient not taking: Reported on 11/20/2024) 20 tablet 0     ondansetron (ZOFRAN) 4 MG tablet Take 1 tablet (4 mg) by mouth every 8 hours as needed for nausea (Patient not taking: Reported on 11/20/2024) 30 tablet 1     SUMAtriptan (IMITREX) 50 MG tablet Take 1 tablet (50 mg) by mouth at onset of headache for migraine May repeat in 2 hours. Max 4 tablets/24 hours. (Patient not taking: Reported on 11/20/2024) 9 tablet 1     tiZANidine (ZANAFLEX) 2 MG capsule PLEASE SEE ATTACHED FOR DETAILED DIRECTIONS (Patient not taking: Reported on 11/20/2024)             11/20/2024     9:49 AM   Weight Loss Medication History Reviewed With Patient   Which weight loss medications are you currently taking on a regular basis? Naltrexone   Are you having any side effects from the weight loss medication that we have prescribed you? No               1/3/2022     3:03 PM   RODRIGUE Score (Last Two)   RODRIGUE Raw Score 29   Activation Score 52.9   RODRIGUE Level 2         PHYSICAL EXAM:  Objective   Ht 1.676 m (5' 6\")   Wt 121.1 kg (267 lb)   LMP 09/29/2024 (Approximate)   BMI 43.09 kg/m      Vitals - Patient Reported  Pain Score: Moderate Pain (4)  Pain Loc: Head      Vitals:  No vitals were obtained today due to virtual visit.    GENERAL: alert and no distress  EYES: Eyes grossly normal to inspection.  No discharge or erythema, or obvious scleral/conjunctival abnormalities.  RESP: No audible wheeze, cough, or " visible cyanosis.    SKIN: Visible skin clear. No significant rash, abnormal pigmentation or lesions.  NEURO: Cranial nerves grossly intact.  Mentation and speech appropriate for age.  PSYCH: Appropriate affect, tone, and pace of words        Sincerely,    Elisa Rivera PA-C      18 minutes spent by me on the date of the encounter doing chart review, history and exam, documentation and further activities per the note    The longitudinal plan of care for the diagnosis(es)/condition(s) as documented were addressed during this visit. Due to the added complexity in care, I will continue to support Immadalbertouelle in the subsequent management and with ongoing continuity of care.     Virtual Visit Details    Type of service:  Video Visit   Video Start Time:  10:08am  Video End Time: 10:24am    Originating Location (pt. Location): Home    Distant Location (provider location):  Off-site  Platform used for Video Visit: AmWell      Again, thank you for allowing me to participate in the care of your patient.      Sincerely,    Elisa Rivera PA-C

## 2024-11-20 NOTE — NURSING NOTE
Current patient location: [address] [place] [address]    Is the patient currently in the state of [place]? YES    Visit mode:VIDEO    If the visit is dropped, the patient can be reconnected by:VIDEO VISIT: Text to cell phone:   Telephone Information:   Mobile [phone]       Will anyone else be joining the visit? NO  (If patient encounters technical issues they should call [phone]    Are changes needed to the allergy or medication list? Yes Pt states taking 300mg Gapabentin at bedtime daily now.    Are refills needed on medications prescribed by this physician? NO    Rooming Documentation:  Questionnaire(s) completed    Reason for visit: RECHECK    Pt states 4/10 headache today.    Depression Response    Patient completed the PHQ-9 assessment for depression and scored >9? Yes  Question 9 on the PHQ-9 was positive for suicidality? No  Does patient have current mental health provider? Yes    Is this a virtual visit? Yes   Does patient have suicidal ideation (positive question 9)? No - offer to place Mental Health Referral.  Patient declined referral/not needed    I personally notified the following: visit provider           Yury MADERA

## 2024-11-20 NOTE — PATIENT INSTRUCTIONS
"Thank you for allowing us the privilege of caring for you. We hope we provided you with the excellent service you deserve.   Please let us know if there is anything else we can do for you so that we can be sure you are completely satisfied with your care experience.    To ensure the quality of our services you may be receiving a patient satisfaction survey from an independent patient satisfaction monitoring company.    The greatest compliment you can give is a \"Likely to Recommend\"    Your visit was with Elisa Rivera PA-C today.    Instructions per today's visit:     Paulo Mascorro, it was great to visit with you today.  Here is a review of our visit.  If our clinic scheduler is not able to reach you please call [phone] to schedule your next appointments.    Plan  Increase mounjaro to 10mg after completing remaining doses of 7.5mg   Try taking naltrexone 50mg every morning if easier to remember to take   Can consider adding on metformin in a month if still seeing weight plateau- provider will check in in 1 month   Goals we discussed today:   Increasing protein with protein shakes   Go on walks  or walk at the gym at least once a week.   Follow up with Elisa in 3 months   Dietician appointment scheduled 1/16/25   Keep up the excellent work!       Information about Video Visits with [employer]: video visit information  _________________________________________________________________________________________________________________________________________________________  If you are asked by your clinic team to have your blood pressure checked:  [facility] do offer several locations for blood pressure checks. Please follow the below link to schedule an appointment. Scheduling an appointment at the pharmacy for a blood pressure check is now preferred.    Appointment Plus " ([email or web address])  _________________________________________________________________________________________________________________________________________________________  Important contact and scheduling information:  Please call our contact center at [phone] to schedule your next appointments.  To find a lab location near you, please call [phone].  For any nursing questions or concerns call Susan Hunter LPN at [phone] or Alba Mathew RN at [phone]  Please call during clinic hours Monday through Friday 8:00a - 4:00p if you have questions or you can contact us via [employer] at anytime and we will reply during clinic hours.    Lab results will be communicated through My Chart or letter (if My Chart not used). Please call the clinic if you have not received communication after 1 week or if you have any questions.?  Clinic Fax: [phone]    _________________________________________________________________________________________________________________________________________________________  Meal Replacement Products:    Here is the link to our new e-store where you can purchase our meal replacement products    [facility] E-Store  [facility].Protagen/store    The one week starter kit is a great way to sample a variety of products and see what works for you.    If you want more information about the product go to: [employer] Meals  [email or web address]    If you are an employee or [facility] or [facility] please contact your care team for a 10% estore discount    Free Shipping for orders over $75     Benefits of meal replacements products:    Portion and calorie control  Improved nutrition  Structured eating  Simplified food choices  Avoid contact with trigger foods  _________________________________________________________________________________________________________________________________________________________  Interested in working with a health  ?  Health coaches work with you to improve your overall health and wellbeing.  They look at the whole person, and may involve discussion of different areas of life, including, but not limited to the four pillars of health (sleep, exercise, nutrition, and stress management). Discuss with your care team if you would like to start working a health .  Health Coaching-3 Pack: Schedule by calling [phone]    $99 for three health coaching visits    Visits may be done in person or via phone    Coaching is a partnership between the  and the client; Coaches do not prescribe or diagnose    Coaching helps inspire the client to reach his/her personal goals   _________________________________________________________________________________________________________________________________________________________  24 Week Healthy Lifestyle Plan:    Our mission in the 24-week Healthy Lifestyle Plan is to provide you with individualized care by giving you the tools, education and support you need to lose weight and maintain a healthy lifestyle. In your 24-week journey, you ll be supported by a dedicated weight loss team that includes registered dietitians, medical weight management providers, health coaches, and nurses -- all with special expertise in weight loss -- to help you every step of the way.     Monthly meetings with your registered dietician or medical weight management provider help to review your progress, update your care plan, and make any adjustments needed to ensure success. Between these visits, weekly and bi-weekly health  visits will help you focus on the four pillars of weight loss -- stress, sleep, nutrition, and exercise -- and how you can best adapt each to achieve sustainable weight loss results.    In addition, you will be given exclusive access to online wellbeing classes through [employer].  Your initial visit will be with a medical weight management provider who will help to  understand your weight loss goals and ensure this program is the right fit for you. Please let our team know if you are interested in the 24 week plan by sending a message to your care team or calling [phone] to schedule.  _________________________________________________________________________________________________________________________________________________________  __________  [facility] Get Moving Program  Our team of physical therapists is trained to help you understand and take control of your condition. They will perform a thorough evaluation to determine your ability for activity and develop a customized plan to fit your goals and physical ability.  Scheduling: Unsure if the Get Moving program is right for you? Discuss the program with your medical provider or diabetes educator. You can also call us at [phone] to ask questions or schedule an appointment.   [facility] Get Moving Program  ____________________________________________________________________________________________________________________________________________________________________________  [facility] Diabetes Prevention Program (DPP)  If you have prediabetes and Medicare please contact us via Diana to learn more about the Diabetes Prevention Program (DPP)  Program Details:   [facility] offers the year-long Diabetes Prevention Program (DPP). The program helps you to make lifestyle changes that prevent or delay type 2 diabetes by supporting healthy eating, increased physical activity, stress reduction and use of coping skills.   On average, previous [facility] DPP cohorts have lost and maintained at least 5% of their starting weight throughout the program and averaged more than 150 minutes of physical activity per week.  Participants meet weekly for one-hour group sessions over sixteen weeks, every other week for the next 8 weeks, and monthly for the last six months.   A year-long  maintenance program is also available for participants who complete the first year.   Location & Cost:   During the COVID-19 Public Health Emergency, the program is offered virtually. When in-person classes can resume, they will be held at [facility].  For people with Medicare, the program is covered in full. A self-pay option will also be available for those with non-Medicare insurance plans.   ______________________________________________________________________________________________________________________________________________________________________________________________________________________________    To work with a Behavioral Health Psychologist:    Call to schedule:    Ezekiel Travis - [phone]  Anila Cummings - [phone]  Jennie Antonio - [phone]  Radha Mascorro - [phone]   Leigh Rosario PhD (cannot accept Medicare) [phone]        Thank you,   [facility] Comprehensive Weight Management Team

## 2024-11-20 NOTE — PROGRESS NOTES
Virtual Visit Details    Type of service:  Video Visit   Video Start Time:  10:08am  Video End Time: 10:24am    Originating Location (pt. Location): Home    Distant Location (provider location):  Off-site  Platform used for Video Visit: Peace

## 2024-11-26 ENCOUNTER — E-VISIT (OUTPATIENT)
Dept: URGENT CARE | Facility: CLINIC | Age: 29
End: 2024-11-26
Payer: COMMERCIAL

## 2024-11-26 DIAGNOSIS — N89.8 VAGINAL DISCHARGE: Primary | ICD-10-CM

## 2024-11-26 NOTE — PATIENT INSTRUCTIONS
Thank you for choosing us for your care. Given your symptoms, I would like you to do a lab-only visit to determine what is causing them.  I have placed the orders.  Please schedule an appointment with the lab right here in [place], or call [phone].  I will let you know when the results are back and next steps to take.    Schedule a Lab Only appointment here.       Try holding off on the doxycycline until you get these labs completed.  If you do have a urinary tract infection, we can give you an antibiotic to cover both your sinusitis and the UTI

## 2024-12-02 ENCOUNTER — LAB (OUTPATIENT)
Dept: LAB | Facility: CLINIC | Age: 29
End: 2024-12-02
Payer: COMMERCIAL

## 2024-12-02 DIAGNOSIS — N89.8 VAGINAL DISCHARGE: ICD-10-CM

## 2024-12-02 DIAGNOSIS — E23.7 PITUITARY LESION (H): ICD-10-CM

## 2024-12-02 LAB
ALBUMIN UR-MCNC: NEGATIVE MG/DL
APPEARANCE UR: CLEAR
BACTERIA #/AREA URNS HPF: ABNORMAL /HPF
BILIRUB UR QL STRIP: NEGATIVE
CLUE CELLS: ABNORMAL
COLOR UR AUTO: YELLOW
GLUCOSE UR STRIP-MCNC: NEGATIVE MG/DL
HGB UR QL STRIP: ABNORMAL
KETONES UR STRIP-MCNC: NEGATIVE MG/DL
LEUKOCYTE ESTERASE UR QL STRIP: NEGATIVE
NITRATE UR QL: NEGATIVE
PH UR STRIP: 6 [PH] (ref 5–8)
RBC #/AREA URNS AUTO: ABNORMAL /HPF
SP GR UR STRIP: >=1.03 (ref 1–1.03)
T4 FREE SERPL-MCNC: 0.99 NG/DL (ref 0.9–1.7)
TRICHOMONAS, WET PREP: ABNORMAL
TSH SERPL DL<=0.005 MIU/L-ACNC: 0.55 UIU/ML (ref 0.3–4.2)
UROBILINOGEN UR STRIP-ACNC: 0.2 E.U./DL
WBC #/AREA URNS AUTO: ABNORMAL /HPF
WBC'S/HIGH POWER FIELD, WET PREP: ABNORMAL
YEAST, WET PREP: ABNORMAL

## 2024-12-02 PROCEDURE — 84443 ASSAY THYROID STIM HORMONE: CPT

## 2024-12-02 PROCEDURE — 84439 ASSAY OF FREE THYROXINE: CPT

## 2024-12-02 PROCEDURE — 36415 COLL VENOUS BLD VENIPUNCTURE: CPT

## 2024-12-02 PROCEDURE — 87210 SMEAR WET MOUNT SALINE/INK: CPT

## 2024-12-02 PROCEDURE — 81001 URINALYSIS AUTO W/SCOPE: CPT

## 2024-12-04 NOTE — PATIENT INSTRUCTIONS
Dear Nehemias Mascorro    After reviewing your responses, I've been able to diagnose you with a urinary tract infection, which is a common infection of the bladder with bacteria.  This is not a sexually transmitted infection, though urinating immediately after intercourse can help prevent infections.  Drinking lots of fluids is also helpful to clear your current infection and prevent the next one.      I have sent a prescription for antibiotics to your pharmacy to treat this infection.    It is important that you take all of your prescribed medication even if your symptoms are improving after a few doses.  Taking all of your medicine helps prevent the symptoms from returning.     If your symptoms worsen, you develop pain in your back or stomach, develop fevers, or are not improving in 5 days, please contact your primary care provider for an appointment or visit any of our convenient Walk-in or Urgent Care Centers to be seen, which can be found on our website here.    Thanks again for choosing us as your health care partner,    Sole Vazquez PA-C, [facility]    Urinary Tract Infections in Women  Urinary tract infections (UTIs) are most often caused by bacteria. These bacteria enter the urinary tract. The bacteria may come from inside the body. Or they may travel from the skin outside the rectum or vagina into the urethra. Female anatomy makes it easy for bacteria from the bowel to enter a woman s urinary tract, which is the most common source of UTI. This means women develop UTIs more often than men. Pain in or around the urinary tract is a common UTI symptom. But the only way to know for sure if you have a UTI for the healthcare provider to test your urine. The two tests that may be done are the urinalysis and urine culture.     Types of UTIs    Cystitis. A bladder infection (cystitis) is the most common UTI in women. You may have urgent or frequent need to pee. You may also have pain, burning when you pee, and  bloody urine.    Urethritis. This is an inflamed urethra, which is the tube that carries urine from the bladder to outside the body. You may have lower stomach or back pain. You may also have urgent or frequent need to pee.    Pyelonephritis. This is a kidney infection. If not treated, it can be serious and damage your kidneys. In severe cases, you may need to stay in the hospital. You may have a fever and lower back pain.    Medicines to treat a UTI  Most UTIs are treated with antibiotics. These kill the bacteria. The length of time you need to take them depends on the type of infection. It may be as short as 3 days. If you have repeated UTIs, you may need a low-dose antibiotic for several months. Take antibiotics exactly as directed. Don t stop taking them until all of the medicine is gone. If you stop taking the antibiotic too soon, the infection may not go away. You may also develop a resistance to the antibiotic. This can make it much harder to treat.   Lifestyle changes to treat and prevent UTIs   The lifestyle changes below will help get rid of your UTI. They may also help prevent future UTIs.     Drink plenty of fluids. This includes water, juice, or other caffeine-free drinks. Fluids help flush bacteria out of your body.    Empty your bladder. Always empty your bladder when you feel the urge to pee. And always pee before going to sleep. Urine that stays in your bladder can lead to infection. Try to pee before and after sex as well.    Practice good personal hygiene. Wipe yourself from front to back after using the toilet. This helps keep bacteria from getting into the urethra.    Use condoms during sex. These help prevent UTIs caused by sexually transmitted bacteria. Also don't use spermicides during sex. These can increase the risk for UTIs. Choose other forms of birth control instead. For women who tend to get UTIs after sex, a low-dose of a preventive antibiotic may be used. Be sure to discuss this  option with your healthcare provider.    Follow up with your healthcare provider as directed. He or she may test to make sure the infection has cleared. If needed, more treatment may be started.  Elpidio last reviewed this educational content on 7/1/2019 2000-2021 [employer]. All rights reserved. This information is not intended as a substitute for professional medical care. Always follow your healthcare professional's instructions.         36.9

## 2024-12-09 ENCOUNTER — MYC MEDICAL ADVICE (OUTPATIENT)
Dept: RHEUMATOLOGY | Facility: CLINIC | Age: 29
End: 2024-12-09
Payer: COMMERCIAL

## 2024-12-09 DIAGNOSIS — L65.9 HAIR LOSS: Primary | ICD-10-CM

## 2024-12-09 NOTE — TELEPHONE ENCOUNTER
Telephone encounter regarding mychart message  Called and spoke with patient.     Diagnosis:Lupus  Provider: Dr. Perez  Current Rheum Medications:   Hcq  Meclizine   Colchicine  Modafinil       Description of problem:   Patient reports 1 month of significant fatigue and hair loss. Hair loss is new for her. The last week she has also noticed worsening of other lupus symptoms. These include, joint pain with stiffness, nasal ulcer, facial rash that comes and goes. Reports fever however highest temp was 99.9. She denies CP. Has slight SOB, this has been ongoing and her inhaler helps.     *of note: she is currently on ABX for sinus infection.     Follow up plan:  -Routing to provider to review and provide recommendation  -Rx pended: none. Not clear that she has used prednisone in the past.       Lindsey Torrez RN  [facility]

## 2024-12-10 NOTE — TELEPHONE ENCOUNTER
Aamir Perez MD  You14 minutes ago (11:14 AM)     I recommend referral to derm for hair loss eval.     Patient was called and providers recommendation was relayed. Patient is ok with derm referral and is understanding/agreeable with plan.       Lindsey Torrez RN  [facility]

## 2024-12-11 ENCOUNTER — TRANSFERRED RECORDS (OUTPATIENT)
Dept: HEALTH INFORMATION MANAGEMENT | Facility: CLINIC | Age: 29
End: 2024-12-11
Payer: COMMERCIAL

## 2024-12-19 DIAGNOSIS — E66.813 CLASS 3 SEVERE OBESITY WITH SERIOUS COMORBIDITY AND BODY MASS INDEX (BMI) OF 40.0 TO 44.9 IN ADULT, UNSPECIFIED OBESITY TYPE (H): ICD-10-CM

## 2024-12-19 DIAGNOSIS — R73.03 PREDIABETES: ICD-10-CM

## 2024-12-19 DIAGNOSIS — E66.01 CLASS 3 SEVERE OBESITY WITH SERIOUS COMORBIDITY AND BODY MASS INDEX (BMI) OF 40.0 TO 44.9 IN ADULT, UNSPECIFIED OBESITY TYPE (H): ICD-10-CM

## 2025-01-16 ENCOUNTER — OFFICE VISIT (OUTPATIENT)
Dept: FAMILY MEDICINE | Facility: CLINIC | Age: 30
End: 2025-01-16
Payer: COMMERCIAL

## 2025-01-16 VITALS
TEMPERATURE: 97.6 F | DIASTOLIC BLOOD PRESSURE: 70 MMHG | OXYGEN SATURATION: 100 % | RESPIRATION RATE: 26 BRPM | HEIGHT: 66 IN | SYSTOLIC BLOOD PRESSURE: 122 MMHG | HEART RATE: 92 BPM | BODY MASS INDEX: 42.52 KG/M2 | WEIGHT: 264.6 LBS

## 2025-01-16 DIAGNOSIS — R53.83 OTHER FATIGUE: ICD-10-CM

## 2025-01-16 DIAGNOSIS — R42 DIZZINESS: Primary | ICD-10-CM

## 2025-01-16 DIAGNOSIS — N92.0 MENORRHAGIA WITH REGULAR CYCLE: ICD-10-CM

## 2025-01-16 LAB
ERYTHROCYTE [DISTWIDTH] IN BLOOD BY AUTOMATED COUNT: 12.8 % (ref 10–15)
HCT VFR BLD AUTO: 42.3 % (ref 35–47)
HGB BLD-MCNC: 13.8 G/DL (ref 11.7–15.7)
MCH RBC QN AUTO: 29.2 PG (ref 26.5–33)
MCHC RBC AUTO-ENTMCNC: 32.6 G/DL (ref 31.5–36.5)
MCV RBC AUTO: 89 FL (ref 78–100)
PLATELET # BLD AUTO: 253 10E3/UL (ref 150–450)
RBC # BLD AUTO: 4.73 10E6/UL (ref 3.8–5.2)
WBC # BLD AUTO: 4.7 10E3/UL (ref 4–11)

## 2025-01-16 PROCEDURE — 85027 COMPLETE CBC AUTOMATED: CPT | Performed by: FAMILY MEDICINE

## 2025-01-16 PROCEDURE — 80053 COMPREHEN METABOLIC PANEL: CPT | Performed by: FAMILY MEDICINE

## 2025-01-16 PROCEDURE — 83540 ASSAY OF IRON: CPT | Performed by: FAMILY MEDICINE

## 2025-01-16 PROCEDURE — 90677 PCV20 VACCINE IM: CPT | Performed by: FAMILY MEDICINE

## 2025-01-16 PROCEDURE — 36415 COLL VENOUS BLD VENIPUNCTURE: CPT | Performed by: FAMILY MEDICINE

## 2025-01-16 PROCEDURE — 90471 IMMUNIZATION ADMIN: CPT | Performed by: FAMILY MEDICINE

## 2025-01-16 PROCEDURE — 83550 IRON BINDING TEST: CPT | Performed by: FAMILY MEDICINE

## 2025-01-16 PROCEDURE — 99214 OFFICE O/P EST MOD 30 MIN: CPT | Mod: 25 | Performed by: FAMILY MEDICINE

## 2025-01-16 ASSESSMENT — ASTHMA QUESTIONNAIRES
ACT_TOTALSCORE: 23
QUESTION_1 LAST FOUR WEEKS HOW MUCH OF THE TIME DID YOUR ASTHMA KEEP YOU FROM GETTING AS MUCH DONE AT WORK, SCHOOL OR AT HOME: NONE OF THE TIME
QUESTION_3 LAST FOUR WEEKS HOW OFTEN DID YOUR ASTHMA SYMPTOMS (WHEEZING, COUGHING, SHORTNESS OF BREATH, CHEST TIGHTNESS OR PAIN) WAKE YOU UP AT NIGHT OR EARLIER THAN USUAL IN THE MORNING: NOT AT ALL
QUESTION_4 LAST FOUR WEEKS HOW OFTEN HAVE YOU USED YOUR RESCUE INHALER OR NEBULIZER MEDICATION (SUCH AS ALBUTEROL): ONCE A WEEK OR LESS
QUESTION_2 LAST FOUR WEEKS HOW OFTEN HAVE YOU HAD SHORTNESS OF BREATH: ONCE OR TWICE A WEEK
QUESTION_5 LAST FOUR WEEKS HOW WOULD YOU RATE YOUR ASTHMA CONTROL: COMPLETELY CONTROLLED
ACT_TOTALSCORE: 23

## 2025-01-16 ASSESSMENT — PATIENT HEALTH QUESTIONNAIRE - PHQ9
SUM OF ALL RESPONSES TO PHQ QUESTIONS 1-9: 11
SUM OF ALL RESPONSES TO PHQ QUESTIONS 1-9: 11
10. IF YOU CHECKED OFF ANY PROBLEMS, HOW DIFFICULT HAVE THESE PROBLEMS MADE IT FOR YOU TO DO YOUR WORK, TAKE CARE OF THINGS AT HOME, OR GET ALONG WITH OTHER PEOPLE: SOMEWHAT DIFFICULT

## 2025-01-16 NOTE — PROGRESS NOTES
"  Assessment & Plan     Dizziness  Other fatigue  Differential diagnosis includes worsening of lupus, upper respiratory infection (although this came after onset of the symptoms) weight loss medications (Zepbound) ;  and anemia due to    Menorrhagia with regular cycle  Evaluate with  - CBC with platelets  - Iron and iron binding capacity -I ordered this because ferritin might be high due to lupus  - Comprehensive metabolic panel (BMP + Alb, Alk Phos, ALT, AST, Total. Bili, TP)    Return soon, Routine preventive, with pcp.        Araceli Del Cid is a 29 year old, presenting for the following health issues:  Dizziness        1/16/2025     1:32 PM   Additional Questions   Roomed by Slim BENITEZ RN     History of Present Illness       Reason for visit:  Dizziness  Symptom onset:  3-4 weeks ago  Symptoms include:  Dizziness, runny nose  Symptom intensity:  Moderate  Symptom progression:  Staying the same  Had these symptoms before:  Yes  Has tried/received treatment for these symptoms:  No  What makes it worse:  Standing She is missing 2 dose(s) of medications per week.  She is not taking prescribed medications regularly due to remembering to take and other.     Nehemias has noted dizziness  -which she describes as feeling faint  -in the last 3 to 4 weeks.  She says \"I thought it was because not eating as much;\" she has been taking Zepbound for weight loss for the last 2 months. She focused on trying to eat more but this did not make a change in her symptoms.  She also says she feels mentally fuzzy and fatigued.    She notes that her butterfly rash of lupus is coming back and wonders whether she could be having a lupus flare    Heavy menses I asked her about her menstrual periods when she says they are longer and heavier than usual.  She cannot take estrogen containing contraceptives because of her lupus, but she does take drospirenone alone.  She has never been sexually active.    Eats meat and takes iron - that " "improved hemoglobin      Wt Readings from Last 5 Encounters:   01/16/25 120 kg (264 lb 9.6 oz)   11/20/24 121.1 kg (267 lb)   11/06/24 121.9 kg (268 lb 12.8 oz)   10/11/24 121.9 kg (268 lb 12.8 oz)   08/15/24 122 kg (269 lb)     She also had symptoms of upper respiratory infection starting about 8 days ago: Runny nose, fatigue .headache and  sore throat without fever.  This is getting better now      Social History: Works at the [employer] at [employer] and she runs [employer]         Objective    /70 (BP Location: Left arm, Patient Position: Sitting, Cuff Size: Adult Regular)   Pulse 92   Temp 97.6  F (36.4  C) (Tympanic)   Resp 26   Ht 1.676 m (5' 6\")   Wt 120 kg (264 lb 9.6 oz)   SpO2 100%   BMI 42.71 kg/m    Body mass index is 42.71 kg/m .  Physical Exam   GENERAL: alert and no distress  EYES: Eyes grossly normal to inspection, PERRL and conjunctivae and sclerae normal  HENT: ear canals and TM's normal, nose and mouth without ulcers or lesions  RESP: lungs clear to auscultation - no rales, rhonchi or wheezes  CV: regular rate and rhythm, normal S1 S2, no S3 or S4, no murmur, click or rub, no peripheral edema             Signed Electronically by: Lizeth Munroe MD    "

## 2025-01-16 NOTE — NURSING NOTE
Prior to immunization administration, verified patients identity using patient s name and date of birth. Please see Immunization Activity for additional information.     Screening Questionnaire for Adult Immunization    Are you sick today?   Yes   Do you have allergies to medications, food, a vaccine component or latex?   Yes   Have you ever had a serious reaction after receiving a vaccination?   No   Do you have a long-term health problem with heart, lung, kidney, or metabolic disease (e.g., diabetes), asthma, a blood disorder, no spleen, complement component deficiency, a cochlear implant, or a spinal fluid leak?  Are you on long-term aspirin therapy?   Yes   Do you have cancer, leukemia, HIV/AIDS, or any other immune system problem?   No   Do you have a parent, brother, or sister with an immune system problem?   No   In the past 3 months, have you taken medications that affect  your immune system, such as prednisone, other steroids, or anticancer drugs; drugs for the treatment of rheumatoid arthritis, Crohn s disease, or psoriasis; or have you had radiation treatments?   No   Have you had a seizure, or a brain or other nervous system problem?   No   During the past year, have you received a transfusion of blood or blood    products, or been given immune (gamma) globulin or antiviral drug?   No   For women: Are you pregnant or is there a chance you could become       pregnant during the next month?   No   Have you received any vaccinations in the past 4 weeks?   No     Immunization questionnaire was positive for at least one answer.  MD aware.      Pt tolerated injection (Prevnar 20). Site (left deltoid) was cleansed with alcohol prior to injections. No pain, burning, swelling or redness at the site of the injection. Patient instructed to remain in clinic for 15 minutes afterwards, and to report any adverse reactions    Screening performed by Niurka Caraballo RN on 1/16/2025 at 2:27 PM.

## 2025-01-16 NOTE — LETTER
2025    Nehemias Mascorro   1995        To Whom it May Concern;    Please excuse Nehemias Mascorro from work/school for a healthcare visit on 2025. She has ongoing medical conditions contributing to her fatigue and on top of this now has a upper respiratory infection .  Please excuse her from work today through 25.  If she feels much improved, she may return to work earlier    Sincerely,        Lizeth Munroe MD

## 2025-01-17 LAB
ALBUMIN SERPL BCG-MCNC: 4.3 G/DL (ref 3.5–5.2)
ALP SERPL-CCNC: 79 U/L (ref 40–150)
ALT SERPL W P-5'-P-CCNC: 42 U/L (ref 0–50)
ANION GAP SERPL CALCULATED.3IONS-SCNC: 11 MMOL/L (ref 7–15)
AST SERPL W P-5'-P-CCNC: 26 U/L (ref 0–45)
BILIRUB SERPL-MCNC: 0.4 MG/DL
BUN SERPL-MCNC: 9.6 MG/DL (ref 6–20)
CALCIUM SERPL-MCNC: 9.3 MG/DL (ref 8.8–10.4)
CHLORIDE SERPL-SCNC: 105 MMOL/L (ref 98–107)
CREAT SERPL-MCNC: 1.01 MG/DL (ref 0.51–0.95)
EGFRCR SERPLBLD CKD-EPI 2021: 77 ML/MIN/1.73M2
GLUCOSE SERPL-MCNC: 71 MG/DL (ref 70–99)
HCO3 SERPL-SCNC: 25 MMOL/L (ref 22–29)
IRON BINDING CAPACITY (ROCHE): 336 UG/DL (ref 240–430)
IRON SATN MFR SERPL: 22 % (ref 15–46)
IRON SERPL-MCNC: 74 UG/DL (ref 37–145)
POTASSIUM SERPL-SCNC: 4.1 MMOL/L (ref 3.4–5.3)
PROT SERPL-MCNC: 7 G/DL (ref 6.4–8.3)
SODIUM SERPL-SCNC: 141 MMOL/L (ref 135–145)

## 2025-01-22 ENCOUNTER — TRANSFERRED RECORDS (OUTPATIENT)
Dept: HEALTH INFORMATION MANAGEMENT | Facility: CLINIC | Age: 30
End: 2025-01-22
Payer: COMMERCIAL

## 2025-01-27 DIAGNOSIS — E66.01 CLASS 3 SEVERE OBESITY WITH SERIOUS COMORBIDITY AND BODY MASS INDEX (BMI) OF 40.0 TO 44.9 IN ADULT, UNSPECIFIED OBESITY TYPE (H): ICD-10-CM

## 2025-01-27 DIAGNOSIS — E66.813 CLASS 3 SEVERE OBESITY WITH SERIOUS COMORBIDITY AND BODY MASS INDEX (BMI) OF 40.0 TO 44.9 IN ADULT, UNSPECIFIED OBESITY TYPE (H): ICD-10-CM

## 2025-01-27 DIAGNOSIS — R73.03 PREDIABETES: ICD-10-CM

## 2025-02-04 ENCOUNTER — MYC MEDICAL ADVICE (OUTPATIENT)
Dept: ENDOCRINOLOGY | Facility: CLINIC | Age: 30
End: 2025-02-04

## 2025-02-14 NOTE — PROGRESS NOTES
"Video-Visit Details    Type of service:  Video Visit    Video Start Time: 12:52 pm  Video End Time: 1:08 pm    Originating Location (pt. Location): Home    Distant Location (provider location):  Offsite (providers home) [facility] [place]     Platform used for Video Visit: Allakos      Weight Management Nutrition Consultation    Nehemias Mascorro is a 29 year old female presents today for return weight management nutrition consultation.  Patient referred by Elisa Rivera PA-C on May 7, 2024. Previously seen by Dr. Slade.     Patient with Co-morbidities of obesity including:  H/o prediabetes, hypopitiuitarism     Anthropometrics:  Initial Consult 5/17/24: 117.9 kg (260 lb).    Estimated body mass index is 41.18 kg/m  as calculated from the following:    Height as of this encounter: 1.676 m (5' 5.98\").    Weight as of this encounter: 115.7 kg (255 lb).     Current weight: 257 lbs    Medications for Weight Loss:  Zepbound, naltrexone    NUTRITION HISTORY  Food allergies: None  Food intolerances: Gluten, lactose    Vitamin/Mineral Supplements: Vitamin D, probiotic, iron supplement      Previous methods of diet modification for weight loss: Counting calories (My Fitness Pal), meal replacements (protein shakes), less sugar.  Took Wegovy last year and lost 40 lbs.     RD before: wt mgmt RD in 2020. GI RD in 2023 for IBS-C and abd pain.     Was following IF (waiting to eat until 11 am), until last few days started eating breakfast at 9 am.   Prepares her own meals.   Preferences: Rice, beans, fish - African American, Tuvaluan foods, Soul food, Mexican foods.   Cravings - sugar. Occ red meat  Pt goals - cut back on carbs, more fiber. Foods that help improve energy and iron intake as she has chronic fatigue and iron deficiency anemia.     10/31/24 - Weight now stable. Found addition of naltrexone helpful. Recently threw out all her snacks and restocked healthier choices. Doing well " "with balance meal intake. She would like to have dinner a little sooner in the evening. Is experiencing some reflux at night. She is getting bored of some of her regular meal choices, looking to add variety.   She is concerned about the winter and mood. Discussed strategies to help like taking a walk when the sun is up, using her \"happy\" light.     1/17/25 - Switched to Zepbound. On 10 mg currently. Notices its more painful to inject compared to Wegovy. Has tried different injection sites.   Feeling more tired lately. Has Lupus and feels like she may be getting a flare.   Occasional nausea, has thrown up twice. She can't remember that circumstances around it.   Struggling most with incorporating veggies. Prefers fresh veggies.    Has been feeling dizzy lately. Reports she was drinking less water while she was traveling over the holidays.     Recent Diet Recall:  Breakfast: 9 am bagel with cheese and sausage; frozen waffle  Lunch: 1 pm Healthy Choice; sandwich; left-overs  Snack: 3-4 pm apples, sliced veggies, cheese sticks, meat sticks   Dinner: 6 pm salmon/chicken and veggies; Healthy Choice   After dinner Snacks: desserts (ice cream, cookie, pie, mini snickers) or snack   Beverages: Water (less while she was traveling)  Alcohol: Rarely   Dining Out/take-out: Twice this week, overall less than usual. Chipotle, [employer]s , [employer], Burger.      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   Does well with steps on days she is at the food [employer].     March 2025:    Has been sick for 2 weeks - cold. Decreased appetite overall.    Also had her period. Was eating more sweets - cupcakes, etc. Curious about sweet alternatives. Discussed     Feels dehydrated right now with being sick. Hasn't been drinking as much as she should this past week per pt. Was drinking some gatorade. Was drinking enough prior.     Did well with veggie goal prior to getting " sick. Curious about fresh vs frozen vegetables.     Progress Towards Previous Goals:  1) Increase water intake, aim for 64-96 oz/d. - Dehydrated right now with being sick per pt.  2) Keep a veggie tray and salad kits in the fridge.- Went really well prior to getting sick. Tried ranch/yogurt but did not like. Curious about trying low fat or fat free sour cream.  3) Ok to replace meal with protein shake if not feeling hungry. - Met but hasn't done the last two weeks with being sick    Nutrition Prescription  Recommended energy/nutrient modification.    Nutrition Diagnosis  Obesity r/t long history of positive energy balance aeb BMI >30. - Improving    Nutrition Intervention  Materials/education provided on hypocaloric diet for weight loss.   Reviewed progress towards previous goals.   Praised pt on the progress she has made.  Reviewed diet strategies for mitigating side-effects of Zepbound.    Discussed strategies for incorporating veggies.   Reviewed healthy food choices.   ANswered pt's nutrition-related questions.   Co-developed goals to work towards.   Provided pt with list of goals and resources below via Peeractive.     Expected Engagement: good    Nutrition Goals  Aim to stay hydrated as able, goal of 64-96 oz/day (water, crystal light, sparkling water)   Consider trying gatorade zero  Consider low fat/fat free sour cream and ranch as a dip option for vegetables   Aim for vegetables 2x/day  Mindful with sweets - consider alteratives     Sweet Alternatives   https://www.heart.org/en/healthy-living/healthy-eating/eat-smart/sugar/life-is-sweet-with-these-easy-sugar-swaps-infographic     Sweet alternative considerations: yasso bars, 1/4 c trail mix, homemade yogurt bars, Rx bars, dried fruit    Homemade dessert recipes:    Greek Yogurt Chocolate Mousse   https://www.diabetesfoodhub.org/recipes/greek-yogurt-chocolate-mousse.html?home-category_id=23     No Bake [place] Butter  "Cookies  https://www.Online Warmongers.Carlson Wireless/ay-ovfx-sgesjn-butter-cookies/     Titus Seed Chocolate Pudding  https://www.Spoutokingcollective.Carlson Wireless.au/chocolate-titus-pudding/#recipe     Snickerdoodle hummus  https://Stroz Friedberg/vyfsxgzaqktpb-aipqxcp-mezfyk/#recipe     Peanut Butter Dip  https://www.diabetesfoodhub.org/recipes/sweet-peanut-buttery-dip.html?home-category_id=23     Double Chocolate Zucchini Cake (can adapt recipe and use applesauce instead of butter/oil)  https://CayMay Education/2021/05/double-chocolate-zucchini-loaf-cake.html     Banana Oat Muffins   https://Luminary Micro/blog/healthy-desserts/#by-dgfiyeqb-682     Chocolate hummus   https://MATRIXX Software/chocolate-hummus/#tasty-recipes-82868-jump-target     Peanut butter cookie dough hummus   https://[facility]/2018/05/15/peanut-butter-cookie-dough-dessert-hummus/     Banana Peanut Butter Oat Bars  https://Agari/banana-peanut-butter-oat-protein-bars/     The Plate Method  https://fvfiles.com/614587.pdf    Protein Sources   http://Lorena Gaxiola/301644.pdf     Carbohydrates  http://fvfiles.com/172577.pdf     Mindful Eating  http://Lorena Gaxiola/833210.pdf     Summary of Volumetrics Eating Plan  http://fvfiles.com/580538.pdf       Healthy Recipe Resources:  Books:  \"The Volumetrics Eating Plan\" by Mehnaz Watson, Ph.D.  \"Cooking that Counts\" by editors of CookingLight  \"Calorie Smart Meals\" cookbook by Better Homes and Gardens (200-500 calorie meals)    Websites:  www.Game Face Hockey  https://www.BuldumBuldum.com.Carlson Wireless/  www.TinyTap.Informed Trades  https://www.diabetesfoodhub.org/all-recipes.html  Https://www.DogSpotplate.gov/myplatekitchen  Recipes by Season: https://snaped.fns.usda.gov/recipes-menus   Video Meal Prep: Https://www.Arquo Technologiesube.com/c/opal   Meal Plans: EatBreeze Technology.Carlson Wireless   DASH Diet: https://www.nhlbi.nih.gov/education/dash-eating-plan  Whole Grains [place]: " https://Sina Weiboainscouncil.org/recipes      Cultural Cuisines:  Various Regions of African Cuisine: https://www.Newsy.PMG Solutions/recipes/44015/cuisines-regions//   Cuisine: https://www.Sleep.FM.org/knowledge-center/recipes/  Mexican and Vegan Cuisine:   https://Blue Bottle Coffee/  https://HuTerra/recipe-index/  Chinese Cuisine: https://www.ASLAN Pharmaceuticals/  South Asian Cuisine:  Sammie Cortes on social media- South  high-protein/low-calorie meal/food ideas  Kristyn Ni RD, [facility], plant-based [facility]  Resources: https://www.Kairos/    Apps:  OpenCurriculum robby (or website, mealime.com)   SpendSmartEatSmart       Follow-Up:  Tuesday, April 29th at 9:30 am    Time spent with patient: 16 minutes.  STARLA Og, RD, LD  Clinic #: [phone]

## 2025-02-27 ENCOUNTER — VIRTUAL VISIT (OUTPATIENT)
Dept: FAMILY MEDICINE | Facility: CLINIC | Age: 30
End: 2025-02-27
Payer: COMMERCIAL

## 2025-02-27 DIAGNOSIS — J45.21 MILD INTERMITTENT ASTHMA WITH ACUTE EXACERBATION: Primary | ICD-10-CM

## 2025-02-27 DIAGNOSIS — E61.1 IRON DEFICIENCY: ICD-10-CM

## 2025-02-27 RX ORDER — FERROUS SULFATE 325(65) MG
325 TABLET, DELAYED RELEASE (ENTERIC COATED) ORAL DAILY
Qty: 90 TABLET | Refills: 2 | Status: SHIPPED | OUTPATIENT
Start: 2025-02-27

## 2025-02-27 RX ORDER — PREDNISONE 10 MG/1
TABLET ORAL
Qty: 18 TABLET | Refills: 0 | Status: SHIPPED | OUTPATIENT
Start: 2025-02-27 | End: 2025-03-08

## 2025-02-27 ASSESSMENT — PATIENT HEALTH QUESTIONNAIRE - PHQ9
10. IF YOU CHECKED OFF ANY PROBLEMS, HOW DIFFICULT HAVE THESE PROBLEMS MADE IT FOR YOU TO DO YOUR WORK, TAKE CARE OF THINGS AT HOME, OR GET ALONG WITH OTHER PEOPLE: SOMEWHAT DIFFICULT
SUM OF ALL RESPONSES TO PHQ QUESTIONS 1-9: 12
SUM OF ALL RESPONSES TO PHQ QUESTIONS 1-9: 12

## 2025-02-27 NOTE — PROGRESS NOTES
"Nehemias is a 29 year old who is being evaluated via a billable video visit.    How would you like to obtain your AVS? MyChart  If the video visit is dropped, the invitation should be resent by: Text to cell phone: [phone]  Will anyone else be joining your video visit? No      Assessment & Plan     (J45.21) Mild intermittent asthma with acute exacerbation  (primary encounter diagnosis)  Comment:   Plan: predniSONE (DELTASONE) 10 MG tablet,         amoxicillin-clavulanate (AUGMENTIN) 875-125 MG         tablet          Patient advised to focus on symptom management with saline nasal rinse, nasal steroid spray, decongestants if tolerated.  Based on the course of her symptoms lasting greater than 10 days, antibiotic treatment is warranted, side effects of medication discussed and patient was advised to follow-up with me if not improving over the next 5 to 7 days.    BMI  Estimated body mass index is 42.47 kg/m  as calculated from the following:    Height as of 1/17/25: 1.676 m (5' 5.98\").    Weight as of 1/17/25: 119.3 kg (263 lb).             Subjective   Nehemias is a 29 year old, presenting for the following health issues:  Sick (On-going since last Tuesday, headache, stuffy nose, little ear pain (was severe), coughing and blowing out yellow stuff and a little blood, chest hurts when coughing sometimes, teeth hurt, little runny nose, no fever today, no face pain, little fatigue and slight SOB. /)        2/27/2025     3:56 PM   Additional Questions   Roomed by ANA MARIA Thompson   Accompanied by Self     Video Start Time: 4:40 PM    History of Present Illness       Reason for visit:  Cold symptoms  Symptom onset:  1-2 weeks ago  Symptoms include:  Cough yellow mucus, stuffy nose, teeth hurt, chest hurt when coughing, shortness of breath, headache, ear pain  Symptom intensity:  Moderate  Symptom progression:  Improving  Had these symptoms before:  Yes  Has tried/received treatment for these symptoms:  No  What makes it " worse:  Physical exertion  What makes it better:  Tylenol sinus She is missing 2 dose(s) of medications per week.  She is not taking prescribed medications regularly due to other.                    Objective    Vitals - Patient Reported  Systolic (Patient Reported):  (unknown)  Diastolic (Patient Reported):  (unknown)  Weight (Patient Reported): 115.7 kg (255 lb)  Pain Score: Moderate Pain (4)  Pain Loc: Head      Vitals:  No vitals were obtained today due to virtual visit.    Physical Exam   GENERAL: alert and no distress  EYES: Eyes grossly normal to inspection.  No discharge or erythema, or obvious scleral/conjunctival abnormalities.  RESP: No audible wheeze, cough, or visible cyanosis.    SKIN: Visible skin clear. No significant rash, abnormal pigmentation or lesions.  NEURO: Cranial nerves grossly intact.  Mentation and speech appropriate for age.  PSYCH: Appropriate affect, tone, and pace of words          Video-Visit Details    Type of service:  Video Visit   Video End Time:4:44 PM  Originating Location (pt. Location): Home    Distant Location (provider location):  On-site  Platform used for Video Visit: Peace  Signed Electronically by: Slim Bullard PA-C

## 2025-03-03 ENCOUNTER — VIRTUAL VISIT (OUTPATIENT)
Dept: ENDOCRINOLOGY | Facility: CLINIC | Age: 30
End: 2025-03-03
Payer: COMMERCIAL

## 2025-03-03 VITALS — BODY MASS INDEX: 41.3 KG/M2 | WEIGHT: 257 LBS | HEIGHT: 66 IN

## 2025-03-03 DIAGNOSIS — Z71.3 NUTRITIONAL COUNSELING: Primary | ICD-10-CM

## 2025-03-03 DIAGNOSIS — R73.03 PREDIABETES: ICD-10-CM

## 2025-03-03 DIAGNOSIS — E66.01 CLASS 3 SEVERE OBESITY WITH SERIOUS COMORBIDITY AND BODY MASS INDEX (BMI) OF 40.0 TO 44.9 IN ADULT, UNSPECIFIED OBESITY TYPE (H): ICD-10-CM

## 2025-03-03 DIAGNOSIS — E66.813 CLASS 3 SEVERE OBESITY WITH SERIOUS COMORBIDITY AND BODY MASS INDEX (BMI) OF 40.0 TO 44.9 IN ADULT, UNSPECIFIED OBESITY TYPE (H): ICD-10-CM

## 2025-03-03 PROCEDURE — 99207 PR NO CHARGE LOS: CPT | Mod: 95 | Performed by: DIETITIAN, REGISTERED

## 2025-03-03 PROCEDURE — 97803 MED NUTRITION INDIV SUBSEQ: CPT | Mod: 95 | Performed by: DIETITIAN, REGISTERED

## 2025-03-03 ASSESSMENT — PATIENT HEALTH QUESTIONNAIRE - PHQ9: SUM OF ALL RESPONSES TO PHQ QUESTIONS 1-9: 12

## 2025-03-03 NOTE — LETTER
"3/3/2025       RE: Nehemias Mascorro  [address]  [place] [address]     Dear Colleague,    Thank you for referring your patient, Nehemias Mascorro, to the [facility]. Please see a copy of my visit note below.    Video-Visit Details    Type of service:  Video Visit    Video Start Time: 12:52 pm  Video End Time: 1:08 pm    Originating Location (pt. Location): Home    Distant Location (provider location):  Offsite (providers home) [facility]     Platform used for Video Visit: [employer]      Weight Management Nutrition Consultation    Nehemias Mascorro is a 29 year old female presents today for return weight management nutrition consultation.  Patient referred by Elisa Rivera PA-C on May 7, 2024. Previously seen by Dr. Slade.     Patient with Co-morbidities of obesity including:  H/o prediabetes, hypopitiuitarism     Anthropometrics:  Initial Consult 5/17/24: 117.9 kg (260 lb).    Estimated body mass index is 41.18 kg/m  as calculated from the following:    Height as of this encounter: 1.676 m (5' 5.98\").    Weight as of this encounter: 115.7 kg (255 lb).     Current weight: 257 lbs    Medications for Weight Loss:  Zepbound, naltrexone    NUTRITION HISTORY  Food allergies: None  Food intolerances: Gluten, lactose    Vitamin/Mineral Supplements: Vitamin D, probiotic, iron supplement      Previous methods of diet modification for weight loss: Counting calories (My Fitness Pal), meal replacements (protein shakes), less sugar.  Took Wegovy last year and lost 40 lbs.     RD before: wt mgmt RD in 2020. GI RD in 2023 for IBS-C and abd pain.     Was following IF (waiting to eat until 11 am), until last few days started eating breakfast at 9 am.   Prepares her own meals.   Preferences: Rice, beans, fish - African American, Jordanian foods, Soul food, Mexican foods. " "  Cravings - sugar. Occ red meat  Pt goals - cut back on carbs, more fiber. Foods that help improve energy and iron intake as she has chronic fatigue and iron deficiency anemia.     10/31/24 - Weight now stable. Found addition of naltrexone helpful. Recently threw out all her snacks and restocked healthier choices. Doing well with balance meal intake. She would like to have dinner a little sooner in the evening. Is experiencing some reflux at night. She is getting bored of some of her regular meal choices, looking to add variety.   She is concerned about the winter and mood. Discussed strategies to help like taking a walk when the sun is up, using her \"happy\" light.     1/17/25 - Switched to Zepbound. On 10 mg currently. Notices its more painful to inject compared to Wegovy. Has tried different injection sites.   Feeling more tired lately. Has Lupus and feels like she may be getting a flare.   Occasional nausea, has thrown up twice. She can't remember that circumstances around it.   Struggling most with incorporating veggies. Prefers fresh veggies.    Has been feeling dizzy lately. Reports she was drinking less water while she was traveling over the holidays.     Recent Diet Recall:  Breakfast: 9 am bagel with cheese and sausage; frozen waffle  Lunch: 1 pm Healthy Choice; sandwich; left-overs  Snack: 3-4 pm apples, sliced veggies, cheese sticks, meat sticks   Dinner: 6 pm salmon/chicken and veggies; Healthy Choice   After dinner Snacks: desserts (ice cream, cookie, pie, mini snickers) or snack   Beverages: Water (less while she was traveling)  Alcohol: Rarely   Dining Out/take-out: Twice this week, overall less than usual. Chipotle, [employer]s , Nhan Robles.      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   Does well with steps on days she is at the food shelf.     March 2025:    Has been sick for 2 weeks - cold. Decreased appetite " overall.    Also had her period. Was eating more sweets - cupcakes, etc. Curious about sweet alternatives. Discussed     Feels dehydrated right now with being sick. Hasn't been drinking as much as she should this past week per pt. Was drinking some gatorade. Was drinking enough prior.     Did well with veggie goal prior to getting sick. Curious about fresh vs frozen vegetables.     Progress Towards Previous Goals:  1) Increase water intake, aim for 64-96 oz/d. - Dehydrated right now with being sick per pt.  2) Keep a veggie tray and salad kits in the fridge.- Went really well prior to getting sick. Tried ranch/yogurt but did not like. Curious about trying low fat or fat free sour cream.  3) Ok to replace meal with protein shake if not feeling hungry. - Met but hasn't done the last two weeks with being sick    Nutrition Prescription  Recommended energy/nutrient modification.    Nutrition Diagnosis  Obesity r/t long history of positive energy balance aeb BMI >30. - Improving    Nutrition Intervention  Materials/education provided on hypocaloric diet for weight loss.   Reviewed progress towards previous goals.   Praised pt on the progress she has made.  Reviewed diet strategies for mitigating side-effects of Zepbound.    Discussed strategies for incorporating veggies.   Reviewed healthy food choices.   ANswered pt's nutrition-related questions.   Co-developed goals to work towards.   Provided pt with list of goals and resources below via AppSharet.     Expected Engagement: good    Nutrition Goals  Aim to stay hydrated as able, goal of 64-96 oz/day (water, crystal light, sparkling water)   Consider trying gatorade zero  Consider low fat/fat free sour cream and ranch as a dip option for vegetables   Aim for vegetables 2x/day  Mindful with sweets - consider alteratives     Sweet Alternatives   https://www.heart.org/en/healthy-living/healthy-eating/eat-smart/sugar/life-is-sweet-with-these-easy-sugar-swaps-infographic  "    Sweet alternative considerations: yasso bars, 1/4 c trail mix, homemade yogurt bars, Rx bars, dried fruit    Homemade dessert recipes:    Greek Yogurt Chocolate Mousse   https://www.diabetesfoodhub.org/recipes/greek-yogurt-chocolate-mousse.html?home-category_id=23     No Bake [place] Butter Cookies  https://www.RENTISH/df-rsus-ahcjwc-butter-cookies/     Titus Seed Chocolate Pudding  https://www.Cloudynllective.Aupix.au/chocolate-titsu-pudding/#recipe     Snickerdoodle hummus  https://TxCell.Aupix/oaknyemrurgxc-ifimkro-kjuddk/#recipe     Peanut Butter Dip  https://www.diabetesfoodhub.org/recipes/sweet-peanut-buttery-dip.html?home-category_id=23     Double Chocolate Zucchini Cake (can adapt recipe and use applesauce instead of butter/oil)  https://Science Exchange/2021/05/double-chocolate-zucchini-loaf-cake.html     Banana Oat Muffins   https://WebMD/blog/healthy-desserts/#ux-plkmpnsl-091     Chocolate hummus   https://tocario.Aupix/chocolate-hummus/#tasty-recipes-82868-jump-target     Peanut butter cookie dough hummus   https://Dun & Bradstreet Credibility Corp..Aupix/2018/05/15/peanut-butter-cookie-dough-dessert-hummus/     Banana Peanut Butter Oat Bars  https://Sjapper.Aupix/banana-peanut-butter-oat-protein-bars/     The Plate Method  https://fvfiles.com/502733.pdf    Protein Sources   http://Zirtual/174897.pdf     Carbohydrates  http://fvfiles.com/908360.pdf     Mindful Eating  http://Zirtual/076932.pdf     Summary of Volumetrics Eating Plan  http://fvfiles.com/790573.pdf       Healthy Recipe Resources:  Books:  \"The Volumetrics Eating Plan\" by Mehnaz Watson, Ph.D.  \"Cooking that Counts\" by editors of CookingLight  \"Calorie Smart Meals\" cookbook by Better Homes and Gardens (200-500 calorie " meals)    Websites:  www.Veniti  https://www.Joongel/  www.mySociety.ConsiderC  https://www.diabetesfoodhub.org/all-recipes.html  Https://www.Blue Nile Entertainmentmyplate.gov/myplatekitchen  Recipes by Season: https://snaped.The Online 401s.usda.gov/recipes-menus   Video Meal Prep: Https://www.AuctionPay.com/c/medardocoen   Meal Plans: EatthiFired Up Christian Wear.com   DASH Diet: https://www.nhlbi.nih.gov/education/dash-eating-plan  Whole Grains [place]: https://Sudox Paintsuncil.org/recipes      Cultural Cuisines:  Various Regions of African Cuisine: https://www.EntraTympanic/recipes/35548/cuisines-regions//   Cuisine: https://www.Reko Global Water.org/knowledge-center/recipes/  Mexican and Vegan Cuisine:   https://Proximagen/  https://Backpack/recipe-index/  Chinese Cuisine: https://www.Compound Semiconductor Technologies/  South Asian Cuisine:  Sammie Cortes on social media- South  high-protein/low-calorie meal/food ideas  Kristyn Ni RD, [facility], plant-based South  Resources: https://www.Academize/    Apps:  Blue River Technology robby (or website, mealime.com)   EricartEatSmart       Follow-Up:  Tuesday, April 29th at 9:30 am    Time spent with patient: 16 minutes.  STARLA Og, KENA, LD  Clinic #: [phone]          Again, thank you for allowing me to participate in the care of your patient.      Sincerely,    Estela Villegas RD

## 2025-03-03 NOTE — NURSING NOTE
Current patient location: [address] [place] [address]    Is the patient currently in the state of [place]? YES    Visit mode:VIDEO    If the visit is dropped, the patient can be reconnected by: VIDEO VISIT: Send to e-mail at: [email or web address]    Will anyone else be joining the visit? NO  (If patient encounters technical issues they should call [phone]    Are changes needed to the allergy or medication list? Pt stated no changes to allergies and Pt stated no med changes    Are refills needed on medications prescribed by this physician? NO    Reason for visit: RECHECK    Desiree Beck VVF    High phq2&9, decline triage nurse,  pt state they are seeing someone for depression already.

## 2025-03-03 NOTE — PATIENT INSTRUCTIONS
Nutrition Goals  Aim to stay hydrated as able, goal of 64-96 oz/day (water, crystal light, sparkling water)   Consider trying gatorade zero  Consider low fat/fat free sour cream and ranch as a dip option for vegetables   Aim for vegetables 2x/day  Mindful with sweets - consider alteratives     Sweet Alternatives   https://www.heart.org/en/healthy-living/healthy-eating/eat-smart/sugar/life-is-sweet-with-these-easy-sugar-swaps-infographic     Sweet alternative considerations: yasso bars, 1/4 c trail mix, homemade yogurt bars, Rx bars, dried fruit    Homemade dessert recipes:    Greek Yogurt Chocolate Mousse   https://www.diabetesfoodhub.org/recipes/greek-yogurt-chocolate-mousse.html?home-category_id=23     No Bake [place] Butter Cookies  https://wwwProperty Partner/nn-ulod-vxctpy-butter-cookies/     Titus Seed Chocolate Pudding  https://www.LifeBook.AxialMED.au/chocolate-titus-pudding/#recipe     Snickerdoodle hummus  https://RetailerSaver.com/ertxkcgdnfhek-trlppxn-afhlso/#recipe     Peanut Butter Dip  https://www.diabetesfoodhub.org/recipes/sweet-peanut-buttery-dip.html?home-category_id=23     Double Chocolate Zucchini Cake (can adapt recipe and use applesauce instead of butter/oil)  https://SavySwap/2021/05/double-chocolate-zucchini-loaf-cake.html     Banana Oat Muffins   https://Radio NEXT/blog/healthy-desserts/#hq-idszlwkf-455     Chocolate hummus   https://Climateminder.AxialMED/chocolate-hummus/#tasty-recipes-82868-jump-target     Peanut butter cookie dough hummus   https://cooknourishbliss.AxialMED/2018/05/15/peanut-butter-cookie-dough-dessert-hummus/     Banana Peanut Butter Oat Bars  https://Wetradetogether/banana-peanut-butter-oat-protein-bars/     The Plate Method  https://fvfiles.com/020508.pdf    Protein Sources   http://Pinguo/868211.pdf     Carbohydrates  http://fvfiles.com/237121.pdf     Mindful Eating  http://Pinguo/671144.pdf     Summary of  "Volumetrics Eating Plan  http://fvfiles.com/865509.pdf       Healthy Recipe Resources:  Books:  \"The Volumetrics Eating Plan\" by Mehnaz Watson, Ph.D.  \"Cooking that Counts\" by editors of BrightFunnel  \"Calorie Smart Meals\" cookbook by Better Homes and Gardens (200-500 calorie meals)    Websites:  www.Greycork  https://www.Williams Furniture/  www.Revl  https://www.diabetesfoodhub.org/all-recipes.html  Https://www.Synlogicplate.gov/myplatekitchen  Recipes by Season: https://snaped.Coltello Ristorantes.usda.gov/recipes-menus   Video Meal Prep: Https://www.IndigoBoom.com/c/zachcoen   Meal Plans: EatthiAmerican Efficient.com   DASH Diet: https://www.nhlbi.nih.gov/education/dash-eating-plan  Whole Grains [place]: https://wholegrainscouncil.org/recipes      Cultural Cuisines:  Various Regions of African Cuisine: https://www.Seedcamp.LocalBonus/recipes/93980/cuisines-regions//   Cuisine: https://www.Connected.org/knowledge-center/recipes/  Mexican and Vegan Cuisine:   https://Anybots/  https://Frontleaf/recipe-index/  Chinese Cuisine: https://www.Glowbl/  South Asian Cuisine:  Sammie Cortes on social media- South  high-protein/low-calorie meal/food ideas  Kristyn Ni RD, CDCES, plant-based South  Resources: https://www.Prizeo/    Apps:  Hyperformix robby (or website, mealime.com)   Linda       Follow-Up:  Tuesday, April 29th at 9:30 am    STARLA Og, RD, LD  Clinic #: [phone]    "

## 2025-03-10 NOTE — TELEPHONE ENCOUNTER
Patient confirmed scheduled appointment:  Date: 3/18  Time: 8:00  Visit type: RTN VV  Provider: Meggan  Location: VV  Testing/imaging: NA  Additional notes: Rescheduled Nicole buckley.

## 2025-03-18 ENCOUNTER — VIRTUAL VISIT (OUTPATIENT)
Dept: ENDOCRINOLOGY | Facility: CLINIC | Age: 30
End: 2025-03-18
Payer: COMMERCIAL

## 2025-03-18 VITALS — BODY MASS INDEX: 40.98 KG/M2 | HEIGHT: 66 IN | WEIGHT: 255 LBS

## 2025-03-18 DIAGNOSIS — R73.03 PREDIABETES: ICD-10-CM

## 2025-03-18 DIAGNOSIS — E66.813 CLASS 3 SEVERE OBESITY WITH SERIOUS COMORBIDITY AND BODY MASS INDEX (BMI) OF 40.0 TO 44.9 IN ADULT, UNSPECIFIED OBESITY TYPE (H): Primary | ICD-10-CM

## 2025-03-18 DIAGNOSIS — E66.01 CLASS 3 SEVERE OBESITY WITH SERIOUS COMORBIDITY AND BODY MASS INDEX (BMI) OF 40.0 TO 44.9 IN ADULT, UNSPECIFIED OBESITY TYPE (H): Primary | ICD-10-CM

## 2025-03-18 ASSESSMENT — PAIN SCALES - GENERAL: PAINLEVEL_OUTOF10: MILD PAIN (3)

## 2025-03-18 NOTE — NURSING NOTE
Current patient location: [address] [place] [address]    Is the patient currently in the state of [place]? YES    Visit mode: VIDEO    If the visit is dropped, the patient can be reconnected by:VIDEO VISIT: Send to e-mail at: [email or web address]    Will anyone else be joining the visit? NO  (If patient encounters technical issues they should call [phone]    Are changes needed to the allergy or medication list? No    Are refills needed on medications prescribed by this physician? NO    Rooming Documentation:  Questionnaire(s) completed    Reason for visit: RECHECK    Pt states 3/10 headache today-chronic headaches.    Yury MADERA

## 2025-03-18 NOTE — ASSESSMENT & PLAN NOTE
Tolerating switch from Wegovy to Zepbound without adverse side effects.  Notices reduced hunger and cravings.  Has started to see weight loss again.  Feels appetite is well-controlled and no need to go up on Zepbound at this time.  We could consider prior to follow-up if needed.  She will work on nutrition and hydration in the meantime.    Continue zepbound 10mg   Increase fluids   Increase protein   Follow up 3 months   Could consider dose increase if getting adequate nutrition/ hydration, heart burn improves and not seeing weight loss prior to next visit

## 2025-03-18 NOTE — PROGRESS NOTES
Return Medical Weight Management Note     Nehemias Mascorro  MRN:  [id number]  :  1995  JASMEET:  3/18/2025    Dear Alexandra Rodrigues MD,    I had the pleasure of seeing your patient Nehemias Mascorro. She is a 29 year old female who I am continuing to see for treatment of obesity related to:         No data to display                Assessment & Plan   Problem List Items Addressed This Visit          Digestive    Class 3 severe obesity with serious comorbidity and body mass index (BMI) of 40.0 to 44.9 in adult, unspecified obesity type (H) - Primary     Tolerating switch from Wegovy to Zepbound without adverse side effects.  Notices reduced hunger and cravings.  Has started to see weight loss again.  Feels appetite is well-controlled and no need to go up on Zepbound at this time.  We could consider prior to follow-up if needed.  She will work on nutrition and hydration in the meantime.    Continue zepbound 10mg   Increase fluids   Increase protein   Follow up 3 months   Could consider dose increase if getting adequate nutrition/ hydration, heart burn improves and not seeing weight loss prior to next visit          Relevant Medications    tirzepatide-Weight Management (ZEPBOUND) 10 MG/0.5ML prefilled pen       Endocrine    Prediabetes    Relevant Medications    tirzepatide-Weight Management (ZEPBOUND) 10 MG/0.5ML prefilled pen          INTERVAL HISTORY:  Ongoing MWM with Dr. Slade since  starting BMI 36 (220lb) gap from  to . 24 week plan in  (had gained to 260lb). Lizeth Cr PA-C   - switch from liraglutide to semaglutide. High weight 270lb. Last seen 2024 switched from wegovy to zepbound due to plateau       Anti-obesity medication history    Current:   Zepbound 10mg   -no nausea/ vomiting, constipation/ diarrhea  -heart burn worse last month   -olivier sooner, less hunger overall, less cravings   Bupropion - mood     Past/Failed/contraindicated:   Naltrexone - was not helpful  with weight loss   Topiramate - side effects of fatigue, mood changes that was not tolerated   Phentermine - contraindicated due to being on stimulant     Recent diet changes:   Met with RD 3/3/2025   Trying to eat smaller meals more frequently   Struggles with hydration kailash when sick (last 3 weeks has had cold sx making hydration worse) - working on catching up   Added protein shake for when not hungry     Recent exercise/activity changes:   Less in the last 3 weeks  Had started exercising and lifting weights (light)   Shoulder injury limits this - starting PT, saw ortho     Recent sleep changes: nightmares recently   Persistent fatigue since concussion in 2016 and then lupus diagnosis in 2023. Fatigue not worse since starting zepbound     Vitamins/Labs: 1/2025    CURRENT WEIGHT:   255 lbs 0 oz    Initial Weight (lbs): 248 lbs  Last Visits Weight: 116.6 kg (257 lb)  Cumulative weight loss (lbs): -7  Weight Loss Percentage: -2.82%        3/18/2025     7:38 AM   Changes and Difficulties   I have made the following changes to my diet since my last visit: Eating more vegetables   With regards to my diet, I am still struggling with: Drinking enough water   I have made the following changes to my activity/exercise since my last visit: Lifting weights   With regards to my activity/exercise, I am still struggling with: Energy         MEDICATIONS:   Current Outpatient Medications   Medication Sig Dispense Refill    tirzepatide-Weight Management (ZEPBOUND) 10 MG/0.5ML prefilled pen Inject 0.5 mLs (10 mg) subcutaneously every 7 days. 2 mL 2    acetaminophen (TYLENOL) 650 MG CR tablet TAKE 2 TABLETS EVERY 8 HOURS BY ORAL ROUTE FOR 14 DAYS.      albuterol (ACCUNEB) 1.25 MG/3ML neb solution Take 1 vial (1.25 mg) by nebulization every 6 hours as needed for shortness of breath or wheezing. 90 mL 0    albuterol (VENTOLIN HFA) 108 (90 Base) MCG/ACT inhaler INHALE 2 PUFFS INTO THE LUNGS FOUR TIMES DAILY 18 g 2    ARIPiprazole  (ABILIFY) 10 MG tablet Take 1 tablet by mouth daily at 2 pm.      buPROPion (WELLBUTRIN XL) 150 MG 24 hr tablet       buPROPion (WELLBUTRIN XL) 300 MG 24 hr tablet Take 300 mg by mouth every morning      CONCERTA 36 MG CR tablet TAKE 1 TABLET BY MOUTH DAILY IN THE MORNING FOR ADHD. START 11/18/22      DULoxetine (CYMBALTA) 60 MG capsule Take 60 mg by mouth daily      ferrous sulfate (FE TABS) 325 (65 Fe) MG EC tablet TAKE 1 TABLET BY MOUTH EVERY DAY 90 tablet 2    gabapentin (NEURONTIN) 100 MG capsule Take 200 mg by mouth At Bedtime      gabapentin (NEURONTIN) 600 MG tablet Take 1 tablet (600 mg) by mouth at bedtime. 90 tablet 3    hydrocortisone 2.5 % cream APPLY TOPICALLY TO THE CHEST TWICE DAILY AS NEEDED      hydroxychloroquine (PLAQUENIL) 200 MG tablet Take 1 tablet (200 mg) by mouth 2 times daily (with meals) Annual Plaquenil toxicity eye screening required. 180 tablet 3    ibuprofen (ADVIL/MOTRIN) 800 MG tablet TAKE 1 TABLET BY MOUTH TWICE A DAY WITH MEALS FOR 14 DAYS      ketorolac (TORADOL) 10 MG tablet Take 1 tablet (10 mg) by mouth every 6 hours as needed for moderate pain 20 tablet 0    levocetirizine (XYZAL) 5 MG tablet Take 5 mg by mouth every 24 hours      levothyroxine (SYNTHROID/LEVOTHROID) 75 MCG tablet Take 1 tablet (75 mcg) by mouth daily 90 tablet 3    lidocaine (LIDODERM) 5 % patch Place 1 patch onto the skin every 24 hours To prevent lidocaine toxicity, patient should be patch free for 12 hrs daily. 15 patch 1    meclizine (ANTIVERT) 25 MG tablet Take 1 tablet (25 mg) by mouth 3 times daily as needed for dizziness 60 tablet 3    methocarbamol (ROBAXIN) 500 MG tablet Take 1 tablet (500 mg) by mouth 4 times daily as needed for muscle spasms 20 tablet 0    methylphenidate (RITALIN) 10 MG tablet TAKE 1/2 - 1 TABLET ONCE DAILY AS NEEDED FOR ATTENTION AND FOCUS.      modafinil (PROVIGIL) 100 MG tablet Take 2 tablets (200 mg) by mouth daily. 60 tablet 5    naltrexone (DEPADE/REVIA) 50 MG tablet Take  1/2 tablet once daily 1-2 hours prior to worst cravings for 1 week, then increase to 1 tablet daily as directed if tolerating 30 tablet 2    omeprazole (PRILOSEC) 20 MG DR capsule Take 1 capsule (20 mg) by mouth 2 times daily      polyethylene glycol (MIRALAX) 17 GM/Dose powder Take 1 capful by mouth daily as needed for constipation      RELPAX 40 MG tablet       spironolactone-HCTZ (ALDACTAZIDE) 25-25 MG tablet Take 1 tablet by mouth every 72 hours as needed 36 tablet 5    TAZORAC 0.1 % external cream APPLY TOPICALLY TO THE AFFECTED AREA AT BEDTIME      tiZANidine (ZANAFLEX) 2 MG capsule PLEASE SEE ATTACHED FOR DETAILED DIRECTIONS      triamcinolone (KENALOG) 0.1 % paste APPLY TO AFFECTED AREA 2 TIMES DAILY AS DIRECTED             3/18/2025     7:38 AM   Weight Loss Medication History Reviewed With Patient   Are you having any side effects from the weight loss medication that we have prescribed you? No       Office Visit on 01/16/2025   Component Date Value Ref Range Status    WBC Count 01/16/2025 4.7  4.0 - 11.0 10e3/uL Final    RBC Count 01/16/2025 4.73  3.80 - 5.20 10e6/uL Final    Hemoglobin 01/16/2025 13.8  11.7 - 15.7 g/dL Final    Hematocrit 01/16/2025 42.3  35.0 - 47.0 % Final    MCV 01/16/2025 89  78 - 100 fL Final    MCH 01/16/2025 29.2  26.5 - 33.0 pg Final    MCHC 01/16/2025 32.6  31.5 - 36.5 g/dL Final    RDW 01/16/2025 12.8  10.0 - 15.0 % Final    Platelet Count 01/16/2025 253  150 - 450 10e3/uL Final    Iron 01/16/2025 74  37 - 145 ug/dL Final    Iron Binding Capacity 01/16/2025 336  240 - 430 ug/dL Final    Iron Sat Index 01/16/2025 22  15 - 46 % Final    Sodium 01/16/2025 141  135 - 145 mmol/L Final    Potassium 01/16/2025 4.1  3.4 - 5.3 mmol/L Final    Carbon Dioxide (CO2) 01/16/2025 25  22 - 29 mmol/L Final    Anion Gap 01/16/2025 11  7 - 15 mmol/L Final    Urea Nitrogen 01/16/2025 9.6  6.0 - 20.0 mg/dL Final    Creatinine 01/16/2025 1.01 (H)  0.51 - 0.95 mg/dL Final    GFR Estimate 01/16/2025 77  " >60 mL/min/1.73m2 Final    eGFR calculated using 2021 CKD-EPI equation.    Calcium 01/16/2025 9.3  8.8 - 10.4 mg/dL Final    Reference intervals for this test were updated on 7/16/2024 to reflect our healthy population more accurately. There may be differences in the flagging of prior results with similar values performed with this method. Those prior results can be interpreted in the context of the updated reference intervals.    Chloride 01/16/2025 105  98 - 107 mmol/L Final    Glucose 01/16/2025 71  70 - 99 mg/dL Final    Alkaline Phosphatase 01/16/2025 79  40 - 150 U/L Final    AST 01/16/2025 26  0 - 45 U/L Final    ALT 01/16/2025 42  0 - 50 U/L Final    Protein Total 01/16/2025 7.0  6.4 - 8.3 g/dL Final    Albumin 01/16/2025 4.3  3.5 - 5.2 g/dL Final    Bilirubin Total 01/16/2025 0.4  <=1.2 mg/dL Final           1/3/2022     3:03 PM   RODRIGUE Score (Last Two)   RODRIGUE Raw Score 29   Activation Score 52.9   RODRIGUE Level 2         PHYSICAL EXAM:  Objective    Ht 1.676 m (5' 6\")   Wt 115.7 kg (255 lb)   LMP 02/23/2025 (Exact Date)   BMI 41.16 kg/m               GENERAL: alert and no distress  EYES: Eyes grossly normal to inspection.  No discharge or erythema, or obvious scleral/conjunctival abnormalities.  RESP: No audible wheeze, cough, or visible cyanosis.    SKIN: Visible skin clear. No significant rash, abnormal pigmentation or lesions.  NEURO: Cranial nerves grossly intact.  Mentation and speech appropriate for age.  PSYCH: Appropriate affect, tone, and pace of words        Sincerely,    Nicol Broderick NP      20 minutes spent by me on the date of the encounter doing chart review, history and exam, documentation and further activities per the note    Virtual Visit Details    Type of service:  Video Visit   Video Start Time:  0805  Video End Time: 0817    Originating Location (pt. Location): Home    Distant Location (provider location):  On-site  Platform used for Video Visit: Peace"

## 2025-03-18 NOTE — LETTER
3/18/2025       RE: Nehemias Mascorro  [address]  [place] [address]     Dear Colleague,    Thank you for referring your patient, Nehemias Mascorro, to the [facility] [place] at [facility]. Please see a copy of my visit note below.      Return Medical Weight Management Note     Nehemias Mascorro  MRN:  [id number]  :  1995  JASMEET:  3/18/2025    Dear Alexandra Rodrigues MD,    I had the pleasure of seeing your patient Nehemias Mascorro. She is a 29 year old female who I am continuing to see for treatment of obesity related to:         No data to display                Assessment & Plan  Problem List Items Addressed This Visit          Digestive    Class 3 severe obesity with serious comorbidity and body mass index (BMI) of 40.0 to 44.9 in adult, unspecified obesity type (H) - Primary     Tolerating switch from Wegovy to Zepbound without adverse side effects.  Notices reduced hunger and cravings.  Has started to see weight loss again.  Feels appetite is well-controlled and no need to go up on Zepbound at this time.  We could consider prior to follow-up if needed.  She will work on nutrition and hydration in the meantime.    Continue zepbound 10mg   Increase fluids   Increase protein   Follow up 3 months   Could consider dose increase if getting adequate nutrition/ hydration, heart burn improves and not seeing weight loss prior to next visit          Relevant Medications    tirzepatide-Weight Management (ZEPBOUND) 10 MG/0.5ML prefilled pen       Endocrine    Prediabetes    Relevant Medications    tirzepatide-Weight Management (ZEPBOUND) 10 MG/0.5ML prefilled pen          INTERVAL HISTORY:  Ongoing MWM with Dr. Slade since  starting BMI 36 (220lb) gap from  to . 24 week plan in  (had gained to 260lb). Lizeth Cr PA-C   - switch from liraglutide to semaglutide. High weight 270lb. Last seen  11/2024 switched from wegovy to zepbound due to plateau       Anti-obesity medication history    Current:   Zepbound 10mg   -no nausea/ vomiting, constipation/ diarrhea  -heart burn worse last month   -olivier sooner, less hunger overall, less cravings   Bupropion - mood     Past/Failed/contraindicated:   Naltrexone - was not helpful with weight loss   Topiramate - side effects of fatigue, mood changes that was not tolerated   Phentermine - contraindicated due to being on stimulant     Recent diet changes:   Met with RD 3/3/2025   Trying to eat smaller meals more frequently   Struggles with hydration kailash when sick (last 3 weeks has had cold sx making hydration worse) - working on catching up   Added protein shake for when not hungry     Recent exercise/activity changes:   Less in the last 3 weeks  Had started exercising and lifting weights (light)   Shoulder injury limits this - starting PT, saw ortho     Recent sleep changes: nightmares recently   Persistent fatigue since concussion in 2016 and then lupus diagnosis in 2023. Fatigue not worse since starting zepbound     Vitamins/Labs: 1/2025    CURRENT WEIGHT:   255 lbs 0 oz    Initial Weight (lbs): 248 lbs  Last Visits Weight: 116.6 kg (257 lb)  Cumulative weight loss (lbs): -7  Weight Loss Percentage: -2.82%        3/18/2025     7:38 AM   Changes and Difficulties   I have made the following changes to my diet since my last visit: Eating more vegetables   With regards to my diet, I am still struggling with: Drinking enough water   I have made the following changes to my activity/exercise since my last visit: Lifting weights   With regards to my activity/exercise, I am still struggling with: Energy         MEDICATIONS:   Current Outpatient Medications   Medication Sig Dispense Refill     tirzepatide-Weight Management (ZEPBOUND) 10 MG/0.5ML prefilled pen Inject 0.5 mLs (10 mg) subcutaneously every 7 days. 2 mL 2     acetaminophen (TYLENOL) 650 MG CR tablet TAKE 2  TABLETS EVERY 8 HOURS BY ORAL ROUTE FOR 14 DAYS.       albuterol (ACCUNEB) 1.25 MG/3ML neb solution Take 1 vial (1.25 mg) by nebulization every 6 hours as needed for shortness of breath or wheezing. 90 mL 0     albuterol (VENTOLIN HFA) 108 (90 Base) MCG/ACT inhaler INHALE 2 PUFFS INTO THE LUNGS FOUR TIMES DAILY 18 g 2     ARIPiprazole (ABILIFY) 10 MG tablet Take 1 tablet by mouth daily at 2 pm.       buPROPion (WELLBUTRIN XL) 150 MG 24 hr tablet        buPROPion (WELLBUTRIN XL) 300 MG 24 hr tablet Take 300 mg by mouth every morning       CONCERTA 36 MG CR tablet TAKE 1 TABLET BY MOUTH DAILY IN THE MORNING FOR ADHD. START 11/18/22       DULoxetine (CYMBALTA) 60 MG capsule Take 60 mg by mouth daily       ferrous sulfate (FE TABS) 325 (65 Fe) MG EC tablet TAKE 1 TABLET BY MOUTH EVERY DAY 90 tablet 2     gabapentin (NEURONTIN) 100 MG capsule Take 200 mg by mouth At Bedtime       gabapentin (NEURONTIN) 600 MG tablet Take 1 tablet (600 mg) by mouth at bedtime. 90 tablet 3     hydrocortisone 2.5 % cream APPLY TOPICALLY TO THE CHEST TWICE DAILY AS NEEDED       hydroxychloroquine (PLAQUENIL) 200 MG tablet Take 1 tablet (200 mg) by mouth 2 times daily (with meals) Annual Plaquenil toxicity eye screening required. 180 tablet 3     ibuprofen (ADVIL/MOTRIN) 800 MG tablet TAKE 1 TABLET BY MOUTH TWICE A DAY WITH MEALS FOR 14 DAYS       ketorolac (TORADOL) 10 MG tablet Take 1 tablet (10 mg) by mouth every 6 hours as needed for moderate pain 20 tablet 0     levocetirizine (XYZAL) 5 MG tablet Take 5 mg by mouth every 24 hours       levothyroxine (SYNTHROID/LEVOTHROID) 75 MCG tablet Take 1 tablet (75 mcg) by mouth daily 90 tablet 3     lidocaine (LIDODERM) 5 % patch Place 1 patch onto the skin every 24 hours To prevent lidocaine toxicity, patient should be patch free for 12 hrs daily. 15 patch 1     meclizine (ANTIVERT) 25 MG tablet Take 1 tablet (25 mg) by mouth 3 times daily as needed for dizziness 60 tablet 3     methocarbamol  (ROBAXIN) 500 MG tablet Take 1 tablet (500 mg) by mouth 4 times daily as needed for muscle spasms 20 tablet 0     methylphenidate (RITALIN) 10 MG tablet TAKE 1/2 - 1 TABLET ONCE DAILY AS NEEDED FOR ATTENTION AND FOCUS.       modafinil (PROVIGIL) 100 MG tablet Take 2 tablets (200 mg) by mouth daily. 60 tablet 5     naltrexone (DEPADE/REVIA) 50 MG tablet Take 1/2 tablet once daily 1-2 hours prior to worst cravings for 1 week, then increase to 1 tablet daily as directed if tolerating 30 tablet 2     omeprazole (PRILOSEC) 20 MG DR capsule Take 1 capsule (20 mg) by mouth 2 times daily       polyethylene glycol (MIRALAX) 17 GM/Dose powder Take 1 capful by mouth daily as needed for constipation       RELPAX 40 MG tablet        spironolactone-HCTZ (ALDACTAZIDE) 25-25 MG tablet Take 1 tablet by mouth every 72 hours as needed 36 tablet 5     TAZORAC 0.1 % external cream APPLY TOPICALLY TO THE AFFECTED AREA AT BEDTIME       tiZANidine (ZANAFLEX) 2 MG capsule PLEASE SEE ATTACHED FOR DETAILED DIRECTIONS       triamcinolone (KENALOG) 0.1 % paste APPLY TO AFFECTED AREA 2 TIMES DAILY AS DIRECTED             3/18/2025     7:38 AM   Weight Loss Medication History Reviewed With Patient   Are you having any side effects from the weight loss medication that we have prescribed you? No       Office Visit on 01/16/2025   Component Date Value Ref Range Status     WBC Count 01/16/2025 4.7  4.0 - 11.0 10e3/uL Final     RBC Count 01/16/2025 4.73  3.80 - 5.20 10e6/uL Final     Hemoglobin 01/16/2025 13.8  11.7 - 15.7 g/dL Final     Hematocrit 01/16/2025 42.3  35.0 - 47.0 % Final     MCV 01/16/2025 89  78 - 100 fL Final     MCH 01/16/2025 29.2  26.5 - 33.0 pg Final     MCHC 01/16/2025 32.6  31.5 - 36.5 g/dL Final     RDW 01/16/2025 12.8  10.0 - 15.0 % Final     Platelet Count 01/16/2025 253  150 - 450 10e3/uL Final     Iron 01/16/2025 74  37 - 145 ug/dL Final     Iron Binding Capacity 01/16/2025 336  240 - 430 ug/dL Final     Iron Sat Index  "01/16/2025 22  15 - 46 % Final     Sodium 01/16/2025 141  135 - 145 mmol/L Final     Potassium 01/16/2025 4.1  3.4 - 5.3 mmol/L Final     Carbon Dioxide (CO2) 01/16/2025 25  22 - 29 mmol/L Final     Anion Gap 01/16/2025 11  7 - 15 mmol/L Final     Urea Nitrogen 01/16/2025 9.6  6.0 - 20.0 mg/dL Final     Creatinine 01/16/2025 1.01 (H)  0.51 - 0.95 mg/dL Final     GFR Estimate 01/16/2025 77  >60 mL/min/1.73m2 Final    eGFR calculated using 2021 CKD-EPI equation.     Calcium 01/16/2025 9.3  8.8 - 10.4 mg/dL Final    Reference intervals for this test were updated on 7/16/2024 to reflect our healthy population more accurately. There may be differences in the flagging of prior results with similar values performed with this method. Those prior results can be interpreted in the context of the updated reference intervals.     Chloride 01/16/2025 105  98 - 107 mmol/L Final     Glucose 01/16/2025 71  70 - 99 mg/dL Final     Alkaline Phosphatase 01/16/2025 79  40 - 150 U/L Final     AST 01/16/2025 26  0 - 45 U/L Final     ALT 01/16/2025 42  0 - 50 U/L Final     Protein Total 01/16/2025 7.0  6.4 - 8.3 g/dL Final     Albumin 01/16/2025 4.3  3.5 - 5.2 g/dL Final     Bilirubin Total 01/16/2025 0.4  <=1.2 mg/dL Final           1/3/2022     3:03 PM   RODRIGUE Score (Last Two)   RODRIGUE Raw Score 29   Activation Score 52.9   RODRIGUE Level 2         PHYSICAL EXAM:  Objective   Ht 1.676 m (5' 6\")   Wt 115.7 kg (255 lb)   LMP 02/23/2025 (Exact Date)   BMI 41.16 kg/m               GENERAL: alert and no distress  EYES: Eyes grossly normal to inspection.  No discharge or erythema, or obvious scleral/conjunctival abnormalities.  RESP: No audible wheeze, cough, or visible cyanosis.    SKIN: Visible skin clear. No significant rash, abnormal pigmentation or lesions.  NEURO: Cranial nerves grossly intact.  Mentation and speech appropriate for age.  PSYCH: Appropriate affect, tone, and pace of words        Sincerely,    Nicol Broderick NP      20 minutes " spent by me on the date of the encounter doing chart review, history and exam, documentation and further activities per the note    Virtual Visit Details    Type of service:  Video Visit   Video Start Time:  0805  Video End Time: 0817    Originating Location (pt. Location): Home    Distant Location (provider location):  On-site  Platform used for Video Visit: Peace      Again, thank you for allowing me to participate in the care of your patient.      Sincerely,    Nicol Broderick NP

## 2025-03-18 NOTE — PATIENT INSTRUCTIONS
Continue zepbound 10mg   Increase fluids   Increase protein   Follow up 3 months   Could consider dose increase if getting adequate nutrition/ hydration, heart burn improves and not seeing weight loss prior to next visit

## 2025-03-26 ENCOUNTER — LAB (OUTPATIENT)
Dept: LAB | Facility: CLINIC | Age: 30
End: 2025-03-26
Payer: COMMERCIAL

## 2025-03-26 ENCOUNTER — MYC MEDICAL ADVICE (OUTPATIENT)
Dept: RHEUMATOLOGY | Facility: CLINIC | Age: 30
End: 2025-03-26

## 2025-03-26 ENCOUNTER — HOSPITAL ENCOUNTER (OUTPATIENT)
Dept: MRI IMAGING | Facility: HOSPITAL | Age: 30
Discharge: HOME | End: 2025-03-26
Attending: INTERNAL MEDICINE
Payer: COMMERCIAL

## 2025-03-26 DIAGNOSIS — R05.1 ACUTE COUGH: ICD-10-CM

## 2025-03-26 DIAGNOSIS — E23.7 PITUITARY LESION: ICD-10-CM

## 2025-03-26 LAB
FLUAV RNA SPEC QL NAA+PROBE: NEGATIVE
FLUBV RNA RESP QL NAA+PROBE: NEGATIVE
RSV RNA SPEC NAA+PROBE: NEGATIVE
SARS-COV-2 RNA RESP QL NAA+PROBE: POSITIVE

## 2025-03-26 PROCEDURE — 99000 SPECIMEN HANDLING OFFICE-LAB: CPT | Performed by: PATHOLOGY

## 2025-03-26 PROCEDURE — 255N000002 HC RX 255 OP 636: Performed by: INTERNAL MEDICINE

## 2025-03-26 PROCEDURE — 87637 SARSCOV2&INF A&B&RSV AMP PRB: CPT

## 2025-03-26 PROCEDURE — 70553 MRI BRAIN STEM W/O & W/DYE: CPT

## 2025-03-26 PROCEDURE — A9585 GADOBUTROL INJECTION: HCPCS | Performed by: INTERNAL MEDICINE

## 2025-03-26 RX ORDER — GADOBUTROL 604.72 MG/ML
0.1 INJECTION INTRAVENOUS ONCE
Status: COMPLETED | OUTPATIENT
Start: 2025-03-26 | End: 2025-03-26

## 2025-03-26 RX ADMIN — GADOBUTROL 12 ML: 604.72 INJECTION INTRAVENOUS at 09:16

## 2025-03-27 ENCOUNTER — TELEPHONE (OUTPATIENT)
Dept: INTERNAL MEDICINE | Facility: CLINIC | Age: 30
End: 2025-03-27
Payer: COMMERCIAL

## 2025-03-27 NOTE — TELEPHONE ENCOUNTER
Patient calling wanting to report she tested positive for covid, was seen at [facility] for MRI yesterday.    Symptoms started Tuesday, 3/25/25.   Symptoms include headache, body aches, mild cough. Denies red-flag symptoms at this time.    We discussed home care measures and CDC recommendations for isolation. Answered all of patients questions as able.     We also discussed Paxlovid. She declines at this time but does understand can take within 5 days of the onset of symptoms and would call back if this is something she would like to pursue.     Jennie VALENTINEN, RN

## 2025-03-27 NOTE — TELEPHONE ENCOUNTER
Called and spoke with patient. Discussed that she does not need to hold any of her rheum medications. Rest and maintaining good hydration. Advised to work with PCP and or urgent care for covid symptoms/concerns. Advised to update us if any lupus symptoms worsen.    She cancelled visit for tomorrow 3/27. Writer offered for her to re-schedule as a video visit. Patient declined. She has visit for 4/24 and prefers to keep that appointment in person.       Lindsey CA RN  [facility]

## 2025-04-21 ENCOUNTER — PATIENT OUTREACH (OUTPATIENT)
Dept: CARE COORDINATION | Facility: CLINIC | Age: 30
End: 2025-04-21
Payer: COMMERCIAL

## 2025-04-23 ENCOUNTER — TELEPHONE (OUTPATIENT)
Dept: ENDOCRINOLOGY | Facility: CLINIC | Age: 30
End: 2025-04-23

## 2025-04-23 ENCOUNTER — OFFICE VISIT (OUTPATIENT)
Dept: DERMATOLOGY | Facility: CLINIC | Age: 30
End: 2025-04-23
Attending: INTERNAL MEDICINE
Payer: COMMERCIAL

## 2025-04-23 DIAGNOSIS — L65.9 HAIR LOSS: ICD-10-CM

## 2025-04-23 LAB
FERRITIN SERPL-MCNC: 20 NG/ML (ref 6–175)
SHBG SERPL-SCNC: 61 NMOL/L (ref 30–135)
VIT B12 SERPL-MCNC: 844 PG/ML (ref 232–1245)
VIT D+METAB SERPL-MCNC: 44 NG/ML (ref 20–50)

## 2025-04-23 PROCEDURE — 82607 VITAMIN B-12: CPT | Performed by: PHYSICIAN ASSISTANT

## 2025-04-23 PROCEDURE — 84270 ASSAY OF SEX HORMONE GLOBUL: CPT | Performed by: PHYSICIAN ASSISTANT

## 2025-04-23 PROCEDURE — 84630 ASSAY OF ZINC: CPT | Mod: 90 | Performed by: PHYSICIAN ASSISTANT

## 2025-04-23 PROCEDURE — 82306 VITAMIN D 25 HYDROXY: CPT | Performed by: PHYSICIAN ASSISTANT

## 2025-04-23 PROCEDURE — 82728 ASSAY OF FERRITIN: CPT | Performed by: PHYSICIAN ASSISTANT

## 2025-04-23 PROCEDURE — 84425 ASSAY OF VITAMIN B-1: CPT | Mod: 90 | Performed by: PHYSICIAN ASSISTANT

## 2025-04-23 PROCEDURE — 82627 DEHYDROEPIANDROSTERONE: CPT | Performed by: PHYSICIAN ASSISTANT

## 2025-04-23 PROCEDURE — 36415 COLL VENOUS BLD VENIPUNCTURE: CPT | Performed by: PHYSICIAN ASSISTANT

## 2025-04-23 PROCEDURE — 99000 SPECIMEN HANDLING OFFICE-LAB: CPT | Performed by: PHYSICIAN ASSISTANT

## 2025-04-23 PROCEDURE — 99203 OFFICE O/P NEW LOW 30 MIN: CPT | Performed by: PHYSICIAN ASSISTANT

## 2025-04-23 PROCEDURE — 1126F AMNT PAIN NOTED NONE PRSNT: CPT | Performed by: PHYSICIAN ASSISTANT

## 2025-04-23 PROCEDURE — 84403 ASSAY OF TOTAL TESTOSTERONE: CPT | Performed by: PHYSICIAN ASSISTANT

## 2025-04-23 ASSESSMENT — PAIN SCALES - GENERAL: PAINLEVEL_OUTOF10: NO PAIN (0)

## 2025-04-23 NOTE — LETTER
4/23/2025      Nehemias Mascorro  [address]  [place] [address]      Dear Colleague,    Thank you for referring your patient, Nehemias Mascorro, to the [facility]. Please see a copy of my visit note below.    Nehemias Mascorro is a pleasant 29 year old year old female patient here today for hair loss/shedding. She notes present for past year. She notes she has hypothyroid but controlled on levothyroxine. She was diagnosed with lupus last year improving on plaquenil. She notes still having joint pain but improved on plaquenil. She does take vit D. She reports that her father had male pattern hair loss. 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 Patient has no other skin complaints today.  Remainder of the HPI, Meds, PMH, Allergies, FH, and SH was reviewed in chart.   Past Medical History:   Diagnosis Date     ADHD (attention deficit hyperactivity disorder)      Depressive disorder      Ovarian cyst      Uncomplicated asthma        Past Surgical History:   Procedure Laterality Date     COLONOSCOPY N/A 4/27/2022    Procedure: COLONOSCOPY, WITH POLYPECTOMY AND BIOPSY;  Surgeon: Alyse Leija MD;  Location:  GI     ENT SURGERY      tonsilectomy age 8     ESOPHAGOSCOPY, GASTROSCOPY, DUODENOSCOPY (EGD), COMBINED N/A 3/15/2023    Procedure: ESOPHAGOGASTRODUODENOSCOPY, WITH BIOPSY;  Surgeon: Cyn Colon MD;  Location: [facility] OR     ORTHOPEDIC SURGERY      Hip, emelia surgery in 2013     WRIST SURGERY          Family History   Problem Relation Age of Onset     Depression Maternal Grandmother      Schizophrenia Maternal Uncle      Substance Abuse Maternal Uncle        Social History     Socioeconomic History     Marital status: Single     Spouse name: Not on file     Number of children: Not on file     Years of education: Not on file     Highest education level: Not on file   Occupational History     Not on file   Tobacco Use     Smoking status: Never      Passive exposure: Never     Smokeless tobacco: Never     Tobacco comments:     Medical edible gummi  - has [place] medical marijuana card.    Vaping Use     Vaping status: Never Used   Substance and Sexual Activity     Alcohol use: Yes     Comment: rare     Drug use: Never     Sexual activity: Not Currently   Other Topics Concern     Parent/sibling w/ CABG, MI or angioplasty before 65F 55M? Not Asked   Social History Narrative    Lives with her parents working part time . Had to leave college      Social Drivers of Health     Financial Resource Strain: Low Risk  (5/13/2024)    Financial Resource Strain      Within the past 12 months, have you or your family members you live with been unable to get utilities (heat, electricity) when it was really needed?: No   Food Insecurity: Low Risk  (5/13/2024)    Food Insecurity      Within the past 12 months, did you worry that your food would run out before you got money to buy more?: No      Within the past 12 months, did the food you bought just not last and you didn t have money to get more?: No   Transportation Needs: High Risk (5/13/2024)    Transportation Needs      Within the past 12 months, has lack of transportation kept you from medical appointments, getting your medicines, non-medical meetings or appointments, work, or from getting things that you need?: Yes   Physical Activity: Insufficiently Active (5/13/2024)    Exercise Vital Sign      Days of Exercise per Week: 2 days      Minutes of Exercise per Session: 30 min   Stress: Stress Concern Present (5/13/2024)    [employer] - Occupational Stress Questionnaire      Feeling of Stress : Rather much   Social Connections: Unknown (7/13/2024)    [facility]    Social Connections      Frequency of Communication with Friends and Family: Not on file   Interpersonal Safety: Low Risk  (1/16/2025)    Interpersonal Safety      Do you feel physically and  emotionally safe where you currently live?: Yes      Within the past 12 months, have you been hit, slapped, kicked or otherwise physically hurt by someone?: No      Within the past 12 months, have you been humiliated or emotionally abused in other ways by your partner or ex-partner?: No   Housing Stability: High Risk (5/13/2024)    Housing Stability      Do you have housing? : Yes      Are you worried about losing your housing?: Yes       Outpatient Encounter Medications as of 4/23/2025   Medication Sig Dispense Refill     acetaminophen (TYLENOL) 650 MG CR tablet TAKE 2 TABLETS EVERY 8 HOURS BY ORAL ROUTE FOR 14 DAYS.       albuterol (ACCUNEB) 1.25 MG/3ML neb solution Take 1 vial (1.25 mg) by nebulization every 6 hours as needed for shortness of breath or wheezing. 90 mL 0     albuterol (VENTOLIN HFA) 108 (90 Base) MCG/ACT inhaler INHALE 2 PUFFS INTO THE LUNGS FOUR TIMES DAILY 18 g 2     ARIPiprazole (ABILIFY) 10 MG tablet Take 1 tablet by mouth daily at 2 pm.       buPROPion (WELLBUTRIN XL) 150 MG 24 hr tablet        buPROPion (WELLBUTRIN XL) 300 MG 24 hr tablet Take 300 mg by mouth every morning       CONCERTA 36 MG CR tablet TAKE 1 TABLET BY MOUTH DAILY IN THE MORNING FOR ADHD. START 11/18/22       DULoxetine (CYMBALTA) 60 MG capsule Take 60 mg by mouth daily       ferrous sulfate (FE TABS) 325 (65 Fe) MG EC tablet TAKE 1 TABLET BY MOUTH EVERY DAY 90 tablet 2     gabapentin (NEURONTIN) 100 MG capsule Take 200 mg by mouth At Bedtime       gabapentin (NEURONTIN) 600 MG tablet Take 1 tablet (600 mg) by mouth at bedtime. 90 tablet 3     hydrocortisone 2.5 % cream APPLY TOPICALLY TO THE CHEST TWICE DAILY AS NEEDED       hydroxychloroquine (PLAQUENIL) 200 MG tablet Take 1 tablet (200 mg) by mouth 2 times daily (with meals) Annual Plaquenil toxicity eye screening required. 180 tablet 3     ibuprofen (ADVIL/MOTRIN) 800 MG tablet TAKE 1 TABLET BY MOUTH TWICE A DAY WITH MEALS FOR 14 DAYS       ketorolac (TORADOL) 10 MG  tablet Take 1 tablet (10 mg) by mouth every 6 hours as needed for moderate pain 20 tablet 0     levocetirizine (XYZAL) 5 MG tablet Take 5 mg by mouth every 24 hours       levothyroxine (SYNTHROID/LEVOTHROID) 75 MCG tablet Take 1 tablet (75 mcg) by mouth daily. 90 tablet 2     lidocaine (LIDODERM) 5 % patch Place 1 patch onto the skin every 24 hours To prevent lidocaine toxicity, patient should be patch free for 12 hrs daily. 15 patch 1     meclizine (ANTIVERT) 25 MG tablet Take 1 tablet (25 mg) by mouth 3 times daily as needed for dizziness 60 tablet 3     methocarbamol (ROBAXIN) 500 MG tablet Take 1 tablet (500 mg) by mouth 4 times daily as needed for muscle spasms 20 tablet 0     methylphenidate (RITALIN) 10 MG tablet TAKE 1/2 - 1 TABLET ONCE DAILY AS NEEDED FOR ATTENTION AND FOCUS.       modafinil (PROVIGIL) 100 MG tablet Take 2 tablets (200 mg) by mouth daily. 60 tablet 5     naltrexone (DEPADE/REVIA) 50 MG tablet Take 1/2 tablet once daily 1-2 hours prior to worst cravings for 1 week, then increase to 1 tablet daily as directed if tolerating 30 tablet 2     omeprazole (PRILOSEC) 20 MG DR capsule Take 1 capsule (20 mg) by mouth 2 times daily       polyethylene glycol (MIRALAX) 17 GM/Dose powder Take 1 capful by mouth daily as needed for constipation       RELPAX 40 MG tablet        spironolactone-HCTZ (ALDACTAZIDE) 25-25 MG tablet Take 1 tablet by mouth every 72 hours as needed 36 tablet 5     TAZORAC 0.1 % external cream APPLY TOPICALLY TO THE AFFECTED AREA AT BEDTIME       tirzepatide-Weight Management (ZEPBOUND) 10 MG/0.5ML prefilled pen Inject 0.5 mLs (10 mg) subcutaneously every 7 days. 2 mL 2     tiZANidine (ZANAFLEX) 2 MG capsule PLEASE SEE ATTACHED FOR DETAILED DIRECTIONS       triamcinolone (KENALOG) 0.1 % paste APPLY TO AFFECTED AREA 2 TIMES DAILY AS DIRECTED       No facility-administered encounter medications on file as of 4/23/2025.             O:   NAD, WDWN, Alert & Oriented, Mood & Affect wnl,  Vitals stable   Here today alone   LMP 02/23/2025 (Exact Date)    General appearance normal   Vitals stable   Alert, oriented and in no acute distress      Nonscarring hair loss       Eyes: Conjunctivae/lids:Normal     ENT: Lips,: normal    MSK:Normal    Pulm: Breathing Normal    Neuro/Psych: Orientation:Alert and Orientedx3 ; Mood/Affect:normal  A/P:  1. Non scarring alopecia   Telogen effluvium vs androgenetic alopecia  Check labs   Patient will return next next week for punch biopsy.       Again, thank you for allowing me to participate in the care of your patient.        Sincerely,        Niurka Dick PA-C    Electronically signed

## 2025-04-23 NOTE — NURSING NOTE
Chief Complaint   Patient presents with    Hair/Scalp Problem     More shedding, has noted with in the last year        There were no vitals filed for this visit.  Wt Readings from Last 1 Encounters:   03/18/25 115.7 kg (255 lb)       Gail Vigil LPN .................4/23/2025

## 2025-04-23 NOTE — TELEPHONE ENCOUNTER
PA RENEWAL Initiation    Medication: ZEPBOUND 10 MG/0.5ML SC SOAJ  Insurance Company: [employer] - Phone [phone] Fax [phone]  Pharmacy Filling the Rx:    Filling Pharmacy Phone:    Filling Pharmacy Fax:    Start Date: 4/23/2025    [id number]

## 2025-04-24 ENCOUNTER — OFFICE VISIT (OUTPATIENT)
Dept: RHEUMATOLOGY | Facility: CLINIC | Age: 30
End: 2025-04-24
Attending: INTERNAL MEDICINE
Payer: COMMERCIAL

## 2025-04-24 ENCOUNTER — LAB (OUTPATIENT)
Dept: LAB | Facility: CLINIC | Age: 30
End: 2025-04-24
Payer: COMMERCIAL

## 2025-04-24 VITALS
OXYGEN SATURATION: 100 % | BODY MASS INDEX: 42.09 KG/M2 | TEMPERATURE: 98.5 F | WEIGHT: 261.9 LBS | HEART RATE: 87 BPM | HEIGHT: 66 IN | DIASTOLIC BLOOD PRESSURE: 85 MMHG | SYSTOLIC BLOOD PRESSURE: 137 MMHG

## 2025-04-24 DIAGNOSIS — Z51.81 MEDICATION MONITORING ENCOUNTER: ICD-10-CM

## 2025-04-24 DIAGNOSIS — M32.19 OTHER SYSTEMIC LUPUS ERYTHEMATOSUS WITH OTHER ORGAN INVOLVEMENT (H): ICD-10-CM

## 2025-04-24 DIAGNOSIS — M32.19 OTHER SYSTEMIC LUPUS ERYTHEMATOSUS WITH OTHER ORGAN INVOLVEMENT (H): Primary | ICD-10-CM

## 2025-04-24 LAB
ALBUMIN MFR UR ELPH: <6 MG/DL
ALBUMIN UR-MCNC: NEGATIVE MG/DL
APPEARANCE UR: CLEAR
BILIRUB UR QL STRIP: NEGATIVE
COLOR UR AUTO: NORMAL
CREAT UR-MCNC: 53.5 MG/DL
DHEA-S SERPL-MCNC: 63 UG/DL (ref 35–430)
GLUCOSE UR STRIP-MCNC: NEGATIVE MG/DL
HGB UR QL STRIP: NEGATIVE
KETONES UR STRIP-MCNC: NEGATIVE MG/DL
LEUKOCYTE ESTERASE UR QL STRIP: NEGATIVE
NITRATE UR QL: NEGATIVE
PH UR STRIP: 7 [PH] (ref 5–7)
PROT/CREAT 24H UR: NORMAL MG/G{CREAT}
RBC URINE: 0 /HPF
SP GR UR STRIP: 1.01 (ref 1–1.03)
SQUAMOUS EPITHELIAL: 1 /HPF
TESTOST FREE SERPL-MCNC: 0.56 NG/DL
TESTOST SERPL-MCNC: 47 NG/DL (ref 8–60)
UROBILINOGEN UR STRIP-MCNC: NORMAL MG/DL
WBC URINE: 0 /HPF
ZINC SERPL-MCNC: 62.1 UG/DL

## 2025-04-24 PROCEDURE — 1126F AMNT PAIN NOTED NONE PRSNT: CPT | Performed by: INTERNAL MEDICINE

## 2025-04-24 PROCEDURE — G0463 HOSPITAL OUTPT CLINIC VISIT: HCPCS | Performed by: INTERNAL MEDICINE

## 2025-04-24 PROCEDURE — G2211 COMPLEX E/M VISIT ADD ON: HCPCS | Performed by: INTERNAL MEDICINE

## 2025-04-24 PROCEDURE — 99215 OFFICE O/P EST HI 40 MIN: CPT | Mod: GC | Performed by: INTERNAL MEDICINE

## 2025-04-24 PROCEDURE — 3075F SYST BP GE 130 - 139MM HG: CPT | Performed by: INTERNAL MEDICINE

## 2025-04-24 PROCEDURE — 3079F DIAST BP 80-89 MM HG: CPT | Performed by: INTERNAL MEDICINE

## 2025-04-24 RX ORDER — AZATHIOPRINE 50 MG/1
50 TABLET ORAL
Qty: 30 TABLET | Refills: 1 | Status: SHIPPED | OUTPATIENT
Start: 2025-04-24

## 2025-04-24 ASSESSMENT — PAIN SCALES - GENERAL: PAINLEVEL_OUTOF10: NO PAIN (0)

## 2025-04-24 NOTE — NURSING NOTE
"Chief Complaint   Patient presents with    New Patient     Follow Up     /85   Pulse 87   Temp 98.5  F (36.9  C) (Oral)   Ht 1.676 m (5' 6\")   Wt 118.8 kg (261 lb 14.4 oz)   LMP 02/23/2025 (Exact Date)   SpO2 100%   BMI 42.27 kg/m    Noy Antonio on 4/24/2025 at 9:31 AM    "

## 2025-04-24 NOTE — TELEPHONE ENCOUNTER
Prior Authorization RENEWAL Approval    Medication: ZEPBOUND 10 MG/0.5ML SC SOAJ  Authorization Effective Date: 4/24/2025  Authorization Expiration Date: 4/24/2026  Approved Dose/Quantity: 2ml per 28 days  Reference #: [id number]   Insurance Company: [employer] - Phone [phone] Fax [phone]  Expected CoPay: $    CoPay Card Available:      Financial Assistance Needed:   Which Pharmacy is filling the prescription:    Pharmacy Notified:   Patient Notified:

## 2025-04-24 NOTE — PROGRESS NOTES
Rheumatology Follow up Visit Note    Reason for visit: SLE Dx 7/2024    Initial visit date: 7/3/2024    Last seen:  11/6/2024    DOS: April 24, 2025      HPI from initial visit:    Nehemias REID uLdwin is a 29 year old female G0 with + fh/o SLE, who was referred to our clinic for evaluation and management of possible SLE.      -she was seen by several rheumatologists and was diagnosed with fibromyalgia, but her mother and herself are worried about having lupus. Her mom is under my care with lupus and asked me to see her daughter    -in 2016, had TBI, getting tx from neurologist, still has headache and neck pain    -her major complaint is joint pain    -she has this joint pain x 2 years    -gets pain over hands, elbows, knees and ankles    -hands swell up    -AM stiffness is between 1 hr-all day    -no triggers    -prednisone provided same benefit, made her hyper    -has raynaud's with turning color to blue, white x 2 yr    -gets periodic fevers., low grade x past 3 months,  T max 99.7-100.4, no infection    -no hair loss    -gets oral/nasal ulcers x past 2 years, kenalog paste helps with that    -PCP noticed cervical LAP     -has productive phlegm x 1 yr, clear, white in color, more in the AM    -no SOB, Cp    -has constipation, on miralax    -gets random nausea    -on wegoway, no benefit yet    -reports rapid weight gain, on thyroid med (new)    -dry eyes, on systane eyedrops, not helping    -on biotene for dry mouth    -gest butterfly rash x 3 months, not sure if it photosensitive    -no seizures    -mood is up and down    -has h/o asthma    -no pregnancies, no future pregnancy    -no tobacco, ETOH use    -sun burns her eyes    -has brain fog    -gets muscle aches and spasms    -reports urgency with frequency    -working par time, 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[employer], 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    -has fatigue      8/15/2024:    -urgent visit, has called several times since initial visit with pain/fatigue/low grade fevers flare  ups    -no benefit from HCQ, no SEs    -has nausea, meclizine helps    -hands are better today    -hands/feet swell up    -flares are every 2 months, ibuprofen helps with pain    -AM stiffness is > 30 min    -gets malar rash    -reports brain fog    -BM has changed, yesterday had dark stool    -today has dark urine with burning    -gets headaches      11/6/2024:    -sometimes gets pain over her back with taking deep breath  -joint pain is better  -still very fatigued  -complains of runny nose, clear discharge    Today --  4/24/25   -Diagnosed with COVID a few weeks ago  -Felt initial improvement in fatigue with modafinil. However, over the last few weeks she has felt more tired. She feels she has had more stress recently.   -Notes she has a rash that developed over her cheeks over the last few weeks. This is resolved now but flares intermittently   -Feels her joints especially in her knees and hands are worsened. The knees hurt more with squatting and when they are persistently bent. Her hands are more painful, stiff especially in the morning. She has noticed some swelling but no swelling today.   -Reports increased photosensitivity   -Saw dermatology yesterday for hair loss. She is scheduled for a biopsy next week.     Past Medical History:   Diagnosis Date    ADHD (attention deficit hyperactivity disorder)     Depressive disorder     Ovarian cyst     Uncomplicated asthma      Past Surgical History:   Procedure Laterality Date    COLONOSCOPY N/A 4/27/2022    Procedure: COLONOSCOPY, WITH POLYPECTOMY AND BIOPSY;  Surgeon: Alyse Leija MD;  Location:  GI    ENT SURGERY      tonsilectomy age 8    ESOPHAGOSCOPY, GASTROSCOPY, DUODENOSCOPY (EGD), COMBINED N/A 3/15/2023    Procedure: ESOPHAGOGASTRODUODENOSCOPY, WITH BIOPSY;  Surgeon: Cyn Colon MD;  Location: [facility] [place] OR    ORTHOPEDIC SURGERY      Hip, emelia surgery in 2013    WRIST SURGERY       Family History   Problem Relation Age of Onset    Depression  Maternal Grandmother     Schizophrenia Maternal Uncle     Substance Abuse Maternal Uncle    Mom has lupus      Social History     Socioeconomic History    Marital status: Single     Spouse name: Not on file    Number of children: Not on file    Years of education: Not on file    Highest education level: Not on file   Occupational History    Not on file   Tobacco Use    Smoking status: Never     Passive exposure: Never    Smokeless tobacco: Never    Tobacco comments:     Medical edible gummi  - has [place] medical marijuana card.    Vaping Use    Vaping status: Never Used   Substance and Sexual Activity    Alcohol use: Yes     Comment: rare    Drug use: Never    Sexual activity: Not Currently   Other Topics Concern    Parent/sibling w/ CABG, MI or angioplasty before 65F 55M? Not Asked   Social History Narrative    Lives with her parents working part time . Had to leave college      Social Drivers of Health     Financial Resource Strain: Low Risk  (5/13/2024)    Financial Resource Strain     Within the past 12 months, have you or your family members you live with been unable to get utilities (heat, electricity) when it was really needed?: No   Food Insecurity: Low Risk  (5/13/2024)    Food Insecurity     Within the past 12 months, did you worry that your food would run out before you got money to buy more?: No     Within the past 12 months, did the food you bought just not last and you didn t have money to get more?: No   Transportation Needs: High Risk (5/13/2024)    Transportation Needs     Within the past 12 months, has lack of transportation kept you from medical appointments, getting your medicines, non-medical meetings or appointments, work, or from getting things that you need?: Yes   Physical Activity: Insufficiently Active (5/13/2024)    Exercise Vital Sign     Days of Exercise per Week: 2 days     Minutes of Exercise per Session: 30 min   Stress: Stress Concern Present (5/13/2024)    [employer] - Occupational Stress Questionnaire     Feeling of Stress : Rather much   Social Connections: Unknown (7/13/2024)    Received from [employer] [facility]    Social Connections     Frequency of Communication with Friends and Family: Not on file   Interpersonal Safety: Low Risk  (1/16/2025)    Interpersonal Safety     Do you feel physically and emotionally safe where you currently live?: Yes     Within the past 12 months, have you been hit, slapped, kicked or otherwise physically hurt by someone?: No     Within the past 12 months, have you been humiliated or emotionally abused in other ways by your partner or ex-partner?: No   Housing Stability: High Risk (5/13/2024)    Housing Stability     Do you have housing? : Yes     Are you worried about losing your housing?: Yes     Patient Active Problem List   Diagnosis    Anxiety    Major depressive disorder    Class 3 severe obesity with serious comorbidity and body mass index (BMI) of 40.0 to 44.9 in adult, unspecified obesity type    Gastroesophageal reflux disease    Asymptomatic COVID-19 virus infection    Asthma    Chronic migraine without aura    Cyst of ovary    Slow transit constipation    Constipation    Iron deficiency anemia    Gastric ulcer without hemorrhage or perforation    Occipital neuralgia    Neuralgia and neuritis, unspecified    Other reactions to severe stress    Somatization disorder    Post-COVID chronic fatigue    Encephalopathy, unspecified    Excessive and frequent menstruation    Paresthesia of skin    Post concussion syndrome    Primary focal hyperhidrosis    Snoring    Tension-type headache, unspecified, not intractable    Cervicothoracic disc displacement    Sleep related bruxism    Current moderate episode of major depressive disorder, unspecified whether recurrent (H)    Central hypothyroidism    Prediabetes    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      albuterol (ACCUNEB) 1.25 MG/3ML neb solution Take 1 vial (1.25 mg) by nebulization every 6 hours as needed for shortness of breath or wheezing. 90 mL 0    albuterol (VENTOLIN HFA) 108 (90 Base) MCG/ACT inhaler INHALE 2 PUFFS INTO THE LUNGS FOUR TIMES DAILY 18 g 2    ARIPiprazole (ABILIFY) 10 MG tablet Take 1 tablet by mouth daily at 2 pm.      buPROPion (WELLBUTRIN XL) 150 MG 24 hr tablet       buPROPion (WELLBUTRIN XL) 300 MG 24 hr tablet Take 300 mg by mouth every morning      CONCERTA 36 MG CR tablet TAKE 1 TABLET BY MOUTH DAILY IN THE MORNING FOR ADHD. START 11/18/22      DULoxetine (CYMBALTA) 60 MG capsule Take 60 mg by mouth daily      ferrous sulfate (FE TABS) 325 (65 Fe) MG EC tablet TAKE 1 TABLET BY MOUTH EVERY DAY 90 tablet 2    gabapentin (NEURONTIN) 100 MG capsule Take 200 mg by mouth At Bedtime      gabapentin (NEURONTIN) 600 MG tablet Take 1 tablet (600 mg) by mouth at bedtime. 90 tablet 3    hydrocortisone 2.5 % cream APPLY TOPICALLY TO THE CHEST TWICE DAILY AS NEEDED      hydroxychloroquine (PLAQUENIL) 200 MG tablet Take 1 tablet (200 mg) by mouth 2 times daily (with meals) Annual Plaquenil toxicity eye screening required. 180 tablet 3    ibuprofen (ADVIL/MOTRIN) 800 MG tablet TAKE 1 TABLET BY MOUTH TWICE A DAY WITH MEALS FOR 14 DAYS      ketorolac (TORADOL) 10 MG tablet Take 1 tablet (10 mg) by mouth every 6 hours as needed for moderate pain 20 tablet 0    levocetirizine (XYZAL) 5 MG tablet Take 5 mg by mouth every 24 hours      levothyroxine (SYNTHROID/LEVOTHROID) 75 MCG tablet Take 1 tablet (75 mcg) by mouth daily. 90 tablet 2    lidocaine (LIDODERM) 5 % patch Place 1 patch onto the  skin every 24 hours To prevent lidocaine toxicity, patient should be patch free for 12 hrs daily. 15 patch 1    meclizine (ANTIVERT) 25 MG tablet Take 1 tablet (25 mg) by mouth 3 times daily as needed for dizziness 60 tablet 3    methocarbamol (ROBAXIN) 500 MG tablet Take 1 tablet (500 mg) by mouth 4 times daily as needed for muscle spasms 20 tablet 0    methylphenidate (RITALIN) 10 MG tablet TAKE 1/2 - 1 TABLET ONCE DAILY AS NEEDED FOR ATTENTION AND FOCUS.      modafinil (PROVIGIL) 100 MG tablet Take 2 tablets (200 mg) by mouth daily. 60 tablet 5    naltrexone (DEPADE/REVIA) 50 MG tablet Take 1/2 tablet once daily 1-2 hours prior to worst cravings for 1 week, then increase to 1 tablet daily as directed if tolerating 30 tablet 2    omeprazole (PRILOSEC) 20 MG DR capsule Take 1 capsule (20 mg) by mouth 2 times daily      polyethylene glycol (MIRALAX) 17 GM/Dose powder Take 1 capful by mouth daily as needed for constipation      RELPAX 40 MG tablet       spironolactone-HCTZ (ALDACTAZIDE) 25-25 MG tablet Take 1 tablet by mouth every 72 hours as needed 36 tablet 5    TAZORAC 0.1 % external cream APPLY TOPICALLY TO THE AFFECTED AREA AT BEDTIME      tirzepatide-Weight Management (ZEPBOUND) 10 MG/0.5ML prefilled pen Inject 0.5 mLs (10 mg) subcutaneously every 7 days. 2 mL 2    tiZANidine (ZANAFLEX) 2 MG capsule PLEASE SEE ATTACHED FOR DETAILED DIRECTIONS      triamcinolone (KENALOG) 0.1 % paste APPLY TO AFFECTED AREA 2 TIMES DAILY AS DIRECTED       No facility-administered encounter medications on file as of 4/24/2025.         Her records were reviewed.    Component      Latest Ref Rng 5/8/2024  2:40 PM 5/8/2024  2:43 PM   Sodium      135 - 145 mmol/L 139     Potassium      3.4 - 5.3 mmol/L 4.1     Carbon Dioxide (CO2)      22 - 29 mmol/L 26     Anion Gap      7 - 15 mmol/L 8     Urea Nitrogen      6.0 - 20.0 mg/dL 12.5     Creatinine      0.51 - 0.95 mg/dL 0.83     GFR Estimate      >60 mL/min/1.73m2 >90     Calcium       8.6 - 10.0 mg/dL 9.2     Chloride      98 - 107 mmol/L 105     Glucose      70 - 99 mg/dL 96     Alkaline Phosphatase      40 - 150 U/L 65     AST      0 - 45 U/L 24     ALT      0 - 50 U/L 27     Protein Total      6.4 - 8.3 g/dL 6.6     Albumin      3.5 - 5.2 g/dL 4.0     Bilirubin Total      <=1.2 mg/dL 0.3     Color Urine      Colorless, Straw, Light Yellow, Yellow   Yellow    Appearance Urine      Clear   Clear    Glucose Urine      Negative mg/dL  Negative    Bilirubin Urine      Negative   Negative    Ketones Urine      Negative mg/dL  Negative    Specific Gravity Urine      1.005 - 1.030   1.020    Blood Urine      Negative   Negative    pH Urine      5.0 - 8.0   7.0    Protein Albumin Urine      Negative mg/dL  Negative    Urobilinogen Urine      0.2, 1.0 E.U./dL  1.0    Nitrite Urine      Negative   Negative    Leukocyte Esterase Urine      Negative   Negative    WBC      4.0 - 11.0 10e3/uL 5.0     RBC Count      3.80 - 5.20 10e6/uL 4.23     Hemoglobin      11.7 - 15.7 g/dL 11.1 (L)     Hematocrit      35.0 - 47.0 % 36.9     MCV      78 - 100 fL 87     MCH      26.5 - 33.0 pg 26.2 (L)     MCHC      31.5 - 36.5 g/dL 30.1 (L)     RDW      10.0 - 15.0 % 13.9     Platelet Count      150 - 450 10e3/uL 308     INR      0.85 - 1.15  0.95     Thrombin Time      13.0 - 19.0 Seconds 17.0     PTT Ratio      <1.21  0.92     DRVVT Screen Ratio      <1.08  0.94     Lupus Result      Negative  Negative     Lupus Interpretation The INR is normal.      Bacteria Urine      None Seen /HPF  None Seen    RBC Urine      0-2 /HPF /HPF  0-2    WBC Urine      0-5 /HPF /HPF  0-5    Squamous Epithelial /LPF Urine      None Seen /LPF  Few !    RONALD interpretation      Negative  Borderline Positive !     RONALD pattern 1 Speckled     RONALD titer 1 1:40     Beta 2 Glycoprotein 1 Antibody IgG      <7.0 U/mL 14.0 (H)     Beta 2 Glycoprotein 1 Antibody IgM      <7.0 U/mL <2.4     CRP Inflammation      <5.00 mg/L 12.90 (H)     Sed Rate      0 -  "20 mm/hr 18     Complement C3      81 - 157 mg/dL 149     Complement C4      13 - 39 mg/dL 20     Complement, Total, S      38.7 - 89.9 U/mL 28.9 (L)     TSH      0.30 - 4.20 uIU/mL 0.39        Legend:  (L) Low  ! Abnormal  (H) High    Neg RF, anti-CCP, SSA/SSB      Ph.E:    /85   Pulse 87   Temp 98.5  F (36.9  C) (Oral)   Ht 1.676 m (5' 6\")   Wt 118.8 kg (261 lb 14.4 oz)   LMP 02/23/2025 (Exact Date)   SpO2 100%   BMI 42.27 kg/m        Constitutional: WD/WN. Pleasant. In no acute distress  Eyes: EOM intact, sclera anicteric, conj not injected  HEENT: No oral ulcers.  NL salivary pool.  Neck: No cervical LAP   Chest: Clear to auscultation bilaterally  CV: RRR, no murmurs. No edema, clubbing or cyanosis.   GI: Abdomen is soft and non tender.   MS: No synovitis. No tenderness of the joints. No joint deformities. No warmth of joints   Skin: No skin rash  Neuro: A&O x 3. Grossly non focal  Psych: NL affect       Assessment/Plan:    7/2024:    SLERoxanne Del Cid has signs and symptoms suggestive of mild early SLE. This is based on + RONALD 1:40, +beta 2 GP I IgG, low CH50, leukopenia, high CRP, arthritis, malar rash, oral/nasal ulcers, Raynaud's, low grade fevers, fatigue, brain fog, and sicca.      I reviewed and discussed lab results with her. Will finish the work up by checking the labs which are missing and checking follow up albs, plus thyroid antibodies to see if her hypothyroidism is autoimmune or not.    Beta 2 GP I IgG comes with increased risk of miscarriage and thrombosis, if repeat comes back positive in 3 months. Discussed significance of it, and recommendation to take ASA 81 mg every day during future pregnancies.    Discussed lifestyle changes, discussed sun protective measures (sun block Neutrogena  sheer mist for body, sport for face, sun protective clothing), healthy diet (more omega 3, less sugar), stretching exercises like Myron Chi, good sleep, avoiding stress and triggers for lupus (adonis " adonis sprouts, echinacea, garlic, melatonin, sulfa drug).      She should avoid any estrogen and any birth control containing estrogen given + beta 2 GP I IgG.      Recommend to start HCQ; risks were discussed including rare risk of retinal toxicity. It takes 2-3 months to show benefit, peak benefit is 6 months.      Recommend to switch mobic to ibuprofen as it is not helping.    Advised her to try Kiwi for constipation.      Plan:.    Labs     Start plaquenil 1 tab twice a day with food    Return in 4 months in person      8/15/2024:    SLE.      Continues to have joint pain/fatigue despite starting HCQ in 7/2024, she tolerates it. It takes 2-3 months to show benefit, peak benefit 6 months, we have to give it a chance to work.    Plan:    Meclizine got refilled, it helps with nausea    Stay on plaquenil and ibuprofen     Yearly eye exam on plaquenil    Provigil for fatigue 1 tab every morning; risks were discussed    Lupus labs at the end of Oct    Urine test today (r/o UTI given sx), stool test for blood (given dark stool), CBC diff (given dark stool)+beta 2 GP IgG (repeat to see if true positive) today    Keep 11/6 appointment         T 11/6/2024:    Plan:    SLE. Improved joint pain and CRP, but continues to have significant fatigue and pleurisy. Will add provigil for fatigue and colcrys for pleurisy; risks were discussed. Continue  mg bid.      HCQ monitoring. Needs yearly eye exam.    Plan:      Try the provigil at 1 tab a day, if no help, could try 2  day, for fatigue    Try Myron Chi exercises for fatigue    Colcrys 1 tab twice a day as needed for pleurisy    Try normal saline rinse    Return 2/19 9 am (ARUNA slot could be used)      Today-- 4/24/2025  Patient with persistent fatigue, joint pain, intermittent rash, increased photosensitivity despite HCQ.  On her last lab check in October her complements were normal, CRP improving. She is tolerating HCQ. After discussion, will elect to start Imuran with  plaquenil to see if it helps with better symptomatic control.     Start Imuran 50 mg daily   Continue HCQ  Needs yearly eye exam.   Continue Colcrys 1 tab twice a day as needed for pleurisy  Continue provigil daily for fatigue  Repeat labs in 1 month to monitor use of Imuran    Orders Placed This Encounter   Procedures    DNA double stranded antibodies    Complement C3    Complement C4    CRP inflammation    Routine UA with Micro Reflex to Culture    Protein  random urine    Creatinine random urine    ALT    Albumin level    AST    Creatinine    ALT    Albumin level    AST    Creatinine    CBC with Platelets & Differential     Seen and discussed with Dr. Ana Frankel MD  [id number]

## 2025-04-24 NOTE — PROGRESS NOTES
Nehemias Mascorro is a pleasant 29 year old year old female patient here today for hair loss/shedding. She notes present for past year. She notes she has hypothyroid but controlled on levothyroxine. She was diagnosed with lupus last year improving on plaquenil. She notes still having joint pain but improved on plaquenil. She does take vit D. She reports that her father had male pattern hair loss. 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 Patient has no other skin complaints today.  Remainder of the HPI, Meds, PMH, Allergies, FH, and SH was reviewed in chart.   Past Medical History:   Diagnosis Date    ADHD (attention deficit hyperactivity disorder)     Depressive disorder     Ovarian cyst     Uncomplicated asthma        Past Surgical History:   Procedure Laterality Date    COLONOSCOPY N/A 4/27/2022    Procedure: COLONOSCOPY, WITH POLYPECTOMY AND BIOPSY;  Surgeon: Alyse Leija MD;  Location:  GI    ENT SURGERY      tonsilectomy age 8    ESOPHAGOSCOPY, GASTROSCOPY, DUODENOSCOPY (EGD), COMBINED N/A 3/15/2023    Procedure: ESOPHAGOGASTRODUODENOSCOPY, WITH BIOPSY;  Surgeon: Cyn Colon MD;  [place] [facility] [place]OPEDIC SURG[place]      Hip, emelai surgery in 2013    WRIST SURGERY          Family History   Problem Relation Age of Onset    Depression Maternal Grandmother     Schizophrenia Maternal Uncle     Substance Abuse Maternal Uncle        Social History     Socioeconomic History    Marital status: Single     Spouse name: Not on file    Number of children: Not on file    Years of education: Not on file    Highest education level: Not on file   Occupational History    Not on file   Tobacco Use    Smoking status: Never     Passive exposure: Never    Smokeless tobacco: Never    Tobacco comments:     Medical edible gummi  - has [place] medical marijuana card.    Vaping Use    Vaping status: Never Used   Substance and Sexual Activity    Alcohol use: Yes     Comment: rare    Drug use: Never     Sexual activity: Not Currently   Other Topics Concern    Parent/sibling w/ CABG, MI or angioplasty before 65F 55M? Not Asked   Social History Narrative    Lives with her parents working part time . Had to leave college      Social Drivers of Health     Financial Resource Strain: Low Risk  (5/13/2024)    Financial Resource Strain     Within the past 12 months, have you or your family members you live with been unable to get utilities (heat, electricity) when it was really needed?: No   Food Insecurity: Low Risk  (5/13/2024)    Food Insecurity     Within the past 12 months, did you worry that your food would run out before you got money to buy more?: No     Within the past 12 months, did the food you bought just not last and you didn t have money to get more?: No   Transportation Needs: High Risk (5/13/2024)    Transportation Needs     Within the past 12 months, has lack of transportation kept you from medical appointments, getting your medicines, non-medical meetings or appointments, work, or from getting things that you need?: Yes   Physical Activity: Insufficiently Active (5/13/2024)    Exercise Vital Sign     Days of Exercise per Week: 2 days     Minutes of Exercise per Session: 30 min   Stress: Stress Concern Present (5/13/2024)    [employer] - Occupational Stress Questionnaire     Feeling of Stress : Rather much   Social Connections: Unknown (7/13/2024)    Received from [employer] [facility]    Social Connections     Frequency of Communication with Friends and Family: Not on file   Interpersonal Safety: Low Risk  (1/16/2025)    Interpersonal Safety     Do you feel physically and emotionally safe where you currently live?: Yes     Within the past 12 months, have you been hit, slapped, kicked or otherwise physically hurt by someone?: No     Within the past 12 months, have you been humiliated or emotionally abused in other ways by your partner or ex-partner?: No    Housing Stability: High Risk (5/13/2024)    Housing Stability     Do you have housing? : Yes     Are you worried about losing your housing?: Yes       Outpatient Encounter Medications as of 4/23/2025   Medication Sig Dispense Refill    acetaminophen (TYLENOL) 650 MG CR tablet TAKE 2 TABLETS EVERY 8 HOURS BY ORAL ROUTE FOR 14 DAYS.      albuterol (ACCUNEB) 1.25 MG/3ML neb solution Take 1 vial (1.25 mg) by nebulization every 6 hours as needed for shortness of breath or wheezing. 90 mL 0    albuterol (VENTOLIN HFA) 108 (90 Base) MCG/ACT inhaler INHALE 2 PUFFS INTO THE LUNGS FOUR TIMES DAILY 18 g 2    ARIPiprazole (ABILIFY) 10 MG tablet Take 1 tablet by mouth daily at 2 pm.      buPROPion (WELLBUTRIN XL) 150 MG 24 hr tablet       buPROPion (WELLBUTRIN XL) 300 MG 24 hr tablet Take 300 mg by mouth every morning      CONCERTA 36 MG CR tablet TAKE 1 TABLET BY MOUTH DAILY IN THE MORNING FOR ADHD. START 11/18/22      DULoxetine (CYMBALTA) 60 MG capsule Take 60 mg by mouth daily      ferrous sulfate (FE TABS) 325 (65 Fe) MG EC tablet TAKE 1 TABLET BY MOUTH EVERY DAY 90 tablet 2    gabapentin (NEURONTIN) 100 MG capsule Take 200 mg by mouth At Bedtime      gabapentin (NEURONTIN) 600 MG tablet Take 1 tablet (600 mg) by mouth at bedtime. 90 tablet 3    hydrocortisone 2.5 % cream APPLY TOPICALLY TO THE CHEST TWICE DAILY AS NEEDED      hydroxychloroquine (PLAQUENIL) 200 MG tablet Take 1 tablet (200 mg) by mouth 2 times daily (with meals) Annual Plaquenil toxicity eye screening required. 180 tablet 3    ibuprofen (ADVIL/MOTRIN) 800 MG tablet TAKE 1 TABLET BY MOUTH TWICE A DAY WITH MEALS FOR 14 DAYS      ketorolac (TORADOL) 10 MG tablet Take 1 tablet (10 mg) by mouth every 6 hours as needed for moderate pain 20 tablet 0    levocetirizine (XYZAL) 5 MG tablet Take 5 mg by mouth every 24 hours      levothyroxine (SYNTHROID/LEVOTHROID) 75 MCG tablet Take 1 tablet (75 mcg) by mouth daily. 90 tablet 2    lidocaine (LIDODERM) 5 % patch  Place 1 patch onto the skin every 24 hours To prevent lidocaine toxicity, patient should be patch free for 12 hrs daily. 15 patch 1    meclizine (ANTIVERT) 25 MG tablet Take 1 tablet (25 mg) by mouth 3 times daily as needed for dizziness 60 tablet 3    methocarbamol (ROBAXIN) 500 MG tablet Take 1 tablet (500 mg) by mouth 4 times daily as needed for muscle spasms 20 tablet 0    methylphenidate (RITALIN) 10 MG tablet TAKE 1/2 - 1 TABLET ONCE DAILY AS NEEDED FOR ATTENTION AND FOCUS.      modafinil (PROVIGIL) 100 MG tablet Take 2 tablets (200 mg) by mouth daily. 60 tablet 5    naltrexone (DEPADE/REVIA) 50 MG tablet Take 1/2 tablet once daily 1-2 hours prior to worst cravings for 1 week, then increase to 1 tablet daily as directed if tolerating 30 tablet 2    omeprazole (PRILOSEC) 20 MG DR capsule Take 1 capsule (20 mg) by mouth 2 times daily      polyethylene glycol (MIRALAX) 17 GM/Dose powder Take 1 capful by mouth daily as needed for constipation      RELPAX 40 MG tablet       spironolactone-HCTZ (ALDACTAZIDE) 25-25 MG tablet Take 1 tablet by mouth every 72 hours as needed 36 tablet 5    TAZORAC 0.1 % external cream APPLY TOPICALLY TO THE AFFECTED AREA AT BEDTIME      tirzepatide-Weight Management (ZEPBOUND) 10 MG/0.5ML prefilled pen Inject 0.5 mLs (10 mg) subcutaneously every 7 days. 2 mL 2    tiZANidine (ZANAFLEX) 2 MG capsule PLEASE SEE ATTACHED FOR DETAILED DIRECTIONS      triamcinolone (KENALOG) 0.1 % paste APPLY TO AFFECTED AREA 2 TIMES DAILY AS DIRECTED       No facility-administered encounter medications on file as of 4/23/2025.             O:   NAD, WDWN, Alert & Oriented, Mood & Affect wnl, Vitals stable   Here today alone   LMP 02/23/2025 (Exact Date)    General appearance normal   Vitals stable   Alert, oriented and in no acute distress      Nonscarring hair loss       Eyes: Conjunctivae/lids:Normal     ENT: Lips,: normal    MSK:Normal    Pulm: Breathing Normal    Neuro/Psych: Orientation:Alert and  Orientedx3 ; Mood/Affect:normal  A/P:  1. Non scarring alopecia   Telogen effluvium vs androgenetic alopecia  Check labs   Patient will return next next week for punch biopsy.    GI follow-up 12/26 at 3pm  [address], [place] [id number] [phone] GI follow-up 12/26 at 3pm  [address], [address], [place] [id number] [phone] - Please follow up with Dr. De Souza (gastroenterology) on 12/26 at 3pm. Location: [address] [place] [address], [address] [facility], [place] [address]. Please call [phone] if you have any questions.   - Please follow up with your primary care doctor within 2 weeks after being discharged.  - Please follow up with Dr. De Souza (gastroenterology) on 12/26 at 3pm. Location: [address] [place] [address], [address] [facility], [place] [address]. Please call [phone] if you have any questions.   - Please follow up with your primary care doctor within 2 weeks after being discharged.

## 2025-04-24 NOTE — PATIENT INSTRUCTIONS
Urine test today    Add imuran 1 tab with food with monitoring blood test in 4 weeks    Return in about 3-4 months in person

## 2025-04-24 NOTE — LETTER
4/24/2025       RE: Nehemias Mascorro  [address]  [place] [address]     Dear Colleague,    Thank you for referring your patient, Nehemias Mascorro, to the [facility]HEUMATOLOG[facility] [place] at [facility]. Please see a copy of my visit note below.    Rheumatology Follow up Visit Note    Reason for visit: SLE Dx 7/2024    Initial visit date: 7/3/2024    Last seen:  11/6/2024    DOS: April 24, 2025      HPI from initial visit:    Nehemias Mascorro is a 29 year old female G0 with + fh/o SLE, who was referred to our clinic for evaluation and management of possible SLE.      -she was seen by several rheumatologists and was diagnosed with fibromyalgia, but her mother and herself are worried about having lupus. Her mom is under my care with lupus and asked me to see her daughter    -in 2016, had TBI, getting tx from neurologist, still has headache and neck pain    -her major complaint is joint pain    -she has this joint pain x 2 years    -gets pain over hands, elbows, knees and ankles    -hands swell up    -AM stiffness is between 1 hr-all day    -no triggers    -prednisone provided same benefit, made her hyper    -has raynaud's with turning color to blue, white x 2 yr    -gets periodic fevers., low grade x past 3 months,  T max 99.7-100.4, no infection    -no hair loss    -gets oral/nasal ulcers x past 2 years, kenalog paste helps with that    -PCP noticed cervical LAP     -has productive phlegm x 1 yr, clear, white in color, more in the AM    -no SOB, Cp    -has constipation, on miralax    -gets random nausea    -on wegoway, no benefit yet    -reports rapid weight gain, on thyroid med (new)    -dry eyes, on systane eyedrops, not helping    -on biotene for dry mouth    -gest butterfly rash x 3 months, not sure if it photosensitive    -no seizures    -mood is up and down    -has h/o asthma    -no pregnancies, no future pregnancy    -no tobacco,  ETOH use    -sun burns her eyes    -has brain fog    -gets muscle aches and spasms    -reports urgency with frequency    -working par time, 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[employer], 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    -has fatigue      8/15/2024:    -urgent visit, has called several times since initial visit with pain/fatigue/low grade fevers flare ups    -no benefit from HCQ, no SEs    -has nausea, meclizine helps    -hands are better today    -hands/feet swell up    -flares are every 2 months, ibuprofen helps with pain    -AM stiffness is > 30 min    -gets malar rash    -reports brain fog    -BM has changed, yesterday had dark stool    -today has dark urine with burning    -gets headaches      11/6/2024:    -sometimes gets pain over her back with taking deep breath  -joint pain is better  -still very fatigued  -complains of runny nose, clear discharge    Today --  4/24/25   -Diagnosed with COVID a few weeks ago  -Felt initial improvement in fatigue with modafinil. However, over the last few weeks she has felt more tired. She feels she has had more stress recently.   -Notes she has a rash that developed over her cheeks over the last few weeks. This is resolved now but flares intermittently   -Feels her joints especially in her knees and hands are worsened. The knees hurt more with squatting and when they are persistently bent. Her hands are more painful, stiff especially in the morning. She has noticed some swelling but no swelling today.   -Reports increased photosensitivity   -Saw dermatology yesterday for hair loss. She is scheduled for a biopsy next week.     Past Medical History:   Diagnosis Date     ADHD (attention deficit hyperactivity disorder)      Depressive disorder      Ovarian cyst      Uncomplicated asthma      Past Surgical History:   Procedure Laterality Date     COLONOSCOPY N/A 4/27/2022    Procedure: COLONOSCOPY, WITH POLYPECTOMY AND BIOPSY;  Surgeon: Alyse Leija MD;  Location:  GI     ENT SURGERY      tonsilectomy  age 8     ESOPHAGOSCOPY, GASTROSCOPY, DUODENOSCOPY (EGD), COMBINED N/A 3/15/2023    Procedure: ESOPHAGOGASTRODUODENOSCOPY, WITH BIOPSY;  Surgeon: Cyn Colon MD;  Location: [facility] OR     ORTHOPEDIC SURGERY      Hip, emelia surgery in 2013     WRIST SURGERY       Family History   Problem Relation Age of Onset     Depression Maternal Grandmother      Schizophrenia Maternal Uncle      Substance Abuse Maternal Uncle    Mom has lupus      Social History     Socioeconomic History     Marital status: Single     Spouse name: Not on file     Number of children: Not on file     Years of education: Not on file     Highest education level: Not on file   Occupational History     Not on file   Tobacco Use     Smoking status: Never     Passive exposure: Never     Smokeless tobacco: Never     Tobacco comments:     Medical edible gummi  - has [place] medical marijuana card.    Vaping Use     Vaping status: Never Used   Substance and Sexual Activity     Alcohol use: Yes     Comment: rare     Drug use: Never     Sexual activity: Not Currently   Other Topics Concern     Parent/sibling w/ CABG, MI or angioplasty before 65F 55M? Not Asked   Social History Narrative    Lives with her parents working part time . Had to leave college      Social Drivers of Health     Financial Resource Strain: Low Risk  (5/13/2024)    Financial Resource Strain      Within the past 12 months, have you or your family members you live with been unable to get utilities (heat, electricity) when it was really needed?: No   Food Insecurity: Low Risk  (5/13/2024)    Food Insecurity      Within the past 12 months, did you worry that your food would run out before you got money to buy more?: No      Within the past 12 months, did the food you bought just not last and you didn t have money to get more?: No   Transportation Needs: High Risk (5/13/2024)    Transportation Needs      Within the past 12 months, has lack of transportation kept you from medical appointments,  getting your medicines, non-medical meetings or appointments, work, or from getting things that you need?: Yes   Physical Activity: Insufficiently Active (5/13/2024)    Exercise Vital Sign      Days of Exercise per Week: 2 days      Minutes of Exercise per Session: 30 min   Stress: Stress Concern Present (5/13/2024)    [employer] [place] [employer] - Occupational Stress Questionnaire      Feeling of Stress : Rather much   Social Connections: Unknown (7/13/2024)    Received from [facility]    Social Connections      Frequency of Communication with Friends and Family: Not on file   Interpersonal Safety: Low Risk  (1/16/2025)    Interpersonal Safety      Do you feel physically and emotionally safe where you currently live?: Yes      Within the past 12 months, have you been hit, slapped, kicked or otherwise physically hurt by someone?: No      Within the past 12 months, have you been humiliated or emotionally abused in other ways by your partner or ex-partner?: No   Housing Stability: High Risk (5/13/2024)    Housing Stability      Do you have housing? : Yes      Are you worried about losing your housing?: Yes     Patient Active Problem List   Diagnosis     Anxiety     Major depressive disorder     Class 3 severe obesity with serious comorbidity and body mass index (BMI) of 40.0 to 44.9 in adult, unspecified obesity type     Gastroesophageal reflux disease     Asymptomatic COVID-19 virus infection     Asthma     Chronic migraine without aura     Cyst of ovary     Slow transit constipation     Constipation     Iron deficiency anemia     Gastric ulcer without hemorrhage or perforation     Occipital neuralgia     Neuralgia and neuritis, unspecified     Other reactions to severe stress     Somatization disorder     Post-COVID chronic fatigue     Encephalopathy, unspecified     Excessive and frequent menstruation     Paresthesia of skin     Post concussion syndrome     Primary  focal hyperhidrosis     Snoring     Tension-type headache, unspecified, not intractable     Cervicothoracic disc displacement     Sleep related bruxism     Current moderate episode of major depressive disorder, unspecified whether recurrent (H)     Central hypothyroidism     Prediabetes     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       albuterol (ACCUNEB) 1.25 MG/3ML neb solution Take 1 vial (1.25 mg) by nebulization every 6 hours as needed for shortness of breath or wheezing. 90 mL 0     albuterol (VENTOLIN HFA) 108 (90 Base) MCG/ACT inhaler INHALE 2 PUFFS INTO THE LUNGS FOUR TIMES DAILY 18 g 2     ARIPiprazole (ABILIFY) 10 MG tablet Take 1 tablet by mouth daily at 2 pm.       buPROPion (WELLBUTRIN XL) 150 MG 24 hr tablet        buPROPion (WELLBUTRIN XL) 300 MG 24 hr tablet Take 300 mg by mouth every morning       CONCERTA 36 MG CR tablet TAKE 1 TABLET BY MOUTH DAILY IN THE MORNING FOR ADHD. START 11/18/22       DULoxetine (CYMBALTA) 60 MG capsule Take 60 mg by mouth daily       ferrous sulfate (FE TABS) 325 (65 Fe) MG EC tablet TAKE 1 TABLET BY MOUTH EVERY DAY 90 tablet 2     gabapentin (NEURONTIN) 100 MG capsule Take 200 mg by mouth At Bedtime       gabapentin (NEURONTIN) 600 MG tablet Take 1 tablet (600 mg) by mouth at bedtime. 90 tablet 3     hydrocortisone 2.5 % cream APPLY TOPICALLY TO THE CHEST TWICE DAILY AS NEEDED       hydroxychloroquine (PLAQUENIL) 200 MG tablet Take 1 tablet (200 mg) by mouth 2 times daily (with meals) Annual Plaquenil toxicity eye screening required. 180 tablet 3     ibuprofen (ADVIL/MOTRIN) 800 MG tablet TAKE 1 TABLET BY MOUTH  TWICE A DAY WITH MEALS FOR 14 DAYS       ketorolac (TORADOL) 10 MG tablet Take 1 tablet (10 mg) by mouth every 6 hours as needed for moderate pain 20 tablet 0     levocetirizine (XYZAL) 5 MG tablet Take 5 mg by mouth every 24 hours       levothyroxine (SYNTHROID/LEVOTHROID) 75 MCG tablet Take 1 tablet (75 mcg) by mouth daily. 90 tablet 2     lidocaine (LIDODERM) 5 % patch Place 1 patch onto the skin every 24 hours To prevent lidocaine toxicity, patient should be patch free for 12 hrs daily. 15 patch 1     meclizine (ANTIVERT) 25 MG tablet Take 1 tablet (25 mg) by mouth 3 times daily as needed for dizziness 60 tablet 3     methocarbamol (ROBAXIN) 500 MG tablet Take 1 tablet (500 mg) by mouth 4 times daily as needed for muscle spasms 20 tablet 0     methylphenidate (RITALIN) 10 MG tablet TAKE 1/2 - 1 TABLET ONCE DAILY AS NEEDED FOR ATTENTION AND FOCUS.       modafinil (PROVIGIL) 100 MG tablet Take 2 tablets (200 mg) by mouth daily. 60 tablet 5     naltrexone (DEPADE/REVIA) 50 MG tablet Take 1/2 tablet once daily 1-2 hours prior to worst cravings for 1 week, then increase to 1 tablet daily as directed if tolerating 30 tablet 2     omeprazole (PRILOSEC) 20 MG DR capsule Take 1 capsule (20 mg) by mouth 2 times daily       polyethylene glycol (MIRALAX) 17 GM/Dose powder Take 1 capful by mouth daily as needed for constipation       RELPAX 40 MG tablet        spironolactone-HCTZ (ALDACTAZIDE) 25-25 MG tablet Take 1 tablet by mouth every 72 hours as needed 36 tablet 5     TAZORAC 0.1 % external cream APPLY TOPICALLY TO THE AFFECTED AREA AT BEDTIME       tirzepatide-Weight Management (ZEPBOUND) 10 MG/0.5ML prefilled pen Inject 0.5 mLs (10 mg) subcutaneously every 7 days. 2 mL 2     tiZANidine (ZANAFLEX) 2 MG capsule PLEASE SEE ATTACHED FOR DETAILED DIRECTIONS       triamcinolone (KENALOG) 0.1 % paste APPLY TO AFFECTED AREA 2 TIMES DAILY AS DIRECTED       No facility-administered encounter medications on file as of 4/24/2025.          Her records were reviewed.    Component      Latest Ref Rng 5/8/2024  2:40 PM 5/8/2024  2:43 PM   Sodium      135 - 145 mmol/L 139     Potassium      3.4 - 5.3 mmol/L 4.1     Carbon Dioxide (CO2)      22 - 29 mmol/L 26     Anion Gap      7 - 15 mmol/L 8     Urea Nitrogen      6.0 - 20.0 mg/dL 12.5     Creatinine      0.51 - 0.95 mg/dL 0.83     GFR Estimate      >60 mL/min/1.73m2 >90     Calcium      8.6 - 10.0 mg/dL 9.2     Chloride      98 - 107 mmol/L 105     Glucose      70 - 99 mg/dL 96     Alkaline Phosphatase      40 - 150 U/L 65     AST      0 - 45 U/L 24     ALT      0 - 50 U/L 27     Protein Total      6.4 - 8.3 g/dL 6.6     Albumin      3.5 - 5.2 g/dL 4.0     Bilirubin Total      <=1.2 mg/dL 0.3     Color Urine      Colorless, Straw, Light Yellow, Yellow   Yellow    Appearance Urine      Clear   Clear    Glucose Urine      Negative mg/dL  Negative    Bilirubin Urine      Negative   Negative    Ketones Urine      Negative mg/dL  Negative    Specific Gravity Urine      1.005 - 1.030   1.020    Blood Urine      Negative   Negative    pH Urine      5.0 - 8.0   7.0    Protein Albumin Urine      Negative mg/dL  Negative    Urobilinogen Urine      0.2, 1.0 E.U./dL  1.0    Nitrite Urine      Negative   Negative    Leukocyte Esterase Urine      Negative   Negative    WBC      4.0 - 11.0 10e3/uL 5.0     RBC Count      3.80 - 5.20 10e6/uL 4.23     Hemoglobin      11.7 - 15.7 g/dL 11.1 (L)     Hematocrit      35.0 - 47.0 % 36.9     MCV      78 - 100 fL 87     MCH      26.5 - 33.0 pg 26.2 (L)     MCHC      31.5 - 36.5 g/dL 30.1 (L)     RDW      10.0 - 15.0 % 13.9     Platelet Count      150 - 450 10e3/uL 308     INR      0.85 - 1.15  0.95     Thrombin Time      13.0 - 19.0 Seconds 17.0     PTT Ratio      <1.21  0.92     DRVVT Screen Ratio      <1.08  0.94     Lupus Result      Negative  Negative     Lupus Interpretation The INR is normal.      Bacteria Urine      None Seen /HPF  None Seen    RBC Urine      0-2  "/HPF /HPF  0-2    WBC Urine      0-5 /HPF /HPF  0-5    Squamous Epithelial /LPF Urine      None Seen /LPF  Few !    RONALD interpretation      Negative  Borderline Positive !     RONALD pattern 1 Speckled     RONALD titer 1 1:40     Beta 2 Glycoprotein 1 Antibody IgG      <7.0 U/mL 14.0 (H)     Beta 2 Glycoprotein 1 Antibody IgM      <7.0 U/mL <2.4     CRP Inflammation      <5.00 mg/L 12.90 (H)     Sed Rate      0 - 20 mm/hr 18     Complement C3      81 - 157 mg/dL 149     Complement C4      13 - 39 mg/dL 20     Complement, Total, S      38.7 - 89.9 U/mL 28.9 (L)     TSH      0.30 - 4.20 uIU/mL 0.39        Legend:  (L) Low  ! Abnormal  (H) High    Neg RF, anti-CCP, SSA/SSB      Ph.E:    /85   Pulse 87   Temp 98.5  F (36.9  C) (Oral)   Ht 1.676 m (5' 6\")   Wt 118.8 kg (261 lb 14.4 oz)   LMP 02/23/2025 (Exact Date)   SpO2 100%   BMI 42.27 kg/m        Constitutional: WD/WN. Pleasant. In no acute distress  Eyes: EOM intact, sclera anicteric, conj not injected  HEENT: No oral ulcers.  NL salivary pool.  Neck: No cervical LAP   Chest: Clear to auscultation bilaterally  CV: RRR, no murmurs. No edema, clubbing or cyanosis.   GI: Abdomen is soft and non tender.   MS: No synovitis. No tenderness of the joints. No joint deformities. No warmth of joints   Skin: No skin rash  Neuro: A&O x 3. Grossly non focal  Psych: NL affect       Assessment/Plan:    7/2024:    SLERoxanne Del Cid has signs and symptoms suggestive of mild early SLE. This is based on + RONALD 1:40, +beta 2 GP I IgG, low CH50, leukopenia, high CRP, arthritis, malar rash, oral/nasal ulcers, Raynaud's, low grade fevers, fatigue, brain fog, and sicca.      I reviewed and discussed lab results with her. Will finish the work up by checking the labs which are missing and checking follow up albs, plus thyroid antibodies to see if her hypothyroidism is autoimmune or not.    Beta 2 GP I IgG comes with increased risk of miscarriage and thrombosis, if repeat comes back " positive in 3 months. Discussed significance of it, and recommendation to take ASA 81 mg every day during future pregnancies.    Discussed lifestyle changes, discussed sun protective measures (sun block Neutrogena  sheer mist for body, sport for face, sun protective clothing), healthy diet (more omega 3, less sugar), stretching exercises like Myron Chi, good sleep, avoiding stress and triggers for lupus (adonis adonis sprouts, echinacea, garlic, melatonin, sulfa drug).      She should avoid any estrogen and any birth control containing estrogen given + beta 2 GP I IgG.      Recommend to start HCQ; risks were discussed including rare risk of retinal toxicity. It takes 2-3 months to show benefit, peak benefit is 6 months.      Recommend to switch mobic to ibuprofen as it is not helping.    Advised her to try Kiwi for constipation.      Plan:.    Labs     Start plaquenil 1 tab twice a day with food    Return in 4 months in person      8/15/2024:    SLE.      Continues to have joint pain/fatigue despite starting HCQ in 7/2024, she tolerates it. It takes 2-3 months to show benefit, peak benefit 6 months, we have to give it a chance to work.    Plan:    Meclizine got refilled, it helps with nausea    Stay on plaquenil and ibuprofen     Yearly eye exam on plaquenil    Provigil for fatigue 1 tab every morning; risks were discussed    Lupus labs at the end of Oct    Urine test today (r/o UTI given sx), stool test for blood (given dark stool), CBC diff (given dark stool)+beta 2 GP IgG (repeat to see if true positive) today    Keep 11/6 appointment         T 11/6/2024:    Plan:    SLE. Improved joint pain and CRP, but continues to have significant fatigue and pleurisy. Will add provigil for fatigue and colcrys for pleurisy; risks were discussed. Continue  mg bid.      HCQ monitoring. Needs yearly eye exam.    Plan:      Try the provigil at 1 tab a day, if no help, could try 2  day, for fatigue    Try Myron Chi exercises  for fatigue    Colcrys 1 tab twice a day as needed for pleurisy    Try normal saline rinse    Return 2/19 9 am (ARUNA slot could be used)      Today-- 4/24/2025  Patient with persistent fatigue, joint pain, intermittent rash, increased photosensitivity despite HCQ.  On her last lab check in October her complements were normal, CRP improving. She is tolerating HCQ. After discussion, will elect to start Imuran with plaquenil to see if it helps with better symptomatic control.     Start Imuran 50 mg daily   Continue HCQ  Needs yearly eye exam.   Continue Colcrys 1 tab twice a day as needed for pleurisy  Continue provigil daily for fatigue  Repeat labs in 1 month to monitor use of Imuran    Orders Placed This Encounter   Procedures     DNA double stranded antibodies     Complement C3     Complement C4     CRP inflammation     Routine UA with Micro Reflex to Culture     Protein  random urine     Creatinine random urine     ALT     Albumin level     AST     Creatinine     ALT     Albumin level     AST     Creatinine     CBC with Platelets & Differential       Seen and Discussed with Dr. Ana Frankel MD  PGY2         Attending Note: I saw and evaluated the patient with Dr. Frankel. I agree with the assessment and plan.        TT 40 min was spent on date of the encounter doing chart review, history and exam, documentation and further activities as noted above. Any prior notes, outside records, laboratory results, and imaging studies were reviewed if relevant.    The longitudinal plan of care for the diagnosis(es)/condition(s) as documented were addressed during this visit. Due to the added complexity in care, I will continue to support Immanuelle in the subsequent management and with ongoing continuity of care.        Aamir Perez MD          Again, thank you for allowing me to participate in the care of your patient.      Sincerely,    Aamir Perez MD

## 2025-04-26 LAB — VIT B1 PYROPHOSHATE BLD-SCNC: 96 NMOL/L

## 2025-04-28 ENCOUNTER — TRANSFERRED RECORDS (OUTPATIENT)
Dept: HEALTH INFORMATION MANAGEMENT | Facility: CLINIC | Age: 30
End: 2025-04-28
Payer: COMMERCIAL

## 2025-04-29 ENCOUNTER — TELEPHONE (OUTPATIENT)
Dept: DERMATOLOGY | Facility: CLINIC | Age: 30
End: 2025-04-29

## 2025-04-29 NOTE — TELEPHONE ENCOUNTER
[facility] Call Center    Phone Message    May a detailed message be left on voicemail: yes     Reason for Call: Other: Pt has an appt for a punch biopsy tomorrow 04/30 that she needs to reschedule. Please call pt back to reschedule. Thank you     Action Taken: TE SENT    Travel Screening: Not Applicable     Date of Service:                        Thank you!  [facility]

## 2025-04-29 NOTE — TELEPHONE ENCOUNTER
Patient Contact    Attempt # 1    Was call answered?  Yes    Per patient wanted to schedule in June. Provider saba steele.     Latasha Gillette MA  [facility]   [phone]

## 2025-05-05 ENCOUNTER — PATIENT OUTREACH (OUTPATIENT)
Dept: CARE COORDINATION | Facility: CLINIC | Age: 30
End: 2025-05-05
Payer: COMMERCIAL

## 2025-05-06 NOTE — PROGRESS NOTES
"Video-Visit Details    Type of service:  Video Visit    Video Start Time: 9:00 am  Video End Time: 9:14    Originating Location (pt. Location): Home    Distant Location (provider location):  Offsite (providers home) [facility] [place]     Platform used for Video Visit: [employer]      Weight Management Nutrition Consultation    Nehemias Mascorro is a 29 year old female presents today for return weight management nutrition consultation.  Patient referred by Elisa Rivera PA-C on May 7, 2024. Previously seen by Dr. Slade.     Patient with Co-morbidities of obesity including:  H/o prediabetes, hypopitiuitarism     Anthropometrics:  Initial Consult 5/17/24: 117.9 kg (260 lb).    Estimated body mass index is 41.97 kg/m  as calculated from the following:    Height as of this encounter: 1.676 m (5' 6\").    Weight as of this encounter: 117.9 kg (260 lb).     Current weight: 260 lbs   + 3 lbs since last RD visit     Medications for Weight Loss:  Zepbound, naltrexone    NUTRITION HISTORY  Food allergies: None  Food intolerances: Gluten, lactose    Vitamin/Mineral Supplements: Vitamin D, probiotic, iron supplement      Previous methods of diet modification for weight loss: Counting calories (My Fitness Pal), meal replacements (protein shakes), less sugar.  Took Wegovy last year and lost 40 lbs.     RD before: wt mgmt RD in 2020. GI RD in 2023 for IBS-C and abd pain.     Was following IF (waiting to eat until 11 am), until last few days started eating breakfast at 9 am.   Prepares her own meals.   Preferences: Rice, beans, fish - African American, Ivorian foods, Soul food, Mexican foods.   Cravings - sugar. Occ red meat  Pt goals - cut back on carbs, more fiber. Foods that help improve energy and iron intake as she has chronic fatigue and iron deficiency anemia.     10/31/24 - Weight now stable. Found addition of naltrexone helpful. Recently threw out all her snacks and restocked healthier " "choices. Doing well with balance meal intake. She would like to have dinner a little sooner in the evening. Is experiencing some reflux at night. She is getting bored of some of her regular meal choices, looking to add variety.   She is concerned about the winter and mood. Discussed strategies to help like taking a walk when the sun is up, using her \"happy\" light.     1/17/25 - Switched to Zepbound. On 10 mg currently. Notices its more painful to inject compared to Wegovy. Has tried different injection sites.   Feeling more tired lately. Has Lupus and feels like she may be getting a flare.   Occasional nausea, has thrown up twice. She can't remember that circumstances around it.   Struggling most with incorporating veggies. Prefers fresh veggies.    Has been feeling dizzy lately. Reports she was drinking less water while she was traveling over the holidays.     Recent Diet Recall:  Breakfast: 9 am bagel with cheese and sausage; frozen waffle  Lunch: 1 pm Healthy Choice; sandwich; left-overs  Snack: 3-4 pm apples, sliced veggies, cheese sticks, meat sticks   Dinner: 6 pm salmon/chicken and veggies; Healthy Choice   After dinner Snacks: desserts (ice cream, cookie, pie, mini snickers) or snack   Beverages: Water (less while she was traveling)  Alcohol: Rarely   Dining Out/take-out: Twice this week, overall less than usual. Chipotle, Raymond johns , Nhan Robles.      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   Does well with steps on days she is at the food [employer].     March 2025:    Has been sick for 2 weeks - cold. Decreased appetite overall.    Also had her period. Was eating more sweets - cupcakes, etc. Curious about sweet alternatives. Discussed     Feels dehydrated right now with being sick. Hasn't been drinking as much as she should this past week per pt. Was drinking some gatorade. Was drinking enough prior.     Did well with veggie goal " prior to getting sick. Curious about fresh vs frozen vegetables.     May 2025:    Saw [facility] 3/18/25 with plan to continue Zepbound 10 mg. Felt appetite is well controlled. Was working on eating smaller meals more frequently. Had added a protein shake for when not hungry but needing nutrition.     PA: light weights, lots of standing, working out 2x/week   - limited by shoulder injury. Starting PT, saw ortho    Progress Towards Previous Goals:  Aim to stay hydrated as able, goal of 64-96 oz/day (water, crystal light, sparkling water) - Wasn't going good at first but now going good. Had pop a couple times while in [place]   Consider trying gatorade zero - Met, didn't like the flavor she tried.   Consider low fat/fat free sour cream and ranch as a dip option for vegetables - Ate vegetables but didn't use any dip.   Aim for vegetables 2x/day - Met, going well.   Mindful with sweets - consider alteratives - Met, hasn't been having sweets too often recently. Feels she can do better but that it's not terrible. Only had dessert 2x when out of town, wanted it a lot more than this    Nutrition Prescription  Recommended energy/nutrient modification.    Nutrition Diagnosis  Obesity r/t long history of positive energy balance aeb BMI >30. - Improving    Nutrition Intervention  Materials/education provided on hypocaloric diet for weight loss.   Reviewed progress towards previous goals.   Praised pt on the progress she has made.  Reviewed diet strategies for mitigating side-effects of Zepbound.    Discussed strategies for incorporating veggies.   Reviewed healthy food choices.   ANswered pt's nutrition-related questions.   Co-developed goals to work towards.   Provided pt with list of goals and resources below via Harbor [employer].     Expected Engagement: good    Nutrition Goals  Aim to stay hydrated as able, goal of 64-96 oz/day (water, crystal light, sparkling water)  Aim for vegetables 2x/day   Mindful with sweets - consider alteratives  "  Aim to workout 3-4x/week     Sweet Alternatives   https://www.heart.org/en/healthy-living/healthy-eating/eat-smart/sugar/life-is-sweet-with-these-easy-sugar-swaps-infographic     Sweet alternative considerations: yasso bars, 1/4 c trail mix, homemade yogurt bars, Rx bars, dried fruit    Homemade dessert recipes:    Greek Yogurt Chocolate Mousse   https://www.diabetesfoodhub.org/recipes/greek-yogurt-chocolate-mousse.html?home-category_id=23     No Bake [place] Butter Cookies  https://www.Upside/qr-exln-zuxyqs-butter-cookies/     Titus Seed Chocolate Pudding  https://www.ZaldivallPV Evolution Labs.Orb Health.au/chocolate-titus-pudding/#recipe     Snickerdoodle hummus  https://eCourier.co.uk/esbtyikqvxhkn-phujgwk-mpugny/#recipe     Peanut Butter Dip  https://www.diabetesfoodhub.org/recipes/sweet-peanut-buttery-dip.html?home-category_id=23     Double Chocolate Zucchini Cake (can adapt recipe and use applesauce instead of butter/oil)  https://TELA Bio/2021/05/double-chocolate-zucchini-loaf-cake.html     Banana Oat Muffins   https://MeilleursAgents.com/blog/healthy-desserts/#uf-maxhhroz-080     Chocolate hummus   https://Fotolia.Orb Health/chocolate-hummus/#tasty-recipes-82868-jump-target     Peanut butter cookie dough hummus   https://Techpacker.Orb Health/2018/05/15/peanut-butter-cookie-dough-dessert-hummus/     Banana Peanut Butter Oat Bars  https://Apieron.Orb Health/banana-peanut-butter-oat-protein-bars/     The Plate Method  https://fvfiles.com/931144.pdf    Protein Sources   http://OneShield/116641.pdf     Carbohydrates  http://fvfiles.com/890631.pdf     Mindful Eating  http://OneShield/202782.pdf     Summary of Volumetrics Eating Plan  http://fvfiles.com/933579.pdf       Healthy Recipe Resources:  Books:  \"The Volumetrics Eating Plan\" by Mehnaz Watson, Ph.D.  \"Cooking that Counts\" by editors of CookingLight  \"Calorie Smart Meals\" cookbook by Better Homes and Gardens (200-500 " calorie meals)    Websites:  www.Social Pulse  https://www.Casenet.Planet Sushi/  www.Legendary Pictures.Ethical Deal  https://www.diabetesfoodhub.org/all-recipes.html  Https://www.choosemyplate.gov/myplatekitchen  Recipes by Season: https://snaped.fns.usda.gov/recipes-menus   Video Meal Prep: Https://www.Patentspin.com/c/medardocoen   Meal Plans: EatAmazon.com   DASH Diet: https://www.nhlbi.nih.gov/education/dash-eating-plan  Whole Grains [place]: https://zweitgeistcouncil.org/recipes      Cultural Cuisines:  Various Regions of African Cuisine: https://www.moka5/recipes/89271/cuisines-regions//   Cuisine: https://www.Lightningcast.org/knowledge-center/recipes/  Mexican and Vegan Cuisine:   https://[place]/  https://Playchemy/recipe-index/  Chinese Cuisine: https://www.Smart Museum/  South Asian Cuisine:  Sammie Cortes on social media- South  high-protein/low-calorie meal/food ideas  Kristyn Ni RD, [facility], plant-based South  Resources: https://www.Text A Cab/    Apps:  OrangeScape robby (or website, mealime.com)   EricartEatSmart       Follow-Up:  Monday, August 25th at 9:30 am    Time spent with patient: 14 minutes.  STARLA Og, RD, LD  Clinic #: [phone]

## 2025-05-08 DIAGNOSIS — G47.11 HYPERSOMNIA, IDIOPATHIC: ICD-10-CM

## 2025-05-09 NOTE — TELEPHONE ENCOUNTER
Last Written Prescription:     modafinil (PROVIGIL) 100 MG tablet 60 tablet 5 11/6/2024 -- No   Sig - Route: Take 2 tablets (200 mg) by mouth daily. - Oral     ----------------------  Last Visit Date: 4/29/25  Future Visit Date: 8/24/25 Addyi  ----------------------           Refill decision: Medication unable to be refilled by RN due to: Controlled medication sent to clinic staff for PMAP, then please forward for signature        Request from pharmacy:  Requested Prescriptions   Pending Prescriptions Disp Refills    modafinil (PROVIGIL) 100 MG tablet [Pharmacy Med Name: MODAFINIL 100 MG TABLET] 60 tablet      Sig: TAKE 2 TABLETS BY MOUTH EVERY DAY       Rx Protocol Controlled Substance Failed - 5/9/2025  2:53 PM        Failed - Urine drug screeen results on file in past 12 months     [unfilled]           Failed - Controlled Substance Agreement on file in last 12 months     Please review last Controlled Substance Pain agreement document.   CSA -- Encounter Level:    CSA: None found at the encounter level.       CSA -- Patient Level:    CSA: None found at the patient level.               Failed - Auto Fail - Please forward to Provider        Passed - Visit with relevant provider in past 3 months or upcoming 3 months        Passed - Medication is active on med list and the sig matches        Passed - Medication not refilled in past 28 days     Invalid Medication Grouper          Passed - No active pregnancy on record        Passed - No pregnancy test in past 12 months or most recent test was negative

## 2025-05-10 RX ORDER — MODAFINIL 100 MG/1
200 TABLET ORAL DAILY
Qty: 60 TABLET | Refills: 3 | Status: SHIPPED | OUTPATIENT
Start: 2025-05-10

## 2025-05-27 ENCOUNTER — VIRTUAL VISIT (OUTPATIENT)
Dept: ENDOCRINOLOGY | Facility: CLINIC | Age: 30
End: 2025-05-27
Payer: COMMERCIAL

## 2025-05-27 VITALS — BODY MASS INDEX: 41.78 KG/M2 | WEIGHT: 260 LBS | HEIGHT: 66 IN

## 2025-05-27 DIAGNOSIS — Z71.3 NUTRITIONAL COUNSELING: Primary | ICD-10-CM

## 2025-05-27 DIAGNOSIS — E66.813 CLASS 3 SEVERE OBESITY WITH SERIOUS COMORBIDITY AND BODY MASS INDEX (BMI) OF 40.0 TO 44.9 IN ADULT, UNSPECIFIED OBESITY TYPE (H): ICD-10-CM

## 2025-05-27 PROCEDURE — 97803 MED NUTRITION INDIV SUBSEQ: CPT | Mod: 95 | Performed by: DIETITIAN, REGISTERED

## 2025-05-27 PROCEDURE — 99207 PR NO CHARGE LOS: CPT | Mod: 95 | Performed by: DIETITIAN, REGISTERED

## 2025-05-27 PROCEDURE — 1126F AMNT PAIN NOTED NONE PRSNT: CPT | Mod: 95 | Performed by: DIETITIAN, REGISTERED

## 2025-05-27 ASSESSMENT — PATIENT HEALTH QUESTIONNAIRE - PHQ9: SUM OF ALL RESPONSES TO PHQ QUESTIONS 1-9: 10

## 2025-05-27 ASSESSMENT — PAIN SCALES - GENERAL: PAINLEVEL_OUTOF10: NO PAIN (0)

## 2025-05-27 NOTE — LETTER
"5/27/2025       RE: Nehemias Mascorro  [address]  [place] [address]     Dear Colleague,    Thank you for referring your patient, Nehemias Mascorro, to the [facility] [place] at [facility]. Please see a copy of my visit note below.    Video-Visit Details    Type of service:  Video Visit    Video Start Time: 9:00 am  Video End Time: 9:14    Originating Location (pt. Location): Home    Distant Location (provider location):  Offsite (providers home) [facility] [place]     Platform used for Video Visit: Virally      Weight Management Nutrition Consultation    Nehemias Mascorro is a 29 year old female presents today for return weight management nutrition consultation.  Patient referred by Elisa Rivera PA-C on May 7, 2024. Previously seen by Dr. Slade.     Patient with Co-morbidities of obesity including:  H/o prediabetes, hypopitiuitarism     Anthropometrics:  Initial Consult 5/17/24: 117.9 kg (260 lb).    Estimated body mass index is 41.97 kg/m  as calculated from the following:    Height as of this encounter: 1.676 m (5' 6\").    Weight as of this encounter: 117.9 kg (260 lb).     Current weight: 260 lbs   + 3 lbs since last RD visit     Medications for Weight Loss:  Zepbound, naltrexone    NUTRITION HISTORY  Food allergies: None  Food intolerances: Gluten, lactose    Vitamin/Mineral Supplements: Vitamin D, probiotic, iron supplement      Previous methods of diet modification for weight loss: Counting calories (My Fitness Pal), meal replacements (protein shakes), less sugar.  Took Wegovy last year and lost 40 lbs.     RD before: wt mgmt RD in 2020. GI RD in 2023 for IBS-C and abd pain.     Was following IF (waiting to eat until 11 am), until last few days started eating breakfast at 9 am.   Prepares her own meals.   Preferences: Rice, beans, fish - African American, Namibian foods, " "Soul food, Mexican foods.   Cravings - sugar. Occ red meat  Pt goals - cut back on carbs, more fiber. Foods that help improve energy and iron intake as she has chronic fatigue and iron deficiency anemia.     10/31/24 - Weight now stable. Found addition of naltrexone helpful. Recently threw out all her snacks and restocked healthier choices. Doing well with balance meal intake. She would like to have dinner a little sooner in the evening. Is experiencing some reflux at night. She is getting bored of some of her regular meal choices, looking to add variety.   She is concerned about the winter and mood. Discussed strategies to help like taking a walk when the sun is up, using her \"happy\" light.     1/17/25 - Switched to Zepbound. On 10 mg currently. Notices its more painful to inject compared to Wegovy. Has tried different injection sites.   Feeling more tired lately. Has Lupus and feels like she may be getting a flare.   Occasional nausea, has thrown up twice. She can't remember that circumstances around it.   Struggling most with incorporating veggies. Prefers fresh veggies.    Has been feeling dizzy lately. Reports she was drinking less water while she was traveling over the holidays.     Recent Diet Recall:  Breakfast: 9 am bagel with cheese and sausage; frozen waffle  Lunch: 1 pm Healthy Choice; sandwich; left-overs  Snack: 3-4 pm apples, sliced veggies, cheese sticks, meat sticks   Dinner: 6 pm salmon/chicken and veggies; Healthy Choice   After dinner Snacks: desserts (ice cream, cookie, pie, mini snickers) or snack   Beverages: Water (less while she was traveling)  Alcohol: Rarely   Dining Out/take-out: Twice this week, overall less than usual. Chipotle, Raymond johns , Nhan Robles.      Physical Activity:  Per PA-C note: works part time as a coordinator for a youth program, also runs a food shelf.   Does well with steps on days she is at the food shelf.     March 2025:    Has been sick for 2 weeks - cold. " Decreased appetite overall.    Also had her period. Was eating more sweets - cupcakes, etc. Curious about sweet alternatives. Discussed     Feels dehydrated right now with being sick. Hasn't been drinking as much as she should this past week per pt. Was drinking some gatorade. Was drinking enough prior.     Did well with veggie goal prior to getting sick. Curious about fresh vs frozen vegetables.     May 2025:    Saw [facility] 3/18/25 with plan to continue Zepbound 10 mg. Felt appetite is well controlled. Was working on eating smaller meals more frequently. Had added a protein shake for when not hungry but needing nutrition.     PA: light weights, lots of standing, working out 2x/week   - limited by shoulder injury. Starting PT, saw ortho    Progress Towards Previous Goals:  Aim to stay hydrated as able, goal of 64-96 oz/day (water, crystal light, sparkling water) - Wasn't going good at first but now going good. Had pop a couple times while in [place]   Consider trying gatorade zero - Met, didn't like the flavor she tried.   Consider low fat/fat free sour cream and ranch as a dip option for vegetables - Ate vegetables but didn't use any dip.   Aim for vegetables 2x/day - Met, going well.   Mindful with sweets - consider alteratives - Met, hasn't been having sweets too often recently. Feels she can do better but that it's not terrible. Only had dessert 2x when out of town, wanted it a lot more than this    Nutrition Prescription  Recommended energy/nutrient modification.    Nutrition Diagnosis  Obesity r/t long history of positive energy balance aeb BMI >30. - Improving    Nutrition Intervention  Materials/education provided on hypocaloric diet for weight loss.   Reviewed progress towards previous goals.   Praised pt on the progress she has made.  Reviewed diet strategies for mitigating side-effects of Zepbound.    Discussed strategies for incorporating veggies.   Reviewed healthy food choices.   ANswered pt's  nutrition-related questions.   Co-developed goals to work towards.   Provided pt with list of goals and resources below via ProtonMediat.     Expected Engagement: good    Nutrition Goals  Aim to stay hydrated as able, goal of 64-96 oz/day (water, crystal light, sparkling water)  Aim for vegetables 2x/day   Mindful with sweets - consider alteratives   Aim to workout 3-4x/week     Sweet Alternatives   https://www.heart.org/en/healthy-living/healthy-eating/eat-smart/sugar/life-is-sweet-with-these-easy-sugar-swaps-infographic     Sweet alternative considerations: yasso bars, 1/4 c trail mix, homemade yogurt bars, Rx bars, dried fruit    Homemade dessert recipes:    Greek Yogurt Chocolate Mousse   https://www.diabetesfoodhub.org/recipes/greek-yogurt-chocolate-mousse.html?home-category_id=23     No Bake [place] Butter Cookies  https://www.Robodrom/hs-qjfq-sxwzpq-butter-cookies/     Titus Seed Chocolate Pudding  https://www.Vanderdroidll"Vitrum View, LLC".Thoughtful Movers.au/chocolate-titus-pudding/#recipe     Snickerdoodle hummus  https://Joyride/nmhikaikmvdzu-xglwbyr-frkhda/#recipe     Peanut Butter Dip  https://www.diabetesfoodTradesparqb.org/recipes/sweet-peanut-buttery-dip.html?home-category_id=23     Double Chocolate Zucchini Cake (can adapt recipe and use applesauce instead of butter/oil)  https://Qoture.Thoughtful Movers/2021/05/double-chocolate-zucchini-loaf-cake.html     Banana Oat Muffins   https://SchoolEdge Mobile/blog/healthy-desserts/#wb-wzvytpcx-647     Chocolate hummus   https://FreshDigitalGroup.Thoughtful Movers/chocolate-hummus/#tasty-recipes-82868-jump-target     Peanut butter cookie dough hummus   https://Thermogenics.Thoughtful Movers/2018/05/15/peanut-butter-cookie-dough-dessert-hummus/     Banana Peanut Butter Oat Bars  https://Orca Systems.Thoughtful Movers/banana-peanut-butter-oat-protein-bars/     The Plate Method  https://fvfiles.com/954988.pdf    Protein Sources   http://Finderly/379188.pdf  "    Carbohydrates  http://fvfiles.com/528149.pdf     Mindful Eating  http://WeMonitor/818244.pdf     Summary of Volumetrics Eating Plan  http://fvfiles.com/324666.pdf       Healthy Recipe Resources:  Books:  \"The Volumetrics Eating Plan\" by Mehnaz Watson, Ph.D.  \"Cooking that Counts\" by editors of Virtual Telephone & Telegraph  \"Calorie Smart Meals\" cookbook by Better Homes and Gardens (200-500 calorie meals)    Websites:  www.MuscleGenes  https://www.Advanced Diamond Technologies/  www.CMD Bioscience  https://www.diabetesfoodhub.org/all-recipes.html  Https://www.Fastlane Venturesplate.gov/myplatekitchen  Recipes by Season: https://snaped.Entefys.usda.gov/recipes-menus   Video Meal Prep: Https://www.Glaukosube.com/c/opal   Meal Plans: EatthiLynkuch.com   DASH Diet: https://www.nhlbi.nih.gov/education/dash-eating-plan  Whole Grains [place]: https://wholegrainscouncil.org/recipes      Cultural Cuisines:  Various Regions of African Cuisine: https://www.OnState.AudienceView/recipes/92658/cuisines-regions//   Cuisine: https://www.Goozzy.org/knowledge-center/recipes/  Mexican and Vegan Cuisine:   https://KIXEYE.AudienceView/  https://Minimally invasive devices.AudienceView/recipe-index/  Chinese Cuisine: https://www.Colorescience.AudienceView/  South Asian Cuisine:  Sammie Cortes on social media- South  high-protein/low-calorie meal/food ideas  Kristyn Ni RD, PHYLICIAES, plant-based South  Resources: https://www.Shahiya/    Apps:  MealConcur [place] robby (or website, mealime.com)   SpendSmartEatSmart       Follow-Up:  Monday, August 25th at 9:30 am    Time spent with patient: 14 minutes.  STARLA Og, KENA, LD  Clinic #: [phone]          Again, thank you for allowing me to participate in the care of your patient.      Sincerely,    Estela Villegas RD    "

## 2025-05-27 NOTE — NURSING NOTE
Is the patient currently in the state of [place]? Y    Visit mode:VIDEO    If the visit is dropped, the patient can be reconnected by: VIDEO VISIT: Send to e-mail at: [email or web address]    Will anyone else be joining the visit? NO  (If patient encounters technical issues they should call [phone]    How would you like to obtain your AVS? MyChart    Are changes needed to the allergy or medication list? No    Are refills needed on medications prescribed by this physician? NO    Reason for visit: ALEJANDRA MADERA

## 2025-05-27 NOTE — PATIENT INSTRUCTIONS
Nutrition Goals  Aim to stay hydrated as able, goal of 64-96 oz/day (water, crystal light, sparkling water)  Aim for vegetables 2x/day   Mindful with sweets - consider alteratives   Aim to workout 3-4x/week     Sweet Alternatives   https://www.heart.org/en/healthy-living/healthy-eating/eat-smart/sugar/life-is-sweet-with-these-easy-sugar-swaps-infographic     Sweet alternative considerations: yasso bars, 1/4 c trail mix, homemade yogurt bars, Rx bars, dried fruit    Homemade dessert recipes:    Greek Yogurt Chocolate Mousse   https://www.diabetesfoodhub.org/recipes/greek-yogurt-chocolate-mousse.html?home-category_id=23     No Bake Boring Butter Cookies  https://wwwDataVote/ur-rilp-drakcw-butter-cookies/     Titus Seed Chocolate Pudding  https://www.[facility]ists.Graphite Systems.au/chocolate-titus-pudding/#recipe     Snickerdoodle hummus  https://Euphoria App/wnfmafahqzojr-tnbkbes-wxnvmu/#recipe     Peanut Butter Dip  https://www.diabetesfoodWukong.comb.org/recipes/sweet-peanut-buttery-dip.html?home-category_id=23     Double Chocolate Zucchini Cake (can adapt recipe and use applesauce instead of butter/oil)  https://Organic Church Today/2021/05/double-chocolate-zucchini-loaf-cake.html     Banana Oat Muffins   https://Sixteen Eighteen Design.Graphite Systems/blog/healthy-desserts/#bx-lkebdyag-155     Chocolate hummus   https://Eagle Crest Enterprises.Graphite Systems/chocolate-hummus/#tasty-recipes-82868-jump-target     Peanut butter cookie dough hummus   https://NetPayment.Graphite Systems/2018/05/15/peanut-butter-cookie-dough-dessert-hummus/     Banana Peanut Butter Oat Bars  https://Organica Water.Graphite Systems/banana-peanut-butter-oat-protein-bars/     The Plate Method  https://fvfiles.com/487178.pdf    Protein Sources   http://xAd/809323.pdf     Carbohydrates  http://fvfiles.com/807392.pdf     Mindful Eating  http://xAd/974071.pdf     Summary of Volumetrics Eating Plan  http://fvfiles.com/716131.pdf       Healthy Recipe  "Resources:  Books:  \"The Volumetrics Eating Plan\" by Mehnaz Watson, Ph.D.  \"Cooking that Counts\" by editors of Bioscale  \"Calorie Smart Meals\" cookbook by Better Homes and Gardens (200-500 calorie meals)    Websites:  www.JellyfishArt.com  https://www.Harvest Trends/  www.ExtraFootie  https://www.diabetesfoodhub.org/all-recipes.html  Https://www.Concert Pharmaceuticalsplate.gov/myplatekitchen  Recipes by Season: https://snaped.Destination Medias.usda.gov/recipes-menus   Video Meal Prep: Https://www.youVIOSOube.com/c/opal   Meal Plans: Eatthismuch.com   DASH Diet: https://www.nhlbi.nih.gov/education/dash-eating-plan  Whole Grains Cherry Plain: https://The Infatuationcouncil.org/recipes      Cultural Cuisines:  Various Regions of African Cuisine: https://www.RSens/recipes/60107/cuisines-regions//   Cuisine: https://www.ElephantDrive.org/knowledge-center/recipes/  Mexican and Vegan Cuisine:   https://Magine/  https://Phigital/recipe-index/  Chinese Cuisine: https://www.Remark/  South Asian Cuisine:  Sammie Cortes on social media- South  high-protein/low-calorie meal/food ideas  Kristyn Ni RD, MEGHANA, plant-based South  Resources: https://www.Lion & Foster International/    Apps:  MealApp55 Ltd robby (or website, mealime.com)   EricartEatSmart       Follow-Up:  Monday, August 25th at 9:30 am    STARLA Og, KENA, LD  Clinic #: [phone]    "

## 2025-06-03 ENCOUNTER — LAB (OUTPATIENT)
Dept: LAB | Facility: CLINIC | Age: 30
End: 2025-06-03
Payer: COMMERCIAL

## 2025-06-03 DIAGNOSIS — Z51.81 MEDICATION MONITORING ENCOUNTER: ICD-10-CM

## 2025-06-03 DIAGNOSIS — M32.19 OTHER SYSTEMIC LUPUS ERYTHEMATOSUS WITH OTHER ORGAN INVOLVEMENT (H): ICD-10-CM

## 2025-06-03 LAB
ALBUMIN SERPL BCG-MCNC: 4.1 G/DL (ref 3.5–5.2)
ALT SERPL W P-5'-P-CCNC: 31 U/L (ref 0–50)
AST SERPL W P-5'-P-CCNC: 26 U/L (ref 0–45)
BASOPHILS # BLD AUTO: 0 10E3/UL (ref 0–0.2)
BASOPHILS NFR BLD AUTO: 1 %
C3 SERPL-MCNC: 137 MG/DL (ref 81–157)
C4 SERPL-MCNC: 21 MG/DL (ref 13–39)
CREAT SERPL-MCNC: 0.95 MG/DL (ref 0.51–0.95)
CRP SERPL-MCNC: 9.17 MG/L
EGFRCR SERPLBLD CKD-EPI 2021: 83 ML/MIN/1.73M2
EOSINOPHIL # BLD AUTO: 0 10E3/UL (ref 0–0.7)
EOSINOPHIL NFR BLD AUTO: 1 %
ERYTHROCYTE [DISTWIDTH] IN BLOOD BY AUTOMATED COUNT: 13 % (ref 10–15)
HCT VFR BLD AUTO: 39.5 % (ref 35–47)
HGB BLD-MCNC: 12.8 G/DL (ref 11.7–15.7)
IMM GRANULOCYTES # BLD: 0 10E3/UL
IMM GRANULOCYTES NFR BLD: 0 %
LYMPHOCYTES # BLD AUTO: 1.6 10E3/UL (ref 0.8–5.3)
LYMPHOCYTES NFR BLD AUTO: 44 %
MCH RBC QN AUTO: 29.2 PG (ref 26.5–33)
MCHC RBC AUTO-ENTMCNC: 32.4 G/DL (ref 31.5–36.5)
MCV RBC AUTO: 90 FL (ref 78–100)
MONOCYTES # BLD AUTO: 0.3 10E3/UL (ref 0–1.3)
MONOCYTES NFR BLD AUTO: 8 %
NEUTROPHILS # BLD AUTO: 1.7 10E3/UL (ref 1.6–8.3)
NEUTROPHILS NFR BLD AUTO: 47 %
PLATELET # BLD AUTO: 250 10E3/UL (ref 150–450)
RBC # BLD AUTO: 4.38 10E6/UL (ref 3.8–5.2)
WBC # BLD AUTO: 3.6 10E3/UL (ref 4–11)

## 2025-06-03 PROCEDURE — 82040 ASSAY OF SERUM ALBUMIN: CPT

## 2025-06-03 PROCEDURE — 82565 ASSAY OF CREATININE: CPT

## 2025-06-03 PROCEDURE — 86160 COMPLEMENT ANTIGEN: CPT

## 2025-06-03 PROCEDURE — 84460 ALANINE AMINO (ALT) (SGPT): CPT

## 2025-06-03 PROCEDURE — 84450 TRANSFERASE (AST) (SGOT): CPT

## 2025-06-03 PROCEDURE — 85025 COMPLETE CBC W/AUTO DIFF WBC: CPT

## 2025-06-03 PROCEDURE — 86140 C-REACTIVE PROTEIN: CPT

## 2025-06-03 PROCEDURE — 36415 COLL VENOUS BLD VENIPUNCTURE: CPT

## 2025-06-04 ENCOUNTER — TELEPHONE (OUTPATIENT)
Dept: RHEUMATOLOGY | Facility: CLINIC | Age: 30
End: 2025-06-04
Payer: COMMERCIAL

## 2025-06-04 NOTE — TELEPHONE ENCOUNTER
Called pt to reschedule 8/29 appt. Pt already had 8/13/25 appt scheduled. Pt elected to cancel 8/29 and come up with follow-up plan with Dr. Perez at her next appt

## 2025-06-05 ENCOUNTER — VIRTUAL VISIT (OUTPATIENT)
Dept: URGENT CARE | Facility: CLINIC | Age: 30
End: 2025-06-05
Payer: COMMERCIAL

## 2025-06-05 ENCOUNTER — LAB (OUTPATIENT)
Dept: LAB | Facility: CLINIC | Age: 30
End: 2025-06-05
Payer: COMMERCIAL

## 2025-06-05 DIAGNOSIS — J45.20 MILD INTERMITTENT ASTHMA WITHOUT COMPLICATION: ICD-10-CM

## 2025-06-05 DIAGNOSIS — R53.83 FATIGUE, UNSPECIFIED TYPE: Primary | ICD-10-CM

## 2025-06-05 DIAGNOSIS — R53.83 FATIGUE, UNSPECIFIED TYPE: ICD-10-CM

## 2025-06-05 DIAGNOSIS — M32.9 SYSTEMIC LUPUS ERYTHEMATOSUS, UNSPECIFIED SLE TYPE, UNSPECIFIED ORGAN INVOLVEMENT STATUS (H): ICD-10-CM

## 2025-06-05 DIAGNOSIS — E03.8 CENTRAL HYPOTHYROIDISM: ICD-10-CM

## 2025-06-05 NOTE — PROGRESS NOTES
Assessment & Plan     Fatigue, unspecified type  - TSH with free T4 reflex; Future  - EBV Capsid Antibody IgG; Future  - CMV Antibody IgG; Future  - CMV antibody IgM; Future  - EBV Capsid Antibody IgM; Future    Mild intermittent asthma without complication  Currently poor air quality. Use albuterol as reduced O2 could contribute to fatigue    Central hypothyroidism  Check TSH today    Systemic lupus erythematosus, unspecified SLE type, unspecified organ involvement status (H)  Follow up with primary care provider next week but see rheumatology if symptoms persist.    Follow up with primary care provider if symptoms worsen, consider seeing rheumatology early for SLE recheck.     Return in about 1 week (around 6/12/2025) for see PCP for previously scheduled visit.       [facility]    Subjective   Nehemias Mascorro is a 29 year old who presents for the following health issues    HPI    Nehemias presents for fatigue  Sees endocrinology- hypothyroidism, prediabetes  Sees rheumatology- SLE  Symptom onset: 5 days ago, staying the same  Current symptoms: joint pain- right knee, bilateral hands, fingers- feels similar to lupus flare but not as severe  Back pain in mid back  Headache- pressure back of head and top of head, trouble focusing  Fatigue- feeling worn out   Going to bed 9:30-10pm, Waking around 8am  Usually a little tired but this is worse  Nasal congestion but not significant, some cough in the morning  No fever, no SOB  Home COVID negative.  Medication review complete.    Review of Systems   ROS negative except as stated above.      Objective    Physical Exam   GENERAL: Healthy, alert and no distress  EYES: Eyes grossly normal to inspection.  No discharge or erythema, or obvious scleral/conjunctival abnormalities.  HENT: Normal cephalic/atraumatic.  External ears, nose and mouth without ulcers or lesions.  No nasal drainage visible.  RESP: No audible cough  wheeze or visible cyanosis.  No visible retractions or increased work of breathing.    SKIN: Visible skin clear. No significant rash, abnormal pigmentation or lesions.  NEURO: Cranial nerves grossly intact.  Mentation and speech appropriate for age.  PSYCH: Mentation appears normal, affect normal/bright, judgement and insight intact, normal speech and appearance well-groomed.    Type of service:  Video Visit  Video Start Time: 2:24PM  Video End Time: 2:46PM  Originating Location (pt. Location): Home  Distant Location (provider location):  [facility] URGENT [facility]ARE- offsite at [facility]  Platform used for Video Visit: [employer]    No results found for any visits on 06/05/25.

## 2025-06-05 NOTE — LETTER
[facility] VIRTUAL URGENT CARE  [address] [address]  Phone: [phone]    June 5, 2025        Nehemias Mascorro  [address]  [place] [address]          To whom it may concern:    RE: Nehemias Mascorro    Patient was seen and evaluated today. Please excuse her from work so she can go to the clinic to have labs drawn for further evaluation.    Please contact me for questions or concerns.      Sincerely,      Eleanor Stephenson PA-C  [facility]

## 2025-06-06 ENCOUNTER — RESULTS FOLLOW-UP (OUTPATIENT)
Dept: URGENT CARE | Facility: CLINIC | Age: 30
End: 2025-06-06

## 2025-06-13 ENCOUNTER — RESULTS FOLLOW-UP (OUTPATIENT)
Dept: FAMILY MEDICINE | Facility: CLINIC | Age: 30
End: 2025-06-13

## 2025-06-19 ENCOUNTER — VIRTUAL VISIT (OUTPATIENT)
Dept: ENDOCRINOLOGY | Facility: CLINIC | Age: 30
End: 2025-06-19
Payer: COMMERCIAL

## 2025-06-19 ENCOUNTER — RESULTS FOLLOW-UP (OUTPATIENT)
Dept: RHEUMATOLOGY | Facility: CLINIC | Age: 30
End: 2025-06-19

## 2025-06-19 ENCOUNTER — VIRTUAL VISIT (OUTPATIENT)
Dept: FAMILY MEDICINE | Facility: CLINIC | Age: 30
End: 2025-06-19
Payer: COMMERCIAL

## 2025-06-19 VITALS — HEIGHT: 66 IN | WEIGHT: 257 LBS | BODY MASS INDEX: 41.3 KG/M2

## 2025-06-19 DIAGNOSIS — E16.2 HYPOGLYCEMIA: Primary | ICD-10-CM

## 2025-06-19 DIAGNOSIS — R42 VERTIGO: ICD-10-CM

## 2025-06-19 DIAGNOSIS — E66.813 CLASS 3 SEVERE OBESITY WITH SERIOUS COMORBIDITY AND BODY MASS INDEX (BMI) OF 40.0 TO 44.9 IN ADULT, UNSPECIFIED OBESITY TYPE (H): Primary | ICD-10-CM

## 2025-06-19 PROCEDURE — 98006 SYNCH AUDIO-VIDEO EST MOD 30: CPT | Performed by: FAMILY MEDICINE

## 2025-06-19 ASSESSMENT — PAIN SCALES - GENERAL: PAINLEVEL_OUTOF10: MODERATE PAIN (4)

## 2025-06-19 NOTE — PROGRESS NOTES
Return Medical Weight Management Note     Nehemias Mascorro  MRN:  [id number]  :  1995  JASMEET:  2025    Dear Alexandra Rodrigues MD,    I had the pleasure of seeing your patient Nehemias Mascorro. She is a 30 year old female who I am continuing to see for treatment of obesity related to:         No data to display                Assessment & Plan   Problem List Items Addressed This Visit          Endocrine    Class 3 severe obesity with serious comorbidity and body mass index (BMI) of 40.0 to 44.9 in adult, unspecified obesity type (H) - Primary    Starting to see some weight loss but still up from consult. Tolerating zepbound 10mg well. Occasional nausea but manageable. Still has hard time stopping when full and occasionally over eats. Also with sometimes miss meals because she forgets to eat. No structure to day or regular eating patterns. Discussed working on this to help avoid missing meals and optimize nutrition. Could consider dose increase but need to be intentional about nutrition/ hydration.     Increase zepbound to 12.5mg - watch for energy level, if getting worse may want to go back down to 10mg   Schedule meals during the day   Great work with increasing protein   Start with smaller portions - maybe use a smaller plate   Continue working on hydration - maybe make a schedule   Follow up with sleep medicine     Try setting alarms for meals   First meal- within hour of waking up   Lunch - between 12 and 1   Dinner around 6pm    Follow up 3 months          Relevant Medications    tirzepatide-Weight Management (ZEPBOUND) 12.5 MG/0.5ML prefilled pen          INTERVAL HISTORY:  Ongoing MWM with Dr. Slade since  starting BMI 36 (220lb) gap from  to . 24 week plan in  (had gained to 260lb). Lizeth Cr PA-C   - switch from liraglutide to semaglutide. High weight 270lb. Last seen 3/2025- doing well with switch to zepbound from [employer].      Sick march- no weight loss      Anti-obesity medication history    Current:   Zepbound 10mg   -some nausea after eating, going too long without eating,   -some constipation, improved now   Bupropion - mood     Past/Failed/contraindicated:   Naltrexone - was not helpful with weight loss   Topiramate - side effects of fatigue, mood changes that was not tolerated   Phentermine - contraindicated due to being on stimulant     Recent diet changes:   Hard to stop eating at meals due to cravings/ tastes good  Doesn't like feeling of being over full so that has been helping with smaller portions   Eating off 8.5in plate   Occasionally forgetting to eat   Smaller little meals   No particular eating schedule   Increasing protein - beans, meat, meat sticks, string cheese, greek yogurt     doesn't always tolerate eggs   Getting at least 48oz water     Recent exercise/activity changes: doing Social Moov videos, using some hand weights   -enjoys this. Doing 2 days a week.     Recent stressors:   Some fatigue, maybe better than before but not resolving     Recent sleep changes: okay   Sleeps 8-10hr  Not always good quality sleep - since concussion, has follow up with sleep medicine     Vitamins/Labs: 4/2025    CURRENT WEIGHT:   257 lbs 0 oz    Initial Weight (lbs): 248 lbs  Last Visits Weight: 117.9 kg (260 lb)  Cumulative weight loss (lbs): -9  Weight Loss Percentage: -3.63%        6/19/2025    11:47 AM   Changes and Difficulties   I have made the following changes to my diet since my last visit: Eating vegetables every day.   With regards to my diet, I am still struggling with: Portion control.   I have made the following changes to my activity/exercise since my last visit: Exercising at home.   With regards to my activity/exercise, I am still struggling with: Fatigue.         MEDICATIONS:   Current Outpatient Medications   Medication Sig Dispense Refill    tirzepatide-Weight Management (ZEPBOUND) 12.5 MG/0.5ML prefilled pen Inject 0.5 mLs (12.5 mg)  subcutaneously every 7 days. 2 mL 2    albuterol (ACCUNEB) 1.25 MG/3ML neb solution Take 1 vial (1.25 mg) by nebulization every 6 hours as needed for shortness of breath or wheezing. 90 mL 0    albuterol (VENTOLIN HFA) 108 (90 Base) MCG/ACT inhaler INHALE 2 PUFFS INTO THE LUNGS FOUR TIMES DAILY 18 g 2    ARIPiprazole (ABILIFY) 10 MG tablet Take 1 tablet by mouth daily at 2 pm.      azaTHIOprine (IMURAN) 50 MG tablet Take 1 tablet (50 mg) by mouth daily with food. 30 tablet 1    buPROPion (WELLBUTRIN XL) 150 MG 24 hr tablet       buPROPion (WELLBUTRIN XL) 300 MG 24 hr tablet Take 300 mg by mouth every morning      CONCERTA 36 MG CR tablet TAKE 1 TABLET BY MOUTH DAILY IN THE MORNING FOR ADHD. START 11/18/22      DULoxetine (CYMBALTA) 60 MG capsule Take 60 mg by mouth daily      ferrous sulfate (FE TABS) 325 (65 Fe) MG EC tablet TAKE 1 TABLET BY MOUTH EVERY DAY 90 tablet 2    fluticasone (FLONASE) 50 MCG/ACT nasal spray Spray 1 spray into both nostrils daily. 10 mL 0    gabapentin (NEURONTIN) 100 MG capsule Take 200 mg by mouth At Bedtime      gabapentin (NEURONTIN) 600 MG tablet Take 1 tablet (600 mg) by mouth at bedtime. 90 tablet 3    hydrocortisone 2.5 % cream Apply topically 2 times daily. 30 g 0    hydrocortisone 2.5 % cream APPLY TOPICALLY TO THE CHEST TWICE DAILY AS NEEDED      hydroxychloroquine (PLAQUENIL) 200 MG tablet Take 1 tablet (200 mg) by mouth 2 times daily (with meals) Annual Plaquenil toxicity eye screening required. 180 tablet 3    ibuprofen (ADVIL/MOTRIN) 800 MG tablet TAKE 1 TABLET BY MOUTH TWICE A DAY WITH MEALS FOR 14 DAYS      ketorolac (TORADOL) 10 MG tablet Take 1 tablet (10 mg) by mouth every 6 hours as needed for moderate pain 20 tablet 0    levocetirizine (XYZAL) 5 MG tablet Take 5 mg by mouth every 24 hours      levothyroxine (SYNTHROID/LEVOTHROID) 75 MCG tablet Take 1 tablet (75 mcg) by mouth daily. 90 tablet 2    lidocaine (LIDODERM) 5 % patch Place 1 patch onto the skin every 24 hours  "To prevent lidocaine toxicity, patient should be patch free for 12 hrs daily. 15 patch 1    meclizine (ANTIVERT) 25 MG tablet Take 1 tablet (25 mg) by mouth 3 times daily as needed for dizziness 60 tablet 3    methylphenidate (RITALIN) 10 MG tablet TAKE 1/2 - 1 TABLET ONCE DAILY AS NEEDED FOR ATTENTION AND FOCUS.      modafinil (PROVIGIL) 100 MG tablet TAKE 2 TABLETS BY MOUTH EVERY DAY 60 tablet 3    omeprazole (PRILOSEC) 20 MG DR capsule Take 1 capsule (20 mg) by mouth 2 times daily      polyethylene glycol (MIRALAX) 17 GM/Dose powder Take 1 capful by mouth daily as needed for constipation      RELPAX 40 MG tablet       spironolactone-HCTZ (ALDACTAZIDE) 25-25 MG tablet Take 1 tablet by mouth every 72 hours as needed 36 tablet 5    TAZORAC 0.1 % external cream APPLY TOPICALLY TO THE AFFECTED AREA AT BEDTIME      tirzepatide-Weight Management (ZEPBOUND) 10 MG/0.5ML prefilled pen Inject 0.5 mLs (10 mg) subcutaneously every 7 days. 2 mL 2    triamcinolone (KENALOG) 0.1 % paste APPLY TO AFFECTED AREA 2 TIMES DAILY AS DIRECTED             6/19/2025    11:47 AM   Weight Loss Medication History Reviewed With Patient   Are you having any side effects from the weight loss medication that we have prescribed you? Yes   If you are having side effects please describe: Nausea and occasional sulfur burps.       Office Visit on 06/13/2025   Component Date Value Ref Range Status    Trichomonas 06/13/2025 Absent  Absent Final    Yeast 06/13/2025 Absent  Absent Final    Clue Cells 06/13/2025 Absent  Absent Final    WBCs/high power field 06/13/2025 None  None Final           1/3/2022     3:03 PM   RODRIGUE Score (Last Two)   RODRIGUE Raw Score 29   Activation Score 52.9   RODRIGUE Level 2         PHYSICAL EXAM:  Objective    Ht 1.676 m (5' 6\")   Wt 116.6 kg (257 lb)   LMP 05/30/2025 (Approximate)   BMI 41.48 kg/m      Vitals - Patient Reported  Pain Score: Moderate Pain (4)  Pain Loc: Head      Vitals:  No vitals were obtained today due to virtual " visit.    GENERAL: alert and no distress  EYES: Eyes grossly normal to inspection.  No discharge or erythema, or obvious scleral/conjunctival abnormalities.  RESP: No audible wheeze, cough, or visible cyanosis.    SKIN: Visible skin clear. No significant rash, abnormal pigmentation or lesions.  NEURO: Cranial nerves grossly intact.  Mentation and speech appropriate for age.  PSYCH: Appropriate affect, tone, and pace of words        Sincerely,    JERALD Gardiner CNP      26 minutes spent by me on the date of the encounter doing chart review, history and exam, documentation and further activities per the note    The longitudinal plan of care for the diagnosis(es)/condition(s) as documented were addressed during this visit. Due to the added complexity in care, I will continue to support Immanuelle in the subsequent management and with ongoing continuity of care.

## 2025-06-19 NOTE — NURSING NOTE
Current patient location: [address] [place] [address]    Is the patient currently in the state of [place]? YES    Visit mode: VIDEO    If the visit is dropped, the patient can be reconnected by:VIDEO VISIT: Text to cell phone:   Telephone Information:   Mobile [phone]       Will anyone else be joining the visit? NO  (If patient encounters technical issues they should call [phone]    Are changes needed to the allergy or medication list? No    Are refills needed on medications prescribed by this physician? NO Pt would like to discuss Zepbound.    Rooming Documentation:  Patient will complete questionnaire(s) in [place]    Reason for visit: RECHECK    Pt states 4/10 headache today.    Yury MADERA

## 2025-06-19 NOTE — ASSESSMENT & PLAN NOTE
Starting to see some weight loss but still up from consult. Tolerating zepbound 10mg well. Occasional nausea but manageable. Still has hard time stopping when full and occasionally over eats. Also with sometimes miss meals because she forgets to eat. No structure to day or regular eating patterns. Discussed working on this to help avoid missing meals and optimize nutrition. Could consider dose increase but need to be intentional about nutrition/ hydration.     Increase zepbound to 12.5mg - watch for energy level, if getting worse may want to go back down to 10mg   Schedule meals during the day   Great work with increasing protein   Start with smaller portions - maybe use a smaller plate   Continue working on hydration - maybe make a schedule   Follow up with sleep medicine     Try setting alarms for meals   First meal- within hour of waking up   Lunch - between 12 and 1   Dinner around 6pm    Follow up 3 months

## 2025-06-19 NOTE — LETTER
2025       RE: Nehemias Mascorro  [address]  [place] [address]     Dear Colleague,    Thank you for referring your patient, Nehemias Mascorro, to the [facility] [place] [facility]. Please see a copy of my visit note below.      Return Medical Weight Management Note     Nehemias Mascorro  MRN:  [id number]  :  1995  JASMEET:  2025    Dear Alexandra Rodrigues MD,    I had the pleasure of seeing your patient Nehemias Mascorro. She is a 30 year old female who I am continuing to see for treatment of obesity related to:         No data to display                Assessment & Plan  Problem List Items Addressed This Visit          Endocrine    Class 3 severe obesity with serious comorbidity and body mass index (BMI) of 40.0 to 44.9 in adult, unspecified obesity type (H) - Primary    Starting to see some weight loss but still up from consult. Tolerating zepbound 10mg well. Occasional nausea but manageable. Still has hard time stopping when full and occasionally over eats. Also with sometimes miss meals because she forgets to eat. No structure to day or regular eating patterns. Discussed working on this to help avoid missing meals and optimize nutrition. Could consider dose increase but need to be intentional about nutrition/ hydration.     Increase zepbound to 12.5mg - watch for energy level, if getting worse may want to go back down to 10mg   Schedule meals during the day   Great work with increasing protein   Start with smaller portions - maybe use a smaller plate   Continue working on hydration - maybe make a schedule   Follow up with sleep medicine     Try setting alarms for meals   First meal- within hour of waking up   Lunch - between 12 and 1   Dinner around 6pm    Follow up 3 months          Relevant Medications    tirzepatide-Weight Management (ZEPBOUND) 12.5 MG/0.5ML prefilled pen          INTERVAL  HISTORY:  Ongoing MWM with Dr. Slade since 2015 starting BMI 36 (220lb) gap from 2016 to 2018. 24 week plan in 2021 (had gained to 260lb). Lizeth Cr PA-C  2023 - switch from liraglutide to semaglutide. High weight 270lb. Last seen 3/2025- doing well with switch to zepbound from wegovy.      Sick march- no weight loss     Anti-obesity medication history    Current:   Zepbound 10mg   -some nausea after eating, going too long without eating,   -some constipation, improved now   Bupropion - mood     Past/Failed/contraindicated:   Naltrexone - was not helpful with weight loss   Topiramate - side effects of fatigue, mood changes that was not tolerated   Phentermine - contraindicated due to being on stimulant     Recent diet changes:   Hard to stop eating at meals due to cravings/ tastes good  Doesn't like feeling of being over full so that has been helping with smaller portions   Eating off 8.5in plate   Occasionally forgetting to eat   Smaller little meals   No particular eating schedule   Increasing protein - beans, meat, meat sticks, string cheese, greek yogurt     doesn't always tolerate eggs   Getting at least 48oz water     Recent exercise/activity changes: doing [employer] videos, using some hand weights   -enjoys this. Doing 2 days a week.     Recent stressors:   Some fatigue, maybe better than before but not resolving     Recent sleep changes: okay   Sleeps 8-10hr  Not always good quality sleep - since concussion, has follow up with sleep medicine     Vitamins/Labs: 4/2025    CURRENT WEIGHT:   257 lbs 0 oz    Initial Weight (lbs): 248 lbs  Last Visits Weight: 117.9 kg (260 lb)  Cumulative weight loss (lbs): -9  Weight Loss Percentage: -3.63%        6/19/2025    11:47 AM   Changes and Difficulties   I have made the following changes to my diet since my last visit: Eating vegetables every day.   With regards to my diet, I am still struggling with: Portion control.   I have made the following changes to my  activity/exercise since my last visit: Exercising at home.   With regards to my activity/exercise, I am still struggling with: Fatigue.         MEDICATIONS:   Current Outpatient Medications   Medication Sig Dispense Refill     tirzepatide-Weight Management (ZEPBOUND) 12.5 MG/0.5ML prefilled pen Inject 0.5 mLs (12.5 mg) subcutaneously every 7 days. 2 mL 2     albuterol (ACCUNEB) 1.25 MG/3ML neb solution Take 1 vial (1.25 mg) by nebulization every 6 hours as needed for shortness of breath or wheezing. 90 mL 0     albuterol (VENTOLIN HFA) 108 (90 Base) MCG/ACT inhaler INHALE 2 PUFFS INTO THE LUNGS FOUR TIMES DAILY 18 g 2     ARIPiprazole (ABILIFY) 10 MG tablet Take 1 tablet by mouth daily at 2 pm.       azaTHIOprine (IMURAN) 50 MG tablet Take 1 tablet (50 mg) by mouth daily with food. 30 tablet 1     buPROPion (WELLBUTRIN XL) 150 MG 24 hr tablet        buPROPion (WELLBUTRIN XL) 300 MG 24 hr tablet Take 300 mg by mouth every morning       CONCERTA 36 MG CR tablet TAKE 1 TABLET BY MOUTH DAILY IN THE MORNING FOR ADHD. START 11/18/22       DULoxetine (CYMBALTA) 60 MG capsule Take 60 mg by mouth daily       ferrous sulfate (FE TABS) 325 (65 Fe) MG EC tablet TAKE 1 TABLET BY MOUTH EVERY DAY 90 tablet 2     fluticasone (FLONASE) 50 MCG/ACT nasal spray Spray 1 spray into both nostrils daily. 10 mL 0     gabapentin (NEURONTIN) 100 MG capsule Take 200 mg by mouth At Bedtime       gabapentin (NEURONTIN) 600 MG tablet Take 1 tablet (600 mg) by mouth at bedtime. 90 tablet 3     hydrocortisone 2.5 % cream Apply topically 2 times daily. 30 g 0     hydrocortisone 2.5 % cream APPLY TOPICALLY TO THE CHEST TWICE DAILY AS NEEDED       hydroxychloroquine (PLAQUENIL) 200 MG tablet Take 1 tablet (200 mg) by mouth 2 times daily (with meals) Annual Plaquenil toxicity eye screening required. 180 tablet 3     ibuprofen (ADVIL/MOTRIN) 800 MG tablet TAKE 1 TABLET BY MOUTH TWICE A DAY WITH MEALS FOR 14 DAYS       ketorolac (TORADOL) 10 MG tablet  Take 1 tablet (10 mg) by mouth every 6 hours as needed for moderate pain 20 tablet 0     levocetirizine (XYZAL) 5 MG tablet Take 5 mg by mouth every 24 hours       levothyroxine (SYNTHROID/LEVOTHROID) 75 MCG tablet Take 1 tablet (75 mcg) by mouth daily. 90 tablet 2     lidocaine (LIDODERM) 5 % patch Place 1 patch onto the skin every 24 hours To prevent lidocaine toxicity, patient should be patch free for 12 hrs daily. 15 patch 1     meclizine (ANTIVERT) 25 MG tablet Take 1 tablet (25 mg) by mouth 3 times daily as needed for dizziness 60 tablet 3     methylphenidate (RITALIN) 10 MG tablet TAKE 1/2 - 1 TABLET ONCE DAILY AS NEEDED FOR ATTENTION AND FOCUS.       modafinil (PROVIGIL) 100 MG tablet TAKE 2 TABLETS BY MOUTH EVERY DAY 60 tablet 3     omeprazole (PRILOSEC) 20 MG DR capsule Take 1 capsule (20 mg) by mouth 2 times daily       polyethylene glycol (MIRALAX) 17 GM/Dose powder Take 1 capful by mouth daily as needed for constipation       RELPAX 40 MG tablet        spironolactone-HCTZ (ALDACTAZIDE) 25-25 MG tablet Take 1 tablet by mouth every 72 hours as needed 36 tablet 5     TAZORAC 0.1 % external cream APPLY TOPICALLY TO THE AFFECTED AREA AT BEDTIME       tirzepatide-Weight Management (ZEPBOUND) 10 MG/0.5ML prefilled pen Inject 0.5 mLs (10 mg) subcutaneously every 7 days. 2 mL 2     triamcinolone (KENALOG) 0.1 % paste APPLY TO AFFECTED AREA 2 TIMES DAILY AS DIRECTED             6/19/2025    11:47 AM   Weight Loss Medication History Reviewed With Patient   Are you having any side effects from the weight loss medication that we have prescribed you? Yes   If you are having side effects please describe: Nausea and occasional sulfur burps.       Office Visit on 06/13/2025   Component Date Value Ref Range Status     Trichomonas 06/13/2025 Absent  Absent Final     Yeast 06/13/2025 Absent  Absent Final     Clue Cells 06/13/2025 Absent  Absent Final     WBCs/high power field 06/13/2025 None  None Final           1/3/2022  "    3:03 PM   RODRIGUE Score (Last Two)   RODRIGUE Raw Score 29   Activation Score 52.9   RODRIGUE Level 2         PHYSICAL EXAM:  Objective   Ht 1.676 m (5' 6\")   Wt 116.6 kg (257 lb)   LMP 05/30/2025 (Approximate)   BMI 41.48 kg/m      Vitals - Patient Reported  Pain Score: Moderate Pain (4)  Pain Loc: Head      Vitals:  No vitals were obtained today due to virtual visit.    GENERAL: alert and no distress  EYES: Eyes grossly normal to inspection.  No discharge or erythema, or obvious scleral/conjunctival abnormalities.  RESP: No audible wheeze, cough, or visible cyanosis.    SKIN: Visible skin clear. No significant rash, abnormal pigmentation or lesions.  NEURO: Cranial nerves grossly intact.  Mentation and speech appropriate for age.  PSYCH: Appropriate affect, tone, and pace of words        Sincerely,    JERALD Gardiner CNP      26 minutes spent by me on the date of the encounter doing chart review, history and exam, documentation and further activities per the note    The longitudinal plan of care for the diagnosis(es)/condition(s) as documented were addressed during this visit. Due to the added complexity in care, I will continue to support Immanuelle in the subsequent management and with ongoing continuity of care.    Virtual Visit Details    Type of service:  Video Visit   Video Start Time: 1200  Video End Time:1226    Originating Location (pt. Location): Home    Distant Location (provider location):  Off-site  Platform used for Video Visit: [employer]      Again, thank you for allowing me to participate in the care of your patient.      Sincerely,    JERALD Gardiner CNP    "

## 2025-06-19 NOTE — PROGRESS NOTES
Nehemias is a 30 year old who is being evaluated via a billable video visit.  CHANGED TO PHONE VISIT DUE TO TECHNICAL ISSUES  How would you like to obtain your AVS? Reji  If the video visit is dropped, the invitation should be resent by: Text to cell phone: [phone]  Will anyone else be joining your video visit? No      Neither of us can video work    Assessment & Plan     Hypoglycemia  On patient monitor that she purchased herself.  Also she takes Zepbound for weight loss - advised stopping this for now as it could contribute to low blood sugars      Vertigo  Had her look left and right and quick turning to the right caused feelings of loss balance in the  - Physical Therapy  Referral    Patient instruction (sent by Vyyojose)    For people who have symptoms of hypoglycemia, the first thing we do is look at eating habits.  People may find that by eating smaller more frequent meals this takes care of their symptoms, and also by keeping handy a snack with both protein and carbs -like a fruit drink plus a peanut butter sandwich -that can quickly reverse the symptoms of feeling hypoglycemic    So one of the test for you would be to have a click source of blood sugar like orange juice or apple juice that you can immediately sip if you are feeling lightheaded and have a low blood sugar, and then add on a more protein rich snack and see if that resolves your symptoms    In addition, you said you were eating less frequently.  It might be helpful to plan your meals so that you eat less at your meal but spread it out over to different times you eat, like half a sandwich at first and then the other half a sandwich 2 hours later    We usually start with these measures before checking things like insulin levels which have to be done with a very long fast, as in almost a day without food    Subjective   Nehemias is a 30 year old, presenting for the following health issues:  Dizziness (Dizziness x2 days, comes  "and goes. Some nausea with dizziness, x1 emesis Tuesday. Patient reports eating and drinking fluids.   Patient prescribed Meclizine which she does take as needed, however it is not helping this time for her dizziness.)        6/19/2025     3:53 PM   Additional Questions   Roomed by shahzad THAPA       History of Present Illness       Reason for visit:  Facial swelling and lopsided. anal itching.  Symptom onset:  More than a month  Symptoms include:  Itchy anus. facial swelling and uneveness. fatigue.  Symptom intensity:  Moderate  Symptom progression:  Staying the same  Had these symptoms before:  No  What makes it worse:  Wiping my behind.  What makes it better:  Cream like antifungal sometimes butt paste. She is missing 2 dose(s) of medications per week.      The above is NOT why she is here - the was addressed at a recent visit            ---  RN Triage    Situation: Dizziness     Background: Hx of lupus, asthma, TBI, migraines. Seen by Dr. Munroe for dizziness in January, chart review indicates Dr. Munroe thought most likely scenario was lupus flair and to follow up with rheumatology. Per visit 4/24 looks like it was not discussed with rheum.      Assessment: States she has been feeling dizzy, shaky, lightheaded on and off since Tuesday 6/17. States standing, working and \"staring at a computer screen\" and \"when I haven't been eating too much \" make it worse. States she does feel \"a little unsteady walking, but not all the time. States she thinks she has been eating adequately and has drank three water bottles today. Endorses headache but states it does not feel like her usual migraine. States she her head feels \"zenaida funny\" and \"blank\" and \"foggy.\" States her \"ears feel clogged.\" Vomited x1 Tuesday, temp last evening 99.8F. Denies palpitations, diarrhea, chest pain, diarrhea, known bleeding, chance of pregnancy. Check BG and it was 86.     --  Immanuelle says she checked her blood sugars 2 and half hours after " eating.  She had chicken and macaroni and cheese so was surprised that her blood sugar was  86.  She brought up glucose monitor on her and because she was concerned about her symptoms, not because she has diabetes    She also  checked monitor on the 18th ; she got a value of 89 - 3 hours after  eating    Takes zepbound now for weight loss.  She notes that her meals have gotten smaller and she is not eating as frequently, but she feels as if she is eating and.  She has been on the same dose for 3 to 4 months.    She is only checking her blood sugars when she feels shaky or lightheaded..    Meals have gotten smaller and she is not eating as frequently, but she feels she is eating enough    Notably, she sees Dr. Rutledge for Lupus and said Dr. Rutledge indicated to Immanuelle that she had no concerns about the labs.     Besides autoimmune labs following mostly normal blood work     Component      Latest Ref Rng 6/3/2025  10:03 AM 6/5/2025  3:32 PM   WBC      4.0 - 11.0 10e3/uL 3.6 (L)     RBC Count      3.80 - 5.20 10e6/uL 4.38     Hemoglobin      11.7 - 15.7 g/dL 12.8     Hematocrit      35.0 - 47.0 % 39.5     MCV      78 - 100 fL 90     MCH      26.5 - 33.0 pg 29.2     MCHC      31.5 - 36.5 g/dL 32.4     RDW      10.0 - 15.0 % 13.0     Platelet Count      150 - 450 10e3/uL 250     % Neutrophils      % 47     % Lymphocytes      % 44     % Monocytes      % 8     % Eosinophils      % 1     % Basophils      % 1     % Immature Granulocytes      % 0     Absolute Neutrophil      1.6 - 8.3 10e3/uL 1.7     Absolute Lymphocytes      0.8 - 5.3 10e3/uL 1.6     Absolute Monocytes      0.0 - 1.3 10e3/uL 0.3     Absolute Eosinophils      0.0 - 0.7 10e3/uL 0.0     Absolute Basophils      0.0 - 0.2 10e3/uL 0.0     Absolute Immature Granulocytes      <=0.4 10e3/uL 0.0     Creatinine      0.51 - 0.95 mg/dL 0.95     GFR Estimate      >60 mL/min/1.73m2 83     CRP Inflammation      <5.00 mg/L 9.17 (H)     ALT      0 - 50 U/L 31      Albumin      3.5 - 5.2 g/dL 4.1     AST      0 - 45 U/L 26     TSH      0.30 - 4.20 uIU/mL  0.40       Legend:  (L) Low  (H) High      --      She also has spells where she feels she is off balance but does not feel nauseated with these.    I had her turn her head to the left right and report on how she felt.  She says turning to the right made her feel a little funny, wobbly, and nauseated            Objective     Immanuelle is breathing normally, speaking normally, has normal mentation and affect there was worried        Physical Exam         Video-Visit Details    Type of service: Phone visit : 30 minutes, including reviewing chart, history and discussing potential diagnoses

## 2025-06-19 NOTE — PATIENT INSTRUCTIONS
Increase zepbound to 12.5mg - watch for energy level, if getting worse may want to go back down to 10mg   Schedule meals during the day   Great work with increasing protein   Start with smaller portions - maybe use a smaller plate   Continue working on hydration - maybe make a schedule   Follow up with sleep medicine     Try setting alarms for meals   First meal- within hour of waking up   Lunch - between 12 and 1   Dinner around 6pm    Follow up 3 months

## 2025-06-19 NOTE — PROGRESS NOTES
Virtual Visit Details    Type of service:  Video Visit   Video Start Time: 1200  Video End Time:1226    Originating Location (pt. Location): Home    Distant Location (provider location):  Off-site  Platform used for Video Visit: [employer]

## 2025-06-22 ENCOUNTER — HEALTH MAINTENANCE LETTER (OUTPATIENT)
Age: 30
End: 2025-06-22

## 2025-06-23 ENCOUNTER — TELEPHONE (OUTPATIENT)
Dept: ENDOCRINOLOGY | Facility: CLINIC | Age: 30
End: 2025-06-23
Payer: COMMERCIAL

## 2025-06-23 NOTE — TELEPHONE ENCOUNTER
Left Voicemail (1st Attempt) and Sent Mychart (1st Attempt) for the patient to call back and schedule the following:    Appointment type: Return Weight Management  Appointment mode: in-person or Virtual Visit  Provider: Nicol Broderick NP  Return date: Approx. 9/19/25  Specialty phone number: [phone] & Direct    Additional Notes:   3 mo follow up, nbs ok per Meggan

## 2025-06-24 ENCOUNTER — OFFICE VISIT (OUTPATIENT)
Dept: INTERNAL MEDICINE | Facility: CLINIC | Age: 30
End: 2025-06-24
Payer: COMMERCIAL

## 2025-06-24 ENCOUNTER — RESULTS FOLLOW-UP (OUTPATIENT)
Dept: SURGERY | Facility: CLINIC | Age: 30
End: 2025-06-24

## 2025-06-24 ENCOUNTER — RESULTS FOLLOW-UP (OUTPATIENT)
Dept: INTERNAL MEDICINE | Facility: CLINIC | Age: 30
End: 2025-06-24

## 2025-06-24 VITALS
OXYGEN SATURATION: 99 % | TEMPERATURE: 97.7 F | WEIGHT: 262.9 LBS | HEART RATE: 87 BPM | BODY MASS INDEX: 42.25 KG/M2 | RESPIRATION RATE: 20 BRPM | HEIGHT: 66 IN | SYSTOLIC BLOOD PRESSURE: 126 MMHG | DIASTOLIC BLOOD PRESSURE: 80 MMHG

## 2025-06-24 DIAGNOSIS — D35.2 PITUITARY ADENOMA (H): ICD-10-CM

## 2025-06-24 DIAGNOSIS — J45.50 SEVERE PERSISTENT ASTHMA WITHOUT COMPLICATION (H): Primary | ICD-10-CM

## 2025-06-24 DIAGNOSIS — E16.2 LOW BLOOD SUGAR: ICD-10-CM

## 2025-06-24 DIAGNOSIS — F33.1 MODERATE RECURRENT MAJOR DEPRESSION (H): ICD-10-CM

## 2025-06-24 DIAGNOSIS — R73.03 PREDIABETES: ICD-10-CM

## 2025-06-24 DIAGNOSIS — F32.1 CURRENT MODERATE EPISODE OF MAJOR DEPRESSIVE DISORDER, UNSPECIFIED WHETHER RECURRENT (H): ICD-10-CM

## 2025-06-24 DIAGNOSIS — E66.813 CLASS 3 SEVERE OBESITY WITH SERIOUS COMORBIDITY AND BODY MASS INDEX (BMI) OF 40.0 TO 44.9 IN ADULT, UNSPECIFIED OBESITY TYPE (H): ICD-10-CM

## 2025-06-24 LAB
ALBUMIN SERPL BCG-MCNC: 4.1 G/DL (ref 3.5–5.2)
ALP SERPL-CCNC: 76 U/L (ref 40–150)
ALT SERPL W P-5'-P-CCNC: 36 U/L (ref 0–50)
ANION GAP SERPL CALCULATED.3IONS-SCNC: 7 MMOL/L (ref 7–15)
AST SERPL W P-5'-P-CCNC: 22 U/L (ref 0–45)
BILIRUB SERPL-MCNC: 0.3 MG/DL
BUN SERPL-MCNC: 12.5 MG/DL (ref 6–20)
CALCIUM SERPL-MCNC: 9 MG/DL (ref 8.8–10.4)
CHLORIDE SERPL-SCNC: 106 MMOL/L (ref 98–107)
CHOLEST SERPL-MCNC: 152 MG/DL
CREAT SERPL-MCNC: 0.99 MG/DL (ref 0.51–0.95)
EGFRCR SERPLBLD CKD-EPI 2021: 78 ML/MIN/1.73M2
ERYTHROCYTE [DISTWIDTH] IN BLOOD BY AUTOMATED COUNT: 13.1 % (ref 10–15)
EST. AVERAGE GLUCOSE BLD GHB EST-MCNC: 94 MG/DL
FASTING STATUS PATIENT QL REPORTED: YES
FASTING STATUS PATIENT QL REPORTED: YES
GLUCOSE SERPL-MCNC: 83 MG/DL (ref 70–99)
HBA1C MFR BLD: 4.9 % (ref 0–5.6)
HCO3 SERPL-SCNC: 27 MMOL/L (ref 22–29)
HCT VFR BLD AUTO: 39.6 % (ref 35–47)
HDLC SERPL-MCNC: 57 MG/DL
HGB BLD-MCNC: 12.8 G/DL (ref 11.7–15.7)
LDLC SERPL CALC-MCNC: 86 MG/DL
MCH RBC QN AUTO: 29.4 PG (ref 26.5–33)
MCHC RBC AUTO-ENTMCNC: 32.3 G/DL (ref 31.5–36.5)
MCV RBC AUTO: 91 FL (ref 78–100)
NONHDLC SERPL-MCNC: 95 MG/DL
PLATELET # BLD AUTO: 246 10E3/UL (ref 150–450)
POTASSIUM SERPL-SCNC: 4.2 MMOL/L (ref 3.4–5.3)
PROT SERPL-MCNC: 6.5 G/DL (ref 6.4–8.3)
RBC # BLD AUTO: 4.36 10E6/UL (ref 3.8–5.2)
SODIUM SERPL-SCNC: 140 MMOL/L (ref 135–145)
TRIGL SERPL-MCNC: 47 MG/DL
WBC # BLD AUTO: 4.3 10E3/UL (ref 4–11)

## 2025-06-24 PROCEDURE — G2211 COMPLEX E/M VISIT ADD ON: HCPCS | Performed by: INTERNAL MEDICINE

## 2025-06-24 PROCEDURE — 83036 HEMOGLOBIN GLYCOSYLATED A1C: CPT | Performed by: INTERNAL MEDICINE

## 2025-06-24 PROCEDURE — 80053 COMPREHEN METABOLIC PANEL: CPT | Performed by: INTERNAL MEDICINE

## 2025-06-24 PROCEDURE — 3074F SYST BP LT 130 MM HG: CPT | Performed by: INTERNAL MEDICINE

## 2025-06-24 PROCEDURE — 85027 COMPLETE CBC AUTOMATED: CPT | Performed by: INTERNAL MEDICINE

## 2025-06-24 PROCEDURE — 84443 ASSAY THYROID STIM HORMONE: CPT | Performed by: INTERNAL MEDICINE

## 2025-06-24 PROCEDURE — 3079F DIAST BP 80-89 MM HG: CPT | Performed by: INTERNAL MEDICINE

## 2025-06-24 PROCEDURE — 3044F HG A1C LEVEL LT 7.0%: CPT | Performed by: INTERNAL MEDICINE

## 2025-06-24 PROCEDURE — 84439 ASSAY OF FREE THYROXINE: CPT | Performed by: INTERNAL MEDICINE

## 2025-06-24 PROCEDURE — 80061 LIPID PANEL: CPT | Performed by: INTERNAL MEDICINE

## 2025-06-24 PROCEDURE — 84146 ASSAY OF PROLACTIN: CPT | Performed by: INTERNAL MEDICINE

## 2025-06-24 PROCEDURE — 99214 OFFICE O/P EST MOD 30 MIN: CPT | Performed by: INTERNAL MEDICINE

## 2025-06-24 NOTE — PROGRESS NOTES
"  Assessment & Plan     Severe persistent asthma without complication (H)  Stable     Moderate recurrent major depression (H)  Sees outside psychiatrist   Is functioning well on this combination . Still has significant physical symptoms associated with depression with some somatization  She also has disease with depression pituitary lesion and possible lupus  Currently working part-time  Recommend cutting back on some specialist visits continue to see pituitary expert and rheumatologist and psychiatrist    Current moderate episode of major depressive disorder, unspecified whether recurrent (H)    - Lipid panel reflex to direct LDL Fasting; Future  - Lipid panel reflex to direct LDL Fasting    Prediabetes  Is on zepound  I did reassure her that blood sugars between  are not low   She can log her daily readings and show them to me in one month   If she skips meals , zepond can cause low blood sugars so she has been skipping meals she has an erratic lifestyle.  Strongly advised increasing exercise having 3 meals a day otherwise we will be able to increase the Zepbound.  Reassured her there is no other dangerous cause for her low blood sugar or her symptoms  - Hemoglobin A1c; Future  - Comprehensive metabolic panel (BMP + Alb, Alk Phos, ALT, AST, Total. Bili, TP); Future  - Lipid panel reflex to direct LDL Fasting; Future  - Comprehensive metabolic panel  - CBC with platelets  - Hemoglobin A1c  - Lipid panel reflex to direct LDL Fasting    Class 3 severe obesity with serious comorbidity and body mass index (BMI) of 40.0 to 44.9 in adult, unspecified obesity type (H)    - Comprehensive metabolic panel  - CBC with platelets    Low blood sugar            BMI  Estimated body mass index is 42.43 kg/m  as calculated from the following:    Height as of this encounter: 1.676 m (5' 6\").    Weight as of this encounter: 119.3 kg (262 lb 14.4 oz).             Araceil   Immgustabo is a 30 year old, presenting for the " "following health issues:  Hypoglycemia (See nurse triage from 06/20/2025/Patient reports blood sugars have been better- fasting bs in 100s as well as others throughout the day. Denies dizziness.)        6/24/2025    11:06 AM   Additional Questions   Roomed by ELIER Pagan BSN     Hypoglycemia    History of Present Illness       Reason for visit:  Facial swelling and lopsided. anal itching.  Symptom onset:  More than a month  Symptoms include:  Itchy anus. facial swelling and uneveness. fatigue.  Symptom intensity:  Moderate  Symptom progression:  Staying the same  Had these symptoms before:  No  What makes it worse:  Wiping my behind.  What makes it better:  Cream like antifungal sometimes butt paste. She is missing 2 dose(s) of medications per week.        Lab Results   Component Value Date    A1C 5.4 03/27/2024    A1C 5.2 07/21/2022    A1C 5.3 06/02/2022    A1C 5.7 11/10/2021    A1C 5.9 06/24/2021                     Objective    /80 (BP Location: Left arm, Patient Position: Sitting, Cuff Size: Adult Regular)   Pulse 87   Temp 97.7  F (36.5  C) (Tympanic)   Resp 20   Ht 1.676 m (5' 6\")   Wt 119.3 kg (262 lb 14.4 oz)   LMP 05/25/2025 (Exact Date)   SpO2 99%   BMI 42.43 kg/m    Body mass index is 42.43 kg/m .  Physical Exam   GENERAL: alert and no distress  NECK: no adenopathy, no asymmetry, masses, or scars  RESP: lungs clear to auscultation - no rales, rhonchi or wheezes  CV: regular rate and rhythm, normal S1 S2, no S3 or S4, no murmur, click or rub, no peripheral edema  MS: no gross musculoskeletal defects noted, no edema            Signed Electronically by: Alexandra Rodrigues MD    "

## 2025-06-25 ENCOUNTER — OFFICE VISIT (OUTPATIENT)
Dept: ENDOCRINOLOGY | Facility: CLINIC | Age: 30
End: 2025-06-25
Payer: COMMERCIAL

## 2025-06-25 ENCOUNTER — RESULTS FOLLOW-UP (OUTPATIENT)
Dept: ENDOCRINOLOGY | Facility: CLINIC | Age: 30
End: 2025-06-25

## 2025-06-25 VITALS
DIASTOLIC BLOOD PRESSURE: 79 MMHG | BODY MASS INDEX: 42.61 KG/M2 | OXYGEN SATURATION: 100 % | WEIGHT: 264 LBS | HEART RATE: 77 BPM | SYSTOLIC BLOOD PRESSURE: 119 MMHG

## 2025-06-25 DIAGNOSIS — D35.2 PITUITARY ADENOMA (H): Primary | ICD-10-CM

## 2025-06-25 DIAGNOSIS — R79.89 ABNORMAL PROLACTIN LEVEL: ICD-10-CM

## 2025-06-25 LAB
PROLACTIN SERPL 3RD IS-MCNC: 2 NG/ML (ref 5–23)
T4 FREE SERPL-MCNC: 1.09 NG/DL (ref 0.9–1.7)
TSH SERPL DL<=0.005 MIU/L-ACNC: 0.13 UIU/ML (ref 0.3–4.2)

## 2025-06-25 PROCEDURE — 3074F SYST BP LT 130 MM HG: CPT | Performed by: INTERNAL MEDICINE

## 2025-06-25 PROCEDURE — 99214 OFFICE O/P EST MOD 30 MIN: CPT | Performed by: INTERNAL MEDICINE

## 2025-06-25 PROCEDURE — 1125F AMNT PAIN NOTED PAIN PRSNT: CPT | Performed by: INTERNAL MEDICINE

## 2025-06-25 PROCEDURE — 3078F DIAST BP <80 MM HG: CPT | Performed by: INTERNAL MEDICINE

## 2025-06-25 RX ORDER — COLCHICINE 0.6 MG/1
TABLET ORAL
COMMUNITY

## 2025-06-25 RX ORDER — DOXYCYCLINE 100 MG/1
CAPSULE ORAL
COMMUNITY

## 2025-06-25 RX ORDER — DICLOFENAC POTASSIUM 50 MG/1
TABLET, FILM COATED ORAL
COMMUNITY
Start: 2024-11-07

## 2025-06-25 ASSESSMENT — PAIN SCALES - GENERAL: PAINLEVEL_OUTOF10: MILD PAIN (2)

## 2025-06-25 NOTE — PROGRESS NOTES
- Endocrinology follow up -    Reason for visit/consult:  Pituitary lesion (H24)    Primary care provider: Alexandra Rodrigues      Assessment and Plan  30 year old female with pituitary 7 mm lesion, suppressed PRL and mild secondary hypothyroidism. Her PRL was 2 on 6/24/25. Her TSH is 0.13 (low) and her free T4 is 1.09 (normal) on LT4 75 mcg daily. Most recent MRI on 3/26/25 showed a stable pituitary lesion.   - suppressed PRL and mild secondary hypothyroidism may due to pituitary lesion vs remote history of concussion vs side effect of D2 agonism on aripiprazole.   - continue LT4 75 mcg daily     RTC with me in 1 year     Patient was seen and plan of care discussed with Dr. Vasquez.      Patti Serrano, MS3      Attending tie-in note   I saw the patient with Patti Serrano MS3  and directly examined patient and discussed. Agree above note and plan.      Total 30 minutes spent on the date of the encounter doing chart review, history and exam, reviewed MRI, reviewed outside record , documentation and further activities as noted above.    Low PRL with microacdnoma could be due to D2 medication take?     Kelly Vasquez MD  Staff Physician  Endocrinology and Metabolism  License: [id number]     Interval History as of 6/25/2025 : Patient has been doing well. She stopped her oral contraceptive last November and reports heavy but regular menses. She sees rheumatology for Lupus management and they have advised avoiding estrogen containing hormonal birth control. She was taking a progesterone based BC until last November. Compliance to LT4 excellent.   HPI: A 29 yo female here for the evaluation for her pituitary lesion. She came with her mother today. Pituitary lesion was found when she underwent brain MRI in 9/2023 for the symptoms of dizziness. It is 7mm hyper-enhanced lesion in the posterior part of the pituitary. Her menstrual cycles are 28 days, she had PCOS  at her age 19 and has been on BCPs since then.  She also has blurry vision, and she went to ophthalmology office in 2/2024 in [place], (for which I do not have access record today).  She mentioned she has been gaining weight and was on wegovy, she mentioned she lost 40 lb in 6 month, and currently stopped using. She is now want to try oral semaglutide.   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 She also has been seen by neurologist (small fiber neuropathy) and rheumatologist (fibromyalgia).   Other medical history includes: depression, and currently seen by psychologist.     Past Medical/Surgical History:  Past Medical History:   Diagnosis Date    ADHD (attention deficit hyperactivity disorder)     Depressive disorder     Ovarian cyst     Thyroid disease     Uncomplicated asthma      Past Surgical History:   Procedure Laterality Date    COLONOSCOPY N/A 04/27/2022    Procedure: COLONOSCOPY, WITH POLYPECTOMY AND BIOPSY;  Surgeon: Alyse Leija MD;  Location:  GI    COLONOSCOPY      ENT SURGERY      tonsilectomy age 8    ESOPHAGOSCOPY, GASTROSCOPY, DUODENOSCOPY (EGD), COMBINED N/A 03/15/2023    Procedure: ESOPHAGOGASTRODUODENOSCOPY, WITH BIOPSY;  Surgeon: Cyn Colon MD;  Location: [facility] [place] [facility] OR    ORTHOPEDIC SURGERY      Hip, emelia surgery in 2013    WRIST SURGERY         Allergies:  Allergies   Allergen Reactions    Tizanidine Other (See Comments)    Azithromycin     Erythromycin Nausea and Vomiting    Sulfa Antibiotics Nausea       Current Medications   Current Outpatient Medications   Medication Sig Dispense Refill    albuterol (ACCUNEB) 1.25 MG/3ML neb solution Take 1 vial (1.25 mg) by nebulization every 6 hours as needed for shortness of breath or wheezing. 90 mL 0    albuterol (VENTOLIN HFA) 108 (90 Base) MCG/ACT inhaler INHALE 2 PUFFS INTO THE LUNGS FOUR TIMES DAILY 18 g 2    ARIPiprazole (ABILIFY) 10 MG tablet Take 1 tablet by mouth daily at 2  pm.      azaTHIOprine (IMURAN) 50 MG tablet Take 1 tablet (50 mg) by mouth daily with food. 30 tablet 1    buPROPion (WELLBUTRIN XL) 150 MG 24 hr tablet       buPROPion (WELLBUTRIN XL) 300 MG 24 hr tablet Take 300 mg by mouth every morning      colchicine (COLCRYS) 0.6 MG tablet       CONCERTA 36 MG CR tablet TAKE 1 TABLET BY MOUTH DAILY IN THE MORNING FOR ADHD. START 11/18/22      diclofenac (CATAFLAM) 50 MG tablet Su Whitdevensandra      doxycycline hyclate (VIBRAMYCIN) 100 MG capsule 1 CAPSULE TWICE A DAY WITH FOOD FOR 14 DAYS IF EXPOSED TO SUN, PLEASE USE SUN PROTECTION      DULoxetine (CYMBALTA) 60 MG capsule Take 60 mg by mouth daily      ferrous sulfate (FE TABS) 325 (65 Fe) MG EC tablet TAKE 1 TABLET BY MOUTH EVERY DAY 90 tablet 2    fluticasone (FLONASE) 50 MCG/ACT nasal spray Spray 1 spray into both nostrils daily. 10 mL 0    hydrocortisone 2.5 % cream Apply topically 2 times daily. 30 g 0    hydrocortisone 2.5 % cream APPLY TOPICALLY TO THE CHEST TWICE DAILY AS NEEDED      hydroxychloroquine (PLAQUENIL) 200 MG tablet Take 1 tablet (200 mg) by mouth 2 times daily (with meals) Annual Plaquenil toxicity eye screening required. 180 tablet 3    ibuprofen (ADVIL/MOTRIN) 800 MG tablet TAKE 1 TABLET BY MOUTH TWICE A DAY WITH MEALS FOR 14 DAYS      ketorolac (TORADOL) 10 MG tablet Take 1 tablet (10 mg) by mouth every 6 hours as needed for moderate pain 20 tablet 0    levocetirizine (XYZAL) 5 MG tablet Take 5 mg by mouth every 24 hours      levothyroxine (SYNTHROID/LEVOTHROID) 75 MCG tablet Take 1 tablet (75 mcg) by mouth daily. 90 tablet 2    lidocaine (LIDODERM) 5 % patch Place 1 patch onto the skin every 24 hours To prevent lidocaine toxicity, patient should be patch free for 12 hrs daily. 15 patch 1    meclizine (ANTIVERT) 25 MG tablet Take 1 tablet (25 mg) by mouth 3 times daily as needed for dizziness 60 tablet 3    methylphenidate (RITALIN) 10 MG tablet TAKE 1/2 - 1 TABLET ONCE DAILY AS NEEDED FOR ATTENTION AND  FOCUS.      modafinil (PROVIGIL) 100 MG tablet TAKE 2 TABLETS BY MOUTH EVERY DAY 60 tablet 3    omeprazole (PRILOSEC) 20 MG DR capsule Take 1 capsule (20 mg) by mouth 2 times daily      polyethylene glycol (MIRALAX) 17 GM/Dose powder Take 1 capful by mouth daily as needed for constipation      RELPAX 40 MG tablet       spironolactone-HCTZ (ALDACTAZIDE) 25-25 MG tablet Take 1 tablet by mouth every 72 hours as needed 36 tablet 5    TAZORAC 0.1 % external cream APPLY TOPICALLY TO THE AFFECTED AREA AT BEDTIME      tirzepatide-Weight Management (ZEPBOUND) 5 MG/0.5ML prefilled pen Inject 0.5 mLs (5 mg) subcutaneously every 7 days. 2 mL 2    triamcinolone (KENALOG) 0.1 % paste APPLY TO AFFECTED AREA 2 TIMES DAILY AS DIRECTED       No current facility-administered medications for this visit.       Family History:  Family History   Problem Relation Age of Onset    Depression Maternal Grandmother     Obesity Maternal Grandmother     Diabetes Maternal Grandmother     Hypertension Maternal Grandmother     Hyperlipidemia Maternal Grandmother     Cerebrovascular Disease Maternal Grandmother     Schizophrenia Maternal Uncle     Substance Abuse Maternal Uncle     Hypertension Mother     Asthma Mother     Obesity Mother     Diabetes Father     Coronary Artery Disease Father     Hypertension Father     Hyperlipidemia Father     Depression Father     Obesity Father     Obesity Paternal Grandfather     Diabetes Paternal Grandfather     Hypertension Paternal Grandfather     Hyperlipidemia Paternal Grandfather     Cerebrovascular Disease Paternal Grandfather     Depression Paternal Grandmother     Mental Illness Paternal Grandmother     Obesity Paternal Grandmother     Diabetes Paternal Grandmother     Hypertension Paternal Grandmother     Hyperlipidemia Paternal Grandmother     Cerebrovascular Disease Paternal Grandmother     Mental Illness Other         Uncle    Substance Abuse Other     Mental Illness Other         Paternal Uncle     Obesity Sister        Social History:  Social History     Tobacco Use    Smoking status: Never     Passive exposure: Never    Smokeless tobacco: Never    Tobacco comments:     Medical edible gummi  - has [place] medical marijuana card.    Substance Use Topics    Alcohol use: Yes     Comment: rare       ROS:  Full review of systems taken with the help of the intake sheet. Otherwise a complete 14 point review of systems was taken and is negative unless stated in the history above.      Physical Exam:   Vitals: /79   Pulse 77   Wt 119.7 kg (264 lb)   LMP 05/25/2025 (Exact Date)   SpO2 100%   BMI 42.61 kg/m    BMI= Body mass index is 42.61 kg/m .   General: well appearing, no acute distress, pleasant and conversant,   Mental Status/neuro: alert and oriented  Face: symmetrical, normal facial color  Eyes: anicteric, no proptosis or lid lag  Resp: normally breathing      Labs : I reviewed data from epic and extract and summarize the pertinent data here.      Latest Reference Range & Units 04/11/23 12:09 09/14/23 13:46 09/14/23 21:29 09/18/23 12:09 02/14/24 16:08   Prolactin 5 - 23 ng/mL     1 (L)   T4 Free 0.90 - 1.70 ng/dL  0.88 (L)      TSH 0.30 - 4.20 uIU/mL 0.52 1.10 3.32 0.42    Ins Growth Factor 1 93 - 297 ng/mL     183   (L): Data is abnormally low  Lab Results   Component Value Date     02/14/2024     06/22/2018      Lab Results   Component Value Date    POTASSIUM 4.0 02/14/2024    POTASSIUM 4.1 11/10/2021    POTASSIUM 4.0 06/22/2018     Lab Results   Component Value Date    CHLORIDE 103 02/14/2024    CHLORIDE 106 11/10/2021    CHLORIDE 110 06/22/2018     Lab Results   Component Value Date    ELIU 9.1 02/14/2024    ELIU 8.5 06/22/2018     Lab Results   Component Value Date    CO2 26 02/14/2024    CO2 24 11/10/2021    CO2 24 06/22/2018     Lab Results   Component Value Date    BUN 18.5 02/14/2024    BUN 11 11/10/2021    BUN 15 06/22/2018     Lab Results   Component Value Date    CR 1.01 02/14/2024     "CR 0.77 06/22/2018     Lab Results   Component Value Date    GLC 84 02/14/2024    GLC 80 11/10/2021    GLC 84 06/22/2018     Lab Results   Component Value Date    TSH 0.42 09/18/2023    TSH 0.89 03/10/2022    TSH 0.70 06/22/2018     Lab Results   Component Value Date    T4 0.88 09/14/2023    T4 0.79 06/22/2018     Lab Results   Component Value Date    A1C 5.2 07/21/2022       No results found for: \"IGF1\"  No results found for: \"LH\"  No results found for: \"FSH\"  No results found for: \"ESTROGEN\"  No results found for: \"PROLACTIN\"        MRI Brain: I personally reviewed the original images and agree with the below reports.   Results for orders placed during the hospital encounter of 09/14/23    MR Brain w/o & w Contrast    Narrative  EXAM: MR BRAIN W/O and W CONTRAST, MRA NECK (CAROTIDS) W/O and W CONTRAST, MRA BRAIN ([place]) W/O CONTRAST  LOCATION: [facility]  DATE: 9/15/2023    INDICATION: dizziness with L neck pain, just had CS injection today and worse after.  COMPARISON: Head CT 07/30/2021  CONTRAST: 10ml gadavist  TECHNIQUE:  1) Routine multiplanar multisequence head MRI without and with intravenous contrast.  2) 3D time-of-flight head MRA without intravenous contrast.  3) Neck MRA without and with IV contrast. Stenosis measurements made according to NASCET criteria unless otherwise specified.    FINDINGS:  HEAD MRI:  INTRACRANIAL CONTENTS: No acute or subacute infarct. No mass, acute hemorrhage, or extra-axial fluid collections. Normal brain parenchymal signal. Normal ventricles and sulci. Normal position of the cerebellar tonsils. No pathologic contrast enhancement.    SELLA: 7 mm T1 hyperintense lesion within the posterior aspect of the pituitary gland.    OSSEOUS STRUCTURES/SOFT TISSUES: Normal marrow signal. The major intracranial vascular flow voids are maintained.    ORBITS: No abnormality accounting for technique.    SINUSES/MASTOIDS: No paranasal sinus mucosal disease. " No middle ear or mastoid effusion.    HEAD MRA:  ANTERIOR CIRCULATION: No stenosis/occlusion, aneurysm, or high flow vascular malformation. Standard [place] anatomy.    POSTERIOR CIRCULATION: No stenosis/occlusion, aneurysm, or high flow vascular malformation. Balanced vertebral arteries supply a normal basilar artery.    NECK MRA:  RIGHT CAROTID: No measurable stenosis or dissection.    LEFT CAROTID: No measurable stenosis or dissection.    VERTEBRAL ARTERIES: No focal stenosis or dissection. Balanced vertebral arteries.    AORTIC ARCH: Vascular variant aortic arch origin left vertebral artery.  No significant stenosis at the origin of the great vessels.    Impression  IMPRESSION:  HEAD MRI:  1.  No intracranial mass, abnormal enhancement or recent infarct or evidence of intracranial hemorrhage.  2.  7 mm nonspecific lesion within the posterior aspect of the pituitary gland. 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    HEAD MRA:  1.  Normal MRA [place].    NECK MRA:  1.  Normal neck MRA.

## 2025-06-25 NOTE — LETTER
6/25/2025       RE: Nehemias Mascorro  [address]  [place] [address]     Dear Colleague,    Thank you for referring your patient, Nehemias Mascorro, to the [facility] [place] [facility]. Please see a copy of my visit note below.                                                                               - Endocrinology follow up -    Reason for visit/consult:  Pituitary lesion (H24)    Primary care provider: Alexandra Rodrigues      Assessment and Plan  30 year old female with pituitary 7 mm lesion, suppressed PRL and mild secondary hypothyroidism. Her PRL was 2 on 6/24/25. Her TSH is 0.13 (low) and her free T4 is 1.09 (normal) on LT4 75 mcg daily. Most recent MRI on 3/26/25 showed a stable pituitary lesion.   - suppressed PRL and mild secondary hypothyroidism may due to pituitary lesion vs remote history of concussion vs side effect of D2 agonism on aripiprazole.   - continue LT4 75 mcg daily     RTC with me in 1 year     Patient was seen and plan of care discussed with Dr. Vasquez.      Patti Serrano, MS3      Attending tie-in note   I saw the patient with Patti Serrano MS3  and directly examined patient and discussed. Agree above note and plan.      Total 30 minutes spent on the date of the encounter doing chart review, history and exam, reviewed MRI, reviewed outside record , documentation and further activities as noted above.    Low PRL with microacdnoma could be due to D2 medication take?     Kelly Vasquez MD  Staff Physician  Endocrinology and Metabolism  License: [id number]     Interval History as of 6/25/2025 : Patient has been doing well. She stopped her oral contraceptive last November and reports heavy but regular menses. She sees rheumatology for Lupus management and they have advised avoiding estrogen containing hormonal birth control. She was taking a progesterone based BC until last November. Compliance to LT4  excellent.   HPI: A 29 yo female here for the evaluation for her pituitary lesion. She came with her mother today. Pituitary lesion was found when she underwent brain MRI in 9/2023 for the symptoms of dizziness. It is 7mm hyper-enhanced lesion in the posterior part of the pituitary. Her menstrual cycles are 28 days, she had PCOS at her age 19 and has been on BCPs since then.  She also has blurry vision, and she went to ophthalmology office in 2/2024 in [place], (for which I do not have access record today).  She mentioned she has been gaining weight and was on wegovy, she mentioned she lost 40 lb in 6 month, and currently stopped using. She is now want to try oral semaglutide.   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 She also has been seen by neurologist (small fiber neuropathy) and rheumatologist (fibromyalgia).   Other medical history includes: depression, and currently seen by psychologist.     Past Medical/Surgical History:  Past Medical History:   Diagnosis Date     ADHD (attention deficit hyperactivity disorder)      Depressive disorder      Ovarian cyst      Thyroid disease      Uncomplicated asthma      Past Surgical History:   Procedure Laterality Date     COLONOSCOPY N/A 04/27/2022    Procedure: COLONOSCOPY, WITH POLYPECTOMY AND BIOPSY;  Surgeon: Alyse Leija MD;  Location:  GI     COLONOSCOPY       ENT SURGERY      tonsilectomy age 8     ESOPHAGOSCOPY, GASTROSCOPY, DUODENOSCOPY (EGD), COMBINED N/A 03/15/2023    Procedure: ESOPHAGOGASTRODUODENOSCOPY, WITH BIOPSY;  Surgeon: Cyn Colon MD;  [place] [facility] [place]     [place]THOPEDIC SURGERY      Hip, emelia surgery in 2013     WRIST SURGERY         Allergies:  Allergies   Allergen Reactions     Tizanidine Other (See Comments)     Azithromycin      Erythromycin Nausea and Vomiting     Sulfa Antibiotics Nausea       Current Medications   Current Outpatient Medications   Medication Sig Dispense  Refill     albuterol (ACCUNEB) 1.25 MG/3ML neb solution Take 1 vial (1.25 mg) by nebulization every 6 hours as needed for shortness of breath or wheezing. 90 mL 0     albuterol (VENTOLIN HFA) 108 (90 Base) MCG/ACT inhaler INHALE 2 PUFFS INTO THE LUNGS FOUR TIMES DAILY 18 g 2     ARIPiprazole (ABILIFY) 10 MG tablet Take 1 tablet by mouth daily at 2 pm.       azaTHIOprine (IMURAN) 50 MG tablet Take 1 tablet (50 mg) by mouth daily with food. 30 tablet 1     buPROPion (WELLBUTRIN XL) 150 MG 24 hr tablet        buPROPion (WELLBUTRIN XL) 300 MG 24 hr tablet Take 300 mg by mouth every morning       colchicine (COLCRYS) 0.6 MG tablet        CONCERTA 36 MG CR tablet TAKE 1 TABLET BY MOUTH DAILY IN THE MORNING FOR ADHD. START 11/18/22       diclofenac (CATAFLAM) 50 MG tablet Su Carroll       doxycycline hyclate (VIBRAMYCIN) 100 MG capsule 1 CAPSULE TWICE A DAY WITH FOOD FOR 14 DAYS IF EXPOSED TO SUN, PLEASE USE SUN PROTECTION       DULoxetine (CYMBALTA) 60 MG capsule Take 60 mg by mouth daily       ferrous sulfate (FE TABS) 325 (65 Fe) MG EC tablet TAKE 1 TABLET BY MOUTH EVERY DAY 90 tablet 2     fluticasone (FLONASE) 50 MCG/ACT nasal spray Spray 1 spray into both nostrils daily. 10 mL 0     hydrocortisone 2.5 % cream Apply topically 2 times daily. 30 g 0     hydrocortisone 2.5 % cream APPLY TOPICALLY TO THE CHEST TWICE DAILY AS NEEDED       hydroxychloroquine (PLAQUENIL) 200 MG tablet Take 1 tablet (200 mg) by mouth 2 times daily (with meals) Annual Plaquenil toxicity eye screening required. 180 tablet 3     ibuprofen (ADVIL/MOTRIN) 800 MG tablet TAKE 1 TABLET BY MOUTH TWICE A DAY WITH MEALS FOR 14 DAYS       ketorolac (TORADOL) 10 MG tablet Take 1 tablet (10 mg) by mouth every 6 hours as needed for moderate pain 20 tablet 0     levocetirizine (XYZAL) 5 MG tablet Take 5 mg by mouth every 24 hours       levothyroxine (SYNTHROID/LEVOTHROID) 75 MCG tablet Take 1 tablet (75 mcg) by mouth daily. 90 tablet 2     lidocaine  (LIDODERM) 5 % patch Place 1 patch onto the skin every 24 hours To prevent lidocaine toxicity, patient should be patch free for 12 hrs daily. 15 patch 1     meclizine (ANTIVERT) 25 MG tablet Take 1 tablet (25 mg) by mouth 3 times daily as needed for dizziness 60 tablet 3     methylphenidate (RITALIN) 10 MG tablet TAKE 1/2 - 1 TABLET ONCE DAILY AS NEEDED FOR ATTENTION AND FOCUS.       modafinil (PROVIGIL) 100 MG tablet TAKE 2 TABLETS BY MOUTH EVERY DAY 60 tablet 3     omeprazole (PRILOSEC) 20 MG DR capsule Take 1 capsule (20 mg) by mouth 2 times daily       polyethylene glycol (MIRALAX) 17 GM/Dose powder Take 1 capful by mouth daily as needed for constipation       RELPAX 40 MG tablet        spironolactone-HCTZ (ALDACTAZIDE) 25-25 MG tablet Take 1 tablet by mouth every 72 hours as needed 36 tablet 5     TAZORAC 0.1 % external cream APPLY TOPICALLY TO THE AFFECTED AREA AT BEDTIME       tirzepatide-Weight Management (ZEPBOUND) 5 MG/0.5ML prefilled pen Inject 0.5 mLs (5 mg) subcutaneously every 7 days. 2 mL 2     triamcinolone (KENALOG) 0.1 % paste APPLY TO AFFECTED AREA 2 TIMES DAILY AS DIRECTED       No current facility-administered medications for this visit.       Family History:  Family History   Problem Relation Age of Onset     Depression Maternal Grandmother      Obesity Maternal Grandmother      Diabetes Maternal Grandmother      Hypertension Maternal Grandmother      Hyperlipidemia Maternal Grandmother      Cerebrovascular Disease Maternal Grandmother      Schizophrenia Maternal Uncle      Substance Abuse Maternal Uncle      Hypertension Mother      Asthma Mother      Obesity Mother      Diabetes Father      Coronary Artery Disease Father      Hypertension Father      Hyperlipidemia Father      Depression Father      Obesity Father      Obesity Paternal Grandfather      Diabetes Paternal Grandfather      Hypertension Paternal Grandfather      Hyperlipidemia Paternal Grandfather      Cerebrovascular Disease  Paternal Grandfather      Depression Paternal Grandmother      Mental Illness Paternal Grandmother      Obesity Paternal Grandmother      Diabetes Paternal Grandmother      Hypertension Paternal Grandmother      Hyperlipidemia Paternal Grandmother      Cerebrovascular Disease Paternal Grandmother      Mental Illness Other         Uncle     Substance Abuse Other      Mental Illness Other         Paternal Uncle     Obesity Sister        Social History:  Social History     Tobacco Use     Smoking status: Never     Passive exposure: Never     Smokeless tobacco: Never     Tobacco comments:     Medical edible gummi  - has [place] medical marijuana card.    Substance Use Topics     Alcohol use: Yes     Comment: rare       ROS:  Full review of systems taken with the help of the intake sheet. Otherwise a complete 14 point review of systems was taken and is negative unless stated in the history above.      Physical Exam:   Vitals: /79   Pulse 77   Wt 119.7 kg (264 lb)   LMP 05/25/2025 (Exact Date)   SpO2 100%   BMI 42.61 kg/m    BMI= Body mass index is 42.61 kg/m .   General: well appearing, no acute distress, pleasant and conversant,   Mental Status/neuro: alert and oriented  Face: symmetrical, normal facial color  Eyes: anicteric, no proptosis or lid lag  Resp: normally breathing      Labs : I reviewed data from epic and extract and summarize the pertinent data here.      Latest Reference Range & Units 04/11/23 12:09 09/14/23 13:46 09/14/23 21:29 09/18/23 12:09 02/14/24 16:08   Prolactin 5 - 23 ng/mL     1 (L)   T4 Free 0.90 - 1.70 ng/dL  0.88 (L)      TSH 0.30 - 4.20 uIU/mL 0.52 1.10 3.32 0.42    Ins Growth Factor 1 93 - 297 ng/mL     183   (L): Data is abnormally low  Lab Results   Component Value Date     02/14/2024     06/22/2018      Lab Results   Component Value Date    POTASSIUM 4.0 02/14/2024    POTASSIUM 4.1 11/10/2021    POTASSIUM 4.0 06/22/2018     Lab Results   Component Value Date    CHLORIDE  "103 02/14/2024    CHLORIDE 106 11/10/2021    CHLORIDE 110 06/22/2018     Lab Results   Component Value Date    ELIU 9.1 02/14/2024    ELIU 8.5 06/22/2018     Lab Results   Component Value Date    CO2 26 02/14/2024    CO2 24 11/10/2021    CO2 24 06/22/2018     Lab Results   Component Value Date    BUN 18.5 02/14/2024    BUN 11 11/10/2021    BUN 15 06/22/2018     Lab Results   Component Value Date    CR 1.01 02/14/2024    CR 0.77 06/22/2018     Lab Results   Component Value Date    GLC 84 02/14/2024    GLC 80 11/10/2021    GLC 84 06/22/2018     Lab Results   Component Value Date    TSH 0.42 09/18/2023    TSH 0.89 03/10/2022    TSH 0.70 06/22/2018     Lab Results   Component Value Date    T4 0.88 09/14/2023    T4 0.79 06/22/2018     Lab Results   Component Value Date    A1C 5.2 07/21/2022       No results found for: \"IGF1\"  No results found for: \"LH\"  No results found for: \"FSH\"  No results found for: \"ESTROGEN\"  No results found for: \"PROLACTIN\"        MRI Brain: I personally reviewed the original images and agree with the below reports.   Results for orders placed during the hospital encounter of 09/14/23    MR Brain w/o & w Contrast    Narrative  EXAM: MR BRAIN W/O and W CONTRAST, MRA NECK (CAROTIDS) W/O and W CONTRAST, MRA BRAIN ([place]) W/O CONTRAST  LOCATION: [facility]  DATE: 9/15/2023    INDICATION: dizziness with L neck pain, just had CS injection today and worse after.  COMPARISON: Head CT 07/30/2021  CONTRAST: 10ml gadavist  TECHNIQUE:  1) Routine multiplanar multisequence head MRI without and with intravenous contrast.  2) 3D time-of-flight head MRA without intravenous contrast.  3) Neck MRA without and with IV contrast. Stenosis measurements made according to NASCET criteria unless otherwise specified.    FINDINGS:  HEAD MRI:  INTRACRANIAL CONTENTS: No acute or subacute infarct. No mass, acute hemorrhage, or extra-axial fluid collections. Normal brain parenchymal signal. " Normal ventricles and sulci. Normal position of the cerebellar tonsils. No pathologic contrast enhancement.    SELLA: 7 mm T1 hyperintense lesion within the posterior aspect of the pituitary gland.    OSSEOUS STRUCTURES/SOFT TISSUES: Normal marrow signal. The major intracranial vascular flow voids are maintained.    ORBITS: No abnormality accounting for technique.    SINUSES/MASTOIDS: No paranasal sinus mucosal disease. No middle ear or mastoid effusion.    HEAD MRA:  ANTERIOR CIRCULATION: No stenosis/occlusion, aneurysm, or high flow vascular malformation. Standard [place] anatomy.    POSTERIOR CIRCULATION: No stenosis/occlusion, aneurysm, or high flow vascular malformation. Balanced vertebral arteries supply a normal basilar artery.    NECK MRA:  RIGHT CAROTID: No measurable stenosis or dissection.    LEFT CAROTID: No measurable stenosis or dissection.    VERTEBRAL ARTERIES: No focal stenosis or dissection. Balanced vertebral arteries.    AORTIC ARCH: Vascular variant aortic arch origin left vertebral artery.  No significant stenosis at the origin of the great vessels.    Impression  IMPRESSION:  HEAD MRI:  1.  No intracranial mass, abnormal enhancement or recent infarct or evidence of intracranial hemorrhage.  2.  7 mm nonspecific lesion within the posterior aspect of the pituitary gland. 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    HEAD MRA:  1.  Normal MRA [place].    NECK MRA:  1.  Normal neck MRA.          Again, thank you for allowing me to participate in the care of your patient.      Sincerely,    Kelly Vasquez MD

## 2025-06-25 NOTE — NURSING NOTE
"Chief Complaint   Patient presents with    Pituitary Problem     Vital signs:      BP: 119/79 Pulse: 77     SpO2: 100 %       Weight: 119.7 kg (264 lb)  Estimated body mass index is 42.61 kg/m  as calculated from the following:    Height as of 6/24/25: 1.676 m (5' 6\").    Weight as of this encounter: 119.7 kg (264 lb).        "

## 2025-07-05 DIAGNOSIS — M32.19 OTHER SYSTEMIC LUPUS ERYTHEMATOSUS WITH OTHER ORGAN INVOLVEMENT (H): ICD-10-CM

## 2025-07-09 RX ORDER — AZATHIOPRINE 50 MG/1
50 TABLET ORAL
Qty: 90 TABLET | Refills: 1 | Status: SHIPPED | OUTPATIENT
Start: 2025-07-09

## 2025-07-09 NOTE — TELEPHONE ENCOUNTER
AZATHIOPRINE 50 MG TABLET      Last Written Prescription Date:  4/24/25  Last Fill Quantity: 30,   # refills: 1  Last Office Visit: 4/24/25  Future Office visit:  8/13/25    CBC RESULTS:   Recent Labs   Lab Test 06/24/25  1143   WBC 4.3   RBC 4.36   HGB 12.8   HCT 39.6   MCV 91   MCH 29.4   MCHC 32.3   RDW 13.1          Creatinine   Date Value Ref Range Status   06/24/2025 0.99 (H) 0.51 - 0.95 mg/dL Final   06/22/2018 0.77 0.52 - 1.04 mg/dL Final   ]    Liver Function Studies -   Recent Labs   Lab Test 06/24/25  1143   PROTTOTAL 6.5   ALBUMIN 4.1   BILITOTAL 0.3   ALKPHOS 76   AST 22   ALT 36       Routing refill request to provider for review/approval because:  Drug not on the [facility] or [facility] refill protocol or controlled substance

## 2025-07-14 ENCOUNTER — TRANSFERRED RECORDS (OUTPATIENT)
Dept: HEALTH INFORMATION MANAGEMENT | Facility: CLINIC | Age: 30
End: 2025-07-14
Payer: COMMERCIAL

## 2025-07-21 ENCOUNTER — TRANSFERRED RECORDS (OUTPATIENT)
Dept: HEALTH INFORMATION MANAGEMENT | Facility: CLINIC | Age: 30
End: 2025-07-21
Payer: COMMERCIAL

## 2025-07-31 ENCOUNTER — VIRTUAL VISIT (OUTPATIENT)
Dept: ENDOCRINOLOGY | Facility: CLINIC | Age: 30
End: 2025-07-31
Payer: COMMERCIAL

## 2025-07-31 VITALS — WEIGHT: 271 LBS | BODY MASS INDEX: 43.55 KG/M2 | HEIGHT: 66 IN

## 2025-07-31 DIAGNOSIS — E66.813 CLASS 3 SEVERE OBESITY WITH SERIOUS COMORBIDITY AND BODY MASS INDEX (BMI) OF 40.0 TO 44.9 IN ADULT, UNSPECIFIED OBESITY TYPE (H): Primary | ICD-10-CM

## 2025-07-31 DIAGNOSIS — R73.03 PREDIABETES: ICD-10-CM

## 2025-07-31 RX ORDER — CEFUROXIME AXETIL 500 MG/1
TABLET ORAL
COMMUNITY
Start: 2025-07-31

## 2025-07-31 ASSESSMENT — PAIN SCALES - GENERAL: PAINLEVEL_OUTOF10: MILD PAIN (3)

## 2025-07-31 NOTE — PROGRESS NOTES
Return Medical Weight Management Note     Nehemias Mascorro  MRN:  [id number]  :  1995  JASMEET:  2025    Dear Alexandra Rodrigues MD,    I had the pleasure of seeing your patient Nehemias Mascorro. She is a 30 year old female who I am continuing to see for treatment of obesity related to:         No data to display                Assessment & Plan   Problem List Items Addressed This Visit          Endocrine    Class 3 severe obesity with serious comorbidity and body mass index (BMI) of 40.0 to 44.9 in adult, unspecified obesity type (H) - Primary    Working with therapist to make changes to eating routine and exercise routine. Eating consistently. Feels good about this. No hypoglycemia now. Increased hunger with reduction to 5mg zepbound. Seeing weight gain. Would benefit from trying to increase again. Will do 7.5mg x 1-2 months then consider increase to 10mg again. Aware to avoid missing meals to help prvent hypoglycemia.. applauded for hard work.     Great work with consistency with exercise   Increase zepbound to 7.5mg - can consider taper up to 10mg as needed before visit if tolerating 7.5mg well   See dietitian next month   See me in 3 moths          Relevant Medications    tirzepatide-Weight Management (ZEPBOUND) 7.5 MG/0.5ML prefilled pen    Prediabetes    Relevant Medications    tirzepatide-Weight Management (ZEPBOUND) 7.5 MG/0.5ML prefilled pen          INTERVAL HISTORY:  Ongoing MWM with Dr. Slade since  starting BMI 36 (220lb) gap from  to . 24 week plan in  (had gained to 260lb). Lizeth Cr PA-C   - switch from liraglutide to semaglutide. High weight 270lb. Last seen 3/2025- doing well with switch to zepbound from wegovy.      Sick march- no weight loss   Last seen 2025 - tolerating 10mg well- sometimes missing meals   Reduced dose to 5mg due to hypoglycemia - increased hunger since reducing dose- worse over night and first thing in the morning     Sinus infection  today     Anti-obesity medication history    Current:   Zepbound 5mg   Wellbutrin (mood)     Past/Failed/contraindicated:   Naltrexone - was not helpful with weight loss   Topiramate - side effects of fatigue, mood changes that was not tolerated   Phentermine - contraindicated due to being on stimulant     Recent diet changes:   More hunger in the AM   Breakfast first thing in the AM - oatmeal, eggs with meat   Snack- cheese sticks/ meat sticks and fruit and nuts   Lunch - sandwich or leftovers or frozen dinner   Snack - fruit or veggie - picking at things through the day - craving and hunger   Dinner- making something at home     Decreased eating out - enjoying this, feels good about it   Smaller portions but more hungry with dose decrease   More of a routine   Increased hydration - 4-5 bottles daily- 1 with electrolytes     Recent exercise/activity changes:   Cardio and strength 3 days a week- [employer] video workouts   Feeling proud and accomplished   Harder when she has been sick     Recent stressors:   Therapist has been helping with building routine/ structure kailash around eating - helpful     Recent sleep changes: more tired with recent illness     Vitamins/Labs: 6/2025     CURRENT WEIGHT:   271 lbs 0 oz    Initial Weight (lbs): 248 lbs  Last Visits Weight: 116.6 kg (257 lb)  Cumulative weight loss (lbs): -23  Weight Loss Percentage: -9.27%        7/31/2025     9:42 AM   Changes and Difficulties   With regards to my diet, I am still struggling with: Sweets and portions   I have made the following changes to my activity/exercise since my last visit: Working out 3 days a week         MEDICATIONS:   Current Outpatient Medications   Medication Sig Dispense Refill    cefuroxime (CEFTIN) 500 MG tablet       tirzepatide-Weight Management (ZEPBOUND) 7.5 MG/0.5ML prefilled pen Inject 0.5 mLs (7.5 mg) subcutaneously every 7 days. 2 mL 2    albuterol (ACCUNEB) 1.25 MG/3ML neb solution Take 1 vial (1.25 mg) by nebulization  every 6 hours as needed for shortness of breath or wheezing. 90 mL 0    albuterol (VENTOLIN HFA) 108 (90 Base) MCG/ACT inhaler INHALE 2 PUFFS INTO THE LUNGS FOUR TIMES DAILY 18 g 2    ARIPiprazole (ABILIFY) 10 MG tablet Take 1 tablet by mouth daily at 2 pm.      azaTHIOprine (IMURAN) 50 MG tablet Take 1 tablet (50 mg) by mouth daily with food. 90 tablet 1    buPROPion (WELLBUTRIN XL) 150 MG 24 hr tablet       buPROPion (WELLBUTRIN XL) 300 MG 24 hr tablet Take 300 mg by mouth every morning      colchicine (COLCRYS) 0.6 MG tablet       CONCERTA 36 MG CR tablet TAKE 1 TABLET BY MOUTH DAILY IN THE MORNING FOR ADHD. START 11/18/22      DULoxetine (CYMBALTA) 60 MG capsule Take 60 mg by mouth daily      ferrous sulfate (FE TABS) 325 (65 Fe) MG EC tablet TAKE 1 TABLET BY MOUTH EVERY DAY 90 tablet 2    fluticasone (FLONASE) 50 MCG/ACT nasal spray Spray 1 spray into both nostrils daily. 10 mL 0    hydrocortisone 2.5 % cream Apply topically 2 times daily. 30 g 0    hydrocortisone 2.5 % cream APPLY TOPICALLY TO THE CHEST TWICE DAILY AS NEEDED      hydroxychloroquine (PLAQUENIL) 200 MG tablet Take 1 tablet (200 mg) by mouth 2 times daily (with meals) Annual Plaquenil toxicity eye screening required. 180 tablet 3    ibuprofen (ADVIL/MOTRIN) 800 MG tablet TAKE 1 TABLET BY MOUTH TWICE A DAY WITH MEALS FOR 14 DAYS      ketorolac (TORADOL) 10 MG tablet Take 1 tablet (10 mg) by mouth every 6 hours as needed for moderate pain 20 tablet 0    levocetirizine (XYZAL) 5 MG tablet Take 5 mg by mouth every 24 hours      levothyroxine (SYNTHROID/LEVOTHROID) 75 MCG tablet Take 1 tablet (75 mcg) by mouth daily. 90 tablet 2    lidocaine (LIDODERM) 5 % patch Place 1 patch onto the skin every 24 hours To prevent lidocaine toxicity, patient should be patch free for 12 hrs daily. 15 patch 1    meclizine (ANTIVERT) 25 MG tablet Take 1 tablet (25 mg) by mouth 3 times daily as needed for dizziness 60 tablet 3    methylphenidate (RITALIN) 10 MG tablet  TAKE 1/2 - 1 TABLET ONCE DAILY AS NEEDED FOR ATTENTION AND FOCUS.      modafinil (PROVIGIL) 100 MG tablet TAKE 2 TABLETS BY MOUTH EVERY DAY 60 tablet 3    omeprazole (PRILOSEC) 20 MG DR capsule Take 1 capsule (20 mg) by mouth 2 times daily      polyethylene glycol (MIRALAX) 17 GM/Dose powder Take 1 capful by mouth daily as needed for constipation      RELPAX 40 MG tablet       spironolactone-HCTZ (ALDACTAZIDE) 25-25 MG tablet Take 1 tablet by mouth every 72 hours as needed 36 tablet 5    TAZORAC 0.1 % external cream APPLY TOPICALLY TO THE AFFECTED AREA AT BEDTIME      tirzepatide-Weight Management (ZEPBOUND) 5 MG/0.5ML prefilled pen Inject 0.5 mLs (5 mg) subcutaneously every 7 days. 2 mL 2    triamcinolone (KENALOG) 0.1 % paste APPLY TO AFFECTED AREA 2 TIMES DAILY AS DIRECTED             7/31/2025     9:42 AM   Weight Loss Medication History Reviewed With Patient   Which weight loss medications are you currently taking on a regular basis? None   Are you having any side effects from the weight loss medication that we have prescribed you? No       Office Visit on 06/24/2025   Component Date Value Ref Range Status    Sodium 06/24/2025 140  135 - 145 mmol/L Final    Potassium 06/24/2025 4.2  3.4 - 5.3 mmol/L Final    Carbon Dioxide (CO2) 06/24/2025 27  22 - 29 mmol/L Final    Anion Gap 06/24/2025 7  7 - 15 mmol/L Final    Urea Nitrogen 06/24/2025 12.5  6.0 - 20.0 mg/dL Final    Creatinine 06/24/2025 0.99 (H)  0.51 - 0.95 mg/dL Final    GFR Estimate 06/24/2025 78  >60 mL/min/1.73m2 Final    eGFR calculated using 2021 CKD-EPI equation.    Calcium 06/24/2025 9.0  8.8 - 10.4 mg/dL Final    Chloride 06/24/2025 106  98 - 107 mmol/L Final    Glucose 06/24/2025 83  70 - 99 mg/dL Final    Alkaline Phosphatase 06/24/2025 76  40 - 150 U/L Final    AST 06/24/2025 22  0 - 45 U/L Final    ALT 06/24/2025 36  0 - 50 U/L Final    Protein Total 06/24/2025 6.5  6.4 - 8.3 g/dL Final    Albumin 06/24/2025 4.1  3.5 - 5.2 g/dL Final     "Bilirubin Total 06/24/2025 0.3  <=1.2 mg/dL Final    Patient Fasting > 8hrs? 06/24/2025 Yes   Final    WBC Count 06/24/2025 4.3  4.0 - 11.0 10e3/uL Final    RBC Count 06/24/2025 4.36  3.80 - 5.20 10e6/uL Final    Hemoglobin 06/24/2025 12.8  11.7 - 15.7 g/dL Final    Hematocrit 06/24/2025 39.6  35.0 - 47.0 % Final    MCV 06/24/2025 91  78 - 100 fL Final    MCH 06/24/2025 29.4  26.5 - 33.0 pg Final    MCHC 06/24/2025 32.3  31.5 - 36.5 g/dL Final    RDW 06/24/2025 13.1  10.0 - 15.0 % Final    Platelet Count 06/24/2025 246  150 - 450 10e3/uL Final    Estimated Average Glucose 06/24/2025 94  <117 mg/dL Final    Hemoglobin A1C 06/24/2025 4.9  0.0 - 5.6 % Final    Normal <5.7%   Prediabetes 5.7-6.4%    Diabetes 6.5% or higher     Note: Adopted from ADA consensus guidelines.    Cholesterol 06/24/2025 152  <200 mg/dL Final    Triglycerides 06/24/2025 47  <150 mg/dL Final    Direct Measure HDL 06/24/2025 57  >=50 mg/dL Final    LDL Cholesterol Calculated 06/24/2025 86  <100 mg/dL Final    LDL calculated using the Friedewald equation.    Non HDL Cholesterol 06/24/2025 95  <130 mg/dL Final    Patient Fasting > 8hrs? 06/24/2025 Yes   Final    Prolactin 06/24/2025 2 (L)  5 - 23 ng/mL Final    Free T4 06/24/2025 1.09  0.90 - 1.70 ng/dL Final    TSH 06/24/2025 0.13 (L)  0.30 - 4.20 uIU/mL Final           1/3/2022     3:03 PM   RODRIGUE Score (Last Two)   RODRIGUE Raw Score 29   Activation Score 52.9   RODRIGUE Level 2         PHYSICAL EXAM:  Objective    Ht 1.676 m (5' 6\")   Wt 122.9 kg (271 lb)   LMP 05/25/2025 (Exact Date)   BMI 43.74 kg/m      Vitals - Patient Reported  Pain Score: Mild Pain (3)  Pain Loc: Knee        GENERAL: alert and no distress  EYES: Eyes grossly normal to inspection.  No discharge or erythema, or obvious scleral/conjunctival abnormalities.  RESP: No audible wheeze, cough, or visible cyanosis.    SKIN: Visible skin clear. No significant rash, abnormal pigmentation or lesions.  NEURO: Cranial nerves grossly intact.  " Mentation and speech appropriate for age.  PSYCH: Appropriate affect, tone, and pace of words        Sincerely,    Nicol Broderick, APRN CNP      22 minutes spent by me on the date of the encounter doing chart review, history and exam, documentation and further activities per the note    The longitudinal plan of care for the diagnosis(es)/condition(s) as documented were addressed during this visit. Due to the added complexity in care, I will continue to support Immanuelle in the subsequent management and with ongoing continuity of care.

## 2025-07-31 NOTE — PATIENT INSTRUCTIONS
Great work using smaller plate   Great work doing more of a routine with eating   Great work with consistency with exercise   Increase zepbound to 7.5mg - can consider taper up to 10mg as needed before visit if tolerating 7.5mg well   See dietitian next month   See me in 3 moths

## 2025-07-31 NOTE — LETTER
2025       RE: Nehemias Mascorro  [address]  [place] [address]     Dear Colleague,    Thank you for referring your patient, Nehemias Mascorro, to the [facility] [place] [facility]. Please see a copy of my visit note below.      Return Medical Weight Management Note     Nehemias Mascorro  MRN:  [id number]  :  1995  JASMEET:  2025    Dear Alexandra Rodrigues MD,    I had the pleasure of seeing your patient Nehemias Mascorro. She is a 30 year old female who I am continuing to see for treatment of obesity related to:         No data to display                Assessment & Plan  Problem List Items Addressed This Visit          Endocrine    Class 3 severe obesity with serious comorbidity and body mass index (BMI) of 40.0 to 44.9 in adult, unspecified obesity type (H) - Primary    Working with therapist to make changes to eating routine and exercise routine. Eating consistently. Feels good about this. No hypoglycemia now. Increased hunger with reduction to 5mg zepbound. Seeing weight gain. Would benefit from trying to increase again. Will do 7.5mg x 1-2 months then consider increase to 10mg again. Aware to avoid missing meals to help prvent hypoglycemia.. applauded for hard work.     Great work with consistency with exercise   Increase zepbound to 7.5mg - can consider taper up to 10mg as needed before visit if tolerating 7.5mg well   See dietitian next month   See me in 3 moths          Relevant Medications    tirzepatide-Weight Management (ZEPBOUND) 7.5 MG/0.5ML prefilled pen    Prediabetes    Relevant Medications    tirzepatide-Weight Management (ZEPBOUND) 7.5 MG/0.5ML prefilled pen          INTERVAL HISTORY:  Ongoing MWM with Dr. Slade since  starting BMI 36 (220lb) gap from  to . 24 week plan in  (had gained to 260lb). Lizeth Cr PA-C   - switch from liraglutide to semaglutide.  High weight 270lb. Last seen 3/2025- doing well with switch to zepbound from wegovy.      Sick march- no weight loss   Last seen 6/2025 - tolerating 10mg well- sometimes missing meals   Reduced dose to 5mg due to hypoglycemia - increased hunger since reducing dose- worse over night and first thing in the morning     Sinus infection today     Anti-obesity medication history    Current:   Zepbound 5mg   Wellbutrin (mood)     Past/Failed/contraindicated:   Naltrexone - was not helpful with weight loss   Topiramate - side effects of fatigue, mood changes that was not tolerated   Phentermine - contraindicated due to being on stimulant     Recent diet changes:   More hunger in the AM   Breakfast first thing in the AM - oatmeal, eggs with meat   Snack- cheese sticks/ meat sticks and fruit and nuts   Lunch - sandwich or leftovers or frozen dinner   Snack - fruit or veggie - picking at things through the day - craving and hunger   Dinner- making something at home     Decreased eating out - enjoying this, feels good about it   Smaller portions but more hungry with dose decrease   More of a routine   Increased hydration - 4-5 bottles daily- 1 with electrolytes     Recent exercise/activity changes:   Cardio and strength 3 days a week- [email or web address] video workouts   Feeling proud and accomplished   Harder when she has been sick     Recent stressors:   Therapist has been helping with building routine/ structure kailash around eating - helpful     Recent sleep changes: more tired with recent illness     Vitamins/Labs: 6/2025     CURRENT WEIGHT:   271 lbs 0 oz    Initial Weight (lbs): 248 lbs  Last Visits Weight: 116.6 kg (257 lb)  Cumulative weight loss (lbs): -23  Weight Loss Percentage: -9.27%        7/31/2025     9:42 AM   Changes and Difficulties   With regards to my diet, I am still struggling with: Sweets and portions   I have made the following changes to my activity/exercise since my last visit: Working out 3 days a week          MEDICATIONS:   Current Outpatient Medications   Medication Sig Dispense Refill     cefuroxime (CEFTIN) 500 MG tablet        tirzepatide-Weight Management (ZEPBOUND) 7.5 MG/0.5ML prefilled pen Inject 0.5 mLs (7.5 mg) subcutaneously every 7 days. 2 mL 2     albuterol (ACCUNEB) 1.25 MG/3ML neb solution Take 1 vial (1.25 mg) by nebulization every 6 hours as needed for shortness of breath or wheezing. 90 mL 0     albuterol (VENTOLIN HFA) 108 (90 Base) MCG/ACT inhaler INHALE 2 PUFFS INTO THE LUNGS FOUR TIMES DAILY 18 g 2     ARIPiprazole (ABILIFY) 10 MG tablet Take 1 tablet by mouth daily at 2 pm.       azaTHIOprine (IMURAN) 50 MG tablet Take 1 tablet (50 mg) by mouth daily with food. 90 tablet 1     buPROPion (WELLBUTRIN XL) 150 MG 24 hr tablet        buPROPion (WELLBUTRIN XL) 300 MG 24 hr tablet Take 300 mg by mouth every morning       colchicine (COLCRYS) 0.6 MG tablet        CONCERTA 36 MG CR tablet TAKE 1 TABLET BY MOUTH DAILY IN THE MORNING FOR ADHD. START 11/18/22       DULoxetine (CYMBALTA) 60 MG capsule Take 60 mg by mouth daily       ferrous sulfate (FE TABS) 325 (65 Fe) MG EC tablet TAKE 1 TABLET BY MOUTH EVERY DAY 90 tablet 2     fluticasone (FLONASE) 50 MCG/ACT nasal spray Spray 1 spray into both nostrils daily. 10 mL 0     hydrocortisone 2.5 % cream Apply topically 2 times daily. 30 g 0     hydrocortisone 2.5 % cream APPLY TOPICALLY TO THE CHEST TWICE DAILY AS NEEDED       hydroxychloroquine (PLAQUENIL) 200 MG tablet Take 1 tablet (200 mg) by mouth 2 times daily (with meals) Annual Plaquenil toxicity eye screening required. 180 tablet 3     ibuprofen (ADVIL/MOTRIN) 800 MG tablet TAKE 1 TABLET BY MOUTH TWICE A DAY WITH MEALS FOR 14 DAYS       ketorolac (TORADOL) 10 MG tablet Take 1 tablet (10 mg) by mouth every 6 hours as needed for moderate pain 20 tablet 0     levocetirizine (XYZAL) 5 MG tablet Take 5 mg by mouth every 24 hours       levothyroxine (SYNTHROID/LEVOTHROID) 75 MCG tablet Take 1 tablet  (75 mcg) by mouth daily. 90 tablet 2     lidocaine (LIDODERM) 5 % patch Place 1 patch onto the skin every 24 hours To prevent lidocaine toxicity, patient should be patch free for 12 hrs daily. 15 patch 1     meclizine (ANTIVERT) 25 MG tablet Take 1 tablet (25 mg) by mouth 3 times daily as needed for dizziness 60 tablet 3     methylphenidate (RITALIN) 10 MG tablet TAKE 1/2 - 1 TABLET ONCE DAILY AS NEEDED FOR ATTENTION AND FOCUS.       modafinil (PROVIGIL) 100 MG tablet TAKE 2 TABLETS BY MOUTH EVERY DAY 60 tablet 3     omeprazole (PRILOSEC) 20 MG DR capsule Take 1 capsule (20 mg) by mouth 2 times daily       polyethylene glycol (MIRALAX) 17 GM/Dose powder Take 1 capful by mouth daily as needed for constipation       RELPAX 40 MG tablet        spironolactone-HCTZ (ALDACTAZIDE) 25-25 MG tablet Take 1 tablet by mouth every 72 hours as needed 36 tablet 5     TAZORAC 0.1 % external cream APPLY TOPICALLY TO THE AFFECTED AREA AT BEDTIME       tirzepatide-Weight Management (ZEPBOUND) 5 MG/0.5ML prefilled pen Inject 0.5 mLs (5 mg) subcutaneously every 7 days. 2 mL 2     triamcinolone (KENALOG) 0.1 % paste APPLY TO AFFECTED AREA 2 TIMES DAILY AS DIRECTED             7/31/2025     9:42 AM   Weight Loss Medication History Reviewed With Patient   Which weight loss medications are you currently taking on a regular basis? None   Are you having any side effects from the weight loss medication that we have prescribed you? No       Office Visit on 06/24/2025   Component Date Value Ref Range Status     Sodium 06/24/2025 140  135 - 145 mmol/L Final     Potassium 06/24/2025 4.2  3.4 - 5.3 mmol/L Final     Carbon Dioxide (CO2) 06/24/2025 27  22 - 29 mmol/L Final     Anion Gap 06/24/2025 7  7 - 15 mmol/L Final     Urea Nitrogen 06/24/2025 12.5  6.0 - 20.0 mg/dL Final     Creatinine 06/24/2025 0.99 (H)  0.51 - 0.95 mg/dL Final     GFR Estimate 06/24/2025 78  >60 mL/min/1.73m2 Final    eGFR calculated using 2021 CKD-EPI equation.     Calcium  "06/24/2025 9.0  8.8 - 10.4 mg/dL Final     Chloride 06/24/2025 106  98 - 107 mmol/L Final     Glucose 06/24/2025 83  70 - 99 mg/dL Final     Alkaline Phosphatase 06/24/2025 76  40 - 150 U/L Final     AST 06/24/2025 22  0 - 45 U/L Final     ALT 06/24/2025 36  0 - 50 U/L Final     Protein Total 06/24/2025 6.5  6.4 - 8.3 g/dL Final     Albumin 06/24/2025 4.1  3.5 - 5.2 g/dL Final     Bilirubin Total 06/24/2025 0.3  <=1.2 mg/dL Final     Patient Fasting > 8hrs? 06/24/2025 Yes   Final     WBC Count 06/24/2025 4.3  4.0 - 11.0 10e3/uL Final     RBC Count 06/24/2025 4.36  3.80 - 5.20 10e6/uL Final     Hemoglobin 06/24/2025 12.8  11.7 - 15.7 g/dL Final     Hematocrit 06/24/2025 39.6  35.0 - 47.0 % Final     MCV 06/24/2025 91  78 - 100 fL Final     MCH 06/24/2025 29.4  26.5 - 33.0 pg Final     MCHC 06/24/2025 32.3  31.5 - 36.5 g/dL Final     RDW 06/24/2025 13.1  10.0 - 15.0 % Final     Platelet Count 06/24/2025 246  150 - 450 10e3/uL Final     Estimated Average Glucose 06/24/2025 94  <117 mg/dL Final     Hemoglobin A1C 06/24/2025 4.9  0.0 - 5.6 % Final    Normal <5.7%   Prediabetes 5.7-6.4%    Diabetes 6.5% or higher     Note: Adopted from ADA consensus guidelines.     Cholesterol 06/24/2025 152  <200 mg/dL Final     Triglycerides 06/24/2025 47  <150 mg/dL Final     Direct Measure HDL 06/24/2025 57  >=50 mg/dL Final     LDL Cholesterol Calculated 06/24/2025 86  <100 mg/dL Final    LDL calculated using the Friedewald equation.     Non HDL Cholesterol 06/24/2025 95  <130 mg/dL Final     Patient Fasting > 8hrs? 06/24/2025 Yes   Final     Prolactin 06/24/2025 2 (L)  5 - 23 ng/mL Final     Free T4 06/24/2025 1.09  0.90 - 1.70 ng/dL Final     TSH 06/24/2025 0.13 (L)  0.30 - 4.20 uIU/mL Final           1/3/2022     3:03 PM   RODRIGUE Score (Last Two)   RODRIGUE Raw Score 29   Activation Score 52.9   RODRIGUE Level 2         PHYSICAL EXAM:  Objective   Ht 1.676 m (5' 6\")   Wt 122.9 kg (271 lb)   LMP 05/25/2025 (Exact Date)   BMI 43.74 kg/m  "     Vitals - Patient Reported  Pain Score: Mild Pain (3)  Pain Loc: Knee        GENERAL: alert and no distress  EYES: Eyes grossly normal to inspection.  No discharge or erythema, or obvious scleral/conjunctival abnormalities.  RESP: No audible wheeze, cough, or visible cyanosis.    SKIN: Visible skin clear. No significant rash, abnormal pigmentation or lesions.  NEURO: Cranial nerves grossly intact.  Mentation and speech appropriate for age.  PSYCH: Appropriate affect, tone, and pace of words        Sincerely,    JERALD Gardiner CNP      22 minutes spent by me on the date of the encounter doing chart review, history and exam, documentation and further activities per the note    The longitudinal plan of care for the diagnosis(es)/condition(s) as documented were addressed during this visit. Due to the added complexity in care, I will continue to support Immanuelle in the subsequent management and with ongoing continuity of care.    Again, thank you for allowing me to participate in the care of your patient.      Sincerely,    JERALD Gardiner CNP

## 2025-07-31 NOTE — ASSESSMENT & PLAN NOTE
Working with therapist to make changes to eating routine and exercise routine. Eating consistently. Feels good about this. No hypoglycemia now. Increased hunger with reduction to 5mg zepbound. Seeing weight gain. Would benefit from trying to increase again. Will do 7.5mg x 1-2 months then consider increase to 10mg again. Aware to avoid missing meals to help prvent hypoglycemia.. applauded for hard work.     Great work with consistency with exercise   Increase zepbound to 7.5mg - can consider taper up to 10mg as needed before visit if tolerating 7.5mg well   See dietitian next month   See me in 3 moths

## 2025-08-07 DIAGNOSIS — M32.19 OTHER SYSTEMIC LUPUS ERYTHEMATOSUS WITH OTHER ORGAN INVOLVEMENT (H): ICD-10-CM

## 2025-08-11 RX ORDER — HYDROXYCHLOROQUINE SULFATE 200 MG/1
200 TABLET, FILM COATED ORAL 2 TIMES DAILY
Qty: 180 TABLET | Refills: 3 | Status: SHIPPED | OUTPATIENT
Start: 2025-08-11

## 2025-08-13 ENCOUNTER — OFFICE VISIT (OUTPATIENT)
Dept: RHEUMATOLOGY | Facility: CLINIC | Age: 30
End: 2025-08-13
Attending: INTERNAL MEDICINE
Payer: COMMERCIAL

## 2025-08-13 VITALS
BODY MASS INDEX: 43.74 KG/M2 | SYSTOLIC BLOOD PRESSURE: 138 MMHG | DIASTOLIC BLOOD PRESSURE: 83 MMHG | WEIGHT: 271 LBS | HEART RATE: 85 BPM | OXYGEN SATURATION: 98 %

## 2025-08-13 DIAGNOSIS — M32.19 OTHER SYSTEMIC LUPUS ERYTHEMATOSUS WITH OTHER ORGAN INVOLVEMENT (H): ICD-10-CM

## 2025-08-13 DIAGNOSIS — Z51.81 MEDICATION MONITORING ENCOUNTER: Primary | ICD-10-CM

## 2025-08-13 PROCEDURE — 1125F AMNT PAIN NOTED PAIN PRSNT: CPT | Performed by: INTERNAL MEDICINE

## 2025-08-13 PROCEDURE — 3075F SYST BP GE 130 - 139MM HG: CPT | Performed by: INTERNAL MEDICINE

## 2025-08-13 PROCEDURE — G0463 HOSPITAL OUTPT CLINIC VISIT: HCPCS | Performed by: INTERNAL MEDICINE

## 2025-08-13 PROCEDURE — G2211 COMPLEX E/M VISIT ADD ON: HCPCS | Performed by: INTERNAL MEDICINE

## 2025-08-13 PROCEDURE — 3079F DIAST BP 80-89 MM HG: CPT | Performed by: INTERNAL MEDICINE

## 2025-08-13 PROCEDURE — 99214 OFFICE O/P EST MOD 30 MIN: CPT | Performed by: INTERNAL MEDICINE

## 2025-08-13 ASSESSMENT — PAIN SCALES - GENERAL: PAINLEVEL_OUTOF10: MILD PAIN (3)

## 2025-08-25 ENCOUNTER — VIRTUAL VISIT (OUTPATIENT)
Dept: ENDOCRINOLOGY | Facility: CLINIC | Age: 30
End: 2025-08-25
Payer: COMMERCIAL

## 2025-08-25 VITALS — WEIGHT: 265 LBS | HEIGHT: 66 IN | BODY MASS INDEX: 42.59 KG/M2

## 2025-08-25 DIAGNOSIS — E66.813 CLASS 3 SEVERE OBESITY WITH SERIOUS COMORBIDITY AND BODY MASS INDEX (BMI) OF 40.0 TO 44.9 IN ADULT, UNSPECIFIED OBESITY TYPE (H): ICD-10-CM

## 2025-08-25 DIAGNOSIS — Z71.3 NUTRITIONAL COUNSELING: Primary | ICD-10-CM

## 2025-08-25 DIAGNOSIS — R22.0 CHEEK SWELLING: ICD-10-CM

## 2025-08-25 ASSESSMENT — PAIN SCALES - GENERAL: PAINLEVEL_OUTOF10: NO PAIN (0)

## 2025-08-26 RX ORDER — FLUTICASONE PROPIONATE 50 MCG
1 SPRAY, SUSPENSION (ML) NASAL DAILY
Qty: 10 ML | Refills: 0 | OUTPATIENT
Start: 2025-08-26

## 2025-09-03 ENCOUNTER — TRANSFERRED RECORDS (OUTPATIENT)
Dept: HEALTH INFORMATION MANAGEMENT | Facility: CLINIC | Age: 30
End: 2025-09-03
Payer: COMMERCIAL

## 2048-06-22 ENCOUNTER — RECORDS - HEALTHEAST (OUTPATIENT)
Dept: ADMINISTRATIVE | Facility: OTHER | Age: 53
End: 2048-06-22

## (undated) DEVICE — SYR 30ML SLIP TIP W/O NDL 302833

## (undated) DEVICE — ENDO SYSTEM WATER BOTTLE & TUBING W/CO2 FILTER 00711549

## (undated) DEVICE — PAD CHUX UNDERPAD 23X24" 7136

## (undated) DEVICE — DRAPE SHEET MED 44X70" 9355

## (undated) DEVICE — TUBING SUCTION 10'X3/16" N510

## (undated) DEVICE — SOL WATER IRRIG 1000ML BOTTLE 2F7114

## (undated) DEVICE — JELLY LUBRICATING SURGILUBE 2OZ TUBE

## (undated) DEVICE — KIT ENDO FIRST STEP DISINFECTANT 200ML W/POUCH EP-4

## (undated) DEVICE — ENDO BITE BLOCK ADULT OMNI-BLOC

## (undated) DEVICE — SUCTION MANIFOLD NEPTUNE 2 SYS 4 PORT 0702-020-000

## (undated) DEVICE — BASIN SET SINGLE STERILE 13752-624

## (undated) RX ORDER — FENTANYL CITRATE 50 UG/ML
INJECTION, SOLUTION INTRAMUSCULAR; INTRAVENOUS
Status: DISPENSED
Start: 2023-03-15

## (undated) RX ORDER — FENTANYL CITRATE 50 UG/ML
INJECTION, SOLUTION INTRAMUSCULAR; INTRAVENOUS
Status: DISPENSED
Start: 2022-04-27

## (undated) RX ORDER — DIPHENHYDRAMINE HYDROCHLORIDE 50 MG/ML
INJECTION INTRAMUSCULAR; INTRAVENOUS
Status: DISPENSED
Start: 2023-03-15

## (undated) RX ORDER — ONDANSETRON 2 MG/ML
INJECTION INTRAMUSCULAR; INTRAVENOUS
Status: DISPENSED
Start: 2023-03-15

## (undated) RX ORDER — HEPARIN SODIUM (PORCINE) LOCK FLUSH IV SOLN 100 UNIT/ML 100 UNIT/ML
SOLUTION INTRAVENOUS
Status: DISPENSED
Start: 2022-11-02